# Patient Record
Sex: MALE | Race: WHITE | NOT HISPANIC OR LATINO | Employment: OTHER | URBAN - METROPOLITAN AREA
[De-identification: names, ages, dates, MRNs, and addresses within clinical notes are randomized per-mention and may not be internally consistent; named-entity substitution may affect disease eponyms.]

---

## 2018-10-16 ENCOUNTER — OFFICE VISIT (OUTPATIENT)
Dept: MEDICAL GROUP | Facility: PHYSICIAN GROUP | Age: 53
End: 2018-10-16
Payer: COMMERCIAL

## 2018-10-16 VITALS
HEIGHT: 69 IN | DIASTOLIC BLOOD PRESSURE: 70 MMHG | TEMPERATURE: 97.8 F | HEART RATE: 74 BPM | BODY MASS INDEX: 27.85 KG/M2 | SYSTOLIC BLOOD PRESSURE: 122 MMHG | WEIGHT: 188 LBS | OXYGEN SATURATION: 95 %

## 2018-10-16 DIAGNOSIS — J33.9 NASAL POLYP: ICD-10-CM

## 2018-10-16 DIAGNOSIS — Z12.12 SCREENING FOR COLORECTAL CANCER: ICD-10-CM

## 2018-10-16 DIAGNOSIS — Z23 NEED FOR INFLUENZA VACCINATION: ICD-10-CM

## 2018-10-16 DIAGNOSIS — Z12.11 SCREENING FOR COLORECTAL CANCER: ICD-10-CM

## 2018-10-16 PROCEDURE — 99386 PREV VISIT NEW AGE 40-64: CPT | Mod: 25 | Performed by: FAMILY MEDICINE

## 2018-10-16 PROCEDURE — 90686 IIV4 VACC NO PRSV 0.5 ML IM: CPT | Performed by: FAMILY MEDICINE

## 2018-10-16 PROCEDURE — 90471 IMMUNIZATION ADMIN: CPT | Performed by: FAMILY MEDICINE

## 2018-10-16 RX ORDER — FLUTICASONE PROPIONATE 50 MCG
1 SPRAY, SUSPENSION (ML) NASAL DAILY
COMMUNITY
End: 2018-10-16 | Stop reason: SDUPTHER

## 2018-10-16 RX ORDER — FLUTICASONE PROPIONATE 50 MCG
1 SPRAY, SUSPENSION (ML) NASAL DAILY
Qty: 16 G | Refills: 5 | Status: SHIPPED | OUTPATIENT
Start: 2018-10-16 | End: 2020-08-07

## 2018-10-16 ASSESSMENT — ENCOUNTER SYMPTOMS
PSYCHIATRIC NEGATIVE: 1
COUGH: 0
BLURRED VISION: 0
MYALGIAS: 0
CARDIOVASCULAR NEGATIVE: 1
BRUISES/BLEEDS EASILY: 0
DIZZINESS: 0
NEUROLOGICAL NEGATIVE: 1
CONSTITUTIONAL NEGATIVE: 1
HEMOPTYSIS: 0
MUSCULOSKELETAL NEGATIVE: 1
EYES NEGATIVE: 1
NAUSEA: 0
PALPITATIONS: 0
RESPIRATORY NEGATIVE: 1
DEPRESSION: 0
TINGLING: 0
HEADACHES: 0
DOUBLE VISION: 0
SINUS PAIN: 1
HEARTBURN: 0
CHILLS: 0
FEVER: 0
GASTROINTESTINAL NEGATIVE: 1

## 2018-10-16 ASSESSMENT — PATIENT HEALTH QUESTIONNAIRE - PHQ9: CLINICAL INTERPRETATION OF PHQ2 SCORE: 0

## 2018-10-16 NOTE — PROGRESS NOTES
Subjective:      Sebastian Wall is a 53 y.o. male who presents with [facility]            New patient visit, well controlled nasal polyps    Needs colonoscopy and flu shot for hm    1. Need for influenza vaccination    - Flu Quad Inj >3 Year Pre-Filled (Preservative Free)    2. Nasal polyp    - fluticasone (FLONASE) 50 MCG/ACT nasal spray; Spray 1 Spray in nose every day.  Dispense: 16 g; Refill: 5    3. Screening for colorectal cancer    - REFERRAL TO GI FOR COLONOSCOPY    Past Medical History:  No date: Nasal polyp  Past Surgical History:  No date: NASAL POLYPECTOMY  Smoking status: Never Smoker                                                              Smokeless tobacco: Never Used                      Alcohol use: Yes              Comment: occ    History reviewed.  No pertinent family history.      Current Outpatient Prescriptions: •  fluticasone-salmeterol (ADVAIR) 250-50 MCG/DOSE AEROSOL POWDER, BREATH ACTIVATED, Inhale 1 Puff by mouth 2 Times a Day., Disp: , Rfl: •  Mometasone Furo-Formoterol Fum (DULERA) 100-5 MCG/ACT Aerosol, Inhale  by mouth., Disp: , Rfl: •  fluticasone (FLONASE) 50 MCG/ACT nasal spray, Spray 1 Spray in nose every day., Disp: 16 g, Rfl: 5    Patient was instructed on the use of medications, either prescriptions or OTC and informed on when the appropriate follow up time period should be. In addition, patient was also instructed that should any acute worsening occur that they should notify this clinic asap or call 911.            Review of Systems   Constitutional: Negative.  Negative for chills and fever.   HENT: Positive for congestion and sinus pain. Negative for hearing loss.    Eyes: Negative.  Negative for blurred vision and double vision.   Respiratory: Negative.  Negative for cough and hemoptysis.    Cardiovascular: Negative.  Negative for chest pain and palpitations.   Gastrointestinal: Negative.  Negative for heartburn and nausea.   Genitourinary: Negative.  Negative for dysuria.  "  Musculoskeletal: Negative.  Negative for myalgias.   Skin: Negative.  Negative for rash.   Neurological: Negative.  Negative for dizziness, tingling and headaches.   Endo/Heme/Allergies: Negative.  Does not bruise/bleed easily.   Psychiatric/Behavioral: Negative.  Negative for depression and suicidal ideas.   All other systems reviewed and are negative.         Objective:     /70 (BP Location: Right arm, Patient Position: Sitting)   Pulse 74   Temp 36.6 °C (97.8 °F)   Ht 1.76 m (5' 9.29\")   Wt 85.3 kg (188 lb)   SpO2 95%   BMI 27.53 kg/m²      Physical Exam   Constitutional: He is oriented to person, place, and time. He appears well-developed and well-nourished. No distress.   HENT:   Head: Normocephalic and atraumatic.   Right Ear: External ear normal.   Left Ear: External ear normal.   Nose: Mucosal edema present.   Mouth/Throat: Oropharynx is clear and moist. No oropharyngeal exudate.   Eyes: Pupils are equal, round, and reactive to light. Right eye exhibits no discharge. Left eye exhibits no discharge. No scleral icterus.   Neck: Normal range of motion. Neck supple. No JVD present. No tracheal deviation present. No thyromegaly present.   Cardiovascular: Normal rate, regular rhythm, normal heart sounds and intact distal pulses.  Exam reveals no gallop and no friction rub.    No murmur heard.  Pulmonary/Chest: Effort normal and breath sounds normal. No stridor. No respiratory distress. He has no wheezes. He has no rales. He exhibits no tenderness.   Abdominal: Soft. He exhibits no distension. There is no tenderness.   Lymphadenopathy:     He has no cervical adenopathy.   Neurological: He is alert and oriented to person, place, and time. No cranial nerve deficit.   Skin: He is not diaphoretic.   Psychiatric: He has a normal mood and affect. His behavior is normal. Judgment and thought content normal.   Nursing note and vitals reviewed.              Assessment/Plan:     1. Need for influenza " vaccination    - Flu Quad Inj >3 Year Pre-Filled (Preservative Free)    2. Nasal polyp    - fluticasone (FLONASE) 50 MCG/ACT nasal spray; Spray 1 Spray in nose every day.  Dispense: 16 g; Refill: 5    3. Screening for colorectal cancer  - REFERRAL TO GI FOR COLONOSCOPY

## 2018-12-19 DIAGNOSIS — J45.909 UNCOMPLICATED ASTHMA, UNSPECIFIED ASTHMA SEVERITY, UNSPECIFIED WHETHER PERSISTENT: ICD-10-CM

## 2018-12-19 NOTE — TELEPHONE ENCOUNTER
Was the patient seen in the last year in this department? Yes    Does patient have an active prescription for medications requested? Yes    Received Request Via: Pharmacy     /Last Visit: 10/16/18  Last Labs:

## 2019-10-16 DIAGNOSIS — J45.909 UNCOMPLICATED ASTHMA, UNSPECIFIED ASTHMA SEVERITY, UNSPECIFIED WHETHER PERSISTENT: ICD-10-CM

## 2019-11-14 RX ORDER — FLUTICASONE PROPIONATE 50 MCG
SPRAY, SUSPENSION (ML) NASAL
COMMUNITY
Start: 2016-09-30 | End: 2019-11-14

## 2019-11-15 ENCOUNTER — ANESTHESIA (OUTPATIENT)
Dept: SURGERY | Facility: MEDICAL CENTER | Age: 54
End: 2019-11-15
Payer: COMMERCIAL

## 2019-11-15 ENCOUNTER — ANESTHESIA EVENT (OUTPATIENT)
Dept: SURGERY | Facility: MEDICAL CENTER | Age: 54
End: 2019-11-15
Payer: COMMERCIAL

## 2019-11-15 ENCOUNTER — HOSPITAL ENCOUNTER (OUTPATIENT)
Facility: MEDICAL CENTER | Age: 54
End: 2019-11-15
Attending: OTOLARYNGOLOGY | Admitting: OTOLARYNGOLOGY
Payer: COMMERCIAL

## 2019-11-15 VITALS
BODY MASS INDEX: 27.99 KG/M2 | RESPIRATION RATE: 16 BRPM | DIASTOLIC BLOOD PRESSURE: 58 MMHG | WEIGHT: 199.96 LBS | TEMPERATURE: 97.7 F | HEART RATE: 76 BPM | SYSTOLIC BLOOD PRESSURE: 131 MMHG | HEIGHT: 71 IN | OXYGEN SATURATION: 96 %

## 2019-11-15 DIAGNOSIS — Z98.890 STATUS POST FUNCTIONAL ENDOSCOPIC SINUS SURGERY (FESS): ICD-10-CM

## 2019-11-15 PROCEDURE — 500331 HCHG COTTONOID, SURG PATTIE: Performed by: OTOLARYNGOLOGY

## 2019-11-15 PROCEDURE — 501404 HCHG SPLINT, NASAL DOYLE AIRWAY: Performed by: OTOLARYNGOLOGY

## 2019-11-15 PROCEDURE — 160048 HCHG OR STATISTICAL LEVEL 1-5: Performed by: OTOLARYNGOLOGY

## 2019-11-15 PROCEDURE — 160041 HCHG SURGERY MINUTES - EA ADDL 1 MIN LEVEL 4: Performed by: OTOLARYNGOLOGY

## 2019-11-15 PROCEDURE — 700102 HCHG RX REV CODE 250 W/ 637 OVERRIDE(OP): Performed by: ANESTHESIOLOGY

## 2019-11-15 PROCEDURE — A9270 NON-COVERED ITEM OR SERVICE: HCPCS | Performed by: OTOLARYNGOLOGY

## 2019-11-15 PROCEDURE — 700111 HCHG RX REV CODE 636 W/ 250 OVERRIDE (IP): Performed by: ANESTHESIOLOGY

## 2019-11-15 PROCEDURE — A9270 NON-COVERED ITEM OR SERVICE: HCPCS | Performed by: ANESTHESIOLOGY

## 2019-11-15 PROCEDURE — 700101 HCHG RX REV CODE 250: Performed by: ANESTHESIOLOGY

## 2019-11-15 PROCEDURE — 160002 HCHG RECOVERY MINUTES (STAT): Performed by: OTOLARYNGOLOGY

## 2019-11-15 PROCEDURE — 160025 RECOVERY II MINUTES (STATS): Performed by: OTOLARYNGOLOGY

## 2019-11-15 PROCEDURE — 160035 HCHG PACU - 1ST 60 MINS PHASE I: Performed by: OTOLARYNGOLOGY

## 2019-11-15 PROCEDURE — 160009 HCHG ANES TIME/MIN: Performed by: OTOLARYNGOLOGY

## 2019-11-15 PROCEDURE — A6402 STERILE GAUZE <= 16 SQ IN: HCPCS | Performed by: OTOLARYNGOLOGY

## 2019-11-15 PROCEDURE — 160046 HCHG PACU - 1ST 60 MINS PHASE II: Performed by: OTOLARYNGOLOGY

## 2019-11-15 PROCEDURE — 160029 HCHG SURGERY MINUTES - 1ST 30 MINS LEVEL 4: Performed by: OTOLARYNGOLOGY

## 2019-11-15 PROCEDURE — 700101 HCHG RX REV CODE 250: Performed by: OTOLARYNGOLOGY

## 2019-11-15 PROCEDURE — 160036 HCHG PACU - EA ADDL 30 MINS PHASE I: Performed by: OTOLARYNGOLOGY

## 2019-11-15 PROCEDURE — 502240 HCHG MISC OR SUPPLY RC 0272: Performed by: OTOLARYNGOLOGY

## 2019-11-15 PROCEDURE — C1726 CATH, BAL DIL, NON-VASCULAR: HCPCS | Performed by: OTOLARYNGOLOGY

## 2019-11-15 PROCEDURE — 501838 HCHG SUTURE GENERAL: Performed by: OTOLARYNGOLOGY

## 2019-11-15 PROCEDURE — 501419 HCHG SPONGE, NASAL MRCL: Performed by: OTOLARYNGOLOGY

## 2019-11-15 PROCEDURE — 700105 HCHG RX REV CODE 258: Performed by: OTOLARYNGOLOGY

## 2019-11-15 PROCEDURE — 700102 HCHG RX REV CODE 250 W/ 637 OVERRIDE(OP): Performed by: OTOLARYNGOLOGY

## 2019-11-15 DEVICE — IMPLANT FUROATE MOMETASONE 8MM (1/EA): Type: IMPLANTABLE DEVICE | Status: FUNCTIONAL

## 2019-11-15 RX ORDER — CEFAZOLIN SODIUM 1 G/3ML
INJECTION, POWDER, FOR SOLUTION INTRAMUSCULAR; INTRAVENOUS PRN
Status: DISCONTINUED | OUTPATIENT
Start: 2019-11-15 | End: 2019-11-15 | Stop reason: SURG

## 2019-11-15 RX ORDER — LIDOCAINE HYDROCHLORIDE AND EPINEPHRINE 10; 10 MG/ML; UG/ML
INJECTION, SOLUTION INFILTRATION; PERINEURAL
Status: DISCONTINUED | OUTPATIENT
Start: 2019-11-15 | End: 2019-11-15 | Stop reason: HOSPADM

## 2019-11-15 RX ORDER — ONDANSETRON 2 MG/ML
4 INJECTION INTRAMUSCULAR; INTRAVENOUS
Status: DISCONTINUED | OUTPATIENT
Start: 2019-11-15 | End: 2019-11-15 | Stop reason: HOSPADM

## 2019-11-15 RX ORDER — ONDANSETRON 2 MG/ML
INJECTION INTRAMUSCULAR; INTRAVENOUS PRN
Status: DISCONTINUED | OUTPATIENT
Start: 2019-11-15 | End: 2019-11-15 | Stop reason: SURG

## 2019-11-15 RX ORDER — MOMETASONE FUROATE 1 MG/G
CREAM TOPICAL
Status: DISCONTINUED | OUTPATIENT
Start: 2019-11-15 | End: 2019-11-15 | Stop reason: HOSPADM

## 2019-11-15 RX ORDER — HALOPERIDOL 5 MG/ML
1 INJECTION INTRAMUSCULAR
Status: DISCONTINUED | OUTPATIENT
Start: 2019-11-15 | End: 2019-11-15 | Stop reason: HOSPADM

## 2019-11-15 RX ORDER — DEXAMETHASONE SODIUM PHOSPHATE 4 MG/ML
INJECTION, SOLUTION INTRA-ARTICULAR; INTRALESIONAL; INTRAMUSCULAR; INTRAVENOUS; SOFT TISSUE PRN
Status: DISCONTINUED | OUTPATIENT
Start: 2019-11-15 | End: 2019-11-15 | Stop reason: SURG

## 2019-11-15 RX ORDER — DIPHENHYDRAMINE HYDROCHLORIDE 50 MG/ML
12.5 INJECTION INTRAMUSCULAR; INTRAVENOUS
Status: DISCONTINUED | OUTPATIENT
Start: 2019-11-15 | End: 2019-11-15 | Stop reason: HOSPADM

## 2019-11-15 RX ORDER — CEFDINIR 300 MG/1
300 CAPSULE ORAL 2 TIMES DAILY
Qty: 14 CAP | Refills: 0 | Status: SHIPPED | OUTPATIENT
Start: 2019-11-15 | End: 2019-11-22

## 2019-11-15 RX ORDER — SODIUM CHLORIDE, SODIUM LACTATE, POTASSIUM CHLORIDE, CALCIUM CHLORIDE 600; 310; 30; 20 MG/100ML; MG/100ML; MG/100ML; MG/100ML
INJECTION, SOLUTION INTRAVENOUS CONTINUOUS
Status: DISCONTINUED | OUTPATIENT
Start: 2019-11-15 | End: 2019-11-15 | Stop reason: HOSPADM

## 2019-11-15 RX ORDER — HYDROCODONE BITARTRATE AND ACETAMINOPHEN 10; 325 MG/1; MG/1
1 TABLET ORAL EVERY 6 HOURS PRN
Qty: 25 TAB | Refills: 0 | Status: SHIPPED | OUTPATIENT
Start: 2019-11-15 | End: 2019-11-22

## 2019-11-15 RX ORDER — OXYMETAZOLINE HYDROCHLORIDE 0.05 G/100ML
SPRAY NASAL
Status: DISCONTINUED | OUTPATIENT
Start: 2019-11-15 | End: 2019-11-15 | Stop reason: HOSPADM

## 2019-11-15 RX ADMIN — CEFAZOLIN 2 G: 1 INJECTION, POWDER, FOR SOLUTION INTRAVENOUS at 10:00

## 2019-11-15 RX ADMIN — SUGAMMADEX 200 MG: 100 INJECTION, SOLUTION INTRAVENOUS at 10:48

## 2019-11-15 RX ADMIN — PROPOFOL 250 MG: 10 INJECTION, EMULSION INTRAVENOUS at 10:07

## 2019-11-15 RX ADMIN — ONDANSETRON 4 MG: 2 INJECTION INTRAMUSCULAR; INTRAVENOUS at 10:36

## 2019-11-15 RX ADMIN — FENTANYL CITRATE 25 MCG: 0.05 INJECTION, SOLUTION INTRAMUSCULAR; INTRAVENOUS at 11:35

## 2019-11-15 RX ADMIN — DEXAMETHASONE SODIUM PHOSPHATE 4 MG: 4 INJECTION, SOLUTION INTRAMUSCULAR; INTRAVENOUS at 10:36

## 2019-11-15 RX ADMIN — LIDOCAINE HYDROCHLORIDE 100 MG: 20 INJECTION, SOLUTION INFILTRATION; PERINEURAL at 10:07

## 2019-11-15 RX ADMIN — ROCURONIUM BROMIDE 50 MG: 10 INJECTION INTRAVENOUS at 10:07

## 2019-11-15 RX ADMIN — SODIUM CHLORIDE, POTASSIUM CHLORIDE, SODIUM LACTATE AND CALCIUM CHLORIDE: 600; 310; 30; 20 INJECTION, SOLUTION INTRAVENOUS at 08:30

## 2019-11-15 RX ADMIN — HYDROCODONE BITARTRATE AND ACETAMINOPHEN 15 ML: 7.5; 325 SOLUTION ORAL at 11:35

## 2019-11-15 RX ADMIN — LIDOCAINE HYDROCHLORIDE 0.5 ML: 10 INJECTION, SOLUTION INFILTRATION; PERINEURAL at 08:30

## 2019-11-15 NOTE — DISCHARGE INSTRUCTIONS
ACTIVITY: Rest and take it easy for the first 24 hours.  A responsible adult is recommended to remain with you during that time.  It is normal to feel sleepy.  We encourage you to not do anything that requires balance, judgment or coordination.    MILD FLU-LIKE SYMPTOMS ARE NORMAL. YOU MAY EXPERIENCE GENERALIZED MUSCLE ACHES, THROAT IRRITATION, HEADACHE AND/OR SOME NAUSEA.    FOR 24 HOURS DO NOT:  Drive, operate machinery or run household appliances.  Drink beer or alcoholic beverages.   Make important decisions or sign legal documents.    SPECIAL INSTRUCTIONS: Sleep/rest with head elevated at least 45 degrees (ie well propped up with pillows in bed or in recliner chair) Change drip pad as needed. Do not blow nose.     DIET: To avoid nausea, slowly advance diet as tolerated, avoiding spicy or greasy foods for the first day.  Add more substantial food to your diet according to your physician's instructions.  Babies can be fed formula or breast milk as soon as they are hungry.  INCREASE FLUIDS AND FIBER TO AVOID CONSTIPATION.        FOLLOW-UP APPOINTMENT:  A follow-up appointment should be arranged with your doctor ; call to schedule.    You should CALL YOUR PHYSICIAN if you develop:  Fever greater than 101 degrees F.  Pain not relieved by medication, or persistent nausea or vomiting.  Excessive bleeding (blood soaking through dressing) or unexpected drainage from the wound.  Extreme redness or swelling around the incision site, drainage of pus or foul smelling drainage.  Inability to urinate or empty your bladder within 8 hours.  Problems with breathing or chest pain.    You should call 911 if you develop problems with breathing or chest pain.  If you are unable to contact your doctor or surgical center, you should go to the nearest emergency room or urgent care center.  Physician's telephone Dr. Tenorio  #: [phone]    If any questions arise, call your doctor.  If your doctor is not available, please feel free to  call the Surgical Center at [phone].  The Center is open Monday through Friday from 7AM to 7PM.  You can also call the HEALTH HOTLINE open 24 hours/day, 7 days/week and speak to a nurse at [phone], or toll free at [phone].    A registered nurse may call you a few days after your surgery to see how you are doing after your procedure.    MEDICATIONS: Resume taking daily medication.  Take prescribed pain medication with food.  If no medication is prescribed, you may take non-aspirin pain medication if needed.  PAIN MEDICATION CAN BE VERY CONSTIPATING.  Take a stool softener or laxative such as senokot, pericolace, or milk of magnesia if needed.    Prescription given for Norco and Omnicef .  Last pain medication given at 5280.    If your physician has prescribed pain medication that includes Acetaminophen (Tylenol), do not take additional Acetaminophen (Tylenol) while taking the prescribed medication.    Depression / Suicide Risk    As you are discharged from this [facility] facility, it is important to learn how to keep safe from harming yourself.    Recognize the warning signs:  · Abrupt changes in personality, positive or negative- including increase in energy   · Giving away possessions  · Change in eating patterns- significant weight changes-  positive or negative  · Change in sleeping patterns- unable to sleep or sleeping all the time   · Unwillingness or inability to communicate  · Depression  · Unusual sadness, discouragement and loneliness  · Talk of wanting to die  · Neglect of personal appearance   · Rebelliousness- reckless behavior  · Withdrawal from people/activities they love  · Confusion- inability to concentrate     If you or a loved one observes any of these behaviors or has concerns about self-harm, here's what you can do:  · Talk about it- your feelings and reasons for harming yourself  · Remove any means that you might use to hurt yourself (examples: pills, rope, extension  cords, firearm)  · Get professional help from the community (Mental Health, Substance Abuse, psychological counseling)  · Do not be alone:Call your Safe Contact- someone whom you trust who will be there for you.  · Call your local CRISIS HOTLINE 338-1808 or 612-383-4848  · Call your local Children's Mobile Crisis Response Team [place] (926) 014-6886 or www.Mohive  · Call the toll free National Suicide Prevention Hotlines   · National Suicide Prevention Lifeline 950-541-JQUN (1086)  · National Hope Line Network 800-SUICIDE (094-9576)

## 2019-11-15 NOTE — OR NURSING
1216 PT DRESSED, TRANSFERRED TO RECLINER.  PT DRESSED, TRANSFERRED TO RECLINER.  VSS. 1220 WIFE AT CHAIRSIDE. NOSE SLING AND 4X4 DRESSING APPLIED.  MIN SANG DRAINAGE TO GAUZE DRESSING OR OROPHARYNX. PT REPORTS PAIN TOLERABLE, CORRINE NAUSEA.    1245 PT MEETS CRITERIA FOR D/C TO HOME.

## 2019-11-15 NOTE — ANESTHESIA PROCEDURE NOTES
Airway  Date/Time: 11/15/2019 10:08 AM  Performed by: Robbin Velasco M.D.  Authorized by: Robbin Velasco M.D.     Location:  OR  Urgency:  Elective  Indications for Airway Management:  Anesthesia  Spontaneous Ventilation: absent    Sedation Level:  Deep  Preoxygenated: Yes    Patient Position:  Sniffing  Final Airway Type:  Endotracheal airway  Final Endotracheal Airway:  ETT  Cuffed: Yes    Technique Used for Successful ETT Placement:  Direct laryngoscopy  Insertion Site:  Oral  Blade Type:  Fernández  Laryngoscope Blade/Videolaryngoscope Blade Size:  3  ETT Size (mm):  8.0  Measured from:  Teeth  ETT to Teeth (cm):  24  Placement Verified by: auscultation and capnometry    Cormack-Lehane Classification:  Grade I - full view of glottis  Number of Attempts at Approach:  1

## 2019-11-15 NOTE — OP REPORT
DATE OF SERVICE:  11/15/2019    PREOPERATIVE DIAGNOSES:  Chronic sinusitis with nasal polyposis and turbinate   hypertrophy.     PROCEDURE:  Bilateral frontal sinus revision, bilateral sphenoid sinus   revision, bilateral ethmoid sinus revision, bilateral maxillary sinus revision   and bilateral turbinoplasty.      SURGEON:  Felicitas Tenorio MD    ANESTHESIOLOGIST:  Robbin Velasco MD    ANESTHESIA:  General.    NARRATIVE:  After meeting the patient in preoperative holding area, discussing   risks, benefits, complications, and alternatives, patient was taken to the   operating room, placed under satisfactory general anesthesia.  CT scan was   hung at the head of the bed.  I put 2 Afrin pledgets on each side of the nasal   cavity and I left to scrub.  Upon returning, the pledgets were removed after   the patient had been draped and I examined the nose intranasally with a   0-degree scope.  Massive polyposis was noted in the frontal, ethmoid,   maxillary, and sphenoid region.  I then injected each of the polyps as much as   possible and then placed in the magnum straight shot handpiece and shaved out   the polyp tissue from the base of the skull, ethmoid, sphenoid, and maxillary   sinuses.  After removing the tissue, we then used the #6 balloon to make sure   that the right frontal sinus was patent, opened it widely with a balloon   confirming the placement with LAD.  This was confirmed on the right frontal   sinus as well as the left.  Once that was completed, I then placed in the   passageway of the frontal recess mometasone ointment and a contour stent to   keep it open.  After that had been completed, the polyp tissue was removed out   of the right and left maxillary sinuses.  Once that was completely removed of   the very thickened tenacious mucus and polypoid tissue, I then placed a   mometasone ointment.  The sphenoid sinuses had scarred shut, so they were   opened on the right and left hand side with a #6  balloon, suctioned the   contents out and then placed in the mometasone as well.  Once that was   completed, I outfractured the inferior turbinates, remaining portions were   reduced slightly with a laser and then all tissues were taken down to the base   surface from the polyps.  Two Sylvain sinus packs were then placed and after   being coated with mometasone 0.1%, patient was suctioned out, returned to   anesthesia, awakened, extubated, and transferred to recovery.  Estimated blood   loss was about 10 mL.       ____________________________________     MD NEVIN CROSS / NTS    DD:  11/15/2019 10:59:00  DT:  11/15/2019 11:13:07    D#:  [id number]  Job#:  [id number]

## 2019-11-15 NOTE — OR NURSING
Into pre op, educated on pre op procedures and schedule for this event   0838 tolerated all pre op procedures well, spouse in waiting room, pt denies need that she be in pre op as she is working out in the waiting room.

## 2019-11-15 NOTE — ANESTHESIA PREPROCEDURE EVALUATION
Relevant Problems   No relevant active problems   nasal polyp    Physical Exam    Airway   Mallampati: II  TM distance: <3 FB  Neck ROM: full       Cardiovascular   Rhythm: regular  Rate: normal     Dental    Pulmonary   Breath sounds clear to auscultation     Abdominal    Neurological - normal exam                 Anesthesia Plan    ASA 1       Plan - general       Airway plan will be ETT        Induction: intravenous          Informed Consent:    Anesthetic plan and risks discussed with patient.

## 2019-11-15 NOTE — ANESTHESIA TIME REPORT
Anesthesia Start and Stop Event Times     Date Time Event    11/15/2019 0945 Ready for Procedure     1000 Anesthesia Start     1100 Anesthesia Stop        Responsible Staff  11/15/19    Name Role Begin End    Robbin Velasco M.D. Anesth 1000 1100        Preop Diagnosis (Free Text):  Pre-op Diagnosis     CHRONIC PANSINUSITIS, HYPERTROPHY OF NASAL TURBINATES NASAL POLYPS        Preop Diagnosis (Codes):    Post op Diagnosis  Nasal polyps      Premium Reason  Non-Premium    Comments:

## 2019-11-15 NOTE — OR NURSING
1216 PT DRESSED, TRANSFERRED TO RECLINER.  PT DRESSED, TRANSFERRED TO RECLINER.  VSS. 1230 PT AMBULATES TO RESTROOM TO VOID.

## 2019-11-15 NOTE — ANESTHESIA POSTPROCEDURE EVALUATION
Patient: Andrew Wall    Procedure Summary     Date:  11/15/19 Room / Location:   OR  / SURGERY [facility]    Anesthesia Start:  1000 Anesthesia Stop:  1100    Procedures:       MAXILLARY ANTROSTOMY- REVISION ENDOSCOPICMOMETASONE INJECTION, PROPEL STENT PLACEMENT (Bilateral Nose)      ENDOSCOPY, PARANASAL SINUSES- FOR FRONTAL EXPLORATION (Bilateral Nose)      TURBINOPLASTY (Bilateral Nose)      SPHENOIDECTOMY- FOR SPHENOIDOTOMY (Bilateral Nose)      ETHMOIDECTOMY- ENDOSCOPIC (Bilateral Nose)      POLYPECTOMY, NASAL CAVITY (Bilateral Nose) Diagnosis:  (CHRONIC PANSINUSITIS, HYPERTROPHY OF NASAL TURBINATES NASAL POLYPS)    Surgeon:  Felicitas Tenorio M.D. Responsible Provider:  Robbin Velasco M.D.    Anesthesia Type:  general ASA Status:  1          Final Anesthesia Type: general  Last vitals  BP   Blood Pressure: 131/58    Temp   36.5 °C (97.7 °F)    Pulse   Pulse: 76   Resp   16    SpO2   96 %      Anesthesia Post Evaluation    Patient location during evaluation: PACU  Patient participation: complete - patient participated  Level of consciousness: awake and alert    Airway patency: patent  Anesthetic complications: no  Cardiovascular status: hemodynamically stable  Respiratory status: acceptable  Hydration status: euvolemic    PONV: none           Nurse Pain Score: 2 (NPRS)

## 2019-11-15 NOTE — ANESTHESIA QCDR
2019 [facility] Clinical Data Registry (for Quality Improvement)     Postoperative nausea/vomiting risk protocol (Adult = 18 yrs and Pediatric 3-17 yrs)- (430 and 463)  General inhalation anesthetic (NOT TIVA) with PONV risk factors: Yes  Provision of anti-emetic therapy with at least 2 different classes of agents: Yes   Patient DID NOT receive anti-emetic therapy and reason is documented in Medical Record:  N/A    Multimodal Pain Management- (AQI59)  Patient undergoing Elective Surgery (i.e. Outpatient, or ASC, or Prescheduled Surgery prior to Hospital Admission): Yes  Use of Multimodal Pain Management, two or more drugs and/or interventions, NOT including systemic opioids: Yes   Exception: Documented allergy to multiple classes of analgesics:  N/A    PACU assessment of acute postoperative pain prior to Anesthesia Care End- Applies to Patients Age = 18- (ABG7)  Initial PACU pain score is which of the following: < 7/10  Patient unable to report pain score: N/A    Post-anesthetic transfer of care checklist/protocol to PACU/ICU- (426 and 427)  Upon conclusion of case, patient transferred to which of the following locations: PACU/Non-ICU  Use of transfer checklist/protocol: Yes  Exclusion: Service Performed in Patient Hospital Room (and thus did not require transfer): N/A    PACU Reintubation- (AQI31)  General anesthesia requiring endotracheal intubation (ETT) along with subsequent extubation in OR or PACU: Yes  Required reintubation in the PACU: No   Extubation was a planned trial documented in the medical record prior to removal of the original airway device:  N/A    Unplanned admission to ICU related to anesthesia service up through end of PACU care- (MD51)  Unplanned admission to ICU (not initially anticipated at anesthesia start time): No

## 2019-11-15 NOTE — OR NURSING
1055To PACU from OR via gurney, respirations spontaneous and non-laboredIcepack applied over eyes and nose for comfort and bleeding. Dressing below nares saturated, sanguinous drainage Changed upon arrival. Blood noted to back of throat   1110 Pt remains drowsy. VSS   1125 No change   1130 Pt states pain is 4/10. Plan to medicate. See MAR   1145 VSS. No change   1155 Pt states pain is tolerable and denies nausea. VSS.   1205 Report to JODY Song. Pt meets criteria for stage two at thus time. VSS

## 2020-08-07 ENCOUNTER — OFFICE VISIT (OUTPATIENT)
Dept: MEDICAL GROUP | Facility: LAB | Age: 55
End: 2020-08-07
Payer: COMMERCIAL

## 2020-08-07 VITALS
SYSTOLIC BLOOD PRESSURE: 122 MMHG | HEART RATE: 68 BPM | TEMPERATURE: 97.1 F | WEIGHT: 190 LBS | BODY MASS INDEX: 26.6 KG/M2 | RESPIRATION RATE: 12 BRPM | HEIGHT: 71 IN | OXYGEN SATURATION: 96 % | DIASTOLIC BLOOD PRESSURE: 64 MMHG

## 2020-08-07 DIAGNOSIS — Z76.89 ENCOUNTER TO ESTABLISH CARE: ICD-10-CM

## 2020-08-07 DIAGNOSIS — J45.20 MILD INTERMITTENT ASTHMA WITHOUT COMPLICATION: ICD-10-CM

## 2020-08-07 DIAGNOSIS — J33.9 NASAL POLYP: ICD-10-CM

## 2020-08-07 DIAGNOSIS — R22.9 SKIN MASS: ICD-10-CM

## 2020-08-07 PROCEDURE — 99214 OFFICE O/P EST MOD 30 MIN: CPT | Performed by: FAMILY MEDICINE

## 2020-08-07 RX ORDER — BUDESONIDE 0.5 MG/2ML
INHALANT ORAL
COMMUNITY
Start: 2020-07-19 | End: 2020-10-14

## 2020-08-07 ASSESSMENT — ENCOUNTER SYMPTOMS
CHILLS: 0
FEVER: 0
BLOOD IN STOOL: 0
WHEEZING: 0
BLURRED VISION: 0
SHORTNESS OF BREATH: 0
COUGH: 0
CONSTIPATION: 0
DIARRHEA: 0
VOMITING: 0
PALPITATIONS: 0
ABDOMINAL PAIN: 0
NAUSEA: 0

## 2020-08-07 ASSESSMENT — PATIENT HEALTH QUESTIONNAIRE - PHQ9: CLINICAL INTERPRETATION OF PHQ2 SCORE: 0

## 2020-08-07 NOTE — PROGRESS NOTES
"Andrew Wall is a 55 y.o. male here for   Chief Complaint   Patient presents with   • Establish Care       HPI:  Andrew is a very pleasant 55 y.o. male.     #Establish Care   -Reviewed all past medical history, family history, social history.  -Reviewed all screening/vaccinations: Zoster vaccine.  -Diet and Exercise: Regular exercise, hiking 4 miles with backpacking daily.  Healthy diet regimen appropriate for age.  -Tobacco, alcohol, recreational drug use: Occasional alcohol use.  Denies any tobacco, recreational drug use.  -Sexually active: Continues to be sexually active with wife, monogamous, no very concerns regarding relationship.  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    #Asthma:  -Patient has a history of mild asthma, currently being treated with Advair (see also use for nasal polyps).  -Does have a rescue inhaler; however, has not used it for several years.  -He states he is doing very well, no concerns at this time.  Denies any shortness of breath, chest pain, coughing, wheezing at this time.    #Skin mass:  -Patient states he has longstanding history of skin mass at the lower back, beginning of gluteal cleft.  He states is been there ever since he was an \"child\".  He states that normally it is not painful, not irritating; however, he states it is grown significantly in the last several months causing significant irritation and pain when sitting, walking.  He denies any swelling, tenderness to palpation, redness, heat to touch.  -Request referral to dermatology for further evaluation and possible excision.    #History of nasal polyps:  -Patient states is a longstanding history of nasal polyps.  Status post 3 surgeries for correction, being [facility]  -Currently treating with Advair, budesonide rinses twice daily.  -He is following up with Dr. Tenorio on a routine basis has no concerns at this time.      Current " "medicines (including changes today)  Current Outpatient Medications   Medication Sig Dispense Refill   • Multiple Vitamins-Minerals (MULTIVITAMIN ADULT PO) Take  by mouth every day.     • Calcium Carbonate-Vitamin D (CALCIUM-D PO) Take  by mouth every day.     • ADVAIR DISKUS 250-50 MCG/DOSE AEROSOL POWDER, BREATH ACTIVATED INHALE 1 PUFF BY MOUTH 2 TIMES A DAY. (Patient taking differently: as needed.) 1 Inhaler 3   • fluticasone (FLONASE) 50 MCG/ACT nasal spray Spray 1 Spray in nose every day. (Patient not taking: Reported on 2020) 16 g 5     No current facility-administered medications for this visit.      He  has a past medical history of Asthma and Nasal polyp. He also has no past medical history of Hyperlipidemia.  He  has a past surgical history that includes nasal polypectomy (, ); antrostomy (Bilateral, 11/15/2019); sinuscopy (Bilateral, 11/15/2019); turbinoplasty (Bilateral, 11/15/2019); sphenoidectomy (Bilateral, 11/15/2019); ethmoidectomy (Bilateral, 11/15/2019); and nasal polypectomy (Bilateral, 11/15/2019).  Social History     Tobacco Use   • Smoking status: Never Smoker   • Smokeless tobacco: Never Used   Substance Use Topics   • Alcohol use: Not Currently   • Drug use: No     Social History     Social History Narrative   • Not on file     History reviewed. No pertinent family history.  Family Status   Relation Name Status   • Mo  Alive   • Fa           ROS  Review of Systems   Constitutional: Negative for chills and fever.   HENT: Negative for hearing loss.    Eyes: Negative for blurred vision.   Respiratory: Negative for cough, shortness of breath and wheezing.    Cardiovascular: Negative for chest pain and palpitations.   Gastrointestinal: Negative for abdominal pain, blood in stool, constipation, diarrhea, nausea and vomiting.        Objective:     /64   Pulse 68   Temp 36.2 °C (97.1 °F)   Resp 12   Ht 1.803 m (5' 11\")   Wt 86.2 kg (190 lb)   SpO2 96%  Body mass index " is 26.5 kg/m².  Physical Exam:    Constitutional: Alert, no distress.  Skin: Warm, dry, good turgor.  Large, violaceous mass that is slightly mobile, nontender located midline lower back gluteal cleft connected to skin by a small stalk.  The mass does seem to be vascular at this time.  Eye: Equal, round and reactive, conjunctiva clear, lids normal.  ENMT: Lips without lesions, good dentition, oropharynx clear. TM's pearly gray with normal light reflexes bilaterally  Neck: Trachea midline, no masses, no thyromegaly. No cervical or supraclavicular lymphadenopathy.  Respiratory: Unlabored respiratory effort, lungs clear to auscultation bilaterally, no wheezes, rales, or ronchi.  Cardiovascular: Normal S1, S2, RRR, no murmur, no edema.  Abdomen: Soft, non-tender, no masses, no hepatosplenomegaly.  Psych: Alert and oriented x3, normal affect and mood.    Assessment and Plan:   The following treatment plan was discussed    1. Encounter to establish care  -All questions concerns were answered at this time.  -Vaccinations/screenings needed at this time: zoster, deferred, no available in office .   -Labs reviewed, previous labs completed several years ago, will check labs as below.  -Discussed the importance of a healthy, Mediterranean style diet, routine exercise regimen.  -Discussed general safety measures including seatbelts, helmets, avoidance of smoking, sunscreen, hydration.  -Follow-up for general physical exam on a yearly basis, sooner if needed.  - CBC WITHOUT DIFFERENTIAL; Future  - Comp Metabolic Panel; Future  - Lipid Profile; Future    2. Skin mass  -Unknown allergen skin vesicle however, given its size and that it is not very symptomatic refer to dermatology for further evaluation and treatment at this time.  - REFERRAL TO DERMATOLOGY    3. Mild intermittent asthma without complication  -Status: Stable.  Continue with Advair, albuterol as needed.    4. Nasal polyp  -Status: Stable.  Will continue the Advair,  budesonide rinses daily.  -Follow-up with ENT as needed.      Records requested.  Followup: Return in about 1 year (around 8/7/2021).         This note was created using voice recognition software. I have made every reasonable attempt to correct errors, however, I do anticipate some grammatical errors.

## 2020-09-23 ENCOUNTER — APPOINTMENT (RX ONLY)
Dept: URBAN - METROPOLITAN AREA CLINIC 22 | Facility: CLINIC | Age: 55
Setting detail: DERMATOLOGY
End: 2020-09-23

## 2020-09-23 DIAGNOSIS — Z71.89 OTHER SPECIFIED COUNSELING: ICD-10-CM

## 2020-09-23 DIAGNOSIS — L81.4 OTHER MELANIN HYPERPIGMENTATION: ICD-10-CM

## 2020-09-23 DIAGNOSIS — L72.8 OTHER FOLLICULAR CYSTS OF THE SKIN AND SUBCUTANEOUS TISSUE: ICD-10-CM

## 2020-09-23 DIAGNOSIS — D36.1 BENIGN NEOPLASM OF PERIPHERAL NERVES AND AUTONOMIC NERVOUS SYSTEM: ICD-10-CM

## 2020-09-23 DIAGNOSIS — D18.0 HEMANGIOMA: ICD-10-CM

## 2020-09-23 DIAGNOSIS — D22 MELANOCYTIC NEVI: ICD-10-CM

## 2020-09-23 DIAGNOSIS — L82.1 OTHER SEBORRHEIC KERATOSIS: ICD-10-CM

## 2020-09-23 PROBLEM — D18.01 HEMANGIOMA OF SKIN AND SUBCUTANEOUS TISSUE: Status: ACTIVE | Noted: 2020-09-23

## 2020-09-23 PROBLEM — D22.5 MELANOCYTIC NEVI OF TRUNK: Status: ACTIVE | Noted: 2020-09-23

## 2020-09-23 PROBLEM — D48.5 NEOPLASM OF UNCERTAIN BEHAVIOR OF SKIN: Status: ACTIVE | Noted: 2020-09-23

## 2020-09-23 PROCEDURE — ? COUNSELING

## 2020-09-23 PROCEDURE — 11102 TANGNTL BX SKIN SINGLE LES: CPT

## 2020-09-23 PROCEDURE — 99203 OFFICE O/P NEW LOW 30 MIN: CPT | Mod: 25

## 2020-09-23 PROCEDURE — ? DEFER

## 2020-09-23 PROCEDURE — ? BIOPSY BY SHAVE METHOD

## 2020-09-23 ASSESSMENT — LOCATION SIMPLE DESCRIPTION DERM
LOCATION SIMPLE: RIGHT LOWER BACK
LOCATION SIMPLE: RIGHT BUTTOCK
LOCATION SIMPLE: LEFT UPPER BACK
LOCATION SIMPLE: LEFT FOREARM
LOCATION SIMPLE: CHEST

## 2020-09-23 ASSESSMENT — LOCATION DETAILED DESCRIPTION DERM
LOCATION DETAILED: RIGHT MEDIAL INFERIOR CHEST
LOCATION DETAILED: LEFT LATERAL INFERIOR CHEST
LOCATION DETAILED: RIGHT SUPERIOR LATERAL MIDBACK
LOCATION DETAILED: RIGHT MEDIAL BUTTOCK
LOCATION DETAILED: LEFT PROXIMAL DORSAL FOREARM
LOCATION DETAILED: LEFT INFERIOR UPPER BACK

## 2020-09-23 ASSESSMENT — LOCATION ZONE DERM
LOCATION ZONE: ARM
LOCATION ZONE: TRUNK

## 2020-10-14 ENCOUNTER — PRE-ADMISSION TESTING (OUTPATIENT)
Dept: ADMISSIONS | Facility: MEDICAL CENTER | Age: 55
End: 2020-10-14
Attending: SURGERY
Payer: COMMERCIAL

## 2020-10-14 DIAGNOSIS — Z01.812 PRE-OPERATIVE LABORATORY EXAMINATION: ICD-10-CM

## 2020-10-14 DIAGNOSIS — Z01.810 PRE-OPERATIVE CARDIOVASCULAR EXAMINATION: ICD-10-CM

## 2020-10-14 LAB
ALBUMIN SERPL BCP-MCNC: 4.5 G/DL (ref 3.2–4.9)
ALBUMIN/GLOB SERPL: 1.7 G/DL
ALP SERPL-CCNC: 103 U/L (ref 30–99)
ALT SERPL-CCNC: 39 U/L (ref 2–50)
ANION GAP SERPL CALC-SCNC: 11 MMOL/L (ref 7–16)
AST SERPL-CCNC: 23 U/L (ref 12–45)
BASOPHILS # BLD AUTO: 1.1 % (ref 0–1.8)
BASOPHILS # BLD: 0.08 K/UL (ref 0–0.12)
BILIRUB SERPL-MCNC: 0.3 MG/DL (ref 0.1–1.5)
BUN SERPL-MCNC: 18 MG/DL (ref 8–22)
CALCIUM SERPL-MCNC: 9.7 MG/DL (ref 8.5–10.5)
CHLORIDE SERPL-SCNC: 101 MMOL/L (ref 96–112)
CO2 SERPL-SCNC: 26 MMOL/L (ref 20–33)
COVID ORDER STATUS COVID19: NORMAL
CREAT SERPL-MCNC: 0.7 MG/DL (ref 0.5–1.4)
EKG IMPRESSION: NORMAL
EOSINOPHIL # BLD AUTO: 0.15 K/UL (ref 0–0.51)
EOSINOPHIL NFR BLD: 2.1 % (ref 0–6.9)
ERYTHROCYTE [DISTWIDTH] IN BLOOD BY AUTOMATED COUNT: 41.9 FL (ref 35.9–50)
GLOBULIN SER CALC-MCNC: 2.6 G/DL (ref 1.9–3.5)
GLUCOSE SERPL-MCNC: 109 MG/DL (ref 65–99)
HCT VFR BLD AUTO: 48.6 % (ref 42–52)
HGB BLD-MCNC: 16.5 G/DL (ref 14–18)
IMM GRANULOCYTES # BLD AUTO: 0.05 K/UL (ref 0–0.11)
IMM GRANULOCYTES NFR BLD AUTO: 0.7 % (ref 0–0.9)
INR PPP: 0.94 (ref 0.87–1.13)
LYMPHOCYTES # BLD AUTO: 1.44 K/UL (ref 1–4.8)
LYMPHOCYTES NFR BLD: 20.1 % (ref 22–41)
MCH RBC QN AUTO: 31 PG (ref 27–33)
MCHC RBC AUTO-ENTMCNC: 34 G/DL (ref 33.7–35.3)
MCV RBC AUTO: 91.2 FL (ref 81.4–97.8)
MONOCYTES # BLD AUTO: 0.54 K/UL (ref 0–0.85)
MONOCYTES NFR BLD AUTO: 7.6 % (ref 0–13.4)
NEUTROPHILS # BLD AUTO: 4.89 K/UL (ref 1.82–7.42)
NEUTROPHILS NFR BLD: 68.4 % (ref 44–72)
NRBC # BLD AUTO: 0 K/UL
NRBC BLD-RTO: 0 /100 WBC
PLATELET # BLD AUTO: 300 K/UL (ref 164–446)
PMV BLD AUTO: 10.3 FL (ref 9–12.9)
POTASSIUM SERPL-SCNC: 4.7 MMOL/L (ref 3.6–5.5)
PROT SERPL-MCNC: 7.1 G/DL (ref 6–8.2)
PROTHROMBIN TIME: 12.8 SEC (ref 12–14.6)
RBC # BLD AUTO: 5.33 M/UL (ref 4.7–6.1)
SARS-COV-2 RNA RESP QL NAA+PROBE: NOTDETECTED
SODIUM SERPL-SCNC: 138 MMOL/L (ref 135–145)
SPECIMEN SOURCE: NORMAL
WBC # BLD AUTO: 7.2 K/UL (ref 4.8–10.8)

## 2020-10-14 PROCEDURE — C9803 HOPD COVID-19 SPEC COLLECT: HCPCS

## 2020-10-14 PROCEDURE — 93005 ELECTROCARDIOGRAM TRACING: CPT

## 2020-10-14 PROCEDURE — U0003 INFECTIOUS AGENT DETECTION BY NUCLEIC ACID (DNA OR RNA); SEVERE ACUTE RESPIRATORY SYNDROME CORONAVIRUS 2 (SARS-COV-2) (CORONAVIRUS DISEASE [COVID-19]), AMPLIFIED PROBE TECHNIQUE, MAKING USE OF HIGH THROUGHPUT TECHNOLOGIES AS DESCRIBED BY CMS-2020-01-R: HCPCS

## 2020-10-14 PROCEDURE — 85025 COMPLETE CBC W/AUTO DIFF WBC: CPT

## 2020-10-14 PROCEDURE — 93010 ELECTROCARDIOGRAM REPORT: CPT | Performed by: INTERNAL MEDICINE

## 2020-10-14 PROCEDURE — 80053 COMPREHEN METABOLIC PANEL: CPT

## 2020-10-14 PROCEDURE — 85610 PROTHROMBIN TIME: CPT

## 2020-10-14 PROCEDURE — 36415 COLL VENOUS BLD VENIPUNCTURE: CPT

## 2020-10-19 ENCOUNTER — ANESTHESIA EVENT (OUTPATIENT)
Dept: SURGERY | Facility: MEDICAL CENTER | Age: 55
End: 2020-10-19
Payer: COMMERCIAL

## 2020-10-19 NOTE — TELEPHONE ENCOUNTER
Received request via: Pharmacy    Was the patient seen in the last year in this department? Yes  6/7/20  Does the patient have an active prescription (recently filled or refills available) for medication(s) requested? No

## 2020-10-19 NOTE — OR NURSING
COVID-19 Pre-surgery screenin. Do you have an undiagnosed respiratory illness or symptoms such as coughing or sneezing? NO (Yes/No)  a. Onset of Sx N/A  b. Acute vs. chronic respiratory illness N/A    2. Do you have an unexplained fever greater than 100.4 degrees Fahrenheit or 38 degrees Celsius?     NO (Yes/No)    3. Have you had direct exposure to a patient who tested positive for Covid-19?    NO (Yes/No)    4. Have you had any loss of your sense of taste or smell? Have you had N/V or sore throat? NO    Patient has been informed of visitor policy and asked to wear a mask upon entering the hospital   YES (Yes/No)

## 2020-10-20 ENCOUNTER — ANESTHESIA (OUTPATIENT)
Dept: SURGERY | Facility: MEDICAL CENTER | Age: 55
End: 2020-10-20
Payer: COMMERCIAL

## 2020-10-20 ENCOUNTER — APPOINTMENT (OUTPATIENT)
Dept: RADIOLOGY | Facility: MEDICAL CENTER | Age: 55
End: 2020-10-20
Attending: SURGERY
Payer: COMMERCIAL

## 2020-10-20 ENCOUNTER — HOSPITAL ENCOUNTER (OUTPATIENT)
Facility: MEDICAL CENTER | Age: 55
End: 2020-10-20
Attending: SURGERY | Admitting: SURGERY
Payer: COMMERCIAL

## 2020-10-20 VITALS
SYSTOLIC BLOOD PRESSURE: 126 MMHG | HEART RATE: 83 BPM | HEIGHT: 71 IN | OXYGEN SATURATION: 96 % | BODY MASS INDEX: 26.76 KG/M2 | DIASTOLIC BLOOD PRESSURE: 85 MMHG | RESPIRATION RATE: 15 BRPM | WEIGHT: 191.14 LBS | TEMPERATURE: 96.8 F

## 2020-10-20 DIAGNOSIS — C43.59 MELANOMA OF BUTTOCK (HCC): ICD-10-CM

## 2020-10-20 DIAGNOSIS — C43.59 MALIGNANT MELANOMA OF SKIN OF TRUNK, EXCEPT SCROTUM (HCC): ICD-10-CM

## 2020-10-20 LAB — PATHOLOGY CONSULT NOTE: NORMAL

## 2020-10-20 PROCEDURE — 700105 HCHG RX REV CODE 258: Performed by: SURGERY

## 2020-10-20 PROCEDURE — 501838 HCHG SUTURE GENERAL: Performed by: SURGERY

## 2020-10-20 PROCEDURE — 160048 HCHG OR STATISTICAL LEVEL 1-5: Performed by: SURGERY

## 2020-10-20 PROCEDURE — 160025 RECOVERY II MINUTES (STATS): Performed by: SURGERY

## 2020-10-20 PROCEDURE — 160046 HCHG PACU - 1ST 60 MINS PHASE II: Performed by: SURGERY

## 2020-10-20 PROCEDURE — 88342 IMHCHEM/IMCYTCHM 1ST ANTB: CPT

## 2020-10-20 PROCEDURE — 160035 HCHG PACU - 1ST 60 MINS PHASE I: Performed by: SURGERY

## 2020-10-20 PROCEDURE — 700102 HCHG RX REV CODE 250 W/ 637 OVERRIDE(OP): Performed by: ANESTHESIOLOGY

## 2020-10-20 PROCEDURE — 700101 HCHG RX REV CODE 250: Performed by: SURGERY

## 2020-10-20 PROCEDURE — 160041 HCHG SURGERY MINUTES - EA ADDL 1 MIN LEVEL 4: Performed by: SURGERY

## 2020-10-20 PROCEDURE — 160047 HCHG PACU  - EA ADDL 30 MINS PHASE II: Performed by: SURGERY

## 2020-10-20 PROCEDURE — 700111 HCHG RX REV CODE 636 W/ 250 OVERRIDE (IP): Performed by: ANESTHESIOLOGY

## 2020-10-20 PROCEDURE — 160029 HCHG SURGERY MINUTES - 1ST 30 MINS LEVEL 4: Performed by: SURGERY

## 2020-10-20 PROCEDURE — 700102 HCHG RX REV CODE 250 W/ 637 OVERRIDE(OP): Performed by: SURGERY

## 2020-10-20 PROCEDURE — 88305 TISSUE EXAM BY PATHOLOGIST: CPT | Mod: 59

## 2020-10-20 PROCEDURE — A9541 TC99M SULFUR COLLOID: HCPCS

## 2020-10-20 PROCEDURE — 160002 HCHG RECOVERY MINUTES (STAT): Performed by: SURGERY

## 2020-10-20 PROCEDURE — 88307 TISSUE EXAM BY PATHOLOGIST: CPT | Mod: 59

## 2020-10-20 PROCEDURE — 88341 IMHCHEM/IMCYTCHM EA ADD ANTB: CPT

## 2020-10-20 PROCEDURE — 160009 HCHG ANES TIME/MIN: Performed by: SURGERY

## 2020-10-20 PROCEDURE — A9270 NON-COVERED ITEM OR SERVICE: HCPCS | Performed by: ANESTHESIOLOGY

## 2020-10-20 PROCEDURE — A9270 NON-COVERED ITEM OR SERVICE: HCPCS | Performed by: SURGERY

## 2020-10-20 PROCEDURE — 700101 HCHG RX REV CODE 250: Performed by: ANESTHESIOLOGY

## 2020-10-20 PROCEDURE — 700111 HCHG RX REV CODE 636 W/ 250 OVERRIDE (IP): Performed by: SURGERY

## 2020-10-20 RX ORDER — HYDROMORPHONE HYDROCHLORIDE 2 MG/ML
INJECTION, SOLUTION INTRAMUSCULAR; INTRAVENOUS; SUBCUTANEOUS PRN
Status: DISCONTINUED | OUTPATIENT
Start: 2020-10-20 | End: 2020-10-20 | Stop reason: SURG

## 2020-10-20 RX ORDER — ISOSULFAN BLUE 50 MG/5ML
INJECTION, SOLUTION SUBCUTANEOUS
Status: DISCONTINUED
Start: 2020-10-20 | End: 2020-10-20 | Stop reason: HOSPADM

## 2020-10-20 RX ORDER — ONDANSETRON 2 MG/ML
4 INJECTION INTRAMUSCULAR; INTRAVENOUS
Status: DISCONTINUED | OUTPATIENT
Start: 2020-10-20 | End: 2020-10-20 | Stop reason: HOSPADM

## 2020-10-20 RX ORDER — MEPERIDINE HYDROCHLORIDE 25 MG/ML
6.25 INJECTION INTRAMUSCULAR; INTRAVENOUS; SUBCUTANEOUS
Status: DISCONTINUED | OUTPATIENT
Start: 2020-10-20 | End: 2020-10-20 | Stop reason: HOSPADM

## 2020-10-20 RX ORDER — OXYCODONE HCL 5 MG/5 ML
5 SOLUTION, ORAL ORAL
Status: DISCONTINUED | OUTPATIENT
Start: 2020-10-20 | End: 2020-10-20 | Stop reason: HOSPADM

## 2020-10-20 RX ORDER — HYDROCODONE BITARTRATE AND ACETAMINOPHEN 5; 325 MG/1; MG/1
1 TABLET ORAL EVERY 4 HOURS PRN
Qty: 16 TAB | Refills: 0 | Status: SHIPPED | OUTPATIENT
Start: 2020-10-20 | End: 2020-10-24

## 2020-10-20 RX ORDER — SODIUM CHLORIDE, SODIUM LACTATE, POTASSIUM CHLORIDE, CALCIUM CHLORIDE 600; 310; 30; 20 MG/100ML; MG/100ML; MG/100ML; MG/100ML
INJECTION, SOLUTION INTRAVENOUS CONTINUOUS
Status: DISCONTINUED | OUTPATIENT
Start: 2020-10-20 | End: 2020-10-20 | Stop reason: HOSPADM

## 2020-10-20 RX ORDER — IPRATROPIUM BROMIDE AND ALBUTEROL SULFATE 2.5; .5 MG/3ML; MG/3ML
3 SOLUTION RESPIRATORY (INHALATION)
Status: DISCONTINUED | OUTPATIENT
Start: 2020-10-20 | End: 2020-10-20 | Stop reason: HOSPADM

## 2020-10-20 RX ORDER — HALOPERIDOL 5 MG/ML
1 INJECTION INTRAMUSCULAR
Status: DISCONTINUED | OUTPATIENT
Start: 2020-10-20 | End: 2020-10-20 | Stop reason: HOSPADM

## 2020-10-20 RX ORDER — ONDANSETRON 2 MG/ML
INJECTION INTRAMUSCULAR; INTRAVENOUS PRN
Status: DISCONTINUED | OUTPATIENT
Start: 2020-10-20 | End: 2020-10-20 | Stop reason: SURG

## 2020-10-20 RX ORDER — DEXAMETHASONE SODIUM PHOSPHATE 4 MG/ML
INJECTION, SOLUTION INTRA-ARTICULAR; INTRALESIONAL; INTRAMUSCULAR; INTRAVENOUS; SOFT TISSUE PRN
Status: DISCONTINUED | OUTPATIENT
Start: 2020-10-20 | End: 2020-10-20 | Stop reason: SURG

## 2020-10-20 RX ORDER — ACETAMINOPHEN 500 MG
1000 TABLET ORAL ONCE
Status: COMPLETED | OUTPATIENT
Start: 2020-10-20 | End: 2020-10-20

## 2020-10-20 RX ORDER — BUPIVACAINE HYDROCHLORIDE 2.5 MG/ML
INJECTION, SOLUTION EPIDURAL; INFILTRATION; INTRACAUDAL
Status: DISCONTINUED
Start: 2020-10-20 | End: 2020-10-20 | Stop reason: HOSPADM

## 2020-10-20 RX ORDER — CELECOXIB 200 MG/1
200 CAPSULE ORAL ONCE
Status: COMPLETED | OUTPATIENT
Start: 2020-10-20 | End: 2020-10-20

## 2020-10-20 RX ORDER — HYDROMORPHONE HYDROCHLORIDE 1 MG/ML
0.4 INJECTION, SOLUTION INTRAMUSCULAR; INTRAVENOUS; SUBCUTANEOUS
Status: DISCONTINUED | OUTPATIENT
Start: 2020-10-20 | End: 2020-10-20 | Stop reason: HOSPADM

## 2020-10-20 RX ORDER — BUPIVACAINE HYDROCHLORIDE AND EPINEPHRINE 5; 5 MG/ML; UG/ML
INJECTION, SOLUTION EPIDURAL; INTRACAUDAL; PERINEURAL
Status: DISCONTINUED | OUTPATIENT
Start: 2020-10-20 | End: 2020-10-20 | Stop reason: HOSPADM

## 2020-10-20 RX ORDER — HYDROMORPHONE HYDROCHLORIDE 1 MG/ML
0.2 INJECTION, SOLUTION INTRAMUSCULAR; INTRAVENOUS; SUBCUTANEOUS
Status: DISCONTINUED | OUTPATIENT
Start: 2020-10-20 | End: 2020-10-20 | Stop reason: HOSPADM

## 2020-10-20 RX ORDER — HYDROMORPHONE HYDROCHLORIDE 1 MG/ML
0.1 INJECTION, SOLUTION INTRAMUSCULAR; INTRAVENOUS; SUBCUTANEOUS
Status: DISCONTINUED | OUTPATIENT
Start: 2020-10-20 | End: 2020-10-20 | Stop reason: HOSPADM

## 2020-10-20 RX ORDER — OXYCODONE HCL 5 MG/5 ML
10 SOLUTION, ORAL ORAL
Status: DISCONTINUED | OUTPATIENT
Start: 2020-10-20 | End: 2020-10-20 | Stop reason: HOSPADM

## 2020-10-20 RX ORDER — BUPIVACAINE HYDROCHLORIDE 2.5 MG/ML
INJECTION, SOLUTION EPIDURAL; INFILTRATION; INTRACAUDAL
Status: DISCONTINUED | OUTPATIENT
Start: 2020-10-20 | End: 2020-10-20 | Stop reason: HOSPADM

## 2020-10-20 RX ORDER — BUPIVACAINE HYDROCHLORIDE AND EPINEPHRINE 5; 5 MG/ML; UG/ML
INJECTION, SOLUTION EPIDURAL; INTRACAUDAL; PERINEURAL
Status: DISCONTINUED
Start: 2020-10-20 | End: 2020-10-20 | Stop reason: HOSPADM

## 2020-10-20 RX ORDER — PHENYLEPHRINE HCL IN 0.9% NACL 0.5 MG/5ML
SYRINGE (ML) INTRAVENOUS PRN
Status: DISCONTINUED | OUTPATIENT
Start: 2020-10-20 | End: 2020-10-20 | Stop reason: SURG

## 2020-10-20 RX ORDER — CEFAZOLIN SODIUM 1 G/3ML
INJECTION, POWDER, FOR SOLUTION INTRAMUSCULAR; INTRAVENOUS PRN
Status: DISCONTINUED | OUTPATIENT
Start: 2020-10-20 | End: 2020-10-20 | Stop reason: SURG

## 2020-10-20 RX ORDER — LIDOCAINE HYDROCHLORIDE 20 MG/ML
INJECTION, SOLUTION EPIDURAL; INFILTRATION; INTRACAUDAL; PERINEURAL PRN
Status: DISCONTINUED | OUTPATIENT
Start: 2020-10-20 | End: 2020-10-20 | Stop reason: SURG

## 2020-10-20 RX ADMIN — CEFAZOLIN 2 G: 330 INJECTION, POWDER, FOR SOLUTION INTRAMUSCULAR; INTRAVENOUS at 12:41

## 2020-10-20 RX ADMIN — ACETAMINOPHEN 1000 MG: 500 TABLET ORAL at 12:21

## 2020-10-20 RX ADMIN — Medication 100 MCG: at 14:08

## 2020-10-20 RX ADMIN — EPHEDRINE SULFATE 10 MG: 50 INJECTION, SOLUTION INTRAVENOUS at 13:25

## 2020-10-20 RX ADMIN — HYDROMORPHONE HYDROCHLORIDE 600 MCG: 2 INJECTION, SOLUTION INTRAMUSCULAR; INTRAVENOUS; SUBCUTANEOUS at 14:27

## 2020-10-20 RX ADMIN — FENTANYL CITRATE 50 MCG: 50 INJECTION, SOLUTION INTRAMUSCULAR; INTRAVENOUS at 12:36

## 2020-10-20 RX ADMIN — PROPOFOL 50 MG: 10 INJECTION, EMULSION INTRAVENOUS at 12:39

## 2020-10-20 RX ADMIN — FENTANYL CITRATE 50 MCG: 50 INJECTION, SOLUTION INTRAMUSCULAR; INTRAVENOUS at 13:01

## 2020-10-20 RX ADMIN — PROPOFOL 150 MG: 10 INJECTION, EMULSION INTRAVENOUS at 12:38

## 2020-10-20 RX ADMIN — HYDROMORPHONE HYDROCHLORIDE 400 MCG: 2 INJECTION, SOLUTION INTRAMUSCULAR; INTRAVENOUS; SUBCUTANEOUS at 13:37

## 2020-10-20 RX ADMIN — ONDANSETRON 4 MG: 2 INJECTION INTRAMUSCULAR; INTRAVENOUS at 12:45

## 2020-10-20 RX ADMIN — DEXAMETHASONE SODIUM PHOSPHATE 4 MG: 4 INJECTION, SOLUTION INTRA-ARTICULAR; INTRALESIONAL; INTRAMUSCULAR; INTRAVENOUS; SOFT TISSUE at 12:44

## 2020-10-20 RX ADMIN — SODIUM CHLORIDE, POTASSIUM CHLORIDE, SODIUM LACTATE AND CALCIUM CHLORIDE 1000 ML: 600; 310; 30; 20 INJECTION, SOLUTION INTRAVENOUS at 10:18

## 2020-10-20 RX ADMIN — CELECOXIB 200 MG: 200 CAPSULE ORAL at 12:21

## 2020-10-20 RX ADMIN — POVIDONE IODINE 15 ML: 100 SOLUTION TOPICAL at 10:21

## 2020-10-20 RX ADMIN — Medication 100 MCG: at 13:27

## 2020-10-20 RX ADMIN — LIDOCAINE HYDROCHLORIDE 100 MG: 20 INJECTION, SOLUTION EPIDURAL; INFILTRATION; INTRACAUDAL at 12:38

## 2020-10-20 ASSESSMENT — FIBROSIS 4 INDEX: FIB4 SCORE: 0.69

## 2020-10-20 ASSESSMENT — PAIN DESCRIPTION - PAIN TYPE
TYPE: SURGICAL PAIN

## 2020-10-20 NOTE — ANESTHESIA POSTPROCEDURE EVALUATION
Patient: Andrew Chavira Mae    Procedure Summary     Date: 10/20/20 Room / Location: [facility] ROOM 25 / SURGERY SAME DAY [facility]    Anesthesia Start:  Anesthesia Stop:     Procedures:       BIOPSY, LYMPH NODE, SENTINEL- POSSIBLE GROIN (Bilateral )      WIDE EXCISION, LESION- BUTTOCKS, RADICAL MALIGNANT MELANOMA Diagnosis: (MALIGNANT MELANOMA OF RIGHT MEDIAL BUTTOCK)    Surgeon: Davon Valera M.D. Responsible Provider:     Anesthesia Type: general ASA Status: 2          Final Anesthesia Type: general  Last vitals  BP   Blood Pressure: 117/69    Temp   36.1 °C (97 °F)    Pulse   Pulse: 68   Resp   19    SpO2   98 %      Anesthesia Post Evaluation    Patient location during evaluation: PACU  Patient participation: complete - patient participated  Level of consciousness: awake and alert    Airway patency: patent  Anesthetic complications: no  Cardiovascular status: hemodynamically stable  Respiratory status: acceptable  Hydration status: euvolemic    PONV: none           Nurse Pain Score: 2 (NPRS)

## 2020-10-20 NOTE — ANESTHESIA PROCEDURE NOTES
Airway    Date/Time: 10/20/2020 12:40 PM  Performed by: Nikki Villanueva M.D.  Authorized by: Nikki Villanueva M.D.     Location:  OR  Urgency:  Elective  Difficult Airway: No    Indications for Airway Management:  Anesthesia      Spontaneous Ventilation: absent    Sedation Level:  Deep  Preoxygenated: Yes    Mask Difficulty Assessment:  1 - vent by mask  Final Airway Type:  Supraglottic airway  Final Supraglottic Airway:  Standard LMA    SGA Size:  4  Number of Attempts at Approach:  1   Performed by AA student.

## 2020-10-20 NOTE — DISCHARGE INSTRUCTIONS
ACTIVITY: Rest and take it easy for the first 24 hours.  A responsible adult is recommended to remain with you during that time.  It is normal to feel sleepy.  We encourage you to not do anything that requires balance, judgment or coordination.    MILD FLU-LIKE SYMPTOMS ARE NORMAL. YOU MAY EXPERIENCE GENERALIZED MUSCLE ACHES, THROAT IRRITATION, HEADACHE AND/OR SOME NAUSEA.    FOR 24 HOURS DO NOT:  Drive, operate machinery or run household appliances.  Drink beer or alcoholic beverages.   Make important decisions or sign legal documents.    SPECIAL INSTRUCTIONS:  Patient should try not to bend at waist.  He should try to get into bed as straight as possible.  No bending or sitting. The patient has a Prevena for buttock wound. It will have an air leak and that is OK.     DIET: To avoid nausea, slowly advance diet as tolerated, avoiding spicy or greasy foods for the first day.  Add more substantial food to your diet according to your physician's instructions. INCREASE FLUIDS AND FIBER TO AVOID CONSTIPATION.    SURGICAL DRESSING/BATHING:   · Remove plastic and gauze in 3 days from groin incision.  · Leave underlying steristips on until they fall off   · Leave the dressing on the buttocks area for 1 week; remove the plastic and gauze then and leave the steristrips on.  · OK to shower post-op day 4 (Saturday)    FOLLOW-UP APPOINTMENT:  A follow-up appointment should be arranged with your doctor. Call to schedule. To see Dr. Valera this Friday at 9am for removal of the Prevena.     You should CALL YOUR PHYSICIAN if you develop:  Fever greater than 101 degrees F.  Pain not relieved by medication, or persistent nausea or vomiting.  Excessive bleeding (blood soaking through dressing) or unexpected drainage from the wound.  Extreme redness or swelling around the incision site, drainage of pus or foul smelling drainage.  Inability to urinate or empty your bladder within 8 hours.  Problems with breathing or chest pain.    You  should call 911 if you develop problems with breathing or chest pain.  If you are unable to contact your doctor or surgical center, you should go to the nearest emergency room or urgent care center.  Physician's telephone #: Dr. Valera [phone]    If any questions arise, call your doctor.  If your doctor is not available, please feel free to call the Surgical Center at [phone]. The Contact Center is open Monday through Friday 7AM to 5PM and may speak to a nurse at [phone], or toll free at [phone].     A registered nurse may call you a few days after your surgery to see how you are doing after your procedure.    MEDICATIONS: Resume taking daily medication.  Take prescribed pain medication with food.  If no medication is prescribed, you may take non-aspirin pain medication if needed.  PAIN MEDICATION CAN BE VERY CONSTIPATING.  Take a stool softener or laxative such as senokot, pericolace, or milk of magnesia if needed.    Prescription given for NORCO and PERCOCET (Use percocet if pain not controlled with NORCO).  Last pain medication given: Tylenol 1,000mg and Celebrex at 12:21.  Do not take Tylenol or Ibuprofen until after 6:30pm.     If your physician has prescribed pain medication that includes Acetaminophen (Tylenol), do not take additional Acetaminophen (Tylenol) while taking the prescribed medication.    Depression / Suicide Risk    As you are discharged from this [facility] facility, it is important to learn how to keep safe from harming yourself.    Recognize the warning signs:  · Abrupt changes in personality, positive or negative- including increase in energy   · Giving away possessions  · Change in eating patterns- significant weight changes-  positive or negative  · Change in sleeping patterns- unable to sleep or sleeping all the time   · Unwillingness or inability to communicate  · Depression  · Unusual sadness, discouragement and loneliness  · Talk of wanting to die  · Neglect  of personal appearance   · Rebelliousness- reckless behavior  · Withdrawal from people/activities they love  · Confusion- inability to concentrate     If you or a loved one observes any of these behaviors or has concerns about self-harm, here's what you can do:  · Talk about it- your feelings and reasons for harming yourself  · Remove any means that you might use to hurt yourself (examples: pills, rope, extension cords, firearm)  · Get professional help from the community (Mental Health, Substance Abuse, psychological counseling)  · Do not be alone:Call your Safe Contact- someone whom you trust who will be there for you.  · Call your local CRISIS HOTLINE 129-7929 or 767-693-0937  · Call your local Children's Mobile Crisis Response Team [place] (995) 562-4026 or www.WeShow  · Call the toll free National Suicide Prevention Hotlines   · National Suicide Prevention Lifeline 872-730-NPGA (5897)  · National Hope Line Network 800-SUICIDE (563-3166)

## 2020-10-20 NOTE — ANESTHESIA TIME REPORT
Anesthesia Start and Stop Event Times     Date Time Event    10/20/2020 1232 Ready for Procedure     1234 Anesthesia Start     1442 Anesthesia Stop        Responsible Staff  10/20/20    Name Role Begin End    Nikki Villanueva M.D. Anesth 1234 1442        Preop Diagnosis (Free Text):  Pre-op Diagnosis     RIGHT MEDIAL BUTTOCKS        Preop Diagnosis (Codes):    Post op Diagnosis  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)      Premium Reason  Non-Premium    Comments:

## 2020-10-20 NOTE — OR NURSING
1438 pt arrived from OR. Report from RN and anesthesia. Pt awake on 6L mask. D/c to room air. Dressings to bilat groin are CDI.     1450 Dr. Valera at bedside to update pt.     1501 Pt tolerating PO fluids.     1512 prevena to buttock intact and functioning. ABD pad placed to prevent skin breakdown from prevena tubing. Complains of minimal aching at incision site. States does not want any pain medication at this time.     1515 called pt wife Sherly to give updates.    1530 pt meets phase 2 criteria. Report to Lety VISNON in phase 2.

## 2020-10-20 NOTE — ANESTHESIA PREPROCEDURE EVALUATION
Relevant Problems   PULMONARY   (+) Mild intermittent asthma without complication      Other   (+) Melanoma of buttock (HCC)   (+) Nasal polyp       Physical Exam    Airway   Mallampati: II  TM distance: >3 FB  Neck ROM: full       Cardiovascular - normal exam  Rhythm: regular  Rate: normal  (-) murmur     Dental - normal exam           Pulmonary - normal exam  Breath sounds clear to auscultation     Abdominal    Neurological - normal exam                 Anesthesia Plan    ASA 2       Plan - general       Airway plan will be LMA        Induction: intravenous    Postoperative Plan: Postoperative administration of opioids is intended.    Pertinent diagnostic labs and testing reviewed    Informed Consent:    Anesthetic plan and risks discussed with patient.

## 2020-10-21 NOTE — OR NURSING
1530 - Pt received from PACU, report from ABRAM RICHARDSON RN.  Pt denies pain, denies nausea.  VSS, room air.      1620 - Pt got dizzy and felt faint after ambulating to bathroom, voiding and getting dressed.  Pt sat in recliner chair for a time and felt much better.  Had some more soda and crackers.     1643 - Pt stable to discharge.  Instructions given, patient and wife verbalize understanding.  IV And armbands removed.  Taken via wheelchair with CNA escort to car.  Pt has all belongings with them.

## 2020-10-21 NOTE — OR SURGEON
Immediate Post OP Note    PreOp Diagnosis: malignant melanoma of buttocks just to the right of the top of the crease    PostOp Diagnosis: same    Procedure(s):  BIOPSY, LYMPH NODE, SENTINEL- Bilateral GROIN - Wound Class: Clean  WIDE EXCISION, LESION- BUTTOCKS, RADICAL MALIGNANT MELANOMA - Wound Class: Clean  Flap rotation for coverage    Surgeon(s):  Davon Valera M.D.    Anesthesiologist/Type of Anesthesia:  Anesthesiologist: Nikki Villanueva M.D./General    Surgical Staff:  Assistant: CHARLEE Pradhan  Circulator: Kaykay Solomon R.N.  Scrub Person: ESTHER [email or web address]; Rita Wilcox    Specimens removed if any:  ID Type Source Tests Collected by Time Destination   A : RIGHT INGUINAL SENTINAL NODE Other Other PATHOLOGY SPECIMEN Davon Valera M.D. 10/20/2020 12:56 PM    B : LEFT INGUINAL SENTINAL NODE Other Other PATHOLOGY SPECIMEN Davon Valera M.D. 10/20/2020  1:07 PM    C : MALIGNANT MELANOMA RIGHT  BUTTOCKS Other Other PATHOLOGY SPECIMEN Davon Valera M.D. 10/20/2020  1:32 PM    D : ADDITIONAL SUPERIOR MARGIN Other Other PATHOLOGY SPECIMEN Davon Valera M.D. 10/20/2020  1:59 PM        Estimated Blood Loss: minimal    Findings: previous biopsy site identified and 2cm minimal margin removed around tumor, right gluteal rotational flap used to cover defect; bilateral inguinal sentinel nodes removed    Complications: no apparent complications        10/20/2020 5:34 PM Davon Valera M.D.

## 2020-10-21 NOTE — OP REPORT
DATE OF SERVICE:  10/20/2020    SURGEON PRESENT:  Davon Valera MD    ASSISTANT:  BISHOP Kaur    ANESTHESIOLOGIST:  Nikki Villanueva MD    TYPE OF ANESTHETIC:  General and endotracheal anesthesia plus local.    PREOPERATIVE DIAGNOSIS:  Malignant melanoma of the buttocks just to the right   of the upper crease.    POSTOPERATIVE DIAGNOSIS:  Malignant melanoma of the buttocks just to the right   of the upper crease.  T4NxMx stage.    PROCEDURES PERFORMED:  1.  Radical resection of a malignant melanoma of the buttocks.  2.  Rotational flap and tissue rearrangement for closure of the large defect.  3.  Right inguinal sentinel lymph node biopsy.  4.  Left inguinal sentinel node biopsy.  5.  Bilateral sentinel node identification.    FINDINGS:  The patient is a 55-year-old gentleman who was referred from the   dermatologist, DUNIA Stafford after he was noted to have a lesion in the   buttocks just to the right of the upper crease.  The patient believed he had a   lesion there for many years, but had noticed that he began to change in the   last several months.  A biopsy was performed by dermatology and this revealed   a very deep 6 mm malignant melanoma that was nodular in nature.  This did have   ulceration and mitotic rate was 4 mitoses per square mm.  The lesion appeared   to be arising in association with an intradermal melanocytic nevus.  Margins   were thought to be involved.  The patient was counseled and told this was an   aggressive melanoma and that he would require a fairly radical resection of   this area along with sentinel node biopsies.  He agreed with the procedure.    Today, he had an injection prior to surgery of radionuclide in nuclear   medicine and the radioactive substrate migrated to both the right and left   inguinal areas; therefore, requiring bilateral sentinel nodes performed in   both groins.  The special radioactive navigator was used to identify the   sentinel nodes.   The patient also underwent a radical resection with a 2 cm   margin around the lateral most aspect of the previous biopsy site.  In the   superior buttock area, this created a very large defect.  The tissue in this   area was quite tight and therefore a rotational flap was performed into the   upper right gluteal region.  The tissue rotated in to close the defect.  A   Prevena was placed in order to assist with healing of this area.  There were   no apparent complications.    FINDINGS AND PROCEDURE:  The patient was brought to the operating room and   placed on the table in supine position.  General anesthetic was then provided   by the anesthesiologist, Dr. Villanueva.  A timeout was called.  The correct   patient, correct diagnosis, correct surgery, and correct location of the   surgeries including the lymph nodes on both groins as well as the back,   buttocks was performed, all agreed.  The patient did receive preoperative IV   antibiotics.  The patient is in the supine position, the right and left groin   areas were prepped and draped.  Incision was made directly over the right   inguinal crease with #15 blade approximately 4 cm long.  Using a navigator   tool, a hyperactive area of reactivity was encountered.  This dissection was   carried down through the subcutaneous tissue and then through the deep fascia.    Directly below the deep fascia, the navigator was used to identify a 2x1.5   cm lymph node that was very radioactive.  This was removed with the LigaSure   device and the tissue above and below the node was tied with 3-0 Vicryl ties.    The node was removed and radioactive counts were obtained.  Further   interrogation of the right inguinal area did not reveal any areas other than   the node that was removed that was radioactive.  Therefore, the wound was   irrigated, injected with 0.5% Marcaine with epinephrine.  The deep fascia was   closed using running 3-0 Vicryl suture.  The dermis was closed using  running   3-0 Vicryl suture.  The skin was closed using running 4-0 Monocryl suture in   subcuticular fashion.  Steri-Strips were applied along with a sterile   dressing.  Likewise, the left inguinal area was addressed.  There was an X   that had been placed by nuclear medicine just at the left inguinal crease.    Incision was made approximately 4 cm long over this X through the skin and   dermis.  All bleeding was controlled with electrocautery.  Dissection was then   carried down through the subcutaneous tissue and then into the deeper fascia.    The navigator device was then used to identify a 1.5x1.5 cm radioactive   lymph node.  This was just superior to the femoral artery and vein.  This was   dissected out with LigaSure.  Again, the tissue above and below the node was   tied with 3-0 Vicryl ties to control the lymphatic tracts.  Lymph node was   removed and radioactive counts were obtained.  Further interrogation of the   left inguinal area did not reveal any other radioactive lymph nodes;   therefore, this wound was irrigated and then injected with 0.5% Marcaine.  The   deep fascia was closed using running 3-0 Vicryl suture.  The dermis was   closed using running 3-0 Vicryl suture.  The skin was closed using running 4-0   Monocryl suture in subcuticular fashion.  Steri-Strips and sterile dressings   were applied.  The patient was then turned to the right side up, left side   down position on the operating room table with a beanbag and axillary roll was   placed, arms were elevated with pillows.  All superficial nerves and bony   prominences were carefully padded.  With the patient now securely attached to   the table, the buttocks area was examined.  The patient had a previous biopsy   of a melanoma at the upper aspect of the right buttocks near the crease.    Using a ruler, a 2 cm margin was marked around this lesion.  Incision was made   through the skin and dermis using this marking and all bleeding was    controlled with electrocautery.  Dissection was carried down through the deep   subcutaneous tissue all the way down to the presacral fascia.  The entire   specimen was removed and then oriented for the pathologist with sutures.  It   was sent to pathology for further characterization.  This left a very large   defect extending well down into the crease area of the buttocks.  The skin   around this area was very tight and therefore bring the tissue back together   simply would not be possible; therefore, an incision was made from the   superior aspect of the circular defect extending out across the upper and   lateral portions of the right gluteal region.  This incision was carried down   through the subcutaneous tissue until the fascia over the right gluteus muscle   was encountered.  This flap was then elevated off the gluteus muscle for 6-10   cm in the lateral direction.  Likewise, the flap on the left buttocks was   freed up just above the muscular fascia with electrocautery device.  Bleeding   points were controlled with 3-0 Vicryl ties or the LigaSure device.  Care was   taken down to elevate the tissue off of the presacral fascia down near the   crease of the buttocks.  The large rotational flap that was brought in from   the right side to cover the defect in the midline.  The wound was irrigated   and then injected 0.5% Marcaine with epinephrine.  Multiple sutures of 3-0   Vicryl sutures were used to hold the subcutaneous tissue back together to hold   rotational flap in position.  Then, 3-0 nylon sutures in interrupted fashion   were used to suture the skin back together.  Multiple vertical mattress   sutures were placed in the middle of this incision in order to provide   improved control.  The area was cleaned, prepped and then a Prevena wound VAC   suction device was placed over this area.  The patient tolerated the procedure   well without complications.  Final sponge and needle count were correct.   He   was turned back to the supine position where he was extubated and transferred   to recovery room for further postoperative care.       ____________________________________     MD PATTY BRYAN / JAMI    DD:  10/21/2020 08:17:42  DT:  10/21/2020 09:58:19    D#:  [id number]  Job#:  [id number]    cc: Jose Luis Juarez MD, Jason Ross MD, ____ ____

## 2020-10-21 NOTE — OP REPORT
DATE OF SERVICE:  10/20/2020    SURGEON PRESENT:  Davon Valera MD    ASSISTANT:  BISHOP Kaur    ANESTHESIOLOGIST:  Nikki Villanueva MD    TYPE OF ANESTHETIC:  General and endotracheal anesthesia plus local.    PREOPERATIVE DIAGNOSIS:  Malignant melanoma of the buttocks just to the right   of the upper crease.    POSTOPERATIVE DIAGNOSIS:  Malignant melanoma of the buttocks just to the right   of the upper crease, T4NxMx stage.    PROCEDURES PERFORMED:  1. Radical resection of a malignant melanoma of the buttocks.  2. Rotational flap and tissue rearrangement for closure of the large defect.  3. Right inguinal sentinel lymph node biopsy.  4. Left inguinal sentinel node biopsy.  5. Bilateral sentinel node identification.    FINDINGS:  The patient is a 55-year-old gentleman who was referred from the   dermatologist, DUNIA Stafford, after he was noted to have a lesion in the   buttocks just to the right of the upper crease.  The patient believed he had   a lesion there for many years, but had noticed that he began to change in the   last several months.  A biopsy was performed by dermatology and this revealed   a very deep 6 mm malignant melanoma that was nodular in nature.  This did have   ulceration and mitotic rate was 4 mitoses per square mm.  The lesion appeared   to be arising in association with an intradermal melanocytic nevus.  Margins   were thought to be involved.  The patient was counseled and told this was an   aggressive melanoma and that he would require a fairly radical resection of   this area along with sentinel node biopsies.  He agreed with the procedure.    Today, he had an injection prior to surgery of radionuclide in nuclear   medicine and the radioactive substrate migrated to both the right and left   inguinal areas; therefore, requiring bilateral sentinel nodes performed in   both groins.  The special radioactive navigator was used to identify the   sentinel nodes.  The  patient also underwent a radical resection with a 2 cm   margin around the lateral most aspect of the previous biopsy site.  In the   superior buttock area, this created a very large defect.  The tissue in this   area was quite tight and therefore a rotational flap was performed into the   upper right gluteal region.  The tissue rotated in to close the defect.  A   Prevena was placed in order to assist with healing of this area.  There were   no apparent complications.    FINDINGS AND PROCEDURE:  The patient was brought to the operating room and   placed on the table in supine position.  General anesthetic was then provided   by the anesthesiologist, Dr. Villanueva.  A timeout was called.  The correct   patient, correct diagnosis, correct surgery, and correct location of the   surgeries including the lymph nodes on both groins as well as the back,   buttocks was performed, all agreed.  The patient did receive preoperative IV   antibiotics.  The patient is in the supine position, the right and left groin   areas were prepped and draped.  Incision was made directly over the right   inguinal crease with #15 blade approximately 4 cm long.  Using a navigator   tool, a hyperactive area of reactivity was encountered.  This dissection was   carried down through the subcutaneous tissue and then through the deep fascia.    Directly below the deep fascia, the navigator was used to identify a 2x1.5   cm lymph node that was very radioactive.  This was removed with the LigaSure   device and the tissue above and below the node was tied with 3-0 Vicryl ties.    The node was removed and radioactive counts were obtained.  Further   interrogation of the right inguinal area did not reveal any areas other than   the node that was removed that was radioactive.  Therefore, the wound was   irrigated, injected with 0.5% Marcaine with epinephrine.  The deep fascia was   closed using running 3-0 Vicryl suture.  The dermis was closed using running    3-0 Vicryl suture.  The skin was closed using running 4-0 Monocryl suture in   subcuticular fashion.  Steri-Strips were applied along with a sterile   dressing.  Likewise, the left inguinal area was addressed.  There was an X   that had been placed by nuclear medicine just at the left inguinal crease.    Incision was made approximately 4 cm long over this X through the skin and   dermis.  All bleeding was controlled with electrocautery.  Dissection was then   carried down through the subcutaneous tissue and then into the deeper fascia.    The navigator device was then used to identify a 1.5x1.5 cm radioactive   lymph node.  This was just superior to the femoral artery and vein.  This was   dissected out with LigaSure.  Again, the tissue above and below the node was   tied with 3-0 Vicryl ties to control the lymphatic tracts.  Lymph node was   removed and radioactive counts were obtained.  Further interrogation of the   left inguinal area did not reveal any other radioactive lymph nodes;   therefore, this wound was irrigated and then injected with 0.5% Marcaine.  The   deep fascia was closed using running 3-0 Vicryl suture.  The dermis was   closed using running 3-0 Vicryl suture.  The skin was closed using running 4-0   Monocryl suture in subcuticular fashion.  Steri-Strips and sterile dressings   were applied.  The patient was then turned to the right side up, left side   down position on the operating room table with a beanbag and axillary roll was   placed, arms were elevated with pillows.  All superficial nerves and bony   prominences were carefully padded.  With the patient now securely attached to   the table, the buttocks area was examined.  The patient had a previous biopsy   of a melanoma at the upper aspect of the right buttocks near the crease.    Using a ruler, a 2 cm margin was marked around this lesion.  Incision was made   through the skin and dermis using this marking and all bleeding was    controlled with electrocautery.  Dissection was carried down through the deep   subcutaneous tissue all the way down to the presacral fascia.  The entire   specimen was removed and then oriented for the pathologist with sutures.  It   was sent to pathology for further characterization.  This left a very large   defect extending well down into the crease area of the buttocks.  The skin   around this area was very tight and therefore bring the tissue back together   simply would not be possible; therefore, an incision was made from the   superior aspect of the circular defect extending out across the upper and   lateral portions of the right gluteal region.  This incision was carried down   through the subcutaneous tissue until the fascia over the right gluteus muscle   was encountered.  This flap was then elevated off the gluteus muscle for 6-10   cm in the lateral direction.  Likewise, the flap on the left buttocks was   freed up just above the muscular fascia with electrocautery device.  Bleeding   points were controlled with 3-0 Vicryl ties or the LigaSure device.  Care was   taken down to elevate the tissue off of the presacral fascia down near the   crease of the buttocks.  The large rotational flap that was brought in from   the right side to cover the defect in the midline.  The wound was irrigated   and then injected 0.5% Marcaine with epinephrine.  Multiple sutures of 3-0   Vicryl sutures were used to hold the subcutaneous tissue back together to hold   rotational flap in position.  Then, 3-0 nylon sutures in interrupted fashion   were used to suture the skin back together.  Multiple vertical mattress   sutures were placed in the middle of this incision in order to provide   improved control.  The area was cleaned, prepped and then a Prevena wound VAC   suction device was placed over this area.  The patient tolerated the procedure   well without complications.  Final sponge and needle count were correct.   He   was turned back to the supine position where he was extubated and transferred   to recovery room for further postoperative care.             ____________________________________     MD PATTY BRYAN / JAMI    DD:  10/21/2020 08:17:42  DT:  10/21/2020 09:58:19    D#:  [id number]  Job#:  [id number]    cc: Jose Luis Juarez MD, Rachel Andino PA-C, Jason Ross MD, ___ ___

## 2020-10-22 ENCOUNTER — NURSE TRIAGE (OUTPATIENT)
Dept: HEALTH INFORMATION MANAGEMENT | Facility: OTHER | Age: 55
End: 2020-10-22

## 2020-10-22 NOTE — TELEPHONE ENCOUNTER
Patient called nurse triage to report he currently has an alarm going off on his wound vac.  Pt reports he does have an appointment tomorrow with the surgeon. Pt reports wound vac to buttocks where patient had recent procedure on 10/20/20. Pt reports alarm appears to be a leak alarm.  Nurse recommended patient contact his surgeon this morning to address wound vac concern. Pt reports no other problems at this time.  Nurse triage will route message to PCP.

## 2020-10-28 ENCOUNTER — HOSPITAL ENCOUNTER (OUTPATIENT)
Dept: LAB | Facility: MEDICAL CENTER | Age: 55
End: 2020-10-28
Attending: INTERNAL MEDICINE
Payer: COMMERCIAL

## 2020-10-28 LAB
ALBUMIN SERPL BCP-MCNC: 4.7 G/DL (ref 3.2–4.9)
ALBUMIN/GLOB SERPL: 1.7 G/DL
ALP SERPL-CCNC: 111 U/L (ref 30–99)
ALT SERPL-CCNC: 55 U/L (ref 2–50)
ANION GAP SERPL CALC-SCNC: 6 MMOL/L (ref 7–16)
AST SERPL-CCNC: 30 U/L (ref 12–45)
BILIRUB SERPL-MCNC: 0.6 MG/DL (ref 0.1–1.5)
BUN SERPL-MCNC: 14 MG/DL (ref 8–22)
CALCIUM SERPL-MCNC: 9.8 MG/DL (ref 8.5–10.5)
CHLORIDE SERPL-SCNC: 101 MMOL/L (ref 96–112)
CO2 SERPL-SCNC: 30 MMOL/L (ref 20–33)
CREAT SERPL-MCNC: 0.76 MG/DL (ref 0.5–1.4)
GLOBULIN SER CALC-MCNC: 2.7 G/DL (ref 1.9–3.5)
GLUCOSE SERPL-MCNC: 94 MG/DL (ref 65–99)
LDH SERPL L TO P-CCNC: 205 U/L (ref 107–266)
POTASSIUM SERPL-SCNC: 4.3 MMOL/L (ref 3.6–5.5)
PROT SERPL-MCNC: 7.4 G/DL (ref 6–8.2)
SODIUM SERPL-SCNC: 137 MMOL/L (ref 135–145)

## 2020-10-28 PROCEDURE — 36415 COLL VENOUS BLD VENIPUNCTURE: CPT

## 2020-10-28 PROCEDURE — 80053 COMPREHEN METABOLIC PANEL: CPT

## 2020-10-28 PROCEDURE — 83615 LACTATE (LD) (LDH) ENZYME: CPT

## 2020-11-04 ENCOUNTER — APPOINTMENT (OUTPATIENT)
Dept: RADIOLOGY | Facility: MEDICAL CENTER | Age: 55
End: 2020-11-04
Attending: SURGERY
Payer: COMMERCIAL

## 2020-11-04 DIAGNOSIS — Z09 RADIOTHERAPY FOLLOW-UP EXAMINATION: ICD-10-CM

## 2020-11-04 DIAGNOSIS — C43.59 MALIGNANT MELANOMA OF SKIN OF TRUNK, EXCEPT SCROTUM (HCC): ICD-10-CM

## 2020-11-04 PROCEDURE — 700117 HCHG RX CONTRAST REV CODE 255: Performed by: RADIOLOGY

## 2020-11-04 PROCEDURE — A9576 INJ PROHANCE MULTIPACK: HCPCS | Performed by: RADIOLOGY

## 2020-11-04 PROCEDURE — 70553 MRI BRAIN STEM W/O & W/DYE: CPT

## 2020-11-04 RX ADMIN — GADOTERIDOL 20 ML: 279.3 INJECTION, SOLUTION INTRAVENOUS at 13:57

## 2020-11-05 ENCOUNTER — HOSPITAL ENCOUNTER (OUTPATIENT)
Dept: RADIOLOGY | Facility: MEDICAL CENTER | Age: 55
End: 2020-11-05
Attending: SURGERY
Payer: COMMERCIAL

## 2020-11-05 DIAGNOSIS — C43.59 MALIGNANT MELANOMA OF SKIN OF TRUNK, EXCEPT SCROTUM (HCC): ICD-10-CM

## 2020-11-05 PROCEDURE — A9552 F18 FDG: HCPCS

## 2020-11-10 ENCOUNTER — APPOINTMENT (OUTPATIENT)
Dept: RADIOLOGY | Facility: MEDICAL CENTER | Age: 55
End: 2020-11-10
Attending: SURGERY
Payer: COMMERCIAL

## 2020-11-13 ENCOUNTER — HOSPITAL ENCOUNTER (OUTPATIENT)
Dept: LAB | Facility: MEDICAL CENTER | Age: 55
End: 2020-11-13
Attending: INTERNAL MEDICINE
Payer: COMMERCIAL

## 2020-11-13 LAB
ALBUMIN SERPL BCP-MCNC: 4.4 G/DL (ref 3.2–4.9)
ALBUMIN/GLOB SERPL: 1.5 G/DL
ALP SERPL-CCNC: 132 U/L (ref 30–99)
ALT SERPL-CCNC: 53 U/L (ref 2–50)
ANION GAP SERPL CALC-SCNC: 10 MMOL/L (ref 7–16)
AST SERPL-CCNC: 32 U/L (ref 12–45)
BASOPHILS # BLD AUTO: 0.9 % (ref 0–1.8)
BASOPHILS # BLD: 0.06 K/UL (ref 0–0.12)
BILIRUB SERPL-MCNC: 0.5 MG/DL (ref 0.1–1.5)
BUN SERPL-MCNC: 15 MG/DL (ref 8–22)
CALCIUM SERPL-MCNC: 9.4 MG/DL (ref 8.5–10.5)
CHLORIDE SERPL-SCNC: 100 MMOL/L (ref 96–112)
CO2 SERPL-SCNC: 29 MMOL/L (ref 20–33)
CORTIS SERPL-MCNC: 15.1 UG/DL (ref 0–23)
CREAT SERPL-MCNC: 0.62 MG/DL (ref 0.5–1.4)
EOSINOPHIL # BLD AUTO: 0.11 K/UL (ref 0–0.51)
EOSINOPHIL NFR BLD: 1.6 % (ref 0–6.9)
ERYTHROCYTE [DISTWIDTH] IN BLOOD BY AUTOMATED COUNT: 41.6 FL (ref 35.9–50)
GLOBULIN SER CALC-MCNC: 3 G/DL (ref 1.9–3.5)
GLUCOSE SERPL-MCNC: 96 MG/DL (ref 65–99)
HAV IGM SERPL QL IA: NORMAL
HBV CORE IGM SER QL: NORMAL
HBV SURFACE AG SER QL: NORMAL
HCT VFR BLD AUTO: 49.2 % (ref 42–52)
HCV AB SER QL: NORMAL
HGB BLD-MCNC: 16.8 G/DL (ref 14–18)
IMM GRANULOCYTES # BLD AUTO: 0.05 K/UL (ref 0–0.11)
IMM GRANULOCYTES NFR BLD AUTO: 0.7 % (ref 0–0.9)
LYMPHOCYTES # BLD AUTO: 1.2 K/UL (ref 1–4.8)
LYMPHOCYTES NFR BLD: 17.4 % (ref 22–41)
MAGNESIUM SERPL-MCNC: 2.2 MG/DL (ref 1.5–2.5)
MCH RBC QN AUTO: 31.2 PG (ref 27–33)
MCHC RBC AUTO-ENTMCNC: 34.1 G/DL (ref 33.7–35.3)
MCV RBC AUTO: 91.4 FL (ref 81.4–97.8)
MONOCYTES # BLD AUTO: 0.63 K/UL (ref 0–0.85)
MONOCYTES NFR BLD AUTO: 9.1 % (ref 0–13.4)
NEUTROPHILS # BLD AUTO: 4.85 K/UL (ref 1.82–7.42)
NEUTROPHILS NFR BLD: 70.3 % (ref 44–72)
NRBC # BLD AUTO: 0 K/UL
NRBC BLD-RTO: 0 /100 WBC
PLATELET # BLD AUTO: 347 K/UL (ref 164–446)
PMV BLD AUTO: 9.7 FL (ref 9–12.9)
POTASSIUM SERPL-SCNC: 4.1 MMOL/L (ref 3.6–5.5)
PROT SERPL-MCNC: 7.4 G/DL (ref 6–8.2)
RBC # BLD AUTO: 5.38 M/UL (ref 4.7–6.1)
SODIUM SERPL-SCNC: 139 MMOL/L (ref 135–145)
TSH SERPL DL<=0.005 MIU/L-ACNC: 1.12 UIU/ML (ref 0.38–5.33)
WBC # BLD AUTO: 6.9 K/UL (ref 4.8–10.8)

## 2020-11-13 PROCEDURE — 80074 ACUTE HEPATITIS PANEL: CPT

## 2020-11-13 PROCEDURE — 36415 COLL VENOUS BLD VENIPUNCTURE: CPT

## 2020-11-13 PROCEDURE — 85025 COMPLETE CBC W/AUTO DIFF WBC: CPT

## 2020-11-13 PROCEDURE — 80053 COMPREHEN METABOLIC PANEL: CPT

## 2020-11-13 PROCEDURE — 83735 ASSAY OF MAGNESIUM: CPT

## 2020-11-13 PROCEDURE — 84443 ASSAY THYROID STIM HORMONE: CPT

## 2020-11-13 PROCEDURE — 82533 TOTAL CORTISOL: CPT

## 2020-12-03 ENCOUNTER — HOSPITAL ENCOUNTER (OUTPATIENT)
Dept: LAB | Facility: MEDICAL CENTER | Age: 55
End: 2020-12-03
Attending: INTERNAL MEDICINE
Payer: COMMERCIAL

## 2020-12-03 PROCEDURE — 80053 COMPREHEN METABOLIC PANEL: CPT

## 2020-12-03 PROCEDURE — 84443 ASSAY THYROID STIM HORMONE: CPT

## 2020-12-04 ENCOUNTER — APPOINTMENT (RX ONLY)
Dept: URBAN - METROPOLITAN AREA CLINIC 22 | Facility: CLINIC | Age: 55
Setting detail: DERMATOLOGY
End: 2020-12-04

## 2020-12-04 DIAGNOSIS — D22 MELANOCYTIC NEVI: ICD-10-CM

## 2020-12-04 DIAGNOSIS — D18.0 HEMANGIOMA: ICD-10-CM

## 2020-12-04 DIAGNOSIS — L82.1 OTHER SEBORRHEIC KERATOSIS: ICD-10-CM

## 2020-12-04 DIAGNOSIS — L81.4 OTHER MELANIN HYPERPIGMENTATION: ICD-10-CM

## 2020-12-04 DIAGNOSIS — Z71.89 OTHER SPECIFIED COUNSELING: ICD-10-CM

## 2020-12-04 PROBLEM — D18.01 HEMANGIOMA OF SKIN AND SUBCUTANEOUS TISSUE: Status: ACTIVE | Noted: 2020-12-04

## 2020-12-04 PROBLEM — D22.9 MELANOCYTIC NEVI, UNSPECIFIED: Status: ACTIVE | Noted: 2020-12-04

## 2020-12-04 PROBLEM — C43.59 MALIGNANT MELANOMA OF OTHER PART OF TRUNK: Status: ACTIVE | Noted: 2020-12-04

## 2020-12-04 LAB
ALBUMIN SERPL BCP-MCNC: 4.6 G/DL (ref 3.2–4.9)
ALBUMIN/GLOB SERPL: 2 G/DL
ALP SERPL-CCNC: 105 U/L (ref 30–99)
ALT SERPL-CCNC: 33 U/L (ref 2–50)
ANION GAP SERPL CALC-SCNC: 9 MMOL/L (ref 7–16)
AST SERPL-CCNC: 26 U/L (ref 12–45)
BILIRUB SERPL-MCNC: 0.7 MG/DL (ref 0.1–1.5)
BUN SERPL-MCNC: 15 MG/DL (ref 8–22)
CALCIUM SERPL-MCNC: 9.8 MG/DL (ref 8.5–10.5)
CHLORIDE SERPL-SCNC: 106 MMOL/L (ref 96–112)
CO2 SERPL-SCNC: 27 MMOL/L (ref 20–33)
CREAT SERPL-MCNC: 0.69 MG/DL (ref 0.5–1.4)
GLOBULIN SER CALC-MCNC: 2.3 G/DL (ref 1.9–3.5)
GLUCOSE SERPL-MCNC: 81 MG/DL (ref 65–99)
POTASSIUM SERPL-SCNC: 4.8 MMOL/L (ref 3.6–5.5)
PROT SERPL-MCNC: 6.9 G/DL (ref 6–8.2)
SODIUM SERPL-SCNC: 142 MMOL/L (ref 135–145)
TSH SERPL DL<=0.005 MIU/L-ACNC: 0.56 UIU/ML (ref 0.38–5.33)

## 2020-12-04 PROCEDURE — ? ADDITIONAL NOTES

## 2020-12-04 PROCEDURE — ? RECORDS REQUESTED

## 2020-12-04 PROCEDURE — 99214 OFFICE O/P EST MOD 30 MIN: CPT

## 2020-12-04 PROCEDURE — ? COUNSELING

## 2020-12-04 PROCEDURE — ? DIAGNOSIS COMMENT

## 2020-12-04 NOTE — PROCEDURE: ADDITIONAL NOTES
Additional Notes: 3 month FBSE from initial appt and dx.  \\nDiscussed importance of regular dental and retinal exam \\nHe is following with Dr. Mosher, Onc and states he has started Opdivo.  Records requested.
Detail Level: Detailed

## 2020-12-04 NOTE — PROCEDURE: DIAGNOSIS COMMENT
Comment: S/p excision Nov 2020 with one + node.  Stage 3.  Followed by oncology and on Opdivo.
Detail Level: Detailed

## 2020-12-04 NOTE — HPI: MELANOMA F/U (HISTORY OF MALIGNANT MELANOMA)
What Is The Reason For Today's Visit?: Education and counseling about sun exposure
Breslow Depth?: 6mm
Year Excised?: 10/2020

## 2020-12-04 NOTE — PROCEDURE: RECORDS REQUESTED
Preface: I requested the records from the following providers: Oncology and General Surgery
Detail Level: Zone

## 2020-12-31 ENCOUNTER — HOSPITAL ENCOUNTER (OUTPATIENT)
Facility: MEDICAL CENTER | Age: 55
End: 2020-12-31
Attending: INTERNAL MEDICINE
Payer: COMMERCIAL

## 2020-12-31 PROCEDURE — 80053 COMPREHEN METABOLIC PANEL: CPT

## 2020-12-31 PROCEDURE — 84443 ASSAY THYROID STIM HORMONE: CPT

## 2021-01-01 LAB
ALBUMIN SERPL BCP-MCNC: 4.4 G/DL (ref 3.2–4.9)
ALBUMIN/GLOB SERPL: 2 G/DL
ALP SERPL-CCNC: 122 U/L (ref 30–99)
ALT SERPL-CCNC: 41 U/L (ref 2–50)
ANION GAP SERPL CALC-SCNC: 11 MMOL/L (ref 7–16)
AST SERPL-CCNC: 27 U/L (ref 12–45)
BILIRUB SERPL-MCNC: 0.4 MG/DL (ref 0.1–1.5)
BUN SERPL-MCNC: 12 MG/DL (ref 8–22)
CALCIUM SERPL-MCNC: 9.2 MG/DL (ref 8.5–10.5)
CHLORIDE SERPL-SCNC: 107 MMOL/L (ref 96–112)
CO2 SERPL-SCNC: 23 MMOL/L (ref 20–33)
CREAT SERPL-MCNC: 0.56 MG/DL (ref 0.5–1.4)
GLOBULIN SER CALC-MCNC: 2.2 G/DL (ref 1.9–3.5)
GLUCOSE SERPL-MCNC: 113 MG/DL (ref 65–99)
POTASSIUM SERPL-SCNC: 4.1 MMOL/L (ref 3.6–5.5)
PROT SERPL-MCNC: 6.6 G/DL (ref 6–8.2)
SODIUM SERPL-SCNC: 141 MMOL/L (ref 135–145)
TSH SERPL DL<=0.005 MIU/L-ACNC: 0.49 UIU/ML (ref 0.38–5.33)

## 2021-01-28 ENCOUNTER — HOSPITAL ENCOUNTER (OUTPATIENT)
Facility: MEDICAL CENTER | Age: 56
End: 2021-01-28
Attending: INTERNAL MEDICINE
Payer: COMMERCIAL

## 2021-01-28 PROCEDURE — 80053 COMPREHEN METABOLIC PANEL: CPT

## 2021-01-28 PROCEDURE — 84443 ASSAY THYROID STIM HORMONE: CPT

## 2021-01-29 LAB
ALBUMIN SERPL BCP-MCNC: 4.6 G/DL (ref 3.2–4.9)
ALBUMIN/GLOB SERPL: 1.7 G/DL
ALP SERPL-CCNC: 99 U/L (ref 30–99)
ALT SERPL-CCNC: 32 U/L (ref 2–50)
ANION GAP SERPL CALC-SCNC: 12 MMOL/L (ref 7–16)
AST SERPL-CCNC: 36 U/L (ref 12–45)
BILIRUB SERPL-MCNC: 0.5 MG/DL (ref 0.1–1.5)
BUN SERPL-MCNC: 16 MG/DL (ref 8–22)
CALCIUM SERPL-MCNC: 9.5 MG/DL (ref 8.5–10.5)
CHLORIDE SERPL-SCNC: 104 MMOL/L (ref 96–112)
CO2 SERPL-SCNC: 21 MMOL/L (ref 20–33)
CREAT SERPL-MCNC: 0.66 MG/DL (ref 0.5–1.4)
GLOBULIN SER CALC-MCNC: 2.7 G/DL (ref 1.9–3.5)
GLUCOSE SERPL-MCNC: 78 MG/DL (ref 65–99)
POTASSIUM SERPL-SCNC: 4.4 MMOL/L (ref 3.6–5.5)
PROT SERPL-MCNC: 7.3 G/DL (ref 6–8.2)
SODIUM SERPL-SCNC: 137 MMOL/L (ref 135–145)
TSH SERPL DL<=0.005 MIU/L-ACNC: 0.78 UIU/ML (ref 0.38–5.33)

## 2021-02-25 ENCOUNTER — HOSPITAL ENCOUNTER (OUTPATIENT)
Facility: MEDICAL CENTER | Age: 56
End: 2021-02-25
Attending: INTERNAL MEDICINE
Payer: COMMERCIAL

## 2021-02-25 LAB
ALBUMIN SERPL BCP-MCNC: 4.4 G/DL (ref 3.2–4.9)
ALBUMIN/GLOB SERPL: 1.9 G/DL
ALP SERPL-CCNC: 93 U/L (ref 30–99)
ALT SERPL-CCNC: 32 U/L (ref 2–50)
ANION GAP SERPL CALC-SCNC: 15 MMOL/L (ref 7–16)
AST SERPL-CCNC: 26 U/L (ref 12–45)
BILIRUB SERPL-MCNC: 0.3 MG/DL (ref 0.1–1.5)
BUN SERPL-MCNC: 18 MG/DL (ref 8–22)
CALCIUM SERPL-MCNC: 9.3 MG/DL (ref 8.5–10.5)
CHLORIDE SERPL-SCNC: 104 MMOL/L (ref 96–112)
CO2 SERPL-SCNC: 20 MMOL/L (ref 20–33)
CREAT SERPL-MCNC: 0.62 MG/DL (ref 0.5–1.4)
GLOBULIN SER CALC-MCNC: 2.3 G/DL (ref 1.9–3.5)
GLUCOSE SERPL-MCNC: 91 MG/DL (ref 65–99)
POTASSIUM SERPL-SCNC: 4.8 MMOL/L (ref 3.6–5.5)
PROT SERPL-MCNC: 6.7 G/DL (ref 6–8.2)
SODIUM SERPL-SCNC: 139 MMOL/L (ref 135–145)
TSH SERPL DL<=0.005 MIU/L-ACNC: 0.48 UIU/ML (ref 0.38–5.33)

## 2021-02-25 PROCEDURE — 80053 COMPREHEN METABOLIC PANEL: CPT

## 2021-02-25 PROCEDURE — 84443 ASSAY THYROID STIM HORMONE: CPT

## 2021-03-09 ENCOUNTER — APPOINTMENT (RX ONLY)
Dept: URBAN - METROPOLITAN AREA CLINIC 22 | Facility: CLINIC | Age: 56
Setting detail: DERMATOLOGY
End: 2021-03-09

## 2021-03-09 DIAGNOSIS — D18.0 HEMANGIOMA: ICD-10-CM

## 2021-03-09 DIAGNOSIS — D22 MELANOCYTIC NEVI: ICD-10-CM

## 2021-03-09 DIAGNOSIS — L82.1 OTHER SEBORRHEIC KERATOSIS: ICD-10-CM

## 2021-03-09 DIAGNOSIS — L81.4 OTHER MELANIN HYPERPIGMENTATION: ICD-10-CM

## 2021-03-09 DIAGNOSIS — D485 NEOPLASM OF UNCERTAIN BEHAVIOR OF SKIN: ICD-10-CM

## 2021-03-09 PROBLEM — D22.9 MELANOCYTIC NEVI, UNSPECIFIED: Status: ACTIVE | Noted: 2021-03-09

## 2021-03-09 PROBLEM — D48.5 NEOPLASM OF UNCERTAIN BEHAVIOR OF SKIN: Status: ACTIVE | Noted: 2021-03-09

## 2021-03-09 PROBLEM — D18.01 HEMANGIOMA OF SKIN AND SUBCUTANEOUS TISSUE: Status: ACTIVE | Noted: 2021-03-09

## 2021-03-09 PROBLEM — C43.59 MALIGNANT MELANOMA OF OTHER PART OF TRUNK: Status: ACTIVE | Noted: 2021-03-09

## 2021-03-09 PROCEDURE — ? COUNSELING

## 2021-03-09 PROCEDURE — 99212 OFFICE O/P EST SF 10 MIN: CPT

## 2021-03-09 PROCEDURE — ? ADDITIONAL NOTES

## 2021-03-09 PROCEDURE — ? DIAGNOSIS COMMENT

## 2021-03-09 ASSESSMENT — LOCATION SIMPLE DESCRIPTION DERM: LOCATION SIMPLE: CHEST

## 2021-03-09 ASSESSMENT — LOCATION ZONE DERM: LOCATION ZONE: TRUNK

## 2021-03-09 ASSESSMENT — LOCATION DETAILED DESCRIPTION DERM: LOCATION DETAILED: LEFT MEDIAL INFERIOR CHEST

## 2021-03-09 NOTE — PROCEDURE: ADDITIONAL NOTES
Additional Notes: 3 month FBSE from initial appt and diagnosis.\\nHe is following with Dr. Mosher, Onc and states he is doing well on optivo. Denies any symptoms or concerns.  Scar well healed and no evidence of recurrence noted today.
Detail Level: Detailed
Render Risk Assessment In Note?: yes
Additional Notes: Will submit par today for excision removal
Render Risk Assessment In Note?: no

## 2021-03-15 DIAGNOSIS — Z23 NEED FOR VACCINATION: ICD-10-CM

## 2021-03-25 ENCOUNTER — HOSPITAL ENCOUNTER (OUTPATIENT)
Dept: LAB | Facility: MEDICAL CENTER | Age: 56
End: 2021-03-25
Attending: INTERNAL MEDICINE
Payer: COMMERCIAL

## 2021-03-25 LAB
ALBUMIN SERPL BCP-MCNC: 4.5 G/DL (ref 3.2–4.9)
ALBUMIN/GLOB SERPL: 1.9 G/DL
ALP SERPL-CCNC: 92 U/L (ref 30–99)
ALT SERPL-CCNC: 27 U/L (ref 2–50)
ANION GAP SERPL CALC-SCNC: 10 MMOL/L (ref 7–16)
AST SERPL-CCNC: 25 U/L (ref 12–45)
BILIRUB SERPL-MCNC: 0.5 MG/DL (ref 0.1–1.5)
BUN SERPL-MCNC: 15 MG/DL (ref 8–22)
CALCIUM SERPL-MCNC: 9.5 MG/DL (ref 8.5–10.5)
CHLORIDE SERPL-SCNC: 106 MMOL/L (ref 96–112)
CO2 SERPL-SCNC: 25 MMOL/L (ref 20–33)
CREAT SERPL-MCNC: 0.71 MG/DL (ref 0.5–1.4)
GLOBULIN SER CALC-MCNC: 2.4 G/DL (ref 1.9–3.5)
GLUCOSE SERPL-MCNC: 96 MG/DL (ref 65–99)
POTASSIUM SERPL-SCNC: 4.5 MMOL/L (ref 3.6–5.5)
PROT SERPL-MCNC: 6.9 G/DL (ref 6–8.2)
SODIUM SERPL-SCNC: 141 MMOL/L (ref 135–145)
TSH SERPL DL<=0.005 MIU/L-ACNC: 0.3 UIU/ML (ref 0.38–5.33)

## 2021-03-25 PROCEDURE — 84443 ASSAY THYROID STIM HORMONE: CPT

## 2021-03-25 PROCEDURE — 80053 COMPREHEN METABOLIC PANEL: CPT

## 2021-04-09 ENCOUNTER — APPOINTMENT (RX ONLY)
Dept: URBAN - METROPOLITAN AREA CLINIC 22 | Facility: CLINIC | Age: 56
Setting detail: DERMATOLOGY
End: 2021-04-09

## 2021-04-09 DIAGNOSIS — D485 NEOPLASM OF UNCERTAIN BEHAVIOR OF SKIN: ICD-10-CM

## 2021-04-09 PROBLEM — D48.5 NEOPLASM OF UNCERTAIN BEHAVIOR OF SKIN: Status: ACTIVE | Noted: 2021-04-09

## 2021-04-09 PROCEDURE — 12032 INTMD RPR S/A/T/EXT 2.6-7.5: CPT

## 2021-04-09 PROCEDURE — ? EXCISION

## 2021-04-09 PROCEDURE — 11404 EXC TR-EXT B9+MARG 3.1-4 CM: CPT

## 2021-04-09 ASSESSMENT — LOCATION ZONE DERM: LOCATION ZONE: TRUNK

## 2021-04-09 ASSESSMENT — LOCATION DETAILED DESCRIPTION DERM: LOCATION DETAILED: LEFT MEDIAL INFERIOR CHEST

## 2021-04-09 ASSESSMENT — LOCATION SIMPLE DESCRIPTION DERM: LOCATION SIMPLE: CHEST

## 2021-04-09 NOTE — HPI: PROCEDURE (SKIN SURGERY)
Has The Growth Been Previously Biopsied?: has not been previously biopsied
Year Removed: 10/2020
Location: Right medial buttock

## 2021-04-09 NOTE — PROCEDURE: EXCISION
Medical Necessity Information: It is in your best interest to select a reason for this procedure from the list below. All of these items fulfill various CMS LCD requirements except lesion extends to a margin.
Include Z78.9 (Other Specified Conditions Influencing Health Status) As An Associated Diagnosis?: No
Medical Necessity Clause: This procedure was medically necessary because the lesion that was treated was:
Lab: [id number]
Lab Facility: 
Surgeon (Optional): Gee Sher M.D.
Biopsy Photograph Reviewed: Yes
Size Of Lesion In Cm: 3
X Size Of Lesion In Cm (Optional): 2
Size Of Margin In Cm: 0.3
Anesthesia Volume In Cc: 12
Excision Method: Excisional Biopsy
Repair Type: Intermediate
Suturegard Retention Suture: 2-0 Nylon
Retention Suture Bite Size: 3 mm
Length To Time In Minutes Device Was In Place: 10
Number Of Hemigard Strips Per Side: 1
Intermediate / Complex Repair - Final Wound Length In Cm: 7
Undermining Type: Entire Wound
Debridement Text: The wound edges were debrided prior to proceeding with the closure to facilitate wound healing.
Helical Rim Text: The closure involved the helical rim.
Vermilion Border Text: The closure involved the vermilion border.
Nostril Rim Text: The closure involved the nostril rim.
Retention Suture Text: Retention sutures were placed to support the closure and prevent dehiscence.
Primary Defect Length (In Cm): 0
Suture Removal: 14 days
Epidermal Closure Graft Donor Site (Optional): simple interrupted
Graft Donor Site Bandage (Optional-Leave Blank If You Don't Want In Note): Steri-strips and a pressure bandage were applied to the donor site.
Detail Level: Detailed
Excision Depth: adipose tissue
Scalpel Size: 15 blade
Anesthesia Type: 1% lidocaine with 1:100,000 epinephrine and a 1:12 solution of 8.4% sodium bicarbonate
Additional Anesthesia Volume In Cc: 6
Hemostasis: Electricator
Estimated Blood Loss (Cc): minimal
Undermining Location (Optional): in the deep fat
Deep Sutures: 4-0 Maxon
Dermal Closure: buried vertical mattress
Epidermal Sutures: 4-0 Surgipro
Wound Care: Petrolatum
Dressing: pressure dressing with telfa
Suturegard Intro: Intraoperative tissue expansion was performed, utilizing the SUTUREGARD device, in order to reduce wound tension.
Suturegard Body: The suture ends were repeatedly re-tightened and re-clamped to achieve the desired tissue expansion.
Hemigard Intro: Due to skin fragility and wound tension, it was decided to use HEMIGARD adhesive retention suture devices to permit a linear closure. The skin was cleaned and dried for a 6cm distance away from the wound. Excessive hair, if present, was removed to allow for adhesion.
Hemigard Postcare Instructions: The HEMIGARD strips are to remain completely dry for at least 5-7 days.
Complex Repair Preamble Text (Leave Blank If You Do Not Want): Extensive wide undermining was performed.
Intermediate Repair Preamble Text (Leave Blank If You Do Not Want): Undermining was performed with blunt dissection.
Fusiform Excision Additional Text (Leave Blank If You Do Not Want): The margin was drawn around the clinically apparent lesion.  A fusiform shape was then drawn on the skin incorporating the lesion and margins.  Incisions were then made along these lines to the appropriate tissue plane and the lesion was extirpated.
Eliptical Excision Additional Text (Leave Blank If You Do Not Want): The margin was drawn around the clinically apparent lesion.  An elliptical shape was then drawn on the skin incorporating the lesion and margins.  Incisions were then made along these lines to the appropriate tissue plane and the lesion was extirpated.
Saucerization Excision Additional Text (Leave Blank If You Do Not Want): The margin was drawn around the clinically apparent lesion.  Incisions were then made along these lines, in a tangential fashion, to the appropriate tissue plane and the lesion was extirpated.
Slit Excision Additional Text (Leave Blank If You Do Not Want): A linear line was drawn on the skin overlying the lesion. An incision was made slowly until the lesion was visualized.  Once visualized, the lesion was removed with blunt dissection.
Excisional Biopsy Additional Text (Leave Blank If You Do Not Want): The margin was drawn around the clinically apparent lesion. An elliptical shape was then drawn on the skin incorporating the lesion and margins.  Incisions were then made along these lines to the appropriate tissue plane and the lesion was extirpated.
Perilesional Excision Additional Text (Leave Blank If You Do Not Want): The margin was drawn around the clinically apparent lesion. Incisions were then made along these lines to the appropriate tissue plane and the lesion was extirpated.
Repair Performed By Another Provider Text (Leave Blank If You Do Not Want): After the tissue was excised the defect was repaired by another provider.
No Repair - Repaired With Adjacent Surgical Defect Text (Leave Blank If You Do Not Want): After the excision the defect was repaired concurrently with another surgical defect which was in close approximation.
Advancement Flap (Single) Text: The defect edges were debeveled with a #15 scalpel blade.  Given the location of the defect and the proximity to free margins a single advancement flap was deemed most appropriate.  Using a sterile surgical marker, an appropriate advancement flap was drawn incorporating the defect and placing the expected incisions within the relaxed skin tension lines where possible.    The area thus outlined was incised deep to adipose tissue with a #15 scalpel blade.  The skin margins were undermined to an appropriate distance in all directions utilizing iris scissors.
Advancement Flap (Double) Text: The defect edges were debeveled with a #15 scalpel blade.  Given the location of the defect and the proximity to free margins a double advancement flap was deemed most appropriate.  Using a sterile surgical marker, the appropriate advancement flaps were drawn incorporating the defect and placing the expected incisions within the relaxed skin tension lines where possible.    The area thus outlined was incised deep to adipose tissue with a #15 scalpel blade.  The skin margins were undermined to an appropriate distance in all directions utilizing iris scissors.
Burow's Advancement Flap Text: The defect edges were debeveled with a #15 scalpel blade.  Given the location of the defect and the proximity to free margins a Burow's advancement flap was deemed most appropriate.  Using a sterile surgical marker, the appropriate advancement flap was drawn incorporating the defect and placing the expected incisions within the relaxed skin tension lines where possible.    The area thus outlined was incised deep to adipose tissue with a #15 scalpel blade.  The skin margins were undermined to an appropriate distance in all directions utilizing iris scissors.
Chonodrocutaneous Helical Advancement Flap Text: The defect edges were debeveled with a #15 scalpel blade.  Given the location of the defect and the proximity to free margins a chondrocutaneous helical advancement flap was deemed most appropriate.  Using a sterile surgical marker, the appropriate advancement flap was drawn incorporating the defect and placing the expected incisions within the relaxed skin tension lines where possible.    The area thus outlined was incised deep to adipose tissue with a #15 scalpel blade.  The skin margins were undermined to an appropriate distance in all directions utilizing iris scissors.
Crescentic Advancement Flap Text: The defect edges were debeveled with a #15 scalpel blade.  Given the location of the defect and the proximity to free margins a crescentic advancement flap was deemed most appropriate.  Using a sterile surgical marker, the appropriate advancement flap was drawn incorporating the defect and placing the expected incisions within the relaxed skin tension lines where possible.    The area thus outlined was incised deep to adipose tissue with a #15 scalpel blade.  The skin margins were undermined to an appropriate distance in all directions utilizing iris scissors.
A-T Advancement Flap Text: The defect edges were debeveled with a #15 scalpel blade.  Given the location of the defect, shape of the defect and the proximity to free margins an A-T advancement flap was deemed most appropriate.  Using a sterile surgical marker, an appropriate advancement flap was drawn incorporating the defect and placing the expected incisions within the relaxed skin tension lines where possible.    The area thus outlined was incised deep to adipose tissue with a #15 scalpel blade.  The skin margins were undermined to an appropriate distance in all directions utilizing iris scissors.
O-T Advancement Flap Text: The defect edges were debeveled with a #15 scalpel blade.  Given the location of the defect, shape of the defect and the proximity to free margins an O-T advancement flap was deemed most appropriate.  Using a sterile surgical marker, an appropriate advancement flap was drawn incorporating the defect and placing the expected incisions within the relaxed skin tension lines where possible.    The area thus outlined was incised deep to adipose tissue with a #15 scalpel blade.  The skin margins were undermined to an appropriate distance in all directions utilizing iris scissors.
O-L Flap Text: The defect edges were debeveled with a #15 scalpel blade.  Given the location of the defect, shape of the defect and the proximity to free margins an O-L flap was deemed most appropriate.  Using a sterile surgical marker, an appropriate advancement flap was drawn incorporating the defect and placing the expected incisions within the relaxed skin tension lines where possible.    The area thus outlined was incised deep to adipose tissue with a #15 scalpel blade.  The skin margins were undermined to an appropriate distance in all directions utilizing iris scissors.
O-Z Flap Text: The defect edges were debeveled with a #15 scalpel blade.  Given the location of the defect, shape of the defect and the proximity to free margins an O-Z flap was deemed most appropriate.  Using a sterile surgical marker, an appropriate transposition flap was drawn incorporating the defect and placing the expected incisions within the relaxed skin tension lines where possible. The area thus outlined was incised deep to adipose tissue with a #15 scalpel blade.  The skin margins were undermined to an appropriate distance in all directions utilizing iris scissors.
Double O-Z Flap Text: The defect edges were debeveled with a #15 scalpel blade.  Given the location of the defect, shape of the defect and the proximity to free margins a Double O-Z flap was deemed most appropriate.  Using a sterile surgical marker, an appropriate transposition flap was drawn incorporating the defect and placing the expected incisions within the relaxed skin tension lines where possible. The area thus outlined was incised deep to adipose tissue with a #15 scalpel blade.  The skin margins were undermined to an appropriate distance in all directions utilizing iris scissors.
V-Y Flap Text: The defect edges were debeveled with a #15 scalpel blade.  Given the location of the defect, shape of the defect and the proximity to free margins a V-Y flap was deemed most appropriate.  Using a sterile surgical marker, an appropriate advancement flap was drawn incorporating the defect and placing the expected incisions within the relaxed skin tension lines where possible.    The area thus outlined was incised deep to adipose tissue with a #15 scalpel blade.  The skin margins were undermined to an appropriate distance in all directions utilizing iris scissors.
Advancement-Rotation Flap Text: The defect edges were debeveled with a #15 scalpel blade.  Given the location of the defect, shape of the defect and the proximity to free margins an advancement-rotation flap was deemed most appropriate.  Using a sterile surgical marker, an appropriate flap was drawn incorporating the defect and placing the expected incisions within the relaxed skin tension lines where possible. The area thus outlined was incised deep to adipose tissue with a #15 scalpel blade.  The skin margins were undermined to an appropriate distance in all directions utilizing iris scissors.
Mercedes Flap Text: The defect edges were debeveled with a #15 scalpel blade.  Given the location of the defect, shape of the defect and the proximity to free margins a Mercedes flap was deemed most appropriate.  Using a sterile surgical marker, an appropriate advancement flap was drawn incorporating the defect and placing the expected incisions within the relaxed skin tension lines where possible. The area thus outlined was incised deep to adipose tissue with a #15 scalpel blade.  The skin margins were undermined to an appropriate distance in all directions utilizing iris scissors.
Modified Advancement Flap Text: The defect edges were debeveled with a #15 scalpel blade.  Given the location of the defect, shape of the defect and the proximity to free margins a modified advancement flap was deemed most appropriate.  Using a sterile surgical marker, an appropriate advancement flap was drawn incorporating the defect and placing the expected incisions within the relaxed skin tension lines where possible.    The area thus outlined was incised deep to adipose tissue with a #15 scalpel blade.  The skin margins were undermined to an appropriate distance in all directions utilizing iris scissors.
Mucosal Advancement Flap Text: Given the location of the defect, shape of the defect and the proximity to free margins a mucosal advancement flap was deemed most appropriate. Incisions were made with a 15 blade scalpel in the appropriate fashion along the cutaneous vermillion border and the mucosal lip. The remaining actinically damaged mucosal tissue was excised.  The mucosal advancement flap was then elevated to the gingival sulcus with care taken to preserve the neurovascular structures and advanced into the primary defect. Care was taken to ensure that precise realignment of the vermilion border was achieved.
Peng Advancement Flap Text: The defect edges were debeveled with a #15 scalpel blade.  Given the location of the defect, shape of the defect and the proximity to free margins a Peng advancement flap was deemed most appropriate.  Using a sterile surgical marker, an appropriate advancement flap was drawn incorporating the defect and placing the expected incisions within the relaxed skin tension lines where possible. The area thus outlined was incised deep to adipose tissue with a #15 scalpel blade.  The skin margins were undermined to an appropriate distance in all directions utilizing iris scissors.
Hatchet Flap Text: The defect edges were debeveled with a #15 scalpel blade.  Given the location of the defect, shape of the defect and the proximity to free margins a hatchet flap was deemed most appropriate.  Using a sterile surgical marker, an appropriate hatchet flap was drawn incorporating the defect and placing the expected incisions within the relaxed skin tension lines where possible.    The area thus outlined was incised deep to adipose tissue with a #15 scalpel blade.  The skin margins were undermined to an appropriate distance in all directions utilizing iris scissors.
Rotation Flap Text: The defect edges were debeveled with a #15 scalpel blade.  Given the location of the defect, shape of the defect and the proximity to free margins a rotation flap was deemed most appropriate.  Using a sterile surgical marker, an appropriate rotation flap was drawn incorporating the defect and placing the expected incisions within the relaxed skin tension lines where possible.    The area thus outlined was incised deep to adipose tissue with a #15 scalpel blade.  The skin margins were undermined to an appropriate distance in all directions utilizing iris scissors.
Spiral Flap Text: The defect edges were debeveled with a #15 scalpel blade.  Given the location of the defect, shape of the defect and the proximity to free margins a spiral flap was deemed most appropriate.  Using a sterile surgical marker, an appropriate rotation flap was drawn incorporating the defect and placing the expected incisions within the relaxed skin tension lines where possible. The area thus outlined was incised deep to adipose tissue with a #15 scalpel blade.  The skin margins were undermined to an appropriate distance in all directions utilizing iris scissors.
Star Wedge Flap Text: The defect edges were debeveled with a #15 scalpel blade.  Given the location of the defect, shape of the defect and the proximity to free margins a star wedge flap was deemed most appropriate.  Using a sterile surgical marker, an appropriate rotation flap was drawn incorporating the defect and placing the expected incisions within the relaxed skin tension lines where possible. The area thus outlined was incised deep to adipose tissue with a #15 scalpel blade.  The skin margins were undermined to an appropriate distance in all directions utilizing iris scissors.
Transposition Flap Text: The defect edges were debeveled with a #15 scalpel blade.  Given the location of the defect and the proximity to free margins a transposition flap was deemed most appropriate.  Using a sterile surgical marker, an appropriate transposition flap was drawn incorporating the defect.    The area thus outlined was incised deep to adipose tissue with a #15 scalpel blade.  The skin margins were undermined to an appropriate distance in all directions utilizing iris scissors.
Muscle Hinge Flap Text: The defect edges were debeveled with a #15 scalpel blade.  Given the size, depth and location of the defect and the proximity to free margins a muscle hinge flap was deemed most appropriate.  Using a sterile surgical marker, an appropriate hinge flap was drawn incorporating the defect. The area thus outlined was incised with a #15 scalpel blade.  The skin margins were undermined to an appropriate distance in all directions utilizing iris scissors.
Nasal Turnover Hinge Flap Text: The defect edges were debeveled with a #15 scalpel blade.  Given the size, depth, location of the defect and the defect being full thickness a nasal turnover hinge flap was deemed most appropriate.  Using a sterile surgical marker, an appropriate hinge flap was drawn incorporating the defect. The area thus outlined was incised with a #15 scalpel blade. The flap was designed to recreate the nasal mucosal lining and the alar rim. The skin margins were undermined to an appropriate distance in all directions utilizing iris scissors.
Nasalis-Muscle-Based Myocutaneous Island Pedicle Flap Text: Using a #15 blade, an incision was made around the donor flap to the level of the nasalis muscle. Wide lateral undermining was then performed in both the subcutaneous plane above the nasalis muscle, and in a submuscular plane just above periosteum. This allowed the formation of a free nasalis muscle axial pedicle (based on the angular artery) which was still attached to the actual cutaneous flap, increasing its mobility and vascular viability. Hemostasis was obtained with pinpoint electrocoagulation. The flap was mobilized into position and the pivotal anchor points positioned and stabilized with buried interrupted sutures. Subcutaneous and dermal tissues were closed in a multilayered fashion with sutures. Tissue redundancies were excised, and the epidermal edges were apposed without significant tension and sutured with sutures.
Orbicularis Oris Muscle Flap Text: The defect edges were debeveled with a #15 scalpel blade.  Given that the defect affected the competency of the oral sphincter an obicularis oris muscle flap was deemed most appropriate to restore this competency and normal muscle function.  Using a sterile surgical marker, an appropriate flap was drawn incorporating the defect. The area thus outlined was incised with a #15 scalpel blade.
Melolabial Transposition Flap Text: The defect edges were debeveled with a #15 scalpel blade.  Given the location of the defect and the proximity to free margins a melolabial flap was deemed most appropriate.  Using a sterile surgical marker, an appropriate melolabial transposition flap was drawn incorporating the defect.    The area thus outlined was incised deep to adipose tissue with a #15 scalpel blade.  The skin margins were undermined to an appropriate distance in all directions utilizing iris scissors.
Rhombic Flap Text: The defect edges were debeveled with a #15 scalpel blade.  Given the location of the defect and the proximity to free margins a rhombic flap was deemed most appropriate.  Using a sterile surgical marker, an appropriate rhombic flap was drawn incorporating the defect.    The area thus outlined was incised deep to adipose tissue with a #15 scalpel blade.  The skin margins were undermined to an appropriate distance in all directions utilizing iris scissors.
Rhomboid Transposition Flap Text: The defect edges were debeveled with a #15 scalpel blade.  Given the location of the defect and the proximity to free margins a rhomboid transposition flap was deemed most appropriate.  Using a sterile surgical marker, an appropriate rhomboid flap was drawn incorporating the defect.    The area thus outlined was incised deep to adipose tissue with a #15 scalpel blade.  The skin margins were undermined to an appropriate distance in all directions utilizing iris scissors.
Bi-Rhombic Flap Text: The defect edges were debeveled with a #15 scalpel blade.  Given the location of the defect and the proximity to free margins a bi-rhombic flap was deemed most appropriate.  Using a sterile surgical marker, an appropriate rhombic flap was drawn incorporating the defect. The area thus outlined was incised deep to adipose tissue with a #15 scalpel blade.  The skin margins were undermined to an appropriate distance in all directions utilizing iris scissors.
Helical Rim Advancement Flap Text: The defect edges were debeveled with a #15 blade scalpel.  Given the location of the defect and the proximity to free margins (helical rim) a double helical rim advancement flap was deemed most appropriate.  Using a sterile surgical marker, the appropriate advancement flaps were drawn incorporating the defect and placing the expected incisions between the helical rim and antihelix where possible.  The area thus outlined was incised through and through with a #15 scalpel blade.  With a skin hook and iris scissors, the flaps were gently and sharply undermined and freed up.
Bilateral Helical Rim Advancement Flap Text: The defect edges were debeveled with a #15 blade scalpel.  Given the location of the defect and the proximity to free margins (helical rim) a bilateral helical rim advancement flap was deemed most appropriate.  Using a sterile surgical marker, the appropriate advancement flaps were drawn incorporating the defect and placing the expected incisions between the helical rim and antihelix where possible.  The area thus outlined was incised through and through with a #15 scalpel blade.  With a skin hook and iris scissors, the flaps were gently and sharply undermined and freed up.
Ear Star Wedge Flap Text: The defect edges were debeveled with a #15 blade scalpel.  Given the location of the defect and the proximity to free margins (helical rim) an ear star wedge flap was deemed most appropriate.  Using a sterile surgical marker, the appropriate flap was drawn incorporating the defect and placing the expected incisions between the helical rim and antihelix where possible.  The area thus outlined was incised through and through with a #15 scalpel blade.
Banner Transposition Flap Text: The defect edges were debeveled with a #15 scalpel blade.  Given the location of the defect and the proximity to free margins a Banner transposition flap was deemed most appropriate.  Using a sterile surgical marker, an appropriate flap drawn around the defect. The area thus outlined was incised deep to adipose tissue with a #15 scalpel blade.  The skin margins were undermined to an appropriate distance in all directions utilizing iris scissors.
Bilobed Flap Text: The defect edges were debeveled with a #15 scalpel blade.  Given the location of the defect and the proximity to free margins a bilobe flap was deemed most appropriate.  Using a sterile surgical marker, an appropriate bilobe flap drawn around the defect.    The area thus outlined was incised deep to adipose tissue with a #15 scalpel blade.  The skin margins were undermined to an appropriate distance in all directions utilizing iris scissors.
Bilobed Transposition Flap Text: The defect edges were debeveled with a #15 scalpel blade.  Given the location of the defect and the proximity to free margins a bilobed transposition flap was deemed most appropriate.  Using a sterile surgical marker, an appropriate bilobe flap drawn around the defect.    The area thus outlined was incised deep to adipose tissue with a #15 scalpel blade.  The skin margins were undermined to an appropriate distance in all directions utilizing iris scissors.
Trilobed Flap Text: The defect edges were debeveled with a #15 scalpel blade.  Given the location of the defect and the proximity to free margins a trilobed flap was deemed most appropriate.  Using a sterile surgical marker, an appropriate trilobed flap drawn around the defect.    The area thus outlined was incised deep to adipose tissue with a #15 scalpel blade.  The skin margins were undermined to an appropriate distance in all directions utilizing iris scissors.
Dorsal Nasal Flap Text: The defect edges were debeveled with a #15 scalpel blade.  Given the location of the defect and the proximity to free margins a dorsal nasal flap was deemed most appropriate.  Using a sterile surgical marker, an appropriate dorsal nasal flap was drawn around the defect.    The area thus outlined was incised deep to adipose tissue with a #15 scalpel blade.  The skin margins were undermined to an appropriate distance in all directions utilizing iris scissors.
Island Pedicle Flap Text: The defect edges were debeveled with a #15 scalpel blade.  Given the location of the defect, shape of the defect and the proximity to free margins an island pedicle advancement flap was deemed most appropriate.  Using a sterile surgical marker, an appropriate advancement flap was drawn incorporating the defect, outlining the appropriate donor tissue and placing the expected incisions within the relaxed skin tension lines where possible.    The area thus outlined was incised deep to adipose tissue with a #15 scalpel blade.  The skin margins were undermined to an appropriate distance in all directions around the primary defect and laterally outward around the island pedicle utilizing iris scissors.  There was minimal undermining beneath the pedicle flap.
Island Pedicle Flap With Canthal Suspension Text: The defect edges were debeveled with a #15 scalpel blade.  Given the location of the defect, shape of the defect and the proximity to free margins an island pedicle advancement flap was deemed most appropriate.  Using a sterile surgical marker, an appropriate advancement flap was drawn incorporating the defect, outlining the appropriate donor tissue and placing the expected incisions within the relaxed skin tension lines where possible. The area thus outlined was incised deep to adipose tissue with a #15 scalpel blade.  The skin margins were undermined to an appropriate distance in all directions around the primary defect and laterally outward around the island pedicle utilizing iris scissors.  There was minimal undermining beneath the pedicle flap. A suspension suture was placed in the canthal tendon to prevent tension and prevent ectropion.
Alar Island Pedicle Flap Text: The defect edges were debeveled with a #15 scalpel blade.  Given the location of the defect, shape of the defect and the proximity to the alar rim an island pedicle advancement flap was deemed most appropriate.  Using a sterile surgical marker, an appropriate advancement flap was drawn incorporating the defect, outlining the appropriate donor tissue and placing the expected incisions within the nasal ala running parallel to the alar rim. The area thus outlined was incised with a #15 scalpel blade.  The skin margins were undermined minimally to an appropriate distance in all directions around the primary defect and laterally outward around the island pedicle utilizing iris scissors.  There was minimal undermining beneath the pedicle flap.
Double Island Pedicle Flap Text: The defect edges were debeveled with a #15 scalpel blade.  Given the location of the defect, shape of the defect and the proximity to free margins a double island pedicle advancement flap was deemed most appropriate.  Using a sterile surgical marker, an appropriate advancement flap was drawn incorporating the defect, outlining the appropriate donor tissue and placing the expected incisions within the relaxed skin tension lines where possible.    The area thus outlined was incised deep to adipose tissue with a #15 scalpel blade.  The skin margins were undermined to an appropriate distance in all directions around the primary defect and laterally outward around the island pedicle utilizing iris scissors.  There was minimal undermining beneath the pedicle flap.
Island Pedicle Flap-Requiring Vessel Identification Text: The defect edges were debeveled with a #15 scalpel blade.  Given the location of the defect, shape of the defect and the proximity to free margins an island pedicle advancement flap was deemed most appropriate.  Using a sterile surgical marker, an appropriate advancement flap was drawn, based on the axial vessel mentioned above, incorporating the defect, outlining the appropriate donor tissue and placing the expected incisions within the relaxed skin tension lines where possible.    The area thus outlined was incised deep to adipose tissue with a #15 scalpel blade.  The skin margins were undermined to an appropriate distance in all directions around the primary defect and laterally outward around the island pedicle utilizing iris scissors.  There was minimal undermining beneath the pedicle flap.
Keystone Flap Text: The defect edges were debeveled with a #15 scalpel blade.  Given the location of the defect, shape of the defect a keystone flap was deemed most appropriate.  Using a sterile surgical marker, an appropriate keystone flap was drawn incorporating the defect, outlining the appropriate donor tissue and placing the expected incisions within the relaxed skin tension lines where possible. The area thus outlined was incised deep to adipose tissue with a #15 scalpel blade.  The skin margins were undermined to an appropriate distance in all directions around the primary defect and laterally outward around the flap utilizing iris scissors.
O-T Plasty Text: The defect edges were debeveled with a #15 scalpel blade.  Given the location of the defect, shape of the defect and the proximity to free margins an O-T plasty was deemed most appropriate.  Using a sterile surgical marker, an appropriate O-T plasty was drawn incorporating the defect and placing the expected incisions within the relaxed skin tension lines where possible.    The area thus outlined was incised deep to adipose tissue with a #15 scalpel blade.  The skin margins were undermined to an appropriate distance in all directions utilizing iris scissors.
O-Z Plasty Text: The defect edges were debeveled with a #15 scalpel blade.  Given the location of the defect, shape of the defect and the proximity to free margins an O-Z plasty (double transposition flap) was deemed most appropriate.  Using a sterile surgical marker, the appropriate transposition flaps were drawn incorporating the defect and placing the expected incisions within the relaxed skin tension lines where possible.    The area thus outlined was incised deep to adipose tissue with a #15 scalpel blade.  The skin margins were undermined to an appropriate distance in all directions utilizing iris scissors.  Hemostasis was achieved with electrocautery.  The flaps were then transposed into place, one clockwise and the other counterclockwise, and anchored with interrupted buried subcutaneous sutures.
Double O-Z Plasty Text: The defect edges were debeveled with a #15 scalpel blade.  Given the location of the defect, shape of the defect and the proximity to free margins a Double O-Z plasty (double transposition flap) was deemed most appropriate.  Using a sterile surgical marker, the appropriate transposition flaps were drawn incorporating the defect and placing the expected incisions within the relaxed skin tension lines where possible. The area thus outlined was incised deep to adipose tissue with a #15 scalpel blade.  The skin margins were undermined to an appropriate distance in all directions utilizing iris scissors.  Hemostasis was achieved with electrocautery.  The flaps were then transposed into place, one clockwise and the other counterclockwise, and anchored with interrupted buried subcutaneous sutures.
V-Y Plasty Text: The defect edges were debeveled with a #15 scalpel blade.  Given the location of the defect, shape of the defect and the proximity to free margins an V-Y advancement flap was deemed most appropriate.  Using a sterile surgical marker, an appropriate advancement flap was drawn incorporating the defect and placing the expected incisions within the relaxed skin tension lines where possible.    The area thus outlined was incised deep to adipose tissue with a #15 scalpel blade.  The skin margins were undermined to an appropriate distance in all directions utilizing iris scissors.
H Plasty Text: Given the location of the defect, shape of the defect and the proximity to free margins a H-plasty was deemed most appropriate for repair.  Using a sterile surgical marker, the appropriate advancement arms of the H-plasty were drawn incorporating the defect and placing the expected incisions within the relaxed skin tension lines where possible. The area thus outlined was incised deep to adipose tissue with a #15 scalpel blade. The skin margins were undermined to an appropriate distance in all directions utilizing iris scissors.  The opposing advancement arms were then advanced into place in opposite direction and anchored with interrupted buried subcutaneous sutures.
W Plasty Text: The lesion was extirpated to the level of the fat with a #15 scalpel blade.  Given the location of the defect, shape of the defect and the proximity to free margins a W-plasty was deemed most appropriate for repair.  Using a sterile surgical marker, the appropriate transposition arms of the W-plasty were drawn incorporating the defect and placing the expected incisions within the relaxed skin tension lines where possible.    The area thus outlined was incised deep to adipose tissue with a #15 scalpel blade.  The skin margins were undermined to an appropriate distance in all directions utilizing iris scissors.  The opposing transposition arms were then transposed into place in opposite direction and anchored with interrupted buried subcutaneous sutures.
Z Plasty Text: The lesion was extirpated to the level of the fat with a #15 scalpel blade.  Given the location of the defect, shape of the defect and the proximity to free margins a Z-plasty was deemed most appropriate for repair.  Using a sterile surgical marker, the appropriate transposition arms of the Z-plasty were drawn incorporating the defect and placing the expected incisions within the relaxed skin tension lines where possible.    The area thus outlined was incised deep to adipose tissue with a #15 scalpel blade.  The skin margins were undermined to an appropriate distance in all directions utilizing iris scissors.  The opposing transposition arms were then transposed into place in opposite direction and anchored with interrupted buried subcutaneous sutures.
Zygomaticofacial Flap Text: Given the location of the defect, shape of the defect and the proximity to free margins a zygomaticofacial flap was deemed most appropriate for repair.  Using a sterile surgical marker, the appropriate flap was drawn incorporating the defect and placing the expected incisions within the relaxed skin tension lines where possible. The area thus outlined was incised deep to adipose tissue with a #15 scalpel blade with preservation of a vascular pedicle.  The skin margins were undermined to an appropriate distance in all directions utilizing iris scissors.  The flap was then placed into the defect and anchored with interrupted buried subcutaneous sutures.
Cheek Interpolation Flap Text: A decision was made to reconstruct the defect utilizing an interpolation axial flap and a staged reconstruction.  A telfa template was made of the defect.  This telfa template was then used to outline the Cheek Interpolation flap.  The donor area for the pedicle flap was then injected with anesthesia.  The flap was excised through the skin and subcutaneous tissue down to the layer of the underlying musculature.  The interpolation flap was carefully excised within this deep plane to maintain its blood supply.  The edges of the donor site were undermined.   The donor site was closed in a primary fashion.  The pedicle was then rotated into position and sutured.  Once the tube was sutured into place, adequate blood supply was confirmed with blanching and refill.  The pedicle was then wrapped with xeroform gauze and dressed appropriately with a telfa and gauze bandage to ensure continued blood supply and protect the attached pedicle.
Cheek-To-Nose Interpolation Flap Text: A decision was made to reconstruct the defect utilizing an interpolation axial flap and a staged reconstruction.  A telfa template was made of the defect.  This telfa template was then used to outline the Cheek-To-Nose Interpolation flap.  The donor area for the pedicle flap was then injected with anesthesia.  The flap was excised through the skin and subcutaneous tissue down to the layer of the underlying musculature.  The interpolation flap was carefully excised within this deep plane to maintain its blood supply.  The edges of the donor site were undermined.   The donor site was closed in a primary fashion.  The pedicle was then rotated into position and sutured.  Once the tube was sutured into place, adequate blood supply was confirmed with blanching and refill.  The pedicle was then wrapped with xeroform gauze and dressed appropriately with a telfa and gauze bandage to ensure continued blood supply and protect the attached pedicle.
Interpolation Flap Text: A decision was made to reconstruct the defect utilizing an interpolation axial flap and a staged reconstruction.  A telfa template was made of the defect.  This telfa template was then used to outline the interpolation flap.  The donor area for the pedicle flap was then injected with anesthesia.  The flap was excised through the skin and subcutaneous tissue down to the layer of the underlying musculature.  The interpolation flap was carefully excised within this deep plane to maintain its blood supply.  The edges of the donor site were undermined.   The donor site was closed in a primary fashion.  The pedicle was then rotated into position and sutured.  Once the tube was sutured into place, adequate blood supply was confirmed with blanching and refill.  The pedicle was then wrapped with xeroform gauze and dressed appropriately with a telfa and gauze bandage to ensure continued blood supply and protect the attached pedicle.
Melolabial Interpolation Flap Text: A decision was made to reconstruct the defect utilizing an interpolation axial flap and a staged reconstruction.  A telfa template was made of the defect.  This telfa template was then used to outline the melolabial interpolation flap.  The donor area for the pedicle flap was then injected with anesthesia.  The flap was excised through the skin and subcutaneous tissue down to the layer of the underlying musculature.  The pedicle flap was carefully excised within this deep plane to maintain its blood supply.  The edges of the donor site were undermined.   The donor site was closed in a primary fashion.  The pedicle was then rotated into position and sutured.  Once the tube was sutured into place, adequate blood supply was confirmed with blanching and refill.  The pedicle was then wrapped with xeroform gauze and dressed appropriately with a telfa and gauze bandage to ensure continued blood supply and protect the attached pedicle.
Mastoid Interpolation Flap Text: A decision was made to reconstruct the defect utilizing an interpolation axial flap and a staged reconstruction.  A telfa template was made of the defect.  This telfa template was then used to outline the mastoid interpolation flap.  The donor area for the pedicle flap was then injected with anesthesia.  The flap was excised through the skin and subcutaneous tissue down to the layer of the underlying musculature.  The pedicle flap was carefully excised within this deep plane to maintain its blood supply.  The edges of the donor site were undermined.   The donor site was closed in a primary fashion.  The pedicle was then rotated into position and sutured.  Once the tube was sutured into place, adequate blood supply was confirmed with blanching and refill.  The pedicle was then wrapped with xeroform gauze and dressed appropriately with a telfa and gauze bandage to ensure continued blood supply and protect the attached pedicle.
Posterior Auricular Interpolation Flap Text: A decision was made to reconstruct the defect utilizing an interpolation axial flap and a staged reconstruction.  A telfa template was made of the defect.  This telfa template was then used to outline the posterior auricular interpolation flap.  The donor area for the pedicle flap was then injected with anesthesia.  The flap was excised through the skin and subcutaneous tissue down to the layer of the underlying musculature.  The pedicle flap was carefully excised within this deep plane to maintain its blood supply.  The edges of the donor site were undermined.   The donor site was closed in a primary fashion.  The pedicle was then rotated into position and sutured.  Once the tube was sutured into place, adequate blood supply was confirmed with blanching and refill.  The pedicle was then wrapped with xeroform gauze and dressed appropriately with a telfa and gauze bandage to ensure continued blood supply and protect the attached pedicle.
Paramedian Forehead Flap Text: A decision was made to reconstruct the defect utilizing an interpolation axial flap and a staged reconstruction.  A telfa template was made of the defect.  This telfa template was then used to outline the paramedian forehead pedicle flap.  The donor area for the pedicle flap was then injected with anesthesia.  The flap was excised through the skin and subcutaneous tissue down to the layer of the underlying musculature.  The pedicle flap was carefully excised within this deep plane to maintain its blood supply.  The edges of the donor site were undermined.   The donor site was closed in a primary fashion.  The pedicle was then rotated into position and sutured.  Once the tube was sutured into place, adequate blood supply was confirmed with blanching and refill.  The pedicle was then wrapped with xeroform gauze and dressed appropriately with a telfa and gauze bandage to ensure continued blood supply and protect the attached pedicle.
Lip Wedge Excision Repair Text: Given the location of the defect and the proximity to free margins a full thickness wedge repair was deemed most appropriate.  Using a sterile surgical marker, the appropriate repair was drawn incorporating the defect and placing the expected incisions perpendicular to the vermilion border.  The vermilion border was also meticulously outlined to ensure appropriate reapproximation during the repair.  The area thus outlined was incised through and through with a #15 scalpel blade.  The muscularis and dermis were reaproximated with deep sutures following hemostasis. Care was taken to realign the vermilion border before proceeding with the superficial closure.  Once the vermilion was realigned the superfical and mucosal closure was finished.
Ftsg Text: The defect edges were debeveled with a #15 scalpel blade.  Given the location of the defect, shape of the defect and the proximity to free margins a full thickness skin graft was deemed most appropriate.  Using a sterile surgical marker, the primary defect shape was transferred to the donor site. The area thus outlined was incised deep to adipose tissue with a #15 scalpel blade.  The harvested graft was then trimmed of adipose tissue until only dermis and epidermis was left.  The skin margins of the secondary defect were undermined to an appropriate distance in all directions utilizing iris scissors.  The secondary defect was closed with interrupted buried subcutaneous sutures.  The skin edges were then re-apposed with running  sutures.  The skin graft was then placed in the primary defect and oriented appropriately.
Split-Thickness Skin Graft Text: The defect edges were debeveled with a #15 scalpel blade.  Given the location of the defect, shape of the defect and the proximity to free margins a split thickness skin graft was deemed most appropriate.  Using a sterile surgical marker, the primary defect shape was transferred to the donor site. The split thickness graft was then harvested.  The skin graft was then placed in the primary defect and oriented appropriately.
Burow's Graft Text: The defect edges were debeveled with a #15 scalpel blade.  Given the location of the defect, shape of the defect, the proximity to free margins and the presence of a standing cone deformity a Burow's skin graft was deemed most appropriate. The standing cone was removed and this tissue was then trimmed to the shape of the primary defect. The adipose tissue was also removed until only dermis and epidermis were left.  The skin margins of the secondary defect were undermined to an appropriate distance in all directions utilizing iris scissors.  The secondary defect was closed with interrupted buried subcutaneous sutures.  The skin edges were then re-apposed with running  sutures.  The skin graft was then placed in the primary defect and oriented appropriately.
Cartilage Graft Text: The defect edges were debeveled with a #15 scalpel blade.  Given the location of the defect, shape of the defect, the fact the defect involved a full thickness cartilage defect a cartilage graft was deemed most appropriate.  An appropriate donor site was identified, cleansed, and anesthetized. The cartilage graft was then harvested and transferred to the recipient site, oriented appropriately and then sutured into place.  The secondary defect was then repaired using a primary closure.
Composite Graft Text: The defect edges were debeveled with a #15 scalpel blade.  Given the location of the defect, shape of the defect, the proximity to free margins and the fact the defect was full thickness a composite graft was deemed most appropriate.  The defect was outline and then transferred to the donor site.  A full thickness graft was then excised from the donor site. The graft was then placed in the primary defect, oriented appropriately and then sutured into place.  The secondary defect was then repaired using a primary closure.
Epidermal Autograft Text: The defect edges were debeveled with a #15 scalpel blade.  Given the location of the defect, shape of the defect and the proximity to free margins an epidermal autograft was deemed most appropriate.  Using a sterile surgical marker, the primary defect shape was transferred to the donor site. The epidermal graft was then harvested.  The skin graft was then placed in the primary defect and oriented appropriately.
Dermal Autograft Text: The defect edges were debeveled with a #15 scalpel blade.  Given the location of the defect, shape of the defect and the proximity to free margins a dermal autograft was deemed most appropriate.  Using a sterile surgical marker, the primary defect shape was transferred to the donor site. The area thus outlined was incised deep to adipose tissue with a #15 scalpel blade.  The harvested graft was then trimmed of adipose and epidermal tissue until only dermis was left.  The skin graft was then placed in the primary defect and oriented appropriately.
Skin Substitute Text: The defect edges were debeveled with a #15 scalpel blade.  Given the location of the defect, shape of the defect and the proximity to free margins a skin substitute graft was deemed most appropriate.  The graft material was trimmed to fit the size of the defect. The graft was then placed in the primary defect and oriented appropriately.
Tissue Cultured Epidermal Autograft Text: The defect edges were debeveled with a #15 scalpel blade.  Given the location of the defect, shape of the defect and the proximity to free margins a tissue cultured epidermal autograft was deemed most appropriate.  The graft was then trimmed to fit the size of the defect.  The graft was then placed in the primary defect and oriented appropriately.
Xenograft Text: The defect edges were debeveled with a #15 scalpel blade.  Given the location of the defect, shape of the defect and the proximity to free margins a xenograft was deemed most appropriate.  The graft was then trimmed to fit the size of the defect.  The graft was then placed in the primary defect and oriented appropriately.
Purse String (Intermediate) Text: Given the location of the defect and the characteristics of the surrounding skin a purse string intermediate closure was deemed most appropriate.  Undermining was performed circumferentially around the surgical defect.  A purse string suture was then placed and tightened.
Purse String (Simple) Text: Given the location of the defect and the characteristics of the surrounding skin a purse string simple closure was deemed most appropriate.  Undermining was performed circumferentially around the surgical defect.  A purse string suture was then placed and tightened.
Complex Repair And Single Advancement Flap Text: The defect edges were debeveled with a #15 scalpel blade.  The primary defect was closed partially with a complex linear closure.  Given the location of the remaining defect, shape of the defect and the proximity to free margins a single advancement flap was deemed most appropriate for complete closure of the defect.  Using a sterile surgical marker, an appropriate advancement flap was drawn incorporating the defect and placing the expected incisions within the relaxed skin tension lines where possible.    The area thus outlined was incised deep to adipose tissue with a #15 scalpel blade.  The skin margins were undermined to an appropriate distance in all directions utilizing iris scissors.
Complex Repair And Double Advancement Flap Text: The defect edges were debeveled with a #15 scalpel blade.  The primary defect was closed partially with a complex linear closure.  Given the location of the remaining defect, shape of the defect and the proximity to free margins a double advancement flap was deemed most appropriate for complete closure of the defect.  Using a sterile surgical marker, an appropriate advancement flap was drawn incorporating the defect and placing the expected incisions within the relaxed skin tension lines where possible.    The area thus outlined was incised deep to adipose tissue with a #15 scalpel blade.  The skin margins were undermined to an appropriate distance in all directions utilizing iris scissors.
Complex Repair And Modified Advancement Flap Text: The defect edges were debeveled with a #15 scalpel blade.  The primary defect was closed partially with a complex linear closure.  Given the location of the remaining defect, shape of the defect and the proximity to free margins a modified advancement flap was deemed most appropriate for complete closure of the defect.  Using a sterile surgical marker, an appropriate advancement flap was drawn incorporating the defect and placing the expected incisions within the relaxed skin tension lines where possible.    The area thus outlined was incised deep to adipose tissue with a #15 scalpel blade.  The skin margins were undermined to an appropriate distance in all directions utilizing iris scissors.
Complex Repair And A-T Advancement Flap Text: The defect edges were debeveled with a #15 scalpel blade.  The primary defect was closed partially with a complex linear closure.  Given the location of the remaining defect, shape of the defect and the proximity to free margins an A-T advancement flap was deemed most appropriate for complete closure of the defect.  Using a sterile surgical marker, an appropriate advancement flap was drawn incorporating the defect and placing the expected incisions within the relaxed skin tension lines where possible.    The area thus outlined was incised deep to adipose tissue with a #15 scalpel blade.  The skin margins were undermined to an appropriate distance in all directions utilizing iris scissors.
Complex Repair And O-T Advancement Flap Text: The defect edges were debeveled with a #15 scalpel blade.  The primary defect was closed partially with a complex linear closure.  Given the location of the remaining defect, shape of the defect and the proximity to free margins an O-T advancement flap was deemed most appropriate for complete closure of the defect.  Using a sterile surgical marker, an appropriate advancement flap was drawn incorporating the defect and placing the expected incisions within the relaxed skin tension lines where possible.    The area thus outlined was incised deep to adipose tissue with a #15 scalpel blade.  The skin margins were undermined to an appropriate distance in all directions utilizing iris scissors.
Complex Repair And O-L Flap Text: The defect edges were debeveled with a #15 scalpel blade.  The primary defect was closed partially with a complex linear closure.  Given the location of the remaining defect, shape of the defect and the proximity to free margins an O-L flap was deemed most appropriate for complete closure of the defect.  Using a sterile surgical marker, an appropriate flap was drawn incorporating the defect and placing the expected incisions within the relaxed skin tension lines where possible.    The area thus outlined was incised deep to adipose tissue with a #15 scalpel blade.  The skin margins were undermined to an appropriate distance in all directions utilizing iris scissors.
Complex Repair And Bilobe Flap Text: The defect edges were debeveled with a #15 scalpel blade.  The primary defect was closed partially with a complex linear closure.  Given the location of the remaining defect, shape of the defect and the proximity to free margins a bilobe flap was deemed most appropriate for complete closure of the defect.  Using a sterile surgical marker, an appropriate advancement flap was drawn incorporating the defect and placing the expected incisions within the relaxed skin tension lines where possible.    The area thus outlined was incised deep to adipose tissue with a #15 scalpel blade.  The skin margins were undermined to an appropriate distance in all directions utilizing iris scissors.
Complex Repair And Melolabial Flap Text: The defect edges were debeveled with a #15 scalpel blade.  The primary defect was closed partially with a complex linear closure.  Given the location of the remaining defect, shape of the defect and the proximity to free margins a melolabial flap was deemed most appropriate for complete closure of the defect.  Using a sterile surgical marker, an appropriate advancement flap was drawn incorporating the defect and placing the expected incisions within the relaxed skin tension lines where possible.    The area thus outlined was incised deep to adipose tissue with a #15 scalpel blade.  The skin margins were undermined to an appropriate distance in all directions utilizing iris scissors.
Complex Repair And Rotation Flap Text: The defect edges were debeveled with a #15 scalpel blade.  The primary defect was closed partially with a complex linear closure.  Given the location of the remaining defect, shape of the defect and the proximity to free margins a rotation flap was deemed most appropriate for complete closure of the defect.  Using a sterile surgical marker, an appropriate advancement flap was drawn incorporating the defect and placing the expected incisions within the relaxed skin tension lines where possible.    The area thus outlined was incised deep to adipose tissue with a #15 scalpel blade.  The skin margins were undermined to an appropriate distance in all directions utilizing iris scissors.
Complex Repair And Rhombic Flap Text: The defect edges were debeveled with a #15 scalpel blade.  The primary defect was closed partially with a complex linear closure.  Given the location of the remaining defect, shape of the defect and the proximity to free margins a rhombic flap was deemed most appropriate for complete closure of the defect.  Using a sterile surgical marker, an appropriate advancement flap was drawn incorporating the defect and placing the expected incisions within the relaxed skin tension lines where possible.    The area thus outlined was incised deep to adipose tissue with a #15 scalpel blade.  The skin margins were undermined to an appropriate distance in all directions utilizing iris scissors.
Complex Repair And Transposition Flap Text: The defect edges were debeveled with a #15 scalpel blade.  The primary defect was closed partially with a complex linear closure.  Given the location of the remaining defect, shape of the defect and the proximity to free margins a transposition flap was deemed most appropriate for complete closure of the defect.  Using a sterile surgical marker, an appropriate advancement flap was drawn incorporating the defect and placing the expected incisions within the relaxed skin tension lines where possible.    The area thus outlined was incised deep to adipose tissue with a #15 scalpel blade.  The skin margins were undermined to an appropriate distance in all directions utilizing iris scissors.
Complex Repair And V-Y Plasty Text: The defect edges were debeveled with a #15 scalpel blade.  The primary defect was closed partially with a complex linear closure.  Given the location of the remaining defect, shape of the defect and the proximity to free margins a V-Y plasty was deemed most appropriate for complete closure of the defect.  Using a sterile surgical marker, an appropriate advancement flap was drawn incorporating the defect and placing the expected incisions within the relaxed skin tension lines where possible.    The area thus outlined was incised deep to adipose tissue with a #15 scalpel blade.  The skin margins were undermined to an appropriate distance in all directions utilizing iris scissors.
Complex Repair And M Plasty Text: The defect edges were debeveled with a #15 scalpel blade.  The primary defect was closed partially with a complex linear closure.  Given the location of the remaining defect, shape of the defect and the proximity to free margins an M plasty was deemed most appropriate for complete closure of the defect.  Using a sterile surgical marker, an appropriate advancement flap was drawn incorporating the defect and placing the expected incisions within the relaxed skin tension lines where possible.    The area thus outlined was incised deep to adipose tissue with a #15 scalpel blade.  The skin margins were undermined to an appropriate distance in all directions utilizing iris scissors.
Complex Repair And Double M Plasty Text: The defect edges were debeveled with a #15 scalpel blade.  The primary defect was closed partially with a complex linear closure.  Given the location of the remaining defect, shape of the defect and the proximity to free margins a double M plasty was deemed most appropriate for complete closure of the defect.  Using a sterile surgical marker, an appropriate advancement flap was drawn incorporating the defect and placing the expected incisions within the relaxed skin tension lines where possible.    The area thus outlined was incised deep to adipose tissue with a #15 scalpel blade.  The skin margins were undermined to an appropriate distance in all directions utilizing iris scissors.
Complex Repair And W Plasty Text: The defect edges were debeveled with a #15 scalpel blade.  The primary defect was closed partially with a complex linear closure.  Given the location of the remaining defect, shape of the defect and the proximity to free margins a W plasty was deemed most appropriate for complete closure of the defect.  Using a sterile surgical marker, an appropriate advancement flap was drawn incorporating the defect and placing the expected incisions within the relaxed skin tension lines where possible.    The area thus outlined was incised deep to adipose tissue with a #15 scalpel blade.  The skin margins were undermined to an appropriate distance in all directions utilizing iris scissors.
Complex Repair And Z Plasty Text: The defect edges were debeveled with a #15 scalpel blade.  The primary defect was closed partially with a complex linear closure.  Given the location of the remaining defect, shape of the defect and the proximity to free margins a Z plasty was deemed most appropriate for complete closure of the defect.  Using a sterile surgical marker, an appropriate advancement flap was drawn incorporating the defect and placing the expected incisions within the relaxed skin tension lines where possible.    The area thus outlined was incised deep to adipose tissue with a #15 scalpel blade.  The skin margins were undermined to an appropriate distance in all directions utilizing iris scissors.
Complex Repair And Dorsal Nasal Flap Text: The defect edges were debeveled with a #15 scalpel blade.  The primary defect was closed partially with a complex linear closure.  Given the location of the remaining defect, shape of the defect and the proximity to free margins a dorsal nasal flap was deemed most appropriate for complete closure of the defect.  Using a sterile surgical marker, an appropriate flap was drawn incorporating the defect and placing the expected incisions within the relaxed skin tension lines where possible.    The area thus outlined was incised deep to adipose tissue with a #15 scalpel blade.  The skin margins were undermined to an appropriate distance in all directions utilizing iris scissors.
Complex Repair And Ftsg Text: The defect edges were debeveled with a #15 scalpel blade.  The primary defect was closed partially with a complex linear closure.  Given the location of the defect, shape of the defect and the proximity to free margins a full thickness skin graft was deemed most appropriate to repair the remaining defect.  The graft was trimmed to fit the size of the remaining defect.  The graft was then placed in the primary defect, oriented appropriately, and sutured into place.
Complex Repair And Burow's Graft Text: The defect edges were debeveled with a #15 scalpel blade.  The primary defect was closed partially with a complex linear closure.  Given the location of the defect, shape of the defect, the proximity to free margins and the presence of a standing cone deformity a Burow's graft was deemed most appropriate to repair the remaining defect.  The graft was trimmed to fit the size of the remaining defect.  The graft was then placed in the primary defect, oriented appropriately, and sutured into place.
Complex Repair And Split-Thickness Skin Graft Text: The defect edges were debeveled with a #15 scalpel blade.  The primary defect was closed partially with a complex linear closure.  Given the location of the defect, shape of the defect and the proximity to free margins a split thickness skin graft was deemed most appropriate to repair the remaining defect.  The graft was trimmed to fit the size of the remaining defect.  The graft was then placed in the primary defect, oriented appropriately, and sutured into place.
Complex Repair And Epidermal Autograft Text: The defect edges were debeveled with a #15 scalpel blade.  The primary defect was closed partially with a complex linear closure.  Given the location of the defect, shape of the defect and the proximity to free margins an epidermal autograft was deemed most appropriate to repair the remaining defect.  The graft was trimmed to fit the size of the remaining defect.  The graft was then placed in the primary defect, oriented appropriately, and sutured into place.
Complex Repair And Dermal Autograft Text: The defect edges were debeveled with a #15 scalpel blade.  The primary defect was closed partially with a complex linear closure.  Given the location of the defect, shape of the defect and the proximity to free margins an dermal autograft was deemed most appropriate to repair the remaining defect.  The graft was trimmed to fit the size of the remaining defect.  The graft was then placed in the primary defect, oriented appropriately, and sutured into place.
Complex Repair And Tissue Cultured Epidermal Autograft Text: The defect edges were debeveled with a #15 scalpel blade.  The primary defect was closed partially with a complex linear closure.  Given the location of the defect, shape of the defect and the proximity to free margins an tissue cultured epidermal autograft was deemed most appropriate to repair the remaining defect.  The graft was trimmed to fit the size of the remaining defect.  The graft was then placed in the primary defect, oriented appropriately, and sutured into place.
Complex Repair And Xenograft Text: The defect edges were debeveled with a #15 scalpel blade.  The primary defect was closed partially with a complex linear closure.  Given the location of the defect, shape of the defect and the proximity to free margins a xenograft was deemed most appropriate to repair the remaining defect.  The graft was trimmed to fit the size of the remaining defect.  The graft was then placed in the primary defect, oriented appropriately, and sutured into place.
Complex Repair And Skin Substitute Graft Text: The defect edges were debeveled with a #15 scalpel blade.  The primary defect was closed partially with a complex linear closure.  Given the location of the remaining defect, shape of the defect and the proximity to free margins a skin substitute graft was deemed most appropriate to repair the remaining defect.  The graft was trimmed to fit the size of the remaining defect.  The graft was then placed in the primary defect, oriented appropriately, and sutured into place.
Path Notes (To The Dermatopathologist): Please confirm diagnosis and check margins.
Consent was obtained from the patient. The risks and benefits to therapy were discussed in detail. Specifically, the risks of infection, scarring, bleeding, prolonged wound healing, incomplete removal, allergy to anesthesia, nerve injury and recurrence were addressed. Prior to the procedure, the treatment site was clearly identified and confirmed by the patient. All components of Universal Protocol/PAUSE Rule completed.
Post-Care Instructions: I reviewed with the patient in detail post-care instructions. Patient is not to engage in any heavy lifting, exercise, or swimming for the next 14 days. Should the patient develop any fevers, chills, bleeding, severe pain patient will contact the office immediately.
Home Suture Removal Text: Patient was provided a home suture removal kit and will remove their sutures at home.  If they have any questions or difficulties they will call the office.
Where Do You Want The Question To Include Opioid Counseling Located?: Case Summary Tab
Billing Type: Third-Party Bill
Information: Selecting Yes will display possible errors in your note based on the variables you have selected. This validation is only offered as a suggestion for you. PLEASE NOTE THAT THE VALIDATION TEXT WILL BE REMOVED WHEN YOU FINALIZE YOUR NOTE. IF YOU WANT TO FAX A PRELIMINARY NOTE YOU WILL NEED TO TOGGLE THIS TO 'NO' IF YOU DO NOT WANT IT IN YOUR FAXED NOTE.

## 2021-04-23 ENCOUNTER — APPOINTMENT (RX ONLY)
Dept: URBAN - METROPOLITAN AREA CLINIC 22 | Facility: CLINIC | Age: 56
Setting detail: DERMATOLOGY
End: 2021-04-23

## 2021-04-23 DIAGNOSIS — Z48.817 ENCOUNTER FOR SURGICAL AFTERCARE FOLLOWING SURGERY ON THE SKIN AND SUBCUTANEOUS TISSUE: ICD-10-CM

## 2021-04-23 PROCEDURE — 99024 POSTOP FOLLOW-UP VISIT: CPT

## 2021-04-23 PROCEDURE — ? POST-OP WOUND EVALUATION

## 2021-04-23 ASSESSMENT — LOCATION ZONE DERM: LOCATION ZONE: TRUNK

## 2021-04-23 ASSESSMENT — LOCATION SIMPLE DESCRIPTION DERM: LOCATION SIMPLE: CHEST

## 2021-04-23 ASSESSMENT — LOCATION DETAILED DESCRIPTION DERM: LOCATION DETAILED: LEFT MEDIAL INFERIOR CHEST

## 2021-04-23 NOTE — PROCEDURE: POST-OP WOUND EVALUATION
Detail Level: Detailed
Add 08100 Cpt? (Important Note: In 2017 The Use Of 89455 Is Being Tracked By Cms To Determine Future Global Period Reimbursement For Global Periods): yes
Wound Evaluated By (Optional): Gee Sher MD
Wound Diameter In Cm(Optional): 0
Wound Crusting?: clean
Sutures?: intact
Wound Color?: red
Any New Or Residual Neoplasm?: No
Patient To Follow-Up With?: our clinic
Follow Up Provider (Optional): Antwon Sher
Follow Up Units (Optional): 4
Follow Up Time Frame (Optional): weeks

## 2021-05-20 DIAGNOSIS — J45.909 UNCOMPLICATED ASTHMA, UNSPECIFIED ASTHMA SEVERITY, UNSPECIFIED WHETHER PERSISTENT: ICD-10-CM

## 2021-06-05 ENCOUNTER — HOSPITAL ENCOUNTER (OUTPATIENT)
Dept: RADIOLOGY | Facility: MEDICAL CENTER | Age: 56
End: 2021-06-05
Attending: INTERNAL MEDICINE
Payer: COMMERCIAL

## 2021-06-05 DIAGNOSIS — C43.59 MALIGNANT MELANOMA OF SKIN OF TRUNK, EXCEPT SCROTUM (HCC): ICD-10-CM

## 2021-06-05 DIAGNOSIS — Z79.899 NEED FOR PROPHYLACTIC CHEMOTHERAPY: ICD-10-CM

## 2021-06-05 DIAGNOSIS — R94.5 NONSPECIFIC ABNORMAL RESULTS OF LIVER FUNCTION STUDY: ICD-10-CM

## 2021-06-05 PROCEDURE — 70553 MRI BRAIN STEM W/O & W/DYE: CPT

## 2021-06-05 PROCEDURE — A9576 INJ PROHANCE MULTIPACK: HCPCS | Performed by: INTERNAL MEDICINE

## 2021-06-05 PROCEDURE — 700117 HCHG RX CONTRAST REV CODE 255: Performed by: INTERNAL MEDICINE

## 2021-06-05 RX ADMIN — GADOTERIDOL 16 ML: 279.3 INJECTION, SOLUTION INTRAVENOUS at 14:26

## 2021-06-08 ENCOUNTER — APPOINTMENT (RX ONLY)
Dept: URBAN - METROPOLITAN AREA CLINIC 22 | Facility: CLINIC | Age: 56
Setting detail: DERMATOLOGY
End: 2021-06-08

## 2021-06-08 DIAGNOSIS — Z71.89 OTHER SPECIFIED COUNSELING: ICD-10-CM

## 2021-06-08 DIAGNOSIS — L82.1 OTHER SEBORRHEIC KERATOSIS: ICD-10-CM

## 2021-06-08 DIAGNOSIS — L21.8 OTHER SEBORRHEIC DERMATITIS: ICD-10-CM

## 2021-06-08 DIAGNOSIS — D22 MELANOCYTIC NEVI: ICD-10-CM

## 2021-06-08 DIAGNOSIS — D18.0 HEMANGIOMA: ICD-10-CM

## 2021-06-08 DIAGNOSIS — L81.4 OTHER MELANIN HYPERPIGMENTATION: ICD-10-CM

## 2021-06-08 PROBLEM — C43.59 MALIGNANT MELANOMA OF OTHER PART OF TRUNK: Status: ACTIVE | Noted: 2021-06-08

## 2021-06-08 PROBLEM — D18.01 HEMANGIOMA OF SKIN AND SUBCUTANEOUS TISSUE: Status: ACTIVE | Noted: 2021-06-08

## 2021-06-08 PROBLEM — D22.9 MELANOCYTIC NEVI, UNSPECIFIED: Status: ACTIVE | Noted: 2021-06-08

## 2021-06-08 PROCEDURE — ? COUNSELING

## 2021-06-08 PROCEDURE — ? ADDITIONAL NOTES

## 2021-06-08 PROCEDURE — ? DIAGNOSIS COMMENT

## 2021-06-08 PROCEDURE — 99213 OFFICE O/P EST LOW 20 MIN: CPT

## 2021-06-08 ASSESSMENT — LOCATION DETAILED DESCRIPTION DERM: LOCATION DETAILED: RIGHT OCCIPITAL SCALP

## 2021-06-08 ASSESSMENT — LOCATION ZONE DERM: LOCATION ZONE: SCALP

## 2021-06-08 ASSESSMENT — LOCATION SIMPLE DESCRIPTION DERM: LOCATION SIMPLE: POSTERIOR SCALP

## 2021-06-08 NOTE — PROCEDURE: ADDITIONAL NOTES
Additional Notes: 3 month FBSE from initial appt and diagnosis.\\nHe is following with Dr. Mosher, Onc and states he is doing well on optivo. Denies any symptoms or concerns.  Scar well healed and no evidence of recurrence noted today.
Detail Level: Detailed
Render Risk Assessment In Note?: yes
Detail Level: Simple
Render Risk Assessment In Note?: no
Additional Notes: Advised patient to use OTC selsun blue shampoo

## 2021-08-11 ENCOUNTER — OFFICE VISIT (OUTPATIENT)
Dept: MEDICAL GROUP | Facility: LAB | Age: 56
End: 2021-08-11
Payer: COMMERCIAL

## 2021-08-11 VITALS
SYSTOLIC BLOOD PRESSURE: 118 MMHG | RESPIRATION RATE: 14 BRPM | TEMPERATURE: 98.1 F | DIASTOLIC BLOOD PRESSURE: 74 MMHG | WEIGHT: 184.2 LBS | BODY MASS INDEX: 25.79 KG/M2 | HEIGHT: 71 IN | OXYGEN SATURATION: 96 % | HEART RATE: 76 BPM

## 2021-08-11 DIAGNOSIS — J45.40 MODERATE PERSISTENT ASTHMA WITHOUT COMPLICATION: ICD-10-CM

## 2021-08-11 DIAGNOSIS — Z13.6 SCREENING FOR CARDIOVASCULAR CONDITION: ICD-10-CM

## 2021-08-11 DIAGNOSIS — Z00.00 ANNUAL PHYSICAL EXAM: ICD-10-CM

## 2021-08-11 DIAGNOSIS — Z12.5 ENCOUNTER FOR SCREENING FOR MALIGNANT NEOPLASM OF PROSTATE: ICD-10-CM

## 2021-08-11 DIAGNOSIS — C43.59 MELANOMA OF BUTTOCK (HCC): ICD-10-CM

## 2021-08-11 PROBLEM — H93.13 BILATERAL TINNITUS: Status: ACTIVE | Noted: 2021-08-11

## 2021-08-11 PROBLEM — J32.4 CHRONIC PANSINUSITIS: Status: ACTIVE | Noted: 2021-08-11

## 2021-08-11 PROCEDURE — 99396 PREV VISIT EST AGE 40-64: CPT | Performed by: FAMILY MEDICINE

## 2021-08-11 RX ORDER — PREDNISONE 20 MG/1
40 TABLET ORAL DAILY
Qty: 10 TABLET | Refills: 0 | Status: SHIPPED | OUTPATIENT
Start: 2021-08-11 | End: 2021-08-16

## 2021-08-11 RX ORDER — ALBUTEROL SULFATE 90 UG/1
AEROSOL, METERED RESPIRATORY (INHALATION)
COMMUNITY
Start: 2021-07-29 | End: 2021-10-05

## 2021-08-11 ASSESSMENT — ENCOUNTER SYMPTOMS
CHILLS: 0
ABDOMINAL PAIN: 0
BLURRED VISION: 0
DIARRHEA: 0
SHORTNESS OF BREATH: 0
FEVER: 0
CONSTIPATION: 0
NAUSEA: 0
COUGH: 1
PALPITATIONS: 0
DEPRESSION: 0
WHEEZING: 1
NERVOUS/ANXIOUS: 0
VOMITING: 0

## 2021-08-11 ASSESSMENT — PATIENT HEALTH QUESTIONNAIRE - PHQ9: CLINICAL INTERPRETATION OF PHQ2 SCORE: 0

## 2021-08-11 ASSESSMENT — FIBROSIS 4 INDEX: FIB4 SCORE: 0.79

## 2021-08-11 NOTE — PROGRESS NOTES
Subjective:   Andrew Wall is a 56 y.o. male here today for   Chief Complaint   Patient presents with   • Annual Exam   • Shortness of Breath     exercise x 3 weeks , some cough       Dr. [facility].     #Annual   -Reviewed all past medical history, family history, social history.  -Reviewed all screening/vaccinations: Patient is due for zoster vaccine. He is also due for screenings include lipid profile, PSA. Has recently had colonoscopy completed at [facility], will request results at this time.  -Diet and Exercise: Appropriate for age, normal diet with no concerns.  -Tobacco, alcohol, recreational drug use: Very occasional alcohol use. Denies any tobacco, recreational drug use.  -Sexually active: Remains monogamous with female partner, no concerns    #Melanoma   -History of melanoma which is surgically excised off buttock. [facility] Patient states that things are going well. Is tolerating the therapy well with no concerns at this time. We will continue to follow-up with Dr. Ross on a regular basis.    #Moderate persistent asthma:  -Currently treating with Wixela, albuterol. He states that he has had increased chest tightness, cough, wheezing over the last few weeks given the increased smoke. 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 He states that since then he has had worsening of symptoms. He denies any fevers, chills, recent sick exposure, recent traveling. Patient has had Covid-19 vaccine completed in January.      No Active Allergies      Current medicines (including changes today)  Current Outpatient Medications   Medication Sig Dispense Refill   • albuterol 108 (90 Base) MCG/ACT Aero Soln inhalation aerosol INHALE 2 PUFFSBY MOUTH EVERY 4 HOURS AS NEEDED FOR COUGH, WHEEZING, SOB     •  "WIXELA INHUB 250-50 MCG/DOSE AEROSOL POWDER, BREATH ACTIVATED USE 1 INHALATION ORALLY TWICE DAILY. RINSE MOUTH WELL AFTER USE. (NOTE: THIS REPLACES DULERA) 60 Each 3   • BUDESONIDE NA [place]  in nose every day. SALINE NASAL RINSE     • Multiple Vitamins-Minerals (MULTIVITAMIN ADULT PO) Take  by mouth every day.     • Calcium Carbonate-Vitamin D (CALCIUM-D PO) Take  by mouth every day.       No current facility-administered medications for this visit.     He  has a past medical history of Asthma, 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), and Nasal polyp. He also has no past medical history of Hyperlipidemia.    ROS   Review of Systems   Constitutional: Negative for chills and fever.   HENT: Negative for hearing loss.    Eyes: Negative for blurred vision.   Respiratory: Positive for cough and wheezing. Negative for shortness of breath.    Cardiovascular: Negative for chest pain and palpitations.   Gastrointestinal: Negative for abdominal pain, constipation, diarrhea, nausea and vomiting.   Psychiatric/Behavioral: Negative for depression. The patient is not nervous/anxious.           Objective:     Physical Exam:  /74 (BP Location: Right arm, Patient Position: Sitting, BP Cuff Size: Adult)   Pulse 76   Temp 36.7 °C (98.1 °F)   Resp 14   Ht 1.803 m (5' 11\")   Wt 83.6 kg (184 lb 3.2 oz)   SpO2 96%  Body mass index is 25.69 kg/m².   Constitutional: Alert, no distress.  Skin: Warm, dry, good turgor, no rashes in visible areas.  Eye: Equal, round and reactive, conjunctiva clear, lids normal.  ENMT: TM's clear bilaterally, lips without lesions, good dentition, oropharynx clear.  Neck: Trachea midline, no masses, no thyromegaly. No cervical or supraclavicular lymphadenopathy.  Respiratory: Unlabored respiratory effort, lungs clear to auscultation, no wheezes, no rhonchi.  Cardiovascular: Normal S1, S2, no murmur, no edema.  Abdomen: Soft, non-tender, no masses, no hepatosplenomegaly.  Psych: Alert and oriented " x3, normal affect and mood.    Assessment and Plan:     1. Annual physical exam  -All questions concerns were answered at this time.  -Vaccinations/screenings needed at this time: Encourage patient to complete zoster vaccine. Will check labs as below.  -Labs reviewed, no concerns, check labs as below.  -Discussed the importance of a healthy, Mediterranean style diet, routine exercise regimen.  -Discussed general safety measures including seatbelts, helmets, avoidance of smoking, sunscreen, hydration.  -Follow-up for general physical exam on a yearly basis, sooner if needed.    2. Melanoma of buttock (HCC)  -Status: Stable. Will continue immunotherapy with oncologist. Will follow up with me as needed.    3. Moderate persistent asthma without complication  -At this time symptoms do seem to be exacerbated given the smell. His examination did show some slight expiratory wheezing in the right lung field. Given his exacerbation of symptoms we will begin treatment this time with burst of steroids. We will contact oncologist office to assure that they are okay with him beginning steroid burst. Meanwhile I encourage patient to remain out of smoke as much as possible, continue with Wixela, albuterol as needed. Strict return precautions were given, patient will follow-up as needed.  - predniSONE (DELTASONE) 20 MG Tab; Take 2 Tablets by mouth every day for 5 days.  Dispense: 10 Tablet; Refill: 0    4. Encounter for screening for malignant neoplasm of prostate  - PROSTATE SPECIFIC AG SCREENING; Future    5. Screening for cardiovascular condition  - Lipid Profile; Future      Followup: Return in about 1 year (around 8/11/2022), or if symptoms worsen or fail to improve.         PLEASE NOTE: This dictation was created using voice recognition software. I have made every reasonable attempt to correct obvious errors, but I expect that there are errors of grammar and possibly content that I did not discover before finalizing the  note.

## 2021-09-07 ENCOUNTER — APPOINTMENT (RX ONLY)
Dept: URBAN - METROPOLITAN AREA CLINIC 22 | Facility: CLINIC | Age: 56
Setting detail: DERMATOLOGY
End: 2021-09-07

## 2021-09-07 DIAGNOSIS — L81.4 OTHER MELANIN HYPERPIGMENTATION: ICD-10-CM

## 2021-09-07 DIAGNOSIS — D22 MELANOCYTIC NEVI: ICD-10-CM

## 2021-09-07 DIAGNOSIS — Z71.89 OTHER SPECIFIED COUNSELING: ICD-10-CM

## 2021-09-07 DIAGNOSIS — L82.1 OTHER SEBORRHEIC KERATOSIS: ICD-10-CM

## 2021-09-07 DIAGNOSIS — D18.0 HEMANGIOMA: ICD-10-CM

## 2021-09-07 PROBLEM — C43.59 MALIGNANT MELANOMA OF OTHER PART OF TRUNK: Status: ACTIVE | Noted: 2021-09-07

## 2021-09-07 PROBLEM — D18.01 HEMANGIOMA OF SKIN AND SUBCUTANEOUS TISSUE: Status: ACTIVE | Noted: 2021-09-07

## 2021-09-07 PROBLEM — D22.9 MELANOCYTIC NEVI, UNSPECIFIED: Status: ACTIVE | Noted: 2021-09-07

## 2021-09-07 PROCEDURE — ? COUNSELING

## 2021-09-07 PROCEDURE — 99213 OFFICE O/P EST LOW 20 MIN: CPT

## 2021-09-07 PROCEDURE — ? ADDITIONAL NOTES

## 2021-09-07 PROCEDURE — ? DIAGNOSIS COMMENT

## 2021-09-07 NOTE — PROCEDURE: ADDITIONAL NOTES
Additional Notes: 3 month FBSE from initial appt and diagnosis.\\nHe is following with Dr. Mosher, Onc and states he is doing well on optivo. Denies any symptoms or concerns.  Scar well healed and no evidence of recurrence noted today.
Detail Level: Detailed
Render Risk Assessment In Note?: yes

## 2021-09-27 ENCOUNTER — OFFICE VISIT (OUTPATIENT)
Dept: URGENT CARE | Facility: CLINIC | Age: 56
End: 2021-09-27
Payer: COMMERCIAL

## 2021-09-27 ENCOUNTER — HOSPITAL ENCOUNTER (OUTPATIENT)
Facility: MEDICAL CENTER | Age: 56
End: 2021-09-27
Attending: NURSE PRACTITIONER
Payer: COMMERCIAL

## 2021-09-27 VITALS
OXYGEN SATURATION: 97 % | SYSTOLIC BLOOD PRESSURE: 114 MMHG | HEIGHT: 71 IN | TEMPERATURE: 98.4 F | RESPIRATION RATE: 18 BRPM | BODY MASS INDEX: 24.92 KG/M2 | HEART RATE: 95 BPM | DIASTOLIC BLOOD PRESSURE: 72 MMHG | WEIGHT: 178 LBS

## 2021-09-27 DIAGNOSIS — R50.9 FEVER, UNSPECIFIED FEVER CAUSE: ICD-10-CM

## 2021-09-27 DIAGNOSIS — A08.4 VIRAL GASTROENTERITIS: ICD-10-CM

## 2021-09-27 PROCEDURE — U0005 INFEC AGEN DETEC AMPLI PROBE: HCPCS

## 2021-09-27 PROCEDURE — 99213 OFFICE O/P EST LOW 20 MIN: CPT | Performed by: NURSE PRACTITIONER

## 2021-09-27 PROCEDURE — U0003 INFECTIOUS AGENT DETECTION BY NUCLEIC ACID (DNA OR RNA); SEVERE ACUTE RESPIRATORY SYNDROME CORONAVIRUS 2 (SARS-COV-2) (CORONAVIRUS DISEASE [COVID-19]), AMPLIFIED PROBE TECHNIQUE, MAKING USE OF HIGH THROUGHPUT TECHNOLOGIES AS DESCRIBED BY CMS-2020-01-R: HCPCS

## 2021-09-27 ASSESSMENT — ENCOUNTER SYMPTOMS
VOMITING: 1
RESPIRATORY NEGATIVE: 1
DIARRHEA: 1
SHORTNESS OF BREATH: 0
FATIGUE: 1
COUGH: 0
MYALGIAS: 1
CHILLS: 1
FEVER: 1
SORE THROAT: 0
NAUSEA: 1

## 2021-09-27 ASSESSMENT — FIBROSIS 4 INDEX: FIB4 SCORE: 0.79

## 2021-09-27 ASSESSMENT — VISUAL ACUITY: OU: 1

## 2021-09-27 NOTE — PROGRESS NOTES
Subjective:     Andrew Wall is a 56 y.o. male who presents for Coronavirus Screening, Fever (x3-4 days, 101F), Fatigue, and Diarrhea       Fever   This is a new problem. The problem has been gradually worsening. Associated symptoms include diarrhea, nausea and vomiting. Pertinent negatives include no congestion, coughing or sore throat.   Fatigue  Associated symptoms include chills, fatigue, a fever, myalgias, nausea and vomiting. Pertinent negatives include no congestion, coughing or sore throat.   Diarrhea   Associated symptoms include chills, a fever, myalgias and vomiting. Pertinent negatives include no coughing.     Patient would like to be tested for Covid.    Patient was screened prior to rooming and denied COVID-19 diagnosis or contact with a person who has been diagnosed or is suspected to have COVID-19. During this visit, appropriate PPE was worn, hand hygiene was performed, and the patient and any visitors were masked.     PMH:  has a past medical history of Asthma, 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), and Nasal polyp. He also has no past medical history of Hyperlipidemia.    MEDS:   Current Outpatient Medications:   •  albuterol 108 (90 Base) MCG/ACT Aero Soln inhalation aerosol, INHALE 2 PUFFSBY MOUTH EVERY 4 HOURS AS NEEDED FOR COUGH, WHEEZING, SOB, Disp: , Rfl:   •  WIXELA INHUB 250-50 MCG/DOSE AEROSOL POWDER, BREATH ACTIVATED, USE 1 INHALATION ORALLY TWICE DAILY. RINSE MOUTH WELL AFTER USE. (NOTE: THIS REPLACES DULERA), Disp: 60 Each, Rfl: 3  •  BUDESONIDE NA, [place]  in nose every day. SALINE NASAL RINSE, Disp: , Rfl:   •  Multiple Vitamins-Minerals (MULTIVITAMIN ADULT PO), Take  by mouth every day., Disp: , Rfl:   •  Calcium Carbonate-Vitamin D (CALCIUM-D PO), Take  by mouth every day., Disp: , Rfl:     ALLERGIES: No Known Allergies    SURGHX:   Past Surgical History:   Procedure Laterality Date   • NODE BIOPSY SENTINEL Bilateral 10/20/2020    Procedure: BIOPSY, LYMPH NODE, SENTINEL-  GROIN;  Surgeon: Davon Valera M.D.;  Location: SURGERY SAME DAY [facility];  Service: General   • WIDE EXCISION Right 10/20/2020    Procedure: WIDE EXCISION, LESION- BUTTOCKS, RADICAL MALIGNANT MELANOMA;  Surgeon: Davon Valera M.D.;  Location: SURGERY SAME DAY [facility];  Service: General   • ANTROSTOMY Bilateral 11/15/2019    Procedure: MAXILLARY ANTROSTOMY- REVISION ENDOSCOPICMOMETASONE INJECTION, PROPEL STENT PLACEMENT;  Surgeon: Felicitas Tenorio M.D.;  Location: [facility];  Service: Ent   • SINUSCOPY Bilateral 11/15/2019    Procedure: ENDOSCOPY, PARANASAL SINUSES- FOR FRONTAL EXPLORATION;  Surgeon: Felicitas Tenorio M.D.;  Location: [facility];  Service: Ent   • TURBINOPLASTY Bilateral 11/15/2019    Procedure: TURBINOPLASTY;  Surgeon: Felicitas Tenorio M.D.;  Location: [facility];  Service: Ent   • SPHENOIDECTOMY Bilateral 11/15/2019    Procedure: SPHENOIDECTOMY- FOR SPHENOIDOTOMY;  Surgeon: Felicitas Tenorio M.D.;  Location: [facility];  Service: Ent   • ETHMOIDECTOMY Bilateral 11/15/2019    Procedure: ETHMOIDECTOMY- ENDOSCOPIC;  Surgeon: Felicitas Tenorio M.D.;  Location: [facility];  Service: Ent   • NASAL POLYPECTOMY Bilateral 11/15/2019    Procedure: POLYPECTOMY, NASAL CAVITY;  Surgeon: Feliictas Tenorio M.D.;  Location: [facility];  Service: Ent   • NASAL POLYPECTOMY  2015, 2017, 2019    x 3     SOCHX:  reports that he has never smoked. He has never used smokeless tobacco. He reports previous alcohol use. He reports that he does not use drugs.     FH: Reviewed with patient, not pertinent to this visit.    Review of Systems   Constitutional: Positive for chills, fatigue and fever.   HENT: Negative.  Negative for congestion and sore throat.    Respiratory: Negative.  Negative for cough and shortness of breath.    Gastrointestinal: Positive for diarrhea, nausea and vomiting.   Musculoskeletal: Positive for  "myalgias.   All other systems reviewed and are negative.    Additional details per HPI.      Objective:     /72   Pulse 95   Temp 36.9 °C (98.4 °F) (Temporal)   Resp 18   Ht 1.803 m (5' 11\")   Wt 80.7 kg (178 lb)   SpO2 97%   BMI 24.83 kg/m²     Physical Exam  Vitals reviewed.   Constitutional:       General: He is not in acute distress.     Appearance: He is well-developed. He is not ill-appearing or toxic-appearing.   HENT:      Right Ear: External ear normal.      Left Ear: External ear normal.   Eyes:      General: Vision grossly intact.      Extraocular Movements: Extraocular movements intact.      Conjunctiva/sclera: Conjunctivae normal.   Cardiovascular:      Rate and Rhythm: Normal rate.   Pulmonary:      Effort: Pulmonary effort is normal. No respiratory distress.   Musculoskeletal:         General: No deformity. Normal range of motion.      Cervical back: Normal range of motion.   Skin:     Coloration: Skin is not pale.   Neurological:      Mental Status: He is alert and oriented to person, place, and time.      Sensory: No sensory deficit.      Motor: No weakness.   Psychiatric:         Behavior: Behavior normal. Behavior is cooperative.       Assessment/Plan:     1. Viral gastroenteritis  - COVID/SARS CoV-2 PCR; Future    2. Fever, unspecified fever cause  - COVID/SARS CoV-2 PCR; Future    Discussed likely self-limiting viral etiology and expected course and duration of illness. Vital signs stable, afebrile, no acute distress at this time.    COVID-19 test pending. Patient is advised to self-isolate as recommended by the CDC.    Discussed clear liquid, BRAT, and bland diets and then advancing as tolerated.     Patient declines Rx for nausea.    Differential diagnosis, natural history, supportive care, rest, fluids, over-the-counter symptom management per 's instructions, ibuprofen, close monitoring, and indications for immediate follow-up discussed.     All questions answered. " Patient agrees with the plan of care.    Discharge summary provided.    Patient declines work note.    [facility] and approves of electronic communication of test results.

## 2021-09-27 NOTE — PATIENT INSTRUCTIONS
Viral Gastroenteritis, Adult    Viral gastroenteritis is also known as the stomach flu. This condition may affect your stomach, small intestine, and large intestine. It can cause sudden watery diarrhea, fever, and vomiting. This condition is caused by many different viruses. These viruses can be passed from person to person very easily (are contagious).  Diarrhea and vomiting can make you feel weak and cause you to become dehydrated. You may not be able to keep fluids down. Dehydration can make you tired and thirsty, cause you to have a dry mouth, and decrease how often you urinate. It is important to replace the fluids that you lose from diarrhea and vomiting.  What are the causes?  Gastroenteritis is caused by many viruses, including rotavirus and norovirus. Norovirus is the most common cause in adults. You can get sick after being exposed to the viruses from other people. You can also get sick by:  · Eating food, drinking water, or touching a surface contaminated with one of these viruses.  · Sharing utensils or other personal items with an infected person.  What increases the risk?  You are more likely to develop this condition if you:  · Have a weak body defense system (immune system).  · Live with one or more children who are younger than 2 years old.  · Live in a nursing home.  · Travel on cruise ships.  What are the signs or symptoms?  Symptoms of this condition start suddenly 1-3 days after exposure to a virus. Symptoms may last for a few days or for as long as a week. Common symptoms include watery diarrhea and vomiting. Other symptoms include:  · Fever.  · Headache.  · Fatigue.  · Pain in the abdomen.  · Chills.  · Weakness.  · Nausea.  · Muscle aches.  · Loss of appetite.  How is this diagnosed?  This condition is diagnosed with a medical history and physical exam. You may also have a stool test to check for viruses or other infections.  How is this treated?  This condition typically goes away on its  own. The focus of treatment is to prevent dehydration and restore lost fluids (rehydration). This condition may be treated with:  · An oral rehydration solution (ORS) to replace important salts and minerals (electrolytes) in your body. Take this if told by your health care provider. This is a drink that is sold at pharmacies and retail stores.  · Medicines to help with your symptoms.  · Probiotic supplements to reduce symptoms of diarrhea.  · Fluids given through an IV, if dehydration is severe.  Older adults and people with other diseases or a weak immune system are at higher risk for dehydration.  Follow these instructions at home:    Eating and drinking    · Take an ORS as told by your health care provider.  · Drink clear fluids in small amounts as you are able. Clear fluids include:  ? Water.  ? Ice chips.  ? Diluted fruit juice.  ? Low-calorie sports drinks.  · Drink enough fluid to keep your urine pale yellow.  · Eat small amounts of healthy foods every 3-4 hours as you are able. This may include whole grains, fruits, vegetables, lean meats, and yogurt.  · Avoid fluids that contain a lot of sugar or caffeine, such as energy drinks, sports drinks, and soda.  · Avoid spicy or fatty foods.  · Avoid alcohol.  General instructions  · Wash your hands often, especially after having diarrhea or vomiting. If soap and water are not available, use hand .  · Make sure that all people in your household wash their hands well and often.  · Take over-the-counter and prescription medicines only as told by your health care provider.  · Rest at home while you recover.  · Watch your condition for any changes.  · Take a warm bath to relieve any burning or pain from frequent diarrhea episodes.  · Keep all follow-up visits as told by your health care provider. This is important.  Contact a health care provider if you:  · Cannot keep fluids down.  · Have symptoms that get worse.  · Have new symptoms.  · Feel light-headed or  dizzy.  · Have muscle cramps.  Get help right away if you:  · Have chest pain.  · Feel extremely weak or you faint.  · See blood in your vomit.  · Have vomit that looks like coffee grounds.  · Have bloody or black stools or stools that look like tar.  · Have a severe headache, a stiff neck, or both.  · Have a rash.  · Have severe pain, cramping, or bloating in your abdomen.  · Have trouble breathing or you are breathing very quickly.  · Have a fast heartbeat.  · Have skin that feels cold and clammy.  · Feel confused.  · Have pain when you urinate.  · Have signs of dehydration, such as:  ? Dark urine, very little urine, or no urine.  ? Cracked lips.  ? Dry mouth.  ? Sunken eyes.  ? Sleepiness.  ? Weakness.  Summary  · Viral gastroenteritis is also known as the stomach flu. It can cause sudden watery diarrhea, fever, and vomiting.  · This condition can be passed from person to person very easily (is contagious).  · Take an ORS if told by your health care provider. This is a drink that is sold at pharmacies and retail stores.  · Wash your hands often, especially after having diarrhea or vomiting. If soap and water are not available, use hand .  This information is not intended to replace advice given to you by your health care provider. Make sure you discuss any questions you have with your health care provider.  Document Released: 12/18/2006 Document Revised: 10/23/2019 Document Reviewed: 10/23/2019  [employer] Patient Education © 2020 [employer]          COVID-19  COVID-19 is a respiratory infection that is caused by a virus called severe acute respiratory syndrome coronavirus 2 (SARS-CoV-2). The disease is also known as coronavirus disease or novel coronavirus. In some people, the virus may not cause any symptoms. In others, it may cause a serious infection. The infection can get worse quickly and can lead to complications, such as:  · Pneumonia, or infection of the lungs.  · Acute respiratory distress  syndrome or ARDS. This is fluid build-up in the lungs.  · Acute respiratory failure. This is a condition in which there is not enough oxygen passing from the lungs to the body.  · Sepsis or septic shock. This is a serious bodily reaction to an infection.  · Blood clotting problems.  · Secondary infections due to bacteria or fungus.  The virus that causes COVID-19 is contagious. This means that it can spread from person to person through droplets from coughs and sneezes (respiratory secretions).  What are the causes?  This illness is caused by a virus. You may catch the virus by:  · Breathing in droplets from an infected person's cough or sneeze.  · Touching something, like a table or a doorknob, that was exposed to the virus (contaminated) and then touching your mouth, nose, or eyes.  What increases the risk?  Risk for infection  You are more likely to be infected with this virus if you:  · Live in or travel to an area with a COVID-19 outbreak.  · Come in contact with a sick person who recently traveled to an area with a COVID-19 outbreak.  · Provide care for or live with a person who is infected with COVID-19.  Risk for serious illness  You are more likely to become seriously ill from the virus if you:  · Are 65 years of age or older.  · Have a long-term disease that lowers your body's ability to fight infection (immunocompromised).  · Live in a nursing home or long-term care facility.  · Have a long-term (chronic) disease such as:  ? Chronic lung disease, including chronic obstructive pulmonary disease or asthma  ? Heart disease.  ? Diabetes.  ? Chronic kidney disease.  ? Liver disease.  · Are obese.  What are the signs or symptoms?  Symptoms of this condition can range from mild to severe. Symptoms may appear any time from 2 to 14 days after being exposed to the virus. They include:  · A fever.  · A cough.  · Difficulty breathing.  · Chills.  · Muscle pains.  · A sore throat.  · Loss of taste or smell.  Some  people may also have stomach problems, such as nausea, vomiting, or diarrhea.  Other people may not have any symptoms of COVID-19.  How is this diagnosed?  This condition may be diagnosed based on:  · Your signs and symptoms, especially if:  ? You live in an area with a COVID-19 outbreak.  ? You recently traveled to or from an area where the virus is common.  ? You provide care for or live with a person who was diagnosed with COVID-19.  · A physical exam.  · Lab tests, which may include:  ? A nasal swab to take a sample of fluid from your nose.  ? A throat swab to take a sample of fluid from your throat.  ? A sample of mucus from your lungs (sputum).  ? Blood tests.  · Imaging tests, which may include, X-rays, CT scan, or ultrasound.  How is this treated?  At present, there is no medicine to treat COVID-19. Medicines that treat other diseases are being used on a trial basis to see if they are effective against COVID-19.  Your health care provider will talk with you about ways to treat your symptoms. For most people, the infection is mild and can be managed at home with rest, fluids, and over-the-counter medicines.  Treatment for a serious infection usually takes places in a hospital intensive care unit (ICU). It may include one or more of the following treatments. These treatments are given until your symptoms improve.  · Receiving fluids and medicines through an IV.  · Supplemental oxygen. Extra oxygen is given through a tube in the nose, a face mask, or a aguillon.  · Positioning you to lie on your stomach (prone position). This makes it easier for oxygen to get into the lungs.  · Continuous positive airway pressure (CPAP) or bi-level positive airway pressure (BPAP) machine. This treatment uses mild air pressure to keep the airways open. A tube that is connected to a motor delivers oxygen to the body.  · Ventilator. This treatment moves air into and out of the lungs by using a tube that is placed in your  arcelia.  · Tracheostomy. This is a procedure to create a hole in the neck so that a breathing tube can be inserted.  · Extracorporeal membrane oxygenation (ECMO). This procedure gives the lungs a chance to recover by taking over the functions of the heart and lungs. It supplies oxygen to the body and removes carbon dioxide.  Follow these instructions at home:  Lifestyle  · If you are sick, stay home except to get medical care. Your health care provider will tell you how long to stay home. Call your health care provider before you go for medical care.  · Rest at home as told by your health care provider.  · Do not use any products that contain nicotine or tobacco, such as cigarettes, e-cigarettes, and chewing tobacco. If you need help quitting, ask your health care provider.  · Return to your normal activities as told by your health care provider. Ask your health care provider what activities are safe for you.  General instructions  · Take over-the-counter and prescription medicines only as told by your health care provider.  · Drink enough fluid to keep your urine pale yellow.  · Keep all follow-up visits as told by your health care provider. This is important.  How is this prevented?    There is no vaccine to help prevent COVID-19 infection. However, there are steps you can take to protect yourself and others from this virus.  To protect yourself:   · Do not travel to areas where COVID-19 is a risk. The areas where COVID-19 is reported change often. To identify high-risk areas and travel restrictions, check the CDC travel website: wwwnc.cdc.gov/travel/notices  · If you live in, or must travel to, an area where COVID-19 is a risk, take precautions to avoid infection.  ? Stay away from people who are sick.  ? Wash your hands often with soap and water for 20 seconds. If soap and water are not available, use an alcohol-based hand .  ? Avoid touching your mouth, face, eyes, or nose.  ? Avoid going out in  public, follow guidance from your state and local health authorities.  ? If you must go out in public, wear a cloth face covering or face mask.  ? Disinfect objects and surfaces that are frequently touched every day. This may include:  § Counters and tables.  § Doorknobs and light switches.  § Sinks and faucets.  § Electronics, such as phones, remote controls, keyboards, computers, and tablets.  To protect others:  If you have symptoms of COVID-19, take steps to prevent the virus from spreading to others.  · If you think you have a COVID-19 infection, contact your health care provider right away. Tell your health care team that you think you may have a COVID-19 infection.  · Stay home. Leave your house only to seek medical care. Do not use public transport.  · Do not travel while you are sick.  · Wash your hands often with soap and water for 20 seconds. If soap and water are not available, use alcohol-based hand .  · Stay away from other members of your household. Let healthy household members care for children and pets, if possible. If you have to care for children or pets, wash your hands often and wear a mask. If possible, stay in your own room, separate from others. Use a different bathroom.  · Make sure that all people in your household wash their hands well and often.  · Cough or sneeze into a tissue or your sleeve or elbow. Do not cough or sneeze into your hand or into the air.  · Wear a cloth face covering or face mask.  Where to find more information  · Centers for Disease Control and Prevention: www.cdc.gov/coronavirus/2019-ncov/index.html  · World Health Organization: www.who.int/health-topics/coronavirus  Contact a health care provider if:  · You live in or have traveled to an area where COVID-19 is a risk and you have symptoms of the infection.  · You have had contact with someone who has COVID-19 and you have symptoms of the infection.  Get help right away if:  · You have trouble  breathing.  · You have pain or pressure in your chest.  · You have confusion.  · You have bluish lips and fingernails.  · You have difficulty waking from sleep.  · You have symptoms that get worse.  These symptoms may represent a serious problem that is an emergency. Do not wait to see if the symptoms will go away. Get medical help right away. Call your local emergency services (911 in the [place] Do not drive yourself to the hospital. Let the emergency medical personnel know if you think you have COVID-19.  Summary  · COVID-19 is a respiratory infection that is caused by a virus. It is also known as coronavirus disease or novel coronavirus. It can cause serious infections, such as pneumonia, acute respiratory distress syndrome, acute respiratory failure, or sepsis.  · The virus that causes COVID-19 is contagious. This means that it can spread from person to person through droplets from coughs and sneezes.  · You are more likely to develop a serious illness if you are 65 years of age or older, have a weak immunity, live in a nursing home, or have chronic disease.  · There is no medicine to treat COVID-19. Your health care provider will talk with you about ways to treat your symptoms.  · Take steps to protect yourself and others from infection. Wash your hands often and disinfect objects and surfaces that are frequently touched every day. Stay away from people who are sick and wear a mask if you are sick.  This information is not intended to replace advice given to you by your health care provider. Make sure you discuss any questions you have with your health care provider.  Document Released: 01/23/2020 Document Revised: 05/14/2020 Document Reviewed: 01/23/2020  [employer] Patient Education © 2020 [employer]        Discussed clear liquid, BRAT, and bland diets and then advancing as tolerated.        COVID-19 test pending. Patient is advised to self-isolate as recommended by the CDC.    The CDC recommends that any  person who has symptoms of COVID-19 self-isolates until 1) at least 10 days have elapsed since symptom onset, and 2) at least 24 hours have passed since resolution of any fever without use of fever-reducing medications, and 3) other symptoms have improved.    If results are positive, the health department should be consulted for further instructions. Otherwise, follow the CDC self-isolation criteria stated above.    If results are negative and there is exposure to COVID-19, the CDC recommends self-isolation for 14 days after last exposure.    If results are negative and there is no exposure to COVID-19, the patient may return to work, school, or regular activities after symptoms have fully resolved for at least 24 hours.    In general, repeat testing is not necessary and is not offered in our urgent cares.

## 2021-09-28 DIAGNOSIS — J45.909 UNCOMPLICATED ASTHMA, UNSPECIFIED ASTHMA SEVERITY, UNSPECIFIED WHETHER PERSISTENT: ICD-10-CM

## 2021-09-28 DIAGNOSIS — R50.9 FEVER, UNSPECIFIED FEVER CAUSE: ICD-10-CM

## 2021-09-28 DIAGNOSIS — A08.4 VIRAL GASTROENTERITIS: ICD-10-CM

## 2021-09-28 LAB
COVID ORDER STATUS COVID19: NORMAL
SARS-COV-2 RNA RESP QL NAA+PROBE: NOTDETECTED
SPECIMEN SOURCE: NORMAL

## 2021-09-28 NOTE — TELEPHONE ENCOUNTER
Received request via: Patient    Was the patient seen in the last year in this department? Yes  LOV 08/11/2021  Does the patient have an active prescription (recently filled or refills available) for medication(s) requested? No

## 2021-10-05 ENCOUNTER — OFFICE VISIT (OUTPATIENT)
Dept: URGENT CARE | Facility: CLINIC | Age: 56
End: 2021-10-05
Payer: COMMERCIAL

## 2021-10-05 VITALS
HEIGHT: 71 IN | BODY MASS INDEX: 24.64 KG/M2 | DIASTOLIC BLOOD PRESSURE: 66 MMHG | HEART RATE: 72 BPM | RESPIRATION RATE: 20 BRPM | SYSTOLIC BLOOD PRESSURE: 100 MMHG | OXYGEN SATURATION: 97 % | TEMPERATURE: 96.9 F | WEIGHT: 176 LBS

## 2021-10-05 DIAGNOSIS — H66.002 ACUTE SUPPURATIVE OTITIS MEDIA OF LEFT EAR WITHOUT SPONTANEOUS RUPTURE OF TYMPANIC MEMBRANE, RECURRENCE NOT SPECIFIED: ICD-10-CM

## 2021-10-05 PROCEDURE — 99213 OFFICE O/P EST LOW 20 MIN: CPT | Performed by: PHYSICIAN ASSISTANT

## 2021-10-05 RX ORDER — AMOXICILLIN 875 MG/1
875 TABLET, COATED ORAL 2 TIMES DAILY
Qty: 20 TABLET | Refills: 0 | Status: SHIPPED | OUTPATIENT
Start: 2021-10-05 | End: 2021-10-15

## 2021-10-05 ASSESSMENT — FIBROSIS 4 INDEX: FIB4 SCORE: 0.79

## 2021-10-05 NOTE — PROGRESS NOTES
"Subjective:     Andrew Wall  is a 56 y.o. male who presents for Ear Pain (Left, started 3 days ago)       HPI  Mr. Wall is a very pleasant 56-year-old gentleman who presents to urgent care with 3-day history of left ear pain.  Patient admits to viral respiratory illness 1 week ago.  He did have a Covid test performed which was negative per his report.  Over the past 3 days patient has had worsening left ear pain with decreased hearing in the ear.  Denies recent swimming.  Denies fever or chills.    Review of Systems   HENT: Positive for ear pain, hearing loss and tinnitus. Negative for ear discharge.    All other systems reviewed and are negative.    No Known Allergies  Past medical history, family history, surgical history and social history are reviewed and updated in the record today.     Objective:   /66 (BP Location: Left arm, Patient Position: Sitting, BP Cuff Size: Adult)   Pulse 72   Temp 36.1 °C (96.9 °F) (Temporal)   Resp 20   Ht 1.803 m (5' 11\")   Wt 79.8 kg (176 lb)   SpO2 97%   BMI 24.55 kg/m²   Physical Exam  Vitals and nursing note reviewed.   Constitutional:       Appearance: He is well-developed. He is not ill-appearing or toxic-appearing.   HENT:      Head: Normocephalic and atraumatic.      Right Ear: Tympanic membrane, ear canal and external ear normal.      Left Ear: Ear canal and external ear normal. No tenderness. A middle ear effusion is present. No mastoid tenderness. Tympanic membrane is injected, erythematous and bulging.      Nose: Nose normal.      Mouth/Throat:      Lips: Pink.      Mouth: Mucous membranes are moist.      Pharynx: Uvula midline. No oropharyngeal exudate.   Eyes:      Conjunctiva/sclera: Conjunctivae normal.      Pupils: Pupils are equal, round, and reactive to light.   Cardiovascular:      Rate and Rhythm: Normal rate and regular rhythm.      Heart sounds: Normal heart sounds. No murmur heard.   No friction rub.   Pulmonary:      Effort: Pulmonary " effort is normal. No respiratory distress.      Breath sounds: Normal breath sounds.   Abdominal:      General: Bowel sounds are normal.      Palpations: Abdomen is soft.      Tenderness: There is no abdominal tenderness.   Musculoskeletal:         General: Normal range of motion.      Cervical back: Normal range of motion and neck supple.   Lymphadenopathy:      Head:      Right side of head: No submental, submandibular or tonsillar adenopathy.      Left side of head: No submental, submandibular or tonsillar adenopathy.      Cervical: No cervical adenopathy.      Upper Body:      Right upper body: No supraclavicular adenopathy.      Left upper body: No supraclavicular adenopathy.   Skin:     General: Skin is warm and dry.      Findings: No rash.   Neurological:      Mental Status: He is alert and oriented to person, place, and time.      Cranial Nerves: Cranial nerves are intact. No cranial nerve deficit.      Sensory: Sensation is intact. No sensory deficit.      Motor: Motor function is intact.      Coordination: Coordination is intact. Coordination normal.      Gait: Gait is intact.   Psychiatric:         Attention and Perception: Attention normal.         Mood and Affect: Mood normal.         Speech: Speech normal.         Behavior: Behavior normal.         Thought Content: Thought content normal.         Judgment: Judgment normal.          Assessment/Plan:   1. Acute suppurative otitis media of left ear without spontaneous rupture of tympanic membrane, recurrence not specified  - amoxicillin (AMOXIL) 875 MG tablet; Take 1 Tablet by mouth 2 times a day for 10 days.  Dispense: 20 Tablet; Refill: 0    Patient will be treated with amoxicillin as above.  May use ibuprofen or Tylenol as needed for pain not to exceed recommended daily dose.      Differential diagnosis, natural history, supportive care, and indications for immediate follow-up discussed.  Red flag warning symptoms and strict ER/follow-up precautions  given.  The patient demonstrated a good understanding and agreed with the treatment plan.    Upon entering exam room I ensured patient was wearing a mask.  This provider wore appropriate PPE throughout entire visit.  Patient wore mask entire visit except for a brief period while examining oropharynx.    Please note that this note was created using voice recognition speech to text software. Every effort has been made to correct obvious errors.  However, I expect there are errors of grammar and possibly context that were not discovered prior to finalizing the note  OSCAR Joseph PA-C

## 2021-10-15 ENCOUNTER — OFFICE VISIT (OUTPATIENT)
Dept: MEDICAL GROUP | Facility: LAB | Age: 56
End: 2021-10-15
Payer: COMMERCIAL

## 2021-10-15 VITALS
DIASTOLIC BLOOD PRESSURE: 70 MMHG | HEART RATE: 81 BPM | WEIGHT: 176.6 LBS | HEIGHT: 71 IN | BODY MASS INDEX: 24.72 KG/M2 | RESPIRATION RATE: 17 BRPM | SYSTOLIC BLOOD PRESSURE: 100 MMHG | OXYGEN SATURATION: 97 % | TEMPERATURE: 97 F

## 2021-10-15 DIAGNOSIS — R14.0 BLOATING: ICD-10-CM

## 2021-10-15 PROCEDURE — 99213 OFFICE O/P EST LOW 20 MIN: CPT | Performed by: FAMILY MEDICINE

## 2021-10-15 ASSESSMENT — FIBROSIS 4 INDEX: FIB4 SCORE: 0.79

## 2021-10-15 ASSESSMENT — ENCOUNTER SYMPTOMS
PALPITATIONS: 0
SHORTNESS OF BREATH: 0
NAUSEA: 0
DIARRHEA: 0
VOMITING: 0
CHILLS: 0
WHEEZING: 0
FEVER: 0
ABDOMINAL PAIN: 0

## 2021-10-15 NOTE — PROGRESS NOTES
Subjective:   Andrew Wall is a 56 y.o. male here today for   Chief Complaint   Patient presents with   • GI Problem     Bloating       #Bloating:  -Patient states for the last 1 to 2 weeks has been experiencing significant mount of abdominal bloating, early satiety.  He states these symptoms normally occur after eating a large meal.  Before he had no problems eating meals; however, now he states that anytime he eats something that is spicy, fatty he experiences the symptoms.  -Recently was diagnosed with otitis media and treated with amoxicillin.  Prescription originally written for amoxicillin twice a day; however, is only taking it once a day for the last 10 days.  Patient does have a significant intolerance to dairy which has been working on avoiding.  -Patient also has a significantly pertinent medical history of melanoma for which she is undergoing immunotherapy.  He states he recently started having some symptoms immunotherapy including rash.  He is concerned that this could possibly be due to the immunotherapy as well.  -Patient denies any fevers, chills, nausea, vomiting, diarrhea, constipation, blood in stool, black dark or tarry looking stools.    No Known Allergies      Current medicines (including changes today)  Current Outpatient Medications   Medication Sig Dispense Refill   • amoxicillin (AMOXIL) 875 MG tablet Take 1 Tablet by mouth 2 times a day for 10 days. 20 Tablet 0   • fluticasone-salmeterol (WIXELA INHUB) 250-50 MCG/DOSE AEROSOL POWDER, BREATH ACTIVATED Inhale 1 Puff 2 times a day for 30 days. 1 Each 11   • BUDESONIDE NA [place]  in nose every day. SALINE NASAL RINSE     • Multiple Vitamins-Minerals (MULTIVITAMIN ADULT PO) Take  by mouth every day.       No current facility-administered medications for this visit.     He  has a past medical history of Asthma, 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), and Nasal polyp. He also has no past medical history of Hyperlipidemia.    ROS  "  Review of Systems   Constitutional: Negative for chills and fever.   Respiratory: Negative for shortness of breath and wheezing.    Cardiovascular: Negative for chest pain and palpitations.   Gastrointestinal: Negative for abdominal pain, diarrhea, nausea and vomiting.      Objective:     Physical Exam:  /70 (BP Location: Right arm, Patient Position: Sitting, BP Cuff Size: Adult)   Pulse 81   Temp 36.1 °C (97 °F) (Temporal)   Resp 17   Ht 1.803 m (5' 11\")   Wt 80.1 kg (176 lb 9.6 oz)   SpO2 97%  Body mass index is 24.63 kg/m².   Constitutional: Alert, no distress.  Skin: Warm, dry, good turgor, no rashes in visible areas.  Eye: Equal, round and reactive, conjunctiva clear, lids normal.  ENMT: TM's clear bilaterally, lips without lesions, good dentition, oropharynx clear.  Neck: Trachea midline, no masses, no thyromegaly. No cervical or supraclavicular lymphadenopathy.  Respiratory: Unlabored respiratory effort  Abdomen: Soft, non-tender, no masses, no hepatosplenomegaly.  Normal bowel sounds.  Psych: Alert and oriented x3, normal affect and mood.    Assessment and Plan:     1. Bloating  -Symptoms most likely linked to current antibiotic use and/or side effects from current immunotherapy.  I recommend that patient begins a lactose-free probiotic such as Culturelle to be taken daily with antibiotic as well as 1 week after antibiotic is completed.  -Patient has 2 more doses immunotherapy left, encouraged continuation of and immunotherapy to follow-up with oncologist.  Begins having worsening symptoms such as nausea, vomiting then he will follow-up with me at that time.  Patient understood agree with current treatment plan.      Followup: Return if symptoms worsen or fail to improve.         PLEASE NOTE: This dictation was created using voice recognition software. I have made every reasonable attempt to correct obvious errors, but I expect that there are errors of grammar and possibly content that I did " not discover before finalizing the note.

## 2021-10-21 ENCOUNTER — HOSPITAL ENCOUNTER (OUTPATIENT)
Dept: RADIOLOGY | Facility: MEDICAL CENTER | Age: 56
End: 2021-10-21
Attending: INTERNAL MEDICINE
Payer: COMMERCIAL

## 2021-10-21 DIAGNOSIS — R94.5 NONSPECIFIC ABNORMAL RESULTS OF LIVER FUNCTION STUDY: ICD-10-CM

## 2021-10-21 DIAGNOSIS — Z79.899 NEED FOR PROPHYLACTIC CHEMOTHERAPY: ICD-10-CM

## 2021-10-21 DIAGNOSIS — C43.59 MALIGNANT MELANOMA OF SKIN OF TRUNK, EXCEPT SCROTUM (HCC): ICD-10-CM

## 2021-10-21 PROCEDURE — 700117 HCHG RX CONTRAST REV CODE 255: Performed by: INTERNAL MEDICINE

## 2021-10-21 PROCEDURE — 71260 CT THORAX DX C+: CPT

## 2021-10-21 RX ADMIN — IOHEXOL 25 ML: 240 INJECTION, SOLUTION INTRATHECAL; INTRAVASCULAR; INTRAVENOUS; ORAL at 08:00

## 2021-10-21 RX ADMIN — IOHEXOL 100 ML: 350 INJECTION, SOLUTION INTRAVENOUS at 09:30

## 2021-10-27 ENCOUNTER — HOSPITAL ENCOUNTER (OUTPATIENT)
Dept: RADIOLOGY | Facility: MEDICAL CENTER | Age: 56
End: 2021-10-27
Attending: INTERNAL MEDICINE
Payer: COMMERCIAL

## 2021-10-27 DIAGNOSIS — R94.5 NONSPECIFIC ABNORMAL RESULTS OF LIVER FUNCTION STUDY: ICD-10-CM

## 2021-10-27 DIAGNOSIS — C43.59 MALIGNANT MELANOMA OF SKIN OF TRUNK, EXCEPT SCROTUM (HCC): ICD-10-CM

## 2021-10-27 PROCEDURE — 700117 HCHG RX CONTRAST REV CODE 255: Performed by: INTERNAL MEDICINE

## 2021-10-27 PROCEDURE — 74183 MRI ABD W/O CNTR FLWD CNTR: CPT

## 2021-10-27 PROCEDURE — A9576 INJ PROHANCE MULTIPACK: HCPCS | Performed by: INTERNAL MEDICINE

## 2021-10-27 RX ADMIN — GADOTERIDOL 15 ML: 279.3 INJECTION, SOLUTION INTRAVENOUS at 16:44

## 2021-12-02 ENCOUNTER — OFFICE VISIT (OUTPATIENT)
Dept: MEDICAL GROUP | Facility: LAB | Age: 56
End: 2021-12-02
Payer: COMMERCIAL

## 2021-12-02 ENCOUNTER — HOSPITAL ENCOUNTER (OUTPATIENT)
Dept: RADIOLOGY | Facility: MEDICAL CENTER | Age: 56
End: 2021-12-02
Attending: FAMILY MEDICINE
Payer: COMMERCIAL

## 2021-12-02 VITALS
RESPIRATION RATE: 16 BRPM | WEIGHT: 177.8 LBS | SYSTOLIC BLOOD PRESSURE: 100 MMHG | OXYGEN SATURATION: 97 % | TEMPERATURE: 97.7 F | BODY MASS INDEX: 24.89 KG/M2 | HEIGHT: 71 IN | HEART RATE: 76 BPM | DIASTOLIC BLOOD PRESSURE: 64 MMHG

## 2021-12-02 DIAGNOSIS — J45.40 MODERATE PERSISTENT ASTHMA WITHOUT COMPLICATION: ICD-10-CM

## 2021-12-02 DIAGNOSIS — R05.9 COUGH: ICD-10-CM

## 2021-12-02 PROCEDURE — 99214 OFFICE O/P EST MOD 30 MIN: CPT | Performed by: FAMILY MEDICINE

## 2021-12-02 PROCEDURE — 71046 X-RAY EXAM CHEST 2 VIEWS: CPT

## 2021-12-02 RX ORDER — ALBUTEROL SULFATE 90 UG/1
2 AEROSOL, METERED RESPIRATORY (INHALATION) EVERY 4 HOURS PRN
Qty: 1 EACH | Refills: 11 | Status: SHIPPED | OUTPATIENT
Start: 2021-12-02 | End: 2022-01-01

## 2021-12-02 RX ORDER — ALBUTEROL SULFATE 90 UG/1
AEROSOL, METERED RESPIRATORY (INHALATION)
COMMUNITY
Start: 2021-11-04 | End: 2021-12-02 | Stop reason: SDUPTHER

## 2021-12-02 RX ORDER — PREDNISONE 10 MG/1
TABLET ORAL
COMMUNITY
Start: 2021-11-04 | End: 2021-12-02

## 2021-12-02 RX ORDER — BUDESONIDE 0.5 MG/2ML
INHALANT ORAL
COMMUNITY
Start: 2021-11-07 | End: 2023-05-24

## 2021-12-02 RX ORDER — AZITHROMYCIN 250 MG/1
TABLET, FILM COATED ORAL
COMMUNITY
Start: 2021-10-19 | End: 2021-12-02

## 2021-12-02 RX ORDER — LEVOFLOXACIN 500 MG/1
TABLET, FILM COATED ORAL
COMMUNITY
Start: 2021-11-23 | End: 2021-12-02

## 2021-12-02 RX ORDER — PREDNISONE 20 MG/1
40 TABLET ORAL DAILY
Qty: 10 TABLET | Refills: 0 | Status: SHIPPED | OUTPATIENT
Start: 2021-12-02 | End: 2021-12-07

## 2021-12-02 ASSESSMENT — ENCOUNTER SYMPTOMS
FEVER: 0
NAUSEA: 0
HEARTBURN: 0
COUGH: 1
WHEEZING: 1
SPUTUM PRODUCTION: 1
CHILLS: 0
VOMITING: 0
PALPITATIONS: 0
SHORTNESS OF BREATH: 1
ABDOMINAL PAIN: 0

## 2021-12-02 ASSESSMENT — FIBROSIS 4 INDEX: FIB4 SCORE: 0.79

## 2021-12-02 NOTE — PROGRESS NOTES
"Subjective:   Andrew Wall is a 56 y.o. male here today for   Chief Complaint   Patient presents with   • Other     respitory issues when he works out, Sx 3-4 weeks        #Cough/shortness of breath:-Patient is a 56-year-old male with pertinent medical history of moderate persistent asthma, malignant melanoma currently completed immunotherapy.  He states that several weeks ago he developed a URI which was treated by oncologist with a azithromycin, levofloxacin, and prednisone 10 mg burst for 5 days.  He states most symptoms have resolved; however, continues to have a persistent, occasionally productive cough that is worse with exercise or any other moderate activity.  He denies any chest pain, difficulty breathing but does feel like he is more fatigued than normal.  Patient is currently using inhalers including Wixela, albuterol as needed but feels like the inhalers are \"not getting down deep enough).    No Known Allergies      Current medicines (including changes today)  Current Outpatient Medications   Medication Sig Dispense Refill   • BUDESONIDE NA [place]  in nose every day. SALINE NASAL RINSE     • Multiple Vitamins-Minerals (MULTIVITAMIN ADULT PO) Take  by mouth every day.       No current facility-administered medications for this visit.     He  has a past medical history of Asthma, 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), and Nasal polyp. He also has no past medical history of Hyperlipidemia.    ROS   Review of Systems   Constitutional: Negative for chills and fever.   Respiratory: Positive for cough, sputum production, shortness of breath and wheezing.    Cardiovascular: Negative for chest pain and palpitations.   Gastrointestinal: Negative for abdominal pain, heartburn, nausea and vomiting.      Objective:     Physical Exam:  /64 (BP Location: Right arm, Patient Position: Sitting, BP Cuff Size: Adult)   Pulse 76   Temp 36.5 °C (97.7 °F) (Temporal)   Resp 16   Ht 1.803 m (5' 11\")   Wt " 80.6 kg (177 lb 12.8 oz)   SpO2 97%  Body mass index is 24.8 kg/m².   Constitutional: Alert, no distress.  Skin: Warm, dry, good turgor, no rashes in visible areas.  Eye: Equal, round and reactive, conjunctiva clear, lids normal.  ENMT: TM's clear bilaterally, lips without lesions, good dentition, oropharynx clear.  Neck: Trachea midline, no masses, no thyromegaly. No cervical or supraclavicular lymphadenopathy.  Respiratory: Unlabored respiratory effort, end expiratory wheeze noted in bibasilar lung fields.  Cardiovascular: Normal S1, S2, no murmur, no edema.  Psych: Alert and oriented x3, normal affect and mood.    Assessment and Plan:     1. Cough  -Cough is most likely brought on by a post viral cough syndrome in the presence of acute asthma exacerbation given patient's history as well as physical exam findings.  At this time we will restart steroid burst at 40 mg for 5 days.  He can continue using the albuterol every 4 hours as needed.  We will also get chest x-ray to rule out any other pathology.  -Return precautions given, patient will follow-up as needed.  - predniSONE (DELTASONE) 20 MG Tab; Take 2 Tablets by mouth every day for 5 days.  Dispense: 10 Tablet; Refill: 0  - albuterol 108 (90 Base) MCG/ACT Aero Soln inhalation aerosol; Inhale 2 Puffs every four hours as needed for Shortness of Breath for up to 30 days.  Dispense: 1 Each; Refill: 11  - DX-CHEST-2 VIEWS; Future    2. Moderate persistent asthma without complication  -See #1  - predniSONE (DELTASONE) 20 MG Tab; Take 2 Tablets by mouth every day for 5 days.  Dispense: 10 Tablet; Refill: 0  - albuterol 108 (90 Base) MCG/ACT Aero Soln inhalation aerosol; Inhale 2 Puffs every four hours as needed for Shortness of Breath for up to 30 days.  Dispense: 1 Each; Refill: 11  - DX-CHEST-2 VIEWS; Future      Followup: No follow-ups on file.         PLEASE NOTE: This dictation was created using voice recognition software. I have made every reasonable attempt to  correct obvious errors, but I expect that there are errors of grammar and possibly content that I did not discover before finalizing the note.

## 2021-12-07 ENCOUNTER — APPOINTMENT (RX ONLY)
Dept: URBAN - METROPOLITAN AREA CLINIC 22 | Facility: CLINIC | Age: 56
Setting detail: DERMATOLOGY
End: 2021-12-07

## 2021-12-07 DIAGNOSIS — Z71.89 OTHER SPECIFIED COUNSELING: ICD-10-CM

## 2021-12-07 DIAGNOSIS — D18.0 HEMANGIOMA: ICD-10-CM

## 2021-12-07 DIAGNOSIS — L81.4 OTHER MELANIN HYPERPIGMENTATION: ICD-10-CM

## 2021-12-07 DIAGNOSIS — L82.1 OTHER SEBORRHEIC KERATOSIS: ICD-10-CM

## 2021-12-07 DIAGNOSIS — D22 MELANOCYTIC NEVI: ICD-10-CM

## 2021-12-07 PROBLEM — D18.01 HEMANGIOMA OF SKIN AND SUBCUTANEOUS TISSUE: Status: ACTIVE | Noted: 2021-12-07

## 2021-12-07 PROBLEM — D22.9 MELANOCYTIC NEVI, UNSPECIFIED: Status: ACTIVE | Noted: 2021-12-07

## 2021-12-07 PROBLEM — C43.59 MALIGNANT MELANOMA OF OTHER PART OF TRUNK: Status: ACTIVE | Noted: 2021-12-07

## 2021-12-07 PROBLEM — D22.39 MELANOCYTIC NEVI OF OTHER PARTS OF FACE: Status: ACTIVE | Noted: 2021-12-07

## 2021-12-07 PROCEDURE — ? ADDITIONAL NOTES

## 2021-12-07 PROCEDURE — ? DIAGNOSIS COMMENT

## 2021-12-07 PROCEDURE — ? COUNSELING

## 2021-12-07 PROCEDURE — 99213 OFFICE O/P EST LOW 20 MIN: CPT

## 2021-12-07 ASSESSMENT — LOCATION ZONE DERM: LOCATION ZONE: FACE

## 2021-12-07 ASSESSMENT — LOCATION SIMPLE DESCRIPTION DERM: LOCATION SIMPLE: LEFT CHEEK

## 2021-12-07 ASSESSMENT — LOCATION DETAILED DESCRIPTION DERM: LOCATION DETAILED: LEFT CENTRAL MALAR CHEEK

## 2021-12-07 NOTE — PROCEDURE: MIPS QUALITY
Quality 137: Melanoma: Continuity Of Care - Recall System: Patient information entered into a recall system that includes: target date for the next exam specified AND a process to follow up with patients regarding missed or unscheduled appointments
Detail Level: Detailed
Quality 138: Melanoma: Coordination Of Care: Treatment plan not communicated, reason not otherwise specified.
Quality 130: Documentation Of Current Medications In The Medical Record: Current Medications Documented

## 2021-12-17 ENCOUNTER — APPOINTMENT (OUTPATIENT)
Dept: RADIOLOGY | Facility: MEDICAL CENTER | Age: 56
End: 2021-12-17
Attending: INTERNAL MEDICINE
Payer: COMMERCIAL

## 2021-12-24 ENCOUNTER — HOSPITAL ENCOUNTER (OUTPATIENT)
Dept: RADIOLOGY | Facility: MEDICAL CENTER | Age: 56
End: 2021-12-24
Attending: INTERNAL MEDICINE
Payer: COMMERCIAL

## 2021-12-24 DIAGNOSIS — C43.59 MALIGNANT MELANOMA OF SKIN OF TRUNK, EXCEPT SCROTUM (HCC): ICD-10-CM

## 2021-12-24 DIAGNOSIS — J20.9 ACUTE BRONCHITIS, UNSPECIFIED ORGANISM: ICD-10-CM

## 2021-12-24 DIAGNOSIS — R59.0 VIRCHOW'S NODE: ICD-10-CM

## 2021-12-24 DIAGNOSIS — Z79.899 DRUG THERAPY: ICD-10-CM

## 2021-12-24 DIAGNOSIS — R94.5 ABNORMAL RESULTS OF LIVER FUNCTION STUDIES: ICD-10-CM

## 2021-12-24 DIAGNOSIS — Z51.12 ENCOUNTER FOR ANTINEOPLASTIC IMMUNOTHERAPY: ICD-10-CM

## 2021-12-24 PROCEDURE — 700117 HCHG RX CONTRAST REV CODE 255: Performed by: INTERNAL MEDICINE

## 2021-12-24 PROCEDURE — 71260 CT THORAX DX C+: CPT

## 2021-12-24 RX ADMIN — IOHEXOL 25 ML: 240 INJECTION, SOLUTION INTRATHECAL; INTRAVASCULAR; INTRAVENOUS; ORAL at 09:38

## 2021-12-24 RX ADMIN — IOHEXOL 100 ML: 350 INJECTION, SOLUTION INTRAVENOUS at 09:38

## 2022-01-05 ENCOUNTER — APPOINTMENT (OUTPATIENT)
Dept: MEDICAL GROUP | Facility: LAB | Age: 57
End: 2022-01-05
Payer: COMMERCIAL

## 2022-01-05 ENCOUNTER — OFFICE VISIT (OUTPATIENT)
Dept: MEDICAL GROUP | Facility: LAB | Age: 57
End: 2022-01-05
Payer: COMMERCIAL

## 2022-01-05 VITALS
TEMPERATURE: 98.2 F | OXYGEN SATURATION: 95 % | WEIGHT: 172.2 LBS | SYSTOLIC BLOOD PRESSURE: 102 MMHG | HEIGHT: 71 IN | BODY MASS INDEX: 24.11 KG/M2 | DIASTOLIC BLOOD PRESSURE: 56 MMHG | RESPIRATION RATE: 14 BRPM | HEART RATE: 82 BPM

## 2022-01-05 DIAGNOSIS — J98.8 RESPIRATORY INFECTION: ICD-10-CM

## 2022-01-05 PROCEDURE — 99213 OFFICE O/P EST LOW 20 MIN: CPT | Performed by: NURSE PRACTITIONER

## 2022-01-05 RX ORDER — METHYLPREDNISOLONE 4 MG/1
TABLET ORAL
Qty: 21 TABLET | Refills: 0 | Status: SHIPPED | OUTPATIENT
Start: 2022-01-05 | End: 2022-08-10

## 2022-01-05 RX ORDER — AZITHROMYCIN 250 MG/1
TABLET, FILM COATED ORAL
Qty: 6 TABLET | Refills: 0 | Status: SHIPPED | OUTPATIENT
Start: 2022-01-05 | End: 2022-01-10

## 2022-01-05 ASSESSMENT — FIBROSIS 4 INDEX: FIB4 SCORE: 0.79

## 2022-01-05 NOTE — PROGRESS NOTES
"Subjective:     CC: The encounter diagnosis was Respiratory infection.    HPI:   Andrew presents today with the following:    Cough  Onset 10 days ago. Current symptoms include congestion, cough, runny nose, shortness of breath, wheezing, myalgias, chills.   Denies fever, sinus pain/pressure.   Patient is taking inhalers as prescribed.   He reports that it has been a struggle to go up the stairs, which is usually not a problem.   Patient reports that his daughter was sick over [place], but was negative for covid.     ROS:   As documented in history of present illness above    Objective:     Exam: /56 (BP Location: Right arm, Patient Position: Sitting, BP Cuff Size: Adult)   Pulse 82   Temp 36.8 °C (98.2 °F)   Resp 14   Ht 1.803 m (5' 11\")   Wt 78.1 kg (172 lb 3.2 oz)   SpO2 95%  Body mass index is 24.02 kg/m².    Constitutional: Alert, no distress, plus 3 vital signs  Skin:  Warm, dry, no rashes invisible areas  Eye: Equal, round and reactive, conjunctiva clear  ENMT: Bilateral tympanic membranes are retracted but translucent without erythema or perforation. Positive bilateral maxillary sinus tenderness. Oropharynx is slightly injected without exudate. Mild right-sided tonsillar enlargement.    Neck: Mild anterior cervical, nontender lymphadenopathy  Respiratory: Unlabored respiration, diffuse wheezing in all lung fields  Cardiovascular: Normal rate and rhythm  Psych: Alert, pleasant, well-groomed, normal affect    Assessment & Plan:     56 y.o. male with the following -     1. Respiratory infection  Discussed using a cool mist humidifier at home. Recommend using nasal saline wash (i.e. Nedi-Pot), over-the-counter decongestants as needed and antibiotics as prescribed. Tylenol or Motrin as needed for headache or discomfort. Patient to continue inhalers as prescribed. Supportive care, differential diagnoses, and indications for immediate follow-up discussed with patient. Pathogenesis of diagnosis " discussed including typical length and natural progression. Instructed to return to clinic for any change in condition, further concerns, or worsening of symptoms.  - azithromycin (ZITHROMAX) 250 MG Tab; Take 2 Tablets by mouth every day for 1 day, THEN 1 Tablet every day for 4 days.  Dispense: 6 Tablet; Refill: 0  - methylPREDNISolone (MEDROL DOSEPAK) 4 MG Tablet Therapy Pack; As directed on the packaging label.  Dispense: 21 Tablet; Refill: 0

## 2022-01-05 NOTE — ASSESSMENT & PLAN NOTE
Onset 10 days.  He reports that it has been a struggle to go up the stairs, which is usually not a problem.   He does report some congestion, cough, runny nose, shortness of breath, myalgias, chills,   Denies fever, sinus pain/pressure. Patient is taking inhalers as prescribed.   Patient reports that his daughter was sick over [place], but was negative for covid.

## 2022-01-05 NOTE — PROGRESS NOTES
Subjective:     CC: ***    HPI:   Andrew here today with ***    No problems updated.      ROS:  Gen: no fevers/chills, no changes in weight  Eyes: no changes in vision  ENT: no sore throat, no hearing loss, no bloody nose  Pulm: no sob, no cough  CV: no chest pain, no palpitations  GI: no nausea/vomiting, no diarrhea  : no dysuria  MSk: no myalgias  Skin: no rash  Neuro: no headaches, no numbness/tingling  Heme/Lymph: no easy bruising    Current Outpatient Medications Ordered in Epic   Medication Sig Dispense Refill   • budesonide (PULMICORT) 0.5 MG/2ML Suspension      • fluticasone-salmeterol (WIXELA INHUB) 250-50 MCG/DOSE AEROSOL POWDER, BREATH ACTIVATED      • Multiple Vitamins-Minerals (MULTIVITAMIN ADULT PO) Take  by mouth every day.       No current [facility]-ordered facility-administered medications on file.       Health Maintenance: {COMPLETED:560787}    Objective:     Exam:  There were no vitals taken for this visit. There is no height or weight on file to calculate BMI.    Gen: Alert and oriented, No apparent distress.  Neck: Neck is supple without lymphadenopathy.  Lungs: Normal effort, CTA bilaterally, no wheezes, rhonchi, or rales  CV: Regular rate and rhythm. No murmurs, rubs, or gallops.  Ext: No clubbing, cyanosis, edema.    A chaperone was offered to the patient during today's exam. {CHAPERONE:39225}    Labs: ***    Assessment & Plan:     56 y.o. male with the following -     Problem List Items Addressed This Visit     None          I spent a total of *** minutes with record review, exam, communication with the patient, communication with other providers, and documentation of this encounter.      No follow-ups on file.    Please note that this dictation was created using voice recognition software. I have made every reasonable attempt to correct obvious errors, but there may be errors of grammar and possibly content that I did not discover before finalizing the note.

## 2022-06-07 ENCOUNTER — APPOINTMENT (RX ONLY)
Dept: URBAN - METROPOLITAN AREA CLINIC 22 | Facility: CLINIC | Age: 57
Setting detail: DERMATOLOGY
End: 2022-06-07

## 2022-06-07 DIAGNOSIS — L20.89 OTHER ATOPIC DERMATITIS: ICD-10-CM

## 2022-06-07 DIAGNOSIS — L82.1 OTHER SEBORRHEIC KERATOSIS: ICD-10-CM

## 2022-06-07 DIAGNOSIS — D18.0 HEMANGIOMA: ICD-10-CM

## 2022-06-07 DIAGNOSIS — L81.4 OTHER MELANIN HYPERPIGMENTATION: ICD-10-CM

## 2022-06-07 DIAGNOSIS — D22 MELANOCYTIC NEVI: ICD-10-CM

## 2022-06-07 DIAGNOSIS — Z71.89 OTHER SPECIFIED COUNSELING: ICD-10-CM

## 2022-06-07 PROBLEM — C43.59 MALIGNANT MELANOMA OF OTHER PART OF TRUNK: Status: ACTIVE | Noted: 2022-06-07

## 2022-06-07 PROBLEM — D18.01 HEMANGIOMA OF SKIN AND SUBCUTANEOUS TISSUE: Status: ACTIVE | Noted: 2022-06-07

## 2022-06-07 PROBLEM — L20.84 INTRINSIC (ALLERGIC) ECZEMA: Status: ACTIVE | Noted: 2022-06-07

## 2022-06-07 PROBLEM — D22.9 MELANOCYTIC NEVI, UNSPECIFIED: Status: ACTIVE | Noted: 2022-06-07

## 2022-06-07 PROCEDURE — ? ADDITIONAL NOTES

## 2022-06-07 PROCEDURE — ? COUNSELING

## 2022-06-07 PROCEDURE — 99213 OFFICE O/P EST LOW 20 MIN: CPT

## 2022-06-07 PROCEDURE — ? PRESCRIPTION

## 2022-06-07 PROCEDURE — ? DIAGNOSIS COMMENT

## 2022-06-07 RX ORDER — TRIAMCINOLONE ACETONIDE 1 MG/G
OINTMENT TOPICAL BID
Qty: 454 | Refills: 2 | Status: ERX | COMMUNITY
Start: 2022-06-07

## 2022-06-07 RX ADMIN — TRIAMCINOLONE ACETONIDE 1: 1 OINTMENT TOPICAL at 00:00

## 2022-06-07 ASSESSMENT — LOCATION DETAILED DESCRIPTION DERM: LOCATION DETAILED: LEFT MID-UPPER BACK

## 2022-06-07 ASSESSMENT — LOCATION SIMPLE DESCRIPTION DERM: LOCATION SIMPLE: LEFT UPPER BACK

## 2022-06-07 ASSESSMENT — LOCATION ZONE DERM: LOCATION ZONE: TRUNK

## 2022-06-07 NOTE — PROCEDURE: ADDITIONAL NOTES
Additional Notes: 6 month FBSE from initial appt and diagnosis.\\nHe is following with Dr. Mosher, Onc and states he is doing well on optivo. Denies any symptoms or concerns.  Scar well healed and no evidence of recurrence noted today.
Detail Level: Detailed
Render Risk Assessment In Note?: yes

## 2022-07-13 NOTE — TELEPHONE ENCOUNTER
REFERRING PHYSICIAN: Bill Merritt MD    DATE:  07/12/2022    FIRST ASSISTANT:  Kat Leblanc PA-C. PREOPERATIVE DIAGNOSIS:  Prostate cancer. POSTOPERATIVE DIAGNOSIS:  Prostate cancer. OPERATION PERFORMED:  Laparoscopic robotic-assisted radical prostatectomy with bilateral pelvic lymphadenectomy. ANESTHETIC:  General.    INDICATIONS:  The patient is a 42-year-old male with prostate cancer, desiring treatment by laparoscopic robotic radical prostatectomy. The patient understands the risks, benefits, and potential complications, including anesthetic and medical complications, infection, bleeding, pain, change in sensation, change in cosmesis, open conversion, immediate or delayed bowel injury, major vascular injury, bladder neck, urethral or ureteral orifice obstruction, intraabdominal urinary leakage, incontinence, impotency, as well as the possible need for adjuvant therapy or recurrent prostate cancer in the future and the need for followup. Preoperative IV antibiotics, GIORGI hose, compression stockings, and a modified bowel preparation were given. The patient wished to proceed as planned. DESCRIPTION OF PROCEDURE:  After proper counseling and consent, the patient brought to the operating room where general anesthetic was given and the patient was placed in the padded supine lithotomy position and shaved, prepped, and draped in the sterile fashion. The first assistant was present throughout the case and essential at the bedside. The first assistant changed instruments, used suction gave exposure. Clean the camera, passed suture and removed the specimen. The patient was placed in the Trendelenburg position after placement of a 20-[place] Anderson catheter and 20 cc in the balloon. The abdomen was insufflated above the umbilicus. A robotic port was placed here. Then, 2 robotic ports on the left side under direct vision as well as one on the right side.   An AirSeal port in the right Received request via: Pharmacy    Was the patient seen in the last year in this department? Yes 8/7/2020, has [facility] 8/9/21    Does the patient have an active prescription (recently filled or refills available) for medication(s) requested? No   lower quadrant and a 5-mm port in the right upper quadrant. The 0 degree lens was used as well as the monopolar scissors, bipolar fenestrated graspers, and ProGrasp. On investigation of the pelvis, the patient had a large left and right inguinal hernia. The hernia sac actually was adherent to the bladder and had to be dissected free. The retropubic space was then developed. The endopelvic fascia incised and the dorsal venous complex ligated with a 0 Vicryl suture. The bladder neck was identified and incised and the Anderson catheter brought forth for retraction. The posterior plane was developed. The seminal vesicles and ejaculatory ducts were dissected free. Each seminal vesicle was sent separately. The posterior plane was developed up to the apex and the vessel sealer was used to take down the lateral pedicles. The dorsal venous complex was then ligated. The urethra was developed. The anterior wall incised. The catheter drawn back and then the posterior wall. The prostate was free and placed into an EndoCatch bag. The area was irrigated and several small bleeding points were cauterized with the bipolar fenestrated graspers. Lateral pelvic lymphadenectomy was done from the external iliac vein to the pelvic sidewall above the obturator nerve on the left side. Surgicel was placed in on the right side, thrombogenic Gel-Foam.  The anastomosis was then done using 3-0 Monocryl V-Loc suture starting posteriorly and running anterior applying the proper amount of tension over a fresh 18-Beng[place] Anderson catheter. 15 cc was left in the balloon and with 100 cc of irrigation, there was no drainage. FloSeal was placed at the dorsal venous complex. The prostate was brought out through the midline port site which was widened slightly and then closed with a 0 Vicryl suture. The AirSeal port was also closed with 0 Vicryl suture figure-of-eight.   Each site was infiltrated with 0.25% Marcaine, irrigated and closed with subcuticular 4-0 Monocryl. Steri-Strips, dressings were applied as well as Tegaderm dressings. The Anderson catheter was secured to the patient's leg and he was transferred to recovery room in good condition. SPECIMENS TO PATHOLOGY:    1. Final apical margin. 2. Radical prostatectomy. 3. Bilateral pelvic lymphadenectomy. ESTIMATED BLOOD LOSS:  From procedure 250 cc. All sponge, needle counts were correct x3. The patient tolerated the procedure well and was transferred to the recovery room in stable condition.         Dictated By:  Juan M Guzman MD        D:  07/12/2022 10:54:13  T:  07/12/2022 21:34:26  GRG/modl  Job:  [id number]  cc:    CC:  Mukund Kuo MD      cc:  Mukund Kuo MD

## 2022-08-01 ENCOUNTER — HOSPITAL ENCOUNTER (OUTPATIENT)
Dept: RADIOLOGY | Facility: MEDICAL CENTER | Age: 57
End: 2022-08-01
Attending: INTERNAL MEDICINE
Payer: COMMERCIAL

## 2022-08-01 DIAGNOSIS — Z79.899 NEED FOR PROPHYLACTIC CHEMOTHERAPY: ICD-10-CM

## 2022-08-01 DIAGNOSIS — Z51.12 ENCOUNTER FOR ANTINEOPLASTIC IMMUNOTHERAPY: ICD-10-CM

## 2022-08-01 DIAGNOSIS — C43.59 MALIGNANT MELANOMA OF SKIN OF TRUNK, EXCEPT SCROTUM (HCC): ICD-10-CM

## 2022-08-01 DIAGNOSIS — R94.5 NONSPECIFIC ABNORMAL RESULTS OF LIVER FUNCTION STUDY: ICD-10-CM

## 2022-08-01 DIAGNOSIS — R59.0 VIRCHOW'S NODE: ICD-10-CM

## 2022-08-01 DIAGNOSIS — J20.9 ACUTE BRONCHITIS, UNSPECIFIED ORGANISM: ICD-10-CM

## 2022-08-01 PROCEDURE — 71260 CT THORAX DX C+: CPT

## 2022-08-01 PROCEDURE — 700117 HCHG RX CONTRAST REV CODE 255: Performed by: INTERNAL MEDICINE

## 2022-08-01 RX ADMIN — IOHEXOL 100 ML: 350 INJECTION, SOLUTION INTRAVENOUS at 14:45

## 2022-08-10 ENCOUNTER — OFFICE VISIT (OUTPATIENT)
Dept: MEDICAL GROUP | Facility: LAB | Age: 57
End: 2022-08-10
Payer: COMMERCIAL

## 2022-08-10 VITALS
OXYGEN SATURATION: 97 % | HEIGHT: 71 IN | HEART RATE: 68 BPM | WEIGHT: 178 LBS | TEMPERATURE: 97.7 F | BODY MASS INDEX: 24.92 KG/M2 | RESPIRATION RATE: 15 BRPM

## 2022-08-10 DIAGNOSIS — J06.9 UPPER RESPIRATORY TRACT INFECTION, UNSPECIFIED TYPE: ICD-10-CM

## 2022-08-10 PROCEDURE — 99213 OFFICE O/P EST LOW 20 MIN: CPT | Performed by: FAMILY MEDICINE

## 2022-08-10 RX ORDER — ALBUTEROL SULFATE 90 UG/1
AEROSOL, METERED RESPIRATORY (INHALATION)
COMMUNITY
Start: 2021-12-02 | End: 2022-08-10

## 2022-08-10 RX ORDER — BUDESONIDE 0.5 MG/2ML
0.5 INHALANT ORAL DAILY
COMMUNITY
End: 2022-10-05

## 2022-08-10 RX ORDER — AMOXICILLIN AND CLAVULANATE POTASSIUM 875; 125 MG/1; MG/1
TABLET, FILM COATED ORAL
COMMUNITY
Start: 2022-07-14 | End: 2022-08-10

## 2022-08-10 RX ORDER — TRIAMCINOLONE ACETONIDE 1 MG/G
OINTMENT TOPICAL
COMMUNITY
Start: 2022-06-07 | End: 2022-08-10

## 2022-08-10 RX ORDER — NEOMYCIN SULFATE, POLYMYXIN B SULFATE, HYDROCORTISONE 3.5; 10000; 1 MG/ML; [USP'U]/ML; MG/ML
SOLUTION/ DROPS AURICULAR (OTIC)
COMMUNITY
Start: 2022-07-14 | End: 2022-08-10

## 2022-08-10 RX ORDER — FLUTICASONE PROPIONATE AND SALMETEROL 250; 50 UG/1; UG/1
1 POWDER RESPIRATORY (INHALATION)
COMMUNITY
End: 2022-08-10

## 2022-08-10 ASSESSMENT — PATIENT HEALTH QUESTIONNAIRE - PHQ9: CLINICAL INTERPRETATION OF PHQ2 SCORE: 0

## 2022-08-10 ASSESSMENT — FIBROSIS 4 INDEX: FIB4 SCORE: 0.8

## 2022-08-10 NOTE — PROGRESS NOTES
"Subjective:   Andrew Wall is a 57 y.o. male here today for   Chief Complaint   Patient presents with    Sinusitis     X 3 weeks or so, taking Advil PRN      #Sinusitis   -s/p nasal plyp removal, hx of recurrent sinus infection   -2-3days of congestion, nasal discharge, headache, loss of smell.  Symptoms have been improving but continues to have lingering congestion  -Previously given Augmentin for previous ear infection 3 weeks ago. Did not complete antibiotics   -Currently treating with ibuprofen, nasal lavage.  Again, some improvement but no full resolution.  Denies any fevers, chills, chest pain, shortness of breath, difficulty breathing.    No Known Allergies      Current medicines (including changes today)  Current Outpatient Medications   Medication Sig Dispense Refill    budesonide (PULMICORT) 0.5 MG/2ML Suspension Inhale 0.5 mg every day.      budesonide (PULMICORT) 0.5 MG/2ML Suspension       fluticasone-salmeterol (ADVAIR) 250-50 MCG/DOSE AEROSOL POWDER, BREATH ACTIVATED       Multiple Vitamins-Minerals (MULTIVITAMIN ADULT PO) Take  by mouth every day.      methylPREDNISolone (MEDROL DOSEPAK) 4 MG Tablet Therapy Pack As directed on the packaging label. 21 Tablet 0     No current facility-administered medications for this visit.     He  has a past medical history of Asthma, 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), and Nasal polyp.    He has no past medical history of Hyperlipidemia.    ROS   -See HPI     Objective:     Physical Exam:  Pulse 68   Temp 36.5 °C (97.7 °F) (Temporal)   Resp 15   Ht 1.803 m (5' 10.98\")   Wt 80.7 kg (178 lb)   SpO2 97%  Body mass index is 24.84 kg/m².   Constitutional: Alert, no distress.  Skin: Warm, dry, good turgor, no rashes in visible areas.  Eye: Equal, round and reactive, conjunctiva clear, lids normal.  ENMT: TM's clear bilaterally, lips without lesions, good dentition, oropharynx clear.  Neck: Trachea midline, no masses, no thyromegaly. No cervical or " supraclavicular lymphadenopathy.  Respiratory: Unlabored respiratory effort, lungs clear to auscultation, no wheezes, no rhonchi.  Psych: Alert and oriented x3, normal affect and mood.    Assessment and Plan:     1. Upper respiratory tract infection, unspecified type  -Uncertain etiology although most likely viral. Concern for possible allergy etiology as well.  Continue conservative treatment measures.  We will add on Flonase, second-generation antihistamine.  If no improvement in the next 1 to 2 weeks patient will follow-up for treatment of possible bacterial sinusitis.      Followup: No follow-ups on file.         PLEASE NOTE: This dictation was created using voice recognition software. I have made every reasonable attempt to correct obvious errors, but I expect that there are errors of grammar and possibly content that I did not discover before finalizing the note.

## 2022-10-03 ENCOUNTER — PATIENT MESSAGE (OUTPATIENT)
Dept: MEDICAL GROUP | Facility: LAB | Age: 57
End: 2022-10-03
Payer: COMMERCIAL

## 2022-10-05 RX ORDER — FLUTICASONE PROPIONATE AND SALMETEROL 250; 50 UG/1; UG/1
POWDER RESPIRATORY (INHALATION)
COMMUNITY
Start: 2022-08-01 | End: 2022-10-05 | Stop reason: SDUPTHER

## 2022-10-05 RX ORDER — FLUTICASONE PROPIONATE AND SALMETEROL 250; 50 UG/1; UG/1
1 POWDER RESPIRATORY (INHALATION) 2 TIMES DAILY
Qty: 60 EACH | Refills: 11 | Status: SHIPPED | OUTPATIENT
Start: 2022-10-05 | End: 2023-10-24

## 2022-10-05 RX ORDER — BUDESONIDE 0.5 MG/2ML
INHALANT ORAL DAILY
Status: CANCELLED
Start: 2022-10-05

## 2022-12-06 ENCOUNTER — APPOINTMENT (RX ONLY)
Dept: URBAN - METROPOLITAN AREA CLINIC 22 | Facility: CLINIC | Age: 57
Setting detail: DERMATOLOGY
End: 2022-12-06

## 2022-12-06 DIAGNOSIS — L81.4 OTHER MELANIN HYPERPIGMENTATION: ICD-10-CM

## 2022-12-06 DIAGNOSIS — D18.0 HEMANGIOMA: ICD-10-CM

## 2022-12-06 DIAGNOSIS — L82.1 OTHER SEBORRHEIC KERATOSIS: ICD-10-CM

## 2022-12-06 DIAGNOSIS — D22 MELANOCYTIC NEVI: ICD-10-CM

## 2022-12-06 DIAGNOSIS — L57.0 ACTINIC KERATOSIS: ICD-10-CM

## 2022-12-06 DIAGNOSIS — Z85.820 PERSONAL HISTORY OF MALIGNANT MELANOMA OF SKIN: ICD-10-CM

## 2022-12-06 DIAGNOSIS — Z71.89 OTHER SPECIFIED COUNSELING: ICD-10-CM

## 2022-12-06 PROBLEM — D18.01 HEMANGIOMA OF SKIN AND SUBCUTANEOUS TISSUE: Status: ACTIVE | Noted: 2022-12-06

## 2022-12-06 PROBLEM — D22.9 MELANOCYTIC NEVI, UNSPECIFIED: Status: ACTIVE | Noted: 2022-12-06

## 2022-12-06 PROCEDURE — 17000 DESTRUCT PREMALG LESION: CPT

## 2022-12-06 PROCEDURE — ? DIAGNOSIS COMMENT

## 2022-12-06 PROCEDURE — ? COUNSELING

## 2022-12-06 PROCEDURE — ? LIQUID NITROGEN

## 2022-12-06 PROCEDURE — 99213 OFFICE O/P EST LOW 20 MIN: CPT | Mod: 25

## 2022-12-06 PROCEDURE — 17003 DESTRUCT PREMALG LES 2-14: CPT

## 2022-12-06 ASSESSMENT — LOCATION DETAILED DESCRIPTION DERM
LOCATION DETAILED: LEFT ULNAR DORSAL HAND
LOCATION DETAILED: RIGHT BUTTOCK
LOCATION DETAILED: LEFT PROXIMAL DORSAL FOREARM

## 2022-12-06 ASSESSMENT — LOCATION ZONE DERM
LOCATION ZONE: HAND
LOCATION ZONE: ARM
LOCATION ZONE: TRUNK

## 2022-12-06 ASSESSMENT — LOCATION SIMPLE DESCRIPTION DERM
LOCATION SIMPLE: LEFT FOREARM
LOCATION SIMPLE: RIGHT BUTTOCK
LOCATION SIMPLE: LEFT HAND

## 2022-12-06 NOTE — PROCEDURE: DIAGNOSIS COMMENT
Comment: Right medial buttock 0.6mm excised 9/23/2020
Detail Level: Detailed
Render Risk Assessment In Note?: no

## 2022-12-06 NOTE — PROCEDURE: LIQUID NITROGEN
Duration Of Freeze Thaw-Cycle (Seconds): 5
Post-Care Instructions: I reviewed with the patient in detail post-care instructions. Patient is to wear sunprotection, and avoid picking at any of the treated lesions. Pt may apply Vaseline to crusted or scabbing areas.
Show Applicator Variable?: Yes
Application Tool (Optional): Liquid Nitrogen Sprayer
Render Note In Bullet Format When Appropriate: No
Consent: The patient's consent was obtained including but not limited to risks of crusting, scabbing, blistering, scarring, darker or lighter pigmentary change, recurrence, incomplete removal and infection.
Detail Level: Detailed
Number Of Freeze-Thaw Cycles: 1 freeze-thaw cycle

## 2022-12-06 NOTE — HPI: MELANOMA F/U (HISTORY OF MALIGNANT MELANOMA)
What Is The Reason For Today's Visit?: History of Melanoma
What Stage Is The Melanoma?: other
Year Excised?: 9/23/2020

## 2023-01-05 ENCOUNTER — HOSPITAL ENCOUNTER (OUTPATIENT)
Dept: RADIOLOGY | Facility: MEDICAL CENTER | Age: 58
End: 2023-01-05
Attending: INTERNAL MEDICINE
Payer: COMMERCIAL

## 2023-01-05 DIAGNOSIS — R59.0 LOCALIZED ENLARGED LYMPH NODES: ICD-10-CM

## 2023-01-05 DIAGNOSIS — Z79.899 OTHER LONG TERM (CURRENT) DRUG THERAPY: ICD-10-CM

## 2023-01-05 DIAGNOSIS — J20.9 ACUTE BRONCHITIS, UNSPECIFIED ORGANISM: ICD-10-CM

## 2023-01-05 DIAGNOSIS — Z51.12 ENCOUNTER FOR ANTINEOPLASTIC IMMUNOTHERAPY: ICD-10-CM

## 2023-01-05 DIAGNOSIS — R94.5 ABNORMAL RESULTS OF LIVER FUNCTION STUDIES: ICD-10-CM

## 2023-01-05 DIAGNOSIS — C43.59 MALIGNANT MELANOMA OF SKIN OF TRUNK (HCC): ICD-10-CM

## 2023-01-05 PROCEDURE — 700117 HCHG RX CONTRAST REV CODE 255: Performed by: INTERNAL MEDICINE

## 2023-01-05 PROCEDURE — 74160 CT ABDOMEN W/CONTRAST: CPT

## 2023-01-05 RX ADMIN — IOHEXOL 100 ML: 350 INJECTION, SOLUTION INTRAVENOUS at 11:48

## 2023-01-05 RX ADMIN — IOHEXOL 20 ML: 240 INJECTION, SOLUTION INTRATHECAL; INTRAVASCULAR; INTRAVENOUS; ORAL at 11:49

## 2023-05-24 ENCOUNTER — OFFICE VISIT (OUTPATIENT)
Dept: MEDICAL GROUP | Facility: LAB | Age: 58
End: 2023-05-24
Payer: COMMERCIAL

## 2023-05-24 VITALS
SYSTOLIC BLOOD PRESSURE: 102 MMHG | HEART RATE: 77 BPM | RESPIRATION RATE: 16 BRPM | DIASTOLIC BLOOD PRESSURE: 72 MMHG | OXYGEN SATURATION: 96 % | HEIGHT: 70 IN | BODY MASS INDEX: 25.05 KG/M2 | WEIGHT: 175 LBS | TEMPERATURE: 97.3 F

## 2023-05-24 DIAGNOSIS — Z12.12 SCREENING FOR COLORECTAL CANCER: ICD-10-CM

## 2023-05-24 DIAGNOSIS — L84 CORN OF FOOT: ICD-10-CM

## 2023-05-24 DIAGNOSIS — Z12.11 SCREENING FOR COLORECTAL CANCER: ICD-10-CM

## 2023-05-24 PROCEDURE — 3074F SYST BP LT 130 MM HG: CPT | Performed by: FAMILY MEDICINE

## 2023-05-24 PROCEDURE — 99214 OFFICE O/P EST MOD 30 MIN: CPT | Performed by: FAMILY MEDICINE

## 2023-05-24 PROCEDURE — 3078F DIAST BP <80 MM HG: CPT | Performed by: FAMILY MEDICINE

## 2023-05-24 ASSESSMENT — PATIENT HEALTH QUESTIONNAIRE - PHQ9: CLINICAL INTERPRETATION OF PHQ2 SCORE: 0

## 2023-05-24 NOTE — PROGRESS NOTES
"Subjective:   Andrew Wall is a 58 y.o. male here today for   No chief complaint on file.    #left foot pain   -Patient states for last 4 months has been having specific, pinpoint tenderness located on the bottom of his foot below the fifth toe.  He states the pain is intermittent, only occurring when on feet, active for several hours.  He states the biggest example he had noted is when he is skiing for prolonged period of time he notices worsening of the pain on the bottom of his foot.  -Patient states that getting off his feet, resting does help.  -Had previous injury several years back with retained piece of glass that had to be removed by podiatrist in the same area.  -Does have history of bunion on left lateral aspect as well.  -Denies any acute trauma.  Denies any numbness, tingling, weakness in lower extremities.      #Health maintenance:   -Due for colonoscopy.    Allergies   Allergen Reactions    Augmentin Vomiting and Nausea         Current medicines (including changes today)  Current Outpatient Medications   Medication Sig Dispense Refill    fluticasone-salmeterol (WIXELA INHUB) 250-50 MCG/ACT AEROSOL POWDER, BREATH ACTIVATED Inhale 1 Puff 2 times a day. 60 Each 11    Multiple Vitamins-Minerals (MULTIVITAMIN ADULT PO) Take  by mouth every day.       No current facility-administered medications for this visit.     He  has a past medical history of Asthma, 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), and Nasal polyp.    He has no past medical history of Hyperlipidemia.    ROS   -See HPI     Objective:     Physical Exam:  /72   Pulse 77   Temp 36.3 °C (97.3 °F) (Temporal)   Resp 16   Ht 1.778 m (5' 10\")   Wt 79.4 kg (175 lb)   SpO2 96%  Body mass index is 25.11 kg/m².   Const: Vitals above. Well-appearing.  CV: Inspected/palpated for lower extremity edema. Bilateral dorsalis pedis/posterior tibial pulses palpated, noting below if not 2+. Capillary refill evaluated, noting below if greater " than 2 seconds.  Skin: Inspected for rash or skin lesions in area of concern.  Neuro/psych: Sensation to light touch evaluated at web space of great toe (peroneal nerve), medial leg/foot (L4), lateral leg/dorsal foot (L5), lateral foot (S1). Observed for normal mood/affect/insight.  MSK: Evaluated gait, posture, weightbearing status. Examination of bilateral ankles:  - Inspected/palpated bilaterally for warmth, erythema/discoloration, effusion/swelling, deformity, and tenderness of the proximal fibula, malleolae, navicular and tarsal bones, talus (dome, lateral/posterior processes), calcaneus, 5th metatarsal and all other metatarsals, phalanges, peroneus brevis/longus tendons, Achilles tendon, anterior tibiofibular ligament, deltoid ligament, ATFL/CFL/PTFL, and Lisfranc ligament. Syndesmosis squeeze and Patton test were performed.  - Assessed passive/active range of motion bilaterally in dorsiflexion, plantar flexion, inversion, and eversion, noting below for pain or crepitation.  - Assessed muscle strength bilaterally in dorsiflexion, plantar flexion, inversion, and eversion, noting below if less than 5/5 or pain. Observed for muscle atrophy.  - Assessed stability bilaterally at ATFL (anterior drawer), CFL (talar tilt).    All of the above were found to be normal, except:   -Pinpoint tenderness located on the dorsal aspect foot just proximal to the fifth metatarsal head.  Pinpoint tenderness is located same area as large callus formation.  No redness, no foreign bodies noted, no heat to touch.  Further examination of foot unremarkable.    Assessment and Plan:     1. Corn of foot  -Discussed conservative treatment measures with offloading corn pads, appropriate foot care with moisturizing lotion to help soften callus, appropriate footwear.  No significant red flag symptoms at this time.  Patient will continue with measures as discussed, follow-up if symptoms persist or worsen.    2. Screening for colorectal  cancer  - Referral to GI for Colonoscopy      Followup: No follow-ups on file.         PLEASE NOTE: This dictation was created using voice recognition software. I have made every reasonable attempt to correct obvious errors, but I expect that there are errors of grammar and possibly content that I did not discover before finalizing the note.

## 2023-06-06 ENCOUNTER — APPOINTMENT (RX ONLY)
Dept: URBAN - METROPOLITAN AREA CLINIC 35 | Facility: CLINIC | Age: 58
Setting detail: DERMATOLOGY
End: 2023-06-06

## 2023-06-06 DIAGNOSIS — Z85.820 PERSONAL HISTORY OF MALIGNANT MELANOMA OF SKIN: ICD-10-CM

## 2023-06-06 DIAGNOSIS — L82.1 OTHER SEBORRHEIC KERATOSIS: ICD-10-CM

## 2023-06-06 DIAGNOSIS — L81.4 OTHER MELANIN HYPERPIGMENTATION: ICD-10-CM

## 2023-06-06 DIAGNOSIS — D22 MELANOCYTIC NEVI: ICD-10-CM

## 2023-06-06 DIAGNOSIS — Z71.89 OTHER SPECIFIED COUNSELING: ICD-10-CM

## 2023-06-06 PROBLEM — D22.5 MELANOCYTIC NEVI OF TRUNK: Status: ACTIVE | Noted: 2023-06-06

## 2023-06-06 PROCEDURE — ? DIAGNOSIS COMMENT

## 2023-06-06 PROCEDURE — 99213 OFFICE O/P EST LOW 20 MIN: CPT

## 2023-06-06 PROCEDURE — ? COUNSELING

## 2023-06-06 ASSESSMENT — LOCATION DETAILED DESCRIPTION DERM
LOCATION DETAILED: EPIGASTRIC SKIN
LOCATION DETAILED: GLUTEAL CLEFT
LOCATION DETAILED: RIGHT MID-UPPER BACK
LOCATION DETAILED: SUPERIOR THORACIC SPINE
LOCATION DETAILED: RIGHT INFERIOR LATERAL UPPER BACK

## 2023-06-06 ASSESSMENT — LOCATION ZONE DERM: LOCATION ZONE: TRUNK

## 2023-06-06 ASSESSMENT — LOCATION SIMPLE DESCRIPTION DERM
LOCATION SIMPLE: UPPER BACK
LOCATION SIMPLE: RIGHT UPPER BACK
LOCATION SIMPLE: ABDOMEN
LOCATION SIMPLE: GLUTEAL CLEFT

## 2023-07-11 ENCOUNTER — HOSPITAL ENCOUNTER (OUTPATIENT)
Dept: RADIOLOGY | Facility: MEDICAL CENTER | Age: 58
End: 2023-07-11
Attending: INTERNAL MEDICINE
Payer: COMMERCIAL

## 2023-07-11 DIAGNOSIS — J20.9 ACUTE BRONCHITIS, UNSPECIFIED ORGANISM: ICD-10-CM

## 2023-07-11 DIAGNOSIS — R94.5 NONSPECIFIC ABNORMAL RESULTS OF LIVER FUNCTION STUDY: ICD-10-CM

## 2023-07-11 DIAGNOSIS — C43.59 MALIGNANT MELANOMA OF SKIN OF TRUNK, EXCEPT SCROTUM (HCC): ICD-10-CM

## 2023-07-11 PROCEDURE — 700117 HCHG RX CONTRAST REV CODE 255: Performed by: INTERNAL MEDICINE

## 2023-07-11 PROCEDURE — 71260 CT THORAX DX C+: CPT

## 2023-07-11 RX ADMIN — IOHEXOL 100 ML: 350 INJECTION, SOLUTION INTRAVENOUS at 11:05

## 2023-07-11 RX ADMIN — IOHEXOL 25 ML: 240 INJECTION, SOLUTION INTRATHECAL; INTRAVASCULAR; INTRAVENOUS; ORAL at 11:05

## 2023-09-27 ENCOUNTER — OFFICE VISIT (OUTPATIENT)
Dept: MEDICAL GROUP | Facility: LAB | Age: 58
End: 2023-09-27
Payer: COMMERCIAL

## 2023-09-27 ENCOUNTER — HOSPITAL ENCOUNTER (OUTPATIENT)
Dept: RADIOLOGY | Facility: MEDICAL CENTER | Age: 58
End: 2023-09-27
Attending: FAMILY MEDICINE
Payer: COMMERCIAL

## 2023-09-27 VITALS
BODY MASS INDEX: 25.05 KG/M2 | WEIGHT: 175 LBS | HEART RATE: 68 BPM | OXYGEN SATURATION: 98 % | RESPIRATION RATE: 16 BRPM | HEIGHT: 70 IN | DIASTOLIC BLOOD PRESSURE: 70 MMHG | SYSTOLIC BLOOD PRESSURE: 108 MMHG | TEMPERATURE: 97.4 F

## 2023-09-27 DIAGNOSIS — R19.09 INGUINAL MASS: ICD-10-CM

## 2023-09-27 DIAGNOSIS — J06.9 UPPER RESPIRATORY TRACT INFECTION, UNSPECIFIED TYPE: ICD-10-CM

## 2023-09-27 PROCEDURE — 99214 OFFICE O/P EST MOD 30 MIN: CPT | Performed by: FAMILY MEDICINE

## 2023-09-27 PROCEDURE — 76857 US EXAM PELVIC LIMITED: CPT

## 2023-09-27 PROCEDURE — 3074F SYST BP LT 130 MM HG: CPT | Performed by: FAMILY MEDICINE

## 2023-09-27 PROCEDURE — 3078F DIAST BP <80 MM HG: CPT | Performed by: FAMILY MEDICINE

## 2023-09-27 ASSESSMENT — ENCOUNTER SYMPTOMS
CHILLS: 0
WEIGHT LOSS: 0
FEVER: 0

## 2023-09-27 NOTE — PROGRESS NOTES
"Subjective:   Andrew Wall is a 58 y.o. male here today for   Chief Complaint   Patient presents with    Lump     X few weeks or so, no pain, right side hip,        #Mass on hip  -Mass noted in the right inguinal area.  First noted a couple weeks ago.  Patient denies any tenderness, sensitivity, itching, rash.  States it is mobile.  Denies any changes in size or shape.  -Patient does have significant history of melanoma diagnosed in 2020.  Underwent adjuvant chemotherapy with Opdivo.  Being followed closely by dermatology, oncology.  -No other lesions or masses noted.    #URI:  -Patient states that 14 days cold symptoms including sore throat, congestion, sinus pressure, loss of voice, cough.  Patient did have fever but has since resolved.  He states symptoms are improving albeit very slowly.  Denies any chest pain, shortness of breath, difficulty breathing.    Allergies   Allergen Reactions    Augmentin Vomiting and Nausea         Current medicines (including changes today)  Current Outpatient Medications   Medication Sig Dispense Refill    fluticasone-salmeterol (WIXELA INHUB) 250-50 MCG/ACT AEROSOL POWDER, BREATH ACTIVATED Inhale 1 Puff 2 times a day. 60 Each 11    Multiple Vitamins-Minerals (MULTIVITAMIN ADULT PO) Take  by mouth every day.       No current facility-administered medications for this visit.     He  has a past medical history of Asthma, 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), and Nasal polyp.    He has no past medical history of Hyperlipidemia.    ROS   Review of Systems   Constitutional:  Negative for chills, fever and weight loss.   Skin:  Negative for itching and rash.      Objective:     Physical Exam:  /70   Pulse 68   Temp 36.3 °C (97.4 °F) (Temporal)   Resp 16   Ht 1.778 m (5' 10\")   Wt 79.4 kg (175 lb)   SpO2 98%  Body mass index is 25.11 kg/m².   Constitutional: Alert, no distress.  Skin: Warm, dry, good turgor, no rashes in visible areas.  Small, 1 cm x 1 cm round, " rubbery, mobile mass noted high in inguinal area.  No tenderness to palpation.  Eye: Equal, round and reactive, conjunctiva clear, lids normal.  Respiratory: Unlabored respiratory effort  Psych: Alert and oriented x3, normal affect and mood.    Assessment and Plan:     1. Inguinal mass  -While signs and symptoms are most likely consistent with lipoma, patient does have history of melanoma.  Given his history further evaluations we will order ultrasound at this time we will follow patient with results.  - US-INGUINAL HERNIA; Future    2. Upper respiratory tract infection, unspecified type  -Patient continues to improve; however, patient will continue to monitor symptoms, follow-up with me in the next 1 to 7 days if no improvement at which time we may need to start antibiotic.      Followup: No follow-ups on file.         PLEASE NOTE: This dictation was created using voice recognition software. I have made every reasonable attempt to correct obvious errors, but I expect that there are errors of grammar and possibly content that I did not discover before finalizing the note.

## 2023-10-02 DIAGNOSIS — R19.09 INGUINAL MASS: ICD-10-CM

## 2023-10-21 ENCOUNTER — PATIENT MESSAGE (OUTPATIENT)
Dept: MEDICAL GROUP | Facility: LAB | Age: 58
End: 2023-10-21
Payer: COMMERCIAL

## 2023-10-21 DIAGNOSIS — J01.90 ACUTE NON-RECURRENT SINUSITIS, UNSPECIFIED LOCATION: ICD-10-CM

## 2023-10-23 NOTE — TELEPHONE ENCOUNTER
Received request via: Pharmacy    Was the patient seen in the last year in this department? Yes    Does the patient have an active prescription (recently filled or refills available) for medication(s) requested? No    Does the patient have skilled nursing Plus and need 100 day supply (blood pressure, diabetes and cholesterol meds only)? Patient does not have SCP    WIXELA 250-50 INHUB

## 2023-10-24 RX ORDER — FLUTICASONE PROPIONATE AND SALMETEROL 250; 50 UG/1; UG/1
1 POWDER RESPIRATORY (INHALATION) 2 TIMES DAILY
Qty: 60 EACH | Refills: 3 | Status: SHIPPED | OUTPATIENT
Start: 2023-10-24 | End: 2024-03-05

## 2023-10-24 RX ORDER — DOXYCYCLINE HYCLATE 100 MG
100 TABLET ORAL 2 TIMES DAILY
Qty: 14 TABLET | Refills: 0 | Status: SHIPPED | OUTPATIENT
Start: 2023-10-24 | End: 2023-10-31

## 2023-11-07 ENCOUNTER — APPOINTMENT (OUTPATIENT)
Dept: ADMISSIONS | Facility: MEDICAL CENTER | Age: 58
End: 2023-11-07
Attending: SURGERY
Payer: COMMERCIAL

## 2023-11-14 ENCOUNTER — PRE-ADMISSION TESTING (OUTPATIENT)
Dept: ADMISSIONS | Facility: MEDICAL CENTER | Age: 58
End: 2023-11-14
Attending: SURGERY
Payer: COMMERCIAL

## 2023-11-14 DIAGNOSIS — Z01.810 PRE-OPERATIVE CARDIOVASCULAR EXAMINATION: ICD-10-CM

## 2023-11-14 DIAGNOSIS — Z01.812 PRE-OPERATIVE LABORATORY EXAMINATION: ICD-10-CM

## 2023-11-15 ENCOUNTER — PRE-ADMISSION TESTING (OUTPATIENT)
Dept: ADMISSIONS | Facility: MEDICAL CENTER | Age: 58
End: 2023-11-15
Attending: SURGERY
Payer: COMMERCIAL

## 2023-11-15 DIAGNOSIS — Z01.810 PRE-OPERATIVE CARDIOVASCULAR EXAMINATION: ICD-10-CM

## 2023-11-15 DIAGNOSIS — Z01.812 PRE-OPERATIVE LABORATORY EXAMINATION: ICD-10-CM

## 2023-11-15 LAB
ALBUMIN SERPL BCP-MCNC: 4.2 G/DL (ref 3.2–4.9)
ALBUMIN/GLOB SERPL: 1.8 G/DL
ALP SERPL-CCNC: 83 U/L (ref 30–99)
ALT SERPL-CCNC: 31 U/L (ref 2–50)
ANION GAP SERPL CALC-SCNC: 8 MMOL/L (ref 7–16)
AST SERPL-CCNC: 27 U/L (ref 12–45)
BASOPHILS # BLD AUTO: 1.2 % (ref 0–1.8)
BASOPHILS # BLD: 0.06 K/UL (ref 0–0.12)
BILIRUB SERPL-MCNC: 0.6 MG/DL (ref 0.1–1.5)
BUN SERPL-MCNC: 14 MG/DL (ref 8–22)
CALCIUM ALBUM COR SERPL-MCNC: 9.3 MG/DL (ref 8.5–10.5)
CALCIUM SERPL-MCNC: 9.5 MG/DL (ref 8.5–10.5)
CHLORIDE SERPL-SCNC: 103 MMOL/L (ref 96–112)
CO2 SERPL-SCNC: 29 MMOL/L (ref 20–33)
CREAT SERPL-MCNC: 0.65 MG/DL (ref 0.5–1.4)
EKG IMPRESSION: NORMAL
EOSINOPHIL # BLD AUTO: 0.26 K/UL (ref 0–0.51)
EOSINOPHIL NFR BLD: 5.1 % (ref 0–6.9)
ERYTHROCYTE [DISTWIDTH] IN BLOOD BY AUTOMATED COUNT: 40 FL (ref 35.9–50)
GFR SERPLBLD CREATININE-BSD FMLA CKD-EPI: 108 ML/MIN/1.73 M 2
GLOBULIN SER CALC-MCNC: 2.4 G/DL (ref 1.9–3.5)
GLUCOSE SERPL-MCNC: 124 MG/DL (ref 65–99)
HCT VFR BLD AUTO: 45.5 % (ref 42–52)
HGB BLD-MCNC: 15.2 G/DL (ref 14–18)
IMM GRANULOCYTES # BLD AUTO: 0.01 K/UL (ref 0–0.11)
IMM GRANULOCYTES NFR BLD AUTO: 0.2 % (ref 0–0.9)
INR PPP: 1.1 (ref 0.87–1.13)
LYMPHOCYTES # BLD AUTO: 1.71 K/UL (ref 1–4.8)
LYMPHOCYTES NFR BLD: 33.3 % (ref 22–41)
MCH RBC QN AUTO: 29.5 PG (ref 27–33)
MCHC RBC AUTO-ENTMCNC: 33.4 G/DL (ref 32.3–36.5)
MCV RBC AUTO: 88.3 FL (ref 81.4–97.8)
MONOCYTES # BLD AUTO: 0.47 K/UL (ref 0–0.85)
MONOCYTES NFR BLD AUTO: 9.2 % (ref 0–13.4)
NEUTROPHILS # BLD AUTO: 2.62 K/UL (ref 1.82–7.42)
NEUTROPHILS NFR BLD: 51 % (ref 44–72)
NRBC # BLD AUTO: 0 K/UL
NRBC BLD-RTO: 0 /100 WBC (ref 0–0.2)
PLATELET # BLD AUTO: 293 K/UL (ref 164–446)
PMV BLD AUTO: 9.7 FL (ref 9–12.9)
POTASSIUM SERPL-SCNC: 4.6 MMOL/L (ref 3.6–5.5)
PROT SERPL-MCNC: 6.6 G/DL (ref 6–8.2)
PROTHROMBIN TIME: 14.3 SEC (ref 12–14.6)
RBC # BLD AUTO: 5.15 M/UL (ref 4.7–6.1)
SODIUM SERPL-SCNC: 140 MMOL/L (ref 135–145)
WBC # BLD AUTO: 5.1 K/UL (ref 4.8–10.8)

## 2023-11-15 PROCEDURE — 85025 COMPLETE CBC W/AUTO DIFF WBC: CPT

## 2023-11-15 PROCEDURE — 85610 PROTHROMBIN TIME: CPT

## 2023-11-15 PROCEDURE — 93005 ELECTROCARDIOGRAM TRACING: CPT

## 2023-11-15 PROCEDURE — 80053 COMPREHEN METABOLIC PANEL: CPT

## 2023-11-15 PROCEDURE — 93010 ELECTROCARDIOGRAM REPORT: CPT | Performed by: INTERNAL MEDICINE

## 2023-11-15 PROCEDURE — 36415 COLL VENOUS BLD VENIPUNCTURE: CPT

## 2023-11-16 ENCOUNTER — ANESTHESIA (OUTPATIENT)
Dept: SURGERY | Facility: MEDICAL CENTER | Age: 58
End: 2023-11-16
Payer: COMMERCIAL

## 2023-11-16 ENCOUNTER — HOSPITAL ENCOUNTER (OUTPATIENT)
Facility: MEDICAL CENTER | Age: 58
End: 2023-11-16
Attending: SURGERY | Admitting: SURGERY
Payer: COMMERCIAL

## 2023-11-16 ENCOUNTER — ANESTHESIA EVENT (OUTPATIENT)
Dept: SURGERY | Facility: MEDICAL CENTER | Age: 58
End: 2023-11-16
Payer: COMMERCIAL

## 2023-11-16 VITALS
OXYGEN SATURATION: 94 % | TEMPERATURE: 97.2 F | SYSTOLIC BLOOD PRESSURE: 115 MMHG | RESPIRATION RATE: 17 BRPM | HEART RATE: 68 BPM | WEIGHT: 171.74 LBS | HEIGHT: 71 IN | DIASTOLIC BLOOD PRESSURE: 74 MMHG | BODY MASS INDEX: 24.04 KG/M2

## 2023-11-16 DIAGNOSIS — G89.18 POSTOPERATIVE PAIN: ICD-10-CM

## 2023-11-16 PROBLEM — C77.9 MALIGNANT MELANOMA METASTATIC TO LYMPH NODE (HCC): Status: ACTIVE | Noted: 2023-11-16

## 2023-11-16 LAB — PATHOLOGY CONSULT NOTE: NORMAL

## 2023-11-16 PROCEDURE — 700101 HCHG RX REV CODE 250: Performed by: ANESTHESIOLOGY

## 2023-11-16 PROCEDURE — 160028 HCHG SURGERY MINUTES - 1ST 30 MINS LEVEL 3: Performed by: SURGERY

## 2023-11-16 PROCEDURE — 700111 HCHG RX REV CODE 636 W/ 250 OVERRIDE (IP): Performed by: ANESTHESIOLOGY

## 2023-11-16 PROCEDURE — 700102 HCHG RX REV CODE 250 W/ 637 OVERRIDE(OP): Performed by: ANESTHESIOLOGY

## 2023-11-16 PROCEDURE — 160048 HCHG OR STATISTICAL LEVEL 1-5: Performed by: SURGERY

## 2023-11-16 PROCEDURE — 160036 HCHG PACU - EA ADDL 30 MINS PHASE I: Performed by: SURGERY

## 2023-11-16 PROCEDURE — 88305 TISSUE EXAM BY PATHOLOGIST: CPT

## 2023-11-16 PROCEDURE — 88342 IMHCHEM/IMCYTCHM 1ST ANTB: CPT

## 2023-11-16 PROCEDURE — 700101 HCHG RX REV CODE 250: Performed by: SURGERY

## 2023-11-16 PROCEDURE — 160046 HCHG PACU - 1ST 60 MINS PHASE II: Performed by: SURGERY

## 2023-11-16 PROCEDURE — 160002 HCHG RECOVERY MINUTES (STAT): Performed by: SURGERY

## 2023-11-16 PROCEDURE — 160025 RECOVERY II MINUTES (STATS): Performed by: SURGERY

## 2023-11-16 PROCEDURE — A9270 NON-COVERED ITEM OR SERVICE: HCPCS | Performed by: ANESTHESIOLOGY

## 2023-11-16 PROCEDURE — 160009 HCHG ANES TIME/MIN: Performed by: SURGERY

## 2023-11-16 PROCEDURE — 700105 HCHG RX REV CODE 258: Performed by: SURGERY

## 2023-11-16 PROCEDURE — 160035 HCHG PACU - 1ST 60 MINS PHASE I: Performed by: SURGERY

## 2023-11-16 PROCEDURE — 160039 HCHG SURGERY MINUTES - EA ADDL 1 MIN LEVEL 3: Performed by: SURGERY

## 2023-11-16 RX ORDER — DIPHENHYDRAMINE HYDROCHLORIDE 50 MG/ML
12.5 INJECTION INTRAMUSCULAR; INTRAVENOUS
Status: DISCONTINUED | OUTPATIENT
Start: 2023-11-16 | End: 2023-11-16 | Stop reason: HOSPADM

## 2023-11-16 RX ORDER — HYDROMORPHONE HYDROCHLORIDE 1 MG/ML
0.4 INJECTION, SOLUTION INTRAMUSCULAR; INTRAVENOUS; SUBCUTANEOUS
Status: DISCONTINUED | OUTPATIENT
Start: 2023-11-16 | End: 2023-11-16 | Stop reason: HOSPADM

## 2023-11-16 RX ORDER — LABETALOL HYDROCHLORIDE 5 MG/ML
5 INJECTION, SOLUTION INTRAVENOUS
Status: DISCONTINUED | OUTPATIENT
Start: 2023-11-16 | End: 2023-11-16 | Stop reason: HOSPADM

## 2023-11-16 RX ORDER — LIDOCAINE HYDROCHLORIDE 20 MG/ML
INJECTION, SOLUTION EPIDURAL; INFILTRATION; INTRACAUDAL; PERINEURAL PRN
Status: DISCONTINUED | OUTPATIENT
Start: 2023-11-16 | End: 2023-11-16 | Stop reason: SURG

## 2023-11-16 RX ORDER — MEPERIDINE HYDROCHLORIDE 25 MG/ML
12.5 INJECTION INTRAMUSCULAR; INTRAVENOUS; SUBCUTANEOUS
Status: DISCONTINUED | OUTPATIENT
Start: 2023-11-16 | End: 2023-11-16 | Stop reason: HOSPADM

## 2023-11-16 RX ORDER — PHENYLEPHRINE HCL IN 0.9% NACL 0.5 MG/5ML
SYRINGE (ML) INTRAVENOUS PRN
Status: DISCONTINUED | OUTPATIENT
Start: 2023-11-16 | End: 2023-11-16 | Stop reason: SURG

## 2023-11-16 RX ORDER — OXYCODONE HCL 5 MG/5 ML
10 SOLUTION, ORAL ORAL
Status: COMPLETED | OUTPATIENT
Start: 2023-11-16 | End: 2023-11-16

## 2023-11-16 RX ORDER — ACETAMINOPHEN 500 MG
1000 TABLET ORAL ONCE
Status: COMPLETED | OUTPATIENT
Start: 2023-11-16 | End: 2023-11-16

## 2023-11-16 RX ORDER — OXYCODONE HCL 5 MG/5 ML
5 SOLUTION, ORAL ORAL
Status: COMPLETED | OUTPATIENT
Start: 2023-11-16 | End: 2023-11-16

## 2023-11-16 RX ORDER — KETOROLAC TROMETHAMINE 30 MG/ML
INJECTION, SOLUTION INTRAMUSCULAR; INTRAVENOUS PRN
Status: DISCONTINUED | OUTPATIENT
Start: 2023-11-16 | End: 2023-11-16 | Stop reason: SURG

## 2023-11-16 RX ORDER — MIDAZOLAM HYDROCHLORIDE 1 MG/ML
INJECTION INTRAMUSCULAR; INTRAVENOUS PRN
Status: DISCONTINUED | OUTPATIENT
Start: 2023-11-16 | End: 2023-11-16 | Stop reason: SURG

## 2023-11-16 RX ORDER — ONDANSETRON 2 MG/ML
4 INJECTION INTRAMUSCULAR; INTRAVENOUS
Status: DISCONTINUED | OUTPATIENT
Start: 2023-11-16 | End: 2023-11-16 | Stop reason: HOSPADM

## 2023-11-16 RX ORDER — SODIUM CHLORIDE, SODIUM LACTATE, POTASSIUM CHLORIDE, CALCIUM CHLORIDE 600; 310; 30; 20 MG/100ML; MG/100ML; MG/100ML; MG/100ML
INJECTION, SOLUTION INTRAVENOUS CONTINUOUS
Status: DISCONTINUED | OUTPATIENT
Start: 2023-11-16 | End: 2023-11-16 | Stop reason: HOSPADM

## 2023-11-16 RX ORDER — EPHEDRINE SULFATE 50 MG/ML
5 INJECTION, SOLUTION INTRAVENOUS
Status: DISCONTINUED | OUTPATIENT
Start: 2023-11-16 | End: 2023-11-16 | Stop reason: HOSPADM

## 2023-11-16 RX ORDER — CEFAZOLIN SODIUM 1 G/3ML
INJECTION, POWDER, FOR SOLUTION INTRAMUSCULAR; INTRAVENOUS PRN
Status: DISCONTINUED | OUTPATIENT
Start: 2023-11-16 | End: 2023-11-16 | Stop reason: SURG

## 2023-11-16 RX ORDER — METOPROLOL TARTRATE 1 MG/ML
1 INJECTION, SOLUTION INTRAVENOUS
Status: DISCONTINUED | OUTPATIENT
Start: 2023-11-16 | End: 2023-11-16 | Stop reason: HOSPADM

## 2023-11-16 RX ORDER — HYDROCODONE BITARTRATE AND ACETAMINOPHEN 5; 325 MG/1; MG/1
1 TABLET ORAL EVERY 4 HOURS PRN
Qty: 10 TABLET | Refills: 0 | Status: SHIPPED | OUTPATIENT
Start: 2023-11-16 | End: 2023-11-19

## 2023-11-16 RX ORDER — HYDRALAZINE HYDROCHLORIDE 20 MG/ML
5 INJECTION INTRAMUSCULAR; INTRAVENOUS
Status: DISCONTINUED | OUTPATIENT
Start: 2023-11-16 | End: 2023-11-16 | Stop reason: HOSPADM

## 2023-11-16 RX ORDER — BUPIVACAINE HYDROCHLORIDE AND EPINEPHRINE 5; 5 MG/ML; UG/ML
INJECTION, SOLUTION EPIDURAL; INTRACAUDAL; PERINEURAL
Status: DISCONTINUED | OUTPATIENT
Start: 2023-11-16 | End: 2023-11-16 | Stop reason: HOSPADM

## 2023-11-16 RX ORDER — MIDAZOLAM HYDROCHLORIDE 1 MG/ML
1 INJECTION INTRAMUSCULAR; INTRAVENOUS
Status: DISCONTINUED | OUTPATIENT
Start: 2023-11-16 | End: 2023-11-16 | Stop reason: HOSPADM

## 2023-11-16 RX ORDER — HALOPERIDOL 5 MG/ML
1 INJECTION INTRAMUSCULAR
Status: DISCONTINUED | OUTPATIENT
Start: 2023-11-16 | End: 2023-11-16 | Stop reason: HOSPADM

## 2023-11-16 RX ORDER — HYDROMORPHONE HYDROCHLORIDE 1 MG/ML
0.1 INJECTION, SOLUTION INTRAMUSCULAR; INTRAVENOUS; SUBCUTANEOUS
Status: DISCONTINUED | OUTPATIENT
Start: 2023-11-16 | End: 2023-11-16 | Stop reason: HOSPADM

## 2023-11-16 RX ORDER — ONDANSETRON 2 MG/ML
INJECTION INTRAMUSCULAR; INTRAVENOUS PRN
Status: DISCONTINUED | OUTPATIENT
Start: 2023-11-16 | End: 2023-11-16 | Stop reason: SURG

## 2023-11-16 RX ORDER — HYDROMORPHONE HYDROCHLORIDE 1 MG/ML
0.2 INJECTION, SOLUTION INTRAMUSCULAR; INTRAVENOUS; SUBCUTANEOUS
Status: DISCONTINUED | OUTPATIENT
Start: 2023-11-16 | End: 2023-11-16 | Stop reason: HOSPADM

## 2023-11-16 RX ORDER — IBUPROFEN 200 MG
200 TABLET ORAL
COMMUNITY
End: 2024-03-09

## 2023-11-16 RX ADMIN — FENTANYL CITRATE 50 MCG: 50 INJECTION, SOLUTION INTRAMUSCULAR; INTRAVENOUS at 07:50

## 2023-11-16 RX ADMIN — SODIUM CHLORIDE, POTASSIUM CHLORIDE, SODIUM LACTATE AND CALCIUM CHLORIDE: 600; 310; 30; 20 INJECTION, SOLUTION INTRAVENOUS at 06:56

## 2023-11-16 RX ADMIN — Medication 100 MCG: at 07:55

## 2023-11-16 RX ADMIN — FENTANYL CITRATE 50 MCG: 50 INJECTION, SOLUTION INTRAMUSCULAR; INTRAVENOUS at 07:26

## 2023-11-16 RX ADMIN — LIDOCAINE HYDROCHLORIDE 100 MG: 20 INJECTION, SOLUTION EPIDURAL; INFILTRATION; INTRACAUDAL at 07:26

## 2023-11-16 RX ADMIN — CEFAZOLIN 2 G: 1 INJECTION, POWDER, FOR SOLUTION INTRAMUSCULAR; INTRAVENOUS at 07:26

## 2023-11-16 RX ADMIN — ONDANSETRON 4 MG: 2 INJECTION INTRAMUSCULAR; INTRAVENOUS at 08:04

## 2023-11-16 RX ADMIN — MIDAZOLAM 2 MG: 1 INJECTION, SOLUTION INTRAMUSCULAR; INTRAVENOUS at 07:24

## 2023-11-16 RX ADMIN — ACETAMINOPHEN 1000 MG: 500 TABLET, FILM COATED ORAL at 06:57

## 2023-11-16 RX ADMIN — Medication 100 MCG: at 08:04

## 2023-11-16 RX ADMIN — KETOROLAC TROMETHAMINE 30 MG: 30 INJECTION, SOLUTION INTRAMUSCULAR; INTRAVENOUS at 08:04

## 2023-11-16 RX ADMIN — PROPOFOL 150 MG: 10 INJECTION, EMULSION INTRAVENOUS at 07:26

## 2023-11-16 RX ADMIN — PROPOFOL 50 MG: 10 INJECTION, EMULSION INTRAVENOUS at 07:30

## 2023-11-16 RX ADMIN — OXYCODONE HYDROCHLORIDE 5 MG: 5 SOLUTION ORAL at 08:55

## 2023-11-16 ASSESSMENT — PAIN DESCRIPTION - PAIN TYPE
TYPE: SURGICAL PAIN
TYPE: SURGICAL PAIN

## 2023-11-16 ASSESSMENT — FIBROSIS 4 INDEX
FIB4 SCORE: 0.98
FIB4 SCORE: 0.98

## 2023-11-16 ASSESSMENT — PAIN SCALES - GENERAL: PAIN_LEVEL: 2

## 2023-11-16 NOTE — OP REPORT
DATE OF SERVICE:  11/16/2023     SURGEON:  Davon Valera MD     ANESTHESIOLOGIST:  Srinivas Mijares MD     TYPE OF ANESTHESIA:  General anesthetic using laryngeal mask airway plus local   0.5% Marcaine with epinephrine.     PREOPERATIVE DIAGNOSES:  1.  Enlarged right inguinal bernardo mass suspicious for metastatic melanoma.  2.  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     POSTOPERATIVE DIAGNOSES:  1.  Enlarged right inguinal bernardo mass suspicious for metastatic melanoma.  2.  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     PROCEDURE:  Right inguinal node excisional biopsy.     FINDINGS:  The patient is a 58-year-old gentleman who had a previous history   of malignant melanoma of the lower back near the gluteal cleft.  He underwent   a radical wide excision back in 2020 with flap closure.  He also underwent   bilateral sentinel node biopsies and was found to have a positive right   inguinal node an isolated tumor as well as in the left inguinal node.  He was   seen postoperatively by Dr. Ross, the medical oncologist, and underwent   immunotherapy for one year.  He was then being followed when he developed a   new right inguinal mass.  This was a somewhat higher and more towards the   anterior superior iliac spine.  A PET scan was performed and revealed that   this lymph node was very active measured about 2.1 cm in diameter.  After   discussion with Dr. Ross, it was felt the patient should have an excisional   biopsy of this lesion rather than a core biopsy in order to remove the bulk of   the disease.  The tissue will be sent for further analysis.  At the time of   surgery, the patient was found to have a 3x3 cm inflamed lymph node that was   attached to the external oblique fascia higher up on the inguinal region.    Evaluation of the rest of the inguinal area by palpation did not reveal any   other suspicious nodes.  Ultrasound revealed only normal-appearing nodes down   by the inguinal femoral  area.  It was elected only to perform the excisional   biopsy to get histology.  The patient tolerated the procedure well with no   complications.     FINDINGS AND PROCEDURE:  The patient was brought to the operating room and   placed on the table in supine position.  General anesthetic was then provided   by the anesthesiologist, Dr. Mijares.  A timeout was called.  The correct   patient, correct diagnosis, correct surgery, and correct location of surgery   were discussed and agreed upon.  He did receive preoperative IV antibiotics.    The right inguinal area was prepped and draped in the usual sterile fashion.    A sterile ultrasound probe was used to identify the bernardo mass in the high   inguinal region.  Further interrogation of the inguinal area did not reveal   any other enlarged nodes.  Several small normal-appearing nodes were noted in   the inguinal femoral region.  With the area now prepped and draped, an   incision was made directly over the palpable mass high in the right inguinal   area with a #15 blade through skin and dermis.  All bleeding was controlled   with electrocautery.  Dissection was then carried down through the   subcutaneous tissue down through the subcutaneous fat and through Noé's   fascia.  A bernardo mass was then noted to be attached to the external oblique   aponeurosis.  A dissection around this bernardo mass was then performed using   combination of the Metzenbaum scissors and the LigaSure device.  A portion of   the fascia of the external oblique was taken with the bernardo mass.  The entire   mass was removed en bloc and then sent to pathology for further   characterization.  The defect in the oblique was closed using a running 0   Vicryl suture.  Next, palpation of the area of the inguinal femoral region   through the open incision did not reveal any other suspicious nodes.  The area   was injected with 0.5% Marcaine with epinephrine.  Noé's fascia and the   dermis was closed using  running 3-0 Vicryl suture.  The skin was closed using   running 4-0 Monocryl suture in subcuticular fashion.  Steri-Strips and sterile   dressings were applied.  This included Dermabond as the patient was sensitive   to Steri-Strips.        ______________________________  MD PATTY BRYAN/TOMMIE    DD:  11/16/2023 08:22  DT:  11/16/2023 09:17    Job#:  [id number]    CC:MD Jose Luis Crane MD

## 2023-11-16 NOTE — DISCHARGE INSTRUCTIONS
HOME CARE INSTRUCTIONS    ACTIVITY: Rest and take it easy for the first 24 hours.  A responsible adult is recommended to remain with you during that time.  It is normal to feel sleepy.  We encourage you to not do anything that requires balance, judgment or coordination.    FOR 24 HOURS DO NOT:  Drive, operate machinery or run household appliances.  Drink beer or alcoholic beverages.  Make important decisions or sign legal documents.    SPECIAL INSTRUCTIONS:   Remove any gauze dressing in 2 days. There will be dermabond and that will last for 1-2 weeks     DIET: To avoid nausea, slowly advance diet as tolerated, avoiding spicy or greasy foods for the first day.  Add more substantial food to your diet according to your physician's instructions.  Babies can be fed formula or breast milk as soon as they are hungry.  INCREASE FLUIDS AND FIBER TO AVOID CONSTIPATION.    SURGICAL DRESSING/BATHING: Ok to remove dressing in 2 days and shower after dressing removed. Do not submerge surgical site     MEDICATIONS: Resume taking daily medication.  Take prescribed pain medication with food.  If no medication is prescribed, you may take non-aspirin pain medication if needed.  PAIN MEDICATION CAN BE VERY CONSTIPATING.  Take a stool softener or laxative such as senokot, pericolace, or milk of magnesia if needed.    Prescription given for Norco.  Last pain medication given at 0855 am next dose at 1255pm    A follow-up appointment should be arranged with your doctor in 1-2 weeks; call to schedule.    You should CALL YOUR PHYSICIAN if you develop:  Fever greater than 101 degrees F.  Pain not relieved by medication, or persistent nausea or vomiting.  Excessive bleeding (blood soaking through dressing) or unexpected drainage from the wound.  Extreme redness or swelling around the incision site, drainage of pus or foul smelling drainage.  Inability to urinate or empty your bladder within 8 hours.  Problems with breathing or chest  pain.    You should call 911 if you develop problems with breathing or chest pain.  If you are unable to contact your doctor or surgical center, you should go to the nearest emergency room or urgent care center.  Physician's telephone #: [phone]    MILD FLU-LIKE SYMPTOMS ARE NORMAL.  YOU MAY EXPERIENCE GENERALIZED MUSCLE ACHES, THROAT IRRITATION, HEADACHE AND/OR SOME NAUSEA.    If any questions arise, call your doctor.  If your doctor is not available, please feel free to call the Surgical Center at [phone].  The Center is open Monday through Friday from 7AM to 7PM.      A registered nurse may call you a few days after your surgery to see how you are doing after your procedure.    You may also receive a survey in the mail within the next two weeks and we ask that you take a few moments to complete the survey and return it to us.  Our goal is to provide you with very good care and we value your comments.     Depression / Suicide Risk    As you are discharged from this [facility] facility, it is important to learn how to keep safe from harming yourself.    Recognize the warning signs:  Abrupt changes in personality, positive or negative- including increase in energy   Giving away possessions  Change in eating patterns- significant weight changes-  positive or negative  Change in sleeping patterns- unable to sleep or sleeping all the time   Unwillingness or inability to communicate  Depression  Unusual sadness, discouragement and loneliness  Talk of wanting to die  Neglect of personal appearance   Rebelliousness- reckless behavior  Withdrawal from people/activities they love  Confusion- inability to concentrate     If you or a loved one observes any of these behaviors or has concerns about self-harm, here's what you can do:  Talk about it- your feelings and reasons for harming yourself  Remove any means that you might use to hurt yourself (examples: pills, rope, extension cords, firearm)  Get  professional help from the community (Mental Health, Substance Abuse, psychological counseling)  Do not be alone:Call your Safe Contact- someone whom you trust who will be there for you.  Call your local CRISIS HOTLINE 768-6986 or 788-463-6672  Call your local Children's Mobile Crisis Response Team [place] (862) 944-2149 or www.TLabs  Call the toll free National Suicide Prevention Hotlines   National Suicide Prevention Lifeline 840-607-MYEK (9673)  [facility] Line Network 800-SUICIDE (883-1238)    I acknowledge receipt and understanding of these Home Care instructions.

## 2023-11-16 NOTE — OR NURSING
0815: Pt arrived from OR, handoff received from anesthesiologist and RN. Patient asleep, breathing even and unlabored. Vitals stable. Right groin incision with derma bond, C/D/I.     0840: Patient waking up, following commands, OPA removed.     0855: Mediated for pain per MAR.     0900: Patient tolerating oral intake of fluids. Wife update don status.     0915: Patient states pain improved, at tolerable level. Recovery complete.      0925: Report given to phase 2 Kenroy VERA.

## 2023-11-16 NOTE — ANESTHESIA PROCEDURE NOTES
Airway    Date/Time: 11/16/2023 7:27 AM    Performed by: Srinivas Mijares M.D.  Authorized by: Srinivas Mijares M.D.    Location:  OR  Urgency:  Elective  Indications for Airway Management:  Anesthesia      Spontaneous Ventilation: absent    Sedation Level:  Deep  Preoxygenated: Yes    Mask Difficulty Assessment:  1 - vent by mask  Final Airway Type:  Supraglottic airway  Final Supraglottic Airway:  Standard LMA    SGA Size:  5  Number of Attempts at Approach:  1

## 2023-11-16 NOTE — ANESTHESIA POSTPROCEDURE EVALUATION
Patient: Andrew Wall    Procedure Summary       Date: 11/16/23 Room / Location: [place] [id number] / SURGERY [facility]    Anesthesia Start: 0720 Anesthesia Stop: 0815    Procedure: RIGHT INGUINAL NODE SUPERFICIAL DISSECTION (Right: Groin) Diagnosis: (MALIGNANT MELANOMA)    Surgeons: Davon Valera M.D. Responsible Provider: Srinivas Mijares M.D.    Anesthesia Type: general ASA Status: 2            Final Anesthesia Type: general  Last vitals  BP   Blood Pressure: 115/74    Temp   36.2 °C (97.2 °F)    Pulse   68   Resp   17    SpO2   94 %      Anesthesia Post Evaluation    Patient location during evaluation: PACU  Patient participation: complete - patient participated  Level of consciousness: awake and alert  Pain score: 2    Airway patency: patent  Anesthetic complications: no  Cardiovascular status: hemodynamically stable  Respiratory status: acceptable  Hydration status: euvolemic    PONV: none          No notable events documented.     Nurse Pain Score: 2 (NPRS)

## 2023-11-16 NOTE — OR NURSING
Pt arrived to PACU II at 0933. Pt aa/ox4, VSS. Discharge criteria met. Pain is 2/10 which patient states is tolerable. Rt groin incision with dermabond, ulisses. Ice pack in place. Pt changed into clothing with assistance. Discharge instructions given; pt and family verbalized understanding and questions answered.  IV removed, tip intact. Will follow-up with Dr. Valera  in 1-2 weeks. Prescriptions sent to pharmacy. Pt discharged home and escorted via w/c with RN in stable condition.

## 2023-11-16 NOTE — PROGRESS NOTES
Medication history reviewed with PT at bedside    [facility] is complete per PT reporting    Allergies reviewed.     Patient denies any outpatient antibiotics in the last 30 days.     Patient is not taking anticoagulants.    Preferred pharmacy for this visit - [facility]  ([phone]).

## 2023-11-16 NOTE — OR SURGEON
Immediate Post OP Note    PreOp Diagnosis: Enlarged Right Inguinal Lymph Node ; History of node positive melanoma of the lower back      PostOp Diagnosis: same      Procedure(s):  RIGHT INGUINAL NODE Excisional Biopsy - Wound Class: Clean    Surgeon(s):  Davon Valera M.D.    Anesthesiologist/Type of Anesthesia:  Anesthesiologist: Srinivas Mijares M.D./General    Surgical Staff:  Circulator: Annelise Zavala R.N.  Scrub Person: Nohmei Jenesn    Specimens removed if any:  ID Type Source Tests Collected by Time Destination   A : Right inguinal node #1 Tissue Lymph Node PATHOLOGY SPECIMEN Davon Valera M.D. 11/16/2023 0750        Estimated Blood Loss: minimal    Findings: large 3x3cm inflammed bernardo mass attached to the ext. Oblique fascia int he upper inguinal area, no other palpable enlarged or suspicious nodes    Complications: no apparent complications        11/16/2023 8:13 AM Davon Valera M.D.

## 2023-11-16 NOTE — ANESTHESIA TIME REPORT
Anesthesia Start and Stop Event Times       Date Time Event    11/16/2023 0715 Ready for Procedure     0720 Anesthesia Start     0815 Anesthesia Stop          Responsible Staff  11/16/23      Name Role Begin End    Srinivas Mijares M.D. Anesth 0720 0815          Overtime Reason:  no overtime (within assigned shift)    Comments:

## 2023-11-16 NOTE — OR NURSING
Assume care for pt in pre-op. Patient allergies and NPO status verified. Belongings secured. Patient verbalizes understanding of pain scale, expected course of stay and plan of care. Surgical procedure verified with patient. IV access established. Call light within reach. No further needs at this time. Hourly rounding in place.

## 2023-11-16 NOTE — ANESTHESIA PREPROCEDURE EVALUATION
Case: [id number] Date/Time: 11/16/23 0715    Procedure: RIGHT INGUINAL NODE SUPERFICIAL DISSECTION    Pre-op diagnosis: METASTATIC MALIGNANT NEOPLASM TO INGUINAL LYMPH NODES    Location: [facility] OR 12 / SURGERY [facility]    Surgeons: Davon Valera M.D.            Relevant Problems   PULMONARY   (positive) Mild intermittent asthma without complication   (positive) Moderate persistent asthma without complication       Physical Exam    Airway   Mallampati: II  TM distance: >3 FB  Neck ROM: full       Cardiovascular - normal exam  Rhythm: regular  Rate: normal  (-) murmur     Dental - normal exam           Pulmonary - normal exam  Breath sounds clear to auscultation     Abdominal    Neurological - normal exam                   Anesthesia Plan    ASA 2       Plan - general       Airway plan will be LMA          Induction: intravenous    Postoperative Plan: Postoperative administration of opioids is intended.    Pertinent diagnostic labs and testing reviewed    Informed Consent:    Anesthetic plan and risks discussed with patient.    Use of blood products discussed with: patient whom consented to blood products.

## 2023-12-06 ENCOUNTER — OFFICE VISIT (OUTPATIENT)
Dept: URGENT CARE | Facility: CLINIC | Age: 58
End: 2023-12-06
Payer: COMMERCIAL

## 2023-12-06 ENCOUNTER — APPOINTMENT (RX ONLY)
Dept: URBAN - METROPOLITAN AREA CLINIC 35 | Facility: CLINIC | Age: 58
Setting detail: DERMATOLOGY
End: 2023-12-06

## 2023-12-06 ENCOUNTER — APPOINTMENT (OUTPATIENT)
Dept: RADIOLOGY | Facility: IMAGING CENTER | Age: 58
End: 2023-12-06
Attending: NURSE PRACTITIONER
Payer: COMMERCIAL

## 2023-12-06 VITALS
BODY MASS INDEX: 24.08 KG/M2 | TEMPERATURE: 97.1 F | HEIGHT: 71 IN | DIASTOLIC BLOOD PRESSURE: 68 MMHG | OXYGEN SATURATION: 97 % | WEIGHT: 172 LBS | RESPIRATION RATE: 16 BRPM | SYSTOLIC BLOOD PRESSURE: 104 MMHG | HEART RATE: 86 BPM

## 2023-12-06 DIAGNOSIS — C77.9 MALIGNANT MELANOMA METASTATIC TO LYMPH NODE (HCC): ICD-10-CM

## 2023-12-06 DIAGNOSIS — Z71.89 OTHER SPECIFIED COUNSELING: ICD-10-CM

## 2023-12-06 DIAGNOSIS — L82.1 OTHER SEBORRHEIC KERATOSIS: ICD-10-CM

## 2023-12-06 DIAGNOSIS — D22 MELANOCYTIC NEVI: ICD-10-CM

## 2023-12-06 DIAGNOSIS — M25.512 ACUTE PAIN OF LEFT SHOULDER: ICD-10-CM

## 2023-12-06 DIAGNOSIS — L81.4 OTHER MELANIN HYPERPIGMENTATION: ICD-10-CM

## 2023-12-06 DIAGNOSIS — M79.18 MYOFASCIAL MUSCLE PAIN: ICD-10-CM

## 2023-12-06 DIAGNOSIS — L80 VITILIGO: ICD-10-CM

## 2023-12-06 PROBLEM — D22.5 MELANOCYTIC NEVI OF TRUNK: Status: ACTIVE | Noted: 2023-12-06

## 2023-12-06 PROBLEM — L81.9 DISORDER OF PIGMENTATION, UNSPECIFIED: Status: ACTIVE | Noted: 2023-12-06

## 2023-12-06 PROBLEM — C43.59 MALIGNANT MELANOMA OF OTHER PART OF TRUNK: Status: ACTIVE | Noted: 2023-12-06

## 2023-12-06 PROCEDURE — 99213 OFFICE O/P EST LOW 20 MIN: CPT

## 2023-12-06 PROCEDURE — 3078F DIAST BP <80 MM HG: CPT | Performed by: NURSE PRACTITIONER

## 2023-12-06 PROCEDURE — 99214 OFFICE O/P EST MOD 30 MIN: CPT | Performed by: NURSE PRACTITIONER

## 2023-12-06 PROCEDURE — 3074F SYST BP LT 130 MM HG: CPT | Performed by: NURSE PRACTITIONER

## 2023-12-06 PROCEDURE — 73030 X-RAY EXAM OF SHOULDER: CPT | Mod: TC,FY,LT | Performed by: FAMILY MEDICINE

## 2023-12-06 PROCEDURE — ? COUNSELING

## 2023-12-06 PROCEDURE — ? ADDITIONAL NOTES

## 2023-12-06 ASSESSMENT — LOCATION SIMPLE DESCRIPTION DERM
LOCATION SIMPLE: ABDOMEN
LOCATION SIMPLE: UPPER BACK
LOCATION SIMPLE: LEFT CALF
LOCATION SIMPLE: RIGHT UPPER BACK
LOCATION SIMPLE: LEFT POPLITEAL SKIN

## 2023-12-06 ASSESSMENT — LOCATION DETAILED DESCRIPTION DERM
LOCATION DETAILED: LEFT DISTAL CALF
LOCATION DETAILED: SUPERIOR THORACIC SPINE
LOCATION DETAILED: LEFT POPLITEAL SKIN
LOCATION DETAILED: RIGHT INFERIOR LATERAL UPPER BACK
LOCATION DETAILED: RIGHT MID-UPPER BACK
LOCATION DETAILED: EPIGASTRIC SKIN

## 2023-12-06 ASSESSMENT — LOCATION ZONE DERM
LOCATION ZONE: LEG
LOCATION ZONE: TRUNK

## 2023-12-06 ASSESSMENT — FIBROSIS 4 INDEX: FIB4 SCORE: 0.98

## 2023-12-06 NOTE — PROGRESS NOTES
Andrew Wall is a 58 y.o. male who presents for Shoulder Pain (L side shoulder pain x 2-3 days, no injuries)      HPI  This is a new problem. Andrew Wall is a 58 y.o. patient who presents to urgent care with c/o: recent surgery on leg is making him lay more on his left shoulder. Now Left shoulder is very painful for a few days.  Had an old shoulder sling that he is now using to reduce his pain. Pain  8/10, constant but depends on movement.  Pain does not radiate.  It is achy to sharp at times.  Denies numbness or tingling. Has decreased movement in his shoulder.  Denies repetitive movements, injury or trauma.  Treatment tried ibuprofen ( last dose 0800 today 400 mg and advil pm at 3 am)     ROS See HPI    Allergies:       Allergies   Allergen Reactions    Augmentin Vomiting and Nausea       PMSFS Hx:  Past Medical History:   Diagnosis Date    Asthma     Cancer (HCC) 10/20    melanoma    Malignant melanoma metastatic to lymph node ([facility]) 11/16/2023    Malignant melanoma metastatic to lymph node ([facility]) 11/16/2023    Malignant melanoma of skin of buttock ([facility])     Nasal polyp      Past Surgical History:   Procedure Laterality Date    LYMPH NODE EXCISION Right 11/16/2023    Procedure: RIGHT INGUINAL NODE SUPERFICIAL DISSECTION;  Surgeon: Davon Valera M.D.;  Location: [facility];  Service: General    NODE BIOPSY SENTINEL Bilateral 10/20/2020    Procedure: BIOPSY, LYMPH NODE, SENTINEL- GROIN;  Surgeon: Davon Valera M.D.;  Location: SURGERY [facility];  Service: General    WIDE EXCISION Right 10/20/2020    Procedure: WIDE EXCISION, LESION- BUTTOCKS, RADICAL MALIGNANT MELANOMA;  Surgeon: Davon Valera M.D.;  Location: SURGERY SAME [facility];  Service: General    ANTROSTOMY Bilateral 11/15/2019    Procedure: MAXILLARY ANTROSTOMY- REVISION ENDOSCOPICMOMETASONE INJECTION, PROPEL STENT PLACEMENT;  Surgeon: Felicitas Tenorio M.D.;  Location: [facility];  Service: Ent     SINUSCOPY Bilateral 11/15/2019    Procedure: ENDOSCOPY, PARANASAL SINUSES- FOR FRONTAL EXPLORATION;  Surgeon: Felicitas Tenorio M.D.;  Location: [facility];  Service: Ent    TURBINOPLASTY Bilateral 11/15/2019    Procedure: TURBINOPLASTY;  Surgeon: Felicitas Tenorio M.D.;  Location: [facility];  Service: Ent    SPHENOIDECTOMY Bilateral 11/15/2019    Procedure: SPHENOIDECTOMY- FOR SPHENOIDOTOMY;  Surgeon: Felicitas Tenorio M.D.;  Location: SURGERY [facility];  Service: Ent    ETHMOIDECTOMY Bilateral 11/15/2019    Procedure: ETHMOIDECTOMY- ENDOSCOPIC;  Surgeon: Felicitas Tenorio M.D.;  Location: [facility];  Service: Ent    NASAL POLYPECTOMY Bilateral 11/15/2019    Procedure: POLYPECTOMY, NASAL CAVITY;  Surgeon: Felicitas Tenorio M.D.;  Location: [facility];  Service: Ent    NASAL POLYPECTOMY  2015, 2017, 2019    x 3    OTHER  11/20    removed melanoma     No family history on file.  Social History     Tobacco Use    Smoking status: Never    Smokeless tobacco: Never   Substance Use Topics    Alcohol use: Yes     Alcohol/week: 0.6 oz     Types: 1 Cans of beer per week     Comment: reports 4/month       Problems:   Patient Active Problem List   Diagnosis    Hypertrophy of nasal turbinates    Mild intermittent asthma without complication    Melanoma of buttock (HCC)    Moderate persistent asthma without complication    Chronic pansinusitis    Bilateral tinnitus    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)       Medications:   Current Outpatient Medications on File Prior to Visit   Medication Sig Dispense Refill    ibuprofen (MOTRIN) 200 MG Tab Take 200 mg by mouth 2 times a day as needed for Mild Pain.      Calcium-Phosphorus-Vitamin D (CALCIUM/VITAMIN D3/ADULT GUMMY PO) Take 1 Tablet by mouth every day.      NON SPECIFIED Administer 1 Vial into affected nostril(S) every day. Budesenide nasal wash  daily      fluticasone-salmeterol (ADVAIR)  "250-50 MCG/ACT AEROSOL POWDER, BREATH ACTIVATED Inhale 1 Puff 2 times a day. 60 Each 3    Multiple Vitamins-Minerals (MULTIVITAMIN ADULT PO) Take 1 Tablet by mouth every day.       No current facility-administered medications on file prior to visit.        Objective:     /68 (BP Location: Left arm, Patient Position: Sitting, BP Cuff Size: Large adult)   Pulse 86   Temp 36.2 °C (97.1 °F)   Resp 16   Ht 1.803 m (5' 11\")   Wt 78 kg (172 lb)   SpO2 97%   BMI 23.99 kg/m²     Physical Exam  Vitals reviewed.   Constitutional:       General: He is not in acute distress.     Appearance: Normal appearance. He is normal weight. He is not ill-appearing.   Cardiovascular:      Rate and Rhythm: Normal rate and regular rhythm.      Pulses: Normal pulses.      Heart sounds: Normal heart sounds.   Pulmonary:      Effort: Pulmonary effort is normal.      Breath sounds: Normal breath sounds.   Musculoskeletal:        Arms:    Skin:     General: Skin is warm.      Capillary Refill: Capillary refill takes less than 2 seconds.   Neurological:      Mental Status: He is alert and oriented to person, place, and time.   Psychiatric:         Mood and Affect: Mood normal.         Behavior: Behavior normal.         Thought Content: Thought content normal.         Assessment /Associated Orders:      1. Acute pain of left shoulder  DX-SHOULDER 2+ LEFT      2. 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)  DX-SHOULDER 2+ LEFT      3. Myofascial muscle pain            Medical Decision Making:    Sebastian is a very pleasant 58 y.o. male who is clinically stable at today's acute urgent care visit.  No acute distress noted.  VSS. Appropriate for outpatient care at this time.   Acute problem today with uncertain prognosis.  Etiology of his pain is most likely due to laying on his left side due to a procedure that was done in his right side for melanoma.  I recommend that he try to lay on his back or sit in an upright position to try to " sleep and reduce the stress on his left shoulder.  His x-ray was normal today and reassuring.  OTC analgesic topical muscle/ joint cream prn pain. Dosage and directions per   OTC  analgesic of choice (acetaminophen or NSAID) prn pain. Follow manufactures dosing and safety precautions.   Gentle rom exercises prn     Discussed Dx, management options (risks,benefits, and alternatives to planned treatment), natural progression and supportive care.  Expressed understanding and the treatment plan was agreed upon.   Questions were encouraged and answered   Return to urgent care prn if new or worsening sx or if there is no improvement in condition prn.    Educated in Red flags and indications to immediately call 911 or present to the Emergency Department.       Time I spent evaluating Andrew Wall in urgent care today was 30  minutes. This time includes preparing for visit, reviewing any pertinent notes or test results, counseling/education, exam, obtaining HPI, interpretation of lab tests, medication management and documentation as indicated above.Time does not include separately billable procedures noted .       Please note that this dictation was created using voice recognition software. I have worked with consultants from the vendor as well as technical experts from [facility] to optimize the interface. I have made every reasonable attempt to correct obvious errors, but I expect that there are errors of grammar and possibly content that I did not discover before finalizing the note.  This note was electronically signed by provider

## 2023-12-06 NOTE — PROCEDURE: ADDITIONAL NOTES
Render Risk Assessment In Note?: no
Additional Notes: 2023 positive lymph node removed in the right inguinal fold.  PET scan only showed activity in the lymph node that was removed.   Restarting treatment with Dr. Mosher
Detail Level: Simple

## 2023-12-06 NOTE — PROCEDURE: MIPS QUALITY
Quality 138: Melanoma: Coordination Of Care: A treatment plan was communicated to the physicians providing continuing care within one month of diagnosis outlining: diagnosis, tumor thickness and a plan for surgery or alternate care.
Quality 137: Melanoma: Continuity Of Care - Recall System: Patient information entered into a recall system that includes: target date for the next exam specified AND a process to follow up with patients regarding missed or unscheduled appointments
Quality 397: Melanoma: Reporting: Pathology report includes the pT Category, thickness, ulceration and mitotic rate, peripheral and deep margin status and presence or absence of microsatellitosis for invasive tumors.
Quality 226: Preventive Care And Screening: Tobacco Use: Screening And Cessation Intervention: Patient screened for tobacco use and is an ex/non-smoker
Quality 130: Documentation Of Current Medications In The Medical Record: Current Medications Documented
Detail Level: Detailed

## 2023-12-09 ENCOUNTER — OFFICE VISIT (OUTPATIENT)
Dept: URGENT CARE | Facility: CLINIC | Age: 58
End: 2023-12-09
Payer: COMMERCIAL

## 2023-12-09 VITALS
SYSTOLIC BLOOD PRESSURE: 118 MMHG | OXYGEN SATURATION: 98 % | TEMPERATURE: 97 F | DIASTOLIC BLOOD PRESSURE: 70 MMHG | BODY MASS INDEX: 23.8 KG/M2 | WEIGHT: 170 LBS | HEIGHT: 71 IN | RESPIRATION RATE: 18 BRPM | HEART RATE: 82 BPM

## 2023-12-09 DIAGNOSIS — R11.2 NAUSEA AND VOMITING, UNSPECIFIED VOMITING TYPE: ICD-10-CM

## 2023-12-09 DIAGNOSIS — R19.7 DIARRHEA, UNSPECIFIED TYPE: ICD-10-CM

## 2023-12-09 DIAGNOSIS — M25.512 ACUTE PAIN OF LEFT SHOULDER: ICD-10-CM

## 2023-12-09 LAB
FLUAV RNA SPEC QL NAA+PROBE: NEGATIVE
FLUBV RNA SPEC QL NAA+PROBE: NEGATIVE
RSV RNA SPEC QL NAA+PROBE: NEGATIVE
SARS-COV-2 RNA RESP QL NAA+PROBE: NEGATIVE

## 2023-12-09 PROCEDURE — 99213 OFFICE O/P EST LOW 20 MIN: CPT | Performed by: NURSE PRACTITIONER

## 2023-12-09 PROCEDURE — 0241U POCT CEPHEID COV-2, FLU A/B, RSV - PCR: CPT | Performed by: NURSE PRACTITIONER

## 2023-12-09 PROCEDURE — 3074F SYST BP LT 130 MM HG: CPT | Performed by: NURSE PRACTITIONER

## 2023-12-09 PROCEDURE — 3078F DIAST BP <80 MM HG: CPT | Performed by: NURSE PRACTITIONER

## 2023-12-09 RX ORDER — KETOROLAC TROMETHAMINE 30 MG/ML
30 INJECTION, SOLUTION INTRAMUSCULAR; INTRAVENOUS ONCE
Status: COMPLETED | OUTPATIENT
Start: 2023-12-09 | End: 2023-12-09

## 2023-12-09 RX ORDER — NAPROXEN 500 MG/1
500 TABLET ORAL 2 TIMES DAILY WITH MEALS
Qty: 28 TABLET | Refills: 0 | Status: SHIPPED | OUTPATIENT
Start: 2023-12-09 | End: 2023-12-23

## 2023-12-09 RX ORDER — ONDANSETRON 4 MG/1
4 TABLET, ORALLY DISINTEGRATING ORAL EVERY 6 HOURS PRN
Qty: 10 TABLET | Refills: 0 | Status: SHIPPED | OUTPATIENT
Start: 2023-12-09 | End: 2024-01-04 | Stop reason: SDUPTHER

## 2023-12-09 RX ORDER — BUDESONIDE 0.25 MG/2ML
250 INHALANT ORAL
COMMUNITY
End: 2024-03-09

## 2023-12-09 RX ORDER — ONDANSETRON 4 MG/1
4 TABLET, ORALLY DISINTEGRATING ORAL ONCE
Status: COMPLETED | OUTPATIENT
Start: 2023-12-09 | End: 2023-12-09

## 2023-12-09 RX ADMIN — ONDANSETRON 4 MG: 4 TABLET, ORALLY DISINTEGRATING ORAL at 09:55

## 2023-12-09 RX ADMIN — KETOROLAC TROMETHAMINE 30 MG: 30 INJECTION, SOLUTION INTRAMUSCULAR; INTRAVENOUS at 09:54

## 2023-12-09 ASSESSMENT — FIBROSIS 4 INDEX: FIB4 SCORE: 0.98

## 2023-12-09 ASSESSMENT — ENCOUNTER SYMPTOMS
PALPITATIONS: 0
BLOOD IN STOOL: 0
ABDOMINAL PAIN: 0
NUMBNESS: 0
VOMITING: 1
DIARRHEA: 1
CONSTIPATION: 0
NUMBER OF EPISODES OF EMESIS TODAY: 1
SORE THROAT: 0
COUGH: 0
FEVER: 0

## 2023-12-09 NOTE — PROGRESS NOTES
Subjective:     Andrew Wall is a 58 y.o. male who presents for Emesis (X 1 day, vomiting, diarrhea. Cancer patient.) and Shoulder Pain (X 3 days, RT shoulder pain.)      N/V/D started last night. Denies chest pain, abdominal pain, or fever. Not taking any new medications. Denies URI symptoms.      Had been previously seen for left shoulder joint pain that started on Tuesday. No muscle spasms. Pain 0/10 at rest, 8/10 with ROM. Has been unable to take OTC pain medication due to the vomiting. Unable to sleep at night due to the pain. Does have oxycodone at home from his recent surgery.     Emesis  This is a new problem. The current episode started yesterday. Associated symptoms include vomiting. Pertinent negatives include no abdominal pain, chest pain, coughing, fever, numbness or sore throat.   Shoulder Pain  Associated symptoms include vomiting. Pertinent negatives include no abdominal pain, chest pain, coughing, fever, numbness or sore throat.       Past Medical History:   Diagnosis Date    Asthma     Cancer (HCC) 10/20    melanoma    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) 11/16/2023    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) 11/16/2023    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)     Nasal polyp        Past Surgical History:   Procedure Laterality Date    LYMPH NODE EXCISION Right 11/16/2023    Procedure: RIGHT INGUINAL NODE SUPERFICIAL DISSECTION;  Surgeon: Davon Valera M.D.;  Location: SURGERY [facility];  Service: General    NODE BIOPSY SENTINEL Bilateral 10/20/2020    Procedure: BIOPSY, LYMPH NODE, SENTINEL- GROIN;  Surgeon: Davon Valera M.D.;  Location: SURGERY SAME DAY [facility];  Service: General    WIDE EXCISION Right 10/20/2020    Procedure: WIDE EXCISION, LESION- BUTTOCKS, RADICAL MALIGNANT MELANOMA;  Surgeon: Davon Valera M.D.;  Location: SURGERY SAME DAY [facility];  Service: General    ANTROSTOMY Bilateral 11/15/2019    Procedure: MAXILLARY ANTROSTOMY- REVISION  ENDOSCOPICMOMETASONE INJECTION, PROPEL STENT PLACEMENT;  Surgeon: Felicitas Tenorio M.D.;  Location: SURGERY [facility];  Service: Ent    SINUSCOPY Bilateral 11/15/2019    Procedure: ENDOSCOPY, PARANASAL SINUSES- FOR FRONTAL EXPLORATION;  Surgeon: Felicitas Tenorio M.D.;  Location: [facility];  Service: Ent    TURBINOPLASTY Bilateral 11/15/2019    Procedure: TURBINOPLASTY;  Surgeon: Felicitas Tenorio M.D.;  Location: [facility];  Service: Ent    SPHENOIDECTOMY Bilateral 11/15/2019    Procedure: SPHENOIDECTOMY- FOR SPHENOIDOTOMY;  Surgeon: Felicitas Tenorio M.D.;  Location: [facility];  Service: Ent    ETHMOIDECTOMY Bilateral 11/15/2019    Procedure: ETHMOIDECTOMY- ENDOSCOPIC;  Surgeon: Felicitas Tenorio M.D.;  Location: [facility];  Service: Ent    NASAL POLYPECTOMY Bilateral 11/15/2019    Procedure: POLYPECTOMY, NASAL CAVITY;  Surgeon: Felicitas Tenorio M.D.;  Location: [facility];  Service: Ent    NASAL POLYPECTOMY  2015, 2017, 2019    x 3    OTHER  11/20    removed melanoma       Social History     Socioeconomic History    Marital status:      Spouse name: Not on file    Number of children: Not on file    Years of education: Not on file    Highest education level: Not on file   Occupational History    Not on file   Tobacco Use    Smoking status: Never    Smokeless tobacco: Never   Vaping Use    Vaping Use: Never used   Substance and Sexual Activity    Alcohol use: Yes     Alcohol/week: 0.6 oz     Types: 1 Cans of beer per week     Comment: reports 4/month    Drug use: No    Sexual activity: Yes     Partners: Female     Comment: used to manage semiconductor factory   Other Topics Concern    Not on file   Social History Narrative    Not on file     Social Determinants of Health     Financial Resource Strain: Not on file   Food Insecurity: Not on file   Transportation Needs: Not on file   Physical Activity: Not on file  "  Stress: Not on file   Social Connections: Not on file   Intimate Partner Violence: Not on file   Housing Stability: Not on file        History reviewed. No pertinent family history.     Allergies   Allergen Reactions    Augmentin Vomiting and Nausea       Review of Systems   Constitutional:  Negative for fever.   HENT:  Negative for sore throat.    Respiratory:  Negative for cough.    Cardiovascular:  Negative for chest pain and palpitations.   Gastrointestinal:  Positive for diarrhea and vomiting. Negative for abdominal pain, blood in stool and constipation.   Musculoskeletal:  Positive for joint pain.   Neurological:  Negative for numbness.   All other systems reviewed and are negative.       Objective:   /70   Pulse 82   Temp 36.1 °C (97 °F) (Temporal)   Resp 18   Ht 1.803 m (5' 11\")   Wt 77.1 kg (170 lb)   SpO2 98%   BMI 23.71 kg/m²     Physical Exam  Vitals reviewed.   Constitutional:       General: He is not in acute distress.     Appearance: He is ill-appearing. He is not toxic-appearing.   Cardiovascular:      Rate and Rhythm: Normal rate and regular rhythm.      Pulses:           Radial pulses are 2+ on the left side.   Pulmonary:      Effort: Pulmonary effort is normal. No respiratory distress.      Breath sounds: Normal breath sounds. No stridor. No wheezing, rhonchi or rales.   Abdominal:      General: Bowel sounds are normal.      Palpations: Abdomen is soft.      Tenderness: There is no abdominal tenderness. There is no guarding.   Musculoskeletal:         General: Tenderness present.      Left shoulder: Tenderness present. No crepitus. Decreased range of motion. Normal pulse.      Comments: Guarded to ROM. Anterior left shoulder TTP. Noted to be holding shoulder still, in position of comfort.    Skin:     General: Skin is warm and dry.   Neurological:      Mental Status: He is alert and oriented to person, place, and time.         Assessment/Plan:   1. Nausea and vomiting, unspecified " vomiting type  - ondansetron (Zofran ODT) dispertab 4 mg  - POCT CEPHEID COV-2, FLU A/B, RSV - PCR  - ondansetron (ZOFRAN ODT) 4 MG TABLET DISPERSIBLE; Take 1 Tablet by mouth every 6 hours as needed for Nausea/Vomiting.  Dispense: 10 Tablet; Refill: 0    2. Diarrhea, unspecified type    3. Acute pain of left shoulder  - ketorolac (Toradol) injection 30 mg  - naproxen (NAPROSYN) 500 MG Tab; Take 1 Tablet by mouth 2 times a day with meals for 14 days.  Dispense: 28 Tablet; Refill: 0  - Referral to Orthopedics    Vomiting & Diarrhea:  -Rest.   -Clear liquid diet, advance slowly to bland diet, nothing greasy or spicy.   -Follow up with your Primary Care Provider.  -Return to the clinic or your primary care if not better in a couple days.  -Go to the ER if your symptoms worsen.     Follow up emergently if unable to tolerate fluids, severe or worsening abdominal pain, persistent fevers, blood in stool, weakness, elevated heart rate, chest pain.     Shoulder:  -Can also take Tylenol for pain.   -RICE Therapy: Rest, Ice  -Rest includes avoiding overhead reaching, reaching across the chest, lifting, leaning on the elbows, and sleeping directly on the shoulder.  -Gentle range of motion exercises and stretches as tolerated.  -Follow up with Orthopedic specialist if you experience sharp or tearing pain while stretching, and discontinue exercises.    Follow up emergently for severe uncontrolled pain, neurovascular compromise (decreased sensation, motion, or circulation).    -Presents with acute N/V/D since last night. No abdominal pain. Discussed likely viral gastroenteritis. Advised monitoring with f/u for persistent or worsening symptoms. Left shoulder pain with ROM and palpation to shoulder joint, has musculoskeletal correlation. Previous x-ray imaging was negative. Advised continued pain management, RICE therapy, with orthopedic f/u for persistent or worsening symptoms.     Differential diagnosis, natural history, supportive  care, and indications for immediate follow-up discussed.

## 2023-12-09 NOTE — PATIENT INSTRUCTIONS
Vomiting & Diarrhea:  -Rest.   -Clear liquid diet, advance slowly to bland diet, nothing greasy or spicy.   -Follow up with your Primary Care Provider.  -Return to the clinic or your primary care if not better in a couple days.  -Go to the ER if your symptoms worsen.     Follow up emergently if unable to tolerate fluids, severe or worsening abdominal pain, persistent fevers, blood in stool, weakness, elevated heart rate, chest pain.     Shoulder:  -Can also take Tylenol for pain.   -RICE Therapy: Rest, Ice  -Rest includes avoiding overhead reaching, reaching across the chest, lifting, leaning on the elbows, and sleeping directly on the shoulder.  -Gentle range of motion exercises and stretches as tolerated.  -Follow up with Orthopedic specialist if you experience sharp or tearing pain while stretching, and discontinue exercises.    Follow up emergently for severe uncontrolled pain, neurovascular compromise (decreased sensation, motion, or circulation).

## 2024-01-03 ENCOUNTER — HOSPITAL ENCOUNTER (OUTPATIENT)
Facility: MEDICAL CENTER | Age: 59
End: 2024-01-03
Payer: COMMERCIAL

## 2024-01-03 LAB
HBV CORE AB SERPL QL IA: NONREACTIVE
HBV SURFACE AB SERPL IA-ACNC: 39.8 MIU/ML (ref 0–10)
HBV SURFACE AG SER QL: NORMAL
HCV AB SER QL: NORMAL
TSH SERPL DL<=0.005 MIU/L-ACNC: 3.54 UIU/ML (ref 0.38–5.33)

## 2024-01-03 PROCEDURE — 87340 HEPATITIS B SURFACE AG IA: CPT

## 2024-01-03 PROCEDURE — 86706 HEP B SURFACE ANTIBODY: CPT

## 2024-01-03 PROCEDURE — 86803 HEPATITIS C AB TEST: CPT

## 2024-01-03 PROCEDURE — 86704 HEP B CORE ANTIBODY TOTAL: CPT

## 2024-01-03 PROCEDURE — 84443 ASSAY THYROID STIM HORMONE: CPT

## 2024-01-04 ENCOUNTER — OFFICE VISIT (OUTPATIENT)
Dept: MEDICAL GROUP | Facility: LAB | Age: 59
End: 2024-01-04
Payer: COMMERCIAL

## 2024-01-04 VITALS
BODY MASS INDEX: 22.73 KG/M2 | OXYGEN SATURATION: 96 % | RESPIRATION RATE: 16 BRPM | HEART RATE: 90 BPM | TEMPERATURE: 97.5 F | DIASTOLIC BLOOD PRESSURE: 64 MMHG | WEIGHT: 163 LBS | SYSTOLIC BLOOD PRESSURE: 110 MMHG

## 2024-01-04 DIAGNOSIS — M67.912 TENDINOPATHY OF LEFT SHOULDER: ICD-10-CM

## 2024-01-04 DIAGNOSIS — R11.2 NAUSEA AND VOMITING, UNSPECIFIED VOMITING TYPE: ICD-10-CM

## 2024-01-04 DIAGNOSIS — M54.31 SCIATICA OF RIGHT SIDE: ICD-10-CM

## 2024-01-04 DIAGNOSIS — C43.59 MELANOMA OF BUTTOCK (HCC): ICD-10-CM

## 2024-01-04 PROCEDURE — 3078F DIAST BP <80 MM HG: CPT | Performed by: FAMILY MEDICINE

## 2024-01-04 PROCEDURE — 99214 OFFICE O/P EST MOD 30 MIN: CPT | Performed by: FAMILY MEDICINE

## 2024-01-04 PROCEDURE — 3074F SYST BP LT 130 MM HG: CPT | Performed by: FAMILY MEDICINE

## 2024-01-04 RX ORDER — ONDANSETRON 4 MG/1
4 TABLET, ORALLY DISINTEGRATING ORAL EVERY 6 HOURS PRN
Qty: 20 TABLET | Refills: 3 | Status: SHIPPED | OUTPATIENT
Start: 2024-01-04 | End: 2024-02-05 | Stop reason: SDUPTHER

## 2024-01-04 RX ORDER — GABAPENTIN 100 MG/1
CAPSULE ORAL
Qty: 111 CAPSULE | Refills: 1 | Status: SHIPPED | OUTPATIENT
Start: 2024-01-04 | End: 2024-02-17

## 2024-01-04 RX ORDER — OXYCODONE AND ACETAMINOPHEN 10; 325 MG/1; MG/1
1 TABLET ORAL EVERY 8 HOURS PRN
Qty: 15 TABLET | Refills: 0 | Status: ON HOLD | OUTPATIENT
Start: 2024-01-04 | End: 2024-01-28

## 2024-01-04 ASSESSMENT — FIBROSIS 4 INDEX: FIB4 SCORE: 0.98

## 2024-01-04 ASSESSMENT — ENCOUNTER SYMPTOMS
HEADACHES: 0
DIZZINESS: 0
FALLS: 0
BACK PAIN: 0

## 2024-01-04 NOTE — PROGRESS NOTES
Subjective:   Andrew Wall is a 58 y.o. male here today for   Chief Complaint   Patient presents with    Shoulder Pain       Patient presenting with ongoing shoulder pain as well as new onset sciatica.    #Left shoulder pain   -Patient previously injured shoulder back in August. Since then he has had chronic, persistent, severe left shoulder pain.  Has completed 1 round of physical therapy.  Patient states it helped in the moment but the following night was left and worse pain.  He has been seen by orthopedics and following up with MRI in 1 to 2 weeks.  Denies any numbness, ting, weakness in left arm.  -Pain in shoulder (with concomitant sciatic pain as below) has caused significant pain to the point where he is constantly nauseous and throwing up.  He has had weight loss because of this.  Is treating with Zofran.    #Sciatica   -On going pain which she describes as a severe, persistent, sharp pain in his right gluteal, radiating down the lateral aspect of the leg.  -States the pain is present continuously.  It is worse no matter what the position either sitting, standing, or laying.  He does state it is worse when laying for long periods of time.  -Treating with heat, lidocaine patch, ibuprofen several times a day  -Patient denies any significant back pain.  Denies any acute trauma.  -Patient does have a history of malignant melanoma with metastatic lymph nodes.  [facility]  Is planning on restarting chemotherapy.    Allergies   Allergen Reactions    Augmentin Vomiting and Nausea         Current medicines (including changes today)  Current Outpatient Medications   Medication Sig Dispense Refill    HYDROcodone-acetaminophen (NORCO) 5-325 MG Tab per tablet TAKE 1 TABLET BY MOUTH EVERY FOUR HOURS AS NEEDED FOR SEVERE PAIN FOR UP TO 3 DAYS.      doxycycline (VIBRAMYCIN) 100 MG Tab Take 1 Tablet by mouth 2 times a day.      methylPREDNISolone (MEDROL DOSEPAK) 4 MG Tablet Therapy  Pack Follow schedule on package instructions. 21 Tablet 0    budesonide (PULMICORT) 0.25 MG/2ML Suspension Take 250 mcg by nebulization 2 times a day.      NON SPECIFIED Wixela      ondansetron (ZOFRAN ODT) 4 MG TABLET DISPERSIBLE Take 1 Tablet by mouth every 6 hours as needed for Nausea/Vomiting. 10 Tablet 0    ibuprofen (MOTRIN) 200 MG Tab Take 200 mg by mouth 2 times a day as needed for Mild Pain.      Calcium-Phosphorus-Vitamin D (CALCIUM/VITAMIN D3/ADULT GUMMY PO) Take 1 Tablet by mouth every day.      NON SPECIFIED Administer 1 Vial into affected nostril(S) every day. Budesenide nasal wash  daily      fluticasone-salmeterol (ADVAIR) 250-50 MCG/ACT AEROSOL POWDER, BREATH ACTIVATED Inhale 1 Puff 2 times a day. 60 Each 3    Multiple Vitamins-Minerals (MULTIVITAMIN ADULT PO) Take 1 Tablet by mouth every day.       No current facility-administered medications for this visit.     He  has a past medical history of Asthma, Cancer ([facility]) (10/20), Malignant melanoma metastatic to lymph node ([facility]) (11/16/2023), Malignant melanoma metastatic to lymph node ([facility]) (11/16/2023), Malignant melanoma of skin of buttock ([facility]), and Nasal polyp.    He has no past medical history of Acute nasopharyngitis, Anesthesia, Anginal syndrome ([facility]), Arrhythmia, Arthritis, Blood clotting disorder ([facility]), Bowel habit changes, Breath shortness, Bronchitis, Carcinoma in situ of respiratory system, Cataract, Congestive heart failure ([facility]), Continuous ambulatory peritoneal dialysis status ([facility]), Coughing blood, Dental disorder, Diabetes ([facility]), Dialysis patient ([facility]), Disorder of thyroid, Emphysema of lung ([facility]), Glaucoma, Gynecological disorder, Heart burn, Heart murmur, Heart valve disease, Hemorrhagic disorder ([facility]), Hepatitis A, Hepatitis B, Hepatitis C, Hiatus hernia syndrome, High cholesterol, Hyperlipidemia, Indigestion, Infectious disease, Jaundice, Myocardial infarct ([facility]), Pacemaker, Pain, Pneumonia, Pregnant, Psychiatric problem, Renal  disorder, Rheumatic fever, Seizure (HCC), Sleep apnea, Snoring, Stroke (HCC), Tuberculosis, Urinary bladder disorder, or Urinary incontinence.    ROS   Review of Systems   Musculoskeletal:  Negative for back pain, falls and joint pain.   Neurological:  Negative for dizziness and headaches.      Objective:     Physical Exam:  /64   Pulse 90   Temp 36.4 °C (97.5 °F) (Temporal)   Resp 16   Wt 73.9 kg (163 lb)   SpO2 96%  Body mass index is 22.73 kg/m².   Physical examination limited due to severe pain causing difficulty with any type of movement.  Constitutional: Alert, no distress, well-groomed.  Skin: No rashes in visible areas.  Eye: Round. Conjunctiva clear, lids normal. No icterus.   ENMT: Lips pink without lesions, good dentition, moist mucous membranes. Phonation normal.  Neck: No masses, no thyromegaly. Moves freely without pain.  Respiratory: Unlabored respiratory effort, no cough or audible wheeze  Psych: Alert and oriented x3, normal affect and mood.    Assessment and Plan:     1. Tendinopathy of left shoulder  -Patient does have MRI scheduled for the 16th of this month.  I am concerned about complete tear of rotator cuff given that he is having difficulty moving arm without any type of pain.  I do encourage patient to follow-up with physical therapy, follow-up with orthopedics.  We will augment therapy at this time.  Will begin Zofran to help with the nausea secondary to the pain.  We did discuss keeping ibuprofen under 2.4 g a day.  Will augment therapy with Percocet to be used on strict as needed basis.  Discussed appropriate, potential side effects.  Patient will follow-up with orthopedist for any further evaluation.  -Obtained and reviewed patient utilization report from [facility] pharmacy database on 01/04/23 to writing prescription for controlled substance II, III or IV per [place] bill . Based on assessment of the report,my physical exam if necessary and the patient's health  problem, the prescription is medically necessary..  - ondansetron (ZOFRAN ODT) 4 MG TABLET DISPERSIBLE; Take 1 Tablet by mouth every 6 hours as needed for Nausea/Vomiting.  Dispense: 20 Tablet; Refill: 3  - oxyCODONE-acetaminophen (PERCOCET-10)  MG Tab; Take 1 Tablet by mouth every 8 hours as needed for Severe Pain for up to 30 days.  Dispense: 15 Tablet; Refill: 0    2. Sciatica of right side  -New problem, uncertain prognosis.  Uncertain as to what is causing the sciatica-like pain.  Patient not experiencing any back pain but I am concerned about lumbar etiology especially given history of melanoma.  We will go ahead and begin treatment of radicular symptoms with gabapentin.  We will titrate up from once a day to twice a day to 3 times a day.  Can continue seeing ibuprofen as well.  Percocet was ordered to be used on a strict as needed basis (see above).  -Given history of melanoma with acute onset of sciatica we will complete a stat MRI to rule out any malignant etiology.  -Strict return precautions were given, patient will follow-up as needed.  - gabapentin (NEURONTIN) 100 MG Cap; Take 1 Capsule by mouth at bedtime for 7 days, THEN 1 Capsule 2 times a day for 7 days, THEN 1 Capsule 3 times a day for 30 days.  Dispense: 111 Capsule; Refill: 1  - oxyCODONE-acetaminophen (PERCOCET-10)  MG Tab; Take 1 Tablet by mouth every 8 hours as needed for Severe Pain for up to 30 days.  Dispense: 15 Tablet; Refill: 0  - MR-LUMBAR SPINE-WITH; Future    3. Melanoma of buttock (HCC)  -See #2.  - MR-LUMBAR SPINE-WITH; Future    4. Nausea and vomiting, unspecified vomiting type  -Secondary to pain.  We will write prescription for Zofran to help with symptom management.  - ondansetron (ZOFRAN ODT) 4 MG TABLET DISPERSIBLE; Take 1 Tablet by mouth every 6 hours as needed for Nausea/Vomiting.  Dispense: 20 Tablet; Refill: 3      Followup: No follow-ups on file.         PLEASE NOTE: This dictation was created using voice  recognition software. I have made every reasonable attempt to correct obvious errors, but I expect that there are errors of grammar and possibly content that I did not discover before finalizing the note.

## 2024-01-08 ENCOUNTER — HOSPITAL ENCOUNTER (OUTPATIENT)
Dept: RADIOLOGY | Facility: MEDICAL CENTER | Age: 59
End: 2024-01-08
Attending: FAMILY MEDICINE
Payer: COMMERCIAL

## 2024-01-08 DIAGNOSIS — M54.31 SCIATICA OF RIGHT SIDE: ICD-10-CM

## 2024-01-08 DIAGNOSIS — C43.59 MELANOMA OF BUTTOCK (HCC): ICD-10-CM

## 2024-01-08 PROCEDURE — A9579 GAD-BASE MR CONTRAST NOS,1ML: HCPCS | Performed by: FAMILY MEDICINE

## 2024-01-08 PROCEDURE — 72158 MRI LUMBAR SPINE W/O & W/DYE: CPT

## 2024-01-08 PROCEDURE — 700117 HCHG RX CONTRAST REV CODE 255: Performed by: FAMILY MEDICINE

## 2024-01-08 RX ADMIN — GADOTERIDOL 15 ML: 279.3 INJECTION, SOLUTION INTRAVENOUS at 18:00

## 2024-01-11 ENCOUNTER — HOSPITAL ENCOUNTER (INPATIENT)
Facility: MEDICAL CENTER | Age: 59
LOS: 4 days | DRG: 543 | End: 2024-01-15
Attending: EMERGENCY MEDICINE | Admitting: HOSPITALIST
Payer: COMMERCIAL

## 2024-01-11 ENCOUNTER — HOSPITAL ENCOUNTER (OUTPATIENT)
Dept: LAB | Facility: MEDICAL CENTER | Age: 59
End: 2024-01-11
Attending: FAMILY MEDICINE
Payer: COMMERCIAL

## 2024-01-11 ENCOUNTER — TELEPHONE (OUTPATIENT)
Dept: MEDICAL GROUP | Facility: LAB | Age: 59
End: 2024-01-11

## 2024-01-11 ENCOUNTER — OFFICE VISIT (OUTPATIENT)
Dept: MEDICAL GROUP | Facility: LAB | Age: 59
End: 2024-01-11
Payer: COMMERCIAL

## 2024-01-11 VITALS
DIASTOLIC BLOOD PRESSURE: 60 MMHG | SYSTOLIC BLOOD PRESSURE: 88 MMHG | OXYGEN SATURATION: 97 % | RESPIRATION RATE: 16 BRPM | HEART RATE: 117 BPM

## 2024-01-11 DIAGNOSIS — R74.8 ELEVATED ALKALINE PHOSPHATASE LEVEL: ICD-10-CM

## 2024-01-11 DIAGNOSIS — C79.9 METASTASIS FROM MALIGNANT MELANOMA OF SKIN (HCC): ICD-10-CM

## 2024-01-11 DIAGNOSIS — M54.42 CHRONIC BILATERAL LOW BACK PAIN WITH BILATERAL SCIATICA: ICD-10-CM
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
## 2024-01-11 DIAGNOSIS — C77.9 MALIGNANT MELANOMA METASTATIC TO LYMPH NODE (HCC): ICD-10-CM

## 2024-01-11 DIAGNOSIS — C43.9 METASTATIC MELANOMA (HCC): ICD-10-CM

## 2024-01-11 DIAGNOSIS — C43.9 METASTASIS FROM MALIGNANT MELANOMA OF SKIN (HCC): ICD-10-CM

## 2024-01-11 DIAGNOSIS — R53.81 PHYSICAL DECONDITIONING: ICD-10-CM

## 2024-01-11 DIAGNOSIS — R74.01 ELEVATED AST (SGOT): ICD-10-CM

## 2024-01-11 DIAGNOSIS — E83.52 HYPERCALCEMIA: ICD-10-CM

## 2024-01-11 DIAGNOSIS — G89.29 CHRONIC BILATERAL LOW BACK PAIN WITH BILATERAL SCIATICA: ICD-10-CM

## 2024-01-11 DIAGNOSIS — M54.41 CHRONIC BILATERAL LOW BACK PAIN WITH BILATERAL SCIATICA: ICD-10-CM

## 2024-01-11 LAB
ALBUMIN SERPL BCP-MCNC: 3.4 G/DL (ref 3.2–4.9)
ALBUMIN/GLOB SERPL: 1.2 G/DL
ALP SERPL-CCNC: 447 U/L (ref 30–99)
ALT SERPL-CCNC: 50 U/L (ref 2–50)
ANION GAP SERPL CALC-SCNC: 14 MMOL/L (ref 7–16)
AST SERPL-CCNC: 69 U/L (ref 12–45)
BASOPHILS # BLD AUTO: 1 % (ref 0–1.8)
BASOPHILS # BLD: 0.08 K/UL (ref 0–0.12)
BILIRUB SERPL-MCNC: 0.5 MG/DL (ref 0.1–1.5)
BUN SERPL-MCNC: 24 MG/DL (ref 8–22)
CA-I SERPL-SCNC: 1.46 MMOL/L (ref 1.1–1.3)
CALCIUM ALBUM COR SERPL-MCNC: 12.2 MG/DL (ref 8.5–10.5)
CALCIUM SERPL-MCNC: 11.7 MG/DL (ref 8.5–10.5)
CHLORIDE SERPL-SCNC: 93 MMOL/L (ref 96–112)
CO2 SERPL-SCNC: 29 MMOL/L (ref 20–33)
CREAT SERPL-MCNC: 0.92 MG/DL (ref 0.5–1.4)
EKG IMPRESSION: NORMAL
EOSINOPHIL # BLD AUTO: 0 K/UL (ref 0–0.51)
EOSINOPHIL NFR BLD: 0 % (ref 0–6.9)
ERYTHROCYTE [DISTWIDTH] IN BLOOD BY AUTOMATED COUNT: 38.5 FL (ref 35.9–50)
GFR SERPLBLD CREATININE-BSD FMLA CKD-EPI: 95 ML/MIN/1.73 M 2
GLOBULIN SER CALC-MCNC: 2.9 G/DL (ref 1.9–3.5)
GLUCOSE SERPL-MCNC: 131 MG/DL (ref 65–99)
HCT VFR BLD AUTO: 29.2 % (ref 42–52)
HGB BLD-MCNC: 10.1 G/DL (ref 14–18)
LYMPHOCYTES # BLD AUTO: 1.04 K/UL (ref 1–4.8)
LYMPHOCYTES NFR BLD: 13 % (ref 22–41)
MAGNESIUM SERPL-MCNC: 1.7 MG/DL (ref 1.5–2.5)
MANUAL DIFF BLD: NORMAL
MCH RBC QN AUTO: 29.4 PG (ref 27–33)
MCHC RBC AUTO-ENTMCNC: 34.6 G/DL (ref 32.3–36.5)
MCV RBC AUTO: 84.9 FL (ref 81.4–97.8)
MONOCYTES # BLD AUTO: 0.72 K/UL (ref 0–0.85)
MONOCYTES NFR BLD AUTO: 9 % (ref 0–13.4)
NEUTROPHILS # BLD AUTO: 6.16 K/UL (ref 1.82–7.42)
NEUTROPHILS NFR BLD: 73 % (ref 44–72)
NEUTS BAND NFR BLD MANUAL: 4 % (ref 0–10)
NRBC # BLD AUTO: 0 K/UL
NRBC BLD-RTO: 0 /100 WBC (ref 0–0.2)
PLATELET # BLD AUTO: 373 K/UL (ref 164–446)
PLATELET BLD QL SMEAR: NORMAL
PMV BLD AUTO: 9.2 FL (ref 9–12.9)
POTASSIUM SERPL-SCNC: 3.6 MMOL/L (ref 3.6–5.5)
PROT SERPL-MCNC: 6.3 G/DL (ref 6–8.2)
RBC # BLD AUTO: 3.44 M/UL (ref 4.7–6.1)
RBC BLD AUTO: NORMAL
SODIUM SERPL-SCNC: 136 MMOL/L (ref 135–145)
WBC # BLD AUTO: 8 K/UL (ref 4.8–10.8)

## 2024-01-11 PROCEDURE — 93005 ELECTROCARDIOGRAM TRACING: CPT

## 2024-01-11 PROCEDURE — 700111 HCHG RX REV CODE 636 W/ 250 OVERRIDE (IP): Mod: JZ | Performed by: EMERGENCY MEDICINE

## 2024-01-11 PROCEDURE — 99223 1ST HOSP IP/OBS HIGH 75: CPT | Performed by: HOSPITALIST

## 2024-01-11 PROCEDURE — 700105 HCHG RX REV CODE 258: Performed by: EMERGENCY MEDICINE

## 2024-01-11 PROCEDURE — 83735 ASSAY OF MAGNESIUM: CPT

## 2024-01-11 PROCEDURE — 36415 COLL VENOUS BLD VENIPUNCTURE: CPT

## 2024-01-11 PROCEDURE — 93005 ELECTROCARDIOGRAM TRACING: CPT | Performed by: EMERGENCY MEDICINE

## 2024-01-11 PROCEDURE — 3074F SYST BP LT 130 MM HG: CPT | Performed by: FAMILY MEDICINE

## 2024-01-11 PROCEDURE — 85027 COMPLETE CBC AUTOMATED: CPT

## 2024-01-11 PROCEDURE — 96374 THER/PROPH/DIAG INJ IV PUSH: CPT

## 2024-01-11 PROCEDURE — 82330 ASSAY OF CALCIUM: CPT

## 2024-01-11 PROCEDURE — 94760 N-INVAS EAR/PLS OXIMETRY 1: CPT

## 2024-01-11 PROCEDURE — 80053 COMPREHEN METABOLIC PANEL: CPT

## 2024-01-11 PROCEDURE — 85007 BL SMEAR W/DIFF WBC COUNT: CPT

## 2024-01-11 PROCEDURE — 99285 EMERGENCY DEPT VISIT HI MDM: CPT

## 2024-01-11 PROCEDURE — 99214 OFFICE O/P EST MOD 30 MIN: CPT | Performed by: FAMILY MEDICINE

## 2024-01-11 PROCEDURE — 3078F DIAST BP <80 MM HG: CPT | Performed by: FAMILY MEDICINE

## 2024-01-11 PROCEDURE — 770020 HCHG ROOM/CARE - TELE (206)

## 2024-01-11 RX ORDER — HYDROCODONE BITARTRATE AND ACETAMINOPHEN 5; 325 MG/1; MG/1
2 TABLET ORAL EVERY 6 HOURS PRN
Status: DISCONTINUED | OUTPATIENT
Start: 2024-01-11 | End: 2024-01-11

## 2024-01-11 RX ORDER — PROMETHAZINE HYDROCHLORIDE 25 MG/1
12.5-25 SUPPOSITORY RECTAL EVERY 4 HOURS PRN
Status: DISCONTINUED | OUTPATIENT
Start: 2024-01-11 | End: 2024-01-15 | Stop reason: HOSPADM

## 2024-01-11 RX ORDER — SODIUM CHLORIDE 9 MG/ML
1000 INJECTION, SOLUTION INTRAVENOUS ONCE
Status: COMPLETED | OUTPATIENT
Start: 2024-01-11 | End: 2024-01-12

## 2024-01-11 RX ORDER — ONDANSETRON 2 MG/ML
4 INJECTION INTRAMUSCULAR; INTRAVENOUS EVERY 4 HOURS PRN
Status: DISCONTINUED | OUTPATIENT
Start: 2024-01-11 | End: 2024-01-15 | Stop reason: HOSPADM

## 2024-01-11 RX ORDER — BISACODYL 10 MG
10 SUPPOSITORY, RECTAL RECTAL
Status: DISCONTINUED | OUTPATIENT
Start: 2024-01-11 | End: 2024-01-15 | Stop reason: HOSPADM

## 2024-01-11 RX ORDER — ACETAMINOPHEN 325 MG/1
650 TABLET ORAL EVERY 6 HOURS PRN
Status: DISCONTINUED | OUTPATIENT
Start: 2024-01-11 | End: 2024-01-15 | Stop reason: HOSPADM

## 2024-01-11 RX ORDER — AMOXICILLIN 250 MG
2 CAPSULE ORAL 2 TIMES DAILY
Status: DISCONTINUED | OUTPATIENT
Start: 2024-01-11 | End: 2024-01-15 | Stop reason: HOSPADM

## 2024-01-11 RX ORDER — SODIUM CHLORIDE, SODIUM LACTATE, POTASSIUM CHLORIDE, CALCIUM CHLORIDE 600; 310; 30; 20 MG/100ML; MG/100ML; MG/100ML; MG/100ML
1000 INJECTION, SOLUTION INTRAVENOUS ONCE
Status: COMPLETED | OUTPATIENT
Start: 2024-01-11 | End: 2024-01-11

## 2024-01-11 RX ORDER — BUDESONIDE 0.5 MG/2ML
0.25 INHALANT ORAL
Status: DISCONTINUED | OUTPATIENT
Start: 2024-01-12 | End: 2024-01-11

## 2024-01-11 RX ORDER — PROCHLORPERAZINE EDISYLATE 5 MG/ML
5-10 INJECTION INTRAMUSCULAR; INTRAVENOUS EVERY 4 HOURS PRN
Status: DISCONTINUED | OUTPATIENT
Start: 2024-01-11 | End: 2024-01-15 | Stop reason: HOSPADM

## 2024-01-11 RX ORDER — POLYETHYLENE GLYCOL 3350 17 G/17G
1 POWDER, FOR SOLUTION ORAL
Status: DISCONTINUED | OUTPATIENT
Start: 2024-01-11 | End: 2024-01-15 | Stop reason: HOSPADM

## 2024-01-11 RX ORDER — FLUTICASONE PROPIONATE AND SALMETEROL 250; 50 UG/1; UG/1
1 POWDER RESPIRATORY (INHALATION) 2 TIMES DAILY
Status: DISCONTINUED | OUTPATIENT
Start: 2024-01-11 | End: 2024-01-11

## 2024-01-11 RX ORDER — PROMETHAZINE HYDROCHLORIDE 25 MG/1
12.5-25 TABLET ORAL EVERY 4 HOURS PRN
Status: DISCONTINUED | OUTPATIENT
Start: 2024-01-11 | End: 2024-01-15 | Stop reason: HOSPADM

## 2024-01-11 RX ORDER — MORPHINE SULFATE 4 MG/ML
4 INJECTION INTRAVENOUS ONCE
Status: COMPLETED | OUTPATIENT
Start: 2024-01-11 | End: 2024-01-11

## 2024-01-11 RX ORDER — OXYCODONE AND ACETAMINOPHEN 10; 325 MG/1; MG/1
1 TABLET ORAL 2 TIMES DAILY PRN
Qty: 60 TABLET | Refills: 0 | Status: ON HOLD | OUTPATIENT
Start: 2024-01-11 | End: 2024-01-11

## 2024-01-11 RX ORDER — ONDANSETRON 4 MG/1
4 TABLET, ORALLY DISINTEGRATING ORAL EVERY 4 HOURS PRN
Status: DISCONTINUED | OUTPATIENT
Start: 2024-01-11 | End: 2024-01-15 | Stop reason: HOSPADM

## 2024-01-11 RX ADMIN — SODIUM CHLORIDE 1000 ML: 9 INJECTION, SOLUTION INTRAVENOUS at 22:52

## 2024-01-11 RX ADMIN — SODIUM CHLORIDE, POTASSIUM CHLORIDE, SODIUM LACTATE AND CALCIUM CHLORIDE 1000 ML: 600; 310; 30; 20 INJECTION, SOLUTION INTRAVENOUS at 22:09

## 2024-01-11 RX ADMIN — MORPHINE SULFATE 4 MG: 4 INJECTION, SOLUTION INTRAMUSCULAR; INTRAVENOUS at 22:10

## 2024-01-11 ASSESSMENT — COGNITIVE AND FUNCTIONAL STATUS - GENERAL
MOVING TO AND FROM BED TO CHAIR: A LOT
TOILETING: TOTAL
TURNING FROM BACK TO SIDE WHILE IN FLAT BAD: UNABLE
SUGGESTED CMS G CODE MODIFIER MOBILITY: CM
CLIMB 3 TO 5 STEPS WITH RAILING: TOTAL
STANDING UP FROM CHAIR USING ARMS: TOTAL
MOVING FROM LYING ON BACK TO SITTING ON SIDE OF FLAT BED: A LOT
DRESSING REGULAR UPPER BODY CLOTHING: TOTAL
WALKING IN HOSPITAL ROOM: TOTAL
SUGGESTED CMS G CODE MODIFIER DAILY ACTIVITY: CL
PERSONAL GROOMING: A LOT
HELP NEEDED FOR BATHING: TOTAL
EATING MEALS: A LITTLE
MOBILITY SCORE: 8
DRESSING REGULAR LOWER BODY CLOTHING: TOTAL
DAILY ACTIVITIY SCORE: 9

## 2024-01-11 ASSESSMENT — LIFESTYLE VARIABLES
HAVE PEOPLE ANNOYED YOU BY CRITICIZING YOUR DRINKING: NO
TOTAL SCORE: 0
EVER FELT BAD OR GUILTY ABOUT YOUR DRINKING: NO
HAVE YOU EVER FELT YOU SHOULD CUT DOWN ON YOUR DRINKING: NO
CONSUMPTION TOTAL: NEGATIVE
AVERAGE NUMBER OF DAYS PER WEEK YOU HAVE A DRINK CONTAINING ALCOHOL: 0
HOW MANY TIMES IN THE PAST YEAR HAVE YOU HAD 5 OR MORE DRINKS IN A DAY: 0
TOTAL SCORE: 0
ALCOHOL_USE: NO
ON A TYPICAL DAY WHEN YOU DRINK ALCOHOL HOW MANY DRINKS DO YOU HAVE: 0
TOTAL SCORE: 0
EVER HAD A DRINK FIRST THING IN THE MORNING TO STEADY YOUR NERVES TO GET RID OF A HANGOVER: NO

## 2024-01-11 ASSESSMENT — ENCOUNTER SYMPTOMS
DIAPHORESIS: 1
WEIGHT LOSS: 1
BACK PAIN: 1
PALPITATIONS: 0
FOCAL WEAKNESS: 1

## 2024-01-11 ASSESSMENT — FIBROSIS 4 INDEX
FIB4 SCORE: 1.54
FIB4 SCORE: 1.96

## 2024-01-11 NOTE — PROGRESS NOTES
Subjective:   Andrew Wall is a 58 y.o. male here today for   No chief complaint on file.    # Sciatica right side:  -Patient here to follow-up on MRI completed on 1/8/2024.  -Patient continues to have significant sciatica-like pain bilaterally as well as low back pain.  The pain is sharp, intense, has become more persistent.  -At last appointment we began treatment with Percocet 10 mg.  He states that does help significantly.  There are times where he has had to take it twice a day given activity level or going to doctors appointments.  -Significant medical history includes recent metastasis from known malignant melanoma.  Is being followed by oncology at this time.  -Patient states that the pain, weakness and fatigue that he is now almost completely wheelchair-bound as standing for any period of time causes significant discomfort and pain.    Allergies   Allergen Reactions    Augmentin Vomiting and Nausea         Current medicines (including changes today)  Current Outpatient Medications   Medication Sig Dispense Refill    ondansetron (ZOFRAN ODT) 4 MG TABLET DISPERSIBLE Take 1 Tablet by mouth every 6 hours as needed for Nausea/Vomiting. 20 Tablet 3    gabapentin (NEURONTIN) 100 MG Cap Take 1 Capsule by mouth at bedtime for 7 days, THEN 1 Capsule 2 times a day for 7 days, THEN 1 Capsule 3 times a day for 30 days. 111 Capsule 1    oxyCODONE-acetaminophen (PERCOCET-10)  MG Tab Take 1 Tablet by mouth every 8 hours as needed for Severe Pain for up to 30 days. 15 Tablet 0    HYDROcodone-acetaminophen (NORCO) 5-325 MG Tab per tablet TAKE 1 TABLET BY MOUTH EVERY FOUR HOURS AS NEEDED FOR SEVERE PAIN FOR UP TO 3 DAYS.      doxycycline (VIBRAMYCIN) 100 MG Tab Take 1 Tablet by mouth 2 times a day.      methylPREDNISolone (MEDROL DOSEPAK) 4 MG Tablet Therapy Pack Follow schedule on package instructions. 21 Tablet 0    budesonide (PULMICORT) 0.25 MG/2ML Suspension Take 250 mcg by nebulization 2 times a day.       NON SPECIFIED Wixela      ibuprofen (MOTRIN) 200 MG Tab Take 200 mg by mouth 2 times a day as needed for Mild Pain.      Calcium-Phosphorus-Vitamin D (CALCIUM/VITAMIN D3/ADULT GUMMY PO) Take 1 Tablet by mouth every day.      NON SPECIFIED Administer 1 Vial into affected nostril(S) every day. Budesenide nasal wash  daily      fluticasone-salmeterol (ADVAIR) 250-50 MCG/ACT AEROSOL POWDER, BREATH ACTIVATED Inhale 1 Puff 2 times a day. 60 Each 3    Multiple Vitamins-Minerals (MULTIVITAMIN ADULT PO) Take 1 Tablet by mouth every day.       No current facility-administered medications for this visit.     He  has a past medical history of Asthma, Cancer ([facility]) (10/20), Malignant melanoma metastatic to lymph node ([facility]) (11/16/2023), Malignant melanoma metastatic to lymph node ([facility]) (11/16/2023), Malignant melanoma of skin of buttock ([facility]), and Nasal polyp.    He has no past medical history of Acute nasopharyngitis, Anesthesia, Anginal syndrome ([facility]), Arrhythmia, Arthritis, Blood clotting disorder ([facility]), Bowel habit changes, Breath shortness, Bronchitis, Carcinoma in situ of respiratory system, Cataract, Congestive heart failure ([facility]), Continuous ambulatory peritoneal dialysis status ([facility]), Coughing blood, Dental disorder, Diabetes ([facility]), Dialysis patient ([facility]), Disorder of thyroid, Emphysema of lung ([facility]), Glaucoma, Gynecological disorder, Heart burn, Heart murmur, Heart valve disease, Hemorrhagic disorder ([facility]), Hepatitis A, Hepatitis B, Hepatitis C, Hiatus hernia syndrome, High cholesterol, Hyperlipidemia, Indigestion, Infectious disease, Jaundice, Myocardial infarct ([facility]), Pacemaker, Pain, Pneumonia, Pregnant, Psychiatric problem, Renal disorder, Rheumatic fever, Seizure (HCC), Sleep apnea, Snoring, Stroke ([facility]), Tuberculosis, Urinary bladder disorder, or Urinary incontinence.    ROS   Review of Systems   Constitutional:  Positive for diaphoresis, malaise/fatigue and weight loss.   Cardiovascular:  Negative for chest pain  and palpitations.   Musculoskeletal:  Positive for back pain and joint pain.   Neurological:  Positive for focal weakness.      Objective:     Physical Exam:  BP (!) 88/60   Pulse (!) 117   Resp 16   SpO2 97%  There is no height or weight on file to calculate BMI.   Constitutional: Alert, no distress, well-groomed.  Skin: No rashes in visible areas.  Eye: Round. Conjunctiva clear, lids normal. No icterus.   ENMT: Lips pink without lesions, good dentition, moist mucous membranes. Phonation normal.  Neck: No masses, no thyromegaly. Moves freely without pain.  Respiratory: Unlabored respiratory effort, no cough or audible wheeze  Psych: Alert and oriented x3, normal affect and mood.    Imaging:  MRI completed 1/4/2024:    1. Extensive enhancing osseous masses throughout the visualized lumbar spine and sacrum, most in the right sacral alar, in keeping with metastasis.     2. Multiple metastasis seen in the paraspinous muscles, psoas muscle, iliacus muscle, gluteal muscles. The largest is in the right posterior chest wall at the level of the right kidney.     3. No enhancement seen in the cord. There is nerve root enhancement in the bilateral lumbar foramina diffusely of unclear etiology     4. Bilateral S1 nerve roots and right S2 nerve root in the sacral foramen are surrounded by the mass and probably invaded/impinged.    Assessment and Plan:     1. Chronic bilateral low back pain with bilateral sciatica  -Chronic condition, unstable.  Pain continues to worsen causing more more neuropathic symptoms.  This is secondary to metastases to the back as seen on MRI as above.  We will go ahead and augment therapy by prescribing Percocet 10 mg twice a day.  Discussed continuation of gabapentin as well.  -Given that this will be more of a chronic use of medication controlled substance agreement was filed.  -Patient will follow-up in 1 month for reevaluation of symptoms.  -Patient is following up with radiology team to undergo  immunotherapy as well as radiation therapy for pain relief for metastases.  -Obtained and reviewed patient utilization report from [facility] pharmacy database on 01/11/23 to writing prescription for controlled substance II, III or IV per [place] bill . Based on assessment of the report,my physical exam if necessary and the patient's health problem, the prescription is medically necessary.  - oxyCODONE-acetaminophen (PERCOCET-10)  MG Tab; Take 1 Tablet by mouth 2 times a day as needed for Severe Pain for up to 30 days.  Dispense: 60 Tablet; Refill: 0  - Controlled Substance Treatment Agreement  - Comp Metabolic Panel; Future    2. 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  Dispense: 60 Tablet; Refill: 0  - Controlled Substance Treatment Agreement  - Comp Metabolic Panel; Future    3. Physical deconditioning  -Given substantial physical deconditioning due to his recent condition and exacerbation of malignant melanoma with metastasis to the back we will go ahead and order wheelchair for patient for mobility.  We are seeing some weight loss as well.  Will complete CMP at this time.  Encouraged appropriate eating and diet.  Patient will follow-up with any other questions or concerns.  - DME Wheelchair  - Comp Metabolic Panel; Future      Followup: No follow-ups on file.         PLEASE NOTE: This dictation was created using voice recognition software. I have made every reasonable attempt to correct obvious errors, but I expect that there are errors of grammar and possibly content that I did not discover before finalizing the note.

## 2024-01-12 PROBLEM — K59.00 CONSTIPATION: Status: ACTIVE | Noted: 2024-01-12

## 2024-01-12 PROBLEM — C43.9 METASTATIC MELANOMA (HCC): Status: ACTIVE | Noted: 2024-01-12

## 2024-01-12 PROBLEM — R73.09 ELEVATED GLUCOSE: Status: ACTIVE | Noted: 2024-01-12

## 2024-01-12 PROBLEM — D64.9 ANEMIA: Status: ACTIVE | Noted: 2024-01-12

## 2024-01-12 LAB
ANION GAP SERPL CALC-SCNC: 11 MMOL/L (ref 7–16)
BUN SERPL-MCNC: 23 MG/DL (ref 8–22)
CA-I SERPL-SCNC: 1.43 MMOL/L (ref 1.1–1.3)
CALCIUM SERPL-MCNC: 10.9 MG/DL (ref 8.4–10.2)
CHLORIDE SERPL-SCNC: 98 MMOL/L (ref 96–112)
CO2 SERPL-SCNC: 29 MMOL/L (ref 20–33)
CREAT SERPL-MCNC: 0.8 MG/DL (ref 0.5–1.4)
ERYTHROCYTE [DISTWIDTH] IN BLOOD BY AUTOMATED COUNT: 41.5 FL (ref 35.9–50)
FERRITIN SERPL-MCNC: 1327 NG/ML (ref 22–322)
GFR SERPLBLD CREATININE-BSD FMLA CKD-EPI: 101 ML/MIN/1.73 M 2
GLUCOSE SERPL-MCNC: 111 MG/DL (ref 65–99)
HCT VFR BLD AUTO: 28.9 % (ref 42–52)
HGB BLD-MCNC: 9.5 G/DL (ref 14–18)
IRON SATN MFR SERPL: 16 % (ref 15–55)
IRON SERPL-MCNC: 26 UG/DL (ref 50–180)
MCH RBC QN AUTO: 29 PG (ref 27–33)
MCHC RBC AUTO-ENTMCNC: 32.9 G/DL (ref 32.3–36.5)
MCV RBC AUTO: 88.1 FL (ref 81.4–97.8)
PHOSPHATE SERPL-MCNC: 3.6 MG/DL (ref 2.5–4.5)
PLATELET # BLD AUTO: 355 K/UL (ref 164–446)
PMV BLD AUTO: 9.3 FL (ref 9–12.9)
POTASSIUM SERPL-SCNC: 3.8 MMOL/L (ref 3.6–5.5)
PTH-INTACT SERPL-MCNC: 3.7 PG/ML (ref 14–72)
RBC # BLD AUTO: 3.28 M/UL (ref 4.7–6.1)
SODIUM SERPL-SCNC: 138 MMOL/L (ref 135–145)
TIBC SERPL-MCNC: 165 UG/DL (ref 250–450)
UIBC SERPL-MCNC: 139 UG/DL (ref 110–370)
VIT B12 SERPL-MCNC: 2444 PG/ML (ref 211–911)
WBC # BLD AUTO: 6.8 K/UL (ref 4.8–10.8)

## 2024-01-12 PROCEDURE — 36415 COLL VENOUS BLD VENIPUNCTURE: CPT

## 2024-01-12 PROCEDURE — 3E01340 INTRODUCTION OF INFLUENZA VACCINE INTO SUBCUTANEOUS TISSUE, PERCUTANEOUS APPROACH: ICD-10-PCS | Performed by: EMERGENCY MEDICINE

## 2024-01-12 PROCEDURE — 84100 ASSAY OF PHOSPHORUS: CPT

## 2024-01-12 PROCEDURE — 83540 ASSAY OF IRON: CPT

## 2024-01-12 PROCEDURE — 99233 SBSQ HOSP IP/OBS HIGH 50: CPT | Performed by: STUDENT IN AN ORGANIZED HEALTH CARE EDUCATION/TRAINING PROGRAM

## 2024-01-12 PROCEDURE — 700111 HCHG RX REV CODE 636 W/ 250 OVERRIDE (IP): Mod: JZ | Performed by: STUDENT IN AN ORGANIZED HEALTH CARE EDUCATION/TRAINING PROGRAM

## 2024-01-12 PROCEDURE — 700105 HCHG RX REV CODE 258: Performed by: STUDENT IN AN ORGANIZED HEALTH CARE EDUCATION/TRAINING PROGRAM

## 2024-01-12 PROCEDURE — 83550 IRON BINDING TEST: CPT

## 2024-01-12 PROCEDURE — 700111 HCHG RX REV CODE 636 W/ 250 OVERRIDE (IP): Performed by: HOSPITALIST

## 2024-01-12 PROCEDURE — 90471 IMMUNIZATION ADMIN: CPT

## 2024-01-12 PROCEDURE — A9270 NON-COVERED ITEM OR SERVICE: HCPCS | Performed by: STUDENT IN AN ORGANIZED HEALTH CARE EDUCATION/TRAINING PROGRAM

## 2024-01-12 PROCEDURE — 85027 COMPLETE CBC AUTOMATED: CPT

## 2024-01-12 PROCEDURE — 82330 ASSAY OF CALCIUM: CPT

## 2024-01-12 PROCEDURE — 700102 HCHG RX REV CODE 250 W/ 637 OVERRIDE(OP): Performed by: HOSPITALIST

## 2024-01-12 PROCEDURE — 82607 VITAMIN B-12: CPT

## 2024-01-12 PROCEDURE — 90686 IIV4 VACC NO PRSV 0.5 ML IM: CPT | Performed by: HOSPITALIST

## 2024-01-12 PROCEDURE — 82728 ASSAY OF FERRITIN: CPT

## 2024-01-12 PROCEDURE — 94640 AIRWAY INHALATION TREATMENT: CPT

## 2024-01-12 PROCEDURE — 83970 ASSAY OF PARATHORMONE: CPT

## 2024-01-12 PROCEDURE — 94760 N-INVAS EAR/PLS OXIMETRY 1: CPT

## 2024-01-12 PROCEDURE — 80048 BASIC METABOLIC PNL TOTAL CA: CPT

## 2024-01-12 PROCEDURE — 700102 HCHG RX REV CODE 250 W/ 637 OVERRIDE(OP): Performed by: STUDENT IN AN ORGANIZED HEALTH CARE EDUCATION/TRAINING PROGRAM

## 2024-01-12 PROCEDURE — 770020 HCHG ROOM/CARE - TELE (206)

## 2024-01-12 PROCEDURE — A9270 NON-COVERED ITEM OR SERVICE: HCPCS | Performed by: HOSPITALIST

## 2024-01-12 RX ORDER — MORPHINE SULFATE 4 MG/ML
4 INJECTION INTRAVENOUS
Status: DISCONTINUED | OUTPATIENT
Start: 2024-01-12 | End: 2024-01-15 | Stop reason: HOSPADM

## 2024-01-12 RX ORDER — POLYETHYLENE GLYCOL 3350 17 G/17G
1 POWDER, FOR SOLUTION ORAL DAILY
Status: DISCONTINUED | OUTPATIENT
Start: 2024-01-12 | End: 2024-01-15 | Stop reason: HOSPADM

## 2024-01-12 RX ORDER — OXYCODONE HYDROCHLORIDE 5 MG/1
5 TABLET ORAL EVERY 4 HOURS PRN
Status: DISCONTINUED | OUTPATIENT
Start: 2024-01-12 | End: 2024-01-15 | Stop reason: HOSPADM

## 2024-01-12 RX ORDER — ENOXAPARIN SODIUM 100 MG/ML
40 INJECTION SUBCUTANEOUS DAILY
Status: DISCONTINUED | OUTPATIENT
Start: 2024-01-12 | End: 2024-01-15 | Stop reason: HOSPADM

## 2024-01-12 RX ORDER — OXYCODONE HYDROCHLORIDE 10 MG/1
10 TABLET ORAL EVERY 4 HOURS PRN
Status: DISCONTINUED | OUTPATIENT
Start: 2024-01-12 | End: 2024-01-15 | Stop reason: HOSPADM

## 2024-01-12 RX ORDER — MORPHINE SULFATE 4 MG/ML
4 INJECTION INTRAVENOUS
Status: DISCONTINUED | OUTPATIENT
Start: 2024-01-12 | End: 2024-01-12

## 2024-01-12 RX ORDER — SODIUM CHLORIDE 9 MG/ML
INJECTION, SOLUTION INTRAVENOUS CONTINUOUS
Status: DISCONTINUED | OUTPATIENT
Start: 2024-01-12 | End: 2024-01-13

## 2024-01-12 RX ADMIN — MORPHINE SULFATE 4 MG: 4 INJECTION, SOLUTION INTRAMUSCULAR; INTRAVENOUS at 13:08

## 2024-01-12 RX ADMIN — POLYETHYLENE GLYCOL 3350 1 PACKET: 17 POWDER, FOR SOLUTION ORAL at 17:15

## 2024-01-12 RX ADMIN — SENNOSIDES AND DOCUSATE SODIUM 2 TABLET: 50; 8.6 TABLET ORAL at 00:25

## 2024-01-12 RX ADMIN — SODIUM CHLORIDE: 9 INJECTION, SOLUTION INTRAVENOUS at 08:37

## 2024-01-12 RX ADMIN — MORPHINE SULFATE 4 MG: 4 INJECTION, SOLUTION INTRAMUSCULAR; INTRAVENOUS at 16:44

## 2024-01-12 RX ADMIN — ENOXAPARIN SODIUM 40 MG: 100 INJECTION SUBCUTANEOUS at 17:42

## 2024-01-12 RX ADMIN — SODIUM CHLORIDE: 9 INJECTION, SOLUTION INTRAVENOUS at 22:38

## 2024-01-12 RX ADMIN — ACETAMINOPHEN 650 MG: 325 TABLET ORAL at 19:49

## 2024-01-12 RX ADMIN — MORPHINE SULFATE 4 MG: 4 INJECTION, SOLUTION INTRAMUSCULAR; INTRAVENOUS at 08:55

## 2024-01-12 RX ADMIN — MORPHINE SULFATE 4 MG: 4 INJECTION, SOLUTION INTRAMUSCULAR; INTRAVENOUS at 04:44

## 2024-01-12 RX ADMIN — MORPHINE SULFATE 4 MG: 4 INJECTION, SOLUTION INTRAMUSCULAR; INTRAVENOUS at 01:02

## 2024-01-12 RX ADMIN — INFLUENZA A VIRUS A/VICTORIA/4897/2022 IVR-238 (H1N1) ANTIGEN (FORMALDEHYDE INACTIVATED), INFLUENZA A VIRUS A/DARWIN/9/2021 SAN-010 (H3N2) ANTIGEN (FORMALDEHYDE INACTIVATED), INFLUENZA B VIRUS B/PHUKET/3073/2013 ANTIGEN (FORMALDEHYDE INACTIVATED), AND INFLUENZA B VIRUS B/MICHIGAN/01/2021 ANTIGEN (FORMALDEHYDE INACTIVATED) 0.5 ML: 15; 15; 15; 15 INJECTION, SUSPENSION INTRAMUSCULAR at 04:50

## 2024-01-12 RX ADMIN — SENNOSIDES AND DOCUSATE SODIUM 2 TABLET: 50; 8.6 TABLET ORAL at 04:56

## 2024-01-12 RX ADMIN — MORPHINE SULFATE 4 MG: 4 INJECTION, SOLUTION INTRAMUSCULAR; INTRAVENOUS at 22:35

## 2024-01-12 RX ADMIN — OXYCODONE HYDROCHLORIDE 10 MG: 10 TABLET ORAL at 19:53

## 2024-01-12 RX ADMIN — SENNOSIDES AND DOCUSATE SODIUM 2 TABLET: 50; 8.6 TABLET ORAL at 17:15

## 2024-01-12 RX ADMIN — MOMETASONE FUROATE AND FORMOTEROL FUMARATE DIHYDRATE 2 PUFF: 200; 5 AEROSOL RESPIRATORY (INHALATION) at 07:23

## 2024-01-12 RX ADMIN — OXYCODONE HYDROCHLORIDE 10 MG: 10 TABLET ORAL at 12:07

## 2024-01-12 RX ADMIN — MOMETASONE FUROATE AND FORMOTEROL FUMARATE DIHYDRATE 2 PUFF: 200; 5 AEROSOL RESPIRATORY (INHALATION) at 19:53

## 2024-01-12 ASSESSMENT — PATIENT HEALTH QUESTIONNAIRE - PHQ9
2. FEELING DOWN, DEPRESSED, IRRITABLE, OR HOPELESS: SEVERAL DAYS
5. POOR APPETITE OR OVEREATING: NOT AT ALL
4. FEELING TIRED OR HAVING LITTLE ENERGY: NOT AT ALL
9. THOUGHTS THAT YOU WOULD BE BETTER OFF DEAD, OR OF HURTING YOURSELF: NOT AT ALL
2. FEELING DOWN, DEPRESSED, IRRITABLE, OR HOPELESS: NOT AT ALL
1. LITTLE INTEREST OR PLEASURE IN DOING THINGS: NOT AT ALL
5. POOR APPETITE OR OVEREATING: NOT AT ALL
6. FEELING BAD ABOUT YOURSELF - OR THAT YOU ARE A FAILURE OR HAVE LET YOURSELF OR YOUR FAMILY DOWN: NOT AL ALL
1. LITTLE INTEREST OR PLEASURE IN DOING THINGS: NOT AT ALL
4. FEELING TIRED OR HAVING LITTLE ENERGY: NOT AT ALL
5. POOR APPETITE OR OVEREATING: NOT AT ALL
2. FEELING DOWN, DEPRESSED, IRRITABLE, OR HOPELESS: SEVERAL DAYS
3. TROUBLE FALLING OR STAYING ASLEEP OR SLEEPING TOO MUCH: NOT AT ALL
SUM OF ALL RESPONSES TO PHQ QUESTIONS 1-9: 0
SUM OF ALL RESPONSES TO PHQ QUESTIONS 1-9: 1
7. TROUBLE CONCENTRATING ON THINGS, SUCH AS READING THE NEWSPAPER OR WATCHING TELEVISION: NOT AT ALL
3. TROUBLE FALLING OR STAYING ASLEEP OR SLEEPING TOO MUCH: NOT AT ALL
SUM OF ALL RESPONSES TO PHQ9 QUESTIONS 1 AND 2: 1
SUM OF ALL RESPONSES TO PHQ9 QUESTIONS 1 AND 2: 0
9. THOUGHTS THAT YOU WOULD BE BETTER OFF DEAD, OR OF HURTING YOURSELF: NOT AT ALL
3. TROUBLE FALLING OR STAYING ASLEEP OR SLEEPING TOO MUCH: NOT AT ALL
SUM OF ALL RESPONSES TO PHQ9 QUESTIONS 1 AND 2: 1
1. LITTLE INTEREST OR PLEASURE IN DOING THINGS: NOT AT ALL
8. MOVING OR SPEAKING SO SLOWLY THAT OTHER PEOPLE COULD HAVE NOTICED. OR THE OPPOSITE, BEING SO FIGETY OR RESTLESS THAT YOU HAVE BEEN MOVING AROUND A LOT MORE THAN USUAL: NOT AT ALL
7. TROUBLE CONCENTRATING ON THINGS, SUCH AS READING THE NEWSPAPER OR WATCHING TELEVISION: NOT AT ALL
4. FEELING TIRED OR HAVING LITTLE ENERGY: NOT AT ALL
8. MOVING OR SPEAKING SO SLOWLY THAT OTHER PEOPLE COULD HAVE NOTICED. OR THE OPPOSITE, BEING SO FIGETY OR RESTLESS THAT YOU HAVE BEEN MOVING AROUND A LOT MORE THAN USUAL: NOT AT ALL
7. TROUBLE CONCENTRATING ON THINGS, SUCH AS READING THE NEWSPAPER OR WATCHING TELEVISION: NOT AT ALL
9. THOUGHTS THAT YOU WOULD BE BETTER OFF DEAD, OR OF HURTING YOURSELF: NOT AT ALL
SUM OF ALL RESPONSES TO PHQ QUESTIONS 1-9: 1
6. FEELING BAD ABOUT YOURSELF - OR THAT YOU ARE A FAILURE OR HAVE LET YOURSELF OR YOUR FAMILY DOWN: NOT AL ALL
8. MOVING OR SPEAKING SO SLOWLY THAT OTHER PEOPLE COULD HAVE NOTICED. OR THE OPPOSITE, BEING SO FIGETY OR RESTLESS THAT YOU HAVE BEEN MOVING AROUND A LOT MORE THAN USUAL: NOT AT ALL
6. FEELING BAD ABOUT YOURSELF - OR THAT YOU ARE A FAILURE OR HAVE LET YOURSELF OR YOUR FAMILY DOWN: NOT AL ALL

## 2024-01-12 ASSESSMENT — PAIN DESCRIPTION - PAIN TYPE
TYPE: ACUTE PAIN
TYPE: ACUTE PAIN
TYPE: CHRONIC PAIN

## 2024-01-12 NOTE — ASSESSMENT & PLAN NOTE
-Recently diagnosed with extensive metastases through the lumbar spine, sacrum, paraspinous muscles, psoas muscle, iliacus muscle, gluteal muscles and Bilateral S1 nerve roots and right S2 nerve root in the sacral foramen are surrounded by the mass and probably invaded/impinged.  -Adding morphine for pain control and PT/OT evaluation in the morning.  He might need wheelchair or assisting devices as he did report significantly decrease in function over the past few weeks.    Patient has appointment with the oncologist Dr. Ross on Tuesday, and radiology oncologist on Wednesday 1/14 CT R shoulder - Large lytic metastasis involving the right anterior superior glenoid and coracoid with soft tissue component.     CT left shoulder - metastasis of Glenoid/Scapular body/ Clavicular head/ Teres minor muscle mass     CT chest/abd/pelvis - Severe pulmonary metastatic disease; Severe and widespread osseous metastatic disease/pathologic fractures; New liver lesion suggesting metastasis. Few peritoneal nodules suggesting peritoneal malignancy    new from prior CT dated 7/11/2023. PET scan 11/2023 was negative for chest and abdomen.     I discussed with medical oncology, ordered MRI brain for staging  I discussed with medical oncology, will reevaluate patient tomorrow to see whether patient needs inpatient radiation.

## 2024-01-12 NOTE — WOUND TEAM
Wound consult received regarding patient sacrum. Chart reviewed. Patient with healed scar tissue from previous surgery. No open wounds and no pressure injuries noted. No further advanced wound care needs at this time. Wound team signing off.

## 2024-01-12 NOTE — ASSESSMENT & PLAN NOTE
Due to metastatic bone disease  Tsh 3.5,   PTH 3.7 (appropriately depressed)    1/14 calcium is slightly improving  I discussed with the hematology.  Started Zometa IV for 1 dose  Continue IV fluid  I ordered a.m. calcium to monitor

## 2024-01-12 NOTE — H&P
Hospital Medicine History & Physical Note    Date of Service  1/11/2024    Primary Care Physician  Jose Luis Juarez M.D.    Consultants  None    Code Status  Full Code    Chief Complaint  Chief Complaint   Patient presents with    Abnormal Labs     Pt called and told to come to ER from PCP for high calcium levels    Weakness     Pt has hx of spinal cancer and has back pain and weakness. Pt endorses glf tonight after trying to get up to come to ER, pt states he fell backward and landed on some pillows on his buttocks.        History of Presenting Illness  Andrew Wall is a 58 y.o. male, with history of melanoma and recently diagnosed with extensive metastatic disease with MRI earlier this month, scheduled for his first radiation therapy intake appointment tomorrow.  As part of the workup in preparation for radiation therapy, had a BMP done earlier today and received a phone call to come to the ED for hyper calcium of 12.2.  Patient reports progressively worse back pain, right leg sciatica and generalized weakness with inability of walking much secondary to pain.  He is seen in the ED accompanied by his wife on 1/11/2024.    I discussed the plan of care with patient and ERP Dr. Correia .    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    Surgical History   has a past surgical history that includes nasal polypectomy (2015, 2017, 2019); antrostomy (Bilateral, 11/15/2019); sinuscopy (Bilateral, 11/15/2019); turbinoplasty (Bilateral, 11/15/2019); sphenoidectomy (Bilateral, 11/15/2019); ethmoidectomy (Bilateral, 11/15/2019); nasal polypectomy (Bilateral, 11/15/2019); node biopsy sentinel (Bilateral, 10/20/2020); wide excision (Right, 10/20/2020); other (11/20);  and lymph node excision (Right, 11/16/2023).     Family History  Reviewed and not pertinent  Family history reviewed with patient. There is no family history that is pertinent to the chief complaint.     Social History   reports that he has never smoked. He has never used smokeless tobacco. He reports current alcohol use of about 0.6 oz of alcohol per week. He reports that he does not use drugs.    Allergies  Allergies   Allergen Reactions    Augmentin Vomiting and Nausea       Medications  Prior to Admission Medications   Prescriptions Last Dose Informant Patient Reported? Taking?   Calcium-Phosphorus-Vitamin D (CALCIUM/VITAMIN D3/ADULT GUMMY PO)  Patient Yes No   Sig: Take 1 Tablet by mouth every day.   HYDROcodone-acetaminophen (NORCO) 5-325 MG Tab per tablet   Yes No   Sig: TAKE 1 TABLET BY MOUTH EVERY FOUR HOURS AS NEEDED FOR SEVERE PAIN FOR UP TO 3 DAYS.   Multiple Vitamins-Minerals (MULTIVITAMIN ADULT PO)  Patient Yes No   Sig: Take 1 Tablet by mouth every day.   NON SPECIFIED  Patient Yes No   Sig: Administer 1 Vial into affected nostril(S) every day. Budesenide nasal wash  daily   NON SPECIFIED   Yes No   Sig: Wixela   budesonide (PULMICORT) 0.25 MG/2ML Suspension   Yes No   Sig: Take 250 mcg by nebulization 2 times a day.   doxycycline (VIBRAMYCIN) 100 MG Tab   Yes No   Sig: Take 1 Tablet by mouth 2 times a day.   fluticasone-salmeterol (ADVAIR) 250-50 MCG/ACT AEROSOL POWDER, BREATH ACTIVATED  Patient No No   Sig: Inhale 1 Puff 2 times a day.   gabapentin (NEURONTIN) 100 MG Cap   No No   Sig: Take 1 Capsule by mouth at bedtime for 7 days, THEN 1 Capsule 2 times a day for 7 days, THEN 1 Capsule 3 times a day for 30 days.   ibuprofen (MOTRIN) 200 MG Tab  Patient Yes No   Sig: Take 200 mg by mouth 2 times a day as needed for Mild Pain.   methylPREDNISolone (MEDROL DOSEPAK) 4 MG Tablet Therapy Pack   No No   Sig: Follow schedule on package instructions.   ondansetron (ZOFRAN ODT) 4 MG TABLET DISPERSIBLE   No  No   Sig: Take 1 Tablet by mouth every 6 hours as needed for Nausea/Vomiting.   oxyCODONE-acetaminophen (PERCOCET-10)  MG Tab   No No   Sig: Take 1 Tablet by mouth every 8 hours as needed for Severe Pain for up to 30 days.   oxyCODONE-acetaminophen (PERCOCET-10)  MG Tab   No No   Sig: Take 1 Tablet by mouth 2 times a day as needed for Severe Pain for up to 30 days.      Facility-Administered Medications: None       Physical Exam  Temp:  [37.3 °C (99.2 °F)] 37.3 °C (99.2 °F)  Pulse:  [100-117] 100  Resp:  [16-18] 18  BP: (88-95)/(54-63) 93/63  SpO2:  [90 %-97 %] 95 %  Blood Pressure: 93/63   Temperature: 37.3 °C (99.2 °F)   Pulse: 100   Respiration: 18   Pulse Oximetry: 95 %       Physical Exam  Constitutional:       Appearance: Normal appearance.   HENT:      Head: Normocephalic and atraumatic.      Nose: Nose normal.      Mouth/Throat:      Mouth: Mucous membranes are moist.      Pharynx: Oropharynx is clear.   Eyes:      Extraocular Movements: Extraocular movements intact.      Pupils: Pupils are equal, round, and reactive to light.   Cardiovascular:      Rate and Rhythm: Normal rate and regular rhythm.      Pulses: Normal pulses.      Heart sounds: Normal heart sounds.   Pulmonary:      Effort: Pulmonary effort is normal.      Breath sounds: Normal breath sounds.   Abdominal:      General: Abdomen is flat. Bowel sounds are normal.      Palpations: Abdomen is soft.   Musculoskeletal:      Cervical back: Normal range of motion and neck supple.   Skin:     General: Skin is warm and dry.   Neurological:      General: No focal deficit present.      Mental Status: He is alert and oriented to person, place, and time.   Psychiatric:         Mood and Affect: Mood normal.         Behavior: Behavior normal.         Laboratory:          Metastases through the lumbar spine, sacrum, paraspinous muscles      EKG:  I have personally reviewed the images and compared with prior images. and My impression is: Sinus  tachycardia at 109 bpm.  No acute ST elevation.  QTc is 430 .    MRI Lumbar Spine 1/8/2024  IMPRESSION:        1. Extensive enhancing osseous masses throughout the visualized lumbar spine and sacrum, most in the right sacral alar, in keeping with metastasis.     2. Multiple metastasis seen in the paraspinous muscles, psoas muscle, iliacus muscle, gluteal muscles. The largest is in the right posterior chest wall at the level of the right kidney.     3. No enhancement seen in the cord. There is nerve root enhancement in the bilateral lumbar foramina diffusely of unclear etiology     4. Bilateral S1 nerve roots and right S2 nerve root in the sacral foramen are surrounded by the mass and probably invaded/impinged.       Assessment/Plan:  Justification for Admission Status  I anticipate this patient will require at least two midnights for appropriate medical management, necessitating inpatient admission because 58-year-old male, with extensive metastatic melanoma, coming with hypercalcemia      * Hypercalcemia- (present on admission)  Assessment & Plan  -Inpatient status on telemetry unit.  -Calcium earlier today was 12.2.  -Patient is receiving normal saline in the emergency department.  I will repeat his calcium level at 3 in the morning, he might need additional IV fluids.  -Hypercalcemia under the context of extensive bony and muscle metastases, likely the cause of it.      Metastatic melanoma (HCC)  Assessment & Plan  -Recently diagnosed with extensive metastases through the lumbar spine, sacrum, paraspinous muscles, psoas muscle, iliacus muscle, gluteal muscles and Bilateral S1 nerve roots and right S2 nerve root in the sacral foramen are surrounded by the mass and probably invaded/impinged.  -Patient is following with Dr. Ross with oncology.  He has an appointment with the radiation therapist for intake in the morning that he is still hoping to attend.  -Adding morphine for pain control and PT/OT evaluation  in the morning.  He might need wheelchair or assisting devices as he did report significantly decrease in function over the past few weeks.    Elevated glucose  Assessment & Plan  -Glucose earlier today was 131.  This is likely reactive due to above.  If glucose remains elevated, he might need a hemoglobin A1c check.    Mild intermittent asthma without complication- (present on admission)  Assessment & Plan  -He is not wheezing at this time or having any respiratory distress.  -Added Dulera.        VTE prophylaxis: SCDs/TEDs

## 2024-01-12 NOTE — ASSESSMENT & PLAN NOTE
-Glucose earlier today was 131.  This is likely reactive due to above.  If glucose remains elevated, he might need a hemoglobin A1c check.

## 2024-01-12 NOTE — CARE PLAN
The patient is Stable - Low risk of patient condition declining or worsening    Shift Goals  Clinical Goals: VSS, pain control, calcium lab levels  Patient Goals: pain control, get better  Family Goals: pain control, get better    Progress made toward(s) clinical / shift goals:    Problem: Pain - Standard  Goal: Alleviation of pain or a reduction in pain to the patient’s comfort goal  Outcome: Progressing     Problem: Knowledge Deficit - Standard  Goal: Patient and family/care givers will demonstrate understanding of plan of care, disease process/condition, diagnostic tests and medications  Outcome: Progressing     Problem: Fall Risk  Goal: Patient will remain free from falls  Outcome: Progressing       Patient is not progressing towards the following goals:

## 2024-01-12 NOTE — ED PROVIDER NOTES
ER Provider Note    Scribed for Vincenzo Correia M.d. by Dana Moreno. 1/11/2024  9:48 PM    Primary Care Provider: Jose Luis Juarez M.D.    CHIEF COMPLAINT  Chief Complaint   Patient presents with    Abnormal Labs     Pt called and told to come to ER from PCP for high calcium levels    Weakness     Pt has hx of spinal cancer and has back pain and weakness. Pt endorses glf tonight after trying to get up to come to ER, pt states he fell backward and landed on some pillows on his buttocks.      EXTERNAL RECORDS REVIEWED  Outpatient Notes: Reviewing outpatient labs that show correct calcium of 12.2 and ionized calcium of 1.46. Ionized calcium is new and the correct calcium is old.   I reviewed MRI results from  1/8 that showed multiple metastases seen in the paraspinous muscles psoas muscles iliac us muscle and gluteal muscles.    HPI/ROS  LIMITATION TO HISTORY   Select: : None  OUTSIDE HISTORIAN(S):  Significant other Wife is present at bedside and provide some of the patient's history.     Andrew Wall is a 58 y.o. male with a history of malignant melanoma who presents to the ED complaining of high calcium levels along with back pain from a fall earlier this evening. The patient states that he fell earlier today when he was trying to get here. He states that he did not feel any pops when he fell and reports landing on some pillows on his buttocks. He denies any loss of consciousness. The patient denies vomiting after the fall, however reports that he vomited two days ago. He denies hitting his chest when he fell. He denies taking diuretic mediation or blood thinners.  The patient also states that he has back pain from his cancer and adds that the cancer has made his leg go numb and reports that it has been numb for about 2 weeks and has gradually gotten worse. The patient states that he has an appointment with a radiologist tomorrow at 11 AM and the patient's wife notes that he is doing  immunotherapy. The patient states that he is here for his high calcium and for evaluation for the pressure on his spine that is causing him pain. The patient reports that he had a MRI two days ago of his spine. He denies any problems with urination. The patient has an allergy to Augmentin.     PAST MEDICAL HISTORY  Past Medical History:   Diagnosis Date    Asthma     Cancer (HCC) 10/20    melanoma    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) 11/16/2023    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) 11/16/2023    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)     Nasal polyp        SURGICAL HISTORY  Past Surgical History:   Procedure Laterality Date    LYMPH NODE EXCISION Right 11/16/2023    Procedure: RIGHT INGUINAL NODE SUPERFICIAL DISSECTION;  Surgeon: Davon Valera M.D.;  Location: [facility];  Service: General    NODE BIOPSY SENTINEL Bilateral 10/20/2020    Procedure: BIOPSY, LYMPH NODE, SENTINEL- GROIN;  Surgeon: Davon Valera M.D.;  Location: SURGERY SAME DAY [facility];  Service: General    WIDE EXCISION Right 10/20/2020    Procedure: WIDE EXCISION, LESION- BUTTOCKS, RADICAL MALIGNANT MELANOMA;  Surgeon: Davon Valera M.D.;  Location: SURGERY [facility];  Service: General    ANTROSTOMY Bilateral 11/15/2019    Procedure: MAXILLARY ANTROSTOMY- REVISION ENDOSCOPICMOMETASONE INJECTION, PROPEL STENT PLACEMENT;  Surgeon: Felicitas Tenorio M.D.;  Location: [facility];  Service: Ent    SINUSCOPY Bilateral 11/15/2019    Procedure: ENDOSCOPY, PARANASAL SINUSES- FOR FRONTAL EXPLORATION;  Surgeon: Felicitas Tenorio M.D.;  Location: [facility];  Service: Ent    TURBINOPLASTY Bilateral 11/15/2019    Procedure: TURBINOPLASTY;  Surgeon: Felicitas Tenorio M.D.;  Location: [facility];  Service: Ent    SPHENOIDECTOMY Bilateral 11/15/2019    Procedure: SPHENOIDECTOMY- FOR SPHENOIDOTOMY;  Surgeon: Felicitas Tenorio M.D.;  Location: [facility];  Service: Ent    ETHMOIDECTOMY Bilateral 11/15/2019    Procedure: ETHMOIDECTOMY- ENDOSCOPIC;  Surgeon: Felicitas Tenorio M.D.;  Location: SURGERY [facility];  Service: Ent    NASAL POLYPECTOMY Bilateral 11/15/2019    Procedure: POLYPECTOMY, NASAL CAVITY;  Surgeon: Felicitas Tenorio M.D.;  Location: SURGERY [facility];  Service: Ent    NASAL POLYPECTOMY  2015, 2017, 2019    x 3    OTHER  11/20    removed melanoma       FAMILY HISTORY  History reviewed. No pertinent family history.    SOCIAL HISTORY   reports that he has never smoked. He has never used smokeless tobacco. He reports current alcohol use of about 0.6 oz of alcohol per week. He reports that he does not use drugs.    CURRENT MEDICATIONS  Previous Medications    BUDESONIDE (PULMICORT) 0.25 MG/2ML SUSPENSION    Take 250 mcg by nebulization 2 times a day.    CALCIUM-PHOSPHORUS-VITAMIN D (CALCIUM/VITAMIN D3/ADULT GUMMY PO)    Take 1 Tablet by mouth every day.    DOXYCYCLINE (VIBRAMYCIN) 100 MG TAB    Take 1 Tablet by mouth 2 times a day.    FLUTICASONE-SALMETEROL (ADVAIR) 250-50 MCG/ACT AEROSOL POWDER, BREATH ACTIVATED    Inhale 1 Puff 2 times a day.    GABAPENTIN (NEURONTIN) 100 MG CAP    Take 1 Capsule by mouth at bedtime for 7 days, THEN 1 Capsule 2 times a day for 7 days, THEN 1 Capsule 3 times a day for 30 days.    HYDROCODONE-ACETAMINOPHEN (NORCO) 5-325 MG TAB PER TABLET    TAKE 1 TABLET BY MOUTH EVERY FOUR HOURS AS NEEDED FOR SEVERE PAIN FOR UP TO 3 DAYS.    IBUPROFEN (MOTRIN) 200 MG TAB    Take 200 mg by mouth 2 times a day as needed for Mild Pain.    METHYLPREDNISOLONE (MEDROL DOSEPAK) 4 MG TABLET THERAPY PACK    Follow schedule on package instructions.    MULTIPLE VITAMINS-MINERALS (MULTIVITAMIN ADULT PO)    Take 1 Tablet by mouth every day.    NON SPECIFIED    Administer 1 Vial into affected nostril(S) every day. Budesenide nasal wash  daily    NON SPECIFIED    Wixela    ONDANSETRON (ZOFRAN ODT) 4 MG TABLET DISPERSIBLE     Take 1 Tablet by mouth every 6 hours as needed for Nausea/Vomiting.    OXYCODONE-ACETAMINOPHEN (PERCOCET-10)  MG TAB    Take 1 Tablet by mouth every 8 hours as needed for Severe Pain for up to 30 days.    OXYCODONE-ACETAMINOPHEN (PERCOCET-10)  MG TAB    Take 1 Tablet by mouth 2 times a day as needed for Severe Pain for up to 30 days.       ALLERGIES  Augmentin    PHYSICAL EXAM  BP 95/54   Pulse (!) 111   Temp 37.3 °C (99.2 °F) (Temporal)   Resp 18   Wt 72.1 kg (159 lb)   SpO2 90%   BMI 22.18 kg/m²     Constitutional: Alert in no apparent distress.  HENT: No signs of trauma, Bilateral external ears normal, Nose normal. Uvula midline.   Eyes: Pupils are equal and reactive, Conjunctiva normal, Non-icteric.   Neck: Normal range of motion, No tenderness, Supple, No stridor.   Lymphatic: No lymphadenopathy noted.   Cardiovascular: Regular rate and rhythm, no murmurs.   Thorax & Lungs: Normal breath sounds, No respiratory distress, No wheezing, No chest tenderness.   Abdomen: Bowel sounds normal, Soft, No tenderness, No peritoneal signs, No masses, No pulsatile masses.   Skin: Warm, Dry, No erythema, No rash.   Back: No bony tenderness, No CVA tenderness.   Extremities: Intact distal pulses, No edema, No tenderness, No cyanosis.  Musculoskeletal: Good range of motion in all major joints. No tenderness to palpation or major deformities noted.   Neurologic: Alert , Normal motor function, Normal sensory function, No focal deficits noted.   Psychiatric: Affect normal, Judgment normal, Mood normal.          DIAGNOSTIC STUDIES    Labs:   I independently interpreted these labs.   Labs Reviewed   IONIZED CALCIUM - Abnormal; Notable for the following components:       Result Value    Ionized Calcium 1.46 (*)     All other components within normal limits   CBC WITH DIFFERENTIAL - Abnormal; Notable for the following components:    RBC 3.44 (*)     Hemoglobin 10.1 (*)     Hematocrit 29.2 (*)     Neutrophils-Polys  73.00 (*)     Lymphocytes 13.00 (*)     All other components within normal limits   MAGNESIUM   DIFFERENTIAL MANUAL   PLATELET ESTIMATE   MORPHOLOGY   CBC WITHOUT DIFFERENTIAL   BASIC METABOLIC PANEL   PHOSPHORUS        EKG:   I have independently interpreted this EKG      Report   Date Value Ref Range Status   2024       [facility] Emergency Dept.    Test Date:  2024  Pt Name:    CARLOS HEREDIA                 Department: [facility]  MRN:        [id number]                      [facility]  Gender:     Male                         Technician: ZNEIA  :        1965                   Requested By:ER TRIAGE PROTOCOL  Order #:    [id number]                    Reading MD:    Measurements  Intervals                                Axis  Rate:       109                          P:          57  IN:         134                          QRS:        63  QRSD:       97                           T:          -60  QT:         319  QTc:        430    Interpretive Statements  Sinus tachycardia  Borderline repolarization abnormality  Baseline wander in lead(s) I  Compared to ECG 11/15/2023 10:04:33  Sinus rhythm no longer present                    COURSE & MEDICAL DECISION MAKING     ED Observation Status? No; Patient does not meet criteria for ED Observation.     INITIAL ASSESSMENT, COURSE AND PLAN  Care Narrative:     9:48 PM - Patient presents to the ED with a history of malignant melanoma who presents to the ED complaining of high calcium levels along with back pain from a fall earlier this evening. He denies any loss of consciousness. He denies taking diuretic mediation or blood thinners. The patient states that he is here for his high calcium and for evaluation for the pressure on his spine that is causing him pain. See HPI for further details. Discussed the plan of care, including ordering labs and imaging to further evaluate. Discussed the plan for admission. Patient will be treated  with morphine 4 mg. Ordered for CBC with diff, ionized calcium, and EKG to evaluate his symptoms.      10:27 - I discussed the patient's case and the above findings with Dr. Maloney ([facility]) who agrees to evaluate the patient for admission.      Patient with hypercalcemia and elevated ionized calcium.  IV fluids ordered by me.    Given hypercalcemia secondary to metastatic cancer plan for admission and IV fluids and pain control    HYDRATION: Based on the patient's presentation of Dehydration the patient was given IV fluids. IV Hydration was used because oral hydration was not adequate alone. Upon recheck following hydration, the patient was improved.      DISPOSITION AND DISCUSSIONS  I have discussed management of the patient with the following physicians and AZAEL's:  Dr. Maloney ([facility])    Discussion of management with other QHP or appropriate source(s): None     Barriers to care at this time, including but not limited to:  None .         DISPOSITION:  Patient will be hospitalized by Dr. Dr. Maloney in guarded condition.     FINAL DIANGOSIS  1. Hypercalcemia    2. Fall, initial encounter       Dana TOLEDO (Bridget), am scribing for, and in the presence of, Vincenzo Correia M.D..    Electronically signed by: Dana Moreno (Bridget), 1/11/2024    IVincenzo M.D. personally performed the services described in this documentation, as scribed by Dana Moreno in my presence, and it is both accurate and complete.      The note accurately reflects work and decisions made by me.  Vincenzo Correia M.D.  1/11/2024  11:00 PM

## 2024-01-12 NOTE — CARE PLAN
The patient is Stable - Low risk of patient condition declining or worsening    Shift Goals  Clinical Goals: pain control  Patient Goals: pain control  Family Goals: pain control    Progress made toward(s) clinical / shift goals:    Problem: Pain - Standard  Goal: Alleviation of pain or a reduction in pain to the patient’s comfort goal  Outcome: Progressing     Problem: Knowledge Deficit - Standard  Goal: Patient and family/care givers will demonstrate understanding of plan of care, disease process/condition, diagnostic tests and medications  Outcome: Progressing     Problem: Skin Integrity  Goal: Skin integrity is maintained or improved  Outcome: Progressing

## 2024-01-12 NOTE — TELEPHONE ENCOUNTER
Critical lab (Calcium) and today's encounter reviewed.    Discussed results with patient and his wife. Not hydrating well. Chronic poor appetite (drinks 3 ensures a day), but no nausea, vomiting or constipation. Has a few weeks of worsening bone pain. Patient denies generalized weakness, but has needed a wheel chair to ambulate due to back pain. Endorses fatigue, but not significant per patient. Normal mentation, no confusion.     Reviewed moderate range corrected calcium of 12.2. Hypercalcemia and elevated alk phos suspected due to known metastatic bone disease. Cr normal. Has at 1100 with radiation oncology tomorrow. Followed by Dr. Ross for general oncology.     Patient endorsing mild symptoms at this time so aware of ED precautions and will trend labs early tomorrow AM.

## 2024-01-12 NOTE — PROGRESS NOTES
4 Eyes Skin Assessment Completed by JODY Slater and JODY Stuart.    Head WDL  Ears WDL  Nose WDL  Mouth WDL  Neck WDL  Breast/Chest WDL  Shoulder Blades WDL  Spine WDL  (R) Arm/Elbow/Hand Redness- right wrist  (L) Arm/Elbow/Hand WDL  Abdomen WDL  Groin WDL  Scrotum/Coccyx/Buttocks Redness, Blanching, and Scar  (R) Leg WDL  (L) Leg WDL  (R) Heel/Foot/Toe Redness and Blanching  (L) Heel/Foot/Toe WDL          Devices In Places Tele Box      Interventions In Place Gray Ear Foams, Heel Mepilex, Pillows, Q2 Turns, and Heels Loaded W/Pillows    Possible Skin Injury No    Pictures Uploaded Into Epic Yes  Wound Consult Placed Yes  RN Wound Prevention Protocol Ordered Yes

## 2024-01-12 NOTE — ED TRIAGE NOTES
Bib ems for above complaints.     Chief Complaint   Patient presents with    Abnormal Labs     Pt called and told to come to ER from PCP for high calcium levels    Weakness     Pt has hx of spinal cancer and has back pain and weakness. Pt endorses glf tonight after trying to get up to come to ER, pt states he fell backward and landed on some pillows on his buttocks.      BP 95/54   Pulse (!) 111   Temp 37.3 °C (99.2 °F) (Temporal)   Resp 18   Wt 72.1 kg (159 lb)   SpO2 90%   BMI 22.18 kg/m²

## 2024-01-12 NOTE — PROGRESS NOTES
Telemetry Shift Summary     Rhythm SR  HR Range 80s  Ectopy rPAC  Measurements 0.12/0.10/0.34           Normal Values  Rhythm SR  HR Range    Measurements 0.12-0.20 / 0.06-0.10  / 0.30-0.52

## 2024-01-13 ENCOUNTER — APPOINTMENT (OUTPATIENT)
Dept: RADIOLOGY | Facility: MEDICAL CENTER | Age: 59
DRG: 543 | End: 2024-01-13
Attending: STUDENT IN AN ORGANIZED HEALTH CARE EDUCATION/TRAINING PROGRAM
Payer: COMMERCIAL

## 2024-01-13 PROBLEM — M54.59 INTRACTABLE LOW BACK PAIN: Status: ACTIVE | Noted: 2024-01-13

## 2024-01-13 PROBLEM — E87.6 HYPOKALEMIA: Status: ACTIVE | Noted: 2024-01-13

## 2024-01-13 LAB
ALBUMIN SERPL BCP-MCNC: 2.7 G/DL (ref 3.2–4.9)
B PARAP IS1001 DNA NPH QL NAA+NON-PROBE: NOT DETECTED
B PERT.PT PRMT NPH QL NAA+NON-PROBE: NOT DETECTED
BUN SERPL-MCNC: 16 MG/DL (ref 8–22)
C PNEUM DNA NPH QL NAA+NON-PROBE: NOT DETECTED
CA-I SERPL-SCNC: 1.37 MMOL/L (ref 1.1–1.3)
CALCIUM ALBUM COR SERPL-MCNC: 11.8 MG/DL (ref 8.5–10.5)
CALCIUM SERPL-MCNC: 10.8 MG/DL (ref 8.4–10.2)
CHLORIDE SERPL-SCNC: 97 MMOL/L (ref 96–112)
CO2 SERPL-SCNC: 29 MMOL/L (ref 20–33)
CREAT SERPL-MCNC: 0.75 MG/DL (ref 0.5–1.4)
ERYTHROCYTE [DISTWIDTH] IN BLOOD BY AUTOMATED COUNT: 41 FL (ref 35.9–50)
FLUAV RNA NPH QL NAA+NON-PROBE: NOT DETECTED
FLUBV RNA NPH QL NAA+NON-PROBE: NOT DETECTED
GFR SERPLBLD CREATININE-BSD FMLA CKD-EPI: 103 ML/MIN/1.73 M 2
GLUCOSE SERPL-MCNC: 100 MG/DL (ref 65–99)
HADV DNA NPH QL NAA+NON-PROBE: NOT DETECTED
HCOV 229E RNA NPH QL NAA+NON-PROBE: NOT DETECTED
HCOV HKU1 RNA NPH QL NAA+NON-PROBE: NOT DETECTED
HCOV NL63 RNA NPH QL NAA+NON-PROBE: NOT DETECTED
HCOV OC43 RNA NPH QL NAA+NON-PROBE: NOT DETECTED
HCT VFR BLD AUTO: 29.1 % (ref 42–52)
HGB BLD-MCNC: 9.6 G/DL (ref 14–18)
HMPV RNA NPH QL NAA+NON-PROBE: NOT DETECTED
HPIV1 RNA NPH QL NAA+NON-PROBE: NOT DETECTED
HPIV2 RNA NPH QL NAA+NON-PROBE: NOT DETECTED
HPIV3 RNA NPH QL NAA+NON-PROBE: NOT DETECTED
HPIV4 RNA NPH QL NAA+NON-PROBE: NOT DETECTED
M PNEUMO DNA NPH QL NAA+NON-PROBE: NOT DETECTED
MAGNESIUM SERPL-MCNC: 1.5 MG/DL (ref 1.5–2.5)
MCH RBC QN AUTO: 28.6 PG (ref 27–33)
MCHC RBC AUTO-ENTMCNC: 33 G/DL (ref 32.3–36.5)
MCV RBC AUTO: 86.6 FL (ref 81.4–97.8)
PHOSPHATE SERPL-MCNC: 3.5 MG/DL (ref 2.5–4.5)
PLATELET # BLD AUTO: 374 K/UL (ref 164–446)
PMV BLD AUTO: 9 FL (ref 9–12.9)
POTASSIUM SERPL-SCNC: 3.3 MMOL/L (ref 3.6–5.5)
PROCALCITONIN SERPL-MCNC: 0.3 NG/ML
RBC # BLD AUTO: 3.36 M/UL (ref 4.7–6.1)
RSV RNA NPH QL NAA+NON-PROBE: NOT DETECTED
RV+EV RNA NPH QL NAA+NON-PROBE: NOT DETECTED
SARS-COV-2 RNA NPH QL NAA+NON-PROBE: NOTDETECTED
SODIUM SERPL-SCNC: 138 MMOL/L (ref 135–145)
WBC # BLD AUTO: 9 K/UL (ref 4.8–10.8)

## 2024-01-13 PROCEDURE — 87486 CHLMYD PNEUM DNA AMP PROBE: CPT

## 2024-01-13 PROCEDURE — 700117 HCHG RX CONTRAST REV CODE 255: Performed by: STUDENT IN AN ORGANIZED HEALTH CARE EDUCATION/TRAINING PROGRAM

## 2024-01-13 PROCEDURE — 87581 M.PNEUMON DNA AMP PROBE: CPT

## 2024-01-13 PROCEDURE — 74018 RADEX ABDOMEN 1 VIEW: CPT

## 2024-01-13 PROCEDURE — 82330 ASSAY OF CALCIUM: CPT

## 2024-01-13 PROCEDURE — 770020 HCHG ROOM/CARE - TELE (206)

## 2024-01-13 PROCEDURE — 73201 CT UPPER EXTREMITY W/DYE: CPT | Mod: LT

## 2024-01-13 PROCEDURE — 83735 ASSAY OF MAGNESIUM: CPT

## 2024-01-13 PROCEDURE — 94640 AIRWAY INHALATION TREATMENT: CPT

## 2024-01-13 PROCEDURE — 36415 COLL VENOUS BLD VENIPUNCTURE: CPT

## 2024-01-13 PROCEDURE — 84145 PROCALCITONIN (PCT): CPT

## 2024-01-13 PROCEDURE — A9270 NON-COVERED ITEM OR SERVICE: HCPCS | Performed by: HOSPITALIST

## 2024-01-13 PROCEDURE — A9270 NON-COVERED ITEM OR SERVICE: HCPCS | Performed by: STUDENT IN AN ORGANIZED HEALTH CARE EDUCATION/TRAINING PROGRAM

## 2024-01-13 PROCEDURE — 80069 RENAL FUNCTION PANEL: CPT

## 2024-01-13 PROCEDURE — 73201 CT UPPER EXTREMITY W/DYE: CPT | Mod: RT

## 2024-01-13 PROCEDURE — 71260 CT THORAX DX C+: CPT

## 2024-01-13 PROCEDURE — 97535 SELF CARE MNGMENT TRAINING: CPT

## 2024-01-13 PROCEDURE — 87040 BLOOD CULTURE FOR BACTERIA: CPT | Mod: 91

## 2024-01-13 PROCEDURE — 85027 COMPLETE CBC AUTOMATED: CPT

## 2024-01-13 PROCEDURE — 99233 SBSQ HOSP IP/OBS HIGH 50: CPT | Performed by: STUDENT IN AN ORGANIZED HEALTH CARE EDUCATION/TRAINING PROGRAM

## 2024-01-13 PROCEDURE — 94760 N-INVAS EAR/PLS OXIMETRY 1: CPT

## 2024-01-13 PROCEDURE — 700102 HCHG RX REV CODE 250 W/ 637 OVERRIDE(OP): Performed by: STUDENT IN AN ORGANIZED HEALTH CARE EDUCATION/TRAINING PROGRAM

## 2024-01-13 PROCEDURE — 700105 HCHG RX REV CODE 258: Performed by: HOSPITALIST

## 2024-01-13 PROCEDURE — 82652 VIT D 1 25-DIHYDROXY: CPT

## 2024-01-13 PROCEDURE — 700102 HCHG RX REV CODE 250 W/ 637 OVERRIDE(OP): Performed by: HOSPITALIST

## 2024-01-13 PROCEDURE — 87633 RESP VIRUS 12-25 TARGETS: CPT

## 2024-01-13 PROCEDURE — 700111 HCHG RX REV CODE 636 W/ 250 OVERRIDE (IP): Mod: JZ | Performed by: STUDENT IN AN ORGANIZED HEALTH CARE EDUCATION/TRAINING PROGRAM

## 2024-01-13 PROCEDURE — 87798 DETECT AGENT NOS DNA AMP: CPT | Mod: 91

## 2024-01-13 PROCEDURE — 700105 HCHG RX REV CODE 258: Performed by: STUDENT IN AN ORGANIZED HEALTH CARE EDUCATION/TRAINING PROGRAM

## 2024-01-13 RX ORDER — SODIUM CHLORIDE 9 MG/ML
INJECTION, SOLUTION INTRAVENOUS CONTINUOUS
Status: DISCONTINUED | OUTPATIENT
Start: 2024-01-13 | End: 2024-01-15 | Stop reason: HOSPADM

## 2024-01-13 RX ORDER — DEXAMETHASONE SODIUM PHOSPHATE 4 MG/ML
4 INJECTION, SOLUTION INTRA-ARTICULAR; INTRALESIONAL; INTRAMUSCULAR; INTRAVENOUS; SOFT TISSUE EVERY 6 HOURS
Status: DISCONTINUED | OUTPATIENT
Start: 2024-01-13 | End: 2024-01-15 | Stop reason: HOSPADM

## 2024-01-13 RX ORDER — SODIUM CHLORIDE 9 MG/ML
1000 INJECTION, SOLUTION INTRAVENOUS ONCE
Status: COMPLETED | OUTPATIENT
Start: 2024-01-13 | End: 2024-01-13

## 2024-01-13 RX ADMIN — DEXAMETHASONE SODIUM PHOSPHATE 4 MG: 4 INJECTION INTRA-ARTICULAR; INTRALESIONAL; INTRAMUSCULAR; INTRAVENOUS; SOFT TISSUE at 14:36

## 2024-01-13 RX ADMIN — IOHEXOL 80 ML: 350 INJECTION, SOLUTION INTRAVENOUS at 17:54

## 2024-01-13 RX ADMIN — OXYCODONE HYDROCHLORIDE 10 MG: 10 TABLET ORAL at 06:46

## 2024-01-13 RX ADMIN — IOHEXOL 30 ML: 350 INJECTION, SOLUTION INTRAVENOUS at 18:11

## 2024-01-13 RX ADMIN — MORPHINE SULFATE 4 MG: 4 INJECTION, SOLUTION INTRAMUSCULAR; INTRAVENOUS at 03:20

## 2024-01-13 RX ADMIN — MAGNESIUM HYDROXIDE 30 ML: 1200 LIQUID ORAL at 09:04

## 2024-01-13 RX ADMIN — DEXAMETHASONE SODIUM PHOSPHATE 4 MG: 4 INJECTION INTRA-ARTICULAR; INTRALESIONAL; INTRAMUSCULAR; INTRAVENOUS; SOFT TISSUE at 17:51

## 2024-01-13 RX ADMIN — MORPHINE SULFATE 4 MG: 4 INJECTION, SOLUTION INTRAMUSCULAR; INTRAVENOUS at 20:49

## 2024-01-13 RX ADMIN — ACETAMINOPHEN 650 MG: 325 TABLET ORAL at 03:13

## 2024-01-13 RX ADMIN — OXYCODONE HYDROCHLORIDE 10 MG: 10 TABLET ORAL at 17:49

## 2024-01-13 RX ADMIN — ACETAMINOPHEN 650 MG: 325 TABLET ORAL at 15:52

## 2024-01-13 RX ADMIN — MOMETASONE FUROATE AND FORMOTEROL FUMARATE DIHYDRATE 2 PUFF: 200; 5 AEROSOL RESPIRATORY (INHALATION) at 09:02

## 2024-01-13 RX ADMIN — OXYCODONE HYDROCHLORIDE 10 MG: 10 TABLET ORAL at 11:34

## 2024-01-13 RX ADMIN — SODIUM CHLORIDE: 9 INJECTION, SOLUTION INTRAVENOUS at 21:25

## 2024-01-13 RX ADMIN — SODIUM CHLORIDE: 9 INJECTION, SOLUTION INTRAVENOUS at 11:29

## 2024-01-13 RX ADMIN — ENOXAPARIN SODIUM 40 MG: 100 INJECTION SUBCUTANEOUS at 17:50

## 2024-01-13 RX ADMIN — MOMETASONE FUROATE AND FORMOTEROL FUMARATE DIHYDRATE 2 PUFF: 200; 5 AEROSOL RESPIRATORY (INHALATION) at 20:37

## 2024-01-13 RX ADMIN — SENNOSIDES AND DOCUSATE SODIUM 2 TABLET: 50; 8.6 TABLET ORAL at 04:32

## 2024-01-13 RX ADMIN — IOHEXOL 30 ML: 350 INJECTION, SOLUTION INTRAVENOUS at 18:08

## 2024-01-13 RX ADMIN — MORPHINE SULFATE 4 MG: 4 INJECTION, SOLUTION INTRAMUSCULAR; INTRAVENOUS at 15:52

## 2024-01-13 RX ADMIN — POLYETHYLENE GLYCOL 3350 1 PACKET: 17 POWDER, FOR SOLUTION ORAL at 04:32

## 2024-01-13 RX ADMIN — SENNOSIDES AND DOCUSATE SODIUM 2 TABLET: 50; 8.6 TABLET ORAL at 17:49

## 2024-01-13 RX ADMIN — MORPHINE SULFATE 4 MG: 4 INJECTION, SOLUTION INTRAMUSCULAR; INTRAVENOUS at 08:57

## 2024-01-13 RX ADMIN — SODIUM CHLORIDE 1000 ML: 9 INJECTION, SOLUTION INTRAVENOUS at 05:28

## 2024-01-13 ASSESSMENT — COGNITIVE AND FUNCTIONAL STATUS - GENERAL
MOVING FROM LYING ON BACK TO SITTING ON SIDE OF FLAT BED: UNABLE
STANDING UP FROM CHAIR USING ARMS: TOTAL
HELP NEEDED FOR BATHING: TOTAL
TOILETING: TOTAL
DAILY ACTIVITIY SCORE: 9
EATING MEALS: A LITTLE
SUGGESTED CMS G CODE MODIFIER DAILY ACTIVITY: CL
TURNING FROM BACK TO SIDE WHILE IN FLAT BAD: UNABLE
PERSONAL GROOMING: A LOT
SUGGESTED CMS G CODE MODIFIER MOBILITY: CN
WALKING IN HOSPITAL ROOM: TOTAL
MOVING TO AND FROM BED TO CHAIR: UNABLE
CLIMB 3 TO 5 STEPS WITH RAILING: TOTAL
MOBILITY SCORE: 6
DRESSING REGULAR UPPER BODY CLOTHING: TOTAL
DRESSING REGULAR LOWER BODY CLOTHING: TOTAL

## 2024-01-13 ASSESSMENT — PAIN DESCRIPTION - PAIN TYPE
TYPE: ACUTE PAIN

## 2024-01-13 ASSESSMENT — ACTIVITIES OF DAILY LIVING (ADL): TOILETING: INDEPENDENT

## 2024-01-13 ASSESSMENT — GAIT ASSESSMENTS: GAIT LEVEL OF ASSIST: UNABLE TO PARTICIPATE

## 2024-01-13 NOTE — PROGRESS NOTES
Heart rate at 109-113,febrile at 101.6,RR 18 O2 sat 95% on 2liters via NC.WBC 9.0.AOx4.NS at 125ml/hr.MD updated with orders made and carried out.

## 2024-01-13 NOTE — PROGRESS NOTES
Received report from night shift RN. Assumed patient care at 0715. Assessment completed. A&OX4. Patient and wife updated on plan of care.     MOM administered per MAR, patient's wife reports it has been approximately 7 days since last BM. Patient has also not been moving or turing, he was educated on the importance of turning and repositioning in the prevention of skin breakdown.  Patient agreeable to try one turn, but then refused due to pain despite morphine administration prior to turn. Patient agreeable to try placing waffle overlay when he has to turn to use a bedpan next.    Bed in lowest position, bed locked, treaded socks in place, call light within reach. Hourly rounding in place.

## 2024-01-13 NOTE — CARE PLAN
Problem: Pain - Standard  Goal: Alleviation of pain or a reduction in pain to the patient’s comfort goal  Outcome: Progressing  Note: Medicated as per pain rating scale and MAR     Problem: Fall Risk  Goal: Patient will remain free from falls  Outcome: Progressing  Note: Fall precaution in place   The patient is Stable - Low risk of patient condition declining or worsening    Shift Goals  Clinical Goals: pain control,IV fluids,MRI result  Patient Goals: pain control  Family Goals: pain control    Progress made toward(s) clinical / shift goals:  Educated on fall prevention measures,encouraged use of call light,updated on plan of care.    Patient is not progressing towards the following goals:

## 2024-01-13 NOTE — PROGRESS NOTES
[facility] Medicine Daily Progress Note    Date of Service  1/12/2024    Chief Complaint  Andrew Wall is a 58 y.o. male admitted 1/11/2024 with hypercalcemia    Hospital Course  Andrew Wall is a 58 y.o. male, with history of melanoma and recently diagnosed with extensive metastatic disease with MRI earlier this month, scheduled for his first radiation therapy intake appointment tomorrow.  As part of the workup in preparation for radiation therapy, had a BMP done earlier today and received a phone call to come to the ED for hyper calcium of 12.2.  Patient reports progressively worse back pain, right leg sciatica and generalized weakness with inability of walking much secondary to pain.  He is seen in the ED accompanied by his wife on 1/11/2024.     Patient has appointment with the oncologist Dr. Ross on Tuesday, and radiology oncologist on Wednesday    Interval Problem Update  Patient was seen and examined at bedside  Reported lower back pain, radiated to right leg, numbness  Care plan was discussed with the wife at bedside, all questions were answered    Ionized Calcium 1.46>1.43  I ordered normal saline, continue  aggressive  IV fluid  Tsh 3.5    No BM 6 days, I ordered bowel management    Hb10>9.5,  I ordered anemia workup, ferritin high, b12 high    I have discussed this patient's plan of care and discharge plan at IDT rounds today with Case Management, Nursing, Nursing leadership, and other members of the IDT team.    Consultants/Specialty      Code Status  Full Code    Disposition  The patient is not medically cleared for discharge to home or a post-acute facility.      I have placed the appropriate orders for post-discharge needs.    Review of Systems  All 12 systems were reviewed and negative except as mentioned above       Physical Exam  Temp:  [36.1 °C (96.9 °F)-37.3 °C (99.2 °F)] 37 °C (98.6 °F)  Pulse:  [] 114  Resp:  [18-20] 18  BP: ()/(54-78) 121/69  SpO2:  [90 %-100 %] 97  %    Physical Exam  Constitutional:       General: He is in acute distress.      Appearance: He is ill-appearing.   HENT:      Head: Normocephalic.      Nose: Nose normal.      Mouth/Throat:      Mouth: Mucous membranes are moist.   Eyes:      Extraocular Movements: Extraocular movements intact.      Conjunctiva/sclera: Conjunctivae normal.   Cardiovascular:      Rate and Rhythm: Normal rate and regular rhythm.      Pulses: Normal pulses.      Heart sounds: Normal heart sounds.   Pulmonary:      Effort: Pulmonary effort is normal.      Breath sounds: Normal breath sounds.   Abdominal:      Palpations: Abdomen is soft.      Tenderness: There is no abdominal tenderness. There is no guarding.   Musculoskeletal:         General: Tenderness present.      Cervical back: Normal range of motion and neck supple.      Right lower leg: No edema.      Left lower leg: No edema.   Skin:     General: Skin is warm.   Neurological:      Mental Status: He is alert and oriented to person, place, and time. Mental status is at baseline.   Psychiatric:         Mood and Affect: Mood normal.         Fluids    Intake/Output Summary (Last 24 hours) at 1/12/2024 1722  Last data filed at 1/12/2024 1142  Gross per 24 hour   Intake 1171.93 ml   Output 1275 ml   Net -103.07 ml       Laboratory  Recent Labs     01/11/24  2136 01/12/24  0040   WBC 8.0 6.8   RBC 3.44* 3.28*   HEMOGLOBIN 10.1* 9.5*   HEMATOCRIT 29.2* 28.9*   MCV 84.9 88.1   MCH 29.4 29.0   MCHC 34.6 32.9   RDW 38.5 41.5   PLATELETCT 373 355   MPV 9.2 9.3     Recent Labs     01/11/24  1436 01/12/24  0040   SODIUM 136 138   POTASSIUM 3.6 3.8   CHLORIDE 93* 98   CO2 29 29   GLUCOSE 131* 111*   BUN 24* 23*   CREATININE 0.92 0.80   CALCIUM 11.7* 10.9*                   Imaging  No orders to display        Assessment/Plan  * Hypercalcemia- (present on admission)  Assessment & Plan  Due to metastatic bone disease    Ionized Calcium 1.46>1.43  I ordered normal saline, continue  aggressive   IV fluid  Tsh 3.5  I ordered a PTH, vitamin D    Constipation  Assessment & Plan  No bowel movement for 6 days  I ordered aggressive bowel management    Anemia  Assessment & Plan  Due to underlying malignancy  Hb10>9.5,  I ordered anemia workup, ferritin high, b12 high    I ordered a.m. CBC to monitor  Transfuse if Hb less than 7    Elevated glucose  Assessment & Plan  -Glucose earlier today was 131.  This is likely reactive due to above.  If glucose remains elevated, he might need a hemoglobin A1c check.    Metastatic melanoma (HCC)  Assessment & Plan  -Recently diagnosed with extensive metastases through the lumbar spine, sacrum, paraspinous muscles, psoas muscle, iliacus muscle, gluteal muscles and Bilateral S1 nerve roots and right S2 nerve root in the sacral foramen are surrounded by the mass and probably invaded/impinged.  -Adding morphine for pain control and PT/OT evaluation in the morning.  He might need wheelchair or assisting devices as he did report significantly decrease in function over the past few weeks.    Patient has appointment with the oncologist Dr. Ross on Tuesday, and radiology oncologist on Wednesday    Mild intermittent asthma without complication- (present on admission)  Assessment & Plan  -He is not wheezing at this time or having any respiratory distress.  -Added Dulera.         VTE prophylaxis: lovenox    I have performed a physical exam and reviewed and updated ROS and Plan today (1/12/2024). In review of yesterday's note (1/11/2024), there are no changes except as documented above.    I spent greater than 52 minutes for chart review, obtaining history independently, performing medically appropriate examination,  documenting , ordering medications, tests, or procedures, referring and communicating with other health care professionals, Independently interpreting results and communicating results with patient/family/caregiver. More than 50% of time was spent in face-to-face clinical  encounter.

## 2024-01-13 NOTE — THERAPY
Physical Therapy   Initial Evaluation     Patient Name: Andrew Wall  Age:  58 y.o., Sex:  male  Medical Record #: [id number]  Today's Date: 1/13/2024          Assessment  Patient is 58 y.o. male with a diagnosis of metastatic CA lumbar spine.Pt lives at home with wife and is usually very active.Pt unable to move or participate in therapy secondary to pain      Plan    Physical Therapy Initial Treatment Plan   Treatment Plan : (P) Bed Mobility, Therapeutic Activities, Therapeutic Exercise  Treatment Frequency: (P) 3 Times per Week    DC Equipment Recommendations: (P) Unable to determine at this time          Objective       01/13/24 1300   Charge Group   PT Self Care / Home Evaluation (Units) 1   Initial Contact Note    Initial Contact Note Order Received and Verified, Physical Therapy Evaluation in Progress with Full Report to Follow.   Prior Living Situation   Prior Services None   Housing / Facility 1 Story House   Steps Into Home 2   Equipment Owned Single Point Cane   Lives with - Patient's Self Care Capacity Spouse   Prior Level of Functional Mobility   Bed Mobility Independent   Transfer Status Independent   Ambulation Independent   Ambulation Distance community   Assistive Devices Used None   Stairs Independent   Cognition    Cognition / Consciousness X   Passive ROM Lower Body   Passive ROM Lower Body X   Comments limited by pain   Active ROM Lower Body    Active ROM Lower Body  X   Comments pain limitation   Strength Lower Body   Comments limited by pain   Coordination Lower Body    Coordination Lower Body  WDL   Balance Assessment   Sitting Balance (Static) Dependent   Sitting Balance (Dynamic) Dependent   Standing Balance (Static) Dependent   Standing Balance (Dynamic) Dependent   Weight Shift Sitting Absent   Weight Shift Standing Absent   Bed Mobility    Supine to Sit Unable to Participate   Sit to Supine Unable to Participate   Scooting Unable to Participate   Gait Analysis   Gait Level Of Assist  Unable to Participate   Functional Mobility   Sit to Stand Unable to Participate   How much difficulty does the patient currently have...   Turning over in bed (including adjusting bedclothes, sheets and blankets)? 1   Sitting down on and standing up from a chair with arms (e.g., wheelchair, bedside commode, etc.) 1   Moving from lying on back to sitting on the side of the bed? 1   How much help from another person does the patient currently need...   Moving to and from a bed to a chair (including a wheelchair)? 1   Need to walk in a hospital room? 1   Climbing 3-5 steps with a railing? 1   6 clicks Mobility Score 6   Patient / Family Goals    Patient / Family Goal #1 not stated   Short Term Goals    Short Term Goal # 1 Min A bed mob in 6 V   Short Term Goal # 2 Mod A sit to  6 V   Physical Therapy Initial Treatment Plan    Treatment Plan  Bed Mobility;Therapeutic Activities;Therapeutic Exercise   Treatment Frequency 3 Times per Week   Problem List    Problems Impaired Bed Mobility;Impaired Transfers;Impaired Ambulation;Functional ROM Deficit;Functional Strength Deficit;Impaired Balance;Decreased Activity Tolerance   Anticipated Discharge Equipment and Recommendations   DC Equipment Recommendations Unable to determine at this time   Interdisciplinary Plan of Care Collaboration   IDT Collaboration with  Nursing   Session Information   Date / Session Number  1/13-1 1/3 1/19

## 2024-01-13 NOTE — THERAPY
"Occupational Therapy   Initial Evaluation     Patient Name: Andrew Wall  Age:  58 y.o., Sex:  male  Medical Record #: [id number]  Today's Date: 1/13/2024     Assessment initiated:  Initiated OT Eval; PLOF obtained from wife, discussed home safety and DME recommendations. Patient is a 58 y.o. male admitted with hypercalcemia. Hx of melanoma, recently diagnosed with extensive metastatic disease. 1st radiation therapy scheduled next week. Pt presents alert, with confusion. Total A for bed mobility due to lower back,RLE,LUE pain. LUE with decreased AROM - chronic RTC injury. Feeding, grooming- MaxA from spouse's assist. Pt unable to perform sup>sit at this time due to pain. Will try to assess further ADL function as able.               Plan  Plan to complete eval as appropriate.     DC Equipment Recommendations: (P) Unable to determine at this time, Wheelchair, Bed Side Commode, Front-Wheel Walker  Discharge Recommendations: (P) Anticipate that the patient will have no further occupational therapy needs after discharge from the hospital     Subjective  \"Yellow\" when asked if he was cold or hot.      Objective   01/13/24 1258   Prior Living Situation   Prior Services None   Housing / Facility 1 Story House   Steps Into Home 2   Steps In Home 0   Bathroom Set up Walk In Shower   Equipment Owned Single Point Cane   Lives with - Patient's Self Care Capacity Spouse   Comments Pt indep/active 2-3 mos ago; avid skier. Lives with spouse. Family member is building a ramp at entrance.   Prior Level of ADL Function   Self Feeding Independent   Grooming / Hygiene Independent   Bathing Independent   Dressing Independent   Toileting Independent   Prior Level of IADL Function   Medication Management Independent   Laundry Independent   Kitchen Mobility Independent   Finances Independent   Home Management Independent   Shopping Independent   Prior Level Of Mobility Independent Without Device in Home   Driving / Transportation " Driving Independent   Occupation (Pre-Hospital Vocational) Unable To Determine At This Time   Leisure Interests Hobbies;Exercise;Sports;Family   Vitals   O2 (LPM) 2   O2 Delivery Device Silicone Nasal Cannula   Pain 0 - 10 Group   Location Back;Leg;Shoulder   Location Orientation Lower;Right;Left   Therapist Pain Assessment Post Activity Pain Same as Prior to Activity;Nurse Notified   Cognition    Cognition / Consciousness X   Level of Consciousness Alert   Comments confusion noted. word finding difficulties.   Passive ROM Upper Body   Passive ROM Upper Body WDL   Active ROM Upper Body   Active ROM Upper Body  X   Dominant Hand Left   Comments RTC injury LUE- chronic   Bed Mobility    Supine to Sit Unable to Participate   ADL Assessment   Eating Maximal Assist   Grooming Maximal Assist   Toileting Total Assist   Comments wife assists with feeding/grooming. pt unable to to move OOB since admit.   How much help from another person does the patient currently need...   Putting on and taking off regular lower body clothing? 1   Bathing (including washing, rinsing, and drying)? 1   Toileting, which includes using a toilet, bedpan, or urinal? 1   Putting on and taking off regular upper body clothing? 1   Taking care of personal grooming such as brushing teeth? 2   Eating meals? 3   6 Clicks Daily Activity Score 9   Functional Mobility   Sit to Stand Unable to Participate   Education Group   Education Provided Home Safety;Adaptive Equipment   Role of Occupational Therapist Patient Response Family;Patient;Acceptance;Explanation;Verbal Demonstration   Home Safety Patient Response Family;Patient;Acceptance;Explanation;Verbal Demonstration   Adaptive Equipment Patient Response Family;Patient;Acceptance;Explanation;Verbal Demonstration   Anticipated Discharge Equipment and Recommendations   DC Equipment Recommendations Unable to determine at this time;Wheelchair;Bed Side Commode;Front-Wheel Walker   Discharge Recommendations  Anticipate that the patient will have no further occupational therapy needs after discharge from the hospital   Interdisciplinary Plan of Care Collaboration   IDT Collaboration with  Nursing;Certified Nursing Assistant;Physician;Family / Caregiver   Patient Position at End of Therapy In Bed;Family / Friend in Room;Call Light within Reach;Phone within Reach   Collaboration Comments OT Eval initiated; unable to complete due to pt having high pain levels in lower back, RLE, LUE. PLOF obtained from spouse. Spouse plans to take pt home soon; discussed home safety, DME recommendations. Will try to complete eval as appropriate.

## 2024-01-13 NOTE — PROGRESS NOTES
Identified Patient with two Patient identifiers (Name and ). Two Patient Identifiers confirmed. Reviewed record in preparation for visit and have obtained necessary documentation. Chief Complaint   Patient presents with    Routine Prenatal Visit     35w4d       Visit Vitals  /67 (BP 1 Location: Right arm, BP Patient Position: Sitting, BP Cuff Size: Adult)   Pulse 69   Temp 98.5 °F (36.9 °C) (Oral)   Resp 20   Ht 5' 3.3\" (1.608 m)   Wt 178 lb (80.7 kg)   SpO2 100%   BMI 31.23 kg/m²       1. Have you been to the ER, urgent care clinic since your last visit? Hospitalized since your last visit? No    2. Have you seen or consulted any other health care providers outside of the [address] [place] [address] since your last visit? Include any pap smears or colon screening.  No Telemetry Shift Summary     Rhythm ST  HR Range 109-113  Ectopy rPVC  Measurements  .18/.14/.44  Per strip printed 0400     Normal Values  Rhythm SR  HR Range    Measurements 0.12-0.20 / 0.06-0.10  / 0.30-0.52

## 2024-01-13 NOTE — PROGRESS NOTES
12-hour chart check complete.    Monitor Summary  Rhythm: SR  Rate:   Ectopy: PAC  Measurements: .16/.10/.34

## 2024-01-13 NOTE — PROGRESS NOTES
[facility] Medicine Daily Progress Note    Date of Service  1/13/2024    Chief Complaint  Andrew Wall is a 58 y.o. male admitted 1/11/2024 with hypercalcemia    Hospital Course  Andrew Wall is a 58 y.o. male, with history of melanoma and recently diagnosed with extensive metastatic disease with MRI earlier this month, scheduled for his first radiation therapy intake appointment tomorrow.  As part of the workup in preparation for radiation therapy, had a BMP done earlier today and received a phone call to come to the ED for hyper calcium of 12.2.  Patient reports progressively worse back pain, right leg sciatica and generalized weakness with inability of walking much secondary to pain.  He is seen in the ED accompanied by his wife on 1/11/2024.     Patient has appointment with the oncologist Dr. Ross on Tuesday, and radiology oncologist on Wednesday 6    Tsh 3.5, PTH 3.7 (appropriately depressed)    Hb10>9.5,  I ordered anemia workup, ferritin high, b12 high    Interval Problem Update  Patient was seen and examined at bedside  Care plan was discussed with the wife at bedside, all questions were answered    Patient reported intractable back pain radiating to the right leg, likely due to tumor compression of the S1/S2, I started IV Decadron,  monitor BG and BP while on high-dose steroid.     Patient had fever last night, I ordered respiratory viral panel. Came back negative  Procal 0.3, wbc 9, tachy, I ordered blood culture, monitor wbc and procal, no signs of infection, hold abx for now    No BM a week. I ordered KUB, personally reviewed, significant colonic stool burden,  Numerous nodular densities in the visualized lower lungs on both sides consistent with metastases, new from prior CT dated 7/11/2023.   Plan for suppository /enema if still no BM    I discussed with the wife, patient recent PET scan 11/2023 was negative for chest and abdomen. I ordered CT ABD/chest/pelvic with contrast    K 3.3-  replaced    Ionized Calcium 1.46>1.43>1.37, continue aggressive IVF      I have discussed this patient's plan of care and discharge plan at IDT rounds today with Case Management, Nursing, Nursing leadership, and other members of the IDT team.    Consultants/Specialty      Code Status  Full Code    Disposition  The patient is not medically cleared for discharge to home or a post-acute facility.      I have placed the appropriate orders for post-discharge needs.    Review of Systems  All 12 systems were reviewed and negative except as mentioned above       Physical Exam  Temp:  [36.8 °C (98.2 °F)-39.4 °C (103 °F)] 39.4 °C (103 °F)  Pulse:  [] 134  Resp:  [18-20] 20  BP: (110-135)/(56-75) 128/63  SpO2:  [93 %-100 %] 93 %    Physical Exam  Constitutional:       General: He is in acute distress.      Appearance: He is ill-appearing.   HENT:      Head: Normocephalic.      Nose: Nose normal.      Mouth/Throat:      Mouth: Mucous membranes are moist.   Eyes:      Extraocular Movements: Extraocular movements intact.      Conjunctiva/sclera: Conjunctivae normal.   Cardiovascular:      Rate and Rhythm: Normal rate and regular rhythm.      Pulses: Normal pulses.      Heart sounds: Normal heart sounds.   Pulmonary:      Effort: Pulmonary effort is normal.      Breath sounds: Normal breath sounds.   Abdominal:      Palpations: Abdomen is soft.      Tenderness: There is no abdominal tenderness. There is no guarding.   Musculoskeletal:         General: Tenderness present.      Cervical back: Normal range of motion and neck supple.      Right lower leg: No edema.      Left lower leg: No edema.   Skin:     General: Skin is warm.   Neurological:      Mental Status: He is alert and oriented to person, place, and time. Mental status is at baseline.   Psychiatric:         Mood and Affect: Mood normal.         Fluids    Intake/Output Summary (Last 24 hours) at 1/13/2024 1604  Last data filed at 1/13/2024 0900  Gross per 24 hour    Intake 120 ml   Output 925 ml   Net -805 ml       Laboratory  Recent Labs     01/11/24  2136 01/12/24  0040 01/13/24  0219   WBC 8.0 6.8 9.0   RBC 3.44* 3.28* 3.36*   HEMOGLOBIN 10.1* 9.5* 9.6*   HEMATOCRIT 29.2* 28.9* 29.1*   MCV 84.9 88.1 86.6   MCH 29.4 29.0 28.6   MCHC 34.6 32.9 33.0   RDW 38.5 41.5 41.0   PLATELETCT 373 355 374   MPV 9.2 9.3 9.0     Recent Labs     01/11/24  1436 01/12/24  0040 01/13/24  0219   SODIUM 136 138 138   POTASSIUM 3.6 3.8 3.3*   CHLORIDE 93* 98 97   CO2 29 29 29   GLUCOSE 131* 111* 100*   BUN 24* 23* 16   CREATININE 0.92 0.80 0.75   CALCIUM 11.7* 10.9* 10.8*                   Imaging  [id number] VIEW   Final Result      1.  No evidence of bowel obstruction.   2.  Increased colonic stool since and constipation   3.  Numerous nodular densities in the visualized lower lungs on both sides consistent with metastases, new from prior CT dated 7/11/2023.      CT-CHEST,ABDOMEN,PELVIS WITH    (Results Pending)   CT-SHOULDER WITH LEFT    (Results Pending)   CT-SHOULDER WITH RIGHT    (Results Pending)        Assessment/Plan  * Hypercalcemia- (present on admission)  Assessment & Plan  Due to metastatic bone disease  Tsh 3.5,   PTH 3.7 (appropriately depressed)    Continue aggressive IV fluid  Ionized Calcium 1.46>1.43>1.37  vitamin D    Hypokalemia  Assessment & Plan  replaced    Intractable low back pain  Assessment & Plan   intractable back pain radiating to the right leg, likely due to tumor compression of the S1/S2 root, I started IV Decadron,  monitor BG and BP while on high-dose steroid.     Constipation  Assessment & Plan  No bowel movement for 6 days    KUB showed significant colonic stool burden  Continue aggressive bowel management  Plan for suppository /enema if still no BM    Anemia  Assessment & Plan  Due to underlying malignancy  Hb10>9.5,  I ordered anemia workup, ferritin high, b12 high    I ordered a.m. CBC to monitor  Transfuse if Hb less than 7    Elevated  glucose  Assessment & Plan  -Glucose earlier today was 131.  This is likely reactive due to above.  If glucose remains elevated, he might need a hemoglobin A1c check.    Metastatic melanoma (HCC)  Assessment & Plan  -Recently diagnosed with extensive metastases through the lumbar spine, sacrum, paraspinous muscles, psoas muscle, iliacus muscle, gluteal muscles and Bilateral S1 nerve roots and right S2 nerve root in the sacral foramen are surrounded by the mass and probably invaded/impinged.  -Adding morphine for pain control and PT/OT evaluation in the morning.  He might need wheelchair or assisting devices as he did report significantly decrease in function over the past few weeks.    Patient has appointment with the oncologist Dr. Ross on Tuesday, and radiology oncologist on Wednesday 1/12 kub  showed Numerous nodular densities in the visualized lower lungs on both sides consistent with metastases, new from prior CT dated 7/11/2023.     I discussed with the wife, patient recent PET scan 11/2023 was negative for chest and abdomen. I ordered CT ABD/chest/pelvic with contrast      Mild intermittent asthma without complication- (present on admission)  Assessment & Plan  -He is not wheezing at this time or having any respiratory distress.  -Added Dulera.         VTE prophylaxis: lovenox    I have performed a physical exam and reviewed and updated ROS and Plan today (1/13/2024). In review of yesterday's note (1/12/2024), there are no changes except as documented above.    I spent greater than 53 minutes for chart review, obtaining history independently, performing medically appropriate examination,  documenting , ordering medications, tests, or procedures, referring and communicating with other health care professionals, Independently interpreting results and communicating results with patient/family/caregiver. More than 50% of time was spent in face-to-face clinical encounter.

## 2024-01-13 NOTE — ASSESSMENT & PLAN NOTE
No bowel movement for 1 wk  KUB showed significant colonic stool burden    1/14 had a bowel movement today  Continue aggressive bowel management

## 2024-01-13 NOTE — ASSESSMENT & PLAN NOTE
intractable back pain radiating to the right leg, likely due to tumor compression of the S1/S2 root, I started IV Decadron,      1/14 patient reported back pain and right leg pain is better. continue IV Decadron   Monitor BG and BP while on high-dose steroid

## 2024-01-13 NOTE — ASSESSMENT & PLAN NOTE
Due to underlying malignancy  Hb10>9.5,  I ordered anemia workup, ferritin high, b12 high    I ordered a.m. CBC to monitor  Transfuse if Hb less than 7

## 2024-01-13 NOTE — DIETARY
"Nutrition services: Day 2 of admit.  Andrew Wall is a 58 y.o. male with admitting DX of hypercalcemia   Consult received for MST score of 3 indicating 14-23 lb wt loss over 1 month related to poor appetite.     RD met with pt at bedside. Pt has still not been able to have a BM today and has been given bowel meds. Pt reports drinking adequate fluids (~2 L fluid/day). Pt reports h/o N/V pta although no episodes of emesis or reported nausea during admission. RD notified pt to call RN if nausea occurs around meals so that antiemetics can be considered. Pt reports 10 lb wt loss in 1 month pta. RD discussed food preferences which includes fresh fruit (no citrus), and chocolate Ensure.   RD obtained permission to conduct a Nutrition Focused Physical Exam. No significant signs of subcutaneous fat loss or muscle wasting noted.    Assessment:  Height: 180.3 cm (5' 11\")  Weight: 79 kg (174 lb 2.6 oz) via bed scale   Body mass index is 24.29 kg/m²., BMI classification: normal   Diet/Intake: Cardiac, Ensure TID. 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   Labs: K+ 3.3, correct calcium 11.8, iron 26, Ferritin 1327, Vit B12 2444  Medications: Dulera, bowel regimen PRN, antiemetics PRN   Skin: \"No open wounds and no pressure injuries noted. No further advanced wound care needs at this time\" per wound team   GI: constipation, no BM x6 days per MD   Wt Hx:  Records indicate no severe wt loss  Wt Readings from Last 10 Encounters:   01/11/24 79 kg (174 lb 2.6 oz)   01/04/24 73.9 kg (163 lb)   12/09/23 77.1 kg (170 lb)   12/06/23 78 kg (172 lb)   11/16/23 77.9 kg (171 lb 11.8 oz)   09/27/23 79.4 kg (175 lb)   05/24/23 79.4 kg (175 lb)   12/05/22 78 kg (172 lb)   08/10/22 80.7 kg (178 " lb)   01/05/22 78.1 kg (172 lb 3.2 oz)       Malnutrition Risk: criteria not met, pt is at risk due to dx of metastatic cx.     Recommendations/Plan:  Continue current diet order, cardiac. Consider diet liberalization to regular if medically appropriate (MD notified)   Continue Ensure Plus TID   Encourage intake of >50% of meals/ONS  Document intake of all meals/ONS  as % taken in ADL's to provide interdisciplinary communication across all shifts.   Monitor weight, prevent wt loss during hospitalization.   Nutrition rep will continue to see patient for ongoing meal and snack preferences.   Food preferences updated in [employer]  Continue aggressive bowel regimen      RD following    oral

## 2024-01-14 ENCOUNTER — APPOINTMENT (OUTPATIENT)
Dept: RADIOLOGY | Facility: MEDICAL CENTER | Age: 59
DRG: 543 | End: 2024-01-14
Attending: INTERNAL MEDICINE
Payer: COMMERCIAL

## 2024-01-14 PROBLEM — R50.9 FEVER: Status: ACTIVE | Noted: 2024-01-14

## 2024-01-14 LAB
ALBUMIN SERPL BCP-MCNC: 2.7 G/DL (ref 3.2–4.9)
BUN SERPL-MCNC: 16 MG/DL (ref 8–22)
CA-I SERPL-SCNC: 1.39 MMOL/L (ref 1.1–1.3)
CALCIUM ALBUM COR SERPL-MCNC: 11.7 MG/DL (ref 8.5–10.5)
CALCIUM SERPL-MCNC: 10.7 MG/DL (ref 8.4–10.2)
CHLORIDE SERPL-SCNC: 98 MMOL/L (ref 96–112)
CO2 SERPL-SCNC: 26 MMOL/L (ref 20–33)
CREAT SERPL-MCNC: 0.63 MG/DL (ref 0.5–1.4)
ERYTHROCYTE [DISTWIDTH] IN BLOOD BY AUTOMATED COUNT: 42.5 FL (ref 35.9–50)
GFR SERPLBLD CREATININE-BSD FMLA CKD-EPI: 109 ML/MIN/1.73 M 2
GLUCOSE SERPL-MCNC: 232 MG/DL (ref 65–99)
HCT VFR BLD AUTO: 30.6 % (ref 42–52)
HGB BLD-MCNC: 10 G/DL (ref 14–18)
MAGNESIUM SERPL-MCNC: 1.6 MG/DL (ref 1.5–2.5)
MCH RBC QN AUTO: 28.7 PG (ref 27–33)
MCHC RBC AUTO-ENTMCNC: 32.7 G/DL (ref 32.3–36.5)
MCV RBC AUTO: 87.9 FL (ref 81.4–97.8)
PHOSPHATE SERPL-MCNC: 3.2 MG/DL (ref 2.5–4.5)
PLATELET # BLD AUTO: 378 K/UL (ref 164–446)
PMV BLD AUTO: 9.6 FL (ref 9–12.9)
POTASSIUM SERPL-SCNC: 3.5 MMOL/L (ref 3.6–5.5)
PROCALCITONIN SERPL-MCNC: 0.67 NG/ML
RBC # BLD AUTO: 3.48 M/UL (ref 4.7–6.1)
SODIUM SERPL-SCNC: 137 MMOL/L (ref 135–145)
WBC # BLD AUTO: 10.2 K/UL (ref 4.8–10.8)

## 2024-01-14 PROCEDURE — 99233 SBSQ HOSP IP/OBS HIGH 50: CPT | Performed by: STUDENT IN AN ORGANIZED HEALTH CARE EDUCATION/TRAINING PROGRAM

## 2024-01-14 PROCEDURE — 700111 HCHG RX REV CODE 636 W/ 250 OVERRIDE (IP): Mod: JZ | Performed by: HOSPITALIST

## 2024-01-14 PROCEDURE — 36415 COLL VENOUS BLD VENIPUNCTURE: CPT

## 2024-01-14 PROCEDURE — A9270 NON-COVERED ITEM OR SERVICE: HCPCS | Mod: JZ | Performed by: STUDENT IN AN ORGANIZED HEALTH CARE EDUCATION/TRAINING PROGRAM

## 2024-01-14 PROCEDURE — 94760 N-INVAS EAR/PLS OXIMETRY 1: CPT

## 2024-01-14 PROCEDURE — 770020 HCHG ROOM/CARE - TELE (206)

## 2024-01-14 PROCEDURE — 700105 HCHG RX REV CODE 258: Performed by: STUDENT IN AN ORGANIZED HEALTH CARE EDUCATION/TRAINING PROGRAM

## 2024-01-14 PROCEDURE — 700117 HCHG RX CONTRAST REV CODE 255: Performed by: INTERNAL MEDICINE

## 2024-01-14 PROCEDURE — 700102 HCHG RX REV CODE 250 W/ 637 OVERRIDE(OP): Mod: JZ | Performed by: STUDENT IN AN ORGANIZED HEALTH CARE EDUCATION/TRAINING PROGRAM

## 2024-01-14 PROCEDURE — A9579 GAD-BASE MR CONTRAST NOS,1ML: HCPCS | Performed by: INTERNAL MEDICINE

## 2024-01-14 PROCEDURE — 700111 HCHG RX REV CODE 636 W/ 250 OVERRIDE (IP): Mod: JZ | Performed by: INTERNAL MEDICINE

## 2024-01-14 PROCEDURE — 700111 HCHG RX REV CODE 636 W/ 250 OVERRIDE (IP): Mod: JZ | Performed by: STUDENT IN AN ORGANIZED HEALTH CARE EDUCATION/TRAINING PROGRAM

## 2024-01-14 PROCEDURE — 83735 ASSAY OF MAGNESIUM: CPT

## 2024-01-14 PROCEDURE — 70553 MRI BRAIN STEM W/O & W/DYE: CPT

## 2024-01-14 PROCEDURE — 80069 RENAL FUNCTION PANEL: CPT

## 2024-01-14 PROCEDURE — 700105 HCHG RX REV CODE 258: Performed by: INTERNAL MEDICINE

## 2024-01-14 PROCEDURE — 700102 HCHG RX REV CODE 250 W/ 637 OVERRIDE(OP): Performed by: HOSPITALIST

## 2024-01-14 PROCEDURE — 82330 ASSAY OF CALCIUM: CPT

## 2024-01-14 PROCEDURE — 94640 AIRWAY INHALATION TREATMENT: CPT

## 2024-01-14 PROCEDURE — 84145 PROCALCITONIN (PCT): CPT

## 2024-01-14 PROCEDURE — 85027 COMPLETE CBC AUTOMATED: CPT

## 2024-01-14 PROCEDURE — A9270 NON-COVERED ITEM OR SERVICE: HCPCS | Performed by: HOSPITALIST

## 2024-01-14 RX ORDER — POTASSIUM CHLORIDE 20 MEQ/1
40 TABLET, EXTENDED RELEASE ORAL ONCE
Status: COMPLETED | OUTPATIENT
Start: 2024-01-14 | End: 2024-01-14

## 2024-01-14 RX ADMIN — SENNOSIDES AND DOCUSATE SODIUM 2 TABLET: 50; 8.6 TABLET ORAL at 04:58

## 2024-01-14 RX ADMIN — SODIUM CHLORIDE 1000 ML: 9 INJECTION, SOLUTION INTRAVENOUS at 05:27

## 2024-01-14 RX ADMIN — OXYCODONE HYDROCHLORIDE 10 MG: 10 TABLET ORAL at 17:54

## 2024-01-14 RX ADMIN — SENNOSIDES AND DOCUSATE SODIUM 2 TABLET: 50; 8.6 TABLET ORAL at 17:54

## 2024-01-14 RX ADMIN — GADOTERIDOL 17 ML: 279.3 INJECTION, SOLUTION INTRAVENOUS at 17:55

## 2024-01-14 RX ADMIN — POLYETHYLENE GLYCOL 3350 1 PACKET: 17 POWDER, FOR SOLUTION ORAL at 04:58

## 2024-01-14 RX ADMIN — DEXAMETHASONE SODIUM PHOSPHATE 4 MG: 4 INJECTION INTRA-ARTICULAR; INTRALESIONAL; INTRAMUSCULAR; INTRAVENOUS; SOFT TISSUE at 23:42

## 2024-01-14 RX ADMIN — MORPHINE SULFATE 4 MG: 4 INJECTION, SOLUTION INTRAMUSCULAR; INTRAVENOUS at 12:03

## 2024-01-14 RX ADMIN — DEXAMETHASONE SODIUM PHOSPHATE 4 MG: 4 INJECTION INTRA-ARTICULAR; INTRALESIONAL; INTRAMUSCULAR; INTRAVENOUS; SOFT TISSUE at 12:04

## 2024-01-14 RX ADMIN — ZOLEDRONIC ACID 4 MG: 4 INJECTION, SOLUTION, CONCENTRATE INTRAVENOUS at 13:33

## 2024-01-14 RX ADMIN — MORPHINE SULFATE 4 MG: 4 INJECTION, SOLUTION INTRAMUSCULAR; INTRAVENOUS at 15:55

## 2024-01-14 RX ADMIN — DEXAMETHASONE SODIUM PHOSPHATE 4 MG: 4 INJECTION INTRA-ARTICULAR; INTRALESIONAL; INTRAMUSCULAR; INTRAVENOUS; SOFT TISSUE at 17:53

## 2024-01-14 RX ADMIN — OXYCODONE HYDROCHLORIDE 10 MG: 10 TABLET ORAL at 22:00

## 2024-01-14 RX ADMIN — DEXAMETHASONE SODIUM PHOSPHATE 4 MG: 4 INJECTION INTRA-ARTICULAR; INTRALESIONAL; INTRAMUSCULAR; INTRAVENOUS; SOFT TISSUE at 05:00

## 2024-01-14 RX ADMIN — SODIUM CHLORIDE: 9 INJECTION, SOLUTION INTRAVENOUS at 13:32

## 2024-01-14 RX ADMIN — MORPHINE SULFATE 4 MG: 4 INJECTION, SOLUTION INTRAMUSCULAR; INTRAVENOUS at 06:04

## 2024-01-14 RX ADMIN — MOMETASONE FUROATE AND FORMOTEROL FUMARATE DIHYDRATE 2 PUFF: 200; 5 AEROSOL RESPIRATORY (INHALATION) at 07:11

## 2024-01-14 RX ADMIN — DEXAMETHASONE SODIUM PHOSPHATE 4 MG: 4 INJECTION INTRA-ARTICULAR; INTRALESIONAL; INTRAMUSCULAR; INTRAVENOUS; SOFT TISSUE at 00:17

## 2024-01-14 RX ADMIN — POTASSIUM CHLORIDE 40 MEQ: 1500 TABLET, EXTENDED RELEASE ORAL at 08:24

## 2024-01-14 RX ADMIN — SODIUM CHLORIDE: 9 INJECTION, SOLUTION INTRAVENOUS at 22:54

## 2024-01-14 RX ADMIN — ONDANSETRON 4 MG: 2 INJECTION INTRAMUSCULAR; INTRAVENOUS at 17:54

## 2024-01-14 RX ADMIN — MOMETASONE FUROATE AND FORMOTEROL FUMARATE DIHYDRATE 2 PUFF: 200; 5 AEROSOL RESPIRATORY (INHALATION) at 19:38

## 2024-01-14 ASSESSMENT — PAIN DESCRIPTION - PAIN TYPE
TYPE: ACUTE PAIN;CHRONIC PAIN
TYPE: ACUTE PAIN;CHRONIC PAIN
TYPE: ACUTE PAIN
TYPE: ACUTE PAIN;CHRONIC PAIN

## 2024-01-14 NOTE — ASSESSMENT & PLAN NOTE
-likely due to extensive metastasis; no identified infection signs; elevated procalcitonin could be related to malignancy;   blood culture pending, continue to hold antibiotics, monitoring

## 2024-01-14 NOTE — CARE PLAN
The patient is Watcher - Medium risk of patient condition declining or worsening    Shift Goals  Clinical Goals: Pain control, monitor labs  Patient Goals: pain control  Family Goals: safety, comfort    Progress made toward(s) clinical / shift goals:        Problem: Knowledge Deficit - Standard  Goal: Patient and family/care givers will demonstrate understanding of plan of care, disease process/condition, diagnostic tests and medications  Description: Target End Date:  1-3 days or as soon as patient condition allows    Document in Patient Education    1.  Patient and family/caregiver oriented to unit, equipment, visitation policy and means for communicating concern  2.  Complete/review Learning Assessment  3.  Assess knowledge level of disease process/condition, treatment plan, diagnostic tests and medications  4.  Explain disease process/condition, treatment plan, diagnostic tests and medications  Outcome: Progressing     Problem: Fall Risk  Goal: Patient will remain free from falls  Description: Target End Date:  Prior to discharge or change in level of care    Document interventions on the Jodi Leigh Fall Risk Assessment    1.  Assess for fall risk factors  2.  Implement fall precautions  Outcome: Progressing     Problem: Skin Integrity  Goal: Skin integrity is maintained or improved  Description: Target End Date:  Prior to discharge or change in level of care    Document interventions on Skin Risk/[place] flowsheet groups and corresponding LDA    1.  Assess and monitor skin integrity, appearance and/or temperature  2.  Assess risk factors for impaired skin integrity and/or pressures ulcers  3.  Implement precautions to protect skin integrity in collaboration with interdisciplinary team  4.  Implement pressure ulcer prevention protocol if at risk for skin breakdown  5.  Confirm wound care consult if at risk for skin breakdown  6.  Ensure patient use of pressure relieving devices  (Low air loss bed, waffle  overlay, heel protectors, ROHO cushion, etc)  Outcome: Progressing       Patient is not progressing towards the following goals:    Patient is still in a significant amount of pain and expresses this information verbally. Patient is declining oral pain medication oxycodone due to it causing him extreme constipation. Will continue to dose patient with IV morphine for pain control.     Problem: Pain - Standard  Goal: Alleviation of pain or a reduction in pain to the patient’s comfort goal  Description: Target End Date:  Prior to discharge or change in level of care    Document on Vitals flowsheet    1.  Document pain using the appropriate pain scale per order or unit policy  2.  Educate and implement non-pharmacologic comfort measures (i.e. relaxation, distraction, massage, cold/heat therapy, etc.)  3.  Pain management medications as ordered  4.  Reassess pain after pain med administration per policy  5.  If opiods administered assess patient's response to pain medication is appropriate per POSS sedation scale  6.  Follow pain management plan developed in collaboration with patient and interdisciplinary team (including palliative care or pain specialists if applicable)  Outcome: Not Progressing

## 2024-01-14 NOTE — PROGRESS NOTES
Mineral oil enema administered per orders. No BM yet.    Waffle overlay placed on patient's bed to help prevent skin breakdown. TAPS system placed under patient to assist with turning and repositioning. Patient elevated with pillows under bilateral hips and back.

## 2024-01-14 NOTE — CARE PLAN
The patient is Stable - Low risk of patient condition declining or worsening    Shift Goals  Clinical Goals: Pain management  Patient Goals: pain management  Family Goals: comfort; pain management    Progress made toward(s) clinical / shift goals:  Continue with pain management. Reposition patient. Education provided on turns and repositioning - refused due to pain. Per patient in comfortable position currently. Remind patient to call for needs. Safety/fall precautions in place.     Problem: Knowledge Deficit - Standard  Goal: Patient and family/care givers will demonstrate understanding of plan of care, disease process/condition, diagnostic tests and medications  Outcome: Progressing     Problem: Fall Risk  Goal: Patient will remain free from falls  Outcome: Progressing       Patient is not progressing towards the following goals:N/A

## 2024-01-14 NOTE — PROGRESS NOTES
Telemetry Shift Summary    Rhythm SR  HR Range 80s-90s  Ectopy rPVC, rPAC  Measurements 0.14/0.10/0.34        Normal Values  Rhythm SR  HR Range    Measurements 0.12-0.20 / 0.06-0.10  / 0.30-0.52

## 2024-01-14 NOTE — CONSULTS
DATE OF SERVICE:  01/14/2024     HEMATOLOGY/ONCOLOGY CONSULTATION     ADMITTING PHYSICIAN:  Bailey Maloney MD     REQUESTING PHYSICIAN:  Jason Ross MD     CONSULTING PHYSICIAN:  Leta Kidd MD     REASON FOR CONSULTATION:  The patient with stage IV metastatic melanoma, now   being admitted with increasing back pain and also hypercalcemia with   generalized weakness.     HISTORY OF PRESENT ILLNESS:  The patient is a 58-year-old male who had a   history of [id number] malignant melanoma of the right lower back with lymph node   metastasis diagnosed in 2019, underwent surgical resection followed by   adjuvant Opdivo, which he completed in November of 2021.  He was doing well   until recently where he presented with an increasing back pain.  In October,   he had a PET/CT scan done that showed a 2.1 cm hypermetabolic lymph node in   the right upper inguinal region consistent with metastatic disease.  His right   inguinal lymph node biopsy was consistent with relapsed melanoma.  He also   underwent an MRI of the lumbar spine on 01/08/2024 that showed extensive   enhancing osseous metastasis throughout the lumbar spine and sacrum along with   the possibility of nerve root compression being noted with no enhancement in   the cord.  The patient also had bilateral S1 nerve roots surrounded by the   mass.  The patient was seen by Dr. Ross and started on Opdivo and Yervoy on   01/09/2024. The patient also was referred to Dr. Mccormick and he is due to see   her on Wednesday 01/17/2024 as an outpatient.  In the interim, he  presented   to the ER with complaints of worsening back pain, right leg sciatic and   generalized weakness with inability of walking due to pain. He denies any bowel or bladder incontinence or focal weakness.   On presentation   to the ER, he was also found to be hypercalcemic with a calcium level greater   than 12.0.  The patient started on pain management and also IV hydration,   which is helping  his calcium levels to slowly trend downwards but still   elevated at 11.7 with an ionized calcium remains 1.39.  The patient at the   present time complains of increasing back pain.  He reports that his pain is   better with current pain medication.  He is able to lift his right leg and is able to move it. He is started on DEX 4 mg every 6 hrs.  No focal   weakness.  No headaches, vision changes.  He denies any paresthesias. He mentions mainly feels like right sciatica pain. The patient's wife is at bedside   and he also had a CT scan of the chest, abdomen and pelvis on 01/13/24 that   showed interval development of widespread metastatic disease,   severe   pulmonary metastatic disease, severe and widespread osseous metastatic   disease.  There are also some pathological fractures related to osseous METs.    New liver lesion, suggesting metastasis.  Few peritoneal nodules, suggesting   peritoneal malignancy.  Mild right pelvic adenopathy.  He also had a CT of the   shoulder that showed glenoid metastasis, scapular bony metastasis, clavicular   head metastasis.  The patient is due to get MRI of the brain to complete the   staging workup.     PAST MEDICAL HISTORY:  Metastatic melanoma as mentioned above, asthma, acute   on chronic back pain.     PAST SURGICAL HISTORY:  1.  Lymph node biopsy.  2.  Melanoma excision in 2019.  3.  Nasal polypectomy.  4.  Other superficial skin cancers removed.     ALLERGIES:  AUGMENTIN.     MEDICATIONS:  Currently, he is on dexamethasone 4 mg every 6 hours, Lovenox 40   mg, morphine, oxycodone, acetaminophen, Zofran, Compazine, magnesium   hydroxide.     SOCIAL HISTORY:  Denies history of smoking, no heavy alcohol intake, no drugs.    He is , lives with his wife.  He is retired .  He has 2   children.     FAMILY HISTORY:  Noncontributory.  Mother had superficial skin cancer removed.    No history of any breast cancer or melanoma.     REVIEW OF SYSTEMS:  I did do a 10-point  system review, which is negative   except as mentioned above.  CONSTITUTIONAL:  Positive fatigue, positive weight loss, positive malaise.  No   fevers, chills.  RESPIRATORY:  No cough or shortness of breath.  CARDIOVASCULAR:  No chest pain or palpitation.  GASTROINTESTINAL:  No abdominal pain, nausea, vomiting or diarrhea.  PSYCHIATRIC:  Positive situational anxiety, no depression.  MUSCULOSKELETAL:  Positive acute lower back pain, generalized bone pains and   right sciatic pain.  NEUROLOGY:  No headaches, seizure activity or focal weakness.     PHYSICAL EXAMINATION:   GENERAL:  He is alert, oriented x3, chronic fatigue looking.  VITAL SIGNS:  Temperature was 36.3, pulse is 91, blood pressure was 106/64 on   1.5 liters saturating about 97%.  HEENT:  Pupils are equal, reactive.  Extraocular muscles are intact.    Anicteric.  CHEST:  Clear to auscultation bilaterally symmetrical.  CARDIOVASCULAR: S1, S2 heard.  Regular rate and rhythm.  ABDOMEN:  Soft, nontender, nondistended, positive bowel sounds.  EXTREMITIES:  No edema, cyanosis or clubbing.  PSYCHIATRIC: Mood appropriate and normal affect.  Neuro: CN intact, Strength 4/5 RLE due to pain, 5/5 LLE, Reflexes N, sensations intact      LABORATORY DATA:  WBC count 10.2, hemoglobin 10.0, platelet count is 378.    Sodium 137, potassium 3.5, BUN was 16, creatinine was 0.6, calcium 10.7,   corrected calcium is still elevated at 11.7, albumin 2.7, phosphorus 3.2,   magnesium 1.6, ionized calcium 1.3.  Serum iron 26, total iron binding   capacity 165, percentage saturation was 16.  Ferritin 1327.  B12 2444.  PTH   was 3.7 low, 11/16/2023.  Metastatic melanoma involving one lymph node with   extranodal extension.     ASSESSMENT:  The patient is a 58-year-old male who is now diagnosed with stage   IV metastatic melanoma with extensive metastatic disease seen on a recent CT   scan of the chest, abdomen and pelvis showing widespread metastatic disease   involving the lungs,  bones, spine, liver and peritoneal nodules.  The patient did   have a biopsy done of the inguinal lymph node in 11/2023 that came back   positive for metastatic melanoma.  He started on Opdivo and Yervoy on 01/09/24   and now presented with increasing weakness, right sciatic pain and inability   to walk due to pain.  Also, found to have hypercalcemia.  1.  Hypercalcemia due to malignancy.  The patient is on IV hydration with slow   improvement.  I did add Zometa today to help decrease the calcium levels,   which also will be contributing to some of his symptoms.  Continue IV   hydration.  2.  Acute on chronic pain in his back is recent MRI of the spine on 01/08/2024   showed extensive enhancing osseous metastasis in the lumbar spine and the   sacrum, multiple metastases seen in the paraspinal muscles and psoas muscles.    No enhancement seen in the cord, but there is a nerve root enhancement in the   bilateral lumen lumbar foramina and also bilateral S1 nerve roots and right   S2 nerve root in the sacral foramen surrounded by the mass and there is some   impingement.  The patient started on Decadron 4 mg every 6 hours.  His pain is   slightly better.  Exam shows no cauda equina symptoms.  His s/o are related to nerve impingement. He definitely noticed some improvement since admission on steroids.   3.  Stage IV metastatic melanoma, started on Yervoy and Opdivo on 01/09/2024.  4.  Chronic anemia, likely anemia of chronic disease.  5.  Pain control.  Would recommend palliative care consultation.     PLAN: I reviewed all his imaging studies, labs and office records. I had a long discussion with patient and his wife that his disease has taken aggressive course with rapid progression. He just received C # 1 Opdivo and Yervoy and its too soon to predict response. I explained to them that he has stage IV disease - intent of treatment will be palliative which they were surprised to hear his disease status and current  situation.   - He has hypercalcemia which is related to underlying malignancy and help control - I will give Zometa 4 mg IV x 1 today and he will need to continue bisphosphonate therapy as outpatient. Continue IV hydration and pain control   - I will reach out to Dr. Ross on Monday and update his recent findings seen on CT scan during hospitalization   - Also would request tissue to send for foundation one analysis which will help future treatments   - To complete staging work up requested MRI Brain   - Labs reviewed - Chronic anemia likely due to anemic of chronic disease .   - Overall prognosis is guarded   - Discussed with hospitalist Dr. Nubia Sheppard - to get palliative care consult to help with pain   - Continue DEX 4 mg every 6 hrs      Thank you for allowing me to participate in his care.  Our team will continue   to follow as an inpatient.        ______________________________  Leta Kidd MD SR/RUB    DD:  01/14/2024 10:14  DT:  01/14/2024 11:58    Job#:  [id number]

## 2024-01-14 NOTE — PROGRESS NOTES
[facility] Medicine Daily Progress Note    Date of Service  1/14/2024    Chief Complaint  Andrew Wall is a 58 y.o. male admitted 1/11/2024 with hypercalcemia    Hospital Course  Andrew Wall is a 58 y.o. male, with history of melanoma and recently diagnosed with extensive metastatic disease with MRI earlier this month, scheduled for his first radiation therapy intake appointment tomorrow.  As part of the workup in preparation for radiation therapy, had a BMP done earlier today and received a phone call to come to the ED for hyper calcium of 12.2.  Patient reports progressively worse back pain, right leg sciatica and generalized weakness with inability of walking much secondary to pain.  He is seen in the ED accompanied by his wife on 1/11/2024.     Patient has appointment with the oncologist Dr. Ross on Tuesday, and radiology oncologist on Wednesday 6    Tsh 3.5, PTH 3.7 (appropriately depressed)    Hb10>9.5,  I ordered anemia workup, ferritin high, b12 high    Interval Problem Update  Patient was seen and examined at bedside  Care plan was discussed with the wife at bedside, all questions were answered    Patient started the IV Decadron helped, his right leg pain is much better  Continue IV Decadron, may consider to repeated lumbar MRI if pain worsening again    CT R shoulder - Large lytic metastasis involving the right anterior superior glenoid and coracoid with soft tissue component.     CT left shoulder - metastasis of Glenoid/Scapular body/ Clavicular head/ Teres minor muscle mass     CT chest/abd/pelvis - Severe pulmonary metastatic disease; Severe and widespread osseous metastatic disease/pathologic fractures; New liver lesion suggesting metastasis. Few peritoneal nodules suggesting peritoneal malignancy    I discussed with medical oncology, ordered MRI brain for staging  I discussed with medical oncology, will reevaluate patient tomorrow to see whether patient needs inpatient radiation.     Fever  -likely due to extensive metastasis; no identified infection signs; elevated procalcitonin could be related to malignancy; blood culture pending, continue to hold antibiotics, monitoring      - Patient reported intractable back pain radiating to the right leg, likely due to tumor compression of the S1/S2, I started IV Decadron,  monitor BG and BP while on high-dose steroid.     K 3.5- replaced    Hypercalcemia -slightly improving.  I discussed with the hematology.  Started Zometa IV for 1 dose  Continue IV fluid      I have discussed this patient's plan of care and discharge plan at IDT rounds today with Case Management, Nursing, Nursing leadership, and other members of the IDT team.    Consultants/Specialty      Code Status  Full Code    Disposition  The patient is not medically cleared for discharge to home or a post-acute facility.      I have placed the appropriate orders for post-discharge needs.    Review of Systems  All 12 systems were reviewed and negative except as mentioned above       Physical Exam  Temp:  [36.3 °C (97.4 °F)-39.4 °C (103 °F)] 36.4 °C (97.6 °F)  Pulse:  [] 89  Resp:  [16-20] 18  BP: ()/(56-64) 102/57  SpO2:  [93 %-97 %] 96 %    Physical Exam  Constitutional:       General: He is in acute distress.      Appearance: He is ill-appearing.   HENT:      Head: Normocephalic.      Nose: Nose normal.      Mouth/Throat:      Mouth: Mucous membranes are moist.   Eyes:      Extraocular Movements: Extraocular movements intact.      Conjunctiva/sclera: Conjunctivae normal.   Cardiovascular:      Rate and Rhythm: Normal rate and regular rhythm.      Pulses: Normal pulses.      Heart sounds: Normal heart sounds.   Pulmonary:      Effort: Pulmonary effort is normal.      Breath sounds: Normal breath sounds.   Abdominal:      Palpations: Abdomen is soft.      Tenderness: There is no abdominal tenderness. There is no guarding.   Musculoskeletal:         General: Tenderness present.      Cervical  back: Normal range of motion and neck supple.      Right lower leg: No edema.      Left lower leg: No edema.   Skin:     General: Skin is warm.   Neurological:      Mental Status: He is alert and oriented to person, place, and time. Mental status is at baseline.   Psychiatric:         Mood and Affect: Mood normal.         Fluids    Intake/Output Summary (Last 24 hours) at 1/14/2024 1435  Last data filed at 1/14/2024 0300  Gross per 24 hour   Intake 60 ml   Output 700 ml   Net -640 ml       Laboratory  Recent Labs     01/12/24  0040 01/13/24  0219 01/14/24  0038   WBC 6.8 9.0 10.2   RBC 3.28* 3.36* 3.48*   HEMOGLOBIN 9.5* 9.6* 10.0*   HEMATOCRIT 28.9* 29.1* 30.6*   MCV 88.1 86.6 87.9   MCH 29.0 28.6 28.7   MCHC 32.9 33.0 32.7   RDW 41.5 41.0 42.5   PLATELETCT 355 374 378   MPV 9.3 9.0 9.6     Recent Labs     01/12/24  0040 01/13/24 0219 01/14/24  0038   SODIUM 138 138 137   POTASSIUM 3.8 3.3* 3.5*   CHLORIDE 98 97 98   CO2 29 29 26   GLUCOSE 111* 100* 232*   BUN 23* 16 16   CREATININE 0.80 0.75 0.63   CALCIUM 10.9* 10.8* 10.7*                   Imaging  CT-SHOULDER WITH RIGHT   Final Result      Large lytic metastasis involving the right anterior superior glenoid and coracoid with soft tissue component.      CT-SHOULDER WITH LEFT   Final Result      1.  Glenoid metastasis.   2.  Scapular body metastasis.   3.  Clavicular head metastasis.   4.  Other findings as above.   5.  Teres minor muscle mass which may be soft tissue metastasis.      CT-CHEST,ABDOMEN,PELVIS WITH   Final Result      1.  Since the previous study performed 7/11/2023 there has been interval development of widespread metastatic disease throughout chest abdomen and pelvis.   2.  Severe pulmonary metastatic disease.   3.  Severe and widespread osseous metastatic disease. There are some pathologic fractures related to osseous metastatic disease as above.   4.  New liver lesion suggesting metastasis.   5.  Few peritoneal nodules suggesting peritoneal  malignancy.   6.  Mild right pelvic adenopathy.   7.  Detailed findings as above.      [id number] VIEW   Final Result      1.  No evidence of bowel obstruction.   2.  Increased colonic stool since and constipation   3.  Numerous nodular densities in the visualized lower lungs on both sides consistent with metastases, new from prior CT dated 7/11/2023.      MR-BRAIN-WITH & W/O    (Results Pending)        Assessment/Plan  * Hypercalcemia- (present on admission)  Assessment & Plan  Due to metastatic bone disease  Tsh 3.5,   PTH 3.7 (appropriately depressed)    1/14 calcium is slightly improving  I discussed with the hematology.  Started Zometa IV for 1 dose  Continue IV fluid  I ordered a.m. calcium to monitor    Fever  Assessment & Plan   -likely due to extensive metastasis; no identified infection signs; elevated procalcitonin could be related to malignancy;   blood culture pending, continue to hold antibiotics, monitoring    Hypokalemia  Assessment & Plan  Potassium 3.5, replaced    Intractable low back pain  Assessment & Plan   intractable back pain radiating to the right leg, likely due to tumor compression of the S1/S2 root, I started IV Decadron,      1/14 patient reported back pain and right leg pain is better. continue IV Decadron   Monitor BG and BP while on high-dose steroid     Constipation  Assessment & Plan  No bowel movement for 1 wk  KUB showed significant colonic stool burden    1/14 had a bowel movement today  Continue aggressive bowel management      Anemia  Assessment & Plan  Due to underlying malignancy  Hb10>9.5,  I ordered anemia workup, ferritin high, b12 high    I ordered a.m. CBC to monitor  Transfuse if Hb less than 7    Elevated glucose  Assessment & Plan  -Glucose earlier today was 131.  This is likely reactive due to above.  If glucose remains elevated, he might need a hemoglobin A1c check.    Metastatic melanoma (HCC)  Assessment & Plan  -Recently diagnosed with extensive metastases  through the lumbar spine, sacrum, paraspinous muscles, psoas muscle, iliacus muscle, gluteal muscles and Bilateral S1 nerve roots and right S2 nerve root in the sacral foramen are surrounded by the mass and probably invaded/impinged.  -Adding morphine for pain control and PT/OT evaluation in the morning.  He might need wheelchair or assisting devices as he did report significantly decrease in function over the past few weeks.    Patient has appointment with the oncologist Dr. Ross on Tuesday, and radiology oncologist on Wednesday 1/14 CT R shoulder - Large lytic metastasis involving the right anterior superior glenoid and coracoid with soft tissue component.     CT left shoulder - metastasis of Glenoid/Scapular body/ Clavicular head/ Teres minor muscle mass     CT chest/abd/pelvis - Severe pulmonary metastatic disease; Severe and widespread osseous metastatic disease/pathologic fractures; New liver lesion suggesting metastasis. Few peritoneal nodules suggesting peritoneal malignancy    new from prior CT dated 7/11/2023. PET scan 11/2023 was negative for chest and abdomen.     I discussed with medical oncology, ordered MRI brain for staging  I discussed with medical oncology, will reevaluate patient tomorrow to see whether patient needs inpatient radiation.       Mild intermittent asthma without complication- (present on admission)  Assessment & Plan  -He is not wheezing at this time or having any respiratory distress.  -Added Dulera.         VTE prophylaxis: lovenox    I have performed a physical exam and reviewed and updated ROS and Plan today (1/14/2024). In review of yesterday's note (1/13/2024), there are no changes except as documented above.    I spent greater than 55 minutes for chart review, obtaining history independently, performing medically appropriate examination,  documenting , ordering medications, tests, or procedures, referring and communicating with other health care professionals,  Independently interpreting results and communicating results with patient/family/caregiver. More than 50% of time was spent in face-to-face clinical encounter.      ROS

## 2024-01-14 NOTE — PROGRESS NOTES
He updated. Oral temperature of 103, , patient is still A&Ox4 but is increasingly confused and forgetful in conversation.    Blood cultures ordered.

## 2024-01-15 ENCOUNTER — HOSPITAL ENCOUNTER (INPATIENT)
Facility: MEDICAL CENTER | Age: 59
LOS: 14 days | DRG: 543 | End: 2024-01-29
Attending: STUDENT IN AN ORGANIZED HEALTH CARE EDUCATION/TRAINING PROGRAM | Admitting: STUDENT IN AN ORGANIZED HEALTH CARE EDUCATION/TRAINING PROGRAM
Payer: COMMERCIAL

## 2024-01-15 VITALS
RESPIRATION RATE: 18 BRPM | SYSTOLIC BLOOD PRESSURE: 107 MMHG | BODY MASS INDEX: 26.42 KG/M2 | DIASTOLIC BLOOD PRESSURE: 64 MMHG | OXYGEN SATURATION: 98 % | HEART RATE: 82 BPM | HEIGHT: 71 IN | WEIGHT: 188.71 LBS | TEMPERATURE: 97.9 F

## 2024-01-15 DIAGNOSIS — C79.51 METASTASIS TO BONE (HCC): ICD-10-CM

## 2024-01-15 DIAGNOSIS — C43.59 MELANOMA OF BUTTOCK (HCC): ICD-10-CM

## 2024-01-15 DIAGNOSIS — C77.9 MALIGNANT MELANOMA METASTATIC TO LYMPH NODE (HCC): ICD-10-CM

## 2024-01-15 DIAGNOSIS — M84.50XA PATHOLOGICAL FRACTURE DUE TO NEOPLASTIC DISEASE, UNSPECIFIED FRACTURE SITE, INITIAL ENCOUNTER: ICD-10-CM

## 2024-01-15 PROBLEM — D72.829 LEUKOCYTOSIS: Status: ACTIVE | Noted: 2024-01-15

## 2024-01-15 PROBLEM — R73.9 HYPERGLYCEMIA: Status: ACTIVE | Noted: 2024-01-15

## 2024-01-15 LAB
ALBUMIN SERPL BCP-MCNC: 2.4 G/DL (ref 3.2–4.9)
BUN SERPL-MCNC: 19 MG/DL (ref 8–22)
CA-I SERPL-SCNC: 1.33 MMOL/L (ref 1.1–1.3)
CALCIUM ALBUM COR SERPL-MCNC: 11.3 MG/DL (ref 8.5–10.5)
CALCIUM SERPL-MCNC: 10 MG/DL (ref 8.4–10.2)
CHLORIDE SERPL-SCNC: 108 MMOL/L (ref 96–112)
CO2 SERPL-SCNC: 25 MMOL/L (ref 20–33)
CREAT SERPL-MCNC: 0.47 MG/DL (ref 0.5–1.4)
ERYTHROCYTE [DISTWIDTH] IN BLOOD BY AUTOMATED COUNT: 39.8 FL (ref 35.9–50)
GFR SERPLBLD CREATININE-BSD FMLA CKD-EPI: 119 ML/MIN/1.73 M 2
GLUCOSE BLD STRIP.AUTO-MCNC: 122 MG/DL (ref 65–99)
GLUCOSE BLD STRIP.AUTO-MCNC: 194 MG/DL (ref 65–99)
GLUCOSE SERPL-MCNC: 161 MG/DL (ref 65–99)
HCT VFR BLD AUTO: 24.6 % (ref 42–52)
HGB BLD-MCNC: 8.3 G/DL (ref 14–18)
MAGNESIUM SERPL-MCNC: 1.6 MG/DL (ref 1.5–2.5)
MCH RBC QN AUTO: 29.2 PG (ref 27–33)
MCHC RBC AUTO-ENTMCNC: 33.7 G/DL (ref 32.3–36.5)
MCV RBC AUTO: 86.6 FL (ref 81.4–97.8)
PHOSPHATE SERPL-MCNC: 2.6 MG/DL (ref 2.5–4.5)
PLATELET # BLD AUTO: 396 K/UL (ref 164–446)
PMV BLD AUTO: 9.6 FL (ref 9–12.9)
POTASSIUM SERPL-SCNC: 4.4 MMOL/L (ref 3.6–5.5)
RBC # BLD AUTO: 2.84 M/UL (ref 4.7–6.1)
SODIUM SERPL-SCNC: 143 MMOL/L (ref 135–145)
WBC # BLD AUTO: 13.6 K/UL (ref 4.8–10.8)

## 2024-01-15 PROCEDURE — 99223 1ST HOSP IP/OBS HIGH 75: CPT | Performed by: STUDENT IN AN ORGANIZED HEALTH CARE EDUCATION/TRAINING PROGRAM

## 2024-01-15 PROCEDURE — 700111 HCHG RX REV CODE 636 W/ 250 OVERRIDE (IP): Mod: JZ | Performed by: STUDENT IN AN ORGANIZED HEALTH CARE EDUCATION/TRAINING PROGRAM

## 2024-01-15 PROCEDURE — 770004 HCHG ROOM/CARE - ONCOLOGY PRIVATE *

## 2024-01-15 PROCEDURE — A9270 NON-COVERED ITEM OR SERVICE: HCPCS | Performed by: STUDENT IN AN ORGANIZED HEALTH CARE EDUCATION/TRAINING PROGRAM

## 2024-01-15 PROCEDURE — 80069 RENAL FUNCTION PANEL: CPT

## 2024-01-15 PROCEDURE — 94640 AIRWAY INHALATION TREATMENT: CPT

## 2024-01-15 PROCEDURE — 700105 HCHG RX REV CODE 258: Performed by: STUDENT IN AN ORGANIZED HEALTH CARE EDUCATION/TRAINING PROGRAM

## 2024-01-15 PROCEDURE — 700102 HCHG RX REV CODE 250 W/ 637 OVERRIDE(OP): Performed by: STUDENT IN AN ORGANIZED HEALTH CARE EDUCATION/TRAINING PROGRAM

## 2024-01-15 PROCEDURE — 82330 ASSAY OF CALCIUM: CPT

## 2024-01-15 PROCEDURE — 82962 GLUCOSE BLOOD TEST: CPT

## 2024-01-15 PROCEDURE — 36415 COLL VENOUS BLD VENIPUNCTURE: CPT

## 2024-01-15 PROCEDURE — 83735 ASSAY OF MAGNESIUM: CPT

## 2024-01-15 PROCEDURE — 82962 GLUCOSE BLOOD TEST: CPT | Mod: 91

## 2024-01-15 PROCEDURE — 94760 N-INVAS EAR/PLS OXIMETRY 1: CPT

## 2024-01-15 PROCEDURE — 700102 HCHG RX REV CODE 250 W/ 637 OVERRIDE(OP): Performed by: HOSPITALIST

## 2024-01-15 PROCEDURE — 85027 COMPLETE CBC AUTOMATED: CPT

## 2024-01-15 PROCEDURE — A9270 NON-COVERED ITEM OR SERVICE: HCPCS | Performed by: HOSPITALIST

## 2024-01-15 RX ORDER — PROMETHAZINE HYDROCHLORIDE 25 MG/1
12.5-25 SUPPOSITORY RECTAL EVERY 4 HOURS PRN
Status: CANCELLED | OUTPATIENT
Start: 2024-01-15

## 2024-01-15 RX ORDER — POLYETHYLENE GLYCOL 3350 17 G/17G
1 POWDER, FOR SOLUTION ORAL
Status: DISCONTINUED | OUTPATIENT
Start: 2024-01-15 | End: 2024-01-23

## 2024-01-15 RX ORDER — OXYCODONE HYDROCHLORIDE 5 MG/1
5 TABLET ORAL EVERY 4 HOURS PRN
Status: CANCELLED | OUTPATIENT
Start: 2024-01-15

## 2024-01-15 RX ORDER — AMOXICILLIN 250 MG
2 CAPSULE ORAL 2 TIMES DAILY
Status: CANCELLED | OUTPATIENT
Start: 2024-01-15

## 2024-01-15 RX ORDER — FAMOTIDINE 20 MG/1
20 TABLET, FILM COATED ORAL DAILY
Status: DISCONTINUED | OUTPATIENT
Start: 2024-01-15 | End: 2024-01-15 | Stop reason: HOSPADM

## 2024-01-15 RX ORDER — DEXAMETHASONE SODIUM PHOSPHATE 4 MG/ML
4 INJECTION, SOLUTION INTRA-ARTICULAR; INTRALESIONAL; INTRAMUSCULAR; INTRAVENOUS; SOFT TISSUE EVERY 6 HOURS
Status: DISCONTINUED | OUTPATIENT
Start: 2024-01-16 | End: 2024-01-22

## 2024-01-15 RX ORDER — OXYCODONE HYDROCHLORIDE 10 MG/1
10 TABLET ORAL EVERY 4 HOURS PRN
Status: CANCELLED | OUTPATIENT
Start: 2024-01-15

## 2024-01-15 RX ORDER — AMOXICILLIN 250 MG
2 CAPSULE ORAL 2 TIMES DAILY
Status: DISCONTINUED | OUTPATIENT
Start: 2024-01-16 | End: 2024-01-29 | Stop reason: HOSPADM

## 2024-01-15 RX ORDER — ONDANSETRON 2 MG/ML
4 INJECTION INTRAMUSCULAR; INTRAVENOUS EVERY 4 HOURS PRN
Status: DISCONTINUED | OUTPATIENT
Start: 2024-01-15 | End: 2024-01-29 | Stop reason: HOSPADM

## 2024-01-15 RX ORDER — FAMOTIDINE 20 MG/1
20 TABLET, FILM COATED ORAL DAILY
Status: CANCELLED | OUTPATIENT
Start: 2024-01-15

## 2024-01-15 RX ORDER — SODIUM CHLORIDE 9 MG/ML
INJECTION, SOLUTION INTRAVENOUS CONTINUOUS
Status: DISCONTINUED | OUTPATIENT
Start: 2024-01-15 | End: 2024-01-19

## 2024-01-15 RX ORDER — SODIUM CHLORIDE 9 MG/ML
INJECTION, SOLUTION INTRAVENOUS CONTINUOUS
Status: CANCELLED | OUTPATIENT
Start: 2024-01-15

## 2024-01-15 RX ORDER — BISACODYL 10 MG
10 SUPPOSITORY, RECTAL RECTAL
Status: DISCONTINUED | OUTPATIENT
Start: 2024-01-15 | End: 2024-01-23

## 2024-01-15 RX ORDER — PROMETHAZINE HYDROCHLORIDE 25 MG/1
12.5-25 TABLET ORAL EVERY 4 HOURS PRN
Status: DISCONTINUED | OUTPATIENT
Start: 2024-01-15 | End: 2024-01-29 | Stop reason: HOSPADM

## 2024-01-15 RX ORDER — DEXAMETHASONE SODIUM PHOSPHATE 4 MG/ML
4 INJECTION, SOLUTION INTRA-ARTICULAR; INTRALESIONAL; INTRAMUSCULAR; INTRAVENOUS; SOFT TISSUE EVERY 6 HOURS
Status: CANCELLED | OUTPATIENT
Start: 2024-01-15

## 2024-01-15 RX ORDER — PROCHLORPERAZINE EDISYLATE 5 MG/ML
5-10 INJECTION INTRAMUSCULAR; INTRAVENOUS EVERY 4 HOURS PRN
Status: CANCELLED | OUTPATIENT
Start: 2024-01-15

## 2024-01-15 RX ORDER — ONDANSETRON 4 MG/1
4 TABLET, ORALLY DISINTEGRATING ORAL EVERY 4 HOURS PRN
Status: CANCELLED | OUTPATIENT
Start: 2024-01-15

## 2024-01-15 RX ORDER — DEXTROSE MONOHYDRATE 25 G/50ML
25 INJECTION, SOLUTION INTRAVENOUS
Status: DISCONTINUED | OUTPATIENT
Start: 2024-01-15 | End: 2024-01-15 | Stop reason: HOSPADM

## 2024-01-15 RX ORDER — ENOXAPARIN SODIUM 100 MG/ML
40 INJECTION SUBCUTANEOUS DAILY
Status: DISCONTINUED | OUTPATIENT
Start: 2024-01-16 | End: 2024-01-29 | Stop reason: HOSPADM

## 2024-01-15 RX ORDER — ONDANSETRON 2 MG/ML
4 INJECTION INTRAMUSCULAR; INTRAVENOUS EVERY 4 HOURS PRN
Status: CANCELLED | OUTPATIENT
Start: 2024-01-15

## 2024-01-15 RX ORDER — MORPHINE SULFATE 4 MG/ML
4 INJECTION INTRAVENOUS
Status: DISCONTINUED | OUTPATIENT
Start: 2024-01-15 | End: 2024-01-26

## 2024-01-15 RX ORDER — FAMOTIDINE 20 MG/1
20 TABLET, FILM COATED ORAL DAILY
Status: DISCONTINUED | OUTPATIENT
Start: 2024-01-16 | End: 2024-01-29 | Stop reason: HOSPADM

## 2024-01-15 RX ORDER — ENOXAPARIN SODIUM 100 MG/ML
40 INJECTION SUBCUTANEOUS DAILY
Status: CANCELLED | OUTPATIENT
Start: 2024-01-15

## 2024-01-15 RX ORDER — BISACODYL 10 MG
10 SUPPOSITORY, RECTAL RECTAL
Status: CANCELLED | OUTPATIENT
Start: 2024-01-15

## 2024-01-15 RX ORDER — ONDANSETRON 4 MG/1
4 TABLET, ORALLY DISINTEGRATING ORAL EVERY 4 HOURS PRN
Status: DISCONTINUED | OUTPATIENT
Start: 2024-01-15 | End: 2024-01-29 | Stop reason: HOSPADM

## 2024-01-15 RX ORDER — POLYETHYLENE GLYCOL 3350 17 G/17G
1 POWDER, FOR SOLUTION ORAL
Status: CANCELLED | OUTPATIENT
Start: 2024-01-15

## 2024-01-15 RX ORDER — PROMETHAZINE HYDROCHLORIDE 12.5 MG/1
12.5-25 SUPPOSITORY RECTAL EVERY 4 HOURS PRN
Status: DISCONTINUED | OUTPATIENT
Start: 2024-01-15 | End: 2024-01-29 | Stop reason: HOSPADM

## 2024-01-15 RX ORDER — POLYETHYLENE GLYCOL 3350 17 G/17G
1 POWDER, FOR SOLUTION ORAL DAILY
Status: CANCELLED | OUTPATIENT
Start: 2024-01-16

## 2024-01-15 RX ORDER — OXYCODONE HYDROCHLORIDE 10 MG/1
10 TABLET ORAL EVERY 4 HOURS PRN
Status: DISCONTINUED | OUTPATIENT
Start: 2024-01-15 | End: 2024-01-29 | Stop reason: HOSPADM

## 2024-01-15 RX ORDER — OXYCODONE HYDROCHLORIDE 5 MG/1
5 TABLET ORAL EVERY 4 HOURS PRN
Status: DISCONTINUED | OUTPATIENT
Start: 2024-01-15 | End: 2024-01-29 | Stop reason: HOSPADM

## 2024-01-15 RX ORDER — MORPHINE SULFATE 4 MG/ML
4 INJECTION INTRAVENOUS
Status: CANCELLED | OUTPATIENT
Start: 2024-01-15

## 2024-01-15 RX ORDER — POLYETHYLENE GLYCOL 3350 17 G/17G
1 POWDER, FOR SOLUTION ORAL DAILY
Status: DISCONTINUED | OUTPATIENT
Start: 2024-01-16 | End: 2024-01-29 | Stop reason: HOSPADM

## 2024-01-15 RX ORDER — ACETAMINOPHEN 325 MG/1
650 TABLET ORAL EVERY 6 HOURS PRN
Status: DISCONTINUED | OUTPATIENT
Start: 2024-01-15 | End: 2024-01-29 | Stop reason: HOSPADM

## 2024-01-15 RX ORDER — PROCHLORPERAZINE EDISYLATE 5 MG/ML
5-10 INJECTION INTRAMUSCULAR; INTRAVENOUS EVERY 4 HOURS PRN
Status: DISCONTINUED | OUTPATIENT
Start: 2024-01-15 | End: 2024-01-29 | Stop reason: HOSPADM

## 2024-01-15 RX ORDER — ACETAMINOPHEN 325 MG/1
650 TABLET ORAL EVERY 6 HOURS PRN
Status: CANCELLED | OUTPATIENT
Start: 2024-01-15

## 2024-01-15 RX ORDER — DEXTROSE MONOHYDRATE 25 G/50ML
25 INJECTION, SOLUTION INTRAVENOUS
Status: CANCELLED | OUTPATIENT
Start: 2024-01-15

## 2024-01-15 RX ORDER — PROMETHAZINE HYDROCHLORIDE 25 MG/1
12.5-25 TABLET ORAL EVERY 4 HOURS PRN
Status: CANCELLED | OUTPATIENT
Start: 2024-01-15

## 2024-01-15 RX ADMIN — POLYETHYLENE GLYCOL 3350 1 PACKET: 17 POWDER, FOR SOLUTION ORAL at 04:50

## 2024-01-15 RX ADMIN — OXYCODONE HYDROCHLORIDE 10 MG: 10 TABLET ORAL at 03:07

## 2024-01-15 RX ADMIN — MORPHINE SULFATE 4 MG: 4 INJECTION, SOLUTION INTRAMUSCULAR; INTRAVENOUS at 17:40

## 2024-01-15 RX ADMIN — OXYCODONE HYDROCHLORIDE 10 MG: 10 TABLET ORAL at 21:34

## 2024-01-15 RX ADMIN — SODIUM CHLORIDE: 9 INJECTION, SOLUTION INTRAVENOUS at 06:44

## 2024-01-15 RX ADMIN — DEXAMETHASONE SODIUM PHOSPHATE 4 MG: 4 INJECTION INTRA-ARTICULAR; INTRALESIONAL; INTRAMUSCULAR; INTRAVENOUS; SOFT TISSUE at 04:49

## 2024-01-15 RX ADMIN — OXYCODONE HYDROCHLORIDE 10 MG: 10 TABLET ORAL at 11:41

## 2024-01-15 RX ADMIN — SENNOSIDES AND DOCUSATE SODIUM 2 TABLET: 50; 8.6 TABLET ORAL at 04:49

## 2024-01-15 RX ADMIN — DEXAMETHASONE SODIUM PHOSPHATE 4 MG: 4 INJECTION INTRA-ARTICULAR; INTRALESIONAL; INTRAMUSCULAR; INTRAVENOUS; SOFT TISSUE at 11:41

## 2024-01-15 RX ADMIN — SODIUM CHLORIDE: 9 INJECTION, SOLUTION INTRAVENOUS at 19:58

## 2024-01-15 RX ADMIN — MOMETASONE FUROATE AND FORMOTEROL FUMARATE DIHYDRATE 2 PUFF: 200; 5 AEROSOL RESPIRATORY (INHALATION) at 21:40

## 2024-01-15 RX ADMIN — FAMOTIDINE 20 MG: 20 TABLET, FILM COATED ORAL at 11:42

## 2024-01-15 RX ADMIN — MOMETASONE FUROATE AND FORMOTEROL FUMARATE DIHYDRATE 2 PUFF: 200; 5 AEROSOL RESPIRATORY (INHALATION) at 06:48

## 2024-01-15 RX ADMIN — INSULIN HUMAN 1 UNITS: 100 INJECTION, SOLUTION PARENTERAL at 17:39

## 2024-01-15 RX ADMIN — DEXAMETHASONE SODIUM PHOSPHATE 4 MG: 4 INJECTION INTRA-ARTICULAR; INTRALESIONAL; INTRAMUSCULAR; INTRAVENOUS; SOFT TISSUE at 17:40

## 2024-01-15 RX ADMIN — OXYCODONE HYDROCHLORIDE 10 MG: 10 TABLET ORAL at 16:23

## 2024-01-15 RX ADMIN — DEXAMETHASONE SODIUM PHOSPHATE 4 MG: 4 INJECTION INTRA-ARTICULAR; INTRALESIONAL; INTRAMUSCULAR; INTRAVENOUS; SOFT TISSUE at 23:28

## 2024-01-15 ASSESSMENT — COGNITIVE AND FUNCTIONAL STATUS - GENERAL
SUGGESTED CMS G CODE MODIFIER MOBILITY: CL
DRESSING REGULAR UPPER BODY CLOTHING: A LITTLE
TOILETING: A LITTLE
STANDING UP FROM CHAIR USING ARMS: A LOT
TURNING FROM BACK TO SIDE WHILE IN FLAT BAD: A LITTLE
EATING MEALS: A LITTLE
MOBILITY SCORE: 12
MOVING TO AND FROM BED TO CHAIR: A LITTLE
CLIMB 3 TO 5 STEPS WITH RAILING: TOTAL
DAILY ACTIVITIY SCORE: 17
DRESSING REGULAR LOWER BODY CLOTHING: A LOT
HELP NEEDED FOR BATHING: A LOT
MOVING FROM LYING ON BACK TO SITTING ON SIDE OF FLAT BED: A LOT
WALKING IN HOSPITAL ROOM: TOTAL
SUGGESTED CMS G CODE MODIFIER DAILY ACTIVITY: CK

## 2024-01-15 ASSESSMENT — PAIN DESCRIPTION - PAIN TYPE
TYPE: ACUTE PAIN
TYPE: ACUTE PAIN;CHRONIC PAIN
TYPE: ACUTE PAIN
TYPE: ACUTE PAIN;CHRONIC PAIN

## 2024-01-15 ASSESSMENT — FIBROSIS 4 INDEX
FIB4 SCORE: 1.45
FIB4 SCORE: 1.45

## 2024-01-15 NOTE — PROGRESS NOTES
Telemetry Shift Summary     Rhythm: SR/ST  Rate: 70s-100s  Measurements: 0.14/0.08/0.36  Ectopy (reported by Monitor Tech): rare PVC/PAC/PJC/Couplets     Normal Values  Rhythm: Sinus  HR:   Measurements: 0.12-0.20/0.06-0.10/0.30-0.52

## 2024-01-15 NOTE — ASSESSMENT & PLAN NOTE
likely due to tumor compression of the S1/S2,   continue Decadron, his reported his pain has been improving.    Pepcid for GI prophylaxis  Multimodal pain managements including po and iv narcotics prn. Monitoring respiratory status and sedation score  Patient seen at bedside today, patient reports pain is tolerable, pain regimen has been adjusted by palliative care.

## 2024-01-15 NOTE — DISCHARGE PLANNING
Case Management Discharge Planning    Admission Date: 1/11/2024  GMLOS: 3.5  ALOS: 4    6-Clicks ADL Score: 9  6-Clicks Mobility Score: 6    Anticipated Discharge Dispo: Discharge Disposition: D/T to short term gen hosp for IP care (02)    DME Needed: No    Escalations Completed: None    Medically Clear: Yes    Barriers to Discharge: Bed Availability at [facility]    Course of Action  **0930  Per MD, patient to transfer to [facility]. PCS faxed to transfer center.     **1134  Spoke to patient and spouse at bedside. They are aware of transfer. Cobra completed.    **1155  Transfer packet handed to bedside RN. Transfer time: TBD.

## 2024-01-15 NOTE — ASSESSMENT & PLAN NOTE
Likely due to underlying malignancy, high ferritin and B12.   No sign of active bleeding    Continue to monitor, transfuse if Hb less than 7

## 2024-01-15 NOTE — CARE PLAN
The patient is Watcher - Medium risk of patient condition declining or worsening    Shift Goals  Clinical Goals: Pain Control, Monitor Labs  Patient Goals: Comfort  Family Goals: safety, comfort    Progress made toward(s) clinical / shift goals:    Problem: Pain - Standard  Goal: Alleviation of pain or a reduction in pain to the patient’s comfort goal  Outcome: Progressing     Problem: Knowledge Deficit - Standard  Goal: Patient and family/care givers will demonstrate understanding of plan of care, disease process/condition, diagnostic tests and medications  Outcome: Progressing     Problem: Fall Risk  Goal: Patient will remain free from falls  Outcome: Progressing     Problem: Skin Integrity  Goal: Skin integrity is maintained or improved  Outcome: Progressing       Patient is not progressing towards the following goals:

## 2024-01-15 NOTE — PROGRESS NOTES
Monitor summary:     Rhythm : SR  Rate : 88-93  Ectopy : R PVC/PAC    SC=.14  QRS=.10  QT=.34        Per telemetry strip summary from monitor room.

## 2024-01-15 NOTE — H&P
Hospital Medicine History & Physical Note    Date of Service  1/15/2024    Primary Care Physician  Jose Luis Juarez M.D.    Consultants  oncology    Specialist Names: Dr. Tello    Code Status  Full Code    Chief Complaint  No chief complaint on file.      History of Presenting Illness  Andrew Wall is a 58 y.o. male, with history of melanoma and recently diagnosed with extensive metastatic disease with MRI earlier this month, who admitted for hypercalcemia.  Patient was sent by his oncologist for abnormal lab Calcium 12.2.  Patient reports progressively worse back pain, right leg sciatica and generalized weakness, constipation with no bowel movement for 6 days.    Patient followed by oncologist Dr. Ross, has been on immunotherapy.  He has an upcoming appointment with radiation oncology.      Images showed extensive bone metastasis and new metastasis to lung and liver     Hypercalcemia improved with IV fluid and Zometa.     Radiation oncology Dr. Tello recommended to transfer to [facility] for mapping and possible inpatient radiation.     I discussed the plan of care with patient and family.    Review of Systems  All 12 systems were reviewed and negative except as mentioned above      Past Medical History   has a past medical history of Asthma, Cancer (HCC) (10/20), Constipation (1/12/2024), 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) (11/16/2023), 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) (11/16/2023), 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), and Nasal polyp.    Surgical History   has a past surgical history that includes nasal polypectomy (2015, 2017, 2019); antrostomy (Bilateral, 11/15/2019); sinuscopy (Bilateral, 11/15/2019); turbinoplasty (Bilateral, 11/15/2019); sphenoidectomy (Bilateral, 11/15/2019); ethmoidectomy (Bilateral, 11/15/2019); nasal polypectomy (Bilateral, 11/15/2019); node biopsy sentinel (Bilateral, 10/20/2020); wide excision (Right, 10/20/2020); other  (11/20); and lymph node excision (Right, 11/16/2023).     Family History  family history is not on file.   Family history reviewed with patient. There is no family history that is pertinent to the chief complaint.     Social History   reports that he has never smoked. He has never used smokeless tobacco. He reports current alcohol use of about 0.6 oz of alcohol per week. He reports that he does not use drugs.    Allergies  Allergies   Allergen Reactions    Augmentin Vomiting and Nausea       Medications  Cannot display prior to admission medications because the patient has not been admitted in this contact.       Physical Exam  Temp:  [36.1 °C (97 °F)-36.6 °C (97.9 °F)] 36.6 °C (97.9 °F)  Pulse:  [79-99] 82  Resp:  [16-19] 18  BP: (102-115)/(54-81) 107/64  SpO2:  [94 %-98 %] 98 %                          Physical Exam  Constitutional:       General: He is in acute distress.      Appearance: He is ill-appearing.   HENT:      Head: Normocephalic.      Mouth/Throat:      Mouth: Mucous membranes are moist.   Eyes:      Extraocular Movements: Extraocular movements intact.      Conjunctiva/sclera: Conjunctivae normal.   Cardiovascular:      Rate and Rhythm: Normal rate and regular rhythm.      Pulses: Normal pulses.      Heart sounds: Normal heart sounds.   Pulmonary:      Effort: Pulmonary effort is normal.      Breath sounds: Normal breath sounds.   Abdominal:      General: Bowel sounds are normal.      Palpations: Abdomen is soft.      Tenderness: There is no abdominal tenderness. There is no guarding.   Musculoskeletal:         General: Tenderness present.      Cervical back: Normal range of motion and neck supple.   Skin:     General: Skin is warm.   Neurological:      Mental Status: He is alert and oriented to person, place, and time. Mental status is at baseline.   Psychiatric:         Mood and Affect: Mood normal.         Laboratory:  Recent Labs     01/13/24  0219 01/14/24  0038 01/15/24  0125   WBC 9.0 10.2  "13.6*   RBC 3.36* 3.48* 2.84*   HEMOGLOBIN 9.6* 10.0* 8.3*   HEMATOCRIT 29.1* 30.6* 24.6*   MCV 86.6 87.9 86.6   MCH 28.6 28.7 29.2   MCHC 33.0 32.7 33.7   RDW 41.0 42.5 39.8   PLATELETCT 374 378 396   MPV 9.0 9.6 9.6     Recent Labs     01/13/24  0219 01/14/24  0038 01/15/24  0125   SODIUM 138 137 143   POTASSIUM 3.3* 3.5* 4.4   CHLORIDE 97 98 108   CO2 29 26 25   GLUCOSE 100* 232* 161*   BUN 16 16 19   CREATININE 0.75 0.63 0.47*   CALCIUM 10.8* 10.7* 10.0     Recent Labs     01/13/24 0219 01/14/24  0038 01/15/24  0125   GLUCOSE 100* 232* 161*         No results for input(s): \"NTPROBNP\" in the last 72 hours.      No results for input(s): \"TROPONINT\" in the last 72 hours.    Imaging:  No orders to display           Assessment/Plan:  Justification for Admission Status  I anticipate this patient will require at least two midnights for appropriate medical management, necessitating inpatient admission because extensive melanoma    Patient will need a Med/Surg bed on ONCOLOGY service .  The need is secondary to extensive melanoma.    * Metastatic melanoma (HCC)- (present on admission)  Assessment & Plan  Patient followed by oncologist Dr. Ross, has been on immunotherapy.  He has an upcoming appointment with radiation oncology.     Images showed extensive bone metastasis and new metastasis to lung and liver  outpatient MRI showed extensive metastases through the lumbar spine, sacrum, paraspinous muscles, psoas muscle, iliacus muscle, gluteal muscles and Bilateral S1 nerve roots and right S2 nerve root in the sacral foramen are surrounded by the mass and probably invaded/impinged.     CT here showed  R shoulder - Large lytic metastasis involving the right anterior superior glenoid and coracoid with soft tissue component;  left shoulder - metastasis of Glenoid/Scapular body/ Clavicular head/ Teres minor muscle mass      CT chest/abd/pelvis - Severe pulmonary metastatic disease; Severe and widespread osseous metastatic " disease/pathologic fractures; New liver lesion suggesting metastasis. Few peritoneal nodules suggesting peritoneal malignancy     new from prior CT dated 7/11/2023. PET scan 11/2023 was negative for chest and abdomen.       MRI brain showed MRI brain noted C2 vertebral body bone metastasis. There is mild amount of anterior epidural tumor at this level. There is no spinal canal compromise. This is new since the previous study. No intracranial metastasis.     Medical oncology in the radiation oncology consultation  Radiation oncology Dr. Tello recommended to transfer to [facility] for mapping and possible inpatient radiation.           Hypercalcemia- (present on admission)  Assessment & Plan  due to extensive bone metastasis.    TSH 3.5, PTH 3.7 (appropriately depressed)  He has been treated with aggressive IV fluid, received 1 dose of IV Zometa, His hypercalcemia slowly improving, corrected calcium down to 11.3.   Continue IV fluid    Hyperglycemia  Assessment & Plan  Due to steroids  BG over 200  I started SSI  Hypoglycemia protocol    Leukocytosis  Assessment & Plan  likely due to steroid use.  No infectious signs    Fever- (present on admission)  Assessment & Plan   likely due to extensive metastasis;   no identified infection signs;   elevated procalcitonin could be related to malignancy;   blood culture NTD, continue to hold antibiotics, monitoring    Intractable low back pain- (present on admission)  Assessment & Plan   likely due to tumor compression of the S1/S2,   continue IV Decadron, his reported his pain has been improving.    I ordered Pepcid for GI prophylaxis  Monitor BP and BG while on high-dose steroids    Constipation- (present on admission)  Assessment & Plan  No bowel movement for a week  Likely due to hypercalcemia, opioid use and bedbound    1/14 had bowel movement  Continue aggressive bowel management    Anemia- (present on admission)  Assessment & Plan   likely due to underlying  malignancy, high ferritin and B12.   Hb 8.3    Continue to monitor, transfuse if Hb less than 7        VTE prophylaxis: enoxaparin ppx    I spent greater than 76 minutes for chart review, obtaining history independently, performing medically appropriate examination,  documenting , ordering medications, tests, or procedures, referring and communicating with other health care professionals, Independently interpreting results and communicating results with patient/family/caregiver. More than 50% of time was spent in face-to-face clinical encounter.

## 2024-01-15 NOTE — ASSESSMENT & PLAN NOTE
Patient followed by oncologist Dr. Ross, has been on immunotherapy.  He has an upcoming appointment with radiation oncology.     Images showed extensive bone metastasis and new metastasis to lung and liver  outpatient MRI showed extensive metastases through the lumbar spine, sacrum, paraspinous muscles, psoas muscle, iliacus muscle, gluteal muscles and Bilateral S1 nerve roots and right S2 nerve root in the sacral foramen are surrounded by the mass and probably invaded/impinged.     CT here showed  R shoulder - Large lytic metastasis involving the right anterior superior glenoid and coracoid with soft tissue component;  left shoulder - metastasis of Glenoid/Scapular body/ Clavicular head/ Teres minor muscle mass      CT chest/abd/pelvis - Severe pulmonary metastatic disease; Severe and widespread osseous metastatic disease/pathologic fractures; New liver lesion suggesting metastasis. Few peritoneal nodules suggesting peritoneal malignancy. new from prior CT dated 7/11/2023. PET scan 11/2023 was negative for chest and abdomen.       MRI brain showed MRI brain noted C2 vertebral body bone metastasis. There is mild amount of anterior epidural tumor at this level. There is no spinal canal compromise. This is new since the previous study. No intracranial metastasis.     Medical oncology and radiation oncology consultation  S/p Sacrum Radiation started 1/17-19.   Completing course of radiation to spine and hip - following Dr. Fraga  Discussed with Dr. Kuo, he is BRAF [id number] POSITIVE. Planning Braftovi/Mektovi, now insurance approved 1/25, will start 1/27  Continue multimodal pain managements  TTE with LVEF of 60%, no valvular abnormalities.

## 2024-01-15 NOTE — ASSESSMENT & PLAN NOTE
due to extensive bone metastasis.    TSH 3.5, PTH 3.7 (appropriately depressed)  He has been treated with aggressive IV fluid, received 1 dose of IV Zometa, His hypercalcemia slowly improving, corrected calcium down to 11.3.   Labs reviewed, resolved

## 2024-01-15 NOTE — THERAPY
Occupational Therapy Contact Note    Patient Name: Andrew Wall  Age:  58 y.o., Sex:  male  Medical Record #: [id number]  Today's Date: 1/15/2024    Discussed missed therapy with MD and PT       01/15/24 1025   Initial Contact Note    Initial Contact Note Order Received and Verified. Occupational Therapy Evaluation NOT Completed Because Patient Does Not Require Acute Occupational Therapy at this Time.   Interdisciplinary Plan of Care Collaboration   IDT Collaboration with  Physician;Physical Therapist   Collaboration Comments Attempted OT eval.  Consult w/ physican who recommended cancel OT orders at this time secondary pt will be transferring to [facility] for higher level of care.

## 2024-01-15 NOTE — DISCHARGE SUMMARY
Discharge Summary    CHIEF COMPLAINT ON ADMISSION  Chief Complaint   Patient presents with    Abnormal Labs     Pt called and told to come to ER from PCP for high calcium levels    Weakness     Pt has hx of spinal cancer and has back pain and weakness. Pt endorses glf tonight after trying to get up to come to ER, pt states he fell backward and landed on some pillows on his buttocks.        Reason for Admission  EMS    Admission Date  1/11/2024     CODE STATUS  Full Code    HPI & HOSPITAL COURSE  Andrew Wall is a 58 y.o. male, with history of melanoma and recently diagnosed with extensive metastatic disease with MRI earlier this month, who admitted for hypercalcemia.  Patient was sent by his oncologist for abnormal lab Calcium 12.2.  Patient reports progressively worse back pain, right leg sciatica and generalized weakness, constipation with no bowel movement for 6 days.    Patient followed by oncologist Dr. Ross, has been on immunotherapy.  He has an upcoming appointment with radiation oncology.     Images showed extensive bone metastasis and new metastasis to lung and liver  outpatient MRI showed extensive metastases through the lumbar spine, sacrum, paraspinous muscles, psoas muscle, iliacus muscle, gluteal muscles and Bilateral S1 nerve roots and right S2 nerve root in the sacral foramen are surrounded by the mass and probably invaded/impinged.    CT here showed  R shoulder - Large lytic metastasis involving the right anterior superior glenoid and coracoid with soft tissue component;  left shoulder - metastasis of Glenoid/Scapular body/ Clavicular head/ Teres minor muscle mass      CT chest/abd/pelvis - Severe pulmonary metastatic disease; Severe and widespread osseous metastatic disease/pathologic fractures; New liver lesion suggesting metastasis. Few peritoneal nodules suggesting peritoneal malignancy    new from prior CT dated 7/11/2023. PET scan 11/2023 was negative for chest and abdomen.      MRI  brain showed MRI brain noted C2 vertebral body bone metastasis. There is mild amount of anterior epidural tumor at this level. There is no spinal canal compromise. This is new since the previous study. No intracranial metastasis.    TSH 3.5, PTH 3.7 (appropriately depressed)    hypercalcemia - due to extensive bone metastasis.  He has been treated with aggressive IV fluid, received 1 dose of IV Zometa, His hypercalcemia slowly improving, corrected calcium down to 11.3.      intractable back pain radiating to the right leg, likely due to tumor compression of the S1/S2, continue IV Decadron, his reported his pain has been improving.  I ordered Pepcid for GI prophylaxis    Hyperglycemia due to steroid use, BG over 200, continue SSI.     Episodes of fever - likely due to extensive metastasis; no identified infection signs; elevated procalcitonin could be related to malignancy; blood culture NTD, continue to hold antibiotics, monitoring    Leukocytosis-likely due to steroid use.  No infectious signs    Chronic anemia, likely due to underlying malignancy, high ferritin and B12.     Radiation oncology Dr. Tello recommended to transfer to [facility] for mapping and possible inpatient radiation.        Therefore, he is transferred in fair and stable condition to a short-term general hosptial for inpatient care.      FOLLOW UP ITEMS POST DISCHARGE  Follow-up with radiation oncology and medical oncology    DISCHARGE DIAGNOSES  Principal Problem:    Hypercalcemia (POA: Yes)  Active Problems:    Mild intermittent asthma without complication (POA: Yes)    Metastatic melanoma (HCC) (POA: Unknown)    Elevated glucose (POA: Unknown)    Anemia (POA: Unknown)    Constipation (POA: Unknown)    Intractable low back pain (POA: Unknown)    Hypokalemia (POA: Unknown)    Fever (POA: Unknown)  Resolved Problems:    * No resolved hospital problems. *      FOLLOW UP  Future Appointments   Date Time Provider Department Center    1/16/2024 11:00 AM Abdoul Tello M.D. RADT None   1/21/2024  7:30 AM [facility] MRI 1 MR [facility] [place]   1/30/2024  8:45 AM Lamberto Larson M.D. [facility] [address]     No follow-up provider specified.    MEDICATIONS ON DISCHARGE     Medication List        ASK your doctor about these medications        Instructions   budesonide 0.25 MG/2ML Susp  Commonly known as: Pulmicort   Take 250 mcg by nebulization 2 times a day.  Dose: 250 mcg     CALCIUM/VITAMIN D3/ADULT GUMMY PO   Take 1 Tablet by mouth every day.  Dose: 1 Tablet     fluticasone-salmeterol 250-50 MCG/ACT Aepb  Commonly known as: Advair   Inhale 1 Puff 2 times a day.  Dose: 1 Puff     gabapentin 100 MG Caps  Start taking on: January 4, 2024  Commonly known as: Neurontin   Take 1 Capsule by mouth at bedtime for 7 days, THEN 1 Capsule 2 times a day for 7 days, THEN 1 Capsule 3 times a day for 30 days.     ibuprofen 200 MG Tabs  Commonly known as: Motrin   Take 200 mg by mouth 2 times a day as needed for Mild Pain.  Dose: 200 mg     MULTIVITAMIN ADULT PO   Take 1 Tablet by mouth every day.  Dose: 1 Tablet     ondansetron 4 MG Tbdp  Commonly known as: Zofran ODT   Take 1 Tablet by mouth every 6 hours as needed for Nausea/Vomiting.  Dose: 4 mg     oxyCODONE-acetaminophen  MG Tabs  Commonly known as: Percocet-10  Ask about: Which instructions should I use?   Take 1 Tablet by mouth every 8 hours as needed for Severe Pain for up to 30 days.  Dose: 1 Tablet              Allergies  Allergies   Allergen Reactions    Augmentin Vomiting and Nausea       DIET  Orders Placed This Encounter   Procedures    Diet Order Diet: Regular     Standing Status:   Standing     Number of Occurrences:   1     Order Specific Question:   Diet:     Answer:   Regular [1]       ACTIVITY  As tolerated.  Weight bearing as tolerated    LINES, DRAINS, AND WOUNDS  This is an automated list. Peripheral IVs will be removed prior to discharge.  Peripheral IV 01/11/24 18 G Left;Posterior  Hand (Active)   Site Assessment Clean;Dry;Intact 01/14/24 1945   Dressing Type Occlusive;Transparent 01/14/24 1945   Line Status Scrubbed the hub prior to access;Flushed;Infusing 01/14/24 1945   Dressing Status Clean;Dry;Intact 01/14/24 1945   Dressing Intervention N/A 01/14/24 1945   Dressing Change Due 01/13/24 01/13/24 2049   Date Primary Tubing Changed 01/13/24 01/13/24 2049   Infiltration Grading ([facility], [facility]) 0 01/14/24 0800   Phlebitis Scale ([facility] Only) 0 01/14/24 0800       Peripheral IV 01/13/24 Left Antecubital (Active)   Site Assessment Clean;Dry;Intact 01/14/24 1945   Dressing Type Occlusive;Transparent 01/14/24 1945   Line Status Scrubbed the hub prior to access;Flushed;Saline locked 01/14/24 1945   Dressing Status Clean;Dry;Intact 01/14/24 1945   Dressing Intervention N/A 01/14/24 1945   Infiltration Grading ([facility]) 0 01/14/24 0800   Phlebitis Scale ([facility] Only) 0 01/14/24 0800       Wound 10/20/20 Incision Groin Bilateral STERI STRIPS, 2 X 2, TEGADERM BILAT GROINS (Active)       Wound 10/20/20 Incision Buttocks Right PREVENA 13 CM (Active)       Wound 11/16/23 Incision Groin Right 1 incision: dermabond (Active)       Peripheral IV 01/11/24 18 G Left;Posterior Hand (Active)   Site Assessment Clean;Dry;Intact 01/14/24 1945   Dressing Type Occlusive;Transparent 01/14/24 1945   Line Status Scrubbed the hub prior to access;Flushed;Infusing 01/14/24 1945   Dressing Status Clean;Dry;Intact 01/14/24 1945   Dressing Intervention N/A 01/14/24 1945   Dressing Change Due 01/13/24 01/13/24 2049   Date Primary Tubing Changed 01/13/24 01/13/24 2049   Infiltration Grading ([facility]) 0 01/14/24 0800   Phlebitis Scale ([facility] Only) 0 01/14/24 0800       Peripheral IV 01/13/24 Left Antecubital (Active)   Site Assessment Clean;Dry;Intact 01/14/24 1945   Dressing Type Occlusive;Transparent 01/14/24 1945   Line Status Scrubbed the hub prior to access;Flushed;Saline locked 01/14/24 1945   Dressing Status  Clean;Dry;Intact 01/14/24 1945   Dressing Intervention N/A 01/14/24 1945   Infiltration Grading ([facility]) 0 01/14/24 0800   Phlebitis Scale ([facility] Only) 0 01/14/24 0800               MENTAL STATUS ON TRANSFER             CONSULTATIONS  Medical oncology and radiation oncology    PROCEDURES      LABORATORY  Lab Results   Component Value Date    SODIUM 143 01/15/2024    POTASSIUM 4.4 01/15/2024    CHLORIDE 108 01/15/2024    CO2 25 01/15/2024    GLUCOSE 161 (H) 01/15/2024    BUN 19 01/15/2024    CREATININE 0.47 (L) 01/15/2024        Lab Results   Component Value Date    WBC 13.6 (H) 01/15/2024    HEMOGLOBIN 8.3 (L) 01/15/2024    HEMATOCRIT 24.6 (L) 01/15/2024    PLATELETCT 396 01/15/2024        Total time of the discharge process exceeds 36 minutes.

## 2024-01-15 NOTE — ASSESSMENT & PLAN NOTE
Likely due to hypercalcemia, opioid use and bedbound  Aggressive bowel regimen, oral Dulcolax, senna, MiraLAX  Dulcolax suppository  Patient had large bowel movement 1/23 PM, continue with aggressive bowel regimen.

## 2024-01-15 NOTE — THERAPY
Physical Therapy Contact Note    Patient Name: Andrew Wall  Age:  58 y.o., Sex:  male  Medical Record #: [id number]  Today's Date: 1/15/2024     01/15/24 1021   Interdisciplinary Plan of Care Collaboration   IDT Collaboration with  Physician   Collaboration Comments Attempted PT txt session, however, physican stating to cancel therapy at this time as pt will be transferring to [facility] for higher level of care. Will cancel therapy at this time.

## 2024-01-15 NOTE — ASSESSMENT & PLAN NOTE
likely due to extensive metastasis;   no identified infection signs;   elevated procalcitonin could be related to malignancy;   blood culture NTD, continue to hold antibiotics, monitoring

## 2024-01-16 ENCOUNTER — HOSPITAL ENCOUNTER (OUTPATIENT)
Dept: RADIATION ONCOLOGY | Facility: MEDICAL CENTER | Age: 59
End: 2024-01-16

## 2024-01-16 ENCOUNTER — APPOINTMENT (OUTPATIENT)
Dept: RADIOLOGY | Facility: MEDICAL CENTER | Age: 59
DRG: 543 | End: 2024-01-16
Attending: STUDENT IN AN ORGANIZED HEALTH CARE EDUCATION/TRAINING PROGRAM
Payer: COMMERCIAL

## 2024-01-16 ENCOUNTER — APPOINTMENT (OUTPATIENT)
Dept: RADIOLOGY | Facility: MEDICAL CENTER | Age: 59
End: 2024-01-16
Attending: SURGERY
Payer: COMMERCIAL

## 2024-01-16 DIAGNOSIS — C79.51 METASTASIS TO BONE (HCC): ICD-10-CM

## 2024-01-16 LAB
1,25(OH)2D3 SERPL-MCNC: <5 PG/ML (ref 19.9–79.3)
ALBUMIN SERPL BCP-MCNC: 2.5 G/DL (ref 3.2–4.9)
BUN SERPL-MCNC: 25 MG/DL (ref 8–22)
CALCIUM ALBUM COR SERPL-MCNC: 9.4 MG/DL (ref 8.5–10.5)
CALCIUM SERPL-MCNC: 8.2 MG/DL (ref 8.5–10.5)
CHLORIDE SERPL-SCNC: 105 MMOL/L (ref 96–112)
CO2 SERPL-SCNC: 24 MMOL/L (ref 20–33)
CREAT SERPL-MCNC: 0.58 MG/DL (ref 0.5–1.4)
ERYTHROCYTE [DISTWIDTH] IN BLOOD BY AUTOMATED COUNT: 41.9 FL (ref 35.9–50)
GFR SERPLBLD CREATININE-BSD FMLA CKD-EPI: 112 ML/MIN/1.73 M 2
GLUCOSE BLD STRIP.AUTO-MCNC: 119 MG/DL (ref 65–99)
GLUCOSE BLD STRIP.AUTO-MCNC: 126 MG/DL (ref 65–99)
GLUCOSE BLD STRIP.AUTO-MCNC: 173 MG/DL (ref 65–99)
GLUCOSE BLD STRIP.AUTO-MCNC: 191 MG/DL (ref 65–99)
GLUCOSE SERPL-MCNC: 126 MG/DL (ref 65–99)
HCT VFR BLD AUTO: 25.9 % (ref 42–52)
HGB BLD-MCNC: 8.5 G/DL (ref 14–18)
MAGNESIUM SERPL-MCNC: 1.7 MG/DL (ref 1.5–2.5)
MCH RBC QN AUTO: 28.9 PG (ref 27–33)
MCHC RBC AUTO-ENTMCNC: 32.8 G/DL (ref 32.3–36.5)
MCV RBC AUTO: 88.1 FL (ref 81.4–97.8)
PHOSPHATE SERPL-MCNC: 3.2 MG/DL (ref 2.5–4.5)
PLATELET # BLD AUTO: 406 K/UL (ref 164–446)
PMV BLD AUTO: 9.5 FL (ref 9–12.9)
POTASSIUM SERPL-SCNC: 4.1 MMOL/L (ref 3.6–5.5)
RBC # BLD AUTO: 2.94 M/UL (ref 4.7–6.1)
SODIUM SERPL-SCNC: 140 MMOL/L (ref 135–145)
WBC # BLD AUTO: 14.6 K/UL (ref 4.8–10.8)

## 2024-01-16 PROCEDURE — 700102 HCHG RX REV CODE 250 W/ 637 OVERRIDE(OP): Performed by: STUDENT IN AN ORGANIZED HEALTH CARE EDUCATION/TRAINING PROGRAM

## 2024-01-16 PROCEDURE — A9579 GAD-BASE MR CONTRAST NOS,1ML: HCPCS | Performed by: STUDENT IN AN ORGANIZED HEALTH CARE EDUCATION/TRAINING PROGRAM

## 2024-01-16 PROCEDURE — 700111 HCHG RX REV CODE 636 W/ 250 OVERRIDE (IP): Mod: JZ | Performed by: STUDENT IN AN ORGANIZED HEALTH CARE EDUCATION/TRAINING PROGRAM

## 2024-01-16 PROCEDURE — 77334 RADIATION TREATMENT AID(S): CPT | Performed by: RADIOLOGY

## 2024-01-16 PROCEDURE — 77263 THER RADIOLOGY TX PLNG CPLX: CPT | Performed by: RADIOLOGY

## 2024-01-16 PROCEDURE — 99233 SBSQ HOSP IP/OBS HIGH 50: CPT | Performed by: STUDENT IN AN ORGANIZED HEALTH CARE EDUCATION/TRAINING PROGRAM

## 2024-01-16 PROCEDURE — A9270 NON-COVERED ITEM OR SERVICE: HCPCS | Performed by: STUDENT IN AN ORGANIZED HEALTH CARE EDUCATION/TRAINING PROGRAM

## 2024-01-16 PROCEDURE — 85027 COMPLETE CBC AUTOMATED: CPT

## 2024-01-16 PROCEDURE — 77470 SPECIAL RADIATION TREATMENT: CPT | Performed by: RADIOLOGY

## 2024-01-16 PROCEDURE — 82962 GLUCOSE BLOOD TEST: CPT

## 2024-01-16 PROCEDURE — 77334 RADIATION TREATMENT AID(S): CPT | Mod: 26 | Performed by: RADIOLOGY

## 2024-01-16 PROCEDURE — 700105 HCHG RX REV CODE 258: Performed by: STUDENT IN AN ORGANIZED HEALTH CARE EDUCATION/TRAINING PROGRAM

## 2024-01-16 PROCEDURE — 80069 RENAL FUNCTION PANEL: CPT

## 2024-01-16 PROCEDURE — 77290 THER RAD SIMULAJ FIELD CPLX: CPT | Mod: 26 | Performed by: RADIOLOGY

## 2024-01-16 PROCEDURE — 700117 HCHG RX CONTRAST REV CODE 255: Performed by: STUDENT IN AN ORGANIZED HEALTH CARE EDUCATION/TRAINING PROGRAM

## 2024-01-16 PROCEDURE — 83735 ASSAY OF MAGNESIUM: CPT

## 2024-01-16 PROCEDURE — 99223 1ST HOSP IP/OBS HIGH 75: CPT | Mod: 25 | Performed by: RADIOLOGY

## 2024-01-16 PROCEDURE — 770004 HCHG ROOM/CARE - ONCOLOGY PRIVATE *

## 2024-01-16 PROCEDURE — 36415 COLL VENOUS BLD VENIPUNCTURE: CPT

## 2024-01-16 PROCEDURE — 77290 THER RAD SIMULAJ FIELD CPLX: CPT | Performed by: RADIOLOGY

## 2024-01-16 PROCEDURE — 77470 SPECIAL RADIATION TREATMENT: CPT | Mod: 26 | Performed by: RADIOLOGY

## 2024-01-16 PROCEDURE — 72156 MRI NECK SPINE W/O & W/DYE: CPT

## 2024-01-16 PROCEDURE — 72157 MRI CHEST SPINE W/O & W/DYE: CPT

## 2024-01-16 RX ORDER — MENTHOL AND METHYL SALICYLATE 7.6; 29 G/100G; G/100G
OINTMENT TOPICAL PRN
Status: DISCONTINUED | OUTPATIENT
Start: 2024-01-16 | End: 2024-01-29 | Stop reason: HOSPADM

## 2024-01-16 RX ORDER — METHOCARBAMOL 500 MG/1
500 TABLET, FILM COATED ORAL 4 TIMES DAILY
Status: DISCONTINUED | OUTPATIENT
Start: 2024-01-16 | End: 2024-01-29 | Stop reason: HOSPADM

## 2024-01-16 RX ORDER — GABAPENTIN 100 MG/1
100 CAPSULE ORAL 3 TIMES DAILY
Status: DISCONTINUED | OUTPATIENT
Start: 2024-01-16 | End: 2024-01-17

## 2024-01-16 RX ADMIN — MORPHINE SULFATE 4 MG: 4 INJECTION, SOLUTION INTRAMUSCULAR; INTRAVENOUS at 14:30

## 2024-01-16 RX ADMIN — ENOXAPARIN SODIUM 40 MG: 100 INJECTION SUBCUTANEOUS at 17:45

## 2024-01-16 RX ADMIN — METHOCARBAMOL 500 MG: 500 TABLET ORAL at 17:46

## 2024-01-16 RX ADMIN — OXYCODONE HYDROCHLORIDE 10 MG: 10 TABLET ORAL at 13:03

## 2024-01-16 RX ADMIN — OXYCODONE HYDROCHLORIDE 10 MG: 10 TABLET ORAL at 08:14

## 2024-01-16 RX ADMIN — INSULIN HUMAN 1 UNITS: 100 INJECTION, SOLUTION PARENTERAL at 21:21

## 2024-01-16 RX ADMIN — GADOTERIDOL 18 ML: 279.3 INJECTION, SOLUTION INTRAVENOUS at 13:48

## 2024-01-16 RX ADMIN — DEXAMETHASONE SODIUM PHOSPHATE 4 MG: 4 INJECTION INTRA-ARTICULAR; INTRALESIONAL; INTRAMUSCULAR; INTRAVENOUS; SOFT TISSUE at 23:42

## 2024-01-16 RX ADMIN — MORPHINE SULFATE 4 MG: 4 INJECTION, SOLUTION INTRAMUSCULAR; INTRAVENOUS at 22:48

## 2024-01-16 RX ADMIN — ONDANSETRON 4 MG: 2 INJECTION INTRAMUSCULAR; INTRAVENOUS at 09:43

## 2024-01-16 RX ADMIN — OXYCODONE HYDROCHLORIDE 10 MG: 10 TABLET ORAL at 04:15

## 2024-01-16 RX ADMIN — DOCUSATE SODIUM 50 MG AND SENNOSIDES 8.6 MG 2 TABLET: 8.6; 5 TABLET, FILM COATED ORAL at 05:16

## 2024-01-16 RX ADMIN — DEXAMETHASONE SODIUM PHOSPHATE 4 MG: 4 INJECTION INTRA-ARTICULAR; INTRALESIONAL; INTRAMUSCULAR; INTRAVENOUS; SOFT TISSUE at 05:15

## 2024-01-16 RX ADMIN — METHOCARBAMOL 500 MG: 500 TABLET ORAL at 21:20

## 2024-01-16 RX ADMIN — SODIUM CHLORIDE: 9 INJECTION, SOLUTION INTRAVENOUS at 06:46

## 2024-01-16 RX ADMIN — DOCUSATE SODIUM 50 MG AND SENNOSIDES 8.6 MG 2 TABLET: 8.6; 5 TABLET, FILM COATED ORAL at 17:45

## 2024-01-16 RX ADMIN — FAMOTIDINE 20 MG: 20 TABLET ORAL at 05:15

## 2024-01-16 RX ADMIN — INSULIN HUMAN 1 UNITS: 100 INJECTION, SOLUTION PARENTERAL at 17:48

## 2024-01-16 RX ADMIN — OXYCODONE HYDROCHLORIDE 10 MG: 10 TABLET ORAL at 21:20

## 2024-01-16 RX ADMIN — MORPHINE SULFATE 4 MG: 4 INJECTION, SOLUTION INTRAMUSCULAR; INTRAVENOUS at 09:36

## 2024-01-16 RX ADMIN — DEXAMETHASONE SODIUM PHOSPHATE 4 MG: 4 INJECTION INTRA-ARTICULAR; INTRALESIONAL; INTRAMUSCULAR; INTRAVENOUS; SOFT TISSUE at 13:13

## 2024-01-16 RX ADMIN — GABAPENTIN 100 MG: 100 CAPSULE ORAL at 17:46

## 2024-01-16 RX ADMIN — GADOTERIDOL 18 ML: 279.3 INJECTION, SOLUTION INTRAVENOUS at 11:24

## 2024-01-16 RX ADMIN — POLYETHYLENE GLYCOL 3350 1 PACKET: 17 POWDER, FOR SOLUTION ORAL at 05:15

## 2024-01-16 RX ADMIN — DEXAMETHASONE SODIUM PHOSPHATE 4 MG: 4 INJECTION INTRA-ARTICULAR; INTRALESIONAL; INTRAMUSCULAR; INTRAVENOUS; SOFT TISSUE at 17:46

## 2024-01-16 ASSESSMENT — ENCOUNTER SYMPTOMS
MYALGIAS: 1
BACK PAIN: 1

## 2024-01-16 ASSESSMENT — PAIN DESCRIPTION - PAIN TYPE
TYPE: ACUTE PAIN
TYPE: ACUTE PAIN;CHRONIC PAIN
TYPE: ACUTE PAIN
TYPE: ACUTE PAIN;CHRONIC PAIN
TYPE: ACUTE PAIN

## 2024-01-16 ASSESSMENT — LIFESTYLE VARIABLES
AVERAGE NUMBER OF DAYS PER WEEK YOU HAVE A DRINK CONTAINING ALCOHOL: 0
TOTAL SCORE: 0
CONSUMPTION TOTAL: NEGATIVE
HOW MANY TIMES IN THE PAST YEAR HAVE YOU HAD 5 OR MORE DRINKS IN A DAY: 0
EVER HAD A DRINK FIRST THING IN THE MORNING TO STEADY YOUR NERVES TO GET RID OF A HANGOVER: NO
ALCOHOL_USE: NO
ON A TYPICAL DAY WHEN YOU DRINK ALCOHOL HOW MANY DRINKS DO YOU HAVE: 0
TOTAL SCORE: 0
HAVE PEOPLE ANNOYED YOU BY CRITICIZING YOUR DRINKING: NO
TOTAL SCORE: 0
HAVE YOU EVER FELT YOU SHOULD CUT DOWN ON YOUR DRINKING: NO
DOES PATIENT WANT TO STOP DRINKING: NO
EVER FELT BAD OR GUILTY ABOUT YOUR DRINKING: NO

## 2024-01-16 ASSESSMENT — PATIENT HEALTH QUESTIONNAIRE - PHQ9
SUM OF ALL RESPONSES TO PHQ9 QUESTIONS 1 AND 2: 0
2. FEELING DOWN, DEPRESSED, IRRITABLE, OR HOPELESS: NOT AT ALL
1. LITTLE INTEREST OR PLEASURE IN DOING THINGS: NOT AT ALL

## 2024-01-16 NOTE — PROGRESS NOTES
58-year-old male with history of melanoma recently diagnosed with extensive metastatic disease.  Patient presented as a direct transfer from [facility], admitted by my colleague Dr. Sheppard, transferred to main [facility] oncology unit for radiation oncology evaluation.  I personally notified radiation oncology (Dr. Tello), formal evaluation follow tomorrow.  Please see Dr. Sheppard's H&P for details.

## 2024-01-16 NOTE — RADIATION COMPLETION NOTES
Clinical Treatment Planning Note    DATE OF SERVICE: 1/16/2024    DIAGNOSIS:  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)  Staging form: Melanoma of the Skin, AJCC 8th Edition  - Pathologic stage from 1/8/2024: Stage IV (rpT0, pNX, pM1c(1)) - Signed by Julia Fraga M.D. on 1/16/2024  Stage prefix: Recurrence     sacrum    IMAGING REVIEWED:  [x] CT     [x] MRI     [] PET/CT     [] BONE SCAN     [] MAMMO     [] OTHER      TREATMENT INTENT:   [] CURATIVE     [] MAINTENANCE     [x]  PALLIATIVE      []  SUPPORTIVE     []  PROPHYLACTIC     [] BENIGN     []  CONSOLIDATIVE      [] DEFINITIVE   []  OLOGIMETASTATIC      LINE OF TREATMENT:  [] ADJUVANT   [x] DEFINITIVE   [] NEOADJUVANT   [] RE-TREATMENT      TECHNIQUE PLANNED:  [] IMRT   [] 3D   [x] SBRT   [] SRS/SRT   [] HDR   [] ELECTRON       IMRT JUSTIFICATION:  []   An immediately adjacent area has been previously irradiated and abutting portals must be established with high precision.    []  Dose escalation is planned to deliver radiation doses in excess of those commonly utilized for similar tumors with conventional treatment.    []  The target volume is concave or convex, and the critical normal tissues are within or around that convexity or concavity.    []  The target volume is in close proximity to critical structures that must be protected.    []  The volume of interest must be covered with narrow margins to adequately protect  immediately adjacent structures.      FIELDS & BLOCKING:  [x] COMPLEX BLOCKS     []  = 3 TX AREAS     []  ARCS     []  CUSTOM SHEILD        []  SIMPLE BLOCK      CHEMOTHERAPY:  []  CONCURRENT     []  INDUCTION     [] SEQUENTIAL     []  <30 DAYS FROM XRT      NOTES:  OAR CONSTRAINTS: (GUIDELINES ONLY NOT ABSOLUTE)   Target Prescribed Coverage   % of PTV covered by 95% (cGy) of RX Dose       DENNY Goal   Bladder 50% < 35Gy   Bladder 35% < 40Gy   Bladder 5% < 50Gy   External genitalia 50% < 20Gy   External genitalia 35% < 30Gy    External genitalia 5% < 40Gy   Femoral Head 50% < 30Gy   Femoral Head 35% < 40Gy   Femoral Head 5% < 44Gy   lliac crests 50% < 30Gy   lliac crests 35% < 40Gy   lliac crests 5% < 50Gy   Large Bowel 50% < 50Gy   Large Bowel 35% < 40Gy   Large Bowel 5% < 50Gy   Small Bowel 200cc < 30Gy   Small Bowel 150cc < 35Gy   Small Bowel 20cc < 45Gy   *RTOG 0529

## 2024-01-16 NOTE — CARE PLAN
The patient is Watcher - Medium risk of patient condition declining or worsening    Shift Goals  Clinical Goals: vitals WNL  Patient Goals: sleep  Family Goals: update on plan of care    Progress made toward(s) clinical / shift goals:  Vitals stable and WNL at this time. Pain controlled as medicated per mar.     Patient is not progressing towards the following goals:

## 2024-01-16 NOTE — CARE PLAN
The patient is Stable - Low risk of patient condition declining or worsening    Shift Goals  Clinical Goals: Pain control, Radiation Mapping  Patient Goals: Pain control, start radiation  Family Goals: update on plan of care    Progress made toward(s) clinical / shift goals:        Problem: Knowledge Deficit - Standard  Goal: Patient and family/care givers will demonstrate understanding of plan of care, disease process/condition, diagnostic tests and medications  Description: Target End Date:  1-3 days or as soon as patient condition allows    Document in Patient Education    1.  Patient and family/caregiver oriented to unit, equipment, visitation policy and means for communicating concern  2.  Complete/review Learning Assessment  3.  Assess knowledge level of disease process/condition, treatment plan, diagnostic tests and medications  4.  Explain disease process/condition, treatment plan, diagnostic tests and medications  Outcome: Progressing  Note: Patient and spouse understand the need for radiation treatment and mapping. Patient will continue to rate pain using a scale of 1-10.

## 2024-01-16 NOTE — PROGRESS NOTES
Hospital Medicine Daily Progress Note    Date of Service  1/16/2024    Chief Complaint  Andrew Wall is a 58 y.o. male admitted 1/15/2024 with back pain    Hospital Course  58 y.o. male, with history of melanoma and recently diagnosed with extensive metastatic disease with MRI earlier this month, who initially admitted to [facility] 1/11 for hypercalcemia 12.2.  Patient reports progressively worse back pain, right leg sciatica and generalized weakness, constipation with no bowel movement for 6 days.    Patient followed by oncologist Dr. Ross, has been on immunotherapy.   Images showed extensive bone metastasis and new metastasis to lung and liver.  Patient is transferred here for radiation consult.   Hypercalcemia improved with IV fluid and Zometa.        Interval Problem Update  Seen patient at bedside. Family at bedside  Complains back pain  Discussed with radiation oncology Dr. Fraga, Ordered MRI C/T spine  Multimodal pain managements. I consulted palliative care    I have discussed this patient's plan of care and discharge plan at IDT rounds today with Case Management, Nursing, Nursing leadership, and other members of the IDT team.    Consultants/Specialty  Radiation oncology    Code Status  Full Code    Disposition  The patient is not medically cleared for discharge to home or a post-acute facility.      I have placed the appropriate orders for post-discharge needs.    Review of Systems  Review of Systems   Musculoskeletal:  Positive for back pain, joint pain and myalgias.   All other systems reviewed and are negative.       Physical Exam  Temp:  [36.4 °C (97.6 °F)-36.7 °C (98.1 °F)] 36.6 °C (97.9 °F)  Pulse:  [76-82] 76  Resp:  [16-18] 18  BP: (111-117)/(62-70) 114/67  SpO2:  [96 %-98 %] 96 %    Physical Exam  Vitals and nursing note reviewed.   Constitutional:       Appearance: Normal appearance. He is ill-appearing.   HENT:      Head: Normocephalic and atraumatic.      Mouth/Throat:       Pharynx: Oropharynx is clear.   Eyes:      Pupils: Pupils are equal, round, and reactive to light.   Neck:      Vascular: No carotid bruit.   Cardiovascular:      Rate and Rhythm: Normal rate and regular rhythm.   Pulmonary:      Effort: Pulmonary effort is normal.      Breath sounds: Normal breath sounds.   Abdominal:      General: Abdomen is flat. Bowel sounds are normal.      Palpations: Abdomen is soft. There is no mass.   Musculoskeletal:         General: Tenderness present. Normal range of motion.      Cervical back: Neck supple.   Skin:     General: Skin is warm and dry.   Neurological:      General: No focal deficit present.      Mental Status: He is alert and oriented to person, place, and time.   Psychiatric:         Mood and Affect: Mood normal.         Behavior: Behavior normal.         Fluids    Intake/Output Summary (Last 24 hours) at 1/16/2024 1545  Last data filed at 1/16/2024 0946  Gross per 24 hour   Intake 650 ml   Output 650 ml   Net 0 ml       Laboratory  Recent Labs     01/14/24  0038 01/15/24  0125 01/16/24  0511   WBC 10.2 13.6* 14.6*   RBC 3.48* 2.84* 2.94*   HEMOGLOBIN 10.0* 8.3* 8.5*   HEMATOCRIT 30.6* 24.6* 25.9*   MCV 87.9 86.6 88.1   MCH 28.7 29.2 28.9   MCHC 32.7 33.7 32.8   RDW 42.5 39.8 41.9   PLATELETCT 378 396 406   MPV 9.6 9.6 9.5     Recent Labs     01/14/24  0038 01/15/24  0125 01/16/24  0511   SODIUM 137 143 140   POTASSIUM 3.5* 4.4 4.1   CHLORIDE 98 108 105   CO2 26 25 24   GLUCOSE 232* 161* 126*   BUN 16 19 25*   CREATININE 0.63 0.47* 0.58   CALCIUM 10.7* 10.0 8.2*                   Imaging  MR-CERVICAL SPINE-WITH & W/O   Final Result         1.  Infiltrative metastatic disease with complete replacement of the bone marrow at C2, C6 and C7 with involvement of the posterior elements as described in detail above.      2.  There is ventral epidural infiltration by metastatic disease at C2, C6 and C7.      3.   Cortical breakthrough with extension of disease to the pre and  paravertebral soft tissues is noted at C6 and C7 as described above.      4.  Severe right foraminal narrowing at C5-6.      5.  There is no significant spinal canal stenosis, cord compression or evidence for cord signal abnormality.      MR-THORACIC SPINE-WITH & W/O   Final Result         Multiple metastatic lesions noted throughout the thoracic spine as described above.      Large spinous process lesion noted at T3, T9.      Large right paraspinal metastatic lesion involvement of the right-sided pedicle and lamina noted at T3.      Ventral epidural extension of metastatic disease noted at the T5, T5-T6 level without significant cord impingement.      Large left paraspinous soft tissue metastatic lesion and a small right subcutaneous lesion noted at the T1-T2 level.      No significant spinal canal stenosis.                    Assessment/Plan  * Metastatic melanoma (HCC)- (present on admission)  Assessment & Plan  Patient followed by oncologist Dr. Ross, has been on immunotherapy.  He has an upcoming appointment with radiation oncology.     Images showed extensive bone metastasis and new metastasis to lung and liver  outpatient MRI showed extensive metastases through the lumbar spine, sacrum, paraspinous muscles, psoas muscle, iliacus muscle, gluteal muscles and Bilateral S1 nerve roots and right S2 nerve root in the sacral foramen are surrounded by the mass and probably invaded/impinged.     CT here showed  R shoulder - Large lytic metastasis involving the right anterior superior glenoid and coracoid with soft tissue component;  left shoulder - metastasis of Glenoid/Scapular body/ Clavicular head/ Teres minor muscle mass      CT chest/abd/pelvis - Severe pulmonary metastatic disease; Severe and widespread osseous metastatic disease/pathologic fractures; New liver lesion suggesting metastasis. Few peritoneal nodules suggesting peritoneal malignancy     new from prior CT dated 7/11/2023. PET scan 11/2023 was  negative for chest and abdomen.       MRI brain showed MRI brain noted C2 vertebral body bone metastasis. There is mild amount of anterior epidural tumor at this level. There is no spinal canal compromise. This is new since the previous study. No intracranial metastasis.     Medical oncology and radiation oncology consultation  1/16: ordered MRI C/T spine  Discussed with Dr. Fraga          Hyperglycemia  Assessment & Plan  Due to steroids  BG over 200  I started SSI  Hypoglycemia protocol    Leukocytosis  Assessment & Plan  likely due to steroid use.  No infectious signs    Fever- (present on admission)  Assessment & Plan   likely due to extensive metastasis;   no identified infection signs;   elevated procalcitonin could be related to malignancy;   blood culture NTD, continue to hold antibiotics, monitoring    Intractable low back pain- (present on admission)  Assessment & Plan   likely due to tumor compression of the S1/S2,   continue IV Decadron, his reported his pain has been improving.    I ordered Pepcid for GI prophylaxis  Multimodal pain managements including po and iv narcotics prn. Monitoring respiratory status and sedation score  Palliative consulted for pain managements    Constipation- (present on admission)  Assessment & Plan  Likely due to hypercalcemia, opioid use and bedbound  Continue aggressive bowel management    Anemia- (present on admission)  Assessment & Plan   likely due to underlying malignancy, high ferritin and B12.   No sign of active bleeding    Continue to monitor, transfuse if Hb less than 7    Hypercalcemia- (present on admission)  Assessment & Plan  due to extensive bone metastasis.    TSH 3.5, PTH 3.7 (appropriately depressed)  He has been treated with aggressive IV fluid, received 1 dose of IV Zometa, His hypercalcemia slowly improving, corrected calcium down to 11.3.   Continue IV fluid         VTE prophylaxis:    enoxaparin ppx      I have performed a physical exam and reviewed  and updated ROS and Plan today (1/16/2024). In review of yesterday's note (1/15/2024), there are no changes except as documented above.    My total time spent caring for the patient on the day of the encounter was 51  minutes.   This does not include time spent on separately billable procedures/tests.

## 2024-01-16 NOTE — CT SIMULATION
PATIENT NAME Andrew Wall   PRIMARY PHYSICIAN MiriamJose Luis thorpe [id number]   REFERRING PHYSICIAN No ref. provider found 1965     No matching staging information was found for the patient.       Treatment Planning CT Simulation        Order Questions       Question Answer    Is this for a new course of treatment? Yes    Is this an Addendum? No    Simulation Status Initial    Planned Start Date 1/17/2024    Treatment Site Bone - Sacrum    Laterality Midline    Treatment Technique SBRT    Treatment Pattern/Frequency Daily    Slice Thickness 3mm    Bowel Preparation No    Treatment Device(s) OmniBoard    Patient Position Supine    Patient Orientation Head First    Chin Position Neutral    Treatment Image Guidance CBCT    Image Guidance Match Bone    Treatment Planning Image Fusion CT/MR     CT/CT    Special Physics Consult Stereotactic    Other Orders Special Tx Procedure    Release to patient Immediate

## 2024-01-16 NOTE — PROGRESS NOTES
4 Eyes Skin Assessment Completed by Angi RN and JODY Dent.    Head WDL  Ears WDL  Nose WDL  Mouth WDL  Neck WDL  Breast/Chest WDL  Shoulder Blades WDL  Spine WDL  (R) Arm/Elbow/Hand WDL  (L) Arm/Elbow/Hand WDL  Abdomen WDL  Groin WDL  Scrotum/Coccyx/Buttocks Scar  (R) Leg WDL  (L) Leg WDL  (R) Heel/Foot/Toe WDL  (L) Heel/Foot/Toe WDL          Devices In Places Blood Pressure Cuff and SCD's      Interventions In Place Pillows    Possible Skin Injury No    Pictures Uploaded Into Epic N/A  Wound Consult Placed N/A  RN Wound Prevention Protocol Ordered No

## 2024-01-16 NOTE — RADIATION COMPLETION NOTES
DATE OF SERVICE: 1/16/2024    DIAGNOSIS:  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)  Staging form: Melanoma of the Skin, AJCC 8th Edition  - Pathologic stage from 1/8/2024: Stage IV (rpT0, pNX, pM1c(1)) - Signed by Julia Fraga M.D. on 1/16/2024  Stage prefix: Recurrence       DATE OF SERVICE: 1/16/2024    TYPE OF SIMULATION: Pelvis    GOAL OF TREATMENT:   [] Curative  [x] Palliative  [] Oligometastatic    CONTRAST:    [] IV Contrast*  [] Small Bowel  [] Rectal  [] Urethral              POSITION:    [x]  Supine  [] Prone with belly board    COMPLEX:  [x] Complex Blocking   []Arcs  [] Custom Blocks  [] >3 Sites    PROCEDURE: Patient positioned on CT table in Vac-Kelly immobilization device with or without belly board depending on position. CT acquired thorough the entire volume of interest.  Images reviewed and exported to treatment planning system.    I have personally reviewed the relevant data, performed the target localization, and determined all relevant factors for this patient’s simulation.    *Omnipaque 80 -100cc IVP in conjunction with 500cc NS

## 2024-01-16 NOTE — CONSULTS
RADIATION ONCOLOGY CONSULT    DATE OF SERVICE: 1/16/2024    IDENTIFICATION: A 58 y.o. male with metastatic melanoma with extensive bony metastasis now with large sacral metastasis   With bilateral S1 nerve root and right S2 nerve root impingement/invasion.  Also incidentally found to have a C2 metastasis as well as bilateral shoulder left clavicular and right rib metastases..  He is here at the kind request of Dr. Moran.      HISTORY OF PRESENT ILLNESS:   Patient's history began in 2019 when he was diagnosed with a T4b N1 M0 malignant melanoma of the right lower back with lymph node metastasis.  He underwent surgical resection followed by adjuvant Opdivo which he completed November 2021.   He did well till a PET CT scan was done late October 2023 showing a right upper inguinal lymph node consistent with metastatic disease.   This was biopsied on 11/16/2023 found to be consistent with metastatic melanoma.  More recently over the last 2 weeks he has been having increasing back pain.  Apparently he was found to have high calcium levels and it was recommended that he go to the emergency room at the same time he was going he fell and developed severe pain in the right leg with inability to move it due to pain.   MRI scan of the lumbar spine was done 1/8/2024 showing an extensive enhancing osseous metastasis throughout the visualized lumbar spine and sacrum most in the right sacral jaz in keeping with metastasis.  There was multiple metastasis seen in the paraspinous muscles psoas muscles iliac us muscles and gluteal muscles.  The largest is in the right posterior chest wall at the level of the right kidney.  There is no enhancement seen in the cord.  there is nerve root enhancement in the bilateral lumbar foramina diffusely of unclear etiology.  There is bilateral S1 nerve roots and right S2 nerve root in the sacral foramen surrounded by the mass and probably invading and impinged.   CT chest abdomen pelvis was done  1/13/2024 there is widespread metastatic disease throughout the chest abdomen and pelvis there is a new liver lesion and pulmonary metastasis and severe and widespread osseous metastatic disease with pathologic fractures related to the osseous metastatic disease.   There is a lytic lesion at L4 with pathologic compression fracture on the left, there is a lytic lesion at S1 sacrum and a large lytic lesion in the right sacral jaz with pathologic fractures.  There is lytic lesions bilaterally in the ilium and in the left ischium with a pathologic fracture.  There is a lytic lesion in the right superior pubic ramus and pubic symphysis with pathologic fracture.  There is a lytic lesion in the right femoral neck.    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)     Nasal polyp        PAST SURGICAL HISTORY:  Past Surgical History:   Procedure Laterality Date    LYMPH NODE EXCISION Right 11/16/2023    Procedure: RIGHT INGUINAL NODE SUPERFICIAL DISSECTION;  Surgeon: Davon Valera M.D.;  Location: SURGERY [facility];  Service: General    NODE BIOPSY SENTINEL Bilateral 10/20/2020    Procedure: BIOPSY, LYMPH NODE, SENTINEL- GROIN;  Surgeon: Davon Valera M.D.;  Location: SURGERY SAME [facility];  Service: General    WIDE EXCISION Right 10/20/2020    Procedure: WIDE EXCISION, LESION- BUTTOCKS, RADICAL MALIGNANT MELANOMA;  Surgeon: Davon Valera M.D.;  Location: SURGERY [facility];  Service: General    ANTROSTOMY Bilateral 11/15/2019    Procedure: MAXILLARY ANTROSTOMY- REVISION ENDOSCOPICMOMETASONE INJECTION, PROPEL STENT PLACEMENT;  Surgeon: Felicitas Tenorio M.D.;  Location: SURGERY [facility];  Service: Ent    SINUSCOPY Bilateral 11/15/2019     Procedure: ENDOSCOPY, PARANASAL SINUSES- FOR FRONTAL EXPLORATION;  Surgeon: Felicitas Tenorio M.D.;  Location: SURGERY [facility];  Service: Ent    TURBINOPLASTY Bilateral 11/15/2019    Procedure: TURBINOPLASTY;  Surgeon: Felicitas Tenorio M.D.;  Location: [facility];  Service: Ent    SPHENOIDECTOMY Bilateral 11/15/2019    Procedure: SPHENOIDECTOMY- FOR SPHENOIDOTOMY;  Surgeon: Felicitas Tenorio M.D.;  Location: [facility];  Service: Ent    ETHMOIDECTOMY Bilateral 11/15/2019    Procedure: ETHMOIDECTOMY- ENDOSCOPIC;  Surgeon: Felicitas Tenorio M.D.;  Location: [facility];  Service: Ent    NASAL POLYPECTOMY Bilateral 11/15/2019    Procedure: POLYPECTOMY, NASAL CAVITY;  Surgeon: Felicitas Tenorio M.D.;  Location: [facility];  Service: Ent    NASAL POLYPECTOMY  2015, 2017, 2019    x 3    OTHER  11/20    removed melanoma         CURRENT MEDICATIONS:  Current Facility-Administered Medications   Medication Dose Route Frequency Provider Last Rate Last Admin    mometasone-formoterol (Dulera) 200-5 MCG/ACT inhaler 2 Puff  2 Puff Inhalation BID Nubia Sheppard M.D.   2 Puff at 01/16/24 0518    acetaminophen (Tylenol) tablet 650 mg  650 mg Oral Q6HRS PRN Nubia Sheppard M.D.        ondansetron (Zofran) syringe/vial injection 4 mg  4 mg Intravenous Q4HRS PRTEREZA Sheppard M.D.        ondansetron (Zofran ODT) dispertab 4 mg  4 mg Oral Q4HRS PRTEREZA Sheppard M.D.        promethazine (Phenergan) tablet 12.5-25 mg  12.5-25 mg Oral Q4HRS PRTEREZA Sheppard M.D.        promethazine (Phenergan) suppository 12.5-25 mg  12.5-25 mg Rectal Q4HRS PRTEREZA Sheppard M.D.        prochlorperazine (Compazine) injection 5-10 mg  5-10 mg Intravenous Q4HRS PRTEREZA Sheppard M.D.        senna-docusate (Pericolace Or Senokot S) 8.6-50 MG per tablet 2 Tablet  2 Tablet Oral BID Nubia Sheppard M.D.   2 Tablet at 01/16/24 0516    And    polyethylene glycol/lytes (Miralax) Packet 1 Packet  1 Packet Oral QDAY PRN Nubia Sheppard M.D.    1 Packet at 01/16/24 0515    And    magnesium hydroxide (Milk Of Magnesia) suspension 30 mL  30 mL Oral QDAY PRN Nubia Sheppard M.D.        And    bisacodyl (Dulcolax) suppository 10 mg  10 mg Rectal QDAY PRN Nubia Sheppard M.D.        oxyCODONE immediate release (Roxicodone) tablet 10 mg  10 mg Oral Q4HRS PRN Nubia Sheppard M.D.   10 mg at 01/16/24 0814    oxyCODONE immediate-release (Roxicodone) tablet 5 mg  5 mg Oral Q4HRS PRN Nubia Sheppard M.D.        morphine 4 MG/ML injection 4 mg  4 mg Intravenous Q3HRS PRN Nubia Sheppard M.D.        polyethylene glycol/lytes (Miralax) Packet 1 Packet  1 Packet Oral DAILY Nubia Sheppard M.D.        enoxaparin (Lovenox) inj 40 mg  40 mg Subcutaneous DAILY AT 1800 Nubia Sheppard M.D.        NS infusion   Intravenous Continuous Nubia Sheppard M.D. 125 mL/hr at 01/16/24 0646 New Bag at 01/16/24 0646    dexamethasone (Decadron) injection 4 mg  4 mg Intravenous Q6HRS Nubia Sheppard M.D.   4 mg at 01/16/24 0515    famotidine (Pepcid) tablet 20 mg  20 mg Oral DAILY Nubia Sheppard M.D.   20 mg at 01/16/24 0515    insulin regular (HumuLIN R,NovoLIN R) injection  1-6 Units Subcutaneous 4X/DAY ACHS Nubia Sheppard M.D.        And    dextrose 10 % BOLUS 25 g  25 g Intravenous Q15 MIN PRN Nubia Sheppard M.D.           ALLERGIES:    Augmentin    FAMILY HISTORY:    No [facility] history on file.[email or web address]        SOCIAL HISTORY:     reports that he has never smoked. He has never used smokeless tobacco. He reports current alcohol use of about 0.6 oz of alcohol per week. He reports that he does not use drugs.    Patient is an EMT.  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    Review of Systems:   Constitutional:   Patient has fever chills sweats and weight loss has lost about 20 pounds in the last few weeks.  HENT:   Denies neck pain, headache or dizziness, hearing loss  Eyes:  denies vision changes  Respiratory:  denies cough, congestion, SOB, hemoptysis  Cardiovascular:  denies palpations, chest pain or easy fatiguability   Gastrointestinal: Denies diarrhea, abdominal pain.  Has  constipation   related to pain medication..  Genitourinal: Denies hematuria, dysuria, incontinence  Musculoskeletal:   Has back pain right posterior rib pain pain radiating down his right leg and bilateral shoulder pain.  Skin:   Denies itching or rash, or new lesions  Neurological:    Has numbness and tingling down the right leg and decreased range of motion because of this but denies other weakness.  Endocrine: Denies thyroid problems or diabetes  Psyche: Denies depression, anxiety, mood changes     PAIN: as described in the HPI      PHYSICAL EXAM:    4=Completely disabled.  Cannot carry on any self care.  Totally confined to bed or chair.  Vitals:    01/15/24 2050 01/15/24 2312 01/16/24 0457 01/16/24 0825   BP: 117/62  116/70 114/67   Pulse: 82  76 76   Resp: 16  16 18   Temp: 36.7 °C (98.1 °F)  36.4 °C (97.6 °F) 36.6 °C (97.9 °F)   TempSrc: Oral  Oral Temporal   SpO2: 96%  98% 96%   Weight:  85.6 kg (188 lb 11.4 oz)     Location: Generalized  Description: Aching        GENERAL:   Well-appearing alert and oriented x 3 lying in bed.  MUSCULOSKELETAL:   There is prominence on palpation in the left clavicular head.  He does have pain bilaterally in the shoulders and pain in the right posterior lower ribs.  Pain can minimally be elicited also on the right sacrum.  HEENT:  Pupils are equal, round, and reactive to light.  Extraocular muscles   are intact. Sclerae nonicteric.  Conjunctivae pink.  Oral cavity, tongue   protrudes midline.   NECK:   No peripheral adenopathy of the neck, supraclavicular fossa or axillae   bilaterally.  LUNGS:  Clear to ascultation   HEART:  Regular rate and rhythm.  No murmur appreciated  ABDOMEN:  Soft. No evidence of hepatosplenomegaly.    EXTREMITIES:  Without Edema.  NEUROLOGIC:  Cranial nerves II through XII were intact.   Patient lying in bed has difficulty with range of motion of the right leg due to pain.   Limited sensation right leg            IMPRESSION:    A 58 y.o. with   metastatic melanoma with extensive bony metastasis now with large sacral metastasis   With bilateral S1 nerve root and right S2 nerve root impingement/invasion.  Also incidentally found to have a C2 metastasis as well as bilateral shoulder left clavicular and right rib metastases. R hip/pelvis metastases      RECOMMENDATIONS:    I am recommending a short palliative course of SBRT to the sacrum as that seems to be the greatest side of symptoms.  Because he was also incidentally found to have the C2 vertebral body met I am recommending getting an MRI scan of the C-spine to further evaluate this area  and consider SBRT to this area as well.  I have also spoken to Dr. Ross about his case because of the advanced lytic nature of his bony metastasis.  I am concerned about the right hip as well.  He explained that he is waiting for the BRAF and he would recommend just treating the sacrum and any spine mets that look like they could cause cord compression.   So we will be not treating the right hip or pelvis although there still is concern for fracture in those areas.  I've described the details of radiation along with the side effects both acute and chronic, including but not exclusive to fatigue, skin reaction, local soreness, swelling, delayed healing.  He and his wife understand there can be a possible flare in pain initially and there is a remote chance that we might not see any improvement with the radiation therapy. I explained that this tumor appears to be rapidly progressing so were not sure what the response will be.  They also understand there could be damage to any of the tissues within the treatment field. Ample time was allowed for questions, and patient understands.      Patient is scheduled for emergent simulation today to get started tomorrow.    Thank you for the opportunity to participate in his care.  If any questions or comments, please do not hesitate in calling.    Please note that this dictation was  created using voice recognition software. I have made every reasonable attempt to correct obvious errors, but I expect that there are errors of grammar and possibly content that I did not discover before finalizing the note.

## 2024-01-16 NOTE — PROGRESS NOTES
Patient arrived to the floor via gurney with JUSTYNA. Patient was A&O 4 on 2 L of O2. Patient was oriented to his room.

## 2024-01-17 ENCOUNTER — HOSPITAL ENCOUNTER (OUTPATIENT)
Dept: RADIATION ONCOLOGY | Facility: MEDICAL CENTER | Age: 59
End: 2024-01-17

## 2024-01-17 ENCOUNTER — HOSPITAL ENCOUNTER (OUTPATIENT)
Dept: RADIATION ONCOLOGY | Facility: MEDICAL CENTER | Age: 59
End: 2024-01-31
Attending: RADIOLOGY
Payer: COMMERCIAL

## 2024-01-17 VITALS — DIASTOLIC BLOOD PRESSURE: 79 MMHG | OXYGEN SATURATION: 97 % | HEART RATE: 74 BPM | SYSTOLIC BLOOD PRESSURE: 119 MMHG

## 2024-01-17 DIAGNOSIS — C79.51 METASTASIS TO BONE (HCC): ICD-10-CM

## 2024-01-17 PROBLEM — Z66 DNR (DO NOT RESUSCITATE): Status: ACTIVE | Noted: 2024-01-17

## 2024-01-17 LAB
ALBUMIN SERPL BCP-MCNC: 2.4 G/DL (ref 3.2–4.9)
ALBUMIN/GLOB SERPL: 1 G/DL
ALP SERPL-CCNC: 323 U/L (ref 30–99)
ALT SERPL-CCNC: 110 U/L (ref 2–50)
ANION GAP SERPL CALC-SCNC: 11 MMOL/L (ref 7–16)
AST SERPL-CCNC: 71 U/L (ref 12–45)
BILIRUB SERPL-MCNC: 0.3 MG/DL (ref 0.1–1.5)
BUN SERPL-MCNC: 25 MG/DL (ref 8–22)
CALCIUM ALBUM COR SERPL-MCNC: 8.6 MG/DL (ref 8.5–10.5)
CALCIUM SERPL-MCNC: 7.3 MG/DL (ref 8.5–10.5)
CHEMOTHERAPY INFUSION START DATE: NORMAL
CHEMOTHERAPY RECORDS: 2700
CHEMOTHERAPY RECORDS: 9
CHEMOTHERAPY RECORDS: NORMAL
CHEMOTHERAPY RX CANCER: NORMAL
CHLORIDE SERPL-SCNC: 104 MMOL/L (ref 96–112)
CO2 SERPL-SCNC: 23 MMOL/L (ref 20–33)
CREAT SERPL-MCNC: 0.52 MG/DL (ref 0.5–1.4)
DATE 1ST CHEMO CANCER: NORMAL
ERYTHROCYTE [DISTWIDTH] IN BLOOD BY AUTOMATED COUNT: 42.4 FL (ref 35.9–50)
GFR SERPLBLD CREATININE-BSD FMLA CKD-EPI: 116 ML/MIN/1.73 M 2
GLOBULIN SER CALC-MCNC: 2.4 G/DL (ref 1.9–3.5)
GLUCOSE BLD STRIP.AUTO-MCNC: 126 MG/DL (ref 65–99)
GLUCOSE BLD STRIP.AUTO-MCNC: 140 MG/DL (ref 65–99)
GLUCOSE BLD STRIP.AUTO-MCNC: 176 MG/DL (ref 65–99)
GLUCOSE BLD STRIP.AUTO-MCNC: 192 MG/DL (ref 65–99)
GLUCOSE SERPL-MCNC: 141 MG/DL (ref 65–99)
HCT VFR BLD AUTO: 28.1 % (ref 42–52)
HGB BLD-MCNC: 9.2 G/DL (ref 14–18)
MAGNESIUM SERPL-MCNC: 1.8 MG/DL (ref 1.5–2.5)
MCH RBC QN AUTO: 28.8 PG (ref 27–33)
MCHC RBC AUTO-ENTMCNC: 32.7 G/DL (ref 32.3–36.5)
MCV RBC AUTO: 88.1 FL (ref 81.4–97.8)
PHOSPHATE SERPL-MCNC: 2.7 MG/DL (ref 2.5–4.5)
PLATELET # BLD AUTO: 474 K/UL (ref 164–446)
PMV BLD AUTO: 9.5 FL (ref 9–12.9)
POTASSIUM SERPL-SCNC: 3.6 MMOL/L (ref 3.6–5.5)
PROT SERPL-MCNC: 4.8 G/DL (ref 6–8.2)
RAD ONC ARIA COURSE LAST TREATMENT DATE: NORMAL
RAD ONC ARIA COURSE TREATMENT ELAPSED DAYS: NORMAL
RAD ONC ARIA REFERENCE POINT DOSAGE GIVEN TO DATE: 9
RAD ONC ARIA REFERENCE POINT ID: NORMAL
RAD ONC ARIA REFERENCE POINT SESSION DOSAGE GIVEN: 9
RBC # BLD AUTO: 3.19 M/UL (ref 4.7–6.1)
SODIUM SERPL-SCNC: 138 MMOL/L (ref 135–145)
WBC # BLD AUTO: 16 K/UL (ref 4.8–10.8)

## 2024-01-17 PROCEDURE — 99222 1ST HOSP IP/OBS MODERATE 55: CPT | Mod: 25 | Performed by: NURSE PRACTITIONER

## 2024-01-17 PROCEDURE — 99497 ADVNCD CARE PLAN 30 MIN: CPT | Performed by: NURSE PRACTITIONER

## 2024-01-17 PROCEDURE — 77295 3-D RADIOTHERAPY PLAN: CPT | Performed by: RADIOLOGY

## 2024-01-17 PROCEDURE — 700111 HCHG RX REV CODE 636 W/ 250 OVERRIDE (IP): Mod: JZ | Performed by: STUDENT IN AN ORGANIZED HEALTH CARE EDUCATION/TRAINING PROGRAM

## 2024-01-17 PROCEDURE — 700102 HCHG RX REV CODE 250 W/ 637 OVERRIDE(OP): Performed by: NURSE PRACTITIONER

## 2024-01-17 PROCEDURE — 77470 SPECIAL RADIATION TREATMENT: CPT | Mod: 26 | Performed by: RADIOLOGY

## 2024-01-17 PROCEDURE — 77300 RADIATION THERAPY DOSE PLAN: CPT | Mod: 26 | Performed by: RADIOLOGY

## 2024-01-17 PROCEDURE — 700102 HCHG RX REV CODE 250 W/ 637 OVERRIDE(OP): Performed by: STUDENT IN AN ORGANIZED HEALTH CARE EDUCATION/TRAINING PROGRAM

## 2024-01-17 PROCEDURE — 77295 3-D RADIOTHERAPY PLAN: CPT | Mod: 26 | Performed by: RADIOLOGY

## 2024-01-17 PROCEDURE — 77334 RADIATION TREATMENT AID(S): CPT | Mod: 26 | Performed by: RADIOLOGY

## 2024-01-17 PROCEDURE — 77470 SPECIAL RADIATION TREATMENT: CPT | Performed by: RADIOLOGY

## 2024-01-17 PROCEDURE — 77290 THER RAD SIMULAJ FIELD CPLX: CPT | Performed by: RADIOLOGY

## 2024-01-17 PROCEDURE — 77290 THER RAD SIMULAJ FIELD CPLX: CPT | Mod: 26 | Performed by: RADIOLOGY

## 2024-01-17 PROCEDURE — 97535 SELF CARE MNGMENT TRAINING: CPT

## 2024-01-17 PROCEDURE — A9270 NON-COVERED ITEM OR SERVICE: HCPCS | Performed by: STUDENT IN AN ORGANIZED HEALTH CARE EDUCATION/TRAINING PROGRAM

## 2024-01-17 PROCEDURE — 77300 RADIATION THERAPY DOSE PLAN: CPT | Performed by: RADIOLOGY

## 2024-01-17 PROCEDURE — 77373 STRTCTC BDY RAD THER TX DLVR: CPT | Performed by: RADIOLOGY

## 2024-01-17 PROCEDURE — 83735 ASSAY OF MAGNESIUM: CPT

## 2024-01-17 PROCEDURE — 77263 THER RADIOLOGY TX PLNG CPLX: CPT | Performed by: RADIOLOGY

## 2024-01-17 PROCEDURE — 85027 COMPLETE CBC AUTOMATED: CPT

## 2024-01-17 PROCEDURE — 77370 RADIATION PHYSICS CONSULT: CPT | Mod: XU | Performed by: RADIOLOGY

## 2024-01-17 PROCEDURE — 77334 RADIATION TREATMENT AID(S): CPT | Performed by: RADIOLOGY

## 2024-01-17 PROCEDURE — 770004 HCHG ROOM/CARE - ONCOLOGY PRIVATE *

## 2024-01-17 PROCEDURE — 700102 HCHG RX REV CODE 250 W/ 637 OVERRIDE(OP)

## 2024-01-17 PROCEDURE — 36415 COLL VENOUS BLD VENIPUNCTURE: CPT

## 2024-01-17 PROCEDURE — 82962 GLUCOSE BLOOD TEST: CPT | Mod: 91

## 2024-01-17 PROCEDURE — 99233 SBSQ HOSP IP/OBS HIGH 50: CPT | Performed by: STUDENT IN AN ORGANIZED HEALTH CARE EDUCATION/TRAINING PROGRAM

## 2024-01-17 PROCEDURE — 77280 THER RAD SIMULAJ FIELD SMPL: CPT | Mod: 26,XU | Performed by: RADIOLOGY

## 2024-01-17 PROCEDURE — 80053 COMPREHEN METABOLIC PANEL: CPT

## 2024-01-17 PROCEDURE — A9270 NON-COVERED ITEM OR SERVICE: HCPCS | Performed by: NURSE PRACTITIONER

## 2024-01-17 PROCEDURE — 77280 THER RAD SIMULAJ FIELD SMPL: CPT | Performed by: RADIOLOGY

## 2024-01-17 PROCEDURE — 84100 ASSAY OF PHOSPHORUS: CPT

## 2024-01-17 PROCEDURE — A9270 NON-COVERED ITEM OR SERVICE: HCPCS

## 2024-01-17 RX ORDER — LACTULOSE 20 G/30ML
30 SOLUTION ORAL DAILY
Status: DISCONTINUED | OUTPATIENT
Start: 2024-01-17 | End: 2024-01-22

## 2024-01-17 RX ORDER — GABAPENTIN 300 MG/1
300 CAPSULE ORAL EVERY EVENING
Status: DISCONTINUED | OUTPATIENT
Start: 2024-01-17 | End: 2024-01-29 | Stop reason: HOSPADM

## 2024-01-17 RX ORDER — DULOXETIN HYDROCHLORIDE 20 MG/1
60 CAPSULE, DELAYED RELEASE ORAL DAILY
Status: DISCONTINUED | OUTPATIENT
Start: 2024-01-17 | End: 2024-01-29 | Stop reason: HOSPADM

## 2024-01-17 RX ORDER — MORPHINE SULFATE 30 MG/1
30 TABLET, FILM COATED, EXTENDED RELEASE ORAL EVERY 12 HOURS
Status: DISCONTINUED | OUTPATIENT
Start: 2024-01-17 | End: 2024-01-23

## 2024-01-17 RX ORDER — GABAPENTIN 100 MG/1
100 CAPSULE ORAL 2 TIMES DAILY
Status: DISCONTINUED | OUTPATIENT
Start: 2024-01-17 | End: 2024-01-17

## 2024-01-17 RX ORDER — GABAPENTIN 100 MG/1
100 CAPSULE ORAL 2 TIMES DAILY
Status: DISCONTINUED | OUTPATIENT
Start: 2024-01-17 | End: 2024-01-29 | Stop reason: HOSPADM

## 2024-01-17 RX ADMIN — LACTULOSE 30 ML: 20 SOLUTION ORAL at 14:05

## 2024-01-17 RX ADMIN — GABAPENTIN 100 MG: 300 CAPSULE ORAL at 14:08

## 2024-01-17 RX ADMIN — METHOCARBAMOL 500 MG: 500 TABLET ORAL at 18:10

## 2024-01-17 RX ADMIN — INSULIN HUMAN 1 UNITS: 100 INJECTION, SOLUTION PARENTERAL at 22:18

## 2024-01-17 RX ADMIN — ENOXAPARIN SODIUM 40 MG: 100 INJECTION SUBCUTANEOUS at 18:09

## 2024-01-17 RX ADMIN — FAMOTIDINE 20 MG: 20 TABLET ORAL at 05:08

## 2024-01-17 RX ADMIN — GABAPENTIN 100 MG: 100 CAPSULE ORAL at 05:08

## 2024-01-17 RX ADMIN — MENTHOL AND METHYL SALICYLATE: 7.6; 29 OINTMENT TOPICAL at 09:17

## 2024-01-17 RX ADMIN — BISACODYL 10 MG: 10 SUPPOSITORY RECTAL at 15:31

## 2024-01-17 RX ADMIN — MORPHINE SULFATE 4 MG: 4 INJECTION, SOLUTION INTRAMUSCULAR; INTRAVENOUS at 09:16

## 2024-01-17 RX ADMIN — DEXAMETHASONE SODIUM PHOSPHATE 4 MG: 4 INJECTION INTRA-ARTICULAR; INTRALESIONAL; INTRAMUSCULAR; INTRAVENOUS; SOFT TISSUE at 11:53

## 2024-01-17 RX ADMIN — METHOCARBAMOL 500 MG: 500 TABLET ORAL at 22:12

## 2024-01-17 RX ADMIN — DOCUSATE SODIUM 50 MG AND SENNOSIDES 8.6 MG 2 TABLET: 8.6; 5 TABLET, FILM COATED ORAL at 18:10

## 2024-01-17 RX ADMIN — MORPHINE SULFATE 4 MG: 4 INJECTION, SOLUTION INTRAMUSCULAR; INTRAVENOUS at 11:53

## 2024-01-17 RX ADMIN — POLYETHYLENE GLYCOL 3350 1 PACKET: 17 POWDER, FOR SOLUTION ORAL at 05:09

## 2024-01-17 RX ADMIN — DEXAMETHASONE SODIUM PHOSPHATE 4 MG: 4 INJECTION INTRA-ARTICULAR; INTRALESIONAL; INTRAMUSCULAR; INTRAVENOUS; SOFT TISSUE at 18:09

## 2024-01-17 RX ADMIN — DULOXETINE HYDROCHLORIDE 60 MG: 20 CAPSULE, DELAYED RELEASE ORAL at 14:05

## 2024-01-17 RX ADMIN — DEXAMETHASONE SODIUM PHOSPHATE 4 MG: 4 INJECTION INTRA-ARTICULAR; INTRALESIONAL; INTRAMUSCULAR; INTRAVENOUS; SOFT TISSUE at 05:08

## 2024-01-17 RX ADMIN — METHOCARBAMOL 500 MG: 500 TABLET ORAL at 09:17

## 2024-01-17 RX ADMIN — GABAPENTIN 300 MG: 300 CAPSULE ORAL at 18:10

## 2024-01-17 RX ADMIN — MORPHINE SULFATE 30 MG: 30 TABLET, FILM COATED, EXTENDED RELEASE ORAL at 18:10

## 2024-01-17 RX ADMIN — METHOCARBAMOL 500 MG: 500 TABLET ORAL at 14:08

## 2024-01-17 RX ADMIN — OXYCODONE HYDROCHLORIDE 10 MG: 10 TABLET ORAL at 11:15

## 2024-01-17 RX ADMIN — INSULIN HUMAN 1 UNITS: 100 INJECTION, SOLUTION PARENTERAL at 18:20

## 2024-01-17 RX ADMIN — DOCUSATE SODIUM 50 MG AND SENNOSIDES 8.6 MG 2 TABLET: 8.6; 5 TABLET, FILM COATED ORAL at 05:08

## 2024-01-17 RX ADMIN — GABAPENTIN 100 MG: 100 CAPSULE ORAL at 11:15

## 2024-01-17 RX ADMIN — OXYCODONE HYDROCHLORIDE 10 MG: 10 TABLET ORAL at 05:08

## 2024-01-17 ASSESSMENT — ENCOUNTER SYMPTOMS
COUGH: 0
EYE PAIN: 0
EYE REDNESS: 0
MYALGIAS: 1
ABDOMINAL PAIN: 0
FEVER: 0
WEAKNESS: 1
NAUSEA: 0
SPUTUM PRODUCTION: 0
INSOMNIA: 1
NAUSEA: 1
EYE DISCHARGE: 0
SHORTNESS OF BREATH: 0
HEARTBURN: 0
WEIGHT LOSS: 1
ORTHOPNEA: 0
HEMOPTYSIS: 0
CHILLS: 0
DIARRHEA: 0
BACK PAIN: 1
PALPITATIONS: 0
VOMITING: 1
CONSTIPATION: 1
VOMITING: 0
SINUS PAIN: 0

## 2024-01-17 ASSESSMENT — PAIN DESCRIPTION - PAIN TYPE
TYPE: ACUTE PAIN

## 2024-01-17 NOTE — RADIATION COMPLETION NOTES
DATE OF SERVICE: 1/17/2024    DIAGNOSIS:  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)  Staging form: Melanoma of the Skin, AJCC 8th Edition  - Pathologic stage from 1/8/2024: Stage IV (rpT0, pNX, pM1c(1)) - Signed by Julia Fraga M.D. on 1/16/2024  Stage prefix: Recurrence     SBRT C_spine  DATE OF SERVICE: 1/17/2024    TYPE OF SIMULATION: Head & Neck    GOAL OF TREATMENT:   [] Curative  [x] Palliative  [] Oligometastatic    CONTRAST:    [] IV Contrast*  [] Oral Contrast               POSITION:    [x]  Supine  [] Prone     COMPLEX:  [x] Complex Blocking   []Arcs  [] Custom Blocks  [] >3 Sites    PROCEDURE: Patient place in supine position on CT table with head in neutral position. Dentures removed. Shoulder pulls used to stabilize shoulders. Patient head and shoulders were immobilized with a thermoplastic mask.   films evaluated to ensure proper patient position.  CT obtained through entire volume of interest. Exam pushed to treatment planning system for contouring and planning.    I have personally reviewed the relevant data, performed the target localization, and determined all relevant factors for this patient’s simulation.    *Omnipaque 80 -100cc IVP in conjunction with 500cc NS

## 2024-01-17 NOTE — CONSULTS
"SUMMARY transition to DNR/DNI.  Symptom and pain management orders placed.  Discussed hospice versus home health with palliative.  PC to continue to closely follow along with you.      MRN: [id number]  Date of palliative consult: 1/16  Reason for consult: Pain management  Referring provider: Dean  Location of consult: Cancer nursing unit [facility]  Additional consulting services: Hospital medicine, radiation oncology, therapies    HPI:   Andrew Wall is a 58 y.o. male with past medical history of melanoma and recently diagnosed extensive metastatic disease per MRI earlier this month who was admitted on January 15 th by  his oncology office for hypercalcemia, calcium 12.2.  Patient reports progressively worsening back pain, right leg sciatica, and generalized weakness with additional constipation x 6 days without bowel movement.  He is followed by Dr. Ross with cancer care specialty and has been on immunotherapy.  He has upcoming appointment with radiation oncology.  Patient was direct transfer from [facility] emergency department to oncology floor at [facility].    Patient and wife report a fall while still at home which precipitated increased pain in right hip area.    Significant/pertinent past medical history: Asthma, nasal polyps.  Never smoker current alcohol use no current drug use.    Pain History:  Onset: 2 Weeks ago  Location: Right posterior leg  Duration: Constant in between pain medication, pain medication lasting about 4 hours.  Characteristics: \"Feels like my leg is being sliced and half and burning\"  Aggravating factors: Movement/position  Alleviating factors: Movement/position  Radiation: Down to ankle and the bottom of right foot  Treatments: IcyHot  Severity: 9+/10 at its worst; 2-3/10 at best    Additional symptoms: Denies dyspnea.  Constipated prior to admission x 6 days, most recent  bowel movement was 1/14 which he describes as \"giving birth to twins\".  Experienced nausea and vomiting " prior to admission secondary to extreme uncontrolled pain.  Wife reports nausea and vomiting occurred with any oral intake.  Experienced increased fatigue with increasing pain and inability to move.  Appetite additionally poor prior to admission due to extreme pain.  Patient had severe and disrupting insomnia secondary to extreme pain.  He states his mood has been mostly good and he is trying to maintain a positive attitude.    Interval History: Complained of back pain, primary hospitalist discussed with radiation oncology Dr. Fraga.  MRI, CT spine ordered.    Medication Allergy/Sensitivities:  Allergies   Allergen Reactions    Augmentin Vomiting and Nausea       ROS:    Review of Systems   Constitutional:  Positive for malaise/fatigue and weight loss. Negative for chills and fever.   Respiratory:  Negative for shortness of breath.    Cardiovascular:  Positive for chest pain. Negative for leg swelling.   Gastrointestinal:  Positive for constipation, nausea and vomiting.   Musculoskeletal:  Positive for back pain.   Neurological:  Positive for weakness.   Psychiatric/Behavioral:  The patient has insomnia.        PE:   Recent vital signs  BMI: Body mass index is 26.32 kg/m².    Temp (24hrs), Av.6 °C (97.8 °F), Min:36.5 °C (97.7 °F), Max:36.6 °C (97.9 °F)  Temperature: 36.5 °C (97.7 °F)  Pulse  Av  Min: 76  Max: 85   Blood Pressure: 115/74       Physical Exam  Vitals and nursing note reviewed.   Constitutional:       General: He is not in acute distress.     Appearance: He is underweight. He is ill-appearing. He is not toxic-appearing.      Interventions: Nasal cannula in place.   Cardiovascular:      Rate and Rhythm: Normal rate.      Pulses: Normal pulses.      Heart sounds: Normal heart sounds.   Pulmonary:      Effort: Pulmonary effort is normal.      Breath sounds: Normal breath sounds.   Abdominal:      General: Bowel sounds are increased.      Palpations: Abdomen is soft.      Tenderness: There is no  abdominal tenderness.   Skin:     General: Skin is warm and dry.      Capillary Refill: Capillary refill takes less than 2 seconds.      Coloration: Skin is pale.   Neurological:      General: No focal deficit present.      Mental Status: He is alert and oriented to person, place, and time.   Psychiatric:         Attention and Perception: Attention normal.         Mood and Affect: Mood normal.         Speech: Speech normal.         Behavior: Behavior normal.         Thought Content: Thought content normal.         Cognition and Memory: Cognition normal.         Judgment: Judgment normal.       Recent Labs     01/15/24  0125 01/16/24  0511   SODIUM 143 140   POTASSIUM 4.4 4.1   CHLORIDE 108 105   CO2 25 24   GLUCOSE 161* 126*   BUN 19 25*   CREATININE 0.47* 0.58   CALCIUM 10.0 8.2*     Recent Labs     01/15/24  0125 01/16/24  0511   WBC 13.6* 14.6*   RBC 2.84* 2.94*   HEMOGLOBIN 8.3* 8.5*   HEMATOCRIT 24.6* 25.9*   MCV 86.6 88.1   MCH 29.2 28.9   MCHC 33.7 32.8   RDW 39.8 41.9   PLATELETCT 396 406   MPV 9.6 9.5       ASSESSMENT/PLAN WITH SHARED DECISION MAKING:   Review  Pertinent imaging reviewed.    MRI thoracic spine with and without/FINDINGS:  Metastatic lesions are noted at T3, T5, T6, T8, T9, T10, T11, T12, L1. Large metastatic lesions noted in the T9 spinous process, the T3 spinous process, T8 spinous process.     Additional large metastatic lesion is noted in the left clavicle, incompletely imaged.     Bilateral pleural effusion noted.     Spinal cord signal intensity is within normal limits throughout. Subcutaneous metastatic lesions are noted bilaterally at T1-T2 involving the paraspinal soft tissues.     Large right paraspinal metastatic lesion involving the right-sided pedicle and lamina noted at the T3 level. Postcontrast images demonstrate enhancement of most of these metastatic lesions.  Minimal epidural enhancement is noted along the left anterolateral epidural space at the T5 level. There is also  minimal epidural extension of metastatic disease on the right side of the T5-T6 disc level    MRI cervical spine with and without/IMPRESSION:        1.  Infiltrative metastatic disease with complete replacement of the bone marrow at C2, C6 and C7 with involvement of the posterior elements as described in detail above.     2.  There is ventral epidural infiltration by metastatic disease at C2, C6 and C7.     3.   Cortical breakthrough with extension of disease to the pre and paravertebral soft tissues is noted at C6 and C7 as described above.     4.  Severe right foraminal narrowing at C5-6.     5.  There is no significant spinal canal stenosis, cord compression or evidence for cord signal abnormality.    PHYSICAL ASPECTS OF CARE  Palliative Performance Scale: 40%    # Cancer related pain: Multiple lytic lesions to axial and appendicular regions  - OME times past 24 hours equals 116 mg:   Oxycodone 10 mg p.o. x 4 doses equals 40 mg--> 80 mg OME   Morphine sulfate 4 mg IV x 3 doses equals 36 mg--> 36 mg OME  -Start morphine sulfate extended release 30 mg p.o. every 12 hours.  -Gabapentin 100 mg 3 times daily  - Dexamethasone 4 mg IV every 6 hours started yesterday  - Previously prescribed oxycodone/acetaminophen 10/325 every 8 hours, verified with PDMP.  -Add duloxetine 60 mg p.o. daily  # Metastatic melanoma  # Opioid-induced constipation, at risk  -Add lactulose 30 g daily, increase to twice daily if needed.  - Last bowel movement 1/14 patient has been on senna DS 2 tabs p.o. twice daily.  If no bowel movement today will escalate as needed protocol.  # Spinal cord compression secondary to multiple lytic lesions, stenosis evident, neuropathic pain  -Change gabapentin dosing to 100 mg p.o. twice daily and 300 mg p.o. q. evening.  # Debility  # Hypercalcemia  #Frailty    SOCIAL ASPECTS OF CARE  Sebastian resides with his wife of 35 years, Sherly.  They have 2 adult daughters Rachel and Vidya.  They also have a large family  network here in [place].  Sebastian reports he is from [place] and moved to [place] in 2018.  He works as  at [employer] and as a  for the [employer]'s office.  He reports that he was skiing 2 months ago.      Sebastian reports that he was skiing 2 months ago however has had somewhat of a decline since then and a drastic decline over the past 2 weeks.  He found himself no longer able to walk due to pain.  Wife reports he spent most of his time in a supine position attempting to relieve his pain.  She also reports they were attempting to get a wheelchair for him but were having difficulty doing so.  Prior to 2 months ago Sebastian was independent in all ADLs and IADLs.    SPIRITUAL ASPECTS OF CARE   Patient states he has been a [employer] all of his life up until moving to [place] he was active in [employer], however was unable to find a [employer] that he connected with after moving here.  He is open, however, to a visit from the  for spiritual support.    GOALS OF CARE/SERIOUS ILLNESS CONVERSATION  Met with patient at bedside along with his wife Sherly, his daughter Rachel, and his daughter Vidya.  Introduced myself and role of palliative care and all are agreeable to visit.  We began visit by discussing patient's symptoms prior to this hospital stay and overall.  We discussed management of symptoms thus far and plan of care to manage going forward.  Patient and family are agreeable to plan of care as proposed by provider.  We then explored patient and family's understanding of his prognosis.  His wife states that they have not given any prognosis by oncology etc.  With permission, I was allowed to give prognosis based upon overall picture, progression of disease, current PPS.  I shared with patient and family her prognosis given spread of lytic lesions to pelvis, spine, clavicle, shoulder, ribs, femur.  We discussed symptom etiology specifically with respect to spinal metastases as well as  hip and pelvic metastases and differentiation of pain therein.  Given patient's rapid decline over the past 2 months and specifically over the past 2 weeks I shared with him and family my worry of a prognosis of less than 6 months.  Obviously, the hope is he would respond to palliative radiation and pending genotyping results may be able to change treatment strategies per oncology.  Given this information, I introduced the concept of hospice to patient and family.  Discussed philosophy of hospice as 1 of quality and comfort focused treatment versus ongoing aggressive disease modifying treatment.  Additional clarification home palliative could continue to follow patient he were to improve and go home with home health and ongoing treatment.  Discussed the difference between palliative care and hospice care.    As such, approached discussion regarding resuscitation.  Patient is a member of [employer] as well as  and is familiar with resuscitation status and the act itself.  Additionally, he and his wife report they have elderly parents and are familiar with the POLST form.  Explored current status of full code and date of metastatic melanoma.  Patient agrees he would not want to be resuscitated if his death was secondary to ongoing cancer diagnosis.  He has agreed to DNR/DNI.  His family is in agreement as well.    Code Status: Transition to DNR/DNI    ACP Documents: Wife reports they have advanced directive she will provide a copy to be scanned into system.  POLST will be completed prior to discharge.    19 minutes spent discussing advance care planning, this time excludes any other billed services.    I spent a total of 65 minutes reviewing medical records, direct face-to-face time with the patient and/or family, documentation and coordination of care. This is separate from the time spent on advance care planning, which is documented above.    Vikki Karimi, MSN, APRN, ACNPC-AG.  Palliative Care  Nurse Practitioner  [phone]

## 2024-01-17 NOTE — RADIATION COMPLETION NOTES
Clinical Treatment Planning Note    DATE OF SERVICE: 1/17/2024    DIAGNOSIS:  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)  Staging form: Melanoma of the Skin, AJCC 8th Edition  - Pathologic stage from 1/8/2024: Stage IV (rpT0, pNX, pM1c(1)) - Signed by Julia Fraga M.D. on 1/16/2024  Stage prefix: Recurrence         IMAGING REVIEWED:  [] CT     [] MRI     [] PET/CT     [] BONE SCAN     [] MAMMO     [] OTHER      TREATMENT INTENT:   [] CURATIVE     [] MAINTENANCE     []  PALLIATIVE      []  SUPPORTIVE     []  PROPHYLACTIC     [] BENIGN     []  CONSOLIDATIVE      [] DEFINITIVE   []  OLOGIMETASTATIC      LINE OF TREATMENT:  [] ADJUVANT   [] DEFINITIVE   [] NEOADJUVANT   [] RE-TREATMENT      TECHNIQUE PLANNED:  [] IMRT   [] 3D   [x] SBRT   [] SRS/SRT   [] HDR   [] ELECTRON       IMRT JUSTIFICATION:  []   An immediately adjacent area has been previously irradiated and abutting portals must be established with high precision.    []  Dose escalation is planned to deliver radiation doses in excess of those commonly utilized for similar tumors with conventional treatment.    []  The target volume is concave or convex, and the critical normal tissues are within or around that convexity or concavity.    []  The target volume is in close proximity to critical structures that must be protected.    []  The volume of interest must be covered with narrow margins to adequately protect  immediately adjacent structures.      FIELDS & BLOCKING:  [x] COMPLEX BLOCKS     []  = 3 TX AREAS     []  ARCS     []  CUSTOM SHEILD        []  SIMPLE BLOCK      CHEMOTHERAPY:  []  CONCURRENT     []  INDUCTION     [] SEQUENTIAL     []  <30 DAYS FROM XRT      NOTES:  OAR CONSTRAINTS: (GUIDELINES ONLY NOT ABSOLUTE)  Target Prescribed Coverage   PTV1 95% of PTV1 covered by 100% (cGy) of RX Dose     PTV1 99% of PTV1 covered by 93% (cGy) of RX Dose       DENNY Goal   Any volume (1cc) outside PTV Max Dose < 74Gy   Plan Hot Spot Max Dose < 110%    Cord  Max Dose < 45Gy   Cord + 0.5cm Max Dose < 50Gy   Brainstem Max Dose < 52 Gy   Brainstem + 0.5cm                           (0.3cm w/ Dr. Luis A.) Max Dose < 52Gy   Oral Pharynx  Mean Dose < 45Gy    Oral Cavity Mean Dose < 30Gy   R Parotid  Mean Dose < 23Gy    L Parotid  Mean Dose < 23Gy    Cervical Esophagus Mean Dose < 30Gy   Contralateral Submandibular gland (not target)  Max Dose < 39Gy   Optic Chiasm Max Dose < 54Gy   R Optic Nerve Max Dose < 54Gy   L Optic Nerve Max Dose < 54Gy   R Eye Max Dose < 35Gy   L Eye Max Dose < 35Gy   R Lens Max Dose < 10Gy   L Lens Max Dose < 10Gy   Inner Ear Mean Dose < 50Gy    Mandible Goal - 1cc Max Dose < 70Gy   *RTOG 1016, RTOG 0225

## 2024-01-17 NOTE — THERAPY
Physical Therapy Contact Note    Patient Name: Andrew Wall  Age:  58 y.o., Sex:  male  Medical Record #: [id number]  Today's Date: 1/16/2024    PT consult received and chart reviewed. Imaging demonstrated widespread bony metastatic disease with pathologic fractures of the R sacral alar, L ischium, R superior pubic ramus, and pubic symphysis. Messaged the hospitalist for oncologic orthopedic consult due to concern for weightbearing with location of fractures. Will hold mobility at this time and follow up as appropriate.     Angi Davila, PT, DPT

## 2024-01-17 NOTE — DISCHARGE PLANNING
Met with pt, wife and adult children . Wife is 24/7 primary caregiver. Children are here to help. Pt was able to eat on his own but needs assistance with ADLs and IADLs. He would like a wheelchair and walker upon discharge. Apparently PCP has tried to get him a wheelchair and was unable to, apparently called [employer] companies and they are on back order. Explained that we have free wheelchair but need to qualify so need to ask CM Manager if he could get one as they are supposed to be for uninsured.    Wife would like HH services, no choice to specific company just one who accepts their insurance.     PCP Dr. Jose Luis Juarez.   Pharmacy is [facility].     Care Transition Team Assessment    Information Source  Orientation Level: Oriented X4  Information Given By: Patient  Informant's Name: Wife Sherly  Who is responsible for making decisions for patient? : Patient    Readmission Evaluation  Is this a readmission?: No    Elopement Risk  Legal Hold: No  Ambulatory or Self Mobile in Wheelchair: No-Not an Elopement Risk  Elopement Risk: Not at Risk for Elopement    Interdisciplinary Discharge Planning  Does Admitting Nurse Feel This Could be a Complex Discharge?: No  Primary Care Physician: DR Bryn Juarez  Lives with - Patient's Self Care Capacity: Spouse, Adult Children  Patient or legal guardian wants to designate a caregiver: No  Support Systems: Children, Spouse / Significant Other  Housing / Facility: 2 Story House (bedroom is on first floor.)  Do You Take your Prescribed Medications Regularly: Yes  Able to Return to Previous ADL's: Future Time w/Therapy  Mobility Issues: Yes  Prior Services: Home-Independent, None  Patient Prefers to be Discharged to:: Home with HH  Assistance Needed: Yes  Durable Medical Equipment: Not Applicable    Discharge Preparedness  What is your plan after discharge?: Home health care  What are your discharge supports?: Child, Spouse  Prior Functional Level: Ambulatory,  Independent with Activities of Daily Living, Independent with Medication Management  Difficulity with ADLs: Walking, Bathing, Toileting, Dressing  Difficulity with IADLs: Cooking, Driving, Laundry, Shopping    Functional Assesment  Prior Functional Level: Ambulatory, Independent with Activities of Daily Living, Independent with Medication Management    Finances  Financial Barriers to Discharge: No  Prescription Coverage: Yes    Vision / Hearing Impairment  Vision Impairment : No  Hearing Impairment : No    Values / Beliefs / Concerns  Values / Beliefs Concerns : No    Advance Directive  Advance Directive?: None    Domestic Abuse  Have you ever been the victim of abuse or violence?: No  Physical Abuse or Sexual Abuse: No  Verbal Abuse or Emotional Abuse: No  Possible Abuse/Neglect Reported to:: Not Applicable         Discharge Risks or Barriers  Discharge risks or barriers?: Post-acute placement / services  Patient risk factors: Cognitive / sensory / physical deficit, Complex medical needs    Anticipated Discharge Information  Discharge Disposition: D/T to home under A care in anticipation of covered skilled care (06)

## 2024-01-17 NOTE — CARE PLAN
The patient is Stable - Low risk of patient condition declining or worsening    Shift Goals  Clinical Goals: Pain control, Radation  Patient Goals: Pain control  Family Goals: NA    Progress made toward(s) clinical / shift goals:       Problem: Knowledge Deficit - Standard  Goal: Patient and family/care givers will demonstrate understanding of plan of care, disease process/condition, diagnostic tests and medications  Description: Target End Date:  1-3 days or as soon as patient condition allows    Document in Patient Education    1.  Patient and family/caregiver oriented to unit, equipment, visitation policy and means for communicating concern  2.  Complete/review Learning Assessment  3.  Assess knowledge level of disease process/condition, treatment plan, diagnostic tests and medications  4.  Explain disease process/condition, treatment plan, diagnostic tests and medications  Outcome: Progressing  Note: Patient understands the need for his radiation treatment. Patient will continue to report pain as needed and rate pain using a scale of 1-10.

## 2024-01-17 NOTE — RADIATION COMPLETION NOTES
PATIENT NAME Andrew Wall   PRIMARY PHYSICIAN Jose Luis Juarez [id number]   REFERRING PHYSICIAN No ref. provider found 1965     DATE OF SERVICE: 1/16/2024    DIAGNOSIS:  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)  Staging form: Melanoma of the Skin, AJCC 8th Edition  - Pathologic stage from 1/8/2024: Stage IV (rpT0, pNX, pM1c(1)) - Signed by Julia Fraga M.D. on 1/16/2024  Stage prefix: Recurrence         SPECIAL TREATMENT PROCEDURE NOTE:  Considerable additional effort required in the management of this case because of administration of Stereotactic Radiotherapy, which may result in increased normal tissue toxicity and require greater effort in contouring and treatment because of greater precision.

## 2024-01-17 NOTE — CT SIMULATION
PATIENT NAME Andrew Wall   PRIMARY PHYSICIAN Jose Luis Juarez [id number]   REFERRING PHYSICIAN No ref. provider found 1965     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)  Staging form: Melanoma of the Skin, AJCC 8th Edition  - Pathologic stage from 1/8/2024: Stage IV (rpT0, pNX, pM1c(1)) - Signed by Julia Fraga M.D. on 1/16/2024  Stage prefix: Recurrence         Treatment Planning CT Simulation        Order Questions       Question Answer    Is this for a new course of treatment? Yes    Is this an Addendum? No    Simulation Status Initial    Planned Start Date 1/22/2024    Treatment Site Spine - Cervical    Laterality Midline    Treatment Technique SBRT    Treatment Pattern/Frequency Daily    Number of fractions: 5    Concurrent Chemotherapy No    CT Technique 3D    Slice Thickness 3mm    Scan Extent H&N     Chest    Bowel Preparation No    Treatment Device(s) Aquaplast Mask    Patient Position Supine    Patient Orientation Head First    Chin Position Neutral    Treatment Image Guidance CBCT    Treatment Planning Image Fusion CT/MR    Other Orders Special Tx Procedure    Release to patient Immediate

## 2024-01-17 NOTE — ON TREATMENT VISIT
ON TREATMENT  NOTE  RADIATION ONCOLOGY DEPARTMENT    Patient name:  Andrew Wall    Primary Physician:  Jose Luis Juarez M.D. MRN: [id number]  CSN: [id number]   Referring physician:  No ref. provider found : 1965, 58 y.o.     ENCOUNTER DATE:  24    DIAGNOSIS:    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)  Staging form: Melanoma of the Skin, AJCC 8th Edition  - Pathologic stage from 2024: Stage IV (rpT0, pNX, pM1c(1)) - Signed by Julia Fraga M.D. on 2024  Stage prefix: Recurrence      TREATMENT SUMMARY:  Aria Treatment Information          2024   Aria Course Treatment Dates   Course First Treatment Date 2024    Course Last Treatment Date 2024    Aria Treatment Summary   SBRT Sacrum  Plan from Course C1_sacrum   Fraction 1 of 3   Elapsed Course Days 0 @ [id number]   Prescribed Fraction Dose 900 cGy   Prescribed Total Dose 2,700 cGy   SBRT Sacrum  Reference Point from Course C1_sacrum   Elapsed Course Days 0 @ [id number]   Session Dose 900 cGy   Total Dose 900 cGy       SUBJECTIVE:   Tolerated first treatment today.  He was also stimulated for the C-spine today.  risks and benefits of treatment to the C-spine were discussed as well.        VITAL SIGNS:  KPS: 20, Very sick; hospital admission necessary; active supportive treatment necessary (ECOG equivalent 4)  Encounter Vitals  Blood Pressure: 119/79  Pulse: 74  Pulse Oximetry: 97 %       No data to display                   PHYSICAL EXAM:  No change lying in bed          2024    12:51 PM   Toxicity Assessment   Toxicity Assessment Male Pelvis   Fatigue (lethargy, malaise, asthenia) Bedridden or disabling   Radiation Dermatitis None   Anorexia None   Colitis None   Constipation Requiring stool softener or dietary modification   Dehydration None   Diarrhea w/o Colostomy None   Flatulence Mild   Nausea None   Proctitis None   Vomiting None   RT - Pain due to RT None   Tumor Pain (onset or  exacerbation of tumor pain due to treatment) None   Dysuria (painful urination) None   Urinary Frequency Normal   Urinary Urgency None   Bladder Spasms Absent   Incontinence None   Urinary Retention Normal           IMPRESSION:  Cancer Staging   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)  Staging form: Melanoma of the Skin, AJCC 8th Edition  - Pathologic stage from 1/8/2024: Stage IV (rpT0, pNX, pM1c(1)) - Signed by Julia Fraga M.D. on 1/16/2024      PLAN:  No change in treatment plan    Disposition:  Treatment plan reviewed. Questions answered. Continue therapy outlined.     Julia Fraga M.D.    No orders of the defined types were placed in this encounter.

## 2024-01-17 NOTE — CARE PLAN
The patient is Unstable - High likelihood or risk of patient condition declining or worsening    Shift Goals  Clinical Goals: pain level 3/10  Patient Goals: sleep  Family Goals: NA    Progress made toward(s) clinical / shift goals:  patient able to sleep with pain level met. Medicated per mar for pain.     Patient is not progressing towards the following goals:

## 2024-01-17 NOTE — PROGRESS NOTES
Oncology/Hematology Progress Note               Author: Amol Leo M.D.    Cc: Metastatic melanoma Date & Time created: 1/17/2024  10:04 AM     Interval History:  He had his radiation planning session yesterday.  He will go down today to start radiation on the lumbosacral area.  He has some ongoing neck pain, bilateral shoulder pain, and low back pain including right-sided sciatica--but this seems to be under fairly good control on the current medications.  He has had some constipation off and on.  He has no nausea or vomiting.  He has no headaches.  His breathing is fine.      PMHx, PSurgHx, SocHX, FAMHx: All reviewed and no changes    Review of Systems:  Review of Systems   Constitutional:  Positive for malaise/fatigue. Negative for chills and fever.   HENT:  Negative for congestion, nosebleeds and sinus pain.    Eyes:  Negative for pain, discharge and redness.   Respiratory:  Negative for cough, hemoptysis, sputum production and shortness of breath.    Cardiovascular:  Negative for chest pain, palpitations, orthopnea and leg swelling.   Gastrointestinal:  Positive for constipation. Negative for abdominal pain, diarrhea, heartburn, nausea and vomiting.   Genitourinary:  Negative for dysuria, frequency and urgency.   Musculoskeletal:  Positive for back pain.   Skin:  Negative for itching and rash.       Physical Exam:  Physical Exam  Vitals reviewed.   Constitutional:       General: He is not in acute distress.     Appearance: He is not toxic-appearing.   HENT:      Head: Normocephalic and atraumatic.      Mouth/Throat:      Mouth: Mucous membranes are moist.      Pharynx: Oropharynx is clear. No oropharyngeal exudate.   Eyes:      General:         Right eye: No discharge.         Left eye: No discharge.      Extraocular Movements: Extraocular movements intact.      Conjunctiva/sclera: Conjunctivae normal.   Cardiovascular:      Rate and Rhythm: Normal rate and regular rhythm.      Heart sounds: No murmur  heard.     No friction rub. No gallop.   Pulmonary:      Effort: Pulmonary effort is normal. No respiratory distress.      Breath sounds: Normal breath sounds. No wheezing or rales.   Abdominal:      General: Bowel sounds are normal. There is no distension.      Palpations: Abdomen is soft.      Tenderness: There is no abdominal tenderness. There is no guarding.   Musculoskeletal:         General: No swelling or tenderness. Normal range of motion.      Cervical back: Normal range of motion. No rigidity.      Right lower leg: No edema.      Left lower leg: No edema.   Lymphadenopathy:      Cervical: No cervical adenopathy.   Skin:     General: Skin is warm and dry.      Coloration: Skin is not jaundiced.      Findings: No bruising or lesion.   Neurological:      General: No focal deficit present.      Mental Status: He is alert and oriented to person, place, and time. Mental status is at baseline.   Psychiatric:         Mood and Affect: Mood normal.         Thought Content: Thought content normal.         Judgment: Judgment normal.         Labs:          Recent Labs     01/15/24  0125 01/16/24  0511 01/17/24  0709   SODIUM 143 140 138   POTASSIUM 4.4 4.1 3.6   CHLORIDE 108 105 104   CO2 25 24 23   BUN 19 25* 25*   CREATININE 0.47* 0.58 0.52   MAGNESIUM 1.6 1.7 1.8   PHOSPHORUS 2.6 3.2 2.7   CALCIUM 10.0 8.2* 7.3*     Recent Labs     01/15/24  0125 01/16/24  0511 01/17/24  0709   ALTSGPT  --   --  110*   ASTSGOT  --   --  71*   ALKPHOSPHAT  --   --  323*   TBILIRUBIN  --   --  0.3   GLUCOSE 161* 126* 141*     Recent Labs     01/15/24  0125 01/16/24  0511 01/17/24  0709   RBC 2.84* 2.94* 3.19*   HEMOGLOBIN 8.3* 8.5* 9.2*   HEMATOCRIT 24.6* 25.9* 28.1*   PLATELETCT 396 406 474*     Recent Labs     01/15/24  0125 01/16/24  0511 01/17/24  0709   WBC 13.6* 14.6* 16.0*   ASTSGOT  --   --  71*   ALTSGPT  --   --  110*   ALKPHOSPHAT  --   --  323*   TBILIRUBIN  --   --  0.3     Recent Labs     01/15/24  0125 01/16/24  0511  24  0709   SODIUM 143 140 138   POTASSIUM 4.4 4.1 3.6   CHLORIDE 108 105 104   CO2 25 24 23   GLUCOSE 161* 126* 141*   BUN 19 25* 25*   CREATININE 0.47* 0.58 0.52   CALCIUM 10.0 8.2* 7.3*     Hemodynamics:  Temp (24hrs), Av.6 °C (97.8 °F), Min:36.5 °C (97.7 °F), Max:36.6 °C (97.9 °F)  Temperature: 36.6 °C (97.8 °F)  Pulse  Av  Min: 71  Max: 85   Blood Pressure: 118/72     Respiratory:    Respiration: 18, Pulse Oximetry: 95 %           Fluids:    Intake/Output Summary (Last 24 hours) at 2024 1004  Last data filed at 2024 0900  Gross per 24 hour   Intake 120 ml   Output --   Net 120 ml        GI/Nutrition:  Orders Placed This Encounter   Procedures    Diet Order Diet: Regular     Standing Status:   Standing     Number of Occurrences:   1     Order Specific Question:   Diet:     Answer:   Regular [1]     Medical Decision Making, by Problem:  Active Hospital Problems    Diagnosis     *Metastatic melanoma (HCC) [C43.9]     Leukocytosis [D72.829]     Hyperglycemia [R73.9]     Fever [R50.9]     Intractable low back pain [M54.59]     Constipation [K59.00]     Anemia [D64.9]     Hypercalcemia [E83.52]        Plan:    1.  Metastatic melanoma--  He had an initial melanoma diagnosed in , treated with surgical resection and 1 year of adjuvant Opdivo from 2020 through 2021.  He underwent further surveillance and monitoring.  He unfortunately is found to have recurrent/metastatic disease in 2023.  He is now on palliative Opdivo and Yervoy therapy which started on 2024.      Admitted now with hypercalcemia of malignancy, as well as progressive back pain, right-sided sciatica, with numbness and weakness in the right leg.  Spine imaging has shown widely metastatic disease through the spine including involvement of C2, C6, and C7 with epidural infiltration at that point.  He also has a large lumbosacral mass involving the right sided nerve roots at S1 and S2.  He also has widely  metastatic disease on CT imaging including more than 100 nodules in the lungs, possible liver disease, as well as widespread bony metastatic disease.    --Currently on Opdivo and Yervoy which is on an every 3-week schedule.  Cycle #2 would be due on 1/30/2024  -- Currently NGS testing on his tumor is pending.  If BRAF positive, will likely switch to BRAF directed therapy to promote more rapid response  -- He will follow-up with Dr. Ross after discharge    2.  Hypercalcemia of malignancy--he was treated with IV fluids as well as Zometa.  His calcium is now back to normal.  -- Resolved    3.  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  He has been seen by Dr. Fraga.  He had a planning session on 1/16.  She is starting SBRT radiation to the sacral mass on 1/17/2024.  She is planning further radiation to the cervical spine as well.  -- Dr. Fraga following  -- Will start radiation to the sacral mass today  -- Will do upper body mapping for cervical and thoracic disease as well, and likely start radiation to these areas in short order  -- Sounds like radiation will encompass 3-5 fractions in each of the areas above  -- Radiation team can decide whether to keep him inpatient for this or not    4.  Cancer related pain--currently seems to be under good control.  -- Will need trial of oral therapies, prior to discharge  -- Will need good control of pain prior to discharge    5.  Anemia--related to underlying disease.  No obvious signs of bleeding.    6.  Immobility--he has some degree of immobility or difficulty ambulating related to pain as well as neurologic symptoms of the right leg.  He is being assessed by physical therapy for home needs.  -- Determine home needs prior to discharge  -- Ensure safe disposition        At this point  there are no active oncology interventions needed.  I will sign off on the case.  I have answered all the patient's questions satisfactorily.  We will follow along peripherally.  We will be available for any questions, and we can be reconsulted if we need to become more involved.  Hopefully he can be discharged over the next few days, to follow-up with Dr. Ross as an outpatient.          Highly complex case, high risk of morbidity and mortality.      Quality-Core Measures   Reviewed items::  Medications reviewed, Labs reviewed and Radiology images reviewed  Santiago catheter::  No Santiago  DVT prophylaxis pharmacological::  Enoxaparin (Lovenox)

## 2024-01-17 NOTE — RADIATION COMPLETION NOTES
PATIENT NAME Andrew Wall   PRIMARY PHYSICIAN Jose Luis Juarez [phone]   REFERRING PHYSICIAN No ref. provider found 1965     DATE OF SERVICE: 1/17/2024    DIAGNOSIS:  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)  Staging form: Melanoma of the Skin, AJCC 8th Edition  - Pathologic stage from 1/8/2024: Stage IV (rpT0, pNX, pM1c(1)) - Signed by Julia Fraga M.D. on 1/16/2024  Stage prefix: Recurrence         SPECIAL TREATMENT PROCEDURE NOTE:  Considerable additional effort required in the management of this case because of administration of Stereotactic Radiotherapy, which may result in increased normal tissue toxicity and require greater effort in contouring and treatment because of greater precision.

## 2024-01-17 NOTE — PROCEDURES
DATE OF SERVICE: 1/17/2024    DIAGNOSIS:  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)  Staging form: Melanoma of the Skin, AJCC 8th Edition  - Pathologic stage from 1/8/2024: Stage IV (rpT0, pNX, pM1c(1)) - Signed by Julia Fraga M.D. on 1/16/2024  Stage prefix: Recurrence       TREATMENT:  Radiation Therapy Episodes       Active Episodes       Radiation Therapy: SBRT (1/22/2024)    Radiation Therapy: SBRT (1/17/2024)                   Radiation Treatments         Plan Last Treated On Elapsed Days Fractions Treated Prescribed Fraction Dose (cGy) Prescribed Total Dose (cGy)    SBRT Sacrum 1/17/2024 0 @ [id number] 1 of 3 900 2,700                  Reference Point Last Treated On Elapsed Days Most Recent Session Dose (cGy) Total Dose (cGy)    SBRT Sacrum 1/17/2024 0 @ [id number] 900 900                            STEREOTACTIC PROCEDURE NOTE:    Called by Truebeam machine to verify treatment parameters including:  treatment site, treatment dose, and treatment setup prior to stereotactic treatment..    Patient was placed in the treatment position with use of immobilization device and  laser guidance. CBCT images were acquired for target localization.  Images were reviewed in the axial, coronal, and saggital views and shifts were made as necessary to ensure that patient position matched simulation position.      Treatment delivered per  prescription.  The medical physicist was present throughout the set-up, verification and treatment delivery to oversee the procedure and ensure all parameters agreed with the computerized plan.    I have personally reviewed the relevant data, performed the target localization, and determined all relevant factors for this patient’s simulation.

## 2024-01-18 ENCOUNTER — APPOINTMENT (OUTPATIENT)
Dept: RADIOLOGY | Facility: MEDICAL CENTER | Age: 59
DRG: 543 | End: 2024-01-18
Attending: ORTHOPAEDIC SURGERY
Payer: COMMERCIAL

## 2024-01-18 ENCOUNTER — HOSPITAL ENCOUNTER (OUTPATIENT)
Dept: RADIATION ONCOLOGY | Facility: MEDICAL CENTER | Age: 59
End: 2024-01-18

## 2024-01-18 PROBLEM — M84.40XA PATHOLOGIC FRACTURE: Status: ACTIVE | Noted: 2024-01-18

## 2024-01-18 PROBLEM — R79.89 ELEVATED LFTS: Status: ACTIVE | Noted: 2024-01-18

## 2024-01-18 LAB
ALBUMIN SERPL BCP-MCNC: 2.6 G/DL (ref 3.2–4.9)
ALBUMIN/GLOB SERPL: 1.1 G/DL
ALP SERPL-CCNC: 298 U/L (ref 30–99)
ALT SERPL-CCNC: 132 U/L (ref 2–50)
ANION GAP SERPL CALC-SCNC: 10 MMOL/L (ref 7–16)
AST SERPL-CCNC: 92 U/L (ref 12–45)
BACTERIA BLD CULT: NORMAL
BACTERIA BLD CULT: NORMAL
BILIRUB SERPL-MCNC: 0.2 MG/DL (ref 0.1–1.5)
BUN SERPL-MCNC: 20 MG/DL (ref 8–22)
CALCIUM ALBUM COR SERPL-MCNC: 8.1 MG/DL (ref 8.5–10.5)
CALCIUM SERPL-MCNC: 7 MG/DL (ref 8.5–10.5)
CHEMOTHERAPY INFUSION START DATE: NORMAL
CHEMOTHERAPY RECORDS: 2700
CHEMOTHERAPY RECORDS: 9
CHEMOTHERAPY RECORDS: NORMAL
CHEMOTHERAPY RX CANCER: NORMAL
CHLORIDE SERPL-SCNC: 105 MMOL/L (ref 96–112)
CO2 SERPL-SCNC: 22 MMOL/L (ref 20–33)
CREAT SERPL-MCNC: 0.46 MG/DL (ref 0.5–1.4)
DATE 1ST CHEMO CANCER: NORMAL
ERYTHROCYTE [DISTWIDTH] IN BLOOD BY AUTOMATED COUNT: 42.4 FL (ref 35.9–50)
GFR SERPLBLD CREATININE-BSD FMLA CKD-EPI: 120 ML/MIN/1.73 M 2
GLOBULIN SER CALC-MCNC: 2.4 G/DL (ref 1.9–3.5)
GLUCOSE BLD STRIP.AUTO-MCNC: 141 MG/DL (ref 65–99)
GLUCOSE BLD STRIP.AUTO-MCNC: 143 MG/DL (ref 65–99)
GLUCOSE BLD STRIP.AUTO-MCNC: 162 MG/DL (ref 65–99)
GLUCOSE BLD STRIP.AUTO-MCNC: 165 MG/DL (ref 65–99)
GLUCOSE SERPL-MCNC: 154 MG/DL (ref 65–99)
HCT VFR BLD AUTO: 28.9 % (ref 42–52)
HGB BLD-MCNC: 9.5 G/DL (ref 14–18)
MAGNESIUM SERPL-MCNC: 1.8 MG/DL (ref 1.5–2.5)
MCH RBC QN AUTO: 28.7 PG (ref 27–33)
MCHC RBC AUTO-ENTMCNC: 32.9 G/DL (ref 32.3–36.5)
MCV RBC AUTO: 87.3 FL (ref 81.4–97.8)
PHOSPHATE SERPL-MCNC: 1.7 MG/DL (ref 2.5–4.5)
PLATELET # BLD AUTO: 449 K/UL (ref 164–446)
PMV BLD AUTO: 9.3 FL (ref 9–12.9)
POTASSIUM SERPL-SCNC: 3.9 MMOL/L (ref 3.6–5.5)
PROT SERPL-MCNC: 5 G/DL (ref 6–8.2)
RAD ONC ARIA COURSE LAST TREATMENT DATE: NORMAL
RAD ONC ARIA COURSE TREATMENT ELAPSED DAYS: NORMAL
RAD ONC ARIA REFERENCE POINT DOSAGE GIVEN TO DATE: 18
RAD ONC ARIA REFERENCE POINT ID: NORMAL
RAD ONC ARIA REFERENCE POINT SESSION DOSAGE GIVEN: 9
RBC # BLD AUTO: 3.31 M/UL (ref 4.7–6.1)
SIGNIFICANT IND 70042: NORMAL
SIGNIFICANT IND 70042: NORMAL
SITE SITE: NORMAL
SITE SITE: NORMAL
SODIUM SERPL-SCNC: 137 MMOL/L (ref 135–145)
SOURCE SOURCE: NORMAL
SOURCE SOURCE: NORMAL
WBC # BLD AUTO: 18.1 K/UL (ref 4.8–10.8)

## 2024-01-18 PROCEDURE — A9270 NON-COVERED ITEM OR SERVICE: HCPCS | Performed by: NURSE PRACTITIONER

## 2024-01-18 PROCEDURE — 77074 RADEX OSSEOUS SURVEY LMTD: CPT

## 2024-01-18 PROCEDURE — 83735 ASSAY OF MAGNESIUM: CPT

## 2024-01-18 PROCEDURE — A9270 NON-COVERED ITEM OR SERVICE: HCPCS | Performed by: STUDENT IN AN ORGANIZED HEALTH CARE EDUCATION/TRAINING PROGRAM

## 2024-01-18 PROCEDURE — 82962 GLUCOSE BLOOD TEST: CPT | Mod: 91

## 2024-01-18 PROCEDURE — 700111 HCHG RX REV CODE 636 W/ 250 OVERRIDE (IP): Mod: JZ | Performed by: STUDENT IN AN ORGANIZED HEALTH CARE EDUCATION/TRAINING PROGRAM

## 2024-01-18 PROCEDURE — 700102 HCHG RX REV CODE 250 W/ 637 OVERRIDE(OP): Performed by: STUDENT IN AN ORGANIZED HEALTH CARE EDUCATION/TRAINING PROGRAM

## 2024-01-18 PROCEDURE — 700105 HCHG RX REV CODE 258: Performed by: STUDENT IN AN ORGANIZED HEALTH CARE EDUCATION/TRAINING PROGRAM

## 2024-01-18 PROCEDURE — 77336 RADIATION PHYSICS CONSULT: CPT | Mod: XE | Performed by: RADIOLOGY

## 2024-01-18 PROCEDURE — 99233 SBSQ HOSP IP/OBS HIGH 50: CPT | Performed by: STUDENT IN AN ORGANIZED HEALTH CARE EDUCATION/TRAINING PROGRAM

## 2024-01-18 PROCEDURE — 77280 THER RAD SIMULAJ FIELD SMPL: CPT | Mod: 26 | Performed by: RADIOLOGY

## 2024-01-18 PROCEDURE — 80053 COMPREHEN METABOLIC PANEL: CPT

## 2024-01-18 PROCEDURE — 84100 ASSAY OF PHOSPHORUS: CPT

## 2024-01-18 PROCEDURE — 85027 COMPLETE CBC AUTOMATED: CPT

## 2024-01-18 PROCEDURE — 97163 PT EVAL HIGH COMPLEX 45 MIN: CPT

## 2024-01-18 PROCEDURE — 700102 HCHG RX REV CODE 250 W/ 637 OVERRIDE(OP): Performed by: NURSE PRACTITIONER

## 2024-01-18 PROCEDURE — 77280 THER RAD SIMULAJ FIELD SMPL: CPT | Performed by: RADIOLOGY

## 2024-01-18 PROCEDURE — 99231 SBSQ HOSP IP/OBS SF/LOW 25: CPT | Performed by: NURSE PRACTITIONER

## 2024-01-18 PROCEDURE — 77373 STRTCTC BDY RAD THER TX DLVR: CPT | Performed by: RADIOLOGY

## 2024-01-18 PROCEDURE — 36415 COLL VENOUS BLD VENIPUNCTURE: CPT

## 2024-01-18 PROCEDURE — 97535 SELF CARE MNGMENT TRAINING: CPT

## 2024-01-18 PROCEDURE — 97166 OT EVAL MOD COMPLEX 45 MIN: CPT

## 2024-01-18 PROCEDURE — 770004 HCHG ROOM/CARE - ONCOLOGY PRIVATE *

## 2024-01-18 RX ADMIN — DEXAMETHASONE SODIUM PHOSPHATE 4 MG: 4 INJECTION INTRA-ARTICULAR; INTRALESIONAL; INTRAMUSCULAR; INTRAVENOUS; SOFT TISSUE at 23:35

## 2024-01-18 RX ADMIN — MORPHINE SULFATE 4 MG: 4 INJECTION, SOLUTION INTRAMUSCULAR; INTRAVENOUS at 11:18

## 2024-01-18 RX ADMIN — DULOXETINE HYDROCHLORIDE 60 MG: 20 CAPSULE, DELAYED RELEASE ORAL at 04:58

## 2024-01-18 RX ADMIN — LACTULOSE 30 ML: 20 SOLUTION ORAL at 08:34

## 2024-01-18 RX ADMIN — DEXAMETHASONE SODIUM PHOSPHATE 4 MG: 4 INJECTION INTRA-ARTICULAR; INTRALESIONAL; INTRAMUSCULAR; INTRAVENOUS; SOFT TISSUE at 12:25

## 2024-01-18 RX ADMIN — DIBASIC SODIUM PHOSPHATE, MONOBASIC POTASSIUM PHOSPHATE AND MONOBASIC SODIUM PHOSPHATE 500 MG: 852; 155; 130 TABLET ORAL at 08:35

## 2024-01-18 RX ADMIN — METHOCARBAMOL 500 MG: 500 TABLET ORAL at 17:45

## 2024-01-18 RX ADMIN — GABAPENTIN 100 MG: 300 CAPSULE ORAL at 04:58

## 2024-01-18 RX ADMIN — METHOCARBAMOL 500 MG: 500 TABLET ORAL at 12:24

## 2024-01-18 RX ADMIN — DIBASIC SODIUM PHOSPHATE, MONOBASIC POTASSIUM PHOSPHATE AND MONOBASIC SODIUM PHOSPHATE 500 MG: 852; 155; 130 TABLET ORAL at 17:58

## 2024-01-18 RX ADMIN — GABAPENTIN 100 MG: 300 CAPSULE ORAL at 12:24

## 2024-01-18 RX ADMIN — MORPHINE SULFATE 30 MG: 30 TABLET, FILM COATED, EXTENDED RELEASE ORAL at 17:46

## 2024-01-18 RX ADMIN — METHOCARBAMOL 500 MG: 500 TABLET ORAL at 08:34

## 2024-01-18 RX ADMIN — ENOXAPARIN SODIUM 40 MG: 100 INJECTION SUBCUTANEOUS at 17:45

## 2024-01-18 RX ADMIN — DOCUSATE SODIUM 50 MG AND SENNOSIDES 8.6 MG 2 TABLET: 8.6; 5 TABLET, FILM COATED ORAL at 08:35

## 2024-01-18 RX ADMIN — DEXAMETHASONE SODIUM PHOSPHATE 4 MG: 4 INJECTION INTRA-ARTICULAR; INTRALESIONAL; INTRAMUSCULAR; INTRAVENOUS; SOFT TISSUE at 00:14

## 2024-01-18 RX ADMIN — POLYETHYLENE GLYCOL 3350 1 PACKET: 17 POWDER, FOR SOLUTION ORAL at 08:35

## 2024-01-18 RX ADMIN — METHOCARBAMOL 500 MG: 500 TABLET ORAL at 20:25

## 2024-01-18 RX ADMIN — FAMOTIDINE 20 MG: 20 TABLET ORAL at 04:59

## 2024-01-18 RX ADMIN — GABAPENTIN 300 MG: 300 CAPSULE ORAL at 17:45

## 2024-01-18 RX ADMIN — DEXAMETHASONE SODIUM PHOSPHATE 4 MG: 4 INJECTION INTRA-ARTICULAR; INTRALESIONAL; INTRAMUSCULAR; INTRAVENOUS; SOFT TISSUE at 17:46

## 2024-01-18 RX ADMIN — INSULIN HUMAN 1 UNITS: 100 INJECTION, SOLUTION PARENTERAL at 17:46

## 2024-01-18 RX ADMIN — DEXAMETHASONE SODIUM PHOSPHATE 4 MG: 4 INJECTION INTRA-ARTICULAR; INTRALESIONAL; INTRAMUSCULAR; INTRAVENOUS; SOFT TISSUE at 04:59

## 2024-01-18 RX ADMIN — ONDANSETRON 4 MG: 4 TABLET, ORALLY DISINTEGRATING ORAL at 17:58

## 2024-01-18 RX ADMIN — DOCUSATE SODIUM 50 MG AND SENNOSIDES 8.6 MG 2 TABLET: 8.6; 5 TABLET, FILM COATED ORAL at 17:44

## 2024-01-18 RX ADMIN — SODIUM CHLORIDE: 9 INJECTION, SOLUTION INTRAVENOUS at 02:47

## 2024-01-18 RX ADMIN — MORPHINE SULFATE 30 MG: 30 TABLET, FILM COATED, EXTENDED RELEASE ORAL at 04:59

## 2024-01-18 ASSESSMENT — ENCOUNTER SYMPTOMS
TINGLING: 1
SENSORY CHANGE: 1
BACK PAIN: 1
SHORTNESS OF BREATH: 0
FEVER: 0
CONSTIPATION: 1
MYALGIAS: 0
CHILLS: 0
WEAKNESS: 0
ABDOMINAL PAIN: 0
MYALGIAS: 1

## 2024-01-18 ASSESSMENT — COGNITIVE AND FUNCTIONAL STATUS - GENERAL
DAILY ACTIVITIY SCORE: 15
MOVING TO AND FROM BED TO CHAIR: UNABLE
SUGGESTED CMS G CODE MODIFIER DAILY ACTIVITY: CK
EATING MEALS: A LITTLE
DRESSING REGULAR LOWER BODY CLOTHING: A LOT
STANDING UP FROM CHAIR USING ARMS: A LOT
MOVING FROM LYING ON BACK TO SITTING ON SIDE OF FLAT BED: UNABLE
CLIMB 3 TO 5 STEPS WITH RAILING: TOTAL
SUGGESTED CMS G CODE MODIFIER MOBILITY: CM
MOBILITY SCORE: 7
TURNING FROM BACK TO SIDE WHILE IN FLAT BAD: UNABLE
WALKING IN HOSPITAL ROOM: TOTAL
PERSONAL GROOMING: A LITTLE
DRESSING REGULAR UPPER BODY CLOTHING: A LITTLE
TOILETING: A LOT
HELP NEEDED FOR BATHING: A LOT

## 2024-01-18 ASSESSMENT — PAIN DESCRIPTION - PAIN TYPE
TYPE: ACUTE PAIN

## 2024-01-18 ASSESSMENT — ACTIVITIES OF DAILY LIVING (ADL): TOILETING: INDEPENDENT

## 2024-01-18 NOTE — PROGRESS NOTES
Inpatient Palliative Care     Location: Cancer nursing unit [address] [address]     HPI:   Andrew Wall is a 58 y.o. male with past medical history of melanoma and recently diagnosed extensive metastatic disease per MRI earlier this month who was admitted on January 15 th by  his oncology office for hypercalcemia, calcium 12.2.  Patient reports progressively worsening back pain, right leg sciatica, and generalized weakness with additional constipation x 6 days without bowel movement.  He is followed by Dr. Ross with cancer care specialty and has been on immunotherapy.  He has upcoming appointment with radiation oncology.  Patient was direct transfer from [facility] emergency department to oncology floor at [facility].     Patient and wife report a fall while still at home which precipitated increased pain in right hip area.   .  Interval:  Status post SBRT per Dr. Fraga initial dose yesterday.  Patient has been evaluated by orthopedic oncology as well.  Participated with joint cotreatment therapy who report low blood pressure with standing, patient was able to stand however.    Summary:  Has only utilized morphine sulfate extended release 30 mg twice as ordered.  He is vague about pain, but reports yesterday was tiring day and he was not awakened by pain only by staff.  He is encouraged to ask for as needed medication accordingly.  He does state nursing staff is inquiring about pain often.  Had a BM last night after suppository.  Has no immediate complaints.     Active listening, reflection, reminiscing, validation & normalization, and empathic support utilized throughout this encounter.  All questions answered and contact information provided, encouraged to call with any questions or concerns.      Plan:     1) continue long-acting morphine, duloxetine, increased gabapentin dosing, dexamethasone per hospitalist team.  2) PC to continue to follow daily  3)        Thank you for allowing me the opportunity to  participate in the care of  Andrew Lamasn.     I spent a total of 32 minutes reviewing medical records, direct face-to-face time with the patient and/or family, coordination of care, and documentation. This is separate from the time spent on advance care planning, which is documented above.     Vikki Karimi, MSN, APRN, ACNPC-AG.  Palliative Care Nurse Practitioner  [phone]

## 2024-01-18 NOTE — PROCEDURES
DATE OF SERVICE: 1/18/2024    DIAGNOSIS:  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)  Staging form: Melanoma of the Skin, AJCC 8th Edition  - Pathologic stage from 1/8/2024: Stage IV (rpT0, pNX, pM1c(1)) - Signed by Julia Fraga M.D. on 1/16/2024  Stage prefix: Recurrence       TREATMENT:  Radiation Therapy Episodes       Active Episodes       Radiation Therapy: SBRT (1/22/2024)    Radiation Therapy: SBRT (1/17/2024)                   Radiation Treatments         Plan Last Treated On Elapsed Days Fractions Treated Prescribed Fraction Dose (cGy) Prescribed Total Dose (cGy)    SBRT Sacrum 1/18/2024 1 @ [id number] 2 of 3 900 2,700                  Reference Point Last Treated On Elapsed Days Most Recent Session Dose (cGy) Total Dose (cGy)    SBRT Sacrum 1/18/2024 1 @ [id number] 900 1,800                            STEREOTACTIC PROCEDURE NOTE:    Called by Truebeam machine to verify treatment parameters including:  treatment site, treatment dose, and treatment setup prior to stereotactic treatment..    Patient was placed in the treatment position with use of immobilization device and  laser guidance. CBCT images were acquired for target localization.  Images were reviewed in the axial, coronal, and saggital views and shifts were made as necessary to ensure that patient position matched simulation position.      Treatment delivered per  prescription.  The medical physicist was present throughout the set-up, verification and treatment delivery to oversee the procedure and ensure all parameters agreed with the computerized plan.    I have personally reviewed the relevant data, performed the target localization, and determined all relevant factors for this patient’s simulation.

## 2024-01-18 NOTE — THERAPY
Physical Therapy Contact Note    Patient Name: Andrew Wall  Age:  58 y.o., Sex:  male  Medical Record #: [id number]  Today's Date: 1/17/2024    Pt seen for DME/dc planning as still awaiting for surgical oncology consult for recommendations on weight bearing (message placed to provider will follow up in AM if not received) on both arms and right leg; pts mets are extensive with soft tissue involvement of both shoulders and may not be appropriate for weight bearing/walking; right hip will likely need limited weight bearing as well therefore pt will absolutely need a wheelchair for dc home; their preference currently continues to be home with home health; wife's greatest concern is transfusion transport that is suppose to occur every three weeks; encouraged reach out to [employer] to see if there is any medical transport benefits with his diagnosis; will mobilize once weight bearing clarified; until then educated on nonweightbearing exercises in bed and absolute adherence to spinal precautions.     DME: standard wheelchair with removable arm rests/leg rests; ramp for entry (discussed care chest/[facility]); see OT note for bathroom equipment        01/17/24 1810   Prior Living Situation   Prior Services Home-Independent;None   Housing / Facility 2 Story House   Steps Into Home 2   Equipment Owned 4-Wheel Walker   Lives with - Patient's Self Care Capacity Spouse;Adult Children   Comments denies falls; will not be returning to work; greatest concern will be medical transport to and from infusions about every 3 weeks; will have dtrs assist for the next month; their home bed is adjustable and he will stay on one floor; wife took care of her mother when she was passingand is familiar with some equipment; also requesting a shower chair and bedside commode, discussed the ywill also be seen by OT   Prior Level of Functional Mobility   Comments pt was independent and working up until 12/31; was told he had a rotator cuff  tear and otherwise had no symptoms until N/V   Cognition    Cognition / Consciousness WDL   Level of Consciousness Alert   Comments pleasant and cooperative; asking appropriate questions; wife at bedside; dtrs present by end   Strength Lower Body   Comments moving both legs against gravity in bed, discouraged straihgt leg raised and prolonged positioning on side   Sensation Lower Body   Lower Extremity Sensation   X   Comments right tingling/numbness down into right foot, unchanged from radiation;   Bed Mobility    Comments disucssed raised HOB up for meals nad as able wiht back pain for rewspiraotyr   Education Group   Role of Physical Therapist Patient Response Patient;Acceptance;Demonstration;Explanation;Verbal Demonstration;Action Demonstration   Additional Comments DME needed for home; role of exercises and diet to promote healing and health

## 2024-01-18 NOTE — CARE PLAN
The patient is Stable - Low risk of patient condition declining or worsening    Shift Goals  Clinical Goals: Pain control, radiation  Patient Goals: Rest  Family Goals: JAYLEEN    Progress made toward(s) clinical / shift goals:        Problem: Knowledge Deficit - Standard  Goal: Patient and family/care givers will demonstrate understanding of plan of care, disease process/condition, diagnostic tests and medications  Description: Target End Date:  1-3 days or as soon as patient condition allows    Document in Patient Education    1.  Patient and family/caregiver oriented to unit, equipment, visitation policy and means for communicating concern  2.  Complete/review Learning Assessment  3.  Assess knowledge level of disease process/condition, treatment plan, diagnostic tests and medications  4.  Explain disease process/condition, treatment plan, diagnostic tests and medications  Outcome: Progressing  Note: Patient understands the need for radiation treatments and pain control. Patient will continue to rate pain using a scale of 1-10.

## 2024-01-18 NOTE — CONSULTS
Date of Service  January 18, 2024     Reason for Consult: Metastatic bone disease secondary to melanoma    Requested by: Tuan Moran MD     HPI: Patient is a pleasant 58-year-old male with history of metastatic melanoma originally diagnosed in 2020.  He was treated with excision of the primary site and started on Opdivo.  Unfortunately he relapsed with original metastatic site as a lymph node.  He now has widely metastatic bone disease as well as other organ sites including lung and liver.  He is currently on Opdivo and Yervoy with plan to switch to BRAF directed therapy pending NGS testing on his tissue.  At admission he was noted to have hypercalcemia of malignancy treated with IV fluids and Zometa.  His calcium has been corrected.  Dr. Fraga is currently radiating the sacral metastatic site and plans to radiate several high risk areas in the spine as well.  He reports to me that his most symptomatic musculoskeletal sites are the left shoulder and right leg.  He denies pain at rest both of the sites and at other sites.  He denies waking from sleep secondary to pain.  He also denies pain in the sacrum and ischium while sitting or laying.  He notes the left shoulder is painful with movement especially when the arm is abducted away from the body.  He states the right leg pain is a burning sensation down the posterior aspect of the thigh and leg and into the heel.  This is associated with numbness of the heel that is nearly constant.  He states the pain is not made worse by standing or walking.  He denies any numbness or paresthesias in the upper extremities or left lower extremity.  He endorses constipation secondary to his narcotics, but denies any urinary retention or incontinence or numbness in the perineal region.  He reports his goals in terms of treatment are to complete radiation in the hospital and discharge to start on BRAF directed therapy if possible.     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;  Service: General    WIDE EXCISION Right 10/20/2020    Procedure: WIDE EXCISION, LESION- BUTTOCKS, RADICAL MALIGNANT MELANOMA;  Surgeon: Davon Valera M.D.;  Location: SURGERY SAME DAY [facility];  Service: General    ANTROSTOMY Bilateral 11/15/2019    Procedure: MAXILLARY ANTROSTOMY- REVISION ENDOSCOPICMOMETASONE INJECTION, PROPEL STENT PLACEMENT;  Surgeon: Felicitas Tenorio M.D.;  Location: [facility];  Service: Ent    SINUSCOPY Bilateral 11/15/2019    Procedure: ENDOSCOPY, PARANASAL SINUSES- FOR FRONTAL EXPLORATION;  Surgeon: Felicitas Tenorio M.D.;  Location: [facility];  Service: Ent    TURBINOPLASTY Bilateral 11/15/2019    Procedure: TURBINOPLASTY;  Surgeon: Felicitas Tenorio M.D.;  Location: [facility];  Service: Ent    SPHENOIDECTOMY Bilateral 11/15/2019    Procedure: SPHENOIDECTOMY- FOR SPHENOIDOTOMY;  Surgeon: Felicitas Tenorio M.D.;  Location: [facility];  Service: Ent    ETHMOIDECTOMY Bilateral 11/15/2019    Procedure: ETHMOIDECTOMY- ENDOSCOPIC;  Surgeon: Felicitas Tenorio M.D.;  Location: [facility];  Service: Ent    NASAL POLYPECTOMY Bilateral 11/15/2019    Procedure: POLYPECTOMY, NASAL CAVITY;  Surgeon: Felicitas Tenorio  M.D.;  Location: SURGERY [facility];  Service: Ent    NASAL POLYPECTOMY  2015, 2017, 2019    x 3    OTHER  11/20    removed melanoma            Anticoagulation:  lovenox    Allergies:   Allergies   Allergen Reactions    Augmentin Vomiting and Nausea       Problem List  Principal Problem:    Metastatic melanoma (HCC) (POA: Yes)  Active Problems:    Hypercalcemia (POA: Yes)    Anemia (POA: Yes)    Constipation (POA: Yes)    Intractable low back pain (POA: Yes)    Fever (POA: Yes)    Leukocytosis (POA: Unknown)    Hyperglycemia (POA: Unknown)    DNR (do not resuscitate) (POA: Unknown)  Resolved Problems:    * No resolved hospital problems. *       Subjective  Review of Systems   Constitutional:  Negative for chills and fever.   Respiratory:  Negative for shortness of breath.    Cardiovascular:  Negative for chest pain.   Gastrointestinal:  Positive for constipation. Negative for abdominal pain.   Genitourinary:  Negative for dysuria and urgency.   Musculoskeletal:  Positive for joint pain. Negative for myalgias.   Skin:  Negative for rash.   Neurological:  Positive for tingling and sensory change. Negative for weakness.         Objective  Temp:  [36.6 °C (97.8 °F)-36.9 °C (98.4 °F)] 36.7 °C (98 °F)  Pulse:  [71-93] 93  Resp:  [18] 18  BP: (118-123)/(72-75) 119/75  SpO2:  [95 %] 95 %      Physical Exam  Constitutional:       General: He is not in acute distress.     Appearance: Normal appearance. He is normal weight.   HENT:      Head: Normocephalic and atraumatic.      Mouth/Throat:      Mouth: Mucous membranes are moist.      Pharynx: Oropharynx is clear.   Eyes:      Extraocular Movements: Extraocular movements intact.      Conjunctiva/sclera: Conjunctivae normal.   Cardiovascular:      Pulses: Normal pulses.   Pulmonary:      Effort: Pulmonary effort is normal. No respiratory distress.      Breath sounds: No wheezing.   Abdominal:      General: Abdomen is flat.      Palpations: Abdomen is soft.    Musculoskeletal:      Cervical back: Normal range of motion and neck supple.      Comments: right upper extremity: shoulder ROM is full painless.  SILT axillary, radial, median and ulnar nerves. Motor intact to axillary, AIN, PIN, median and ulnar nerves. Radial pulse 2+.  There is no tenderness with palpation of the humerus, elbow, forearm and wrist.  He does have some tenderness of the anterior shoulder.     left upper extremity: shoulder ROM is limited and painful especially with abduction.  He tolerates some internal and external rotation with the arm at the side.  SILT axillary, radial, median and ulnar nerves. Motor intact to axillary, AIN, PIN, median and ulnar nerves. Radial pulse 2+.  There is no tenderness with palpation of the humerus, elbow, forearm and wrist.  He is tender to palpation around the shoulder and scapula.    Right lower extremity: He tolerates logroll of the hip without pain.  Sensation intact to light touch SPN, DPN, plantar and sural nerves.  Motor strength 4+/5 with leg raise, 5/5 with plantar and dorsiflexion.  He has some tenderness with palpation of the PSIS and sacrum.  He is nontender with palpation to the femur, knee and leg.    Left lower extremity: He tolerates logroll of the hip without pain.  Sensation intact to light touch SPN, DPN, plantar and sural nerves.  Motor strength 5/5 with leg raise, 5/5 with plantar and dorsiflexion.  He is nontender with palpation of the left lower extremity.    No significant tenderness with palpation of the cervical, thoracic and lumbar spines.  There is tenderness with palpation of the sacrum.     Neurological:      Mental Status: He is alert.          Fluids    Intake/Output Summary (Last 24 hours) at 1/18/2024 0739  Last data filed at 1/18/2024 0520  Gross per 24 hour   Intake 370 ml   Output 1000 ml   Net -630 ml         Labs  Lab Results   Component Value Date/Time    WBC 18.1 (H) 01/18/2024 12:52 AM    RBC 3.31 (L) 01/18/2024 12:52 AM     HEMOGLOBIN 9.5 (L) 01/18/2024 12:52 AM    HEMATOCRIT 28.9 (L) 01/18/2024 12:52 AM    MCV 87.3 01/18/2024 12:52 AM    MCH 28.7 01/18/2024 12:52 AM    MCHC 32.9 01/18/2024 12:52 AM    MPV 9.3 01/18/2024 12:52 AM    NEUTSPOLYS 73.00 (H) 01/11/2024 09:36 PM    LYMPHOCYTES 13.00 (L) 01/11/2024 09:36 PM    MONOCYTES 9.00 01/11/2024 09:36 PM    EOSINOPHILS 0.00 01/11/2024 09:36 PM    BASOPHILS 1.00 01/11/2024 09:36 PM        Lab Results   Component Value Date/Time    SODIUM 137 01/18/2024 12:52 AM    POTASSIUM 3.9 01/18/2024 12:52 AM    CHLORIDE 105 01/18/2024 12:52 AM    CO2 22 01/18/2024 12:52 AM    GLUCOSE 154 (H) 01/18/2024 12:52 AM    BUN 20 01/18/2024 12:52 AM    CREATININE 0.46 (L) 01/18/2024 12:52 AM        Lab Results   Component Value Date/Time    PROTHROMBTM 14.3 11/15/2023 09:53 AM    INR 1.10 11/15/2023 09:53 AM        Recent Labs     01/11/24  1436 01/17/24  0709 01/18/24  0052   ASTSGOT 69* 71* 92*   ALTSGPT 50 110* 132*   TBILIRUBIN 0.5 0.3 0.2   GLOBULIN 2.9 2.4 2.4       Imaging  CT chest abdomen and pelvis 1/13/2024  IMPRESSION:     1.  Since the previous study performed 7/11/2023 there has been interval development of widespread metastatic disease throughout chest abdomen and pelvis.  2.  Severe pulmonary metastatic disease.  3.  Severe and widespread osseous metastatic disease. There are some pathologic fractures related to osseous metastatic disease as above.  4.  New liver lesion suggesting metastasis.  5.  Few peritoneal nodules suggesting peritoneal malignancy.  6.  Mild right pelvic adenopathy.  7.  Detailed findings as above.    Per my read notable areas of skeletal metastatic disease include the right periacetabular and superior ramus demonstrating a destructive lytic mass with nondisplaced pathologic fracture through the ramus.  There is also a lytic lesion in the right femoral neck without cortical destruction or fracture.  The left ischium demonstrates lytic lesions and an incomplete  fracture.  The left glenoid demonstrates a destructive lytic lesion and fracture through the glenoid neck.  The right coracoid process nearly completely destroyed from a lytic mass, with the glenoid neck is intact without fracture.  There are multiple metastatic sites throughout the cervical and thoracic spine and ribs with a pathologic fracture of the right rib 8.    Skeletal survey 1/18/2024    Per my read AP/lateral views of the bilateral humeri and femur demonstrate lytic lesions in the bilateral glenoid and scapula greater on the left with associated pathologic fracture of the glenoid neck.  There is no significant displacement.  There is a small lytic lesion in the left femoral head with a narrow zone of transition and sclerotic border more consistent with a bone cyst over metastasis.  There is lucency with ill-defined borders and a wide zone of transition in the right periacetabulum and the left ischium.  I do not appreciate pathologic fractures in these areas on radiographs        Assessment and Plan  1.  Widely metastatic bone disease secondary to melanoma  -osseous (spine, ribs, scapula, pelvis, right femoral neck, sacrum, clavicle), lungs, liver, lymph   2.  Pathologic fracture left glenoid neck, metastatic melanoma  3.  Pathologic fracture right superior ramus, metastatic melanoma  4.  Pathologic fracture left ischium, metastatic melanoma    -I counseled the patient regarding my clinical and imaging findings.  I agree with the plan for palliative radiation to the sacrum and spine.  I do think there is a benefit for radiation to the right acetabulum and hip as fracture through this area carries high morbidity and attempts to prevent this I believe are worthwhile.  We did discuss options for surgical intervention of the pelvis including percutaneous screw with cement augmentation to support these lytic areas and prevent fracture.  There is some evidence to suggest this can help with pain control as well.   I did not recommend this treatment at this time as he is not having weightbearing pain and I think first attempts at radiation would be best.  I counseled him regarding his left shoulder.  There are no good surgical options to fix or reconstruct the glenoid.  It seems that most of his pain is functional related to the fracture so I am not sure radiation would provide a huge benefit to the left shoulder which does not seem he is having a lot of biologic pain in that area.  I recommended he limit weightbearing to the left shoulder and arm mostly for pain control.  He can use this arm to assist with ADLs and use of the walker.  Also recommended he use the walker for ambulation and if he does begin to experience weightbearing pain to let me know as this is the most significant finding for impending pathologic fracture.  All of his questions were answered, he verbalized understanding and was in agreement with the plan.  I will reach out to Dr. Fraga as well with my recommendation to radiate the right acetabulum.    Weightbearing recommendations:  1.  Weightbearing as tolerated bilateral lower extremities with walker assist  2.  Weightbearing as tolerated right upper extremity  3.  Nonweightbearing left upper extremity except to assist with a walker and ADLs    Recommend palliative radiation to the right acetabulum and hip    Sofia Burleson, DO  Orthopedic Oncology

## 2024-01-18 NOTE — PROGRESS NOTES
Hospital Medicine Daily Progress Note    Date of Service  1/17/2024    Chief Complaint  Andrew Wall is a 58 y.o. male admitted 1/15/2024 with back pain    Hospital Course  58 y.o. male, with history of melanoma following Dr. Ross and recently diagnosed with extensive metastatic disease with MRI earlier this month, who initially admitted to [facility] 1/11 for hypercalcemia 12.2.  Patient reports progressively worse back pain, right leg sciatica and generalized weakness, constipation with no bowel movement for 6 days.    Patient followed by oncologist Dr. Ross, has been on immunotherapy.   Images showed extensive bone metastasis and new metastasis to lung and liver.  Patient is transferred here for radiation consult.   Hypercalcemia improved with IV fluid and Zometa.      Interval Problem Update  Seen patient at bedside. Family at bedside  Radiation started today  Pain control. Discussed with palliative care team. Start MS contin, gabapentin, decadron, cymbalta  DNR/DNI    Discussed with radiation oncology Dr. Fraga, Ordered MRI C/T spine  Multimodal pain managements. I consulted palliative care    I have discussed this patient's plan of care and discharge plan at IDT rounds today with Case Management, Nursing, Nursing leadership, and other members of the IDT team.    Consultants/Specialty  Radiation oncology    Code Status  DNAR/DNI    Disposition  The patient is not medically cleared for discharge to home or a post-acute facility.      I have placed the appropriate orders for post-discharge needs.    Review of Systems  Review of Systems   Musculoskeletal:  Positive for back pain, joint pain and myalgias.   All other systems reviewed and are negative.       Physical Exam  Temp:  [36.5 °C (97.7 °F)-36.9 °C (98.4 °F)] 36.9 °C (98.4 °F)  Pulse:  [71-85] 82  Resp:  [18] 18  BP: (109-123)/(66-79) 123/75  SpO2:  [95 %-97 %] 95 %    Physical Exam  Vitals and nursing note reviewed.   Constitutional:        Appearance: Normal appearance. He is ill-appearing.   HENT:      Head: Normocephalic and atraumatic.      Mouth/Throat:      Pharynx: Oropharynx is clear.   Eyes:      Pupils: Pupils are equal, round, and reactive to light.   Neck:      Vascular: No carotid bruit.   Cardiovascular:      Rate and Rhythm: Normal rate and regular rhythm.   Pulmonary:      Effort: Pulmonary effort is normal.      Breath sounds: Normal breath sounds.   Abdominal:      General: Abdomen is flat. Bowel sounds are normal.      Palpations: Abdomen is soft. There is no mass.   Musculoskeletal:         General: Tenderness present. Normal range of motion.      Cervical back: Neck supple.   Skin:     General: Skin is warm and dry.   Neurological:      General: No focal deficit present.      Mental Status: He is alert and oriented to person, place, and time.   Psychiatric:         Mood and Affect: Mood normal.         Behavior: Behavior normal.         Fluids    Intake/Output Summary (Last 24 hours) at 1/17/2024 1813  Last data filed at 1/17/2024 1457  Gross per 24 hour   Intake 370 ml   Output --   Net 370 ml         Laboratory  Recent Labs     01/15/24  0125 01/16/24  0511 01/17/24  0709   WBC 13.6* 14.6* 16.0*   RBC 2.84* 2.94* 3.19*   HEMOGLOBIN 8.3* 8.5* 9.2*   HEMATOCRIT 24.6* 25.9* 28.1*   MCV 86.6 88.1 88.1   MCH 29.2 28.9 28.8   MCHC 33.7 32.8 32.7   RDW 39.8 41.9 42.4   PLATELETCT 396 406 474*   MPV 9.6 9.5 9.5       Recent Labs     01/15/24  0125 01/16/24  0511 01/17/24  0709   SODIUM 143 140 138   POTASSIUM 4.4 4.1 3.6   CHLORIDE 108 105 104   CO2 25 24 23   GLUCOSE 161* 126* 141*   BUN 19 25* 25*   CREATININE 0.47* 0.58 0.52   CALCIUM 10.0 8.2* 7.3*                     Imaging  MR-CERVICAL SPINE-WITH & W/O   Final Result         1.  Infiltrative metastatic disease with complete replacement of the bone marrow at C2, C6 and C7 with involvement of the posterior elements as described in detail above.      2.  There is ventral epidural  infiltration by metastatic disease at C2, C6 and C7.      3.   Cortical breakthrough with extension of disease to the pre and paravertebral soft tissues is noted at C6 and C7 as described above.      4.  Severe right foraminal narrowing at C5-6.      5.  There is no significant spinal canal stenosis, cord compression or evidence for cord signal abnormality.      MR-THORACIC SPINE-WITH & W/O   Final Result         Multiple metastatic lesions noted throughout the thoracic spine as described above.      Large spinous process lesion noted at T3, T9.      Large right paraspinal metastatic lesion involvement of the right-sided pedicle and lamina noted at T3.      Ventral epidural extension of metastatic disease noted at the T5, T5-T6 level without significant cord impingement.      Large left paraspinous soft tissue metastatic lesion and a small right subcutaneous lesion noted at the T1-T2 level.      No significant spinal canal stenosis.                    Assessment/Plan  * Metastatic melanoma (HCC)- (present on admission)  Assessment & Plan  Patient followed by oncologist Dr. Ross, has been on immunotherapy.  He has an upcoming appointment with radiation oncology.     Images showed extensive bone metastasis and new metastasis to lung and liver  outpatient MRI showed extensive metastases through the lumbar spine, sacrum, paraspinous muscles, psoas muscle, iliacus muscle, gluteal muscles and Bilateral S1 nerve roots and right S2 nerve root in the sacral foramen are surrounded by the mass and probably invaded/impinged.     CT here showed  R shoulder - Large lytic metastasis involving the right anterior superior glenoid and coracoid with soft tissue component;  left shoulder - metastasis of Glenoid/Scapular body/ Clavicular head/ Teres minor muscle mass      CT chest/abd/pelvis - Severe pulmonary metastatic disease; Severe and widespread osseous metastatic disease/pathologic fractures; New liver lesion suggesting  metastasis. Few peritoneal nodules suggesting peritoneal malignancy     new from prior CT dated 7/11/2023. PET scan 11/2023 was negative for chest and abdomen.       MRI brain showed MRI brain noted C2 vertebral body bone metastasis. There is mild amount of anterior epidural tumor at this level. There is no spinal canal compromise. This is new since the previous study. No intracranial metastasis.     Medical oncology and radiation oncology consultation    1/17: discussed with oncology. Radiation started today  Pain control, discussed with palliative. Start MS contin, gabapentin, decadron, cymbalta       DNR (do not resuscitate)  Assessment & Plan  DNR/DNI per discussion with palliative     Hyperglycemia  Assessment & Plan  Due to steroids  BG over 200  I started SSI  Hypoglycemia protocol    Leukocytosis  Assessment & Plan  likely due to steroid use.  No infectious signs    Fever- (present on admission)  Assessment & Plan   likely due to extensive metastasis;   no identified infection signs;   elevated procalcitonin could be related to malignancy;   blood culture NTD, continue to hold antibiotics, monitoring    Intractable low back pain- (present on admission)  Assessment & Plan   likely due to tumor compression of the S1/S2,   continue IV Decadron, his reported his pain has been improving.    I ordered Pepcid for GI prophylaxis  Multimodal pain managements including po and iv narcotics prn. Monitoring respiratory status and sedation score  Palliative consulted for pain managements    Constipation- (present on admission)  Assessment & Plan  Likely due to hypercalcemia, opioid use and bedbound  Continue aggressive bowel management    Anemia- (present on admission)  Assessment & Plan   likely due to underlying malignancy, high ferritin and B12.   No sign of active bleeding    Continue to monitor, transfuse if Hb less than 7    Hypercalcemia- (present on admission)  Assessment & Plan  due to extensive bone metastasis.     TSH 3.5, PTH 3.7 (appropriately depressed)  He has been treated with aggressive IV fluid, received 1 dose of IV Zometa, His hypercalcemia slowly improving, corrected calcium down to 11.3.   Continue IV fluid         VTE prophylaxis:    enoxaparin ppx      I have performed a physical exam and reviewed and updated ROS and Plan today (1/17/2024). In review of yesterday's note (1/16/2024), there are no changes except as documented above.    My total time spent caring for the patient on the day of the encounter was 53  minutes.   This does not include time spent on separately billable procedures/tests.

## 2024-01-18 NOTE — CARE PLAN
The patient is Watcher - Medium risk of patient condition declining or worsening    Shift Goals  Clinical Goals: pain management this shift  Patient Goals: rest  Family Goals: JAYLEEN    Progress made toward(s) clinical / shift goals: Pt. Delines interventions for pain at this time, pt. To have bilateral femur and humerus x-ray in the AM.  Call light and belongings in reach, treaded socks on, yellow fall risk band on, appropriate fall risk sign on door.

## 2024-01-18 NOTE — DISCHARGE PLANNING
Case Management Discharge Planning    Admission Date: 1/15/2024  GMLOS: 3.5  ALOS: 3    6-Clicks ADL Score: 15  6-Clicks Mobility Score: 12  PT and/or OT Eval ordered: Yes  Post-acute Referrals Ordered: Yes  Post-acute Choice Obtained: Yes  Has referral(s) been sent to post-acute provider:  Yes      Anticipated Discharge Dispo: Discharge Disposition: D/T to home under [facility] care in anticipation of covered skilled care (06)    DME Needed: No    Action(s) Taken: Discussed in IDT rounds, pt having Palliative radiation today and tomorrow for symptom management. Pt has been requiring IV pain medications for comfort. PT/OT attempted to wok with pt but need clarification on mobility orders prior to making recommendations. Plan of care to be determined at this time.     Escalations Completed: None    Medically Clear: No    Next Steps: Pending medical clearance     Barriers to Discharge: Medical clearance    Is the patient up for discharge tomorrow: No

## 2024-01-18 NOTE — THERAPY
"Occupational Therapy   Initial Evaluation     Patient Name: Andrew Wall  Age:  58 y.o., Sex:  male  Medical Record #: [id number]  Today's Date: 1/18/2024  Precautions: Fall Risk, Weight Bearing As Tolerated Right Lower Extremity, Weight Bearing As Tolerated Left Lower Extremity, Non Weight Bearing Left Lower Extremity  Comments: +pathological fxs    Assessment  Patient is 58 y.o. male admitted from oncologist d/t abnormal labs. Pt has been having worsening low back pain and weakness. PMHx: Melanoma of buttock in 2020 otherwise was active and healthy.   Pt is now dx w/metastatic melanoma w/lytic mets to bones (spine, shoulders, pelvis, and liver) started on radiation tx, as well as hyperglycemia, constipation and anemia. Pt has progressive loss of ROM to his L shoulder, as well as pain in back and legs w/progressive weakness, and neck pain w/head weakness. All of which is impacting his participation in ADL's. Pt also had +orthostatic BP, impacting activity tolerance and safety w/mobility.     Plan  Occupational Therapy Initial Treatment Plan   Treatment Interventions: Self Care / Activities of Daily Living, Adaptive Equipment, Manual Therapy Techniques, Neuro Re-Education / Balance, Therapeutic Exercises, Therapeutic Activity  Treatment Frequency: 3 Times per Week  Duration: Until Therapy Goals Met    DC Equipment Recommendations: Tub / Shower Seat (BSC order has been placed)  Discharge Recommendations:  (TBD pts goal is for home but will need to monitor functional status given active tx)     Subjective  \" My goal is to go home\"      Objective   01/18/24 1059   Charge Group   OT Evaluation OT Evaluation Mod   OT Self Care / ADL (Units) 1   Total Time Spent   OT Time Spent Yes   OT Self Care / ADL (Minutes) 15   OT Evaluation (Minutes) 15   OT Total Time Spent (Calculated) 30   Initial Contact Note    Initial Contact Note Order Received and Verified, Occupational Therapy Evaluation in Progress with Full Report to " Follow.   Prior Living Situation   Prior Services Home-Independent;None   Housing / Facility 2 Story House   Steps Into Home 2   Bathroom Set up Bathtub / Shower Combination   Equipment Owned 4-Wheel Walker   Lives with - Patient's Self Care Capacity Spouse;Adult Children   Comments SO present and supportive pts goal is to dc home; able to stay on first level of home and   Prior Level of ADL Function   Self Feeding Independent   Grooming / Hygiene Independent   Bathing Independent   Dressing Independent   Toileting Independent   Prior Level of IADL Function   Medication Management Independent   Laundry Independent   Kitchen Mobility Independent   Finances Independent   Home Management Independent   Shopping Independent   Prior Level Of Mobility Independent Without Device in Community   Driving / Transportation Driving Independent   Comments working as  ~2 weeks ago   Precautions   Precautions Fall Risk;Weight Bearing As Tolerated Right Lower Extremity;Weight Bearing As Tolerated Left Lower Extremity;Non Weight Bearing Left Lower Extremity   Comments +pathological fxs   Pain 0 - 10 Group   Location Generalized   Location Orientation Right;Left   Therapist Pain Assessment During Activity;Nurse Notified;6   Cognition    Cognition / Consciousness X   Level of Consciousness Alert   Comments cooperative and motivated some limited insight   Passive ROM Upper Body   Passive ROM Upper Body X   Comments LUE limited proximally at shoulder d/t pain +glenoid fx   Active ROM Upper Body   Active ROM Upper Body  X   Dominant Hand Left   Comments limited by pain w/shoulder flexion and external rotation   Strength Upper Body   Upper Body Strength  X   Comments generalized weakness L>R   Sensation Upper Body   Upper Extremity Sensation  Not Tested   Coordination Upper Body   Coordination X   Comments LUE grossly limited by pain   Balance Assessment   Sitting Balance (Static) Fair   Sitting Balance (Dynamic) Fair -   Standing  Balance (Static) Fair -   Standing Balance (Dynamic) Poor +   Weight Shift Sitting Poor   Weight Shift Standing Poor   Bed Mobility    Supine to Sit Moderate Assist   Sit to Supine Moderate Assist   Comments log roll HOB elevated   ADL Assessment   Eating Minimal Assist   Grooming Minimal Assist;Seated   Upper Body Dressing Moderate Assist   Lower Body Dressing Maximal Assist   Toileting   (NT)   Comments limited by weakness, pain and impaired L dominant UE   How much help from another person does the patient currently need...   6 Clicks Daily Activity Score 15   Functional Mobility   Sit to Stand Minimal Assist   Bed, Chair, Wheelchair Transfer Unable to Participate   Toilet Transfers Unable to Participate   Mobility EOB sit>stand partial side steps BTB   Comments limited by +orthostatic BP   Activity Tolerance   Comments limited by pain and orthostatic BP   Patient / Family Goals   Patient / Family Goal #1 to go home   Short Term Goals   Short Term Goal # 1 pt will complete txf to BSC w/CGA   Short Term Goal # 2 pt will complete UB dressing w/set up   Short Term Goal # 3 pt will complete grooming seated w/set up   Education Group   Education Provided Spinal Precautions;Home Safety;Transfers;Role of Occupational Therapist;Activities of Daily Living;Adaptive Equipment;Weight Bearing Precautions   Role of Occupational Therapist Patient Response Patient;Acceptance;Explanation;Demonstration;Significant Other   Spinal Precautions Patient Response Patient;Acceptance;Explanation;Demonstration;Significant Other   Home Safety Patient Response Patient;Acceptance;Explanation;Demonstration;Significant Other   Transfers Patient Response Patient;Acceptance;Explanation;Demonstration;Significant Other   ADL Patient Response Patient;Acceptance;Explanation;Demonstration;Significant Other   Adaptive Equipment Patient Response Patient;Acceptance;Explanation;Demonstration;Significant Other   Weight Bearing Precautions Patient Response  Patient;Acceptance;Explanation;Demonstration;Significant Other   Occupational Therapy Initial Treatment Plan    Treatment Interventions Self Care / Activities of Daily Living;Adaptive Equipment;Manual Therapy Techniques;Neuro Re-Education / Balance;Therapeutic Exercises;Therapeutic Activity   Treatment Frequency 3 Times per Week   Duration Until Therapy Goals Met   Problem List   Problem List Decreased Active Daily Living Skills;Decreased Upper Extremity Strength Right;Decreased Upper Extremity Strength Left;Decreased Functional Mobility;Decreased Activity Tolerance;Safety Awareness Deficits / Cognition;Impaired Postural Control / Balance;Limited Knowledge of Post Op Precautions   Anticipated Discharge Equipment and Recommendations   DC Equipment Recommendations Tub / Shower Seat  (BSC order has been placed)   Discharge Recommendations   (TBD pts goal is for home but will need to monitor functional status given active tx)   Interdisciplinary Plan of Care Collaboration   IDT Collaboration with  Nursing;Family / Caregiver;Physical Therapist   Patient Position at End of Therapy In Bed;Call Light within Reach;Tray Table within Reach;Phone within Reach   Collaboration Comments RN aware of session   Session Information   Date / Session Number  1/18 #1 (1/3, 1/24)

## 2024-01-19 ENCOUNTER — HOSPITAL ENCOUNTER (OUTPATIENT)
Dept: RADIATION ONCOLOGY | Facility: MEDICAL CENTER | Age: 59
End: 2024-01-19

## 2024-01-19 LAB
ALBUMIN SERPL BCP-MCNC: 2.4 G/DL (ref 3.2–4.9)
ALBUMIN/GLOB SERPL: 1.1 G/DL
ALP SERPL-CCNC: 238 U/L (ref 30–99)
ALT SERPL-CCNC: 92 U/L (ref 2–50)
ANION GAP SERPL CALC-SCNC: 8 MMOL/L (ref 7–16)
AST SERPL-CCNC: 43 U/L (ref 12–45)
BILIRUB SERPL-MCNC: 0.2 MG/DL (ref 0.1–1.5)
BUN SERPL-MCNC: 17 MG/DL (ref 8–22)
CALCIUM ALBUM COR SERPL-MCNC: 7.6 MG/DL (ref 8.5–10.5)
CALCIUM SERPL-MCNC: 6.3 MG/DL (ref 8.5–10.5)
CHEMOTHERAPY INFUSION START DATE: NORMAL
CHEMOTHERAPY INFUSION START DATE: NORMAL
CHEMOTHERAPY INFUSION STOP DATE: NORMAL
CHEMOTHERAPY RECORDS: 2700
CHEMOTHERAPY RECORDS: 2700
CHEMOTHERAPY RECORDS: 9
CHEMOTHERAPY RECORDS: 9
CHEMOTHERAPY RECORDS: NORMAL
CHEMOTHERAPY RX CANCER: NORMAL
CHEMOTHERAPY RX CANCER: NORMAL
CHLORIDE SERPL-SCNC: 105 MMOL/L (ref 96–112)
CO2 SERPL-SCNC: 23 MMOL/L (ref 20–33)
CREAT SERPL-MCNC: 0.33 MG/DL (ref 0.5–1.4)
DATE 1ST CHEMO CANCER: NORMAL
DATE 1ST CHEMO CANCER: NORMAL
ERYTHROCYTE [DISTWIDTH] IN BLOOD BY AUTOMATED COUNT: 43.9 FL (ref 35.9–50)
GFR SERPLBLD CREATININE-BSD FMLA CKD-EPI: 133 ML/MIN/1.73 M 2
GLOBULIN SER CALC-MCNC: 2.1 G/DL (ref 1.9–3.5)
GLUCOSE BLD STRIP.AUTO-MCNC: 118 MG/DL (ref 65–99)
GLUCOSE BLD STRIP.AUTO-MCNC: 133 MG/DL (ref 65–99)
GLUCOSE BLD STRIP.AUTO-MCNC: 159 MG/DL (ref 65–99)
GLUCOSE BLD STRIP.AUTO-MCNC: 180 MG/DL (ref 65–99)
GLUCOSE SERPL-MCNC: 136 MG/DL (ref 65–99)
HCT VFR BLD AUTO: 27.7 % (ref 42–52)
HGB BLD-MCNC: 9 G/DL (ref 14–18)
MCH RBC QN AUTO: 29 PG (ref 27–33)
MCHC RBC AUTO-ENTMCNC: 32.5 G/DL (ref 32.3–36.5)
MCV RBC AUTO: 89.4 FL (ref 81.4–97.8)
PLATELET # BLD AUTO: 390 K/UL (ref 164–446)
PMV BLD AUTO: 9.2 FL (ref 9–12.9)
POTASSIUM SERPL-SCNC: 4.2 MMOL/L (ref 3.6–5.5)
PROT SERPL-MCNC: 4.5 G/DL (ref 6–8.2)
RAD ONC ARIA COURSE LAST TREATMENT DATE: NORMAL
RAD ONC ARIA COURSE LAST TREATMENT DATE: NORMAL
RAD ONC ARIA COURSE TREATMENT ELAPSED DAYS: NORMAL
RAD ONC ARIA COURSE TREATMENT ELAPSED DAYS: NORMAL
RAD ONC ARIA REFERENCE POINT DOSAGE GIVEN TO DATE: 27
RAD ONC ARIA REFERENCE POINT DOSAGE GIVEN TO DATE: 27
RAD ONC ARIA REFERENCE POINT ID: NORMAL
RAD ONC ARIA REFERENCE POINT ID: NORMAL
RAD ONC ARIA REFERENCE POINT SESSION DOSAGE GIVEN: 9
RBC # BLD AUTO: 3.1 M/UL (ref 4.7–6.1)
SODIUM SERPL-SCNC: 136 MMOL/L (ref 135–145)
WBC # BLD AUTO: 17.8 K/UL (ref 4.8–10.8)

## 2024-01-19 PROCEDURE — A9270 NON-COVERED ITEM OR SERVICE: HCPCS | Performed by: STUDENT IN AN ORGANIZED HEALTH CARE EDUCATION/TRAINING PROGRAM

## 2024-01-19 PROCEDURE — 85027 COMPLETE CBC AUTOMATED: CPT

## 2024-01-19 PROCEDURE — 80053 COMPREHEN METABOLIC PANEL: CPT

## 2024-01-19 PROCEDURE — 77280 THER RAD SIMULAJ FIELD SMPL: CPT | Performed by: RADIOLOGY

## 2024-01-19 PROCEDURE — 77334 RADIATION TREATMENT AID(S): CPT | Performed by: RADIOLOGY

## 2024-01-19 PROCEDURE — 77295 3-D RADIOTHERAPY PLAN: CPT | Performed by: RADIOLOGY

## 2024-01-19 PROCEDURE — 99232 SBSQ HOSP IP/OBS MODERATE 35: CPT | Performed by: STUDENT IN AN ORGANIZED HEALTH CARE EDUCATION/TRAINING PROGRAM

## 2024-01-19 PROCEDURE — 700102 HCHG RX REV CODE 250 W/ 637 OVERRIDE(OP): Performed by: NURSE PRACTITIONER

## 2024-01-19 PROCEDURE — A9270 NON-COVERED ITEM OR SERVICE: HCPCS | Performed by: NURSE PRACTITIONER

## 2024-01-19 PROCEDURE — 700111 HCHG RX REV CODE 636 W/ 250 OVERRIDE (IP): Mod: JZ | Performed by: STUDENT IN AN ORGANIZED HEALTH CARE EDUCATION/TRAINING PROGRAM

## 2024-01-19 PROCEDURE — 82962 GLUCOSE BLOOD TEST: CPT

## 2024-01-19 PROCEDURE — 700102 HCHG RX REV CODE 250 W/ 637 OVERRIDE(OP): Performed by: STUDENT IN AN ORGANIZED HEALTH CARE EDUCATION/TRAINING PROGRAM

## 2024-01-19 PROCEDURE — 36415 COLL VENOUS BLD VENIPUNCTURE: CPT

## 2024-01-19 PROCEDURE — 77300 RADIATION THERAPY DOSE PLAN: CPT | Mod: 26,XS | Performed by: RADIOLOGY

## 2024-01-19 PROCEDURE — 77280 THER RAD SIMULAJ FIELD SMPL: CPT | Mod: 26 | Performed by: RADIOLOGY

## 2024-01-19 PROCEDURE — 77373 STRTCTC BDY RAD THER TX DLVR: CPT | Performed by: RADIOLOGY

## 2024-01-19 PROCEDURE — 99231 SBSQ HOSP IP/OBS SF/LOW 25: CPT | Performed by: NURSE PRACTITIONER

## 2024-01-19 PROCEDURE — 77334 RADIATION TREATMENT AID(S): CPT | Mod: 26,XS | Performed by: RADIOLOGY

## 2024-01-19 PROCEDURE — 77295 3-D RADIOTHERAPY PLAN: CPT | Mod: 26,XS | Performed by: RADIOLOGY

## 2024-01-19 PROCEDURE — 770004 HCHG ROOM/CARE - ONCOLOGY PRIVATE *

## 2024-01-19 PROCEDURE — 77300 RADIATION THERAPY DOSE PLAN: CPT | Performed by: RADIOLOGY

## 2024-01-19 PROCEDURE — 700111 HCHG RX REV CODE 636 W/ 250 OVERRIDE (IP): Mod: JZ

## 2024-01-19 RX ORDER — CALCIUM GLUCONATE 20 MG/ML
2 INJECTION, SOLUTION INTRAVENOUS ONCE
Status: COMPLETED | OUTPATIENT
Start: 2024-01-19 | End: 2024-01-19

## 2024-01-19 RX ADMIN — METHOCARBAMOL 500 MG: 500 TABLET ORAL at 17:48

## 2024-01-19 RX ADMIN — DULOXETINE HYDROCHLORIDE 60 MG: 20 CAPSULE, DELAYED RELEASE ORAL at 05:21

## 2024-01-19 RX ADMIN — MORPHINE SULFATE 30 MG: 30 TABLET, FILM COATED, EXTENDED RELEASE ORAL at 17:47

## 2024-01-19 RX ADMIN — INSULIN HUMAN 1 UNITS: 100 INJECTION, SOLUTION PARENTERAL at 21:34

## 2024-01-19 RX ADMIN — FAMOTIDINE 20 MG: 20 TABLET ORAL at 05:22

## 2024-01-19 RX ADMIN — INSULIN HUMAN 1 UNITS: 100 INJECTION, SOLUTION PARENTERAL at 17:52

## 2024-01-19 RX ADMIN — OXYCODONE HYDROCHLORIDE 10 MG: 10 TABLET ORAL at 14:38

## 2024-01-19 RX ADMIN — LACTULOSE 30 ML: 20 SOLUTION ORAL at 09:34

## 2024-01-19 RX ADMIN — DEXAMETHASONE SODIUM PHOSPHATE 4 MG: 4 INJECTION INTRA-ARTICULAR; INTRALESIONAL; INTRAMUSCULAR; INTRAVENOUS; SOFT TISSUE at 05:25

## 2024-01-19 RX ADMIN — MORPHINE SULFATE 30 MG: 30 TABLET, FILM COATED, EXTENDED RELEASE ORAL at 05:22

## 2024-01-19 RX ADMIN — METHOCARBAMOL 500 MG: 500 TABLET ORAL at 13:20

## 2024-01-19 RX ADMIN — METHOCARBAMOL 500 MG: 500 TABLET ORAL at 21:32

## 2024-01-19 RX ADMIN — DOCUSATE SODIUM 50 MG AND SENNOSIDES 8.6 MG 2 TABLET: 8.6; 5 TABLET, FILM COATED ORAL at 17:48

## 2024-01-19 RX ADMIN — METHOCARBAMOL 500 MG: 500 TABLET ORAL at 09:35

## 2024-01-19 RX ADMIN — CALCIUM GLUCONATE 2 G: 20 INJECTION, SOLUTION INTRAVENOUS at 06:31

## 2024-01-19 RX ADMIN — POLYETHYLENE GLYCOL 3350 1 PACKET: 17 POWDER, FOR SOLUTION ORAL at 09:34

## 2024-01-19 RX ADMIN — DIBASIC SODIUM PHOSPHATE, MONOBASIC POTASSIUM PHOSPHATE AND MONOBASIC SODIUM PHOSPHATE 500 MG: 852; 155; 130 TABLET ORAL at 17:47

## 2024-01-19 RX ADMIN — GABAPENTIN 300 MG: 300 CAPSULE ORAL at 17:48

## 2024-01-19 RX ADMIN — DEXAMETHASONE SODIUM PHOSPHATE 4 MG: 4 INJECTION INTRA-ARTICULAR; INTRALESIONAL; INTRAMUSCULAR; INTRAVENOUS; SOFT TISSUE at 13:20

## 2024-01-19 RX ADMIN — GABAPENTIN 100 MG: 300 CAPSULE ORAL at 13:27

## 2024-01-19 RX ADMIN — DOCUSATE SODIUM 50 MG AND SENNOSIDES 8.6 MG 2 TABLET: 8.6; 5 TABLET, FILM COATED ORAL at 05:21

## 2024-01-19 RX ADMIN — ENOXAPARIN SODIUM 40 MG: 100 INJECTION SUBCUTANEOUS at 17:48

## 2024-01-19 RX ADMIN — DIBASIC SODIUM PHOSPHATE, MONOBASIC POTASSIUM PHOSPHATE AND MONOBASIC SODIUM PHOSPHATE 500 MG: 852; 155; 130 TABLET ORAL at 05:21

## 2024-01-19 RX ADMIN — GABAPENTIN 100 MG: 300 CAPSULE ORAL at 05:21

## 2024-01-19 RX ADMIN — DEXAMETHASONE SODIUM PHOSPHATE 4 MG: 4 INJECTION INTRA-ARTICULAR; INTRALESIONAL; INTRAMUSCULAR; INTRAVENOUS; SOFT TISSUE at 17:48

## 2024-01-19 ASSESSMENT — ENCOUNTER SYMPTOMS
MYALGIAS: 1
BACK PAIN: 1

## 2024-01-19 ASSESSMENT — PAIN DESCRIPTION - PAIN TYPE
TYPE: ACUTE PAIN
TYPE: ACUTE PAIN

## 2024-01-19 NOTE — ASSESSMENT & PLAN NOTE
Due to wide spread metastasis  I have consulted oncological orthopedics Dr. Burleson: Weightbearing as tolerated bilateral lower extremities with walker assist; Weightbearing as tolerated right upper extremity; Nonweightbearing left upper extremity except to assist with a walker and ADLs    Patient unable to continue working with physical therapy and occupational therapy due to severe pain.  He reported left-sided pain, x-ray imaging performed, no evidence of new fracture, previous left ischium and right superior ramus fracture noted.    Case discussed with orthopedic oncology, at this time no indication for surgical intervention.  Pelvic x-ray pending.

## 2024-01-19 NOTE — PROGRESS NOTES
"Inpatient Palliative Care     Location: Cancer nursing unit [facility] [phone]    HPI:   Andrew Wall is a 58 y.o. male with past medical history of melanoma and recently diagnosed extensive metastatic disease per MRI earlier this month who was admitted on January 15 th by  his oncology office for hypercalcemia, calcium 12.2.  Patient reports progressively worsening back pain, right leg sciatica, and generalized weakness with additional constipation x 6 days without bowel movement.  He is followed by Dr. Ross with cancer care specialty and has been on immunotherapy.  He has upcoming appointment with radiation oncology.  Patient was direct transfer from [facility] emergency department to oncology floor at [facility].     Patient and wife report a fall while still at home which precipitated increased pain in right hip area.    .  Interval:  No acute overnight events.  Patient is reporting some \"soreness\" to area of radiation to sacrum.  Additionally, reports increased fatigue.  Has utilized 1 dose of IV morphine prior to radiation therapy yesterday although \"he was not really sure he needed it\".  Vital signs are stable.  He remains on half a liter of oxygen nasal cannula.    Summary:  Discussed current pain regimen with patient which he is satisfied with.  Encouraged him to utilize short-term oxycodone in between extended release morphine for increased episodes of pain like when he first wakes up in the morning or when he is trying to wake up to eat breakfast.  He reports he will attempt to recognize his pain before it becomes difficult to manage.  Additionally encourage patient to sleep when needed as fatigue is expected side effect of radiation coupled with cancer.     Active listening, reflection, reminiscing, validation & normalization, and empathic support utilized throughout this encounter.  All questions answered and contact information provided, encouraged to call with any questions or concerns.    "   Plan:     1) continue with current pain regimen  2) PC will follow-up on Monday  3)        Thank you for allowing me the opportunity to participate in the care of  Andrew Wall.     I spent a total of 28 minutes reviewing medical records, direct face-to-face time with the patient and/or family, coordination of care, and documentation. This is separate from the time spent on advance care planning, which is documented above.     Vikki Karimi, MSN, APRN, ACNPC-AG.  Palliative Care Nurse Practitioner  [phone]

## 2024-01-19 NOTE — PROGRESS NOTES
Adam from Lab called with critical result of Calcium of 6.3 at 0600. Critical lab result read back to Adam.   Deacon Richardson notified of critical lab result at 0600.

## 2024-01-19 NOTE — CARE PLAN
Problem: Fall Risk  Goal: Patient will remain free from falls  Description: Target End Date:  Prior to discharge or change in level of care    Document interventions on the Jodi Reese Fall Risk Assessment    1.  Assess for fall risk factors  2.  Implement fall precautions  Outcome: Progressing   The patient is Watcher - Medium risk of patient condition declining or worsening    Shift Goals  Clinical Goals: Pain control, radiation  Patient Goals: Rest  Family Goals: JAYLEEN    Progress made toward(s) clinical / shift goals:  Pt remains in bed d/t pain and weakness.  Pt calls for assistance.  Refuses to be turned     Patient is not progressing towards the following goals:

## 2024-01-19 NOTE — ASSESSMENT & PLAN NOTE
No abdominal pain.  Total bilirubin normal.  Hepatitis panel negative.  Likely due to radiation?  Continue monitoring

## 2024-01-19 NOTE — PROCEDURES
DATE OF SERVICE: 1/19/2024    DIAGNOSIS:  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)  Staging form: Melanoma of the Skin, AJCC 8th Edition  - Pathologic stage from 1/8/2024: Stage IV (rpT0, pNX, pM1c(1)) - Signed by Julia Fraga M.D. on 1/16/2024  Stage prefix: Recurrence       TREATMENT:  Radiation Therapy Episodes       Active Episodes       Radiation Therapy: SBRT (1/22/2024)    Radiation Therapy: SBRT (1/17/2024)                   Radiation Treatments         Plan Last Treated On Elapsed Days Fractions Treated Prescribed Fraction Dose (cGy) Prescribed Total Dose (cGy)    SBRT Sacrum 1/19/24 2 @ [id number] 3 of 3 900 2,700                  Reference Point Last Treated On Elapsed Days Most Recent Session Dose (cGy) Total Dose (cGy)    SBRT Sacrum 1/19/2024 2 @ [id number]   900 2,700                            STEREOTACTIC PROCEDURE NOTE:    Called by Truebeam machine to verify treatment parameters including:  treatment site, treatment dose, and treatment setup prior to stereotactic treatment..    Patient was placed in the treatment position with use of immobilization device and  laser guidance. CBCT images were acquired for target localization.  Images were reviewed in the axial, coronal, and saggital views and shifts were made as necessary to ensure that patient position matched simulation position.      Treatment delivered per  prescription.  The medical physicist was present throughout the set-up, verification and treatment delivery to oversee the procedure and ensure all parameters agreed with the computerized plan.    I have personally reviewed the relevant data, performed the target localization, and determined all relevant factors for this patient’s simulation.

## 2024-01-19 NOTE — THERAPY
Physical Therapy   Initial Evaluation     Patient Name: Andrew Wall  Age:  58 y.o., Sex:  male  Medical Record #: [id number]  Today's Date: 1/18/2024     Precautions  Precautions: Fall Risk;Weight Bearing As Tolerated Left Lower Extremity;Weight Bearing As Tolerated Right Lower Extremity;Non Weight Bearing Left Upper Extremity;Weight Bearing As Tolerated Right Upper Extremity;Cervical Collar    Comments: multiple spine involvement starting at C2, with anterior tumor at that level; multiple L/spine fxs; T1/2 paraspinal involvement; radiation to right sacral area; ordered soft collar for comfort given reports of pain from cervical weakness during mobility    Assessment  Pt presents with impaired activity tolerance, ROM, strength, and balance associated with chief complaint of back pain with no bowel movement x 6 days in setting of recent diagnosis of metastatic CA; here further imagining demo'd new mets to C2 with new epidural tumor noted, multiple spine mets with inovlvement of paraspinals, multiple pathological fractures along the spine, involving S1 nerve roots bilaterally, S 2 nerve root on right, both shoulder GH involvement and scapular body mets; now receiving radition to right pelvic region; pt eager to mobilize reports he has been working on his PO intake and bed level movement; a bit confused with two step commands but otherwise appeared to internalized education well; wife at bedside to observe mobility; pt's greatest source of discomfort was right pelvic area, continued pain in S1 distribution on right as well as significant discomfort from neck weakness (ordered soft collar to assist with acute progress, wearing parameters discussed for upright mobility); pt required mod A for bed and min A for stand; could not take steps, utilizing hemiwalker on right side; BP 76/50 without symptoms in standing and recovered in supine to normal levels; will follow to assist with home dc as this is their preference;  discussed other options depending on mobility achieved;      Plan    Physical Therapy Initial Treatment Plan   Treatment Plan : Bed Mobility, Equipment, Gait Training, Manual Therapy, Neuro Re-Education / Balance, Self Care / Home Evaluation, Stair Training, Therapeutic Activities, Therapeutic Exercise  Treatment Frequency: 4 Times per Week  Duration: Until Therapy Goals Met    DC Equipment Recommendations: standard Wheelchair with removal arm rests/leg rests, ramp (discussed rental/care chest; hospital bed railing attachment)  Discharge Recommendations: Other -will depend on progress over next few sessions; pt/wife will need to demonstrate bed mobility/transfer abilities for outpatient infusion follow up unless [facility]/medical uber can provide; currently,  pt/wife prefer home with home health to pursue treatment ;if infusions are every 3 weeks, may benefit from caregiver training at acute rehab if qualifies depending on level of independence achieved and medical dc date from hospital, will update        Abridged Subjective/Objective     01/18/24 1115   Prior Living Situation   Prior Services Home-Independent;None   Housing / Facility 2 Story House   Steps Into Home 2   Equipment Owned 4-Wheel Walker   Lives with - Patient's Self Care Capacity Spouse;Adult Children   Comments SO at bedside supportive and has provided care for her mother; ordering hospital railings now and ramp   Prior Level of Functional Mobility   Bed Mobility Independent   Transfer Status Independent   Ambulation Independent   Ambulation Distance to tolerance   Assistive Devices Used None   Cognition    Cognition / Consciousness X   Level of Consciousness Alert   Ability To Follow Commands 1 Step   Comments pleasant and cooperative; a bit confused with sequencing at times   Passive ROM Lower Body   Passive ROM Lower Body X   Comments functional ranges only; no overpressure or end range observations givens multiple met involvement   Strength Lower  Body   Lower Body Strength  X   Comments functional testing only, weight bearing testing only; greatest pain is in right hip, unable to isolate knee flexion/extension in standing but could in sitting; full 5/5 at ankles   Sensation Lower Body   Lower Extremity Sensation   X   Comments right heel numbness/tingling unchanged post radiation   Balance Assessment   Sitting Balance (Static) Fair   Sitting Balance (Dynamic) Fair -   Standing Balance (Static) Fair -   Standing Balance (Dynamic) Poor +   Weight Shift Sitting Fair   Weight Shift Standing Poor   Comments B UE support in sitting/standing; disucssed NWB with Dr perez, he really feels he can WBAT at all joints discussed NWB means we cannot use a FWW, used hemiwalker for upright mobility today; unable to take a step   Bed Mobility    Supine to Sit Moderate Assist   Sit to Supine Moderate Assist   Comments slight HOB elevated, poor push rolled left side due to home set up; heavy railing use, could not push well through either shoulder; cues to maintain log rolling positioning needed throughout ;   Gait Analysis   Comments took three shuffled steps along bed   Functional Mobility   Sit to Stand Minimal Assist  (right hemiwalker)   Bed, Chair, Wheelchair Transfer Unable to Participate  (2/2 impending radiation and BP)   Patient / Family Goals    Patient / Family Goal #1 to get treatment   Short Term Goals    Short Term Goal # 1 Pt/wife will perform supine<>sit with supervision from therapist with HOB elevated and railing use wihtin 6 visits to demonstrate caregiver abilities for home.   Short Term Goal # 2 Pt will perform stand step transfer with hemiwalker with min A within 6 visits to progress to independence.   Short Term Goal # 3 Pt will ambulate x 50ft with hemiwalker and min A within 6 visits to progres sto independence.   Short Term Goal # 4 Pt will self propel w/c with bilateral LEs with supervision x 150ft within 6 vistis to ensure household mobility

## 2024-01-19 NOTE — PROGRESS NOTES
Hospital Medicine Daily Progress Note    Date of Service  1/18/2024    Chief Complaint  Andrew Wall is a 58 y.o. male admitted 1/15/2024 with back pain    Hospital Course  58 y.o. male, with history of melanoma following Dr. Ross and recently diagnosed with extensive metastatic disease with MRI earlier this month, who initially admitted to [facility] 1/11 for hypercalcemia 12.2.  Patient reports progressively worse back pain, right leg sciatica and generalized weakness, constipation with no bowel movement for 6 days.    Patient followed by oncologist Dr. Ross, has been on immunotherapy.   Images showed extensive bone metastasis and new metastasis to lung and liver.  Patient is transferred here for radiation consult.   Hypercalcemia improved with IV fluid and Zometa.    Multiple pathologic fracture due to metastasis. I have consulted oncological orthopedics Dr. Burleson: Weightbearing as tolerated bilateral lower extremities with walker assist; Weightbearing as tolerated right upper extremity; Nonweightbearing left upper extremity except to assist with a walker and ADLs    Interval Problem Update  Seen patient at bedside. Family at bedside  Patient had multiple pathologic fracture due to metastasis. I have consulted and discussed with oncological orthopedics Dr. Burleson  Pain control. Discussed with palliative care team. Start MS contin, gabapentin, decadron, cymbalta  DNR/DNI  Radiation today  Phos 1.7, replaced  Wbc 18. He is on decadron. Likely due to decadron. No sign of infectious process noted. Continue monitoring     I have discussed this patient's plan of care and discharge plan at IDT rounds today with Case Management, Nursing, Nursing leadership, and other members of the IDT team.    Consultants/Specialty  Radiation oncology    Code Status  DNAR/DNI    Disposition  The patient is not medically cleared for discharge to home or a post-acute facility.      I have placed the appropriate  orders for post-discharge needs.    Review of Systems  Review of Systems   Musculoskeletal:  Positive for back pain, joint pain and myalgias.   All other systems reviewed and are negative.       Physical Exam  Temp:  [36.6 °C (97.9 °F)-36.9 °C (98.4 °F)] 36.6 °C (97.9 °F)  Pulse:  [80-93] 80  Resp:  [18] 18  BP: (101-123)/(68-75) 101/68  SpO2:  [95 %] 95 %    Physical Exam  Vitals and nursing note reviewed.   Constitutional:       Appearance: Normal appearance. He is ill-appearing.   HENT:      Head: Normocephalic and atraumatic.      Mouth/Throat:      Pharynx: Oropharynx is clear.   Eyes:      Pupils: Pupils are equal, round, and reactive to light.   Neck:      Vascular: No carotid bruit.   Cardiovascular:      Rate and Rhythm: Normal rate and regular rhythm.   Pulmonary:      Effort: Pulmonary effort is normal.      Breath sounds: Normal breath sounds.   Abdominal:      General: Abdomen is flat. Bowel sounds are normal.      Palpations: Abdomen is soft. There is no mass.   Musculoskeletal:         General: Tenderness present. Normal range of motion.      Cervical back: Neck supple.   Skin:     General: Skin is warm and dry.   Neurological:      General: No focal deficit present.      Mental Status: He is alert and oriented to person, place, and time.   Psychiatric:         Mood and Affect: Mood normal.         Behavior: Behavior normal.         Fluids    Intake/Output Summary (Last 24 hours) at 1/18/2024 1615  Last data filed at 1/18/2024 0520  Gross per 24 hour   Intake --   Output 1000 ml   Net -1000 ml         Laboratory  Recent Labs     01/16/24  0511 01/17/24  0709 01/18/24  0052   WBC 14.6* 16.0* 18.1*   RBC 2.94* 3.19* 3.31*   HEMOGLOBIN 8.5* 9.2* 9.5*   HEMATOCRIT 25.9* 28.1* 28.9*   MCV 88.1 88.1 87.3   MCH 28.9 28.8 28.7   MCHC 32.8 32.7 32.9   RDW 41.9 42.4 42.4   PLATELETCT 406 474* 449*   MPV 9.5 9.5 9.3       Recent Labs     01/16/24  0511 01/17/24  0709 01/18/24  0052   SODIUM 140 138 137    POTASSIUM 4.1 3.6 3.9   CHLORIDE 105 104 105   CO2 24 23 22   GLUCOSE 126* 141* 154*   BUN 25* 25* 20   CREATININE 0.58 0.52 0.46*   CALCIUM 8.2* 7.3* 7.0*                     Imaging  DX-BONE SURVEY-METASTATIC LTD   Final Result         1.  Lytic changes of the left glenoid concerning for metastatic disease.   2.  Small peripherally sclerotic lytic lesion in the left femoral neck, appearance suggests small bony cyst, otherwise indeterminate.   3.  Atherosclerosis      MR-CERVICAL SPINE-WITH & W/O   Final Result         1.  Infiltrative metastatic disease with complete replacement of the bone marrow at C2, C6 and C7 with involvement of the posterior elements as described in detail above.      2.  There is ventral epidural infiltration by metastatic disease at C2, C6 and C7.      3.   Cortical breakthrough with extension of disease to the pre and paravertebral soft tissues is noted at C6 and C7 as described above.      4.  Severe right foraminal narrowing at C5-6.      5.  There is no significant spinal canal stenosis, cord compression or evidence for cord signal abnormality.      MR-THORACIC SPINE-WITH & W/O   Final Result         Multiple metastatic lesions noted throughout the thoracic spine as described above.      Large spinous process lesion noted at T3, T9.      Large right paraspinal metastatic lesion involvement of the right-sided pedicle and lamina noted at T3.      Ventral epidural extension of metastatic disease noted at the T5, T5-T6 level without significant cord impingement.      Large left paraspinous soft tissue metastatic lesion and a small right subcutaneous lesion noted at the T1-T2 level.      No significant spinal canal stenosis.                    Assessment/Plan  * Metastatic melanoma (HCC)- (present on admission)  Assessment & Plan  Patient followed by oncologist Dr. Ross, has been on immunotherapy.  He has an upcoming appointment with radiation oncology.     Images showed extensive bone  metastasis and new metastasis to lung and liver  outpatient MRI showed extensive metastases through the lumbar spine, sacrum, paraspinous muscles, psoas muscle, iliacus muscle, gluteal muscles and Bilateral S1 nerve roots and right S2 nerve root in the sacral foramen are surrounded by the mass and probably invaded/impinged.     CT here showed  R shoulder - Large lytic metastasis involving the right anterior superior glenoid and coracoid with soft tissue component;  left shoulder - metastasis of Glenoid/Scapular body/ Clavicular head/ Teres minor muscle mass      CT chest/abd/pelvis - Severe pulmonary metastatic disease; Severe and widespread osseous metastatic disease/pathologic fractures; New liver lesion suggesting metastasis. Few peritoneal nodules suggesting peritoneal malignancy     new from prior CT dated 7/11/2023. PET scan 11/2023 was negative for chest and abdomen.       MRI brain showed MRI brain noted C2 vertebral body bone metastasis. There is mild amount of anterior epidural tumor at this level. There is no spinal canal compromise. This is new since the previous study. No intracranial metastasis.     Medical oncology and radiation oncology consultation    Radiation started 1/17  Multimodal pain managements including po and iv narcotics prn. Monitoring respiratory status and sedation score         Elevated LFTs  Assessment & Plan  No abdominal pain.  Total bilirubin normal.  Hepatitis panel negative.  Likely due to radiation?  Continue monitoring    Pathologic fracture  Assessment & Plan  Due to wide spread metastasis  I have consulted oncological orthopedics Dr. Burleson: Weightbearing as tolerated bilateral lower extremities with walker assist; Weightbearing as tolerated right upper extremity; Nonweightbearing left upper extremity except to assist with a walker and ADLs        DNR (do not resuscitate)  Assessment & Plan  DNR/DNI per discussion with palliative     Hyperglycemia  Assessment & Plan  Due  to steroids  BG over 200  I started SSI  Hypoglycemia protocol    Leukocytosis  Assessment & Plan  likely due to steroid use.  No infectious signs    Fever- (present on admission)  Assessment & Plan   likely due to extensive metastasis;   no identified infection signs;   elevated procalcitonin could be related to malignancy;   blood culture NTD, continue to hold antibiotics, monitoring    Intractable low back pain- (present on admission)  Assessment & Plan   likely due to tumor compression of the S1/S2,   continue IV Decadron, his reported his pain has been improving.    I ordered Pepcid for GI prophylaxis  Multimodal pain managements including po and iv narcotics prn. Monitoring respiratory status and sedation score  Palliative consulted for pain managements    Constipation- (present on admission)  Assessment & Plan  Likely due to hypercalcemia, opioid use and bedbound  Continue aggressive bowel management    Anemia- (present on admission)  Assessment & Plan   likely due to underlying malignancy, high ferritin and B12.   No sign of active bleeding    Continue to monitor, transfuse if Hb less than 7    Hypercalcemia- (present on admission)  Assessment & Plan  due to extensive bone metastasis.    TSH 3.5, PTH 3.7 (appropriately depressed)  He has been treated with aggressive IV fluid, received 1 dose of IV Zometa, His hypercalcemia slowly improving, corrected calcium down to 11.3.   Continue IV fluid         VTE prophylaxis:    enoxaparin ppx      I have performed a physical exam and reviewed and updated ROS and Plan today (1/18/2024). In review of yesterday's note (1/17/2024), there are no changes except as documented above.    My total time spent caring for the patient on the day of the encounter was 52  minutes.   This does not include time spent on separately billable procedures/tests.

## 2024-01-19 NOTE — PROGRESS NOTES
Bedside report received from night RN, pt care assumed, assessment completed. Pt is A&O 4, pt states he is comfortable at the moment. Updated on POC, questions answered. Bed in lowest, locked position, treaded socks on, call light and belongings within reach.

## 2024-01-19 NOTE — DISCHARGE PLANNING
Case Management Discharge Planning    Admission Date: 1/15/2024  GMLOS: 3.5  ALOS: 4    6-Clicks ADL Score: 15  6-Clicks Mobility Score: 7  PT and/or OT Eval ordered: Yes  Post-acute Referrals Ordered: Yes  Post-acute Choice Obtained: Yes  Has referral(s) been sent to post-acute provider:  Yes      Anticipated Discharge Dispo: Discharge Disposition: D/T to home under [facility] care in anticipation of covered skilled care (06)    DME Needed: No    Action(s) Taken: Pt was discussed in IDT rounds; pt received radiation today. It was stated in rounds, pt will be receiving palliative radiation next week. Pending PT/OT eval for discharge disposition.    Escalations Completed: None    Medically Clear: No    Next Steps: pending PT/OT eval     Barriers to Discharge: Medical clearance

## 2024-01-19 NOTE — CARE PLAN
The patient is Watcher - Medium risk of patient condition declining or worsening    Shift Goals  Clinical Goals: Pain control, radiation, comfort  Patient Goals: Comfort  Family Goals: mack    Progress made toward(s) clinical / shift goals:    Problem: Knowledge Deficit - Standard  Goal: Patient and family/care givers will demonstrate understanding of plan of care, disease process/condition, diagnostic tests and medications  Outcome: Progressing     Problem: Pain - Standard  Goal: Alleviation of pain or a reduction in pain to the patient’s comfort goal  Outcome: Progressing     Problem: Fall Risk  Goal: Patient will remain free from falls  Outcome: Progressing

## 2024-01-20 PROBLEM — E83.51 HYPOCALCEMIA: Status: ACTIVE | Noted: 2024-01-20

## 2024-01-20 LAB
ANION GAP SERPL CALC-SCNC: 10 MMOL/L (ref 7–16)
BUN SERPL-MCNC: 15 MG/DL (ref 8–22)
CALCIUM SERPL-MCNC: 6.5 MG/DL (ref 8.5–10.5)
CHLORIDE SERPL-SCNC: 103 MMOL/L (ref 96–112)
CO2 SERPL-SCNC: 23 MMOL/L (ref 20–33)
CREAT SERPL-MCNC: 0.31 MG/DL (ref 0.5–1.4)
ERYTHROCYTE [DISTWIDTH] IN BLOOD BY AUTOMATED COUNT: 44 FL (ref 35.9–50)
GFR SERPLBLD CREATININE-BSD FMLA CKD-EPI: 135 ML/MIN/1.73 M 2
GLUCOSE BLD STRIP.AUTO-MCNC: 133 MG/DL (ref 65–99)
GLUCOSE BLD STRIP.AUTO-MCNC: 148 MG/DL (ref 65–99)
GLUCOSE BLD STRIP.AUTO-MCNC: 153 MG/DL (ref 65–99)
GLUCOSE BLD STRIP.AUTO-MCNC: 176 MG/DL (ref 65–99)
GLUCOSE SERPL-MCNC: 154 MG/DL (ref 65–99)
HCT VFR BLD AUTO: 28.3 % (ref 42–52)
HGB BLD-MCNC: 9.3 G/DL (ref 14–18)
MCH RBC QN AUTO: 29 PG (ref 27–33)
MCHC RBC AUTO-ENTMCNC: 32.9 G/DL (ref 32.3–36.5)
MCV RBC AUTO: 88.2 FL (ref 81.4–97.8)
PLATELET # BLD AUTO: 389 K/UL (ref 164–446)
PMV BLD AUTO: 9.3 FL (ref 9–12.9)
POTASSIUM SERPL-SCNC: 4.2 MMOL/L (ref 3.6–5.5)
RBC # BLD AUTO: 3.21 M/UL (ref 4.7–6.1)
SODIUM SERPL-SCNC: 136 MMOL/L (ref 135–145)
WBC # BLD AUTO: 17.8 K/UL (ref 4.8–10.8)

## 2024-01-20 PROCEDURE — A9270 NON-COVERED ITEM OR SERVICE: HCPCS | Performed by: STUDENT IN AN ORGANIZED HEALTH CARE EDUCATION/TRAINING PROGRAM

## 2024-01-20 PROCEDURE — 700111 HCHG RX REV CODE 636 W/ 250 OVERRIDE (IP): Mod: JZ | Performed by: STUDENT IN AN ORGANIZED HEALTH CARE EDUCATION/TRAINING PROGRAM

## 2024-01-20 PROCEDURE — A9270 NON-COVERED ITEM OR SERVICE: HCPCS | Performed by: NURSE PRACTITIONER

## 2024-01-20 PROCEDURE — 770004 HCHG ROOM/CARE - ONCOLOGY PRIVATE *

## 2024-01-20 PROCEDURE — 85027 COMPLETE CBC AUTOMATED: CPT

## 2024-01-20 PROCEDURE — 700102 HCHG RX REV CODE 250 W/ 637 OVERRIDE(OP): Performed by: NURSE PRACTITIONER

## 2024-01-20 PROCEDURE — 36415 COLL VENOUS BLD VENIPUNCTURE: CPT

## 2024-01-20 PROCEDURE — 82962 GLUCOSE BLOOD TEST: CPT | Mod: 91

## 2024-01-20 PROCEDURE — 80048 BASIC METABOLIC PNL TOTAL CA: CPT

## 2024-01-20 PROCEDURE — 700102 HCHG RX REV CODE 250 W/ 637 OVERRIDE(OP): Performed by: STUDENT IN AN ORGANIZED HEALTH CARE EDUCATION/TRAINING PROGRAM

## 2024-01-20 PROCEDURE — 99232 SBSQ HOSP IP/OBS MODERATE 35: CPT | Performed by: STUDENT IN AN ORGANIZED HEALTH CARE EDUCATION/TRAINING PROGRAM

## 2024-01-20 RX ORDER — CALCIUM GLUCONATE 20 MG/ML
2 INJECTION, SOLUTION INTRAVENOUS ONCE
Status: COMPLETED | OUTPATIENT
Start: 2024-01-20 | End: 2024-01-20

## 2024-01-20 RX ADMIN — INSULIN HUMAN 1 UNITS: 100 INJECTION, SOLUTION PARENTERAL at 21:33

## 2024-01-20 RX ADMIN — METHOCARBAMOL 500 MG: 500 TABLET ORAL at 21:29

## 2024-01-20 RX ADMIN — GABAPENTIN 300 MG: 300 CAPSULE ORAL at 17:59

## 2024-01-20 RX ADMIN — METHOCARBAMOL 500 MG: 500 TABLET ORAL at 12:46

## 2024-01-20 RX ADMIN — CALCIUM GLUCONATE 2 G: 20 INJECTION, SOLUTION INTRAVENOUS at 09:22

## 2024-01-20 RX ADMIN — ENOXAPARIN SODIUM 40 MG: 100 INJECTION SUBCUTANEOUS at 17:59

## 2024-01-20 RX ADMIN — OXYCODONE 5 MG: 5 TABLET ORAL at 12:45

## 2024-01-20 RX ADMIN — DULOXETINE HYDROCHLORIDE 60 MG: 20 CAPSULE, DELAYED RELEASE ORAL at 06:00

## 2024-01-20 RX ADMIN — LACTULOSE 30 ML: 20 SOLUTION ORAL at 06:00

## 2024-01-20 RX ADMIN — POLYETHYLENE GLYCOL 3350 1 PACKET: 17 POWDER, FOR SOLUTION ORAL at 04:28

## 2024-01-20 RX ADMIN — MORPHINE SULFATE 30 MG: 30 TABLET, FILM COATED, EXTENDED RELEASE ORAL at 06:01

## 2024-01-20 RX ADMIN — GABAPENTIN 100 MG: 300 CAPSULE ORAL at 12:46

## 2024-01-20 RX ADMIN — POLYETHYLENE GLYCOL 3350 1 PACKET: 17 POWDER, FOR SOLUTION ORAL at 06:00

## 2024-01-20 RX ADMIN — BISACODYL 10 MG: 10 SUPPOSITORY RECTAL at 09:14

## 2024-01-20 RX ADMIN — DEXAMETHASONE SODIUM PHOSPHATE 4 MG: 4 INJECTION INTRA-ARTICULAR; INTRALESIONAL; INTRAMUSCULAR; INTRAVENOUS; SOFT TISSUE at 12:46

## 2024-01-20 RX ADMIN — DEXAMETHASONE SODIUM PHOSPHATE 4 MG: 4 INJECTION INTRA-ARTICULAR; INTRALESIONAL; INTRAMUSCULAR; INTRAVENOUS; SOFT TISSUE at 00:03

## 2024-01-20 RX ADMIN — METHOCARBAMOL 500 MG: 500 TABLET ORAL at 09:19

## 2024-01-20 RX ADMIN — DEXAMETHASONE SODIUM PHOSPHATE 4 MG: 4 INJECTION INTRA-ARTICULAR; INTRALESIONAL; INTRAMUSCULAR; INTRAVENOUS; SOFT TISSUE at 06:01

## 2024-01-20 RX ADMIN — METHOCARBAMOL 500 MG: 500 TABLET ORAL at 17:59

## 2024-01-20 RX ADMIN — FAMOTIDINE 20 MG: 20 TABLET ORAL at 06:01

## 2024-01-20 RX ADMIN — DOCUSATE SODIUM 50 MG AND SENNOSIDES 8.6 MG 2 TABLET: 8.6; 5 TABLET, FILM COATED ORAL at 18:06

## 2024-01-20 RX ADMIN — DOCUSATE SODIUM 50 MG AND SENNOSIDES 8.6 MG 2 TABLET: 8.6; 5 TABLET, FILM COATED ORAL at 06:00

## 2024-01-20 RX ADMIN — INSULIN HUMAN 1 UNITS: 100 INJECTION, SOLUTION PARENTERAL at 07:30

## 2024-01-20 RX ADMIN — MORPHINE SULFATE 30 MG: 30 TABLET, FILM COATED, EXTENDED RELEASE ORAL at 17:59

## 2024-01-20 RX ADMIN — GABAPENTIN 100 MG: 300 CAPSULE ORAL at 06:01

## 2024-01-20 RX ADMIN — DEXAMETHASONE SODIUM PHOSPHATE 4 MG: 4 INJECTION INTRA-ARTICULAR; INTRALESIONAL; INTRAMUSCULAR; INTRAVENOUS; SOFT TISSUE at 17:59

## 2024-01-20 ASSESSMENT — ENCOUNTER SYMPTOMS
BACK PAIN: 1
MYALGIAS: 1

## 2024-01-20 ASSESSMENT — PAIN DESCRIPTION - PAIN TYPE
TYPE: ACUTE PAIN

## 2024-01-20 NOTE — PROGRESS NOTES
lab from Lab called with critical result of calcium 6.5 at 0808. Critical lab result read back to alessia.   Dr. taveras notified of critical lab result at 0816.  Critical lab result read back by Dr. Taveras  .

## 2024-01-20 NOTE — PROGRESS NOTES
Hospital Medicine Daily Progress Note    Date of Service  1/20/2024    Chief Complaint  Andrew Wall is a 58 y.o. male admitted 1/15/2024 with back pain    Hospital Course  58 y.o. male, with history of melanoma following Dr. Ross and recently diagnosed with extensive metastatic disease with MRI earlier this month, who initially admitted to [facility] 1/11 for hypercalcemia 12.2.  Patient reports progressively worse back pain, right leg sciatica and generalized weakness, constipation with no bowel movement for 6 days.    Patient followed by oncologist Dr. Ross, has been on immunotherapy.   Images showed extensive bone metastasis and new metastasis to lung and liver.  Patient is transferred here for radiation consult.   Hypercalcemia improved with IV fluid and Zometa.    Multiple pathologic fracture due to metastasis. I have consulted oncological orthopedics Dr. Burleson: Weightbearing as tolerated bilateral lower extremities with walker assist; Weightbearing as tolerated right upper extremity; Nonweightbearing left upper extremity except to assist with a walker and ADLs  S/p sacral radiation 1/17-19    Interval Problem Update  Seen patient at bedside. Wife at bedside  No acute overnight events  Will plan radiation 1/22 with other sites  Labs reviewed Ca 6.5, replaced     I have discussed this patient's plan of care and discharge plan at IDT rounds today with Case Management, Nursing, Nursing leadership, and other members of the IDT team.    Consultants/Specialty  Radiation oncology    Code Status  DNAR/DNI    Disposition  Medically Cleared  I have placed the appropriate orders for post-discharge needs.    Review of Systems  Review of Systems   Musculoskeletal:  Positive for back pain, joint pain and myalgias.   All other systems reviewed and are negative.       Physical Exam  Temp:  [36.4 °C (97.5 °F)-36.6 °C (97.8 °F)] 36.6 °C (97.8 °F)  Pulse:  [86-88] 87  Resp:  [15-18] 16  BP: (108-113)/(67-69)  110/69  SpO2:  [96 %-99 %] 96 %    Physical Exam  Vitals and nursing note reviewed.   Constitutional:       Appearance: Normal appearance. He is ill-appearing.   HENT:      Head: Normocephalic and atraumatic.      Mouth/Throat:      Pharynx: Oropharynx is clear.   Eyes:      Pupils: Pupils are equal, round, and reactive to light.   Neck:      Vascular: No carotid bruit.   Cardiovascular:      Rate and Rhythm: Normal rate and regular rhythm.   Pulmonary:      Effort: Pulmonary effort is normal.      Breath sounds: Normal breath sounds.   Abdominal:      General: Abdomen is flat. Bowel sounds are normal.      Palpations: Abdomen is soft. There is no mass.   Musculoskeletal:         General: Tenderness present. Normal range of motion.      Cervical back: Neck supple.   Skin:     General: Skin is warm and dry.   Neurological:      General: No focal deficit present.      Mental Status: He is alert and oriented to person, place, and time.   Psychiatric:         Mood and Affect: Mood normal.         Behavior: Behavior normal.         Fluids    Intake/Output Summary (Last 24 hours) at 1/20/2024 1504  Last data filed at 1/20/2024 0738  Gross per 24 hour   Intake --   Output 220 ml   Net -220 ml         Laboratory  Recent Labs     01/18/24  0052 01/19/24  0509 01/20/24  0649   WBC 18.1* 17.8* 17.8*   RBC 3.31* 3.10* 3.21*   HEMOGLOBIN 9.5* 9.0* 9.3*   HEMATOCRIT 28.9* 27.7* 28.3*   MCV 87.3 89.4 88.2   MCH 28.7 29.0 29.0   MCHC 32.9 32.5 32.9   RDW 42.4 43.9 44.0   PLATELETCT 449* 390 389   MPV 9.3 9.2 9.3       Recent Labs     01/18/24  0052 01/19/24  0509 01/20/24  0649   SODIUM 137 136 136   POTASSIUM 3.9 4.2 4.2   CHLORIDE 105 105 103   CO2 22 23 23   GLUCOSE 154* 136* 154*   BUN 20 17 15   CREATININE 0.46* 0.33* 0.31*   CALCIUM 7.0* 6.3* 6.5*                     Imaging  DX-BONE SURVEY-METASTATIC LTD   Final Result         1.  Lytic changes of the left glenoid concerning for metastatic disease.   2.  Small peripherally  sclerotic lytic lesion in the left femoral neck, appearance suggests small bony cyst, otherwise indeterminate.   3.  Atherosclerosis      MR-CERVICAL SPINE-WITH & W/O   Final Result         1.  Infiltrative metastatic disease with complete replacement of the bone marrow at C2, C6 and C7 with involvement of the posterior elements as described in detail above.      2.  There is ventral epidural infiltration by metastatic disease at C2, C6 and C7.      3.   Cortical breakthrough with extension of disease to the pre and paravertebral soft tissues is noted at C6 and C7 as described above.      4.  Severe right foraminal narrowing at C5-6.      5.  There is no significant spinal canal stenosis, cord compression or evidence for cord signal abnormality.      MR-THORACIC SPINE-WITH & W/O   Final Result         Multiple metastatic lesions noted throughout the thoracic spine as described above.      Large spinous process lesion noted at T3, T9.      Large right paraspinal metastatic lesion involvement of the right-sided pedicle and lamina noted at T3.      Ventral epidural extension of metastatic disease noted at the T5, T5-T6 level without significant cord impingement.      Large left paraspinous soft tissue metastatic lesion and a small right subcutaneous lesion noted at the T1-T2 level.      No significant spinal canal stenosis.                    Assessment/Plan  * Metastatic melanoma (HCC)- (present on admission)  Assessment & Plan  Patient followed by oncologist Dr. Ross, has been on immunotherapy.  He has an upcoming appointment with radiation oncology.     Images showed extensive bone metastasis and new metastasis to lung and liver  outpatient MRI showed extensive metastases through the lumbar spine, sacrum, paraspinous muscles, psoas muscle, iliacus muscle, gluteal muscles and Bilateral S1 nerve roots and right S2 nerve root in the sacral foramen are surrounded by the mass and probably invaded/impinged.     CT here  showed  R shoulder - Large lytic metastasis involving the right anterior superior glenoid and coracoid with soft tissue component;  left shoulder - metastasis of Glenoid/Scapular body/ Clavicular head/ Teres minor muscle mass      CT chest/abd/pelvis - Severe pulmonary metastatic disease; Severe and widespread osseous metastatic disease/pathologic fractures; New liver lesion suggesting metastasis. Few peritoneal nodules suggesting peritoneal malignancy     new from prior CT dated 7/11/2023. PET scan 11/2023 was negative for chest and abdomen.       MRI brain showed MRI brain noted C2 vertebral body bone metastasis. There is mild amount of anterior epidural tumor at this level. There is no spinal canal compromise. This is new since the previous study. No intracranial metastasis.     Medical oncology and radiation oncology consultation  Multimodal pain managements including po and iv narcotics prn. Monitoring respiratory status and sedation score  [place]rum Radiation started 1/17-19.   Planing radiation other sites 1/22         Hypocalcemia  Assessment & Plan  Corrected Ca low  Iv calcium gluconate     Elevated LFTs  Assessment & Plan  No abdominal pain.  Total bilirubin normal.  Hepatitis panel negative.  Likely due to radiation?  Continue monitoring    Pathologic fracture  Assessment & Plan  Due to wide spread metastasis  I have consulted oncological orthopedics Dr. Burleson: Weightbearing as tolerated bilateral lower extremities with walker assist; Weightbearing as tolerated right upper extremity; Nonweightbearing left upper extremity except to assist with a walker and ADLs        DNR (do not resuscitate)  Assessment & Plan  DNR/DNI per discussion with palliative     Hyperglycemia  Assessment & Plan  Due to steroids  BG over 200  I started SSI  Hypoglycemia protocol    Leukocytosis  Assessment & Plan  likely due to steroid use.  No infectious signs    Fever- (present on admission)  Assessment & Plan   likely due to  extensive metastasis;   no identified infection signs;   elevated procalcitonin could be related to malignancy;   blood culture NTD, continue to hold antibiotics, monitoring    Intractable low back pain- (present on admission)  Assessment & Plan   likely due to tumor compression of the S1/S2,   continue IV Decadron, his reported his pain has been improving.    I ordered Pepcid for GI prophylaxis  Multimodal pain managements including po and iv narcotics prn. Monitoring respiratory status and sedation score  Palliative consulted for pain managements    Constipation- (present on admission)  Assessment & Plan  Likely due to hypercalcemia, opioid use and bedbound  Continue aggressive bowel management    Anemia- (present on admission)  Assessment & Plan   likely due to underlying malignancy, high ferritin and B12.   No sign of active bleeding    Continue to monitor, transfuse if Hb less than 7    Hypercalcemia- (present on admission)  Assessment & Plan  due to extensive bone metastasis.    TSH 3.5, PTH 3.7 (appropriately depressed)  He has been treated with aggressive IV fluid, received 1 dose of IV Zometa, His hypercalcemia slowly improving, corrected calcium down to 11.3.   Continue IV fluid         VTE prophylaxis:    enoxaparin ppx      I have performed a physical exam and reviewed and updated ROS and Plan today (1/20/2024). In review of yesterday's note (1/19/2024), there are no changes except as documented above.

## 2024-01-20 NOTE — CARE PLAN
The patient is Stable - Low risk of patient condition declining or worsening    Shift Goals  Clinical Goals: comfort, pain management.  Patient Goals: comfort.  Family Goals: mack    Progress made toward(s) clinical / shift goals:  patient verbalizes understanding of plan of care. Reports no pain during this shift. Scheduled and PRN analgesic as per orders. Repositioning himself in bed.     Problem: Knowledge Deficit - Standard  Goal: Patient and family/care givers will demonstrate understanding of plan of care, disease process/condition, diagnostic tests and medications  Outcome: Progressing       Patient is not progressing towards the following goals:

## 2024-01-20 NOTE — CARE PLAN
The patient is Stable - Low risk of patient condition declining or worsening    Shift Goals  Clinical Goals: out of bed  Patient Goals: have bm  Family Goals: mack    Progress made toward(s) clinical / shift goals:  pt has not attempted oob today. Offered and offered pain medication. He did have bm.       Patient is not progressing towards the following goals:

## 2024-01-20 NOTE — THERAPY
Physical Therapy Contact Note    Patient Name: Andrew Wall  Age:  58 y.o., Sex:  male  Medical Record #: [id number]  Today's Date: 1/19/2024    Pt off unit for radiation upon attempt; will return when able;    Annelise AYERS, PT,  [phone]

## 2024-01-20 NOTE — PROGRESS NOTES
Assumed care of pt from HS RN. He is awake and oriented x4. Denies pain unless he moves. Call light in reach. Urinal emptied. Oxygen in place. Bed locked and low.

## 2024-01-20 NOTE — PROGRESS NOTES
Hospital Medicine Daily Progress Note    Date of Service  1/19/2024    Chief Complaint  Andrew Wall is a 58 y.o. male admitted 1/15/2024 with back pain    Hospital Course  58 y.o. male, with history of melanoma following Dr. Ross and recently diagnosed with extensive metastatic disease with MRI earlier this month, who initially admitted to [facility] 1/11 for hypercalcemia 12.2.  Patient reports progressively worse back pain, right leg sciatica and generalized weakness, constipation with no bowel movement for 6 days.    Patient followed by oncologist Dr. Ross, has been on immunotherapy.   Images showed extensive bone metastasis and new metastasis to lung and liver.  Patient is transferred here for radiation consult.   Hypercalcemia improved with IV fluid and Zometa.    Multiple pathologic fracture due to metastasis. I have consulted oncological orthopedics Dr. Burleson: Weightbearing as tolerated bilateral lower extremities with walker assist; Weightbearing as tolerated right upper extremity; Nonweightbearing left upper extremity except to assist with a walker and ADLs    Interval Problem Update  Seen patient at bedside. Family at bedside  No acute overnight events  Radiation today  Will plan radiation 1/22 with other sites  Labs reviewed  Dc IVF. Corrected calcium 7.6. ordered iv calcium gluconate  He is on decadron 4mg q6h, tapering? Will discuss with Dr. Wilcox    I have discussed this patient's plan of care and discharge plan at IDT rounds today with Case Management, Nursing, Nursing leadership, and other members of the IDT team.    Consultants/Specialty  Radiation oncology    Code Status  DNAR/DNI    Disposition  Medically Cleared  I have placed the appropriate orders for post-discharge needs.    Review of Systems  Review of Systems   Musculoskeletal:  Positive for back pain, joint pain and myalgias.   All other systems reviewed and are negative.       Physical Exam  Temp:  [36.2 °C (97.2  °F)-36.6 °C (97.9 °F)] 36.4 °C (97.5 °F)  Pulse:  [18-88] 86  Resp:  [15-18] 15  BP: (104-115)/(66-73) 113/68  SpO2:  [95 %-97 %] 97 %    Physical Exam  Vitals and nursing note reviewed.   Constitutional:       Appearance: Normal appearance. He is ill-appearing.   HENT:      Head: Normocephalic and atraumatic.      Mouth/Throat:      Pharynx: Oropharynx is clear.   Eyes:      Pupils: Pupils are equal, round, and reactive to light.   Neck:      Vascular: No carotid bruit.   Cardiovascular:      Rate and Rhythm: Normal rate and regular rhythm.   Pulmonary:      Effort: Pulmonary effort is normal.      Breath sounds: Normal breath sounds.   Abdominal:      General: Abdomen is flat. Bowel sounds are normal.      Palpations: Abdomen is soft. There is no mass.   Musculoskeletal:         General: Tenderness present. Normal range of motion.      Cervical back: Neck supple.   Skin:     General: Skin is warm and dry.   Neurological:      General: No focal deficit present.      Mental Status: He is alert and oriented to person, place, and time.   Psychiatric:         Mood and Affect: Mood normal.         Behavior: Behavior normal.         Fluids    Intake/Output Summary (Last 24 hours) at 1/19/2024 1629  Last data filed at 1/19/2024 0911  Gross per 24 hour   Intake 200 ml   Output 600 ml   Net -400 ml         Laboratory  Recent Labs     01/17/24  0709 01/18/24  0052 01/19/24  0509   WBC 16.0* 18.1* 17.8*   RBC 3.19* 3.31* 3.10*   HEMOGLOBIN 9.2* 9.5* 9.0*   HEMATOCRIT 28.1* 28.9* 27.7*   MCV 88.1 87.3 89.4   MCH 28.8 28.7 29.0   MCHC 32.7 32.9 32.5   RDW 42.4 42.4 43.9   PLATELETCT 474* 449* 390   MPV 9.5 9.3 9.2       Recent Labs     01/17/24  0709 01/18/24  0052 01/19/24  0509   SODIUM 138 137 136   POTASSIUM 3.6 3.9 4.2   CHLORIDE 104 105 105   CO2 23 22 23   GLUCOSE 141* 154* 136*   BUN 25* 20 17   CREATININE 0.52 0.46* 0.33*   CALCIUM 7.3* 7.0* 6.3*                     Imaging  DX-BONE SURVEY-METASTATIC LTD   Final Result          1.  Lytic changes of the left glenoid concerning for metastatic disease.   2.  Small peripherally sclerotic lytic lesion in the left femoral neck, appearance suggests small bony cyst, otherwise indeterminate.   3.  Atherosclerosis      MR-CERVICAL SPINE-WITH & W/O   Final Result         1.  Infiltrative metastatic disease with complete replacement of the bone marrow at C2, C6 and C7 with involvement of the posterior elements as described in detail above.      2.  There is ventral epidural infiltration by metastatic disease at C2, C6 and C7.      3.   Cortical breakthrough with extension of disease to the pre and paravertebral soft tissues is noted at C6 and C7 as described above.      4.  Severe right foraminal narrowing at C5-6.      5.  There is no significant spinal canal stenosis, cord compression or evidence for cord signal abnormality.      MR-THORACIC SPINE-WITH & W/O   Final Result         Multiple metastatic lesions noted throughout the thoracic spine as described above.      Large spinous process lesion noted at T3, T9.      Large right paraspinal metastatic lesion involvement of the right-sided pedicle and lamina noted at T3.      Ventral epidural extension of metastatic disease noted at the T5, T5-T6 level without significant cord impingement.      Large left paraspinous soft tissue metastatic lesion and a small right subcutaneous lesion noted at the T1-T2 level.      No significant spinal canal stenosis.                    Assessment/Plan  * Metastatic melanoma (HCC)- (present on admission)  Assessment & Plan  Patient followed by oncologist Dr. Ross, has been on immunotherapy.  He has an upcoming appointment with radiation oncology.     Images showed extensive bone metastasis and new metastasis to lung and liver  outpatient MRI showed extensive metastases through the lumbar spine, sacrum, paraspinous muscles, psoas muscle, iliacus muscle, gluteal muscles and Bilateral S1 nerve roots and right  S2 nerve root in the sacral foramen are surrounded by the mass and probably invaded/impinged.     CT here showed  R shoulder - Large lytic metastasis involving the right anterior superior glenoid and coracoid with soft tissue component;  left shoulder - metastasis of Glenoid/Scapular body/ Clavicular head/ Teres minor muscle mass      CT chest/abd/pelvis - Severe pulmonary metastatic disease; Severe and widespread osseous metastatic disease/pathologic fractures; New liver lesion suggesting metastasis. Few peritoneal nodules suggesting peritoneal malignancy     new from prior CT dated 7/11/2023. PET scan 11/2023 was negative for chest and abdomen.       MRI brain showed MRI brain noted C2 vertebral body bone metastasis. There is mild amount of anterior epidural tumor at this level. There is no spinal canal compromise. This is new since the previous study. No intracranial metastasis.     Medical oncology and radiation oncology consultation  Multimodal pain managements including po and iv narcotics prn. Monitoring respiratory status and sedation score  Sacrum Radiation started 1/17-19.   Planing radiation other sites 1/22         Elevated LFTs  Assessment & Plan  No abdominal pain.  Total bilirubin normal.  Hepatitis panel negative.  Likely due to radiation?  Continue monitoring    Pathologic fracture  Assessment & Plan  Due to wide spread metastasis  I have consulted oncological orthopedics Dr. Burleson: Weightbearing as tolerated bilateral lower extremities with walker assist; Weightbearing as tolerated right upper extremity; Nonweightbearing left upper extremity except to assist with a walker and ADLs        DNR (do not resuscitate)  Assessment & Plan  DNR/DNI per discussion with palliative     Hyperglycemia  Assessment & Plan  Due to steroids  BG over 200  I started SSI  Hypoglycemia protocol    Leukocytosis  Assessment & Plan  likely due to steroid use.  No infectious signs    Fever- (present on  admission)  Assessment & Plan   likely due to extensive metastasis;   no identified infection signs;   elevated procalcitonin could be related to malignancy;   blood culture NTD, continue to hold antibiotics, monitoring    Intractable low back pain- (present on admission)  Assessment & Plan   likely due to tumor compression of the S1/S2,   continue IV Decadron, his reported his pain has been improving.    I ordered Pepcid for GI prophylaxis  Multimodal pain managements including po and iv narcotics prn. Monitoring respiratory status and sedation score  Palliative consulted for pain managements    Constipation- (present on admission)  Assessment & Plan  Likely due to hypercalcemia, opioid use and bedbound  Continue aggressive bowel management    Anemia- (present on admission)  Assessment & Plan   likely due to underlying malignancy, high ferritin and B12.   No sign of active bleeding    Continue to monitor, transfuse if Hb less than 7    Hypercalcemia- (present on admission)  Assessment & Plan  due to extensive bone metastasis.    TSH 3.5, PTH 3.7 (appropriately depressed)  He has been treated with aggressive IV fluid, received 1 dose of IV Zometa, His hypercalcemia slowly improving, corrected calcium down to 11.3.   Continue IV fluid         VTE prophylaxis:    enoxaparin ppx      I have performed a physical exam and reviewed and updated ROS and Plan today (1/19/2024). In review of yesterday's note (1/18/2024), there are no changes except as documented above.

## 2024-01-21 ENCOUNTER — APPOINTMENT (OUTPATIENT)
Dept: RADIOLOGY | Facility: MEDICAL CENTER | Age: 59
End: 2024-01-21
Attending: INTERNAL MEDICINE
Payer: COMMERCIAL

## 2024-01-21 LAB
ANION GAP SERPL CALC-SCNC: 9 MMOL/L (ref 7–16)
BUN SERPL-MCNC: 15 MG/DL (ref 8–22)
CALCIUM SERPL-MCNC: 7 MG/DL (ref 8.5–10.5)
CHLORIDE SERPL-SCNC: 102 MMOL/L (ref 96–112)
CO2 SERPL-SCNC: 24 MMOL/L (ref 20–33)
CREAT SERPL-MCNC: 0.28 MG/DL (ref 0.5–1.4)
ERYTHROCYTE [DISTWIDTH] IN BLOOD BY AUTOMATED COUNT: 43.8 FL (ref 35.9–50)
GFR SERPLBLD CREATININE-BSD FMLA CKD-EPI: 139 ML/MIN/1.73 M 2
GLUCOSE BLD STRIP.AUTO-MCNC: 111 MG/DL (ref 65–99)
GLUCOSE BLD STRIP.AUTO-MCNC: 128 MG/DL (ref 65–99)
GLUCOSE BLD STRIP.AUTO-MCNC: 151 MG/DL (ref 65–99)
GLUCOSE BLD STRIP.AUTO-MCNC: 167 MG/DL (ref 65–99)
GLUCOSE SERPL-MCNC: 122 MG/DL (ref 65–99)
HCT VFR BLD AUTO: 30.3 % (ref 42–52)
HGB BLD-MCNC: 9.9 G/DL (ref 14–18)
MCH RBC QN AUTO: 28.7 PG (ref 27–33)
MCHC RBC AUTO-ENTMCNC: 32.7 G/DL (ref 32.3–36.5)
MCV RBC AUTO: 87.8 FL (ref 81.4–97.8)
PLATELET # BLD AUTO: 393 K/UL (ref 164–446)
PMV BLD AUTO: 9.3 FL (ref 9–12.9)
POTASSIUM SERPL-SCNC: 4.8 MMOL/L (ref 3.6–5.5)
RBC # BLD AUTO: 3.45 M/UL (ref 4.7–6.1)
SODIUM SERPL-SCNC: 135 MMOL/L (ref 135–145)
WBC # BLD AUTO: 16.4 K/UL (ref 4.8–10.8)

## 2024-01-21 PROCEDURE — 700111 HCHG RX REV CODE 636 W/ 250 OVERRIDE (IP): Mod: JZ | Performed by: STUDENT IN AN ORGANIZED HEALTH CARE EDUCATION/TRAINING PROGRAM

## 2024-01-21 PROCEDURE — 700102 HCHG RX REV CODE 250 W/ 637 OVERRIDE(OP): Performed by: STUDENT IN AN ORGANIZED HEALTH CARE EDUCATION/TRAINING PROGRAM

## 2024-01-21 PROCEDURE — 36415 COLL VENOUS BLD VENIPUNCTURE: CPT

## 2024-01-21 PROCEDURE — 99232 SBSQ HOSP IP/OBS MODERATE 35: CPT | Performed by: STUDENT IN AN ORGANIZED HEALTH CARE EDUCATION/TRAINING PROGRAM

## 2024-01-21 PROCEDURE — 85027 COMPLETE CBC AUTOMATED: CPT

## 2024-01-21 PROCEDURE — 700102 HCHG RX REV CODE 250 W/ 637 OVERRIDE(OP): Performed by: NURSE PRACTITIONER

## 2024-01-21 PROCEDURE — A9270 NON-COVERED ITEM OR SERVICE: HCPCS | Performed by: STUDENT IN AN ORGANIZED HEALTH CARE EDUCATION/TRAINING PROGRAM

## 2024-01-21 PROCEDURE — 80048 BASIC METABOLIC PNL TOTAL CA: CPT

## 2024-01-21 PROCEDURE — 82962 GLUCOSE BLOOD TEST: CPT | Mod: 91

## 2024-01-21 PROCEDURE — A9270 NON-COVERED ITEM OR SERVICE: HCPCS | Performed by: NURSE PRACTITIONER

## 2024-01-21 PROCEDURE — 770004 HCHG ROOM/CARE - ONCOLOGY PRIVATE *

## 2024-01-21 RX ADMIN — DOCUSATE SODIUM 50 MG AND SENNOSIDES 8.6 MG 2 TABLET: 8.6; 5 TABLET, FILM COATED ORAL at 17:34

## 2024-01-21 RX ADMIN — DEXAMETHASONE SODIUM PHOSPHATE 4 MG: 4 INJECTION INTRA-ARTICULAR; INTRALESIONAL; INTRAMUSCULAR; INTRAVENOUS; SOFT TISSUE at 11:45

## 2024-01-21 RX ADMIN — DOCUSATE SODIUM 50 MG AND SENNOSIDES 8.6 MG 2 TABLET: 8.6; 5 TABLET, FILM COATED ORAL at 06:15

## 2024-01-21 RX ADMIN — DEXAMETHASONE SODIUM PHOSPHATE 4 MG: 4 INJECTION INTRA-ARTICULAR; INTRALESIONAL; INTRAMUSCULAR; INTRAVENOUS; SOFT TISSUE at 00:38

## 2024-01-21 RX ADMIN — INSULIN HUMAN 1 UNITS: 100 INJECTION, SOLUTION PARENTERAL at 17:39

## 2024-01-21 RX ADMIN — MORPHINE SULFATE 30 MG: 30 TABLET, FILM COATED, EXTENDED RELEASE ORAL at 06:15

## 2024-01-21 RX ADMIN — OXYCODONE 5 MG: 5 TABLET ORAL at 17:34

## 2024-01-21 RX ADMIN — METHOCARBAMOL 500 MG: 500 TABLET ORAL at 09:54

## 2024-01-21 RX ADMIN — DEXAMETHASONE SODIUM PHOSPHATE 4 MG: 4 INJECTION INTRA-ARTICULAR; INTRALESIONAL; INTRAMUSCULAR; INTRAVENOUS; SOFT TISSUE at 06:15

## 2024-01-21 RX ADMIN — METHOCARBAMOL 500 MG: 500 TABLET ORAL at 13:19

## 2024-01-21 RX ADMIN — DULOXETINE HYDROCHLORIDE 60 MG: 20 CAPSULE, DELAYED RELEASE ORAL at 06:15

## 2024-01-21 RX ADMIN — MORPHINE SULFATE 30 MG: 30 TABLET, FILM COATED, EXTENDED RELEASE ORAL at 17:34

## 2024-01-21 RX ADMIN — METHOCARBAMOL 500 MG: 500 TABLET ORAL at 17:34

## 2024-01-21 RX ADMIN — FAMOTIDINE 20 MG: 20 TABLET ORAL at 06:15

## 2024-01-21 RX ADMIN — DEXAMETHASONE SODIUM PHOSPHATE 4 MG: 4 INJECTION INTRA-ARTICULAR; INTRALESIONAL; INTRAMUSCULAR; INTRAVENOUS; SOFT TISSUE at 17:35

## 2024-01-21 RX ADMIN — GABAPENTIN 100 MG: 300 CAPSULE ORAL at 11:45

## 2024-01-21 RX ADMIN — OXYCODONE HYDROCHLORIDE 10 MG: 10 TABLET ORAL at 00:38

## 2024-01-21 RX ADMIN — ENOXAPARIN SODIUM 40 MG: 100 INJECTION SUBCUTANEOUS at 17:34

## 2024-01-21 RX ADMIN — POLYETHYLENE GLYCOL 3350 1 PACKET: 17 POWDER, FOR SOLUTION ORAL at 06:15

## 2024-01-21 RX ADMIN — GABAPENTIN 300 MG: 300 CAPSULE ORAL at 17:34

## 2024-01-21 RX ADMIN — OXYCODONE HYDROCHLORIDE 10 MG: 10 TABLET ORAL at 11:44

## 2024-01-21 RX ADMIN — METHOCARBAMOL 500 MG: 500 TABLET ORAL at 20:52

## 2024-01-21 RX ADMIN — GABAPENTIN 100 MG: 300 CAPSULE ORAL at 06:15

## 2024-01-21 ASSESSMENT — PAIN DESCRIPTION - PAIN TYPE
TYPE: ACUTE PAIN

## 2024-01-21 ASSESSMENT — ENCOUNTER SYMPTOMS
BACK PAIN: 1
MYALGIAS: 1

## 2024-01-21 NOTE — CARE PLAN
The patient is Watcher - Medium risk of patient condition declining or worsening    Shift Goals  Clinical Goals: safety, comfort  Patient Goals: rest, pain control  Family Goals: mack    Progress made toward(s) clinical / shift goals:  Patient kept safe and free from falls. Medicated for pain using PRN's as ordered, see MAR      Problem: Knowledge Deficit - Standard  Goal: Patient and family/care givers will demonstrate understanding of plan of care, disease process/condition, diagnostic tests and medications  Outcome: Progressing     Problem: Pain - Standard  Goal: Alleviation of pain or a reduction in pain to the patient’s comfort goal  Outcome: Progressing     Problem: Fall Risk  Goal: Patient will remain free from falls  Outcome: Progressing     Problem: Skin Integrity  Goal: Skin integrity is maintained or improved  Outcome: Progressing

## 2024-01-21 NOTE — PROGRESS NOTES
Hospital Medicine Daily Progress Note    Date of Service  1/21/2024    Chief Complaint  Andrew Wall is a 58 y.o. male admitted 1/15/2024 with back pain    Hospital Course  58 y.o. male, with history of melanoma following Dr. Ross and recently diagnosed with extensive metastatic disease with MRI earlier this month, who initially admitted to [facility] 1/11 for hypercalcemia 12.2.  Patient reports progressively worse back pain, right leg sciatica and generalized weakness, constipation with no bowel movement for 6 days.    Patient followed by oncologist Dr. Ross, has been on immunotherapy.   Images showed extensive bone metastasis and new metastasis to lung and liver.  Patient is transferred here for radiation consult.   Hypercalcemia improved with IV fluid and Zometa.    Multiple pathologic fracture due to metastasis. I have consulted oncological orthopedics Dr. Burleson: Weightbearing as tolerated bilateral lower extremities with walker assist; Weightbearing as tolerated right upper extremity; Nonweightbearing left upper extremity except to assist with a walker and ADLs  S/p sacral radiation 1/17-19    Interval Problem Update  Seen patient at bedside. Wife at bedside  Will plan radiation 1/22 with other sites  Bowel movement yesterday  Continue multimodal pain managements  PT/OT  Mild BLE swelling, rec leg elevation    I have discussed this patient's plan of care and discharge plan at IDT rounds today with Case Management, Nursing, Nursing leadership, and other members of the IDT team.    Consultants/Specialty  Radiation oncology    Code Status  DNAR/DNI    Disposition  The patient is not medically cleared for discharge to home or a post-acute facility.      I have placed the appropriate orders for post-discharge needs.    Review of Systems  Review of Systems   Musculoskeletal:  Positive for back pain, joint pain and myalgias.   All other systems reviewed and are negative.       Physical Exam  Temp:   [36.4 °C (97.6 °F)-36.9 °C (98.4 °F)] 36.8 °C (98.3 °F)  Pulse:  [85-93] 87  Resp:  [16-18] 16  BP: (112-120)/(69-75) 114/75  SpO2:  [96 %-97 %] 97 %    Physical Exam  Vitals and nursing note reviewed.   Constitutional:       Appearance: Normal appearance. He is ill-appearing.   HENT:      Head: Normocephalic and atraumatic.      Mouth/Throat:      Pharynx: Oropharynx is clear.   Eyes:      Pupils: Pupils are equal, round, and reactive to light.   Neck:      Vascular: No carotid bruit.   Cardiovascular:      Rate and Rhythm: Normal rate and regular rhythm.   Pulmonary:      Effort: Pulmonary effort is normal.      Breath sounds: Normal breath sounds.   Abdominal:      General: Abdomen is flat. Bowel sounds are normal.      Palpations: Abdomen is soft. There is no mass.   Musculoskeletal:         General: Tenderness present. Normal range of motion.      Cervical back: Neck supple.   Skin:     General: Skin is warm and dry.   Neurological:      General: No focal deficit present.      Mental Status: He is alert and oriented to person, place, and time.   Psychiatric:         Mood and Affect: Mood normal.         Behavior: Behavior normal.         Fluids    Intake/Output Summary (Last 24 hours) at 1/21/2024 1519  Last data filed at 1/21/2024 0600  Gross per 24 hour   Intake --   Output 975 ml   Net -975 ml         Laboratory  Recent Labs     01/19/24  0509 01/20/24  0649 01/21/24  0801   WBC 17.8* 17.8* 16.4*   RBC 3.10* 3.21* 3.45*   HEMOGLOBIN 9.0* 9.3* 9.9*   HEMATOCRIT 27.7* 28.3* 30.3*   MCV 89.4 88.2 87.8   MCH 29.0 29.0 28.7   MCHC 32.5 32.9 32.7   RDW 43.9 44.0 43.8   PLATELETCT 390 389 393   MPV 9.2 9.3 9.3       Recent Labs     01/19/24  0509 01/20/24  0649 01/21/24  0801   SODIUM 136 136 135   POTASSIUM 4.2 4.2 4.8   CHLORIDE 105 103 102   CO2 23 23 24   GLUCOSE 136* 154* 122*   BUN 17 15 15   CREATININE 0.33* 0.31* 0.28*   CALCIUM 6.3* 6.5* 7.0*                     Imaging  DX-BONE SURVEY-METASTATIC LTD    Final Result         1.  Lytic changes of the left glenoid concerning for metastatic disease.   2.  Small peripherally sclerotic lytic lesion in the left femoral neck, appearance suggests small bony cyst, otherwise indeterminate.   3.  Atherosclerosis      MR-CERVICAL SPINE-WITH & W/O   Final Result         1.  Infiltrative metastatic disease with complete replacement of the bone marrow at C2, C6 and C7 with involvement of the posterior elements as described in detail above.      2.  There is ventral epidural infiltration by metastatic disease at C2, C6 and C7.      3.   Cortical breakthrough with extension of disease to the pre and paravertebral soft tissues is noted at C6 and C7 as described above.      4.  Severe right foraminal narrowing at C5-6.      5.  There is no significant spinal canal stenosis, cord compression or evidence for cord signal abnormality.      MR-THORACIC SPINE-WITH & W/O   Final Result         Multiple metastatic lesions noted throughout the thoracic spine as described above.      Large spinous process lesion noted at T3, T9.      Large right paraspinal metastatic lesion involvement of the right-sided pedicle and lamina noted at T3.      Ventral epidural extension of metastatic disease noted at the T5, T5-T6 level without significant cord impingement.      Large left paraspinous soft tissue metastatic lesion and a small right subcutaneous lesion noted at the T1-T2 level.      No significant spinal canal stenosis.                    Assessment/Plan  * Metastatic melanoma (HCC)- (present on admission)  Assessment & Plan  Patient followed by oncologist Dr. Ross, has been on immunotherapy.  He has an upcoming appointment with radiation oncology.     Images showed extensive bone metastasis and new metastasis to lung and liver  outpatient MRI showed extensive metastases through the lumbar spine, sacrum, paraspinous muscles, psoas muscle, iliacus muscle, gluteal muscles and Bilateral S1 nerve  roots and right S2 nerve root in the sacral foramen are surrounded by the mass and probably invaded/impinged.     CT here showed  R shoulder - Large lytic metastasis involving the right anterior superior glenoid and coracoid with soft tissue component;  left shoulder - metastasis of Glenoid/Scapular body/ Clavicular head/ Teres minor muscle mass      CT chest/abd/pelvis - Severe pulmonary metastatic disease; Severe and widespread osseous metastatic disease/pathologic fractures; New liver lesion suggesting metastasis. Few peritoneal nodules suggesting peritoneal malignancy     new from prior CT dated 7/11/2023. PET scan 11/2023 was negative for chest and abdomen.       MRI brain showed MRI brain noted C2 vertebral body bone metastasis. There is mild amount of anterior epidural tumor at this level. There is no spinal canal compromise. This is new since the previous study. No intracranial metastasis.     Medical oncology and radiation oncology consultation  S/p Sacrum Radiation started 1/17-19.     1/21: Planing radiation other sites 1/22  Multimodal pain managements including po and iv narcotics prn. Monitoring respiratory status and sedation score           Hypocalcemia  Assessment & Plan  Corrected Ca low  Iv calcium gluconate     Elevated LFTs  Assessment & Plan  No abdominal pain.  Total bilirubin normal.  Hepatitis panel negative.  Likely due to radiation?  Continue monitoring    Pathologic fracture  Assessment & Plan  Due to wide spread metastasis  I have consulted oncological orthopedics Dr. Burleson: Weightbearing as tolerated bilateral lower extremities with walker assist; Weightbearing as tolerated right upper extremity; Nonweightbearing left upper extremity except to assist with a walker and ADLs        DNR (do not resuscitate)  Assessment & Plan  DNR/DNI per discussion with palliative     Hyperglycemia  Assessment & Plan  Due to steroids  BG over 200  I started SSI  Hypoglycemia  protocol    Leukocytosis  Assessment & Plan  likely due to steroid use.  No infectious signs    Fever- (present on admission)  Assessment & Plan   likely due to extensive metastasis;   no identified infection signs;   elevated procalcitonin could be related to malignancy;   blood culture NTD, continue to hold antibiotics, monitoring    Intractable low back pain- (present on admission)  Assessment & Plan   likely due to tumor compression of the S1/S2,   continue IV Decadron, his reported his pain has been improving.    I ordered Pepcid for GI prophylaxis  Multimodal pain managements including po and iv narcotics prn. Monitoring respiratory status and sedation score  Palliative consulted for pain managements    Constipation- (present on admission)  Assessment & Plan  Likely due to hypercalcemia, opioid use and bedbound  Continue aggressive bowel management    Anemia- (present on admission)  Assessment & Plan   likely due to underlying malignancy, high ferritin and B12.   No sign of active bleeding    Continue to monitor, transfuse if Hb less than 7    Hypercalcemia- (present on admission)  Assessment & Plan  due to extensive bone metastasis.    TSH 3.5, PTH 3.7 (appropriately depressed)  He has been treated with aggressive IV fluid, received 1 dose of IV Zometa, His hypercalcemia slowly improving, corrected calcium down to 11.3.   Continue IV fluid         VTE prophylaxis:    enoxaparin ppx      I have performed a physical exam and reviewed and updated ROS and Plan today (1/21/2024). In review of yesterday's note (1/20/2024), there are no changes except as documented above.

## 2024-01-21 NOTE — CARE PLAN
The patient is Stable - Low risk of patient condition declining or worsening    Shift Goals  Clinical Goals: Pain control  Patient Goals: Rest  Family Goals: mack    Progress made toward(s) clinical / shift goals:        Problem: Knowledge Deficit - Standard  Goal: Patient and family/care givers will demonstrate understanding of plan of care, disease process/condition, diagnostic tests and medications  Description: Target End Date:  1-3 days or as soon as patient condition allows    Document in Patient Education    1.  Patient and family/caregiver oriented to unit, equipment, visitation policy and means for communicating concern  2.  Complete/review Learning Assessment  3.  Assess knowledge level of disease process/condition, treatment plan, diagnostic tests and medications  4.  Explain disease process/condition, treatment plan, diagnostic tests and medications  Outcome: Progressing  Note: Patient understands the need for pain control. Patient will report to the nurse if there is any increase in pain and report pain using a scale of 1-10.

## 2024-01-22 ENCOUNTER — PATIENT OUTREACH (OUTPATIENT)
Dept: ONCOLOGY | Facility: MEDICAL CENTER | Age: 59
End: 2024-01-22
Payer: COMMERCIAL

## 2024-01-22 ENCOUNTER — HOSPITAL ENCOUNTER (OUTPATIENT)
Dept: RADIATION ONCOLOGY | Facility: MEDICAL CENTER | Age: 59
End: 2024-01-22

## 2024-01-22 LAB
ANISOCYTOSIS BLD QL SMEAR: ABNORMAL
BASOPHILS # BLD AUTO: 0 % (ref 0–1.8)
BASOPHILS # BLD: 0 K/UL (ref 0–0.12)
CHEMOTHERAPY INFUSION START DATE: NORMAL
CHEMOTHERAPY INFUSION START DATE: NORMAL
CHEMOTHERAPY RECORDS: 3000
CHEMOTHERAPY RECORDS: 6
CHEMOTHERAPY RECORDS: NORMAL
CHEMOTHERAPY RX CANCER: NORMAL
CHEMOTHERAPY RX CANCER: NORMAL
DATE 1ST CHEMO CANCER: NORMAL
DATE 1ST CHEMO CANCER: NORMAL
EOSINOPHIL # BLD AUTO: 0 K/UL (ref 0–0.51)
EOSINOPHIL NFR BLD: 0 % (ref 0–6.9)
ERYTHROCYTE [DISTWIDTH] IN BLOOD BY AUTOMATED COUNT: 46.2 FL (ref 35.9–50)
GLUCOSE BLD STRIP.AUTO-MCNC: 124 MG/DL (ref 65–99)
GLUCOSE BLD STRIP.AUTO-MCNC: 128 MG/DL (ref 65–99)
GLUCOSE BLD STRIP.AUTO-MCNC: 131 MG/DL (ref 65–99)
GLUCOSE BLD STRIP.AUTO-MCNC: 146 MG/DL (ref 65–99)
HCT VFR BLD AUTO: 30.3 % (ref 42–52)
HGB BLD-MCNC: 9.6 G/DL (ref 14–18)
HYPOCHROMIA BLD QL SMEAR: ABNORMAL
LYMPHOCYTES # BLD AUTO: 0.12 K/UL (ref 1–4.8)
LYMPHOCYTES NFR BLD: 0.9 % (ref 22–41)
MANUAL DIFF BLD: NORMAL
MCH RBC QN AUTO: 28.7 PG (ref 27–33)
MCHC RBC AUTO-ENTMCNC: 31.7 G/DL (ref 32.3–36.5)
MCV RBC AUTO: 90.7 FL (ref 81.4–97.8)
METAMYELOCYTES NFR BLD MANUAL: 6 %
MICROCYTES BLD QL SMEAR: ABNORMAL
MONOCYTES # BLD AUTO: 0.83 K/UL (ref 0–0.85)
MONOCYTES NFR BLD AUTO: 6 % (ref 0–13.4)
MORPHOLOGY BLD-IMP: NORMAL
MYELOCYTES NFR BLD MANUAL: 1.7 %
NEUTROPHILS # BLD AUTO: 11.79 K/UL (ref 1.82–7.42)
NEUTROPHILS NFR BLD: 85.4 % (ref 44–72)
NRBC # BLD AUTO: 0.02 K/UL
NRBC BLD-RTO: 0.1 /100 WBC (ref 0–0.2)
PLATELET # BLD AUTO: 311 K/UL (ref 164–446)
PLATELET BLD QL SMEAR: NORMAL
PMV BLD AUTO: 9.1 FL (ref 9–12.9)
POLYCHROMASIA BLD QL SMEAR: NORMAL
RAD ONC ARIA COURSE LAST TREATMENT DATE: NORMAL
RAD ONC ARIA COURSE LAST TREATMENT DATE: NORMAL
RAD ONC ARIA COURSE TREATMENT ELAPSED DAYS: NORMAL
RAD ONC ARIA COURSE TREATMENT ELAPSED DAYS: NORMAL
RAD ONC ARIA REFERENCE POINT DOSAGE GIVEN TO DATE: 6
RAD ONC ARIA REFERENCE POINT ID: NORMAL
RAD ONC ARIA REFERENCE POINT SESSION DOSAGE GIVEN: 6
RBC # BLD AUTO: 3.34 M/UL (ref 4.7–6.1)
RBC BLD AUTO: PRESENT
WBC # BLD AUTO: 13.8 K/UL (ref 4.8–10.8)

## 2024-01-22 PROCEDURE — 99232 SBSQ HOSP IP/OBS MODERATE 35: CPT | Performed by: NURSE PRACTITIONER

## 2024-01-22 PROCEDURE — 85007 BL SMEAR W/DIFF WBC COUNT: CPT

## 2024-01-22 PROCEDURE — A9270 NON-COVERED ITEM OR SERVICE: HCPCS | Performed by: NURSE PRACTITIONER

## 2024-01-22 PROCEDURE — 700102 HCHG RX REV CODE 250 W/ 637 OVERRIDE(OP): Performed by: NURSE PRACTITIONER

## 2024-01-22 PROCEDURE — 700111 HCHG RX REV CODE 636 W/ 250 OVERRIDE (IP): Mod: JZ | Performed by: STUDENT IN AN ORGANIZED HEALTH CARE EDUCATION/TRAINING PROGRAM

## 2024-01-22 PROCEDURE — A9270 NON-COVERED ITEM OR SERVICE: HCPCS | Performed by: STUDENT IN AN ORGANIZED HEALTH CARE EDUCATION/TRAINING PROGRAM

## 2024-01-22 PROCEDURE — 99233 SBSQ HOSP IP/OBS HIGH 50: CPT | Performed by: STUDENT IN AN ORGANIZED HEALTH CARE EDUCATION/TRAINING PROGRAM

## 2024-01-22 PROCEDURE — 77280 THER RAD SIMULAJ FIELD SMPL: CPT | Performed by: RADIOLOGY

## 2024-01-22 PROCEDURE — 77373 STRTCTC BDY RAD THER TX DLVR: CPT | Performed by: RADIOLOGY

## 2024-01-22 PROCEDURE — 82962 GLUCOSE BLOOD TEST: CPT | Mod: 91

## 2024-01-22 PROCEDURE — 700102 HCHG RX REV CODE 250 W/ 637 OVERRIDE(OP): Performed by: STUDENT IN AN ORGANIZED HEALTH CARE EDUCATION/TRAINING PROGRAM

## 2024-01-22 PROCEDURE — 85027 COMPLETE CBC AUTOMATED: CPT

## 2024-01-22 PROCEDURE — 36415 COLL VENOUS BLD VENIPUNCTURE: CPT

## 2024-01-22 PROCEDURE — 77280 THER RAD SIMULAJ FIELD SMPL: CPT | Mod: 26 | Performed by: RADIOLOGY

## 2024-01-22 PROCEDURE — 770004 HCHG ROOM/CARE - ONCOLOGY PRIVATE *

## 2024-01-22 RX ORDER — DEXAMETHASONE 4 MG/1
6 TABLET ORAL 2 TIMES DAILY
Status: DISCONTINUED | OUTPATIENT
Start: 2024-01-22 | End: 2024-01-26

## 2024-01-22 RX ADMIN — OXYCODONE HYDROCHLORIDE 10 MG: 10 TABLET ORAL at 18:24

## 2024-01-22 RX ADMIN — DEXAMETHASONE SODIUM PHOSPHATE 4 MG: 4 INJECTION INTRA-ARTICULAR; INTRALESIONAL; INTRAMUSCULAR; INTRAVENOUS; SOFT TISSUE at 00:32

## 2024-01-22 RX ADMIN — POLYETHYLENE GLYCOL 3350 1 PACKET: 17 POWDER, FOR SOLUTION ORAL at 06:14

## 2024-01-22 RX ADMIN — METHOCARBAMOL 500 MG: 500 TABLET ORAL at 21:11

## 2024-01-22 RX ADMIN — OXYCODONE HYDROCHLORIDE 10 MG: 10 TABLET ORAL at 00:32

## 2024-01-22 RX ADMIN — MORPHINE SULFATE 4 MG: 4 INJECTION, SOLUTION INTRAMUSCULAR; INTRAVENOUS at 15:50

## 2024-01-22 RX ADMIN — FAMOTIDINE 20 MG: 20 TABLET ORAL at 06:14

## 2024-01-22 RX ADMIN — DOCUSATE SODIUM 50 MG AND SENNOSIDES 8.6 MG 2 TABLET: 8.6; 5 TABLET, FILM COATED ORAL at 17:34

## 2024-01-22 RX ADMIN — METHOCARBAMOL 500 MG: 500 TABLET ORAL at 13:23

## 2024-01-22 RX ADMIN — OXYCODONE HYDROCHLORIDE 10 MG: 10 TABLET ORAL at 14:47

## 2024-01-22 RX ADMIN — LACTULOSE 30 ML: 20 SOLUTION ORAL at 06:14

## 2024-01-22 RX ADMIN — METHOCARBAMOL 500 MG: 500 TABLET ORAL at 09:55

## 2024-01-22 RX ADMIN — ENOXAPARIN SODIUM 40 MG: 100 INJECTION SUBCUTANEOUS at 17:34

## 2024-01-22 RX ADMIN — METHOCARBAMOL 500 MG: 500 TABLET ORAL at 17:34

## 2024-01-22 RX ADMIN — DEXAMETHASONE SODIUM PHOSPHATE 4 MG: 4 INJECTION INTRA-ARTICULAR; INTRALESIONAL; INTRAMUSCULAR; INTRAVENOUS; SOFT TISSUE at 11:55

## 2024-01-22 RX ADMIN — MORPHINE SULFATE 30 MG: 30 TABLET, FILM COATED, EXTENDED RELEASE ORAL at 06:14

## 2024-01-22 RX ADMIN — DEXAMETHASONE 6 MG: 4 TABLET ORAL at 13:23

## 2024-01-22 RX ADMIN — GABAPENTIN 100 MG: 300 CAPSULE ORAL at 11:54

## 2024-01-22 RX ADMIN — DEXAMETHASONE SODIUM PHOSPHATE 4 MG: 4 INJECTION INTRA-ARTICULAR; INTRALESIONAL; INTRAMUSCULAR; INTRAVENOUS; SOFT TISSUE at 06:14

## 2024-01-22 RX ADMIN — MORPHINE SULFATE 30 MG: 30 TABLET, FILM COATED, EXTENDED RELEASE ORAL at 17:34

## 2024-01-22 RX ADMIN — DULOXETINE HYDROCHLORIDE 60 MG: 20 CAPSULE, DELAYED RELEASE ORAL at 06:14

## 2024-01-22 RX ADMIN — GABAPENTIN 100 MG: 300 CAPSULE ORAL at 06:14

## 2024-01-22 RX ADMIN — GABAPENTIN 300 MG: 300 CAPSULE ORAL at 17:34

## 2024-01-22 RX ADMIN — DOCUSATE SODIUM 50 MG AND SENNOSIDES 8.6 MG 2 TABLET: 8.6; 5 TABLET, FILM COATED ORAL at 06:14

## 2024-01-22 ASSESSMENT — ENCOUNTER SYMPTOMS
RESPIRATORY NEGATIVE: 1
BACK PAIN: 1
CARDIOVASCULAR NEGATIVE: 1
GASTROINTESTINAL NEGATIVE: 1
EYES NEGATIVE: 1
NEUROLOGICAL NEGATIVE: 1
MYALGIAS: 1
PSYCHIATRIC NEGATIVE: 1

## 2024-01-22 ASSESSMENT — PAIN DESCRIPTION - PAIN TYPE
TYPE: ACUTE PAIN

## 2024-01-22 NOTE — CARE PLAN
The patient is Watcher - Medium risk of patient condition declining or worsening    Shift Goals  Clinical Goals: safety, comfort  Patient Goals: sleep, pain control  Family Goals: mack    Progress made toward(s) clinical / shift goals:  Patient kept safe and free from falls. Medicated for pain using PRN's as ordered, see MAR.       Problem: Knowledge Deficit - Standard  Goal: Patient and family/care givers will demonstrate understanding of plan of care, disease process/condition, diagnostic tests and medications  Outcome: Progressing     Problem: Pain - Standard  Goal: Alleviation of pain or a reduction in pain to the patient’s comfort goal  Outcome: Progressing     Problem: Fall Risk  Goal: Patient will remain free from falls  Outcome: Progressing     Problem: Skin Integrity  Goal: Skin integrity is maintained or improved  Outcome: Progressing

## 2024-01-22 NOTE — PROGRESS NOTES
Inpatient Palliative Care     Location: Cancer nursing unit [facility] [phone]     HPI:   Andrew Wall is a 58 y.o. male with past medical history of melanoma and recently diagnosed extensive metastatic disease per MRI earlier this month who was admitted on January 15 th by  his oncology office for hypercalcemia, calcium 12.2.  Patient reports progressively worsening back pain, right leg sciatica, and generalized weakness with additional constipation x 6 days without bowel movement.  He is followed by Dr. Ross with cancer care specialty and has been on immunotherapy.  He has upcoming appointment with radiation oncology.  Patient was direct transfer from [facility] emergency department to oncology floor at [facility].     Patient and wife report a fall while still at home which precipitated increased pain in right hip area   .  Interval:  Completed first round of radiation on Friday 1/19.  Utilizing intermittent oxycodone for breakthrough pain, 25 mg over the past 24 hours.  Remains on 0.5 L sats adequate.  Vital signs stable.  Continues on dexamethasone IV every 6 hours.  Labs stable, alk phos decreasing.  Last bowel movement 1/20.    Summary:  Patient is evasive regarding pain qualification.  Does say he has more pain when he moves.  Has difficulty asking for as needed medication per nurse, worries he will become addicted.  He reports pain is fairly well-managed except for when he has to move.  Did sit at edge of bed yesterday.  Does not think lactulose is helpful would rather utilize suppositories for bowel management.  Discussed possibility of increasing morphine sulfate extended release with patient.  He worries that he will be altered and increasingly sleepy.  Wife expresses frustration with inability to contact cancer care specialist regarding genotyping of melanoma.  Will discuss with [facility] physician rounding this week on cancer nursing unit.  Reexplored prognosis discussion specifically with regards to  trajectory of quality of life versus quantity.  Discussed goal improve mobility, continue to manage pain and efforts to improve quality of life and with goal of getting patient home with family.  All measures currently are palliative.  Discussed option to go home with home health with home palliative care until disease process renders choice to hospice.  Unclear status of genotyping of cancer, this may offer some immunotherapy options not yet explored which may change prognosis as such.    Discussed with Dr. Bowles regarding above conversation with patient and wife.  She will follow-up with Dr. Ross and with patient and wife/family regarding results of genotyping and options for change of immunotherapy/further follow-ups with primary oncologist Dr. Ross.  Appreciate discussion and follow-up.    Follow-up with Dr. Fraga regarding plan regarding steroids.  Per Dr. Fraga okay to transition to oral steroids and taper if pain is under control-will be getting 5 more treatments, starting C-spine and right hip SBRT treatments today and finishing on Friday 1/26.     Active listening, reflection, reminiscing, validation & normalization, and empathic support utilized throughout this encounter.  All questions answered and contact information provided, encouraged to call with any questions or concerns.      Plan:     1) continue morphine sulfate extended release 30 mg orally every 12 hours, continue as needed medications.  2) transition to oral steroids-begin to taper slowly.  3) complete POLST prior to discharge  4) dexamethasone changed to oral delivery, decrease dosage to 12 mg daily total, 6 mg orally at 8 AM and 1300.  Continue taper as tolerated.     Thank you for allowing me the opportunity to participate in the care of  Andrew Lamasn.     I spent a total of 42 minutes reviewing medical records, direct face-to-face time with the patient and/or family, coordination of care, and documentation. This is separate from  the time spent on advance care planning, which is documented above.     Vikki Karimi, MSN, APRN, ACNPC-AG.  Palliative Care Nurse Practitioner  [phone]

## 2024-01-22 NOTE — PROGRESS NOTES
Referral received, pt currently inpatient, lives in [place].  Will monitor for outpatient cancer nurse navigation needs.

## 2024-01-22 NOTE — CARE PLAN
The patient is Stable - Low risk of patient condition declining or worsening    Shift Goals  Clinical Goals: Pain control, Radiation  Patient Goals: Rest  Family Goals: mack    Progress made toward(s) clinical / shift goals:        Problem: Knowledge Deficit - Standard  Goal: Patient and family/care givers will demonstrate understanding of plan of care, disease process/condition, diagnostic tests and medications  Description: Target End Date:  1-3 days or as soon as patient condition allows    Document in Patient Education    1.  Patient and family/caregiver oriented to unit, equipment, visitation policy and means for communicating concern  2.  Complete/review Learning Assessment  3.  Assess knowledge level of disease process/condition, treatment plan, diagnostic tests and medications  4.  Explain disease process/condition, treatment plan, diagnostic tests and medications  Outcome: Progressing  Note: Patient understands the need for radiation therapy and pain control. Patient will continue to rate pain using a scale of 1-10.

## 2024-01-22 NOTE — PROGRESS NOTES
Oncology/Hematology Progress Note               Author: Clara Parra M.D. Date & Time created: 1/22/2024  1:20 PM     CC: Metastatic Melanoma    Interval History:  No acute changes overnight.  Patient's wife is at the bedside today.  He continues on radiation therapy.  He continues to follow with palliative medicine for pain control.  He reports that he is significantly fatigued.  He reports that he can sit up at the edge of bed with some assistance.  He has not been ambulating much due to pain.  He and his wife would like an update regarding foundation 1 analysis and potential treatment options.    Review of Systems:  Review of Systems   Constitutional:  Positive for malaise/fatigue.   HENT: Negative.     Eyes: Negative.    Respiratory: Negative.     Cardiovascular: Negative.    Gastrointestinal: Negative.    Genitourinary: Negative.    Musculoskeletal:  Positive for back pain and joint pain.   Skin: Negative.    Neurological: Negative.    Endo/Heme/Allergies: Negative.    Psychiatric/Behavioral: Negative.         Physical Exam:  Physical Exam  Constitutional:       General: He is not in acute distress.     Appearance: He is ill-appearing.   HENT:      Head: Normocephalic and atraumatic.      Right Ear: External ear normal.      Left Ear: External ear normal.      Nose: Nose normal.   Eyes:      General: No scleral icterus.     Conjunctiva/sclera: Conjunctivae normal.   Cardiovascular:      Rate and Rhythm: Normal rate.   Pulmonary:      Effort: Pulmonary effort is normal.   Musculoskeletal:      Cervical back: Neck supple.   Neurological:      Mental Status: He is alert and oriented to person, place, and time.   Psychiatric:         Mood and Affect: Mood normal.         Behavior: Behavior normal.         Thought Content: Thought content normal.         Judgment: Judgment normal.         Labs:          Recent Labs     01/20/24  0649 01/21/24  0801   SODIUM 136 135   POTASSIUM 4.2 4.8   CHLORIDE 103 102   CO2   24   BUN 15 15   CREATININE 0.31* 0.28*   CALCIUM 6.5* 7.0*     Recent Labs     24  0649 24  0801   GLUCOSE 154* 122*     Recent Labs     24  0649 24  0824  1027   RBC 3.21* 3.45* 3.34*   HEMOGLOBIN 9.3* 9.9* 9.6*   HEMATOCRIT 28.3* 30.3* 30.3*   PLATELETCT 389 393 311     Recent Labs     24  0649 24  0824  1027   WBC 17.8* 16.4* 13.8*   NEUTSPOLYS  --   --  85.40*   LYMPHOCYTES  --   --  0.90*   MONOCYTES  --   --  6.00   EOSINOPHILS  --   --  0.00   BASOPHILS  --   --  0.00     Recent Labs     24  0649 24  0801   SODIUM 136 135   POTASSIUM 4.2 4.8   CHLORIDE 103 102   CO2  24   GLUCOSE 154* 122*   BUN 15 15   CREATININE 0.31* 0.28*   CALCIUM 6.5* 7.0*     Hemodynamics:  Temp (24hrs), Av.4 °C (97.6 °F), Min:36.2 °C (97.1 °F), Max:36.8 °C (98.3 °F)  Temperature: 36.4 °C (97.5 °F)  Pulse  Av  Min: 18  Max: 93   Blood Pressure: 110/68     Respiratory:    Respiration: 18, Pulse Oximetry: 98 %        RUL Breath Sounds: Clear, RML Breath Sounds: Clear, RLL Breath Sounds: Clear, ALKA Breath Sounds: Clear, LLL Breath Sounds: Clear  Fluids:    Intake/Output Summary (Last 24 hours) at 2024 1320  Last data filed at 2024 2329  Gross per 24 hour   Intake --   Output 800 ml   Net -800 ml        GI/Nutrition:  Orders Placed This Encounter   Procedures    Diet Order Diet: Regular     Standing Status:   Standing     Number of Occurrences:   1     Order Specific Question:   Diet:     Answer:   Regular [1]     Medical Decision Making, by Problem:  Active Hospital Problems    Diagnosis     *Metastatic melanoma (HCC) [C43.9]     Hypocalcemia [E83.51]     Pathologic fracture [M84.40XA]     Elevated LFTs [R79.89]     DNR (do not resuscitate) [Z66]     Leukocytosis [D72.829]     Hyperglycemia [R73.9]     Fever [R50.9]     Intractable low back pain [M54.59]     Constipation [K59.00]     Anemia [D64.9]     Hypercalcemia [E83.52]        Assessment and  Plan:    Metastatic melanoma--  He had an initial melanoma diagnosed in 2020, treated with surgical resection and 1 year of adjuvant Opdivo from 11/19/2020 through 11/4/2021.  He underwent further surveillance and monitoring.  He unfortunately is found to have recurrent/metastatic disease in October 2023.  He is now on palliative Opdivo and Yervoy therapy which started on 1/9/2024. Admitted with hypercalcemia of malignancy, as well as progressive back pain, right-sided sciatica, with numbness and weakness in the right leg.  Spine imaging has shown widely metastatic disease through the spine including involvement of C2, C6, and C7 with epidural infiltration at that point.  He also has a large lumbosacral mass involving the right sided nerve roots at S1 and S2.  He also has widely metastatic disease on CT imaging including more than 100 nodules in the lungs, possible liver disease, as well as widespread bony metastatic disease.     -- Reviewed Foundation One Results with the patient and his wife, he is BRAF V600E POSITIVE. Discussed the implications of this mutation on treatments for melanoma. I discussed with him that given the widespread aggressive nature of his disease, the plan is to switch him to Braftovi/Mektovi as these therapies can provided a quicker response than immunotherapy. Dr. Ross is working on authorization of this therapy and will start ASAP.  -- I discussed the administration of therapy along with the potential side effects.  -- I also discussed prognosis and goals of therapy which he understands is palliative. He wants to proceed.   -- Order baseline echocardiogram - pretreatment evaluation     2. Hypercalcemia of malignancy--he was treated with IV fluids as well as Zometa.  His calcium is now back to normal.  -- Resolved and now actually slightly low but likely corrected calcium is normal given albumin of 2.4.     3.  Metastatic disease of the spine and neck--he has some high risk lesions  including epidural involvement at C2, C6, and C7, as well as epidural extension at T5 and T6, as well as the right-sided nerve root involvement of S1 and S2.  He has been seen by Dr. Fraga.  -- Dr. Fraga following and treating with palliative radiation, plan to complete this therapy on Friday.     4.  Cancer related pain--currently seems to be under good control.  -- Will need trial of oral therapies, prior to discharge  -- Will need good control of pain prior to discharge  -- Palliative medicine is following     5.  Anemia--related to underlying disease.  No obvious signs of bleeding. Hgb has been stable in the mid-9 range throughout. Monitor labs     6.  Immobility, Reduced PS--he has some degree of immobility or difficulty ambulating related to pain as well as neurologic symptoms of the right leg.  He is being assessed by physical therapy for home needs.  -- Determine home needs prior to discharge  -- Continue PT/OT       At this point there are no active oncology interventions needed.  I will sign off on the case.  I have answered all the patient's questions satisfactorily.  We will follow along peripherally.  We will be available for any questions prior to discharge. Pt appreciative of the information. He currently has follow up with Dr. Ross on 01/30/24.       Highly complex case, high risk of morbidity and mortality.    Quality-Core Measures

## 2024-01-22 NOTE — PROGRESS NOTES
Hospital Medicine Daily Progress Note    Date of Service  1/22/2024    Chief Complaint  Andrew Wall is a 58 y.o. male admitted 1/15/2024 with back pain    Hospital Course  58 y.o. male, with history of melanoma following Dr. Ross and recently diagnosed with extensive metastatic disease with MRI earlier this month, who initially admitted to [facility] 1/11 for hypercalcemia 12.2.  Patient reports progressively worse back pain, right leg sciatica and generalized weakness, constipation with no bowel movement for 6 days.    Patient followed by oncologist Dr. Ross, has been on immunotherapy.   Images showed extensive bone metastasis and new metastasis to lung and liver.  Patient is transferred here for radiation consult.   Hypercalcemia improved with IV fluid and Zometa.    Multiple pathologic fracture due to metastasis. I have consulted oncological orthopedics Dr. Burleson: Weightbearing as tolerated bilateral lower extremities with walker assist; Weightbearing as tolerated right upper extremity; Nonweightbearing left upper extremity except to assist with a walker and ADLs  S/p sacral radiation 1/17-19    Interval Problem Update  Seen patient at bedside. Wife at bedside  Patient and wife have been asking chemo options. Patient is very lethargic. He is currently here for inpatient radiation.   I have discussed with Dr. Kuo, he is BRAF V600E POSITIVE. Planning Braftovi/Mektovi pending insurance authorization   Continue multimodal pain managements  PT/OT    I have discussed this patient's plan of care and discharge plan at IDT rounds today with Case Management, Nursing, Nursing leadership, and other members of the IDT team.    Consultants/Specialty  Radiation oncology    Code Status  DNAR/DNI    Disposition  The patient is not medically cleared for discharge to home or a post-acute facility.      I have placed the appropriate orders for post-discharge needs.    Review of Systems  Review of Systems    Musculoskeletal:  Positive for back pain, joint pain and myalgias.   All other systems reviewed and are negative.       Physical Exam  Temp:  [36.2 °C (97.1 °F)-36.8 °C (98.3 °F)] 36.4 °C (97.5 °F)  Pulse:  [82-86] 86  Resp:  [16-18] 18  BP: (108-118)/(68-72) 110/68  SpO2:  [96 %-98 %] 98 %    Physical Exam  Vitals and nursing note reviewed.   Constitutional:       Appearance: Normal appearance. He is ill-appearing.   HENT:      Head: Normocephalic and atraumatic.      Mouth/Throat:      Pharynx: Oropharynx is clear.   Eyes:      Pupils: Pupils are equal, round, and reactive to light.   Neck:      Vascular: No carotid bruit.   Cardiovascular:      Rate and Rhythm: Normal rate and regular rhythm.   Pulmonary:      Effort: Pulmonary effort is normal.      Breath sounds: Normal breath sounds.   Abdominal:      General: Abdomen is flat. Bowel sounds are normal.      Palpations: Abdomen is soft. There is no mass.   Musculoskeletal:         General: Tenderness present. Normal range of motion.      Cervical back: Neck supple.   Skin:     General: Skin is warm and dry.   Neurological:      General: No focal deficit present.      Mental Status: He is alert and oriented to person, place, and time.   Psychiatric:         Mood and Affect: Mood normal.         Behavior: Behavior normal.         Fluids    Intake/Output Summary (Last 24 hours) at 1/22/2024 1550  Last data filed at 1/21/2024 2329  Gross per 24 hour   Intake --   Output 800 ml   Net -800 ml         Laboratory  Recent Labs     01/20/24  0649 01/21/24  0801 01/22/24  1027   WBC 17.8* 16.4* 13.8*   RBC 3.21* 3.45* 3.34*   HEMOGLOBIN 9.3* 9.9* 9.6*   HEMATOCRIT 28.3* 30.3* 30.3*   MCV 88.2 87.8 90.7   MCH 29.0 28.7 28.7   MCHC 32.9 32.7 31.7*   RDW 44.0 43.8 46.2   PLATELETCT 389 393 311   MPV 9.3 9.3 9.1       Recent Labs     01/20/24  0649 01/21/24  0801   SODIUM 136 135   POTASSIUM 4.2 4.8   CHLORIDE 103 102   CO2 23 24   GLUCOSE 154* 122*   BUN 15 15   CREATININE  0.31* 0.28*   CALCIUM 6.5* 7.0*                     Imaging  DX-BONE SURVEY-METASTATIC LTD   Final Result         1.  Lytic changes of the left glenoid concerning for metastatic disease.   2.  Small peripherally sclerotic lytic lesion in the left femoral neck, appearance suggests small bony cyst, otherwise indeterminate.   3.  Atherosclerosis      MR-CERVICAL SPINE-WITH & W/O   Final Result         1.  Infiltrative metastatic disease with complete replacement of the bone marrow at C2, C6 and C7 with involvement of the posterior elements as described in detail above.      2.  There is ventral epidural infiltration by metastatic disease at C2, C6 and C7.      3.   Cortical breakthrough with extension of disease to the pre and paravertebral soft tissues is noted at C6 and C7 as described above.      4.  Severe right foraminal narrowing at C5-6.      5.  There is no significant spinal canal stenosis, cord compression or evidence for cord signal abnormality.      MR-THORACIC SPINE-WITH & W/O   Final Result         Multiple metastatic lesions noted throughout the thoracic spine as described above.      Large spinous process lesion noted at T3, T9.      Large right paraspinal metastatic lesion involvement of the right-sided pedicle and lamina noted at T3.      Ventral epidural extension of metastatic disease noted at the T5, T5-T6 level without significant cord impingement.      Large left paraspinous soft tissue metastatic lesion and a small right subcutaneous lesion noted at the T1-T2 level.      No significant spinal canal stenosis.               EC-ECHOCARDIOGRAM COMPLETE W/ CONT    (Results Pending)        Assessment/Plan  * Metastatic melanoma, BRAF V600E POSITIVE  (HCC)- (present on admission)  Assessment & Plan  Patient followed by oncologist Dr. Ross, has been on immunotherapy.  He has an upcoming appointment with radiation oncology.     Images showed extensive bone metastasis and new metastasis to lung and  liver  outpatient MRI showed extensive metastases through the lumbar spine, sacrum, paraspinous muscles, psoas muscle, iliacus muscle, gluteal muscles and Bilateral S1 nerve roots and right S2 nerve root in the sacral foramen are surrounded by the mass and probably invaded/impinged.     CT here showed  R shoulder - Large lytic metastasis involving the right anterior superior glenoid and coracoid with soft tissue component;  left shoulder - metastasis of Glenoid/Scapular body/ Clavicular head/ Teres minor muscle mass      CT chest/abd/pelvis - Severe pulmonary metastatic disease; Severe and widespread osseous metastatic disease/pathologic fractures; New liver lesion suggesting metastasis. Few peritoneal nodules suggesting peritoneal malignancy     new from prior CT dated 7/11/2023. PET scan 11/2023 was negative for chest and abdomen.       MRI brain showed MRI brain noted C2 vertebral body bone metastasis. There is mild amount of anterior epidural tumor at this level. There is no spinal canal compromise. This is new since the previous study. No intracranial metastasis.     Medical oncology and radiation oncology consultation  S/p Sacrum Radiation started 1/17-19.   1/22: Pending radiation to neck.  Following Dr. Fraga  I have discussed with Dr. Kuo, he is BRAF V600E POSITIVE. Planning Braftovi/Mektovi pending insurance authorization   Continue multimodal pain managements  PT/OT         Hypocalcemia  Assessment & Plan  Corrected Ca low  Iv calcium gluconate     Elevated LFTs  Assessment & Plan  No abdominal pain.  Total bilirubin normal.  Hepatitis panel negative.  Likely due to radiation?  Continue monitoring    Pathologic fracture  Assessment & Plan  Due to wide spread metastasis  I have consulted oncological orthopedics Dr. Burleson: Weightbearing as tolerated bilateral lower extremities with walker assist; Weightbearing as tolerated right upper extremity; Nonweightbearing left upper extremity except to assist  with a walker and ADLs        DNR (do not resuscitate)  Assessment & Plan  DNR/DNI per discussion with palliative     Hyperglycemia  Assessment & Plan  Due to steroids  BG over 200  I started SSI  Hypoglycemia protocol    Leukocytosis  Assessment & Plan  likely due to steroid use.  No infectious signs    Fever- (present on admission)  Assessment & Plan   likely due to extensive metastasis;   no identified infection signs;   elevated procalcitonin could be related to malignancy;   blood culture NTD, continue to hold antibiotics, monitoring    Intractable low back pain- (present on admission)  Assessment & Plan   likely due to tumor compression of the S1/S2,   continue IV Decadron, his reported his pain has been improving.    I ordered Pepcid for GI prophylaxis  Multimodal pain managements including po and iv narcotics prn. Monitoring respiratory status and sedation score  Palliative consulted for pain managements    Constipation- (present on admission)  Assessment & Plan  Likely due to hypercalcemia, opioid use and bedbound  Continue aggressive bowel management    Anemia- (present on admission)  Assessment & Plan   likely due to underlying malignancy, high ferritin and B12.   No sign of active bleeding    Continue to monitor, transfuse if Hb less than 7    Hypercalcemia- (present on admission)  Assessment & Plan  due to extensive bone metastasis.    TSH 3.5, PTH 3.7 (appropriately depressed)  He has been treated with aggressive IV fluid, received 1 dose of IV Zometa, His hypercalcemia slowly improving, corrected calcium down to 11.3.   Continue IV fluid         VTE prophylaxis:    enoxaparin ppx      I have performed a physical exam and reviewed and updated ROS and Plan today (1/22/2024). In review of yesterday's note (1/21/2024), there are no changes except as documented above.    My total time spent caring for the patient on the day of the encounter was 51 minutes.   This does not include time spent on separately  billable procedures/tests.

## 2024-01-23 ENCOUNTER — APPOINTMENT (OUTPATIENT)
Dept: CARDIOLOGY | Facility: MEDICAL CENTER | Age: 59
DRG: 543 | End: 2024-01-23
Attending: GENERAL PRACTICE
Payer: COMMERCIAL

## 2024-01-23 ENCOUNTER — HOSPITAL ENCOUNTER (OUTPATIENT)
Dept: RADIATION ONCOLOGY | Facility: MEDICAL CENTER | Age: 59
End: 2024-01-23

## 2024-01-23 VITALS
TEMPERATURE: 98 F | DIASTOLIC BLOOD PRESSURE: 71 MMHG | OXYGEN SATURATION: 98 % | SYSTOLIC BLOOD PRESSURE: 107 MMHG | HEART RATE: 95 BPM

## 2024-01-23 LAB
ANION GAP SERPL CALC-SCNC: 6 MMOL/L (ref 7–16)
ANISOCYTOSIS BLD QL SMEAR: ABNORMAL
BASOPHILS # BLD AUTO: 0 % (ref 0–1.8)
BASOPHILS # BLD: 0 K/UL (ref 0–0.12)
BUN SERPL-MCNC: 14 MG/DL (ref 8–22)
CALCIUM SERPL-MCNC: 7.1 MG/DL (ref 8.5–10.5)
CHEMOTHERAPY INFUSION START DATE: NORMAL
CHEMOTHERAPY INFUSION START DATE: NORMAL
CHEMOTHERAPY RECORDS: 3000
CHEMOTHERAPY RECORDS: 6
CHEMOTHERAPY RECORDS: NORMAL
CHEMOTHERAPY RX CANCER: NORMAL
CHEMOTHERAPY RX CANCER: NORMAL
CHLORIDE SERPL-SCNC: 103 MMOL/L (ref 96–112)
CO2 SERPL-SCNC: 26 MMOL/L (ref 20–33)
CREAT SERPL-MCNC: 0.36 MG/DL (ref 0.5–1.4)
DATE 1ST CHEMO CANCER: NORMAL
DATE 1ST CHEMO CANCER: NORMAL
EOSINOPHIL # BLD AUTO: 0 K/UL (ref 0–0.51)
EOSINOPHIL NFR BLD: 0 % (ref 0–6.9)
ERYTHROCYTE [DISTWIDTH] IN BLOOD BY AUTOMATED COUNT: 46.1 FL (ref 35.9–50)
GFR SERPLBLD CREATININE-BSD FMLA CKD-EPI: 129 ML/MIN/1.73 M 2
GLUCOSE BLD STRIP.AUTO-MCNC: 116 MG/DL (ref 65–99)
GLUCOSE BLD STRIP.AUTO-MCNC: 125 MG/DL (ref 65–99)
GLUCOSE BLD STRIP.AUTO-MCNC: 128 MG/DL (ref 65–99)
GLUCOSE BLD STRIP.AUTO-MCNC: 161 MG/DL (ref 65–99)
GLUCOSE SERPL-MCNC: 122 MG/DL (ref 65–99)
HCT VFR BLD AUTO: 31.1 % (ref 42–52)
HGB BLD-MCNC: 9.8 G/DL (ref 14–18)
LYMPHOCYTES # BLD AUTO: 0.47 K/UL (ref 1–4.8)
LYMPHOCYTES NFR BLD: 3.4 % (ref 22–41)
MACROCYTES BLD QL SMEAR: ABNORMAL
MANUAL DIFF BLD: NORMAL
MCH RBC QN AUTO: 28.4 PG (ref 27–33)
MCHC RBC AUTO-ENTMCNC: 31.5 G/DL (ref 32.3–36.5)
MCV RBC AUTO: 90.1 FL (ref 81.4–97.8)
METAMYELOCYTES NFR BLD MANUAL: 2.6 %
MICROCYTES BLD QL SMEAR: ABNORMAL
MONOCYTES # BLD AUTO: 0.24 K/UL (ref 0–0.85)
MONOCYTES NFR BLD AUTO: 1.7 % (ref 0–13.4)
MORPHOLOGY BLD-IMP: NORMAL
MYELOCYTES NFR BLD MANUAL: 2.6 %
NEUTROPHILS # BLD AUTO: 12.47 K/UL (ref 1.82–7.42)
NEUTROPHILS NFR BLD: 88 % (ref 44–72)
NEUTS BAND NFR BLD MANUAL: 1.7 % (ref 0–10)
NRBC # BLD AUTO: 0.03 K/UL
NRBC BLD-RTO: 0.2 /100 WBC (ref 0–0.2)
PLATELET # BLD AUTO: 320 K/UL (ref 164–446)
PLATELET BLD QL SMEAR: NORMAL
PMV BLD AUTO: 9.1 FL (ref 9–12.9)
POLYCHROMASIA BLD QL SMEAR: NORMAL
POTASSIUM SERPL-SCNC: 4.9 MMOL/L (ref 3.6–5.5)
RAD ONC ARIA COURSE LAST TREATMENT DATE: NORMAL
RAD ONC ARIA COURSE LAST TREATMENT DATE: NORMAL
RAD ONC ARIA COURSE TREATMENT ELAPSED DAYS: NORMAL
RAD ONC ARIA COURSE TREATMENT ELAPSED DAYS: NORMAL
RAD ONC ARIA REFERENCE POINT DOSAGE GIVEN TO DATE: 12
RAD ONC ARIA REFERENCE POINT ID: NORMAL
RAD ONC ARIA REFERENCE POINT SESSION DOSAGE GIVEN: 6
RBC # BLD AUTO: 3.45 M/UL (ref 4.7–6.1)
RBC BLD AUTO: PRESENT
SODIUM SERPL-SCNC: 135 MMOL/L (ref 135–145)
WBC # BLD AUTO: 13.9 K/UL (ref 4.8–10.8)

## 2024-01-23 PROCEDURE — 700111 HCHG RX REV CODE 636 W/ 250 OVERRIDE (IP): Mod: JZ | Performed by: STUDENT IN AN ORGANIZED HEALTH CARE EDUCATION/TRAINING PROGRAM

## 2024-01-23 PROCEDURE — 85027 COMPLETE CBC AUTOMATED: CPT

## 2024-01-23 PROCEDURE — 82962 GLUCOSE BLOOD TEST: CPT | Mod: 91

## 2024-01-23 PROCEDURE — 77280 THER RAD SIMULAJ FIELD SMPL: CPT | Mod: 26 | Performed by: RADIOLOGY

## 2024-01-23 PROCEDURE — 77373 STRTCTC BDY RAD THER TX DLVR: CPT | Performed by: RADIOLOGY

## 2024-01-23 PROCEDURE — A9270 NON-COVERED ITEM OR SERVICE: HCPCS | Performed by: NURSE PRACTITIONER

## 2024-01-23 PROCEDURE — A9270 NON-COVERED ITEM OR SERVICE: HCPCS | Performed by: STUDENT IN AN ORGANIZED HEALTH CARE EDUCATION/TRAINING PROGRAM

## 2024-01-23 PROCEDURE — 700102 HCHG RX REV CODE 250 W/ 637 OVERRIDE(OP): Performed by: NURSE PRACTITIONER

## 2024-01-23 PROCEDURE — 700102 HCHG RX REV CODE 250 W/ 637 OVERRIDE(OP): Performed by: STUDENT IN AN ORGANIZED HEALTH CARE EDUCATION/TRAINING PROGRAM

## 2024-01-23 PROCEDURE — 85007 BL SMEAR W/DIFF WBC COUNT: CPT

## 2024-01-23 PROCEDURE — 99232 SBSQ HOSP IP/OBS MODERATE 35: CPT | Performed by: GENERAL PRACTICE

## 2024-01-23 PROCEDURE — 77280 THER RAD SIMULAJ FIELD SMPL: CPT | Performed by: RADIOLOGY

## 2024-01-23 PROCEDURE — 770004 HCHG ROOM/CARE - ONCOLOGY PRIVATE *

## 2024-01-23 PROCEDURE — 80048 BASIC METABOLIC PNL TOTAL CA: CPT

## 2024-01-23 PROCEDURE — 99232 SBSQ HOSP IP/OBS MODERATE 35: CPT | Performed by: NURSE PRACTITIONER

## 2024-01-23 PROCEDURE — 36415 COLL VENOUS BLD VENIPUNCTURE: CPT

## 2024-01-23 RX ORDER — ENEMA 19; 7 G/133ML; G/133ML
1 ENEMA RECTAL
Status: DISCONTINUED | OUTPATIENT
Start: 2024-01-23 | End: 2024-01-29 | Stop reason: HOSPADM

## 2024-01-23 RX ORDER — BISACODYL 10 MG
10 SUPPOSITORY, RECTAL RECTAL DAILY
Status: DISCONTINUED | OUTPATIENT
Start: 2024-01-23 | End: 2024-01-29 | Stop reason: HOSPADM

## 2024-01-23 RX ORDER — BISACODYL 5 MG
5 TABLET, DELAYED RELEASE (ENTERIC COATED) ORAL
Status: DISCONTINUED | OUTPATIENT
Start: 2024-01-23 | End: 2024-01-23

## 2024-01-23 RX ORDER — MORPHINE SULFATE 4 MG/ML
1-4 INJECTION INTRAVENOUS
Status: COMPLETED | OUTPATIENT
Start: 2024-01-23 | End: 2024-01-24

## 2024-01-23 RX ORDER — MORPHINE SULFATE 60 MG/1
60 TABLET, FILM COATED, EXTENDED RELEASE ORAL EVERY 12 HOURS
Status: DISCONTINUED | OUTPATIENT
Start: 2024-01-23 | End: 2024-01-29 | Stop reason: HOSPADM

## 2024-01-23 RX ORDER — BISACODYL 5 MG
10 TABLET, DELAYED RELEASE (ENTERIC COATED) ORAL ONCE
Status: DISCONTINUED | OUTPATIENT
Start: 2024-01-23 | End: 2024-01-24

## 2024-01-23 RX ADMIN — OXYCODONE HYDROCHLORIDE 10 MG: 10 TABLET ORAL at 09:37

## 2024-01-23 RX ADMIN — OXYCODONE 5 MG: 5 TABLET ORAL at 22:27

## 2024-01-23 RX ADMIN — BISACODYL 10 MG: 10 SUPPOSITORY RECTAL at 12:39

## 2024-01-23 RX ADMIN — ENOXAPARIN SODIUM 40 MG: 100 INJECTION SUBCUTANEOUS at 17:56

## 2024-01-23 RX ADMIN — MORPHINE SULFATE 30 MG: 30 TABLET, FILM COATED, EXTENDED RELEASE ORAL at 05:37

## 2024-01-23 RX ADMIN — INSULIN HUMAN 1 UNITS: 100 INJECTION, SOLUTION PARENTERAL at 17:58

## 2024-01-23 RX ADMIN — METHOCARBAMOL 500 MG: 500 TABLET ORAL at 21:59

## 2024-01-23 RX ADMIN — OXYCODONE HYDROCHLORIDE 10 MG: 10 TABLET ORAL at 02:03

## 2024-01-23 RX ADMIN — DEXAMETHASONE 6 MG: 4 TABLET ORAL at 12:03

## 2024-01-23 RX ADMIN — FAMOTIDINE 20 MG: 20 TABLET ORAL at 05:37

## 2024-01-23 RX ADMIN — METHOCARBAMOL 500 MG: 500 TABLET ORAL at 09:37

## 2024-01-23 RX ADMIN — GABAPENTIN 300 MG: 300 CAPSULE ORAL at 17:56

## 2024-01-23 RX ADMIN — MORPHINE SULFATE 4 MG: 4 INJECTION, SOLUTION INTRAMUSCULAR; INTRAVENOUS at 07:46

## 2024-01-23 RX ADMIN — OXYCODONE HYDROCHLORIDE 10 MG: 10 TABLET ORAL at 05:37

## 2024-01-23 RX ADMIN — POLYETHYLENE GLYCOL 3350 1 PACKET: 17 POWDER, FOR SOLUTION ORAL at 09:36

## 2024-01-23 RX ADMIN — DOCUSATE SODIUM 50 MG AND SENNOSIDES 8.6 MG 2 TABLET: 8.6; 5 TABLET, FILM COATED ORAL at 17:56

## 2024-01-23 RX ADMIN — MORPHINE SULFATE 60 MG: 60 TABLET, FILM COATED, EXTENDED RELEASE ORAL at 17:56

## 2024-01-23 RX ADMIN — DOCUSATE SODIUM 50 MG AND SENNOSIDES 8.6 MG 2 TABLET: 8.6; 5 TABLET, FILM COATED ORAL at 09:37

## 2024-01-23 RX ADMIN — METHOCARBAMOL 500 MG: 500 TABLET ORAL at 17:56

## 2024-01-23 RX ADMIN — METHOCARBAMOL 500 MG: 500 TABLET ORAL at 12:03

## 2024-01-23 RX ADMIN — DEXAMETHASONE 6 MG: 4 TABLET ORAL at 07:45

## 2024-01-23 RX ADMIN — GABAPENTIN 100 MG: 300 CAPSULE ORAL at 12:01

## 2024-01-23 RX ADMIN — DULOXETINE HYDROCHLORIDE 60 MG: 20 CAPSULE, DELAYED RELEASE ORAL at 05:37

## 2024-01-23 RX ADMIN — GABAPENTIN 100 MG: 300 CAPSULE ORAL at 05:37

## 2024-01-23 ASSESSMENT — PAIN DESCRIPTION - PAIN TYPE
TYPE: ACUTE PAIN

## 2024-01-23 ASSESSMENT — ENCOUNTER SYMPTOMS: WEAKNESS: 1

## 2024-01-23 NOTE — ON TREATMENT VISIT
ON TREATMENT  NOTE  RADIATION ONCOLOGY DEPARTMENT    Patient name:  Andrew Wall    Primary Physician:  Jose Luis Juarez M.D. MRN: [id number]  CSN: [id number]   Referring physician:  No ref. provider found : 1965, 58 y.o.     ENCOUNTER DATE:  24    DIAGNOSIS:    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)  Staging form: Melanoma of the Skin, AJCC 8th Edition  - Pathologic stage from 2024: Stage IV (rpT0, pNX, pM1c(1)) - Signed by Julia Fraga M.D. on 2024  Stage prefix: Recurrence      TREATMENT SUMMARY:  Aria Treatment Information          2024   Aria Course Treatment Dates   Course First Treatment Date  2024    Course Last Treatment Date  2024    Aria Treatment Summary   SBRT C-Spine [SBRT C1-3]  Plan from Course C2_C_spine SBRT   Fraction 2 of 5   Elapsed Course Days 1 @ [id number]   Prescribed Fraction Dose 600 cGy   Prescribed Total Dose 3,000 cGy   SBRT C-Spine [SBRT C6-7]  Plan from Course C2_C_spine SBRT   Fraction 2 of 5   Elapsed Course Days 1 @ [id number]   Prescribed Fraction Dose 600 cGy   Prescribed Total Dose 3,000 cGy   C1-3  Reference Point from Course C2_C_spine SBRT   Elapsed Course Days 1 @ [id number]   Session Dose 600 cGy   Total Dose 1,200 cGy   C6-7  Reference Point from Course C2_C_spine SBRT   Elapsed Course Days 1 @ [id number]   Session Dose 600 cGy   Total Dose 1,200 cGy   SBRT R Hip  Plan from Course C3_R_hip   Fraction 2 of 5   Elapsed Course Days 1 @ [id number]   Prescribed Fraction Dose 600 cGy   Prescribed Total Dose 3,000 cGy   SBRT R Hip  Reference Point from Course C3_R_hip   Elapsed Course Days 1 @ [id number]   Session Dose 600 cGy   Total Dose 1,200 cGy      Details          More values are hidden. Newest values shown. Go to activity for more data.               SUBJECTIVE:  Tolerating therapy fairly well.  The good news is that he is BRAF positive and Dr. Ross is trying to get the drugs on board as soon  as possible.  He does have a little difficulty tolerating the radiation therapy because his shoulders also have known disease and the mask lies on the shoulders making it slightly uncomfortable.  He is medicated but because were doing 3 sites  the pain medication does wear off a little bit.        VITAL SIGNS:  KPS: 40, Disabled; requires special care and assistance (ECOG equivalent 3)  Encounter Vitals  Temperature: 36.7 °C (98 °F)  Temp src: Temporal  Blood Pressure: 107/71  Pulse: 95  Pulse Oximetry: 98 %       No data to display                   PHYSICAL EXAM:    No change lying in bed          1/23/2024     8:57 AM 1/17/2024    12:51 PM   Toxicity Assessment   Toxicity Assessment Spine Male Pelvis   Fatigue (lethargy, malaise, asthenia) Severe (e.g., decrease in performance status by 2 or more ECOG levels or 40% Karnofsky) or loss of ability to perform some activities Bedridden or disabling   Radiation Dermatitis None None   Photosensitivity None    Dry Skin Normal    Anorexia  None   Colitis  None   Constipation  Requiring stool softener or dietary modification   Dehydration None None   Diarrhea w/o Colostomy  None   Flatulence  Mild   Nausea None None   Proctitis  None   Vomiting  None   Dyspepsia/heartburn None    RT Dysphagia-Esophageal None    RT - Pain due to RT  None   Tumor Pain (onset or exacerbation of tumor pain due to treatment) Moderate pain, pain or analgesics interfering with function, but not interfering with activities of daily living None   Dysuria (painful urination)  None   Urinary Frequency  Normal   Urinary Urgency  None   Bladder Spasms  Absent   Incontinence  None   Urinary Retention  Normal           IMPRESSION:  Cancer Staging   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)  Staging form: Melanoma of the Skin, AJCC 8th Edition  - Pathologic stage from 1/8/2024: Stage IV (rpT0, pNX, pM1c(1)) - Signed by Julia Fraga M.D. on 1/16/2024      PLAN:  No change in treatment  plan    Disposition:  Treatment plan reviewed. Questions answered. Continue therapy outlined.     Julia Fraga M.D.    No orders of the defined types were placed in this encounter.

## 2024-01-23 NOTE — PROGRESS NOTES
"Inpatient Palliative Care     Location: Cancer nursing unit room 3/3/2013     HPI:   Andrew Wall is a 58 y.o. male with past medical history of melanoma and recently diagnosed extensive metastatic disease per MRI earlier this month who was admitted on January 15 th by  his oncology office for hypercalcemia, calcium 12.2.  Patient reports progressively worsening back pain, right leg sciatica, and generalized weakness with additional constipation x 6 days without bowel movement.  He is followed by Dr. Ross with cancer care specialty and has been on immunotherapy.  He has upcoming appointment with radiation oncology.  Patient was direct transfer from [facility] emergency department to oncology floor at [facility].     Patient and wife report a fall while still at home which precipitated increased pain in right hip area   .   .  Interval:  Completed first round of radiation on Friday 1/19.  Utilizing intermittent oxycodone for breakthrough pain, 25 mg over the past 24 hours.  Remains on 0.5 L sats adequate.  Vital signs stable.  Continues on dexamethasone IV every 6 hours.  Labs stable, alk phos decreasing.  Last bowel movement 1/20.  Steroids converted from IV to p.o. and mild taper initiated.  First cycle of SBRT to upper spine and hip area initiated last night.  Utilized 40 mg of oral oxycodone and 8 mg of IV morphine in addition to morphine sulfate extended release 30 mg every 12 hours in the past 24 hours.       Summary:  Discussed bowel regimen with patient.  Patient prefers suppository daily to manage bowels.  He states he is having trouble connecting with rectal sphincter to \"have the strength to push stool out\".  When he gets the suppository he feels like it is much easier.  Sent order for daily suppository.  Has not noticed a difference in pain with regards to mild steroid taper initiated yesterday.  Discussed increasing morphine sulfate extended release as per yesterday's conversation.  He is " amenable to this plan.  He is encouraged to continue to use as needed dosing.  Per bedside nurse, patient had increased pain yesterday during SBRT due to length and laying on table for extended period of time.  Requesting procedural dosing as needed pain.  Patient reports he sat at the edge of bed a couple of times yesterday and logrolled quite a lot but is very fatigued today.  Urged frequent rest periods however patient should work with therapies available today.  He is agreeable to [facility] for home palliative services.     Active listening, reflection, reminiscing, validation & normalization, and empathic support utilized throughout this encounter.  All questions answered and contact information provided, encouraged to call with any questions or concerns.      Plan:     1) morphine sulfate extended release increased to 60 mg p.o. every 12 hours, continue as needed breakthrough medication as ordered  2) added morphine 1 to 4 mg IV as needed procedural pain moderate to severe.  3) added daily Dulcolax DC per patient's preference, as needed Dulcolax 5 mg p.o. daily for no BM.    Thank you for allowing me the opportunity to participate in the care of  Andrew Wall.     I spent a total of 38 minutes reviewing medical records, direct face-to-face time with the patient and/or family, coordination of care, and documentation. This is separate from the time spent on advance care planning, which is documented above.     Vikki Karimi, MSN, APRN, ACN-AG.  Palliative Care Nurse Practitioner  [phone]

## 2024-01-23 NOTE — CARE PLAN
The patient is Watcher - Medium risk of patient condition declining or worsening    Shift Goals  Clinical Goals: safety, comfort  Patient Goals: sleep, pain control  Family Goals: mack    Progress made toward(s) clinical / shift goals:  Patient kept safe and free from falls. Medicated for pain using PRN's as ordered, see MAR      Problem: Knowledge Deficit - Standard  Goal: Patient and family/care givers will demonstrate understanding of plan of care, disease process/condition, diagnostic tests and medications  Outcome: Progressing     Problem: Pain - Standard  Goal: Alleviation of pain or a reduction in pain to the patient’s comfort goal  Outcome: Progressing     Problem: Fall Risk  Goal: Patient will remain free from falls  Outcome: Progressing     Problem: Skin Integrity  Goal: Skin integrity is maintained or improved  Outcome: Progressing

## 2024-01-23 NOTE — PROCEDURES
DATE OF SERVICE: 1/22/2024    DIAGNOSIS:  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)  Staging form: Melanoma of the Skin, AJCC 8th Edition  - Pathologic stage from 1/8/2024: Stage IV (rpT0, pNX, pM1c(1)) - Signed by Julia Fraga M.D. on 1/16/2024  Stage prefix: Recurrence       TREATMENT:  Radiation Therapy Episodes       Active Episodes       Radiation Therapy: SBRT (1/22/2024)    Radiation Therapy: SBRT (1/17/2024)                   Radiation Treatments         Plan Last Treated On Elapsed Days Fractions Treated Prescribed Fraction Dose (cGy) Prescribed Total Dose (cGy)    SBRT Sacrum 1/19/2024 2 @ [id number] 3 of 3 900 2,700    SBRT C-Spine [SBRT C1-3] 1/22/2024 0 @ [id number] 1 of 5 600 3,000    SBRT C-Spine [SBRT C6-7] 1/22/2024 0 @ [id number] 1 of 5 600 3,000    SBRT R Hip 1/22/2024 0 @ [id number] 1 of 5 600 3,000                  Reference Point Last Treated On Elapsed Days Most Recent Session Dose (cGy) Total Dose (cGy)    SBRT Sacrum 1/19/2024 2 @ [id number] -- 2,700    C1-3 1/22/2024 0 @ [id number] 600 600    C6-7 1/22/2024 0 @ [id number] 600 600    SBRT R Hip 1/22/2024 0 @ [id number] 600 600                            STEREOTACTIC PROCEDURE NOTE:    Called by [employer] machine to verify treatment parameters including:  treatment site, treatment dose, and treatment setup prior to stereotactic treatment..    Patient was placed in the treatment position with use of immobilization device and  laser guidance. CBCT images were acquired for target localization.  Images were reviewed in the axial, coronal, and saggital views and shifts were made as necessary to ensure that patient position matched simulation position.      Treatment delivered per  prescription.  The medical physicist was present throughout the set-up, verification and treatment delivery to oversee the procedure and ensure all parameters agreed with the computerized plan.    I have personally reviewed the relevant data,  performed the target localization, and determined all relevant factors for this patient’s simulation.

## 2024-01-23 NOTE — DISCHARGE PLANNING
Case Management Discharge Planning    Admission Date: 1/15/2024  GMLOS: 3.5  ALOS: 8    6-Clicks ADL Score: 15  6-Clicks Mobility Score: 7  PT and/or OT Eval ordered: Yes  Post-acute Referrals Ordered: Yes  Post-acute Choice Obtained: Yes  Has referral(s) been sent to post-acute provider:  Yes      Anticipated Discharge Dispo: Discharge Disposition: D/T to home under [facility] care in anticipation of covered skilled care (06)    DME Needed: Yes    DME Ordered: Yes    Action(s) Taken: Discussed in IDT rounds, pt is pending insurance authorization for immunotherapy per [facility]. Pt to continue receiving Palliative radiation until 1/26/24. Family requesting HH order and wheelchair requested DME order from hospitalist. Pt does not want post acute placement, pt would like to go home. Per pt they have hospital bed at home.     Escalations Completed: None    Medically Clear: No    Next Steps: Pending completion of Palliative Radiation.     Barriers to Discharge: Medical clearance    Is the patient up for discharge tomorrow: No

## 2024-01-23 NOTE — PROCEDURES
DATE OF SERVICE: 1/23/2024    DIAGNOSIS:  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)  Staging form: Melanoma of the Skin, AJCC 8th Edition  - Pathologic stage from 1/8/2024: Stage IV (rpT0, pNX, pM1c(1)) - Signed by Julia Fraga M.D. on 1/16/2024  Stage prefix: Recurrence       TREATMENT:  Radiation Therapy Episodes       Active Episodes       Radiation Therapy: SBRT (1/22/2024)    Radiation Therapy: SBRT (1/17/2024)                   Radiation Treatments         Plan Last Treated On Elapsed Days Fractions Treated Prescribed Fraction Dose (cGy) Prescribed Total Dose (cGy)    SBRT Sacrum 1/19/2024 2 @ [id number] 3 of 3 900 2,700    SBRT C-Spine [SBRT C1-3] 1/23/2024 1 @ [id number] 2 of 5 600 3,000    SBRT C-Spine [SBRT C6-7] 1/23/2024 1 @ [id number] 2 of 5 600 3,000    SBRT R Hip 1/23/2024 1 @ [id number] 2 of 5 600 3,000                  Reference Point Last Treated On Elapsed Days Most Recent Session Dose (cGy) Total Dose (cGy)    SBRT Sacrum 1/19/2024 2 @ [id number] -- 2,700    C1-3 1/23/2024 1 @ [id number] 600 1,200    C6-7 1/23/2024 1 @ [id number] 600 1,200    SBRT R Hip 1/23/2024 1 @ [id number] 600 1,200                            STEREOTACTIC PROCEDURE NOTE:    Called by [employer] machine to verify treatment parameters including:  treatment site, treatment dose, and treatment setup prior to stereotactic treatment..    Patient was placed in the treatment position with use of immobilization device and  laser guidance. CBCT images were acquired for target localization.  Images were reviewed in the axial, coronal, and saggital views and shifts were made as necessary to ensure that patient position matched simulation position.      Treatment delivered per  prescription.  The medical physicist was present throughout the set-up, verification and treatment delivery to oversee the procedure and ensure all parameters agreed with the computerized plan.    I have personally reviewed the relevant  data, performed the target localization, and determined all relevant factors for this patient’s simulation.

## 2024-01-23 NOTE — FACE TO FACE
Face to Face Note  -  Durable Medical Equipment    Tiffany Adams D.O. - NPI: [id number]  I certify that this patient is under my care and that they have had a durable medical equipment(DME)face to face encounter by myself that meets the physician DME face-to-face encounter requirements with this patient on:    Date of encounter:   Patient:                    MRN:                       YOB: 2025  Andrew Chavira [facility]  [id number]  1965     The encounter with the patient was in whole, or in part, for the following medical condition, which is the primary reason for durable medical equipment:  Other - metastatic melanoma    I certify that, based on my findings, the following durable medical equipment is medically necessary:  Wheelchair   Patient needs manual wheelchair for use inside the home based on the above diagnosis. Per guidelines patient meets criteria in the following ways:   A.  Patient has significant impairment in the following Toileting, Feeding, Dressing, Grooming, and Bathing and is is unable to complete these tasks in a reasonable timeframe and places the patient at a heightened risk of morbidity.   B.  The patient's mobility limitations cannot be sufficiently resolved by use of  fitted cane or walker.   C.  The patient reports his home provides adequate access between rooms,  maneuvering space, and surfaces for use of the manual wheelchair that is  provided.   D.  The use of the manual wheelchair will significantly improve the patient's  ability to participate in MRADLs and the patient will use it on a regular basis in  the home.   E.  The patient has not expressed an unwillingness to use the manual  wheelchair.   F. The patient has limitations of strength, endurance, range of motion, or coordination per OT notes:.        ------------------------------------------------------------------------------------------------------------------    Face to Face Supporting Documentation -  Home Health    The encounter with this patient was in whole or in part the primary reason for home health admission.    Date of encounter:   Patient:                    MRN:                       YOB: 2025  Andrew Chavira [facility]  [id number]  1965     Home health to see patient for:  Skilled Nursing care for assessment, interventions & education, Registered dietitian consult, Medical social work consult, Home health aide, Physical Therapy evaluation and treatment, and Occupational therapy evaluation and treatment    Skilled need for:  Exacerbation of Chronic Disease State metastatic melanoma    Skilled nursing interventions to include:  Comment: HH/Pt/OT    Homebound evidenced status by:  Need the aid of supportive devices such as crutches, canes, wheelchairs or walkers. Leaving home must require a considerable and taxing effort. There must exist a normal inability to leave the home.    Community Physician to provide follow up care: Jose Luis Juarez M.D.     Optional Interventions    Wound information & treatment:    Home Infusion Therapy orders:    Line/Drain/Airway:    I certify the face to face encounter for this home care referral meets the CMS requirements and the encounter/clinical assessment with the patient was, in whole, or in part, for the medical condition(s) listed above, which is the primary reason for home health care. Based on my clinical findings: the service(s) are medically necessary, support the need for home health care, and the homebound criteria are met.  I certify that this patient has had a face to face encounter by myself.  Tiffany Adams D.O. - JERMAINEI: [id number]    *Debility, frailty and advanced age in the absence of an acute deterioration or exacerbation of a condition do not qualify a patient for home health.

## 2024-01-23 NOTE — CARE PLAN
The patient is Stable - Low risk of patient condition declining or worsening    Shift Goals  Clinical Goals: Pain control, Radiation tx  Patient Goals: Pain control  Family Goals: mack    Progress made toward(s) clinical / shift goals:        Problem: Knowledge Deficit - Standard  Goal: Patient and family/care givers will demonstrate understanding of plan of care, disease process/condition, diagnostic tests and medications  Description: Target End Date:  1-3 days or as soon as patient condition allows    Document in Patient Education    1.  Patient and family/caregiver oriented to unit, equipment, visitation policy and means for communicating concern  2.  Complete/review Learning Assessment  3.  Assess knowledge level of disease process/condition, treatment plan, diagnostic tests and medications  4.  Explain disease process/condition, treatment plan, diagnostic tests and medications  Outcome: Progressing  Note: Patient understands the need for radiation treatment. Patient will continue to rate pain using a scale of 1-10.

## 2024-01-23 NOTE — PROGRESS NOTES
Patient brought to [facility] for treatment number two of five to the C-spine and right hip. Patient saw Dr Fraga after treatment and was returned by transport to his room. He will return tomorrow for additional treatments.

## 2024-01-23 NOTE — HOSPITAL COURSE
This is a 58-year-old male with past medical history of metastatic melanoma on immunotherapy who presented to the ED on 1/15/2024 with hypercalcemia.    Patient started on aggressive IV fluid and Zometa with resolution and hypercalcemia.     Patient with history of extensive bone metastasis and now with new metastasis to lung and liver. Patient's oncology group was consulted, recommended transfer to our facility for inpatient radiation. Patient received radiation to sacrum 1/17 -1/19.  Patient then received 5-day course of radiation for spine and hip.    Patient with multiple pathological fractures due to metastasis, oncological orthopedics consulted, weightbearing as tolerated with walker assist, weightbearing as tolerated in the right upper extremity, nonweightbearing in left upper extremity except to assist with walker and ADLs.     Prolonged admission due to pain control and inability to ambulate. On day of discharge, patient able to transfer to wheelchair comfortably.  Hospital bed delivered.  Patient has follow-up with oncology this week.

## 2024-01-24 ENCOUNTER — APPOINTMENT (OUTPATIENT)
Dept: CARDIOLOGY | Facility: MEDICAL CENTER | Age: 59
DRG: 543 | End: 2024-01-24
Attending: GENERAL PRACTICE
Payer: COMMERCIAL

## 2024-01-24 ENCOUNTER — HOSPITAL ENCOUNTER (OUTPATIENT)
Dept: RADIATION ONCOLOGY | Facility: MEDICAL CENTER | Age: 59
End: 2024-01-24

## 2024-01-24 PROBLEM — E83.39 HYPOPHOSPHATEMIA: Status: ACTIVE | Noted: 2024-01-24

## 2024-01-24 LAB
ALBUMIN SERPL BCP-MCNC: 2.6 G/DL (ref 3.2–4.9)
ALBUMIN/GLOB SERPL: 1 G/DL
ALP SERPL-CCNC: 291 U/L (ref 30–99)
ALT SERPL-CCNC: 52 U/L (ref 2–50)
ANION GAP SERPL CALC-SCNC: 12 MMOL/L (ref 7–16)
AST SERPL-CCNC: 32 U/L (ref 12–45)
BASOPHILS # BLD AUTO: 0 % (ref 0–1.8)
BASOPHILS # BLD: 0 K/UL (ref 0–0.12)
BILIRUB SERPL-MCNC: 0.4 MG/DL (ref 0.1–1.5)
BUN SERPL-MCNC: 13 MG/DL (ref 8–22)
CALCIUM ALBUM COR SERPL-MCNC: 8.2 MG/DL (ref 8.5–10.5)
CALCIUM SERPL-MCNC: 7.1 MG/DL (ref 8.5–10.5)
CHEMOTHERAPY INFUSION START DATE: NORMAL
CHEMOTHERAPY INFUSION START DATE: NORMAL
CHEMOTHERAPY RECORDS: 3000
CHEMOTHERAPY RECORDS: 6
CHEMOTHERAPY RECORDS: NORMAL
CHEMOTHERAPY RX CANCER: NORMAL
CHEMOTHERAPY RX CANCER: NORMAL
CHLORIDE SERPL-SCNC: 101 MMOL/L (ref 96–112)
CO2 SERPL-SCNC: 21 MMOL/L (ref 20–33)
CREAT SERPL-MCNC: 0.24 MG/DL (ref 0.5–1.4)
DATE 1ST CHEMO CANCER: NORMAL
DATE 1ST CHEMO CANCER: NORMAL
EOSINOPHIL # BLD AUTO: 0 K/UL (ref 0–0.51)
EOSINOPHIL NFR BLD: 0 % (ref 0–6.9)
ERYTHROCYTE [DISTWIDTH] IN BLOOD BY AUTOMATED COUNT: 45.2 FL (ref 35.9–50)
GFR SERPLBLD CREATININE-BSD FMLA CKD-EPI: 146 ML/MIN/1.73 M 2
GLOBULIN SER CALC-MCNC: 2.7 G/DL (ref 1.9–3.5)
GLUCOSE BLD STRIP.AUTO-MCNC: 121 MG/DL (ref 65–99)
GLUCOSE SERPL-MCNC: 113 MG/DL (ref 65–99)
HCT VFR BLD AUTO: 28.3 % (ref 42–52)
HGB BLD-MCNC: 9.2 G/DL (ref 14–18)
LV EJECT FRACT  99904: 60
LV EJECT FRACT MOD 2C 99903: 71.57
LV EJECT FRACT MOD 4C 99902: 70.67
LV EJECT FRACT MOD BP 99901: 69.83
LYMPHOCYTES # BLD AUTO: 0.53 K/UL (ref 1–4.8)
LYMPHOCYTES NFR BLD: 4.3 % (ref 22–41)
MAGNESIUM SERPL-MCNC: 1.7 MG/DL (ref 1.5–2.5)
MANUAL DIFF BLD: NORMAL
MCH RBC QN AUTO: 28.8 PG (ref 27–33)
MCHC RBC AUTO-ENTMCNC: 32.5 G/DL (ref 32.3–36.5)
MCV RBC AUTO: 88.7 FL (ref 81.4–97.8)
MONOCYTES # BLD AUTO: 0.43 K/UL (ref 0–0.85)
MONOCYTES NFR BLD AUTO: 3.5 % (ref 0–13.4)
MORPHOLOGY BLD-IMP: NORMAL
MYELOCYTES NFR BLD MANUAL: 0.9 %
NEUTROPHILS # BLD AUTO: 11.32 K/UL (ref 1.82–7.42)
NEUTROPHILS NFR BLD: 91.3 % (ref 44–72)
NRBC # BLD AUTO: 0 K/UL
NRBC BLD-RTO: 0 /100 WBC (ref 0–0.2)
PHOSPHATE SERPL-MCNC: 1.8 MG/DL (ref 2.5–4.5)
PLATELET # BLD AUTO: 270 K/UL (ref 164–446)
PLATELET BLD QL SMEAR: NORMAL
PMV BLD AUTO: 9.1 FL (ref 9–12.9)
POLYCHROMASIA BLD QL SMEAR: NORMAL
POTASSIUM SERPL-SCNC: 4.5 MMOL/L (ref 3.6–5.5)
PROT SERPL-MCNC: 5.3 G/DL (ref 6–8.2)
RAD ONC ARIA COURSE LAST TREATMENT DATE: NORMAL
RAD ONC ARIA COURSE LAST TREATMENT DATE: NORMAL
RAD ONC ARIA COURSE TREATMENT ELAPSED DAYS: NORMAL
RAD ONC ARIA COURSE TREATMENT ELAPSED DAYS: NORMAL
RAD ONC ARIA REFERENCE POINT DOSAGE GIVEN TO DATE: 18
RAD ONC ARIA REFERENCE POINT ID: NORMAL
RAD ONC ARIA REFERENCE POINT SESSION DOSAGE GIVEN: 6
RBC # BLD AUTO: 3.19 M/UL (ref 4.7–6.1)
RBC BLD AUTO: PRESENT
SODIUM SERPL-SCNC: 134 MMOL/L (ref 135–145)
WBC # BLD AUTO: 12.4 K/UL (ref 4.8–10.8)

## 2024-01-24 PROCEDURE — A9270 NON-COVERED ITEM OR SERVICE: HCPCS | Performed by: STUDENT IN AN ORGANIZED HEALTH CARE EDUCATION/TRAINING PROGRAM

## 2024-01-24 PROCEDURE — 700111 HCHG RX REV CODE 636 W/ 250 OVERRIDE (IP): Mod: JZ | Performed by: STUDENT IN AN ORGANIZED HEALTH CARE EDUCATION/TRAINING PROGRAM

## 2024-01-24 PROCEDURE — 82962 GLUCOSE BLOOD TEST: CPT | Mod: 91

## 2024-01-24 PROCEDURE — 97535 SELF CARE MNGMENT TRAINING: CPT

## 2024-01-24 PROCEDURE — 83735 ASSAY OF MAGNESIUM: CPT

## 2024-01-24 PROCEDURE — A9270 NON-COVERED ITEM OR SERVICE: HCPCS | Performed by: NURSE PRACTITIONER

## 2024-01-24 PROCEDURE — 700117 HCHG RX CONTRAST REV CODE 255: Performed by: GENERAL PRACTICE

## 2024-01-24 PROCEDURE — 85007 BL SMEAR W/DIFF WBC COUNT: CPT

## 2024-01-24 PROCEDURE — 84100 ASSAY OF PHOSPHORUS: CPT

## 2024-01-24 PROCEDURE — 700102 HCHG RX REV CODE 250 W/ 637 OVERRIDE(OP): Performed by: NURSE PRACTITIONER

## 2024-01-24 PROCEDURE — 77280 THER RAD SIMULAJ FIELD SMPL: CPT | Performed by: RADIOLOGY

## 2024-01-24 PROCEDURE — 700102 HCHG RX REV CODE 250 W/ 637 OVERRIDE(OP): Performed by: GENERAL PRACTICE

## 2024-01-24 PROCEDURE — 77280 THER RAD SIMULAJ FIELD SMPL: CPT | Mod: 26 | Performed by: RADIOLOGY

## 2024-01-24 PROCEDURE — 700102 HCHG RX REV CODE 250 W/ 637 OVERRIDE(OP): Performed by: STUDENT IN AN ORGANIZED HEALTH CARE EDUCATION/TRAINING PROGRAM

## 2024-01-24 PROCEDURE — A9270 NON-COVERED ITEM OR SERVICE: HCPCS | Performed by: GENERAL PRACTICE

## 2024-01-24 PROCEDURE — 770004 HCHG ROOM/CARE - ONCOLOGY PRIVATE *

## 2024-01-24 PROCEDURE — 36415 COLL VENOUS BLD VENIPUNCTURE: CPT

## 2024-01-24 PROCEDURE — 80053 COMPREHEN METABOLIC PANEL: CPT

## 2024-01-24 PROCEDURE — 99231 SBSQ HOSP IP/OBS SF/LOW 25: CPT | Performed by: NURSE PRACTITIONER

## 2024-01-24 PROCEDURE — 93306 TTE W/DOPPLER COMPLETE: CPT

## 2024-01-24 PROCEDURE — 85027 COMPLETE CBC AUTOMATED: CPT

## 2024-01-24 PROCEDURE — 93306 TTE W/DOPPLER COMPLETE: CPT | Mod: 26 | Performed by: INTERNAL MEDICINE

## 2024-01-24 PROCEDURE — 77336 RADIATION PHYSICS CONSULT: CPT | Mod: XE | Performed by: RADIOLOGY

## 2024-01-24 PROCEDURE — 77373 STRTCTC BDY RAD THER TX DLVR: CPT | Performed by: RADIOLOGY

## 2024-01-24 PROCEDURE — 99232 SBSQ HOSP IP/OBS MODERATE 35: CPT | Performed by: GENERAL PRACTICE

## 2024-01-24 PROCEDURE — 700111 HCHG RX REV CODE 636 W/ 250 OVERRIDE (IP): Mod: JZ | Performed by: NURSE PRACTITIONER

## 2024-01-24 RX ADMIN — MORPHINE SULFATE 60 MG: 60 TABLET, FILM COATED, EXTENDED RELEASE ORAL at 06:14

## 2024-01-24 RX ADMIN — GABAPENTIN 100 MG: 300 CAPSULE ORAL at 06:13

## 2024-01-24 RX ADMIN — DOCUSATE SODIUM 50 MG AND SENNOSIDES 8.6 MG 2 TABLET: 8.6; 5 TABLET, FILM COATED ORAL at 06:15

## 2024-01-24 RX ADMIN — MORPHINE SULFATE 4 MG: 4 INJECTION, SOLUTION INTRAMUSCULAR; INTRAVENOUS at 15:29

## 2024-01-24 RX ADMIN — DEXAMETHASONE 6 MG: 4 TABLET ORAL at 12:27

## 2024-01-24 RX ADMIN — DULOXETINE HYDROCHLORIDE 60 MG: 20 CAPSULE, DELAYED RELEASE ORAL at 06:13

## 2024-01-24 RX ADMIN — MORPHINE SULFATE 60 MG: 60 TABLET, FILM COATED, EXTENDED RELEASE ORAL at 18:10

## 2024-01-24 RX ADMIN — ENOXAPARIN SODIUM 40 MG: 100 INJECTION SUBCUTANEOUS at 18:09

## 2024-01-24 RX ADMIN — OXYCODONE 5 MG: 5 TABLET ORAL at 08:31

## 2024-01-24 RX ADMIN — METHOCARBAMOL 500 MG: 500 TABLET ORAL at 08:31

## 2024-01-24 RX ADMIN — DOCUSATE SODIUM 50 MG AND SENNOSIDES 8.6 MG 2 TABLET: 8.6; 5 TABLET, FILM COATED ORAL at 18:09

## 2024-01-24 RX ADMIN — FAMOTIDINE 20 MG: 20 TABLET ORAL at 06:14

## 2024-01-24 RX ADMIN — OXYCODONE 5 MG: 5 TABLET ORAL at 18:10

## 2024-01-24 RX ADMIN — GABAPENTIN 300 MG: 300 CAPSULE ORAL at 18:10

## 2024-01-24 RX ADMIN — GABAPENTIN 100 MG: 300 CAPSULE ORAL at 12:28

## 2024-01-24 RX ADMIN — METHOCARBAMOL 500 MG: 500 TABLET ORAL at 18:20

## 2024-01-24 RX ADMIN — METHOCARBAMOL 500 MG: 500 TABLET ORAL at 20:31

## 2024-01-24 RX ADMIN — DEXAMETHASONE 6 MG: 4 TABLET ORAL at 08:31

## 2024-01-24 RX ADMIN — HUMAN ALBUMIN MICROSPHERES AND PERFLUTREN 3 ML: 10; .22 INJECTION, SOLUTION INTRAVENOUS at 09:14

## 2024-01-24 RX ADMIN — DIBASIC SODIUM PHOSPHATE, MONOBASIC POTASSIUM PHOSPHATE AND MONOBASIC SODIUM PHOSPHATE 500 MG: 852; 155; 130 TABLET ORAL at 08:31

## 2024-01-24 RX ADMIN — OXYCODONE HYDROCHLORIDE 10 MG: 10 TABLET ORAL at 12:28

## 2024-01-24 RX ADMIN — METHOCARBAMOL 500 MG: 500 TABLET ORAL at 12:28

## 2024-01-24 ASSESSMENT — PAIN DESCRIPTION - PAIN TYPE
TYPE: ACUTE PAIN

## 2024-01-24 ASSESSMENT — GAIT ASSESSMENTS: GAIT LEVEL OF ASSIST: UNABLE TO PARTICIPATE

## 2024-01-24 ASSESSMENT — COGNITIVE AND FUNCTIONAL STATUS - GENERAL
CLIMB 3 TO 5 STEPS WITH RAILING: TOTAL
MOVING TO AND FROM BED TO CHAIR: UNABLE
MOVING FROM LYING ON BACK TO SITTING ON SIDE OF FLAT BED: UNABLE
MOBILITY SCORE: 6
SUGGESTED CMS G CODE MODIFIER MOBILITY: CN
WALKING IN HOSPITAL ROOM: TOTAL
STANDING UP FROM CHAIR USING ARMS: TOTAL
TURNING FROM BACK TO SIDE WHILE IN FLAT BAD: UNABLE

## 2024-01-24 ASSESSMENT — ENCOUNTER SYMPTOMS: WEAKNESS: 1

## 2024-01-24 NOTE — RADIATION COMPLETION NOTES
END OF TREATMENT SUMMARY    Patient name:  Andrew Wall    Primary Physician:  Jose Luis Juarez M.D. MRN: [id number]  CSN: [id number]   Referring physician:  Dr. Moran : 1965, 58 y.o.       TREATMENT SUMMARY:        Course First Treatment Date 2024    Course Last Treatment Date 2024   Course Elapsed Days 1 @ [id number]   Course Intent Palliative     Radiation Therapy Episodes       Active Episodes       Radiation Therapy: SBRT (2024)    Radiation Therapy: SBRT (2024)                   Radiation Treatments         Plan Last Treated On Elapsed Days Fractions Treated Prescribed Fraction Dose (cGy) Prescribed Total Dose (cGy)    SBRT Sacrum 2024 2 @ [id number] 3 of 3 900 2,700    SBRT C-Spine [SBRT C1-3] 2024 1 @ [id number] 2 of 5 600 3,000    SBRT C-Spine [SBRT C6-7] 2024 1 @ [id number] 2 of 5 600 3,000    SBRT R Hip 2024 1 @ [id number] 2 of 5 600 3,000                  Reference Point Last Treated On Elapsed Days Most Recent Session Dose (cGy) Total Dose (cGy)    SBRT Sacrum 2024 2 @ [id number] -- 2,700    C1-3 2024 1 @ [id number] 600 1,200    C6-7 2024 1 @ [id number] 600 1,200    SBRT R Hip 2024 1 @ [id number] 600 1,200                                     STAGE:   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)  Staging form: Melanoma of the Skin, AJCC 8th Edition  - Pathologic stage from 2024: Stage IV (rpT0, pNX, pM1c(1)) - Signed by Julia Fraga M.D. on 2024  Stage prefix: Recurrence       TREATMENT INDICATION:   Local control,palliation     CONCURRENT SYSTEMIC TREATMENT:   no     RT COURSE DISCONTINUED EARLY:   No     PATIENT EXPERIENCE:       2024     8:57 AM 2024    12:51 PM   Toxicity Assessment   Toxicity Assessment Spine Male Pelvis   Fatigue (lethargy, malaise, asthenia) Severe (e.g., decrease in performance status by 2 or more ECOG levels or 40% Karnofsky) or loss of ability to  perform some activities Bedridden or disabling   Radiation Dermatitis None None   Photosensitivity None    Dry Skin Normal    Anorexia  None   Colitis  None   Constipation  Requiring stool softener or dietary modification   Dehydration None None   Diarrhea w/o Colostomy  None   Flatulence  Mild   Nausea None None   Proctitis  None   Vomiting  None   Dyspepsia/heartburn None    RT Dysphagia-Esophageal None    RT - Pain due to RT  None   Tumor Pain (onset or exacerbation of tumor pain due to treatment) Moderate pain, pain or analgesics interfering with function, but not interfering with activities of daily living None   Dysuria (painful urination)  None   Urinary Frequency  Normal   Urinary Urgency  None   Bladder Spasms  Absent   Incontinence  None   Urinary Retention  Normal        FOLLOW-UP PLAN:   6 Weeks     COMMENT:          ANATOMIC TARGET SUMMARY    ANATOMIC TARGET MODALITY TECHNIQUE   sacrum   External beam, photons SBRT            COMMENT:         DIAGRAMS:        DOSE VOLUME HISTOGRAMS:

## 2024-01-24 NOTE — PROGRESS NOTES
[facility] Medicine Daily Progress Note    Date of Service  1/23/2024    Chief Complaint  Andrew Wall is a 58 y.o. male admitted 1/15/2024 with abnormal labs    Hospital Course  This is a 58-year-old male with past medical history of metastatic melanoma on immunotherapy who presented to the ED on 1/15/2024 with hypercalcemia.    Patient started on aggressive IV fluid and Zometa with resolution and hypercalcemia.     Patient with history of extensive bone metastasis and now with new metastasis to lung and liver. Patient's oncology group was consulted, recommended transfer to our facility for inpatient radiation. Patient received radiation to sacrum 1/17 -1/19.    Patient with multiple pathological fractures due to metastasis, oncological orthopedics consulted, weightbearing as tolerated with walker assist, weightbearing as tolerated in the right upper extremity, nonweightbearing in left upper extremity except to assist with walker and ADLs.     Interval Problem Update  Patient seen at bedside today, patient reports pain is tolerable, pain regimen has been adjusted by palliative care.    Patient started on aggressive bowel regimen, unable to have a bowel movement.    Patient continuing with radiation.    Goal for patient and patient's wife is to discharge home, we will have patient work with PT/OT while in house, patient was to be able to sit up at the edge of the bed and transfer into wheelchair.    Patient's wife at bedside, updated on plan of care, all questions answered.    I have discussed this patient's plan of care and discharge plan at IDT rounds today with Case Management, Nursing, Nursing leadership, and other members of the IDT team.    Consultants/Specialty  oncology, orthopedics, palliative care, and radiation oncology    Code Status  DNAR/DNI    Disposition  The patient is not medically cleared for discharge to home or a post-acute facility.  Anticipate discharge to: home with organized home  healthcare and close outpatient follow-up    I have placed the appropriate orders for post-discharge needs.    Review of Systems  Review of Systems   Constitutional:  Positive for malaise/fatigue.   Neurological:  Positive for weakness.   All other systems reviewed and are negative.       Physical Exam  Temp:  [36.3 °C (97.3 °F)-36.7 °C (98 °F)] 36.4 °C (97.6 °F)  Pulse:  [] 90  Resp:  [16] 16  BP: ()/(58-71) 118/70  SpO2:  [96 %-98 %] 96 %    Physical Exam  Vitals and nursing note reviewed.   Constitutional:       Appearance: Normal appearance. He is ill-appearing.   HENT:      Head: Normocephalic and atraumatic.      Mouth/Throat:      Pharynx: Oropharynx is clear.   Eyes:      Pupils: Pupils are equal, round, and reactive to light.   Neck:      Vascular: No carotid bruit.   Cardiovascular:      Rate and Rhythm: Normal rate and regular rhythm.   Pulmonary:      Effort: Pulmonary effort is normal.      Breath sounds: Normal breath sounds.   Abdominal:      General: Abdomen is flat. Bowel sounds are normal.      Palpations: Abdomen is soft. There is no mass.   Musculoskeletal:         General: Tenderness present. Normal range of motion.      Cervical back: Neck supple.   Skin:     General: Skin is warm and dry.   Neurological:      General: No focal deficit present.      Mental Status: He is alert and oriented to person, place, and time.   Psychiatric:         Mood and Affect: Mood normal.         Behavior: Behavior normal.         Fluids    Intake/Output Summary (Last 24 hours) at 1/23/2024 1936  Last data filed at 1/23/2024 1037  Gross per 24 hour   Intake 120 ml   Output 1650 ml   Net -1530 ml       Laboratory  Recent Labs     01/21/24  0801 01/22/24  1027 01/23/24  0122   WBC 16.4* 13.8* 13.9*   RBC 3.45* 3.34* 3.45*   HEMOGLOBIN 9.9* 9.6* 9.8*   HEMATOCRIT 30.3* 30.3* 31.1*   MCV 87.8 90.7 90.1   MCH 28.7 28.7 28.4   MCHC 32.7 31.7* 31.5*   RDW 43.8 46.2 46.1   PLATELETCT 393 311 320   MPV 9.3 9.1  9.1     Recent Labs     01/21/24  0801 01/23/24  0122   SODIUM 135 135   POTASSIUM 4.8 4.9   CHLORIDE 102 103   CO2 24 26   GLUCOSE 122* 122*   BUN 15 14   CREATININE 0.28* 0.36*   CALCIUM 7.0* 7.1*                   Imaging  DX-BONE SURVEY-METASTATIC LTD   Final Result         1.  Lytic changes of the left glenoid concerning for metastatic disease.   2.  Small peripherally sclerotic lytic lesion in the left femoral neck, appearance suggests small bony cyst, otherwise indeterminate.   3.  Atherosclerosis      MR-CERVICAL SPINE-WITH & W/O   Final Result         1.  Infiltrative metastatic disease with complete replacement of the bone marrow at C2, C6 and C7 with involvement of the posterior elements as described in detail above.      2.  There is ventral epidural infiltration by metastatic disease at C2, C6 and C7.      3.   Cortical breakthrough with extension of disease to the pre and paravertebral soft tissues is noted at C6 and C7 as described above.      4.  Severe right foraminal narrowing at C5-6.      5.  There is no significant spinal canal stenosis, cord compression or evidence for cord signal abnormality.      MR-THORACIC SPINE-WITH & W/O   Final Result         Multiple metastatic lesions noted throughout the thoracic spine as described above.      Large spinous process lesion noted at T3, T9.      Large right paraspinal metastatic lesion involvement of the right-sided pedicle and lamina noted at T3.      Ventral epidural extension of metastatic disease noted at the T5, T5-T6 level without significant cord impingement.      Large left paraspinous soft tissue metastatic lesion and a small right subcutaneous lesion noted at the T1-T2 level.      No significant spinal canal stenosis.               EC-ECHOCARDIOGRAM COMPLETE W/ CONT    (Results Pending)        Assessment/Plan  * Metastatic melanoma, BRAF V600E POSITIVE  (HCC)- (present on admission)  Assessment & Plan  Patient followed by oncologist Dr. Ross,  has been on immunotherapy.  He has an upcoming appointment with radiation oncology.     Images showed extensive bone metastasis and new metastasis to lung and liver  outpatient MRI showed extensive metastases through the lumbar spine, sacrum, paraspinous muscles, psoas muscle, iliacus muscle, gluteal muscles and Bilateral S1 nerve roots and right S2 nerve root in the sacral foramen are surrounded by the mass and probably invaded/impinged.     CT here showed  R shoulder - Large lytic metastasis involving the right anterior superior glenoid and coracoid with soft tissue component;  left shoulder - metastasis of Glenoid/Scapular body/ Clavicular head/ Teres minor muscle mass      CT chest/abd/pelvis - Severe pulmonary metastatic disease; Severe and widespread osseous metastatic disease/pathologic fractures; New liver lesion suggesting metastasis. Few peritoneal nodules suggesting peritoneal malignancy     new from prior CT dated 7/11/2023. PET scan 11/2023 was negative for chest and abdomen.       MRI brain showed MRI brain noted C2 vertebral body bone metastasis. There is mild amount of anterior epidural tumor at this level. There is no spinal canal compromise. This is new since the previous study. No intracranial metastasis.     Medical oncology and radiation oncology consultation  S/p Sacrum Radiation started 1/17-19.   1/22: Pending radiation to neck.  Following Dr. Fraga  I have discussed with Dr. Kuo, he is BRAF V600E POSITIVE. Planning Braftovi/Mektovi pending insurance authorization   Continue multimodal pain managements  PT/OT         Constipation- (present on admission)  Assessment & Plan  Likely due to hypercalcemia, opioid use and bedbound  Aggressive bowel regimen, oral Dulcolax, senna, MiraLAX  Dulcolax suppository    Hypercalcemia- (present on admission)  Assessment & Plan  due to extensive bone metastasis.    TSH 3.5, PTH 3.7 (appropriately depressed)  He has been treated with aggressive IV fluid,  received 1 dose of IV Zometa, His hypercalcemia slowly improving, corrected calcium down to 11.3.   Labs reviewed, resolved    Hypocalcemia  Assessment & Plan  Corrected Ca low  Iv calcium gluconate     Elevated LFTs  Assessment & Plan  No abdominal pain.  Total bilirubin normal.  Hepatitis panel negative.  Likely due to radiation?  Continue monitoring    Pathologic fracture  Assessment & Plan  Due to wide spread metastasis  I have consulted oncological orthopedics Dr. Burleson: Weightbearing as tolerated bilateral lower extremities with walker assist; Weightbearing as tolerated right upper extremity; Nonweightbearing left upper extremity except to assist with a walker and ADLs        DNR (do not resuscitate)  Assessment & Plan  DNR/DNI per discussion with palliative     Hyperglycemia  Assessment & Plan  Due to steroids  BG over 200  I started SSI  Hypoglycemia protocol    Leukocytosis  Assessment & Plan  likely due to steroid use.  No infectious signs    Fever- (present on admission)  Assessment & Plan   likely due to extensive metastasis;   no identified infection signs;   elevated procalcitonin could be related to malignancy;   blood culture NTD, continue to hold antibiotics, monitoring    Intractable low back pain- (present on admission)  Assessment & Plan   likely due to tumor compression of the S1/S2,   continue IV Decadron, his reported his pain has been improving.    I ordered Pepcid for GI prophylaxis  Multimodal pain managements including po and iv narcotics prn. Monitoring respiratory status and sedation score  Palliative consulted for pain managements    Anemia- (present on admission)  Assessment & Plan   likely due to underlying malignancy, high ferritin and B12.   No sign of active bleeding    Continue to monitor, transfuse if Hb less than 7         VTE prophylaxis: Lovenox    I have performed a physical exam and reviewed and updated ROS and Plan today (1/23/2024). In review of yesterday's note  (1/22/2024), there are no changes except as documented above.

## 2024-01-24 NOTE — ADDENDUM NOTE
Encounter addended by: Julia Fraga M.D. on: 1/24/2024 1:52 PM   Actions taken: Problem List reviewed, Medication List reviewed, Allergies reviewed

## 2024-01-24 NOTE — PROGRESS NOTES
Inpatient Palliative Care     Location: [place] [address]     HPI:   Andrew Wall is a 58 y.o. male with past medical history of melanoma and recently diagnosed extensive metastatic disease per MRI earlier this month who was admitted on January 15 th by  his oncology office for hypercalcemia, calcium 12.2.  Patient reports progressively worsening back pain, right leg sciatica, and generalized weakness with additional constipation x 6 days without bowel movement.  He is followed by Dr. Ross with cancer care specialty and has been on immunotherapy.  He has upcoming appointment with radiation oncology.  Patient was direct transfer from [facility] emergency department to oncology floor at [facility].     Patient and wife report a fall while still at home which precipitated increased pain in right hip area.   .  Interval:  Last bowel movement 1/23.  s/p Steroids converted from IV to p.o. and mild taper initiated.  Ongoing SBRT to upper spine and hip area. Utilized 20 mg of oral oxycodone and 8 mg of IV morphine in addition to morphine sulfate extended release 60 mg every 12 hours in the past 24 hours.    Summary:  Pain fairly well managed. Patient continues to struggle with mobility, but now understands he needs to manage his pain in order to increase mobility. Discussed wife's concerns regarding home care and care burden. Re visited hospice discussion as well.      Active listening, reflection, reminiscing, validation & normalization, and empathic support utilized throughout this encounter.  All questions answered and contact information provided, encouraged to call with any questions or concerns.      Plan:     1) Continue taper Dexamethasone 1/25  2) PC to continue to follow  3) Home palliative consult placed-will f/u with same upon DC.        Thank you for allowing me the opportunity to participate in the care of  Andrew Wall.     I spent a total of 32 minutes reviewing medical records, direct face-to-face time  with the patient and/or family, coordination of care, and documentation. This is separate from the time spent on advance care planning, which is documented above.     Vikki Karimi, MSN, APRN, ACNPC-AG.  Palliative Care Nurse Practitioner  [phone]

## 2024-01-24 NOTE — PROGRESS NOTES
Hypoglycemia Intervention    Hypoglycemia protocol intervention:  Blood glucose 65 at 2204. Patient asymptomatic, regular diet ordered.  Intervention: 8 oz of fruit juice   Repeat blood glucose 125 at 2247.  Intervention: Blood glucose increased to 125 after drinking juice. No further intervention required   Additional interventions needed: none  BISHOP Ron, notified of the above at 2234, no new orders at this time.  Informed pharmacy.  .

## 2024-01-24 NOTE — RADIATION COMPLETION NOTES
END OF TREATMENT SUMMARY    Patient name:  Andrew Wall    Primary Physician:  Jose Luis Juarez M.D. MRN: [id number]  CSN: [id number]   Referring physician:  Dr. Moran : 1965, 58 y.o.       TREATMENT SUMMARY:        Course First Treatment Date 2024    Course Last Treatment Date 2024   Course Elapsed Days 1 @ [id number]   Course Intent Palliative     Radiation Therapy Episodes       Active Episodes       Radiation Therapy: SBRT (2024)    Radiation Therapy: SBRT (2024)                   Radiation Treatments         Plan Last Treated On Elapsed Days Fractions Treated Prescribed Fraction Dose (cGy) Prescribed Total Dose (cGy)    SBRT Sacrum 2024 2 @ [id number] 3 of 3 900 2,700    SBRT C-Spine [SBRT C1-3] 2024 1 @ [id number] 2 of 5 600 3,000    SBRT C-Spine [SBRT C6-7] 2024 1 @ 733895594283 2 of 5 600 3,000    SBRT R Hip 2024 1 @ 076951790845 2 of 5 600 3,000                  Reference Point Last Treated On Elapsed Days Most Recent Session Dose (cGy) Total Dose (cGy)    SBRT Sacrum 2024 2 @ [id number] -- 2,700    C1-3 2024 1 @ 180126019300 600 1,200    C6-7 2024 1 @ 588390210492 600 1,200    SBRT R Hip 2024 1 @ 957344336061 600 1,200                                     STAGE:   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)  Staging form: Melanoma of the Skin, AJCC 8th Edition  - Pathologic stage from 2024: Stage IV (rpT0, pNX, pM1c(1)) - Signed by Julia Fraga M.D. on 2024  Stage prefix: Recurrence       TREATMENT INDICATION:   ***     CONCURRENT SYSTEMIC TREATMENT:   ***     RT COURSE DISCONTINUED EARLY:   No     PATIENT EXPERIENCE:       2024     8:57 AM 2024    12:51 PM   Toxicity Assessment   Toxicity Assessment Spine Male Pelvis   Fatigue (lethargy, malaise, asthenia) Severe (e.g., decrease in performance status by 2 or more ECOG levels or 40% Karnofsky) or loss of ability to perform some activities  "Bedridden or disabling   Radiation Dermatitis None None   Photosensitivity None    Dry Skin Normal    Anorexia  None   Colitis  None   Constipation  Requiring stool softener or dietary modification   Dehydration None None   Diarrhea w/o Colostomy  None   Flatulence  Mild   Nausea None None   Proctitis  None   Vomiting  None   Dyspepsia/heartburn None    RT Dysphagia-Esophageal None    RT - Pain due to RT  None   Tumor Pain (onset or exacerbation of tumor pain due to treatment) Moderate pain, pain or analgesics interfering with function, but not interfering with activities of daily living None   Dysuria (painful urination)  None   Urinary Frequency  Normal   Urinary Urgency  None   Bladder Spasms  Absent   Incontinence  None   Urinary Retention  Normal        FOLLOW-UP PLAN:   {avpfollowuplist:51182::\"8 Weeks\"}     COMMENT:          ANATOMIC TARGET SUMMARY    ANATOMIC TARGET MODALITY TECHNIQUE   ***   {RAD ONC MODALITY:08928} {RAD ONC Technique:59910::\"IMRT\"}            COMMENT:         DIAGRAMS:        DOSE VOLUME HISTOGRAMS:                    "

## 2024-01-24 NOTE — DISCHARGE PLANNING
Referral sent per choice to Preferred [facility]    1540- Referral sent to [facility]

## 2024-01-24 NOTE — CARE PLAN
The patient is Watcher - Medium risk of patient condition declining or worsening    Shift Goals  Clinical Goals: safety, comfort  Patient Goals: pain control      Progress made toward(s) clinical / shift goals:  pt kept safe and free from falls      Problem: Knowledge Deficit - Standard  Goal: Patient and family/care givers will demonstrate understanding of plan of care, disease process/condition, diagnostic tests and medications  Outcome: Progressing     Problem: Pain - Standard  Goal: Alleviation of pain or a reduction in pain to the patient’s comfort goal  Outcome: Progressing     Problem: Fall Risk  Goal: Patient will remain free from falls  Outcome: Progressing

## 2024-01-24 NOTE — ADDENDUM NOTE
Encounter addended by: Meredith De La Vega, Med Ass't on: 1/24/2024 12:59 PM   Actions taken: Pend clinical note

## 2024-01-25 ENCOUNTER — APPOINTMENT (OUTPATIENT)
Dept: RADIOLOGY | Facility: MEDICAL CENTER | Age: 59
DRG: 543 | End: 2024-01-25
Attending: GENERAL PRACTICE
Payer: COMMERCIAL

## 2024-01-25 ENCOUNTER — HOSPITAL ENCOUNTER (OUTPATIENT)
Dept: RADIATION ONCOLOGY | Facility: MEDICAL CENTER | Age: 59
End: 2024-01-25

## 2024-01-25 LAB
ANION GAP SERPL CALC-SCNC: 12 MMOL/L (ref 7–16)
BASOPHILS # BLD AUTO: 0 % (ref 0–1.8)
BASOPHILS # BLD: 0 K/UL (ref 0–0.12)
BUN SERPL-MCNC: 13 MG/DL (ref 8–22)
CALCIUM SERPL-MCNC: 7.3 MG/DL (ref 8.5–10.5)
CHEMOTHERAPY INFUSION START DATE: NORMAL
CHEMOTHERAPY INFUSION START DATE: NORMAL
CHEMOTHERAPY RECORDS: 3000
CHEMOTHERAPY RECORDS: 6
CHEMOTHERAPY RECORDS: NORMAL
CHEMOTHERAPY RX CANCER: NORMAL
CHEMOTHERAPY RX CANCER: NORMAL
CHLORIDE SERPL-SCNC: 100 MMOL/L (ref 96–112)
CO2 SERPL-SCNC: 22 MMOL/L (ref 20–33)
CREAT SERPL-MCNC: 0.24 MG/DL (ref 0.5–1.4)
DATE 1ST CHEMO CANCER: NORMAL
DATE 1ST CHEMO CANCER: NORMAL
EOSINOPHIL # BLD AUTO: 0 K/UL (ref 0–0.51)
EOSINOPHIL NFR BLD: 0 % (ref 0–6.9)
ERYTHROCYTE [DISTWIDTH] IN BLOOD BY AUTOMATED COUNT: 45.6 FL (ref 35.9–50)
GFR SERPLBLD CREATININE-BSD FMLA CKD-EPI: 146 ML/MIN/1.73 M 2
GLUCOSE BLD STRIP.AUTO-MCNC: 124 MG/DL (ref 65–99)
GLUCOSE BLD STRIP.AUTO-MCNC: 135 MG/DL (ref 65–99)
GLUCOSE BLD STRIP.AUTO-MCNC: 135 MG/DL (ref 65–99)
GLUCOSE BLD STRIP.AUTO-MCNC: 65 MG/DL (ref 65–99)
GLUCOSE SERPL-MCNC: 128 MG/DL (ref 65–99)
HCT VFR BLD AUTO: 27.5 % (ref 42–52)
HGB BLD-MCNC: 9 G/DL (ref 14–18)
LYMPHOCYTES # BLD AUTO: 0.46 K/UL (ref 1–4.8)
LYMPHOCYTES NFR BLD: 3.5 % (ref 22–41)
MAGNESIUM SERPL-MCNC: 1.7 MG/DL (ref 1.5–2.5)
MANUAL DIFF BLD: NORMAL
MCH RBC QN AUTO: 28.4 PG (ref 27–33)
MCHC RBC AUTO-ENTMCNC: 32.7 G/DL (ref 32.3–36.5)
MCV RBC AUTO: 86.8 FL (ref 81.4–97.8)
METAMYELOCYTES NFR BLD MANUAL: 1.7 %
MICROCYTES BLD QL SMEAR: ABNORMAL
MONOCYTES # BLD AUTO: 0.44 K/UL (ref 0–0.85)
MONOCYTES NFR BLD AUTO: 3.4 % (ref 0–13.4)
MORPHOLOGY BLD-IMP: NORMAL
MYELOCYTES NFR BLD MANUAL: 3.4 %
NEUTROPHILS # BLD AUTO: 11.44 K/UL (ref 1.82–7.42)
NEUTROPHILS NFR BLD: 87.1 % (ref 44–72)
NEUTS BAND NFR BLD MANUAL: 0.9 % (ref 0–10)
NRBC # BLD AUTO: 0 K/UL
NRBC BLD-RTO: 0 /100 WBC (ref 0–0.2)
PHOSPHATE SERPL-MCNC: 2.7 MG/DL (ref 2.5–4.5)
PLATELET # BLD AUTO: 238 K/UL (ref 164–446)
PLATELET BLD QL SMEAR: NORMAL
PMV BLD AUTO: 8.8 FL (ref 9–12.9)
POTASSIUM SERPL-SCNC: 4.7 MMOL/L (ref 3.6–5.5)
RAD ONC ARIA COURSE LAST TREATMENT DATE: NORMAL
RAD ONC ARIA COURSE LAST TREATMENT DATE: NORMAL
RAD ONC ARIA COURSE TREATMENT ELAPSED DAYS: NORMAL
RAD ONC ARIA COURSE TREATMENT ELAPSED DAYS: NORMAL
RAD ONC ARIA REFERENCE POINT DOSAGE GIVEN TO DATE: 24
RAD ONC ARIA REFERENCE POINT ID: NORMAL
RAD ONC ARIA REFERENCE POINT SESSION DOSAGE GIVEN: 6
RBC # BLD AUTO: 3.17 M/UL (ref 4.7–6.1)
RBC BLD AUTO: PRESENT
SODIUM SERPL-SCNC: 134 MMOL/L (ref 135–145)
WBC # BLD AUTO: 13 K/UL (ref 4.8–10.8)

## 2024-01-25 PROCEDURE — A9270 NON-COVERED ITEM OR SERVICE: HCPCS | Performed by: NURSE PRACTITIONER

## 2024-01-25 PROCEDURE — 700102 HCHG RX REV CODE 250 W/ 637 OVERRIDE(OP): Performed by: STUDENT IN AN ORGANIZED HEALTH CARE EDUCATION/TRAINING PROGRAM

## 2024-01-25 PROCEDURE — 77280 THER RAD SIMULAJ FIELD SMPL: CPT | Performed by: RADIOLOGY

## 2024-01-25 PROCEDURE — 77373 STRTCTC BDY RAD THER TX DLVR: CPT | Performed by: RADIOLOGY

## 2024-01-25 PROCEDURE — 80048 BASIC METABOLIC PNL TOTAL CA: CPT

## 2024-01-25 PROCEDURE — 85007 BL SMEAR W/DIFF WBC COUNT: CPT

## 2024-01-25 PROCEDURE — 36415 COLL VENOUS BLD VENIPUNCTURE: CPT

## 2024-01-25 PROCEDURE — 700102 HCHG RX REV CODE 250 W/ 637 OVERRIDE(OP): Performed by: NURSE PRACTITIONER

## 2024-01-25 PROCEDURE — 99233 SBSQ HOSP IP/OBS HIGH 50: CPT | Performed by: GENERAL PRACTICE

## 2024-01-25 PROCEDURE — 84100 ASSAY OF PHOSPHORUS: CPT

## 2024-01-25 PROCEDURE — 77280 THER RAD SIMULAJ FIELD SMPL: CPT | Mod: 26 | Performed by: RADIOLOGY

## 2024-01-25 PROCEDURE — 83735 ASSAY OF MAGNESIUM: CPT

## 2024-01-25 PROCEDURE — 770004 HCHG ROOM/CARE - ONCOLOGY PRIVATE *

## 2024-01-25 PROCEDURE — 700111 HCHG RX REV CODE 636 W/ 250 OVERRIDE (IP): Mod: JZ | Performed by: STUDENT IN AN ORGANIZED HEALTH CARE EDUCATION/TRAINING PROGRAM

## 2024-01-25 PROCEDURE — 73590 X-RAY EXAM OF LOWER LEG: CPT | Mod: LT

## 2024-01-25 PROCEDURE — 73502 X-RAY EXAM HIP UNI 2-3 VIEWS: CPT | Mod: RT

## 2024-01-25 PROCEDURE — A9270 NON-COVERED ITEM OR SERVICE: HCPCS | Performed by: STUDENT IN AN ORGANIZED HEALTH CARE EDUCATION/TRAINING PROGRAM

## 2024-01-25 PROCEDURE — 85027 COMPLETE CBC AUTOMATED: CPT

## 2024-01-25 PROCEDURE — 73552 X-RAY EXAM OF FEMUR 2/>: CPT | Mod: LT

## 2024-01-25 RX ADMIN — MORPHINE SULFATE 4 MG: 4 INJECTION, SOLUTION INTRAMUSCULAR; INTRAVENOUS at 10:32

## 2024-01-25 RX ADMIN — DEXAMETHASONE 6 MG: 4 TABLET ORAL at 12:50

## 2024-01-25 RX ADMIN — DOCUSATE SODIUM 50 MG AND SENNOSIDES 8.6 MG 2 TABLET: 8.6; 5 TABLET, FILM COATED ORAL at 17:33

## 2024-01-25 RX ADMIN — MORPHINE SULFATE 60 MG: 60 TABLET, FILM COATED, EXTENDED RELEASE ORAL at 06:25

## 2024-01-25 RX ADMIN — ENOXAPARIN SODIUM 40 MG: 100 INJECTION SUBCUTANEOUS at 17:33

## 2024-01-25 RX ADMIN — OXYCODONE HYDROCHLORIDE 10 MG: 10 TABLET ORAL at 12:49

## 2024-01-25 RX ADMIN — GABAPENTIN 300 MG: 300 CAPSULE ORAL at 17:33

## 2024-01-25 RX ADMIN — MORPHINE SULFATE 60 MG: 60 TABLET, FILM COATED, EXTENDED RELEASE ORAL at 17:33

## 2024-01-25 RX ADMIN — OXYCODONE HYDROCHLORIDE 10 MG: 10 TABLET ORAL at 07:02

## 2024-01-25 RX ADMIN — METHOCARBAMOL 500 MG: 500 TABLET ORAL at 20:33

## 2024-01-25 RX ADMIN — GABAPENTIN 100 MG: 300 CAPSULE ORAL at 12:50

## 2024-01-25 RX ADMIN — DOCUSATE SODIUM 50 MG AND SENNOSIDES 8.6 MG 2 TABLET: 8.6; 5 TABLET, FILM COATED ORAL at 06:25

## 2024-01-25 RX ADMIN — FAMOTIDINE 20 MG: 20 TABLET ORAL at 06:25

## 2024-01-25 RX ADMIN — METHOCARBAMOL 500 MG: 500 TABLET ORAL at 17:33

## 2024-01-25 RX ADMIN — OXYCODONE HYDROCHLORIDE 10 MG: 10 TABLET ORAL at 21:36

## 2024-01-25 RX ADMIN — GABAPENTIN 100 MG: 300 CAPSULE ORAL at 06:25

## 2024-01-25 RX ADMIN — DEXAMETHASONE 6 MG: 4 TABLET ORAL at 08:09

## 2024-01-25 RX ADMIN — DULOXETINE HYDROCHLORIDE 60 MG: 20 CAPSULE, DELAYED RELEASE ORAL at 06:25

## 2024-01-25 RX ADMIN — METHOCARBAMOL 500 MG: 500 TABLET ORAL at 12:50

## 2024-01-25 RX ADMIN — METHOCARBAMOL 500 MG: 500 TABLET ORAL at 08:10

## 2024-01-25 ASSESSMENT — PAIN DESCRIPTION - PAIN TYPE
TYPE: ACUTE PAIN

## 2024-01-25 ASSESSMENT — COGNITIVE AND FUNCTIONAL STATUS - GENERAL
PERSONAL GROOMING: A LOT
DAILY ACTIVITIY SCORE: 12
EATING MEALS: A LOT
TOILETING: A LOT
SUGGESTED CMS G CODE MODIFIER DAILY ACTIVITY: CL
DRESSING REGULAR UPPER BODY CLOTHING: A LOT
DRESSING REGULAR LOWER BODY CLOTHING: A LOT
HELP NEEDED FOR BATHING: A LOT

## 2024-01-25 ASSESSMENT — ENCOUNTER SYMPTOMS: WEAKNESS: 1

## 2024-01-25 NOTE — THERAPY
Occupational Therapy  Daily Treatment     Patient Name: Andrew Wall  Age:  58 y.o., Sex:  male  Medical Record #: [id number]  Today's Date: 1/25/2024     Precautions  Precautions: Fall Risk  Comments: almost whole spine involvement starting at C2, with anterior tumor at that level; multiple L/spine fxs; T1/2 paraspinal involvement; radiation or right sacral area; ordered soft collar for comfort given reports with mobility      Plan    Treatment Plan Status:    Type of Treatment: Self Care / Activities of Daily Living, Adaptive Equipment, Manual Therapy Techniques, Neuro Re-Education / Balance, Therapeutic Exercises, Therapeutic Activity  Treatment Frequency: 3 Times per Week  Treatment Duration: Until Therapy Goals Met    DC Equipment Recommendations: Tub / Shower Seat (BSC order has been placed)  Discharge Recommendations: (P) Recommend post-acute placement for additional occupational therapy services prior to discharge home

## 2024-01-25 NOTE — PROCEDURES
DATE OF SERVICE: 1/24/2024    DIAGNOSIS:  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)  Staging form: Melanoma of the Skin, AJCC 8th Edition  - Pathologic stage from 1/8/2024: Stage IV (rpT0, pNX, pM1c(1)) - Signed by Julia Fraga M.D. on 1/16/2024  Stage prefix: Recurrence       TREATMENT:  Radiation Therapy Episodes       Active Episodes       Radiation Therapy: SBRT (1/22/2024)    Radiation Therapy: SBRT (1/17/2024)                   Radiation Treatments         Plan Last Treated On Elapsed Days Fractions Treated Prescribed Fraction Dose (cGy) Prescribed Total Dose (cGy)    SBRT Sacrum 1/19/2024 2 @ [id number] 3 of 3 900 2,700    SBRT C-Spine [SBRT C1-3] 1/24/2024 2 @ [id number] 3 of 5 600 3,000    SBRT C-Spine [SBRT C6-7] 1/24/2024 2 @ [id number] 3 of 5 600 3,000    SBRT R Hip 1/24/2024 2 @ [id number] 3 of 5 600 3,000                  Reference Point Last Treated On Elapsed Days Most Recent Session Dose (cGy) Total Dose (cGy)    SBRT Sacrum 1/19/2024 2 @ [id number] -- 2,700    C1-3 1/24/2024 2 @ [id number] 600 1,800    C6-7 1/24/2024 2 @ [id number] 600 1,800    SBRT R Hip 1/24/2024 2 @ [id number] 600 1,800                            STEREOTACTIC PROCEDURE NOTE:    Called by [employer] machine to verify treatment parameters including:  treatment site, treatment dose, and treatment setup prior to stereotactic treatment..    Patient was placed in the treatment position with use of immobilization device and  laser guidance. CBCT images were acquired for target localization.  Images were reviewed in the axial, coronal, and saggital views and shifts were made as necessary to ensure that patient position matched simulation position.      Treatment delivered per  prescription.  The medical physicist was present throughout the set-up, verification and treatment delivery to oversee the procedure and ensure all parameters agreed with the computerized plan.    I have personally reviewed the relevant  data, performed the target localization, and determined all relevant factors for this patient’s simulation.

## 2024-01-25 NOTE — THERAPY
Occupational Therapy  Daily Treatment      01/24/24 1147   Bed Mobility    Supine to Sit   (declined 2/2 pn)   Functional Mobility   Sit to Stand   (declined 2/2 pn)   Comments did assist with posisioning of head for comfort   Education Group   Additional Comments reviewed bed mobility with pt's wife.   Anticipated Discharge Equipment and Recommendations   Discharge Recommendations Recommend post-acute placement for additional occupational therapy services prior to discharge home   Interdisciplinary Plan of Care Collaboration   IDT Collaboration with  Nursing;Family / Caregiver   Patient Position at End of Therapy In Bed

## 2024-01-25 NOTE — PROGRESS NOTES
Patient brought to [facility] for treatment number 3 of five to the C-spine and right hip. Patient saw Dr Fraga after treatment and was returned by transport to his room. He will return tomorrow for additional treatments.

## 2024-01-25 NOTE — DISCHARGE PLANNING
Case Management Discharge Planning    Admission Date: 1/15/2024  GMLOS: 3.5  ALOS: 10    6-Clicks ADL Score: 12  6-Clicks Mobility Score: 6  PT and/or OT Eval ordered: Yes  Post-acute Referrals Ordered: Yes  Post-acute Choice Obtained: Yes  Has referral(s) been sent to post-acute provider:  Yes      Anticipated Discharge Dispo: Discharge Disposition: D/T to home under [facility] care in anticipation of covered skilled care (06)    DME Needed: Yes    DME Ordered: Yes    Action(s) Taken: Discussed in IDT rounds, patient pending acceptance at [facility], they have declined, all local SNFs have declined. Hospitalist mentioned Hospice to family, met with wife at bedside to discuss hospice, she stated she would like further imaging prior to making decision.     Escalations Completed: None    Medically Clear: No    Next Steps: Pending imaging and final discharge plan.    Barriers to Discharge: Medical clearance    Is the patient up for discharge tomorrow: No

## 2024-01-25 NOTE — THERAPY
Physical Therapy   Daily Treatment     Patient Name: Andrew Wall  Age:  58 y.o., Sex:  male  Medical Record #: [id number]  Today's Date: 1/24/2024     Precautions  Precautions: Fall Risk    Assessment    Pt having a lot of increased pain now that he is receiving radiation to his spine; c2 was greatest source of discomfort last session during sitting/standing an soft collar was ordered; pt painful in bed with cervical positioning; pt reports he has been doing some bed level exercises that were discouraged on eval (leg lifts, shoulder lifts) and encouraged to save energy for mobility and focus on ROM of knees/feet as leg lifts will irritate his lumbar and cspine; wife now reporting they would be open to SNF due to his 'fragility'; discussed barriers to strengthening given pain here, though today is a bad day due to positioning for other tests; no longer going for transfusions outpatient; hopeful to get functional enough to go home, possible with enhanced home health; however now open to SNF; will follow.     Plan    Treatment Plan Status: Continue Current Treatment Plan  Type of Treatment: Bed Mobility, Equipment, Gait Training, Manual Therapy, Neuro Re-Education / Balance, Self Care / Home Evaluation, Stair Training, Therapeutic Activities, Therapeutic Exercise  Treatment Frequency: 4 Times per Week  Treatment Duration: Until Therapy Goals Met    DC Equipment Recommendations: Wheelchair, standard; removable leg rests/arm rests   Discharge Recommendations: Other -      Abridged Subjective/Objective     01/24/24 1150   Education Group   Role of Physical Therapist Patient Response Patient;Acceptance;Demonstration;Explanation;Verbal Demonstration;Action Demonstration   Additional Comments home health vs enhcnaged home health vs SNF   Physical Therapy Treatment Plan   Physical Therapy Treatment Plan Continue Current Treatment Plan

## 2024-01-25 NOTE — CARE PLAN
The patient is Stable - Low risk of patient condition declining or worsening    Shift Goals  Clinical Goals: Pain 3/10 or less, mobility  Patient Goals: pain control, rest  Family Goals: not present      Problem: Knowledge Deficit - Standard  Goal: Patient and family/care givers will demonstrate understanding of plan of care, disease process/condition, diagnostic tests and medications  Outcome: Progressing     Problem: Pain - Standard  Goal: Alleviation of pain or a reduction in pain to the patient’s comfort goal  Outcome: Progressing     Problem: Fall Risk  Goal: Patient will remain free from falls  Outcome: Progressing

## 2024-01-25 NOTE — CARE PLAN
The patient is Stable - Low risk of patient condition declining or worsening    Shift Goals  Clinical Goals: PRS 3/10 or less. Patient will participate in physical therapy  Patient Goals: Pain control  Family Goals: Pain control    Progress made toward(s) clinical / shift goals:      Problem: Knowledge Deficit - Standard  Goal: Patient and family/care givers will demonstrate understanding of plan of care, disease process/condition, diagnostic tests and medications  Outcome: Progressing     Problem: Pain - Standard  Goal: Alleviation of pain or a reduction in pain to the patient’s comfort goal  Outcome: Progressing     Problem: Fall Risk  Goal: Patient will remain free from falls  Outcome: Progressing     Problem: Skin Integrity  Goal: Skin integrity is maintained or improved  Outcome: Progressing       Patient is not progressing towards the following goals:

## 2024-01-25 NOTE — PROCEDURES
DATE OF SERVICE: 1/25/2024    DIAGNOSIS:  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)  Staging form: Melanoma of the Skin, AJCC 8th Edition  - Pathologic stage from 1/8/2024: Stage IV (rpT0, pNX, pM1c(1)) - Signed by Julia Fraga M.D. on 1/16/2024  Stage prefix: Recurrence       TREATMENT:  Radiation Therapy Episodes       Active Episodes       Radiation Therapy: SBRT (1/22/2024)    Radiation Therapy: SBRT (1/17/2024)                   Radiation Treatments         Plan Last Treated On Elapsed Days Fractions Treated Prescribed Fraction Dose (cGy) Prescribed Total Dose (cGy)    SBRT Sacrum 1/19/2024 2 @ [id number] 3 of 3 900 2,700    SBRT C-Spine [SBRT C1-3] 1/25/2024 3 @ [id number] 4 of 5 600 3,000    SBRT C-Spine [SBRT C6-7] 1/25/2024 3 @ [id number] 4 of 5 600 3,000    SBRT R Hip 1/25/2024 3 @ [id number] 4 of 5 600 3,000                  Reference Point Last Treated On Elapsed Days Most Recent Session Dose (cGy) Total Dose (cGy)    SBRT Sacrum 1/19/2024 2 @ [id number] -- 2,700    C1-3 1/25/2024 3 @ [id number] 600 2,400    C6-7 1/25/2024 3 @ [id number] 600 2,400    SBRT R Hip 1/25/2024 3 @ [id number] 600 2,400                            STEREOTACTIC PROCEDURE NOTE:    Called by [employer] machine to verify treatment parameters including:  treatment site, treatment dose, and treatment setup prior to stereotactic treatment..    Patient was placed in the treatment position with use of immobilization device and  laser guidance. CBCT images were acquired for target localization.  Images were reviewed in the axial, coronal, and saggital views and shifts were made as necessary to ensure that patient position matched simulation position.      Treatment delivered per  prescription.  The medical physicist was present throughout the set-up, verification and treatment delivery to oversee the procedure and ensure all parameters agreed with the computerized plan.    I have personally reviewed the relevant  data, performed the target localization, and determined all relevant factors for this patient’s simulation.

## 2024-01-25 NOTE — THERAPY
Occupational Therapy  Daily Treatment     Patient Name: Andrew Wall  Age:  58 y.o., Sex:  male  Medical Record #: [id number]  Today's Date: 1/25/2024     Precautions  Precautions: Fall Risk  Comments: almost whole spine involvement starting at C2, with anterior tumor at that level; multiple L/spine fxs; T1/2 paraspinal involvement; radiation or right sacral area; ordered soft collar for comfort given reports with mobility    Assessment    Pt currently limited by decreased functional mobility, activity tolerance, strength, AROM, coordination, balance, and pain which are affecting pt's ability to complete ADLs/IADLs at baseline. Pt would benefit from OT services in the acute care setting to maximize functional recovery.      Plan    Treatment Plan Status: (P) Continue Current Treatment Plan  Type of Treatment: Self Care / Activities of Daily Living, Adaptive Equipment, Manual Therapy Techniques, Neuro Re-Education / Balance, Therapeutic Exercises, Therapeutic Activity  Treatment Frequency: 3 Times per Week  Treatment Duration: Until Therapy Goals Met    DC Equipment Recommendations: Tub / Shower Seat (BSC order has been placed)  Discharge Recommendations: (P) Recommend post-acute placement for additional occupational therapy services prior to discharge home         01/25/24 0812   Activities of Daily Living   Upper Body Dressing Maximal Assist   Lower Body Dressing Minimal Assist   Functional Mobility   Sit to Stand Unable to Participate   Bed, Chair, Wheelchair Transfer Unable to Participate   Short Term Goals   Short Term Goal # 1 pt will complete txf to BSC w/CGA   Goal Outcome # 1 Progressing slower than expected   Short Term Goal # 2 pt will complete UB dressing w/set up   Goal Outcome # 2 Progressing slower than expected   Short Term Goal # 3 pt will complete grooming seated w/set up   Goal Outcome # 3 Progressing slower than expected   Occupational Therapy Treatment Plan    O.T. Treatment Plan Continue  Current Treatment Plan   Anticipated Discharge Equipment and Recommendations   Discharge Recommendations Recommend post-acute placement for additional occupational therapy services prior to discharge home

## 2024-01-25 NOTE — PROGRESS NOTES
Patient brought to [facility] for treatment number 4 of five to the C-spine and right hip. Patient was returned by transport to his room. He will return tomorrow for additional treatments.

## 2024-01-25 NOTE — PROGRESS NOTES
[facility] Medicine Daily Progress Note    Date of Service  1/24/2024    Chief Complaint  Andrew Wall is a 58 y.o. male admitted 1/15/2024 with abnormal labs    Hospital Course  This is a 58-year-old male with past medical history of metastatic melanoma on immunotherapy who presented to the ED on 1/15/2024 with hypercalcemia.    Patient started on aggressive IV fluid and Zometa with resolution and hypercalcemia.     Patient with history of extensive bone metastasis and now with new metastasis to lung and liver. Patient's oncology group was consulted, recommended transfer to our facility for inpatient radiation. Patient received radiation to sacrum 1/17 -1/19.  Patient then received 5-day course of radiation for spine and hip.    Patient with multiple pathological fractures due to metastasis, oncological orthopedics consulted, weightbearing as tolerated with walker assist, weightbearing as tolerated in the right upper extremity, nonweightbearing in left upper extremity except to assist with walker and ADLs.     Interval Problem Update  Patient seen at bedside today, patient reports pain is tolerable, pain regimen has been adjusted by palliative care.    Patient had large bowel movement 1/23 PM, continue with aggressive bowel regimen.    Patient continuing with radiation day 3 of 5 for spine and hip.    Due to deconditioning and pain, patient unable to transfer from bed to wheelchair at this time.  If continues this way, both patient and wife are amendable to SNF.    Patient's wife at bedside, updated on plan of care, all questions answered.    I have discussed this patient's plan of care and discharge plan at IDT rounds today with Case Management, Nursing, Nursing leadership, and other members of the IDT team.    Consultants/Specialty  oncology, orthopedics, palliative care, and radiation oncology    Code Status  DNAR/DNI    Disposition  The patient is not medically cleared for discharge to home or a post-acute  facility.  Anticipate discharge to: skilled nursing facility    I have placed the appropriate orders for post-discharge needs.    Review of Systems  Review of Systems   Constitutional:  Positive for malaise/fatigue.   Neurological:  Positive for weakness.   All other systems reviewed and are negative.       Physical Exam  Temp:  [36.7 °C (98 °F)-36.9 °C (98.5 °F)] 36.9 °C (98.5 °F)  Pulse:  [] 101  Resp:  [16] 16  BP: ()/(60-72) 104/61  SpO2:  [96 %-98 %] 98 %    Physical Exam  Vitals and nursing note reviewed.   Constitutional:       Appearance: Normal appearance. He is ill-appearing.   HENT:      Head: Normocephalic and atraumatic.      Mouth/Throat:      Pharynx: Oropharynx is clear.   Eyes:      Pupils: Pupils are equal, round, and reactive to light.   Neck:      Vascular: No carotid bruit.   Cardiovascular:      Rate and Rhythm: Normal rate and regular rhythm.   Pulmonary:      Effort: Pulmonary effort is normal.      Breath sounds: Normal breath sounds.   Abdominal:      General: Abdomen is flat. Bowel sounds are normal.      Palpations: Abdomen is soft. There is no mass.   Musculoskeletal:         General: Tenderness present. Normal range of motion.      Cervical back: Neck supple.   Skin:     General: Skin is warm and dry.   Neurological:      General: No focal deficit present.      Mental Status: He is alert and oriented to person, place, and time.   Psychiatric:         Mood and Affect: Mood normal.         Behavior: Behavior normal.         Fluids    Intake/Output Summary (Last 24 hours) at 1/24/2024 1607  Last data filed at 1/23/2024 2249  Gross per 24 hour   Intake --   Output 475 ml   Net -475 ml       Laboratory  Recent Labs     01/22/24  1027 01/23/24  0122 01/24/24  0127   WBC 13.8* 13.9* 12.4*   RBC 3.34* 3.45* 3.19*   HEMOGLOBIN 9.6* 9.8* 9.2*   HEMATOCRIT 30.3* 31.1* 28.3*   MCV 90.7 90.1 88.7   MCH 28.7 28.4 28.8   MCHC 31.7* 31.5* 32.5   RDW 46.2 46.1 45.2   PLATELETCT 311 320 270    MPV 9.1 9.1 9.1     Recent Labs     01/23/24  0122 01/24/24  0127   SODIUM 135 134*   POTASSIUM 4.9 4.5   CHLORIDE 103 101   CO2 26 21   GLUCOSE 122* 113*   BUN 14 13   CREATININE 0.36* 0.24*   CALCIUM 7.1* 7.1*                   Imaging  EC-ECHOCARDIOGRAM COMPLETE W/ CONT   Final Result      DX-BONE SURVEY-METASTATIC LTD   Final Result         1.  Lytic changes of the left glenoid concerning for metastatic disease.   2.  Small peripherally sclerotic lytic lesion in the left femoral neck, appearance suggests small bony cyst, otherwise indeterminate.   3.  Atherosclerosis      MR-CERVICAL SPINE-WITH & W/O   Final Result         1.  Infiltrative metastatic disease with complete replacement of the bone marrow at C2, C6 and C7 with involvement of the posterior elements as described in detail above.      2.  There is ventral epidural infiltration by metastatic disease at C2, C6 and C7.      3.   Cortical breakthrough with extension of disease to the pre and paravertebral soft tissues is noted at C6 and C7 as described above.      4.  Severe right foraminal narrowing at C5-6.      5.  There is no significant spinal canal stenosis, cord compression or evidence for cord signal abnormality.      MR-THORACIC SPINE-WITH & W/O   Final Result         Multiple metastatic lesions noted throughout the thoracic spine as described above.      Large spinous process lesion noted at T3, T9.      Large right paraspinal metastatic lesion involvement of the right-sided pedicle and lamina noted at T3.      Ventral epidural extension of metastatic disease noted at the T5, T5-T6 level without significant cord impingement.      Large left paraspinous soft tissue metastatic lesion and a small right subcutaneous lesion noted at the T1-T2 level.      No significant spinal canal stenosis.                    Assessment/Plan  * Metastatic melanoma, BRAF V600E POSITIVE  (HCC)- (present on admission)  Assessment & Plan  Patient followed by  oncologist Dr. Ross, has been on immunotherapy.  He has an upcoming appointment with radiation oncology.     Images showed extensive bone metastasis and new metastasis to lung and liver  outpatient MRI showed extensive metastases through the lumbar spine, sacrum, paraspinous muscles, psoas muscle, iliacus muscle, gluteal muscles and Bilateral S1 nerve roots and right S2 nerve root in the sacral foramen are surrounded by the mass and probably invaded/impinged.     CT here showed  R shoulder - Large lytic metastasis involving the right anterior superior glenoid and coracoid with soft tissue component;  left shoulder - metastasis of Glenoid/Scapular body/ Clavicular head/ Teres minor muscle mass      CT chest/abd/pelvis - Severe pulmonary metastatic disease; Severe and widespread osseous metastatic disease/pathologic fractures; New liver lesion suggesting metastasis. Few peritoneal nodules suggesting peritoneal malignancy. new from prior CT dated 7/11/2023. PET scan 11/2023 was negative for chest and abdomen.       MRI brain showed MRI brain noted C2 vertebral body bone metastasis. There is mild amount of anterior epidural tumor at this level. There is no spinal canal compromise. This is new since the previous study. No intracranial metastasis.     Medical oncology and radiation oncology consultation  S/p Sacrum Radiation started 1/17-19.   Completing course of radiation to spine and hip - following Dr. Fraga  Discussed with Dr. Kuo, he is BRAF V600E POSITIVE. Planning Braftovi/Mektovi pending insurance authorization   Continue multimodal pain managements  TTE with LVEF of 60%, no valvular abnormalities.         Constipation- (present on admission)  Assessment & Plan  Likely due to hypercalcemia, opioid use and bedbound  Aggressive bowel regimen, oral Dulcolax, senna, MiraLAX  Dulcolax suppository  Patient had large bowel movement 1/23 PM, continue with aggressive bowel regimen.    Hypercalcemia- (present on  admission)  Assessment & Plan  due to extensive bone metastasis.    TSH 3.5, PTH 3.7 (appropriately depressed)  He has been treated with aggressive IV fluid, received 1 dose of IV Zometa, His hypercalcemia slowly improving, corrected calcium down to 11.3.   Labs reviewed, resolved    Hypophosphatemia  Assessment & Plan  Oral supplementation  Serial BMP, magnesium, phosphorus levels ordered for tomorrow morning to continue to monitor electrolytes     Hypocalcemia  Assessment & Plan  Corrected Ca low  Iv calcium gluconate     Elevated LFTs  Assessment & Plan  No abdominal pain.  Total bilirubin normal.  Hepatitis panel negative.  Likely due to radiation?  Continue monitoring    Pathologic fracture  Assessment & Plan  Due to wide spread metastasis  I have consulted oncological orthopedics Dr. Burleson: Weightbearing as tolerated bilateral lower extremities with walker assist; Weightbearing as tolerated right upper extremity; Nonweightbearing left upper extremity except to assist with a walker and ADLs        DNR (do not resuscitate)  Assessment & Plan  DNR/DNI per discussion with palliative     Hyperglycemia  Assessment & Plan  Due to steroids  BG over 200  I started SSI  Hypoglycemia protocol    Leukocytosis  Assessment & Plan  likely due to steroid use.  No infectious signs    Fever- (present on admission)  Assessment & Plan   likely due to extensive metastasis;   no identified infection signs;   elevated procalcitonin could be related to malignancy;   blood culture NTD, continue to hold antibiotics, monitoring    Intractable low back pain- (present on admission)  Assessment & Plan   likely due to tumor compression of the S1/S2,   continue Decadron, his reported his pain has been improving.    Pepcid for GI prophylaxis  Multimodal pain managements including po and iv narcotics prn. Monitoring respiratory status and sedation score  Patient seen at bedside today, patient reports pain is tolerable, pain regimen has been  adjusted by palliative care.    Anemia- (present on admission)  Assessment & Plan  Likely due to underlying malignancy, high ferritin and B12.   No sign of active bleeding    Continue to monitor, transfuse if Hb less than 7         VTE prophylaxis: Lovenox    I have performed a physical exam and reviewed and updated ROS and Plan today (1/24/2024). In review of yesterday's note (1/23/2024), there are no changes except as documented above.

## 2024-01-25 NOTE — DISCHARGE PLANNING
Spoke To: Celsa  Agency/Facility Name: [facility]  Plan or Request: Per Celsa, left two vm's for pt/family to talk about high insurance deductible for the wheelchair.

## 2024-01-25 NOTE — PROGRESS NOTES
Assumed care of patient and received report from Joselin VERA. Assessment completed.Pt A&Ox 4. Respirations are even and unlabored on 0.5LNC. Pt reports pain being at a tolerable level. Pt declines intervention at this time. Medical pt, call light and belongings are within reach. POC updated. Pt educated on room and call light, pt verbalized understanding. Needs met.

## 2024-01-26 ENCOUNTER — HOSPITAL ENCOUNTER (OUTPATIENT)
Dept: RADIATION ONCOLOGY | Facility: MEDICAL CENTER | Age: 59
End: 2024-01-26

## 2024-01-26 ENCOUNTER — APPOINTMENT (OUTPATIENT)
Dept: RADIOLOGY | Facility: MEDICAL CENTER | Age: 59
DRG: 543 | End: 2024-01-26
Attending: ORTHOPAEDIC SURGERY
Payer: COMMERCIAL

## 2024-01-26 ENCOUNTER — HOSPITAL ENCOUNTER (OUTPATIENT)
Dept: RADIATION ONCOLOGY | Facility: MEDICAL CENTER | Age: 59
End: 2024-01-26
Payer: COMMERCIAL

## 2024-01-26 LAB
BASOPHILS # BLD AUTO: 0 % (ref 0–1.8)
BASOPHILS # BLD: 0 K/UL (ref 0–0.12)
CHEMOTHERAPY INFUSION START DATE: NORMAL
CHEMOTHERAPY INFUSION STOP DATE: NORMAL
CHEMOTHERAPY INFUSION STOP DATE: NORMAL
CHEMOTHERAPY RECORDS: 3000
CHEMOTHERAPY RECORDS: 6
CHEMOTHERAPY RECORDS: NORMAL
CHEMOTHERAPY RX CANCER: NORMAL
DATE 1ST CHEMO CANCER: NORMAL
EOSINOPHIL # BLD AUTO: 0 K/UL (ref 0–0.51)
EOSINOPHIL NFR BLD: 0 % (ref 0–6.9)
ERYTHROCYTE [DISTWIDTH] IN BLOOD BY AUTOMATED COUNT: 46.2 FL (ref 35.9–50)
HCT VFR BLD AUTO: 27.7 % (ref 42–52)
HGB BLD-MCNC: 8.9 G/DL (ref 14–18)
LYMPHOCYTES # BLD AUTO: 0 K/UL (ref 1–4.8)
LYMPHOCYTES NFR BLD: 0 % (ref 22–41)
MANUAL DIFF BLD: NORMAL
MCH RBC QN AUTO: 28.3 PG (ref 27–33)
MCHC RBC AUTO-ENTMCNC: 32.1 G/DL (ref 32.3–36.5)
MCV RBC AUTO: 87.9 FL (ref 81.4–97.8)
METAMYELOCYTES NFR BLD MANUAL: 1.8 %
MICROCYTES BLD QL SMEAR: ABNORMAL
MONOCYTES # BLD AUTO: 0.51 K/UL (ref 0–0.85)
MONOCYTES NFR BLD AUTO: 4.4 % (ref 0–13.4)
MORPHOLOGY BLD-IMP: NORMAL
MYELOCYTES NFR BLD MANUAL: 2.6 %
NEUTROPHILS # BLD AUTO: 10.49 K/UL (ref 1.82–7.42)
NEUTROPHILS NFR BLD: 89.5 % (ref 44–72)
NEUTS BAND NFR BLD MANUAL: 1.7 % (ref 0–10)
NRBC # BLD AUTO: 0 K/UL
NRBC BLD-RTO: 0 /100 WBC (ref 0–0.2)
PLATELET # BLD AUTO: 241 K/UL (ref 164–446)
PLATELET BLD QL SMEAR: NORMAL
PMV BLD AUTO: 9.1 FL (ref 9–12.9)
POLYCHROMASIA BLD QL SMEAR: NORMAL
RAD ONC ARIA COURSE LAST TREATMENT DATE: NORMAL
RAD ONC ARIA COURSE TREATMENT ELAPSED DAYS: NORMAL
RAD ONC ARIA REFERENCE POINT DOSAGE GIVEN TO DATE: 30
RAD ONC ARIA REFERENCE POINT ID: NORMAL
RAD ONC ARIA REFERENCE POINT SESSION DOSAGE GIVEN: 6
RBC # BLD AUTO: 3.15 M/UL (ref 4.7–6.1)
RBC BLD AUTO: PRESENT
WBC # BLD AUTO: 11.5 K/UL (ref 4.8–10.8)

## 2024-01-26 PROCEDURE — 77373 STRTCTC BDY RAD THER TX DLVR: CPT | Performed by: RADIOLOGY

## 2024-01-26 PROCEDURE — A9270 NON-COVERED ITEM OR SERVICE: HCPCS | Performed by: STUDENT IN AN ORGANIZED HEALTH CARE EDUCATION/TRAINING PROGRAM

## 2024-01-26 PROCEDURE — 700102 HCHG RX REV CODE 250 W/ 637 OVERRIDE(OP)

## 2024-01-26 PROCEDURE — 700102 HCHG RX REV CODE 250 W/ 637 OVERRIDE(OP): Performed by: NURSE PRACTITIONER

## 2024-01-26 PROCEDURE — 99232 SBSQ HOSP IP/OBS MODERATE 35: CPT | Performed by: NURSE PRACTITIONER

## 2024-01-26 PROCEDURE — 72190 X-RAY EXAM OF PELVIS: CPT

## 2024-01-26 PROCEDURE — 770004 HCHG ROOM/CARE - ONCOLOGY PRIVATE *

## 2024-01-26 PROCEDURE — 36415 COLL VENOUS BLD VENIPUNCTURE: CPT

## 2024-01-26 PROCEDURE — 700111 HCHG RX REV CODE 636 W/ 250 OVERRIDE (IP): Mod: JZ | Performed by: STUDENT IN AN ORGANIZED HEALTH CARE EDUCATION/TRAINING PROGRAM

## 2024-01-26 PROCEDURE — 700102 HCHG RX REV CODE 250 W/ 637 OVERRIDE(OP): Performed by: STUDENT IN AN ORGANIZED HEALTH CARE EDUCATION/TRAINING PROGRAM

## 2024-01-26 PROCEDURE — 85027 COMPLETE CBC AUTOMATED: CPT

## 2024-01-26 PROCEDURE — 99233 SBSQ HOSP IP/OBS HIGH 50: CPT | Performed by: GENERAL PRACTICE

## 2024-01-26 PROCEDURE — A9270 NON-COVERED ITEM OR SERVICE: HCPCS | Performed by: NURSE PRACTITIONER

## 2024-01-26 PROCEDURE — 77280 THER RAD SIMULAJ FIELD SMPL: CPT | Performed by: RADIOLOGY

## 2024-01-26 PROCEDURE — 77280 THER RAD SIMULAJ FIELD SMPL: CPT | Mod: 26 | Performed by: RADIOLOGY

## 2024-01-26 PROCEDURE — 700111 HCHG RX REV CODE 636 W/ 250 OVERRIDE (IP): Mod: JZ | Performed by: GENERAL PRACTICE

## 2024-01-26 PROCEDURE — 85007 BL SMEAR W/DIFF WBC COUNT: CPT

## 2024-01-26 RX ORDER — DEXAMETHASONE 4 MG/1
4 TABLET ORAL 2 TIMES DAILY
Status: DISCONTINUED | OUTPATIENT
Start: 2024-01-26 | End: 2024-01-29

## 2024-01-26 RX ORDER — MORPHINE SULFATE 4 MG/ML
4 INJECTION INTRAVENOUS
Status: DISCONTINUED | OUTPATIENT
Start: 2024-01-26 | End: 2024-01-29 | Stop reason: HOSPADM

## 2024-01-26 RX ADMIN — BISACODYL 10 MG: 10 SUPPOSITORY RECTAL at 06:28

## 2024-01-26 RX ADMIN — GABAPENTIN 300 MG: 300 CAPSULE ORAL at 17:58

## 2024-01-26 RX ADMIN — MORPHINE SULFATE 60 MG: 60 TABLET, FILM COATED, EXTENDED RELEASE ORAL at 06:25

## 2024-01-26 RX ADMIN — OXYCODONE HYDROCHLORIDE 10 MG: 10 TABLET ORAL at 21:33

## 2024-01-26 RX ADMIN — METHOCARBAMOL 500 MG: 500 TABLET ORAL at 20:27

## 2024-01-26 RX ADMIN — ENCORAFENIB 450 MG: 75 CAPSULE ORAL at 17:58

## 2024-01-26 RX ADMIN — METHOCARBAMOL 500 MG: 500 TABLET ORAL at 13:07

## 2024-01-26 RX ADMIN — METHOCARBAMOL 500 MG: 500 TABLET ORAL at 17:58

## 2024-01-26 RX ADMIN — POLYETHYLENE GLYCOL 3350 1 PACKET: 17 POWDER, FOR SOLUTION ORAL at 06:28

## 2024-01-26 RX ADMIN — DEXAMETHASONE 6 MG: 4 TABLET ORAL at 07:38

## 2024-01-26 RX ADMIN — MORPHINE SULFATE 60 MG: 60 TABLET, FILM COATED, EXTENDED RELEASE ORAL at 17:58

## 2024-01-26 RX ADMIN — ENOXAPARIN SODIUM 40 MG: 100 INJECTION SUBCUTANEOUS at 17:58

## 2024-01-26 RX ADMIN — GABAPENTIN 100 MG: 300 CAPSULE ORAL at 13:07

## 2024-01-26 RX ADMIN — FAMOTIDINE 20 MG: 20 TABLET ORAL at 06:26

## 2024-01-26 RX ADMIN — DOCUSATE SODIUM 50 MG AND SENNOSIDES 8.6 MG 2 TABLET: 8.6; 5 TABLET, FILM COATED ORAL at 06:28

## 2024-01-26 RX ADMIN — GABAPENTIN 100 MG: 300 CAPSULE ORAL at 06:26

## 2024-01-26 RX ADMIN — DOCUSATE SODIUM 50 MG AND SENNOSIDES 8.6 MG 2 TABLET: 8.6; 5 TABLET, FILM COATED ORAL at 17:58

## 2024-01-26 RX ADMIN — DULOXETINE HYDROCHLORIDE 60 MG: 20 CAPSULE, DELAYED RELEASE ORAL at 06:25

## 2024-01-26 RX ADMIN — OXYCODONE 5 MG: 5 TABLET ORAL at 13:07

## 2024-01-26 RX ADMIN — MORPHINE SULFATE 4 MG: 4 INJECTION, SOLUTION INTRAMUSCULAR; INTRAVENOUS at 07:37

## 2024-01-26 RX ADMIN — DEXAMETHASONE 4 MG: 4 TABLET ORAL at 13:07

## 2024-01-26 RX ADMIN — BINIMETINIB 45 MG: 15 TABLET, FILM COATED ORAL at 17:59

## 2024-01-26 ASSESSMENT — ENCOUNTER SYMPTOMS
WEAKNESS: 1
BACK PAIN: 1
PSYCHIATRIC NEGATIVE: 1
GASTROINTESTINAL NEGATIVE: 1
CARDIOVASCULAR NEGATIVE: 1
EYES NEGATIVE: 1
RESPIRATORY NEGATIVE: 1
NEUROLOGICAL NEGATIVE: 1

## 2024-01-26 ASSESSMENT — PAIN DESCRIPTION - PAIN TYPE
TYPE: ACUTE PAIN
TYPE: ACUTE PAIN

## 2024-01-26 NOTE — PROGRESS NOTES
[facility] Medicine Daily Progress Note    Date of Service  1/26/2024    Chief Complaint  Andrew Wall is a 58 y.o. male admitted 1/15/2024 with abnormal labs    Hospital Course  This is a 58-year-old male with past medical history of metastatic melanoma on immunotherapy who presented to the ED on 1/15/2024 with hypercalcemia.    Patient started on aggressive IV fluid and Zometa with resolution and hypercalcemia.     Patient with history of extensive bone metastasis and now with new metastasis to lung and liver. Patient's oncology group was consulted, recommended transfer to our facility for inpatient radiation. Patient received radiation to sacrum 1/17 -1/19.  Patient then received 5-day course of radiation for spine and hip.    Patient with multiple pathological fractures due to metastasis, oncological orthopedics consulted, weightbearing as tolerated with walker assist, weightbearing as tolerated in the right upper extremity, nonweightbearing in left upper extremity except to assist with walker and ADLs.     Interval Problem Update  Patient completed palliative radiation to spine, sacrum and right hip.    Case discussed today with orthopedic oncology, at this time no indication for surgical intervention.  Pelvic x-ray pending.    Case discussed today with oncology, patient to start BRAF inhibitor tomorrow.     Will continue to have patient work with physical therapy and Occupational Therapy.    Patient's wife at bedside, updated on plan of care, all questions answered.    I have discussed this patient's plan of care and discharge plan at IDT rounds today with Case Management, Nursing, Nursing leadership, and other members of the IDT team.    Consultants/Specialty  oncology, orthopedics, palliative care, and radiation oncology    Code Status  DNAR/DNI    Disposition  The patient is not medically cleared for discharge to home or a post-acute facility.  Anticipate discharge to: home with organized home  healthcare and close outpatient follow-up    I have placed the appropriate orders for post-discharge needs.    Review of Systems  Review of Systems   Constitutional:  Positive for malaise/fatigue.   Neurological:  Positive for weakness.   All other systems reviewed and are negative.       Physical Exam  Temp:  [36.4 °C (97.6 °F)-36.6 °C (97.9 °F)] 36.4 °C (97.6 °F)  Pulse:  [] 95  Resp:  [16-18] 16  BP: (103-109)/(58-70) 103/58  SpO2:  [91 %-94 %] 91 %    Physical Exam  Vitals and nursing note reviewed.   Constitutional:       Appearance: Normal appearance. He is ill-appearing.   HENT:      Head: Normocephalic and atraumatic.      Mouth/Throat:      Pharynx: Oropharynx is clear.   Eyes:      Pupils: Pupils are equal, round, and reactive to light.   Neck:      Vascular: No carotid bruit.   Cardiovascular:      Rate and Rhythm: Normal rate and regular rhythm.   Pulmonary:      Effort: Pulmonary effort is normal.      Breath sounds: Normal breath sounds.   Abdominal:      General: Abdomen is flat. Bowel sounds are normal.      Palpations: Abdomen is soft. There is no mass.   Musculoskeletal:         General: Tenderness present. Normal range of motion.      Cervical back: Neck supple.   Skin:     General: Skin is warm and dry.   Neurological:      General: No focal deficit present.      Mental Status: He is alert and oriented to person, place, and time.   Psychiatric:         Mood and Affect: Mood normal.         Behavior: Behavior normal.         Fluids    Intake/Output Summary (Last 24 hours) at 1/26/2024 1608  Last data filed at 1/26/2024 0631  Gross per 24 hour   Intake --   Output 600 ml   Net -600 ml       Laboratory  Recent Labs     01/24/24  0127 01/25/24  0051 01/26/24  0027   WBC 12.4* 13.0* 11.5*   RBC 3.19* 3.17* 3.15*   HEMOGLOBIN 9.2* 9.0* 8.9*   HEMATOCRIT 28.3* 27.5* 27.7*   MCV 88.7 86.8 87.9   MCH 28.8 28.4 28.3   MCHC 32.5 32.7 32.1*   RDW 45.2 45.6 46.2   PLATELETCT 270 238 241   MPV 9.1 8.8*  9.1     Recent Labs     01/24/24  0127 01/25/24  0051   SODIUM 134* 134*   POTASSIUM 4.5 4.7   CHLORIDE 101 100   CO2 21 22   GLUCOSE 113* 128*   BUN 13 13   CREATININE 0.24* 0.24*   CALCIUM 7.1* 7.3*                   Imaging  DX-FEMUR-2+ LEFT   Final Result      1. Stable subcortical peripherally sclerotic lytic lesion in the left femoral neck measuring 8 mm in diameter.   2. The remainder of the left femur radiography is within normal limits.      DX-HIP-COMPLETE - UNILATERAL 2+ RIGHT   Final Result      1.  Bone metastases and right-sided pathologic fracture again noted.      2.  Findings are consistent with mild osteoarthritis.   3.  2. Fracture or malalignment identified.      DX-TIBIA AND FIBULA LEFT   Final Result      Normal left tibia/fibula radiography.      EC-ECHOCARDIOGRAM COMPLETE W/ CONT   Final Result      DX-BONE SURVEY-METASTATIC LTD   Final Result         1.  Lytic changes of the left glenoid concerning for metastatic disease.   2.  Small peripherally sclerotic lytic lesion in the left femoral neck, appearance suggests small bony cyst, otherwise indeterminate.   3.  Atherosclerosis      MR-CERVICAL SPINE-WITH & W/O   Final Result         1.  Infiltrative metastatic disease with complete replacement of the bone marrow at C2, C6 and C7 with involvement of the posterior elements as described in detail above.      2.  There is ventral epidural infiltration by metastatic disease at C2, C6 and C7.      3.   Cortical breakthrough with extension of disease to the pre and paravertebral soft tissues is noted at C6 and C7 as described above.      4.  Severe right foraminal narrowing at C5-6.      5.  There is no significant spinal canal stenosis, cord compression or evidence for cord signal abnormality.      MR-THORACIC SPINE-WITH & W/O   Final Result         Multiple metastatic lesions noted throughout the thoracic spine as described above.      Large spinous process lesion noted at T3, T9.      Large  right paraspinal metastatic lesion involvement of the right-sided pedicle and lamina noted at T3.      Ventral epidural extension of metastatic disease noted at the T5, T5-T6 level without significant cord impingement.      Large left paraspinous soft tissue metastatic lesion and a small right subcutaneous lesion noted at the T1-T2 level.      No significant spinal canal stenosis.               DX-PELVIS-TRAUMA SERIES  3-    (Results Pending)        Assessment/Plan  * Metastatic melanoma, BRAF V600E POSITIVE  (HCC)- (present on admission)  Assessment & Plan  Patient followed by oncologist Dr. Ross, has been on immunotherapy.  He has an upcoming appointment with radiation oncology.     Images showed extensive bone metastasis and new metastasis to lung and liver  outpatient MRI showed extensive metastases through the lumbar spine, sacrum, paraspinous muscles, psoas muscle, iliacus muscle, gluteal muscles and Bilateral S1 nerve roots and right S2 nerve root in the sacral foramen are surrounded by the mass and probably invaded/impinged.     CT here showed  R shoulder - Large lytic metastasis involving the right anterior superior glenoid and coracoid with soft tissue component;  left shoulder - metastasis of Glenoid/Scapular body/ Clavicular head/ Teres minor muscle mass      CT chest/abd/pelvis - Severe pulmonary metastatic disease; Severe and widespread osseous metastatic disease/pathologic fractures; New liver lesion suggesting metastasis. Few peritoneal nodules suggesting peritoneal malignancy. new from prior CT dated 7/11/2023. PET scan 11/2023 was negative for chest and abdomen.       MRI brain showed MRI brain noted C2 vertebral body bone metastasis. There is mild amount of anterior epidural tumor at this level. There is no spinal canal compromise. This is new since the previous study. No intracranial metastasis.     Medical oncology and radiation oncology consultation  S/p Sacrum Radiation started 1/17-19.    Completing course of radiation to spine and hip - following Dr. Fraga  Discussed with Dr. Kuo, he is BRAF V600E POSITIVE. Planning Braftovi/Mektovi, now insurance approved 1/25, will start 1/27  Continue multimodal pain managements  TTE with LVEF of 60%, no valvular abnormalities.         Constipation- (present on admission)  Assessment & Plan  Likely due to hypercalcemia, opioid use and bedbound  Aggressive bowel regimen, oral Dulcolax, senna, MiraLAX  Dulcolax suppository  Patient had large bowel movement 1/23 PM, continue with aggressive bowel regimen.    Hypercalcemia- (present on admission)  Assessment & Plan  due to extensive bone metastasis.    TSH 3.5, PTH 3.7 (appropriately depressed)  He has been treated with aggressive IV fluid, received 1 dose of IV Zometa, His hypercalcemia slowly improving, corrected calcium down to 11.3.   Labs reviewed, resolved    Hypophosphatemia  Assessment & Plan  Oral supplementation  Serial BMP, magnesium, phosphorus levels ordered for tomorrow morning to continue to monitor electrolytes     Hypocalcemia  Assessment & Plan  Corrected Ca low  Iv calcium gluconate     Elevated LFTs  Assessment & Plan  No abdominal pain.  Total bilirubin normal.  Hepatitis panel negative.  Likely due to radiation?  Continue monitoring    Pathologic fracture  Assessment & Plan  Due to wide spread metastasis  I have consulted oncological orthopedics Dr. Burleson: Weightbearing as tolerated bilateral lower extremities with walker assist; Weightbearing as tolerated right upper extremity; Nonweightbearing left upper extremity except to assist with a walker and ADLs    Patient unable to continue working with physical therapy and occupational therapy due to severe pain.  He reported left-sided pain, x-ray imaging performed, no evidence of new fracture, previous left ischium and right superior ramus fracture noted.    Case discussed with orthopedic oncology, at this time no indication for surgical  intervention.  Pelvic x-ray pending.      DNR (do not resuscitate)  Assessment & Plan  DNR/DNI per discussion with palliative     Hyperglycemia  Assessment & Plan  Due to steroids  BG over 200  I started SSI  Hypoglycemia protocol    Leukocytosis  Assessment & Plan  likely due to steroid use.  No infectious signs    Fever- (present on admission)  Assessment & Plan   likely due to extensive metastasis;   no identified infection signs;   elevated procalcitonin could be related to malignancy;   blood culture NTD, continue to hold antibiotics, monitoring    Intractable low back pain- (present on admission)  Assessment & Plan   likely due to tumor compression of the S1/S2,   continue Decadron, his reported his pain has been improving.    Pepcid for GI prophylaxis  Multimodal pain managements including po and iv narcotics prn. Monitoring respiratory status and sedation score  Patient seen at bedside today, patient reports pain is tolerable, pain regimen has been adjusted by palliative care.    Anemia- (present on admission)  Assessment & Plan  Likely due to underlying malignancy, high ferritin and B12.   No sign of active bleeding    Continue to monitor, transfuse if Hb less than 7         VTE prophylaxis: Lovenox    I have performed a physical exam and reviewed and updated ROS and Plan today (1/26/2024). In review of yesterday's note (1/25/2024), there are no changes except as documented above.      Greater than 56 minutes spent prepping to see patient (e.g. reviewing EMR) obtaining and/or reviewing separately obtained history. Performing a medically appropriate examination and evaluation. Independently interpreting results and communicating results to patient/family/caregiver. Counseling and educating the patient/family/caregiver. Ordering medications, tests, and/or procedures. Referring and communicating with other health care professionals.  Documenting clinical information in EPIC. Care coordination.

## 2024-01-26 NOTE — PROGRESS NOTES
"Inpatient Palliative Care     Location: Cancer nursing unit [facility] [phone]     HPI:   Andrew Wall is a 58 y.o. male with past medical history of melanoma and recently diagnosed extensive metastatic disease per MRI earlier this month who was admitted on January 15 th by  his oncology office for hypercalcemia, calcium 12.2.  Patient reports progressively worsening back pain, right leg sciatica, and generalized weakness with additional constipation x 6 days without bowel movement.  He is followed by Dr. Ross with cancer care specialty and has been on immunotherapy.  He has upcoming appointment with radiation oncology.  Patient was direct transfer from [facility] emergency department to oncology floor at [facility].     Patient and wife report a fall while still at home which precipitated increased pain in right hip area.    .  Interval:  Seen today status post last SBRT.  Patient is not going home today due to mobility issues.  Continues to have difficulty with weightbearing and reports that \"he almost blacked out yesterday when he tried to bear weight through his right lower extremity\".  Status post conversation with primary oncologist, Dr. Ross, in which he has been told that medication has been approved and available.  Wife has picked up medication and he is to start on Sunday 1/28.    Summary:  Met with patient, wife at bedside.  Reportedly hospice was discussed yesterday once again per primary hospitalist.  Wife and patient requested further imaging.  Additional discussion regarding surgical oncology reconsulting after most recent imaging completed.  Patient and wife moving forward with new BRAF positive medication to treat static melanoma.  Are hoping to discharge home with [facility] and home health, had been accepted by [facility] per case management.  Patient is okay to continue taper of steroids.     Active listening, reflection, reminiscing, validation & normalization, and empathic support utilized " throughout this encounter.  All questions answered and contact information provided, encouraged to call with any questions or concerns.      Plan:     1) Decrease Dexamethasone to 8 mg, 4 mg at 8 AM and 4 mg at 1300.  2) continue multimodal pain strategies as previously ordered.  3) due to patient's insurance he is not eligible for home palliative program, although he is a patient of [facility] plan to refer to [facility]/palliative/Dr. Bedolla upon discharge.     Thank you for allowing me the opportunity to participate in the care of  Andrew Chavira Mae.     I spent a total of 42 minutes reviewing medical records, direct face-to-face time with the patient and/or family, coordination of care, and documentation. This is separate from the time spent on advance care planning, which is documented above.     Vikki Karimi, MSN, APRN, ACNPC-AG.  Palliative Care Nurse Practitioner  [phone]

## 2024-01-26 NOTE — PROGRESS NOTES
"          Subjective: I was asked to re-evaluate Sebastian secondary to new pain in the left lower extremity. Today he reports the pain occurred when he was attempting to get out of bed. It is described as \"sciatica,\" type pain associated with numbness and tingling. It resolved when he laid back down. He has not had any recurrent episodes since this happened yesterday, but he has also not attempted to sit up out of bed again. He states before that he was mobilizing without pain in his legs.     He denies any back pain or right lower extremity pain. His shoulder pain is stable and unchanged. No new numbness or weakness. No bowel or bladder incontinence or retention. No perineal numbness.     He reports he will be starting the BRAF inhibitor tomorrow.     Objective:  /58   Pulse 95   Temp 36.4 °C (97.6 °F) (Oral)   Resp 16   Wt 85.6 kg (188 lb 11.4 oz)   SpO2 91%   BMI 26.32 kg/m²     Gen - NAD, resting comfortably  CV - Extremities pink and well perfused, DP pulses 2+  Resp - breathing non labored, symmetric chest rise  Abd - flat non-distended  MSK - BL LE are nontender to palpation of the hips, thighs, knees and legs. Tolerates log roll of the hips without pain. Able to perform a straight leg raise bilaterally. Motor 5/5 with ankle and toe DF/PF and eversion. SILT L3-S1 bilaterally.     Imaging:  Multiple radiographs of the left hip, femur and leg were reviewed and demonstrate no new lesions or fractures. Joint spaces and alignment are maintained and stable from priors.       Assessment/Plan:  1.  Widely metastatic bone disease secondary to melanoma  -osseous (spine, ribs, scapula, pelvis, right femoral neck, sacrum, clavicle), lungs, liver, lymph   2.  Pathologic fracture left glenoid neck, metastatic melanoma  3.  Pathologic fracture right superior ramus, metastatic melanoma  4.  Pathologic fracture left ischium, metastatic melanoma    -scheduled to start BRAF inhibitor tomorrow. Completed palliative " radiation to the c spine, sacrum and right hip. I counseled him on my findings on the repeat radiographs. I do not see any indications for surgery on the left lower extremity. The pain may be secondary to inflammation/irritation of the sacral or lumbar nerve roots from the known disease in those locations. He is neuro intact however and the episode of pain was fleeting. I again reviewed signs/symptoms of impending pathologic fracture and we discussed my biggest concern is his right hip, however, he is not having pain there so we agreed to continue watching it. I have given he and his wife my contact number and they will reach out if symptoms change. I am encouraged that he is starting therapy tomorrow. I will also review the pelvis films once completed to see if there are any changes or concerns there. Otherwise he will work with nursing and therapy to mobilize again and see if the pain recurs.     Weightbearing recommendations:  1.  Weightbearing as tolerated bilateral lower extremities with walker assist  2.  Weightbearing as tolerated right upper extremity  3.  Nonweightbearing left upper extremity except to assist with a walker and ADLs    Sofia Burleson, DO  Orthopedic Oncology

## 2024-01-26 NOTE — PROGRESS NOTES
[facility] Medicine Daily Progress Note    Date of Service  1/25/2024    Chief Complaint  Andrew Wall is a 58 y.o. male admitted 1/15/2024 with abnormal labs    Hospital Course  This is a 58-year-old male with past medical history of metastatic melanoma on immunotherapy who presented to the ED on 1/15/2024 with hypercalcemia.    Patient started on aggressive IV fluid and Zometa with resolution and hypercalcemia.     Patient with history of extensive bone metastasis and now with new metastasis to lung and liver. Patient's oncology group was consulted, recommended transfer to our facility for inpatient radiation. Patient received radiation to sacrum 1/17 -1/19.  Patient then received 5-day course of radiation for spine and hip.    Patient with multiple pathological fractures due to metastasis, oncological orthopedics consulted, weightbearing as tolerated with walker assist, weightbearing as tolerated in the right upper extremity, nonweightbearing in left upper extremity except to assist with walker and ADLs.     Interval Problem Update  Patient continuing with radiation day 4 of 5 for spine and hip.    Patient unable to continue working with physical therapy and occupational therapy due to severe pain.  He reported left-sided pain, x-ray imaging performed, no evidence of new fracture, previous left ischium and right superior ramus fracture noted.    Will have orthopedic oncology surgery review imaging and given patient's inability to stand, if surgery would be an option.    Patient unable to transfer safely, all SNFs are currently declining.    Case discussed today with oncology, medication approved, will discuss with patient in regards to initiation if he chooses to not pursue hospice.    Discussed with patient and patient's wife about hospice, they want time to discuss this.    Patient's wife at bedside, updated on plan of care, all questions answered.    I have discussed this patient's plan of care and  discharge plan at IDT rounds today with Case Management, Nursing, Nursing leadership, and other members of the IDT team.    Consultants/Specialty  oncology, orthopedics, palliative care, and radiation oncology    Code Status  DNAR/DNI    Disposition  The patient is not medically cleared for discharge to home or a post-acute facility.      I have placed the appropriate orders for post-discharge needs.    Review of Systems  Review of Systems   Constitutional:  Positive for malaise/fatigue.   Neurological:  Positive for weakness.   All other systems reviewed and are negative.       Physical Exam  Temp:  [36.5 °C (97.7 °F)-37.2 °C (98.9 °F)] 36.9 °C (98.5 °F)  Pulse:  [] 95  Resp:  [16] 16  BP: (108-116)/(59-75) 110/59  SpO2:  [92 %-97 %] 93 %    Physical Exam  Vitals and nursing note reviewed.   Constitutional:       Appearance: Normal appearance. He is ill-appearing.   HENT:      Head: Normocephalic and atraumatic.      Mouth/Throat:      Pharynx: Oropharynx is clear.   Eyes:      Pupils: Pupils are equal, round, and reactive to light.   Neck:      Vascular: No carotid bruit.   Cardiovascular:      Rate and Rhythm: Normal rate and regular rhythm.   Pulmonary:      Effort: Pulmonary effort is normal.      Breath sounds: Normal breath sounds.   Abdominal:      General: Abdomen is flat. Bowel sounds are normal.      Palpations: Abdomen is soft. There is no mass.   Musculoskeletal:         General: Tenderness present. Normal range of motion.      Cervical back: Neck supple.   Skin:     General: Skin is warm and dry.   Neurological:      General: No focal deficit present.      Mental Status: He is alert and oriented to person, place, and time.   Psychiatric:         Mood and Affect: Mood normal.         Behavior: Behavior normal.         Fluids    Intake/Output Summary (Last 24 hours) at 1/25/2024 1730  Last data filed at 1/25/2024 0300  Gross per 24 hour   Intake --   Output 800 ml   Net -800 ml        Laboratory  Recent Labs     01/23/24  0122 01/24/24  0127 01/25/24  0051   WBC 13.9* 12.4* 13.0*   RBC 3.45* 3.19* 3.17*   HEMOGLOBIN 9.8* 9.2* 9.0*   HEMATOCRIT 31.1* 28.3* 27.5*   MCV 90.1 88.7 86.8   MCH 28.4 28.8 28.4   MCHC 31.5* 32.5 32.7   RDW 46.1 45.2 45.6   PLATELETCT 320 270 238   MPV 9.1 9.1 8.8*     Recent Labs     01/23/24  0122 01/24/24  0127 01/25/24  0051   SODIUM 135 134* 134*   POTASSIUM 4.9 4.5 4.7   CHLORIDE 103 101 100   CO2 26 21 22   GLUCOSE 122* 113* 128*   BUN 14 13 13   CREATININE 0.36* 0.24* 0.24*   CALCIUM 7.1* 7.1* 7.3*                   Imaging  DX-FEMUR-2+ LEFT   Final Result      1. Stable subcortical peripherally sclerotic lytic lesion in the left femoral neck measuring 8 mm in diameter.   2. The remainder of the left femur radiography is within normal limits.      DX-HIP-COMPLETE - UNILATERAL 2+ RIGHT   Final Result      1.  Bone metastases and right-sided pathologic fracture again noted.      2.  Findings are consistent with mild osteoarthritis.   3.  2. Fracture or malalignment identified.      DX-TIBIA AND FIBULA LEFT   Final Result      Normal left tibia/fibula radiography.      EC-ECHOCARDIOGRAM COMPLETE W/ CONT   Final Result      DX-BONE SURVEY-METASTATIC LTD   Final Result         1.  Lytic changes of the left glenoid concerning for metastatic disease.   2.  Small peripherally sclerotic lytic lesion in the left femoral neck, appearance suggests small bony cyst, otherwise indeterminate.   3.  Atherosclerosis      MR-CERVICAL SPINE-WITH & W/O   Final Result         1.  Infiltrative metastatic disease with complete replacement of the bone marrow at C2, C6 and C7 with involvement of the posterior elements as described in detail above.      2.  There is ventral epidural infiltration by metastatic disease at C2, C6 and C7.      3.   Cortical breakthrough with extension of disease to the pre and paravertebral soft tissues is noted at C6 and C7 as described above.      4.   Severe right foraminal narrowing at C5-6.      5.  There is no significant spinal canal stenosis, cord compression or evidence for cord signal abnormality.      MR-THORACIC SPINE-WITH & W/O   Final Result         Multiple metastatic lesions noted throughout the thoracic spine as described above.      Large spinous process lesion noted at T3, T9.      Large right paraspinal metastatic lesion involvement of the right-sided pedicle and lamina noted at T3.      Ventral epidural extension of metastatic disease noted at the T5, T5-T6 level without significant cord impingement.      Large left paraspinous soft tissue metastatic lesion and a small right subcutaneous lesion noted at the T1-T2 level.      No significant spinal canal stenosis.                    Assessment/Plan  * Metastatic melanoma, BRAF V600E POSITIVE  (HCC)- (present on admission)  Assessment & Plan  Patient followed by oncologist Dr. Ross, has been on immunotherapy.  He has an upcoming appointment with radiation oncology.     Images showed extensive bone metastasis and new metastasis to lung and liver  outpatient MRI showed extensive metastases through the lumbar spine, sacrum, paraspinous muscles, psoas muscle, iliacus muscle, gluteal muscles and Bilateral S1 nerve roots and right S2 nerve root in the sacral foramen are surrounded by the mass and probably invaded/impinged.     CT here showed  R shoulder - Large lytic metastasis involving the right anterior superior glenoid and coracoid with soft tissue component;  left shoulder - metastasis of Glenoid/Scapular body/ Clavicular head/ Teres minor muscle mass      CT chest/abd/pelvis - Severe pulmonary metastatic disease; Severe and widespread osseous metastatic disease/pathologic fractures; New liver lesion suggesting metastasis. Few peritoneal nodules suggesting peritoneal malignancy. new from prior CT dated 7/11/2023. PET scan 11/2023 was negative for chest and abdomen.       MRI brain showed MRI brain  noted C2 vertebral body bone metastasis. There is mild amount of anterior epidural tumor at this level. There is no spinal canal compromise. This is new since the previous study. No intracranial metastasis.     Medical oncology and radiation oncology consultation  S/p Sacrum Radiation started 1/17-19.   Completing course of radiation to spine and hip - following Dr. Fraga  Discussed with Dr. Kuo, he is BRAF V600E POSITIVE. Planning Braftovi/Mektovi, now insurance approved 1/25  Continue multimodal pain managements  TTE with LVEF of 60%, no valvular abnormalities.         Constipation- (present on admission)  Assessment & Plan  Likely due to hypercalcemia, opioid use and bedbound  Aggressive bowel regimen, oral Dulcolax, senna, MiraLAX  Dulcolax suppository  Patient had large bowel movement 1/23 PM, continue with aggressive bowel regimen.    Hypercalcemia- (present on admission)  Assessment & Plan  due to extensive bone metastasis.    TSH 3.5, PTH 3.7 (appropriately depressed)  He has been treated with aggressive IV fluid, received 1 dose of IV Zometa, His hypercalcemia slowly improving, corrected calcium down to 11.3.   Labs reviewed, resolved    Hypophosphatemia  Assessment & Plan  Oral supplementation  Serial BMP, magnesium, phosphorus levels ordered for tomorrow morning to continue to monitor electrolytes     Hypocalcemia  Assessment & Plan  Corrected Ca low  Iv calcium gluconate     Elevated LFTs  Assessment & Plan  No abdominal pain.  Total bilirubin normal.  Hepatitis panel negative.  Likely due to radiation?  Continue monitoring    Pathologic fracture  Assessment & Plan  Due to wide spread metastasis  I have consulted oncological orthopedics Dr. Burleson: Weightbearing as tolerated bilateral lower extremities with walker assist; Weightbearing as tolerated right upper extremity; Nonweightbearing left upper extremity except to assist with a walker and ADLs    Patient unable to continue working with physical  therapy and occupational therapy due to severe pain.  He reported left-sided pain, x-ray imaging performed, no evidence of new fracture, previous left ischium and right superior ramus fracture noted.    Will have orthopedic oncology surgery review imaging and given patient's inability to stand, if surgery would be an option.      DNR (do not resuscitate)  Assessment & Plan  DNR/DNI per discussion with palliative     Hyperglycemia  Assessment & Plan  Due to steroids  BG over 200  I started SSI  Hypoglycemia protocol    Leukocytosis  Assessment & Plan  likely due to steroid use.  No infectious signs    Fever- (present on admission)  Assessment & Plan   likely due to extensive metastasis;   no identified infection signs;   elevated procalcitonin could be related to malignancy;   blood culture NTD, continue to hold antibiotics, monitoring    Intractable low back pain- (present on admission)  Assessment & Plan   likely due to tumor compression of the S1/S2,   continue Decadron, his reported his pain has been improving.    Pepcid for GI prophylaxis  Multimodal pain managements including po and iv narcotics prn. Monitoring respiratory status and sedation score  Patient seen at bedside today, patient reports pain is tolerable, pain regimen has been adjusted by palliative care.    Anemia- (present on admission)  Assessment & Plan  Likely due to underlying malignancy, high ferritin and B12.   No sign of active bleeding    Continue to monitor, transfuse if Hb less than 7         VTE prophylaxis: Lovenox    I have performed a physical exam and reviewed and updated ROS and Plan today (1/25/2024). In review of yesterday's note (1/24/2024), there are no changes except as documented above.      Greater than 56 minutes spent prepping to see patient (e.g. reviewing EMR) obtaining and/or reviewing separately obtained history. Performing a medically appropriate examination and evaluation. Independently interpreting results and  communicating results to patient/family/caregiver. Counseling and educating the patient/family/caregiver. Ordering medications, tests, and/or procedures. Referring and communicating with other health care professionals.  Documenting clinical information in EPIC. Care coordination.

## 2024-01-26 NOTE — PROGRESS NOTES
Patient delivered to [facility] for his final treatments to C-spine and right hip. Treatment delivered successfully and patient returned to his room by transport.

## 2024-01-26 NOTE — FACE TO FACE
Face to Face Note  -  Durable Medical Equipment    Tiffany Adams D.O. - NPI: [id number]  I certify that this patient is under my care and that they had a durable medical equipment(DME)face to face encounter by myself that meets the physician DME face-to-face encounter requirements with this patient on:    Date of encounter:   Patient:                    MRN:                       YOB: 2025  Andrew Chavira Mae  [id number]  1965     The encounter with the patient was in whole, or in part, for the following medical condition, which is the primary reason for durable medical equipment:  Other - Metastatic Melanoma with multiple pathologic fractures    I certify that, based on my findings, the following durable medical equipment is medically necessary:    Beds.    My Clinical findings support the need for the above equipment due to:  Bedbound/non-weight bearing, Frequent Falls    Patient will require frequent repositioning to reduce pressure on the spine and help alleviate the back pain. Patient has multple pathologic fractures throughout upper extremities and hip, limiting his mobolity. Due to deconditioning patient is unable to fully position themself as necessary.

## 2024-01-26 NOTE — DISCHARGE PLANNING
Case Management Discharge Planning    Admission Date: 1/15/2024  GMLOS: 3.5  ALOS: 11    6-Clicks ADL Score: 12  6-Clicks Mobility Score: 6  PT and/or OT Eval ordered: Yes  Post-acute Referrals Ordered: Yes  Post-acute Choice Obtained: Yes  Has referral(s) been sent to post-acute provider:  Yes      Anticipated Discharge Dispo: Discharge Disposition: D/T to home under HHA care in anticipation of covered skilled care (06)    DME Needed: Yes    DME Ordered: Yes    Action(s) Taken: Discussed in IDT rounds, pt has been accepted with [facility], DME- wheelchair has been delivered at bedside. Discussed with [facility] pt to begin oral chemotherapy medication today once wife brings it to bedside. Family would like hospital bed per Palliative team, when speaking with wife she would like to wait sending order to [facility] as she is going to call around and see where she can rent a hospital bed from as family would like electric hospital bed. Orthopedic Surgical oncology re consulted for recommendations. Pt received last dose of radiation today. Plan for discharge when medically cleared will be home with [facility] and follow up outpatient with Dr. Ross at [facility].     Escalations Completed: None    Medically Clear: No    Next Steps: Starting oral chemotherapy and orthopedic surgical oncology.     Barriers to Discharge: Medical clearance    Is the patient up for discharge tomorrow: No

## 2024-01-26 NOTE — PROGRESS NOTES
Oncology/Hematology Progress Note               Author: Clara Parra M.D. Date & Time created: 1/26/2024  1:14 PM     CC: Metastatic Melanoma    Interval History:  No acute changes overnight. Pt wife is at the bedside. He feels he is slightly better today. Working with therapies.    Review of Systems:  Review of Systems   Constitutional:  Positive for malaise/fatigue.   HENT: Negative.     Eyes: Negative.    Respiratory: Negative.     Cardiovascular: Negative.    Gastrointestinal: Negative.    Genitourinary: Negative.    Musculoskeletal:  Positive for back pain and joint pain.   Skin: Negative.    Neurological: Negative.    Endo/Heme/Allergies: Negative.    Psychiatric/Behavioral: Negative.         Physical Exam:  Physical Exam  Constitutional:       General: He is not in acute distress.     Appearance: He is ill-appearing.   HENT:      Head: Normocephalic and atraumatic.      Right Ear: External ear normal.      Left Ear: External ear normal.      Nose: Nose normal.   Eyes:      General: No scleral icterus.     Conjunctiva/sclera: Conjunctivae normal.   Cardiovascular:      Rate and Rhythm: Normal rate.   Pulmonary:      Effort: Pulmonary effort is normal.   Musculoskeletal:      Cervical back: Neck supple.   Neurological:      Mental Status: He is alert and oriented to person, place, and time.   Psychiatric:         Mood and Affect: Mood normal.         Behavior: Behavior normal.         Thought Content: Thought content normal.         Judgment: Judgment normal.         Labs:          Recent Labs     01/24/24 0127 01/25/24 0051   SODIUM 134* 134*   POTASSIUM 4.5 4.7   CHLORIDE 101 100   CO2 21 22   BUN 13 13   CREATININE 0.24* 0.24*   MAGNESIUM 1.7 1.7   PHOSPHORUS 1.8* 2.7   CALCIUM 7.1* 7.3*       Recent Labs     01/24/24  0127 01/25/24  0051   ALTSGPT 52*  --    ASTSGOT 32  --    ALKPHOSPHAT 291*  --    TBILIRUBIN 0.4  --    GLUCOSE 113* 128*       Recent Labs     01/24/24  0127 01/25/24  0051  24  002   RBC 3.19* 3.17* 3.15*   HEMOGLOBIN 9.2* 9.0* 8.9*   HEMATOCRIT 28.3* 27.5* 27.7*   PLATELETCT 270 238 241       Recent Labs     24   WBC 12.4* 13.0* 11.5*   NEUTSPOLYS 91.30* 87.10* 89.50*   LYMPHOCYTES 4.30* 3.50* 0.00*   MONOCYTES 3.50 3.40 4.40   EOSINOPHILS 0.00 0.00 0.00   BASOPHILS 0.00 0.00 0.00   ASTSGOT 32  --   --    ALTSGPT 52*  --   --    ALKPHOSPHAT 291*  --   --    TBILIRUBIN 0.4  --   --        Recent Labs     24   SODIUM 134* 134*   POTASSIUM 4.5 4.7   CHLORIDE 101 100   CO2 21 22   GLUCOSE 113* 128*   BUN 13 13   CREATININE 0.24* 0.24*   CALCIUM 7.1* 7.3*       Hemodynamics:  Temp (24hrs), Av.7 °C (98 °F), Min:36.4 °C (97.6 °F), Max:36.9 °C (98.5 °F)  Temperature: 36.4 °C (97.6 °F)  Pulse  Av.9  Min: 18  Max: 105   Blood Pressure: 103/58     Respiratory:    Respiration: 16, Pulse Oximetry: 91 %     Work Of Breathing / Effort: Within Normal Limits  RUL Breath Sounds: Clear, RML Breath Sounds: Clear, RLL Breath Sounds: Fine Crackles, ALKA Breath Sounds: Clear, LLL Breath Sounds: Fine Crackles  Fluids:    Intake/Output Summary (Last 24 hours) at 2024 1320  Last data filed at 2024 2329  Gross per 24 hour   Intake --   Output 800 ml   Net -800 ml        GI/Nutrition:  Orders Placed This Encounter   Procedures    Diet Order Diet: Regular     Standing Status:   Standing     Number of Occurrences:   1     Order Specific Question:   Diet:     Answer:   Regular [1]     Medical Decision Making, by Problem:  Active Hospital Problems    Diagnosis     *Metastatic melanoma (HCC) [C43.9]     Hypocalcemia [E83.51]     Pathologic fracture [M84.40XA]     Elevated LFTs [R79.89]     DNR (do not resuscitate) [Z66]     Leukocytosis [D72.829]     Hyperglycemia [R73.9]     Fever [R50.9]     Intractable low back pain [M54.59]     Constipation [K59.00]     Anemia [D64.9]     Hypercalcemia [E83.52]        Assessment and  Plan:    1.Metastatic melanoma--  He had an initial melanoma diagnosed in 2020, treated with surgical resection and 1 year of adjuvant Opdivo from 11/19/2020 through 11/4/2021.  He underwent further surveillance and monitoring.  He unfortunately is found to have recurrent/metastatic disease in October 2023.  He is now on palliative Opdivo and Yervoy therapy which started on 1/9/2024. Admitted with hypercalcemia of malignancy, as well as progressive back pain, right-sided sciatica, with numbness and weakness in the right leg.  Spine imaging has shown widely metastatic disease through the spine including involvement of C2, C6, and C7 with epidural infiltration at that point.  He also has a large lumbosacral mass involving the right sided nerve roots at S1 and S2.  He also has widely metastatic disease on CT imaging including more than 100 nodules in the lungs, possible liver disease, as well as widespread bony metastatic disease.     -- Reviewed [facility] Results with the patient and his wife, he is BRAF V600E POSITIVE. Discussed the implications of this mutation on treatments for melanoma. I discussed with him that given the widespread aggressive nature of his disease, the plan is to switch him to Braftovi/Mektovi as these therapies can provided a quicker response than immunotherapy.   -- I discussed the administration of therapy along with the potential side effects.  -- I also discussed prognosis and goals of therapy which he understands is palliative. He wants to proceed.   -- Baseline Echo is adequate  -- Will start Braftovi 450 mg po daily, and Mektovi 45 mg po q 12 hours tomorrow - pt wife will bring medication in.     2. Hypercalcemia of malignancy--he was treated with IV fluids as well as Zometa.  His calcium is now back to normal.  -- Resolved and now actually slightly low but likely corrected calcium is normal given low albumin.     3.  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  He has been seen by Dr. Fraga.  -- Completed palliative radiation therapy with Dr. Fraga     4.  Cancer related pain--currently seems to be under good control.  -- Palliative medicine is following     5.  Anemia--related to underlying disease.  No obvious signs of bleeding. Hgb has been stable in the mid-9 range throughout. Monitor labs     6.  Immobility, Reduced PS--he has some degree of immobility or difficulty ambulating related to pain as well as neurologic symptoms of the right leg.  He is being assessed by physical therapy for home needs.  -- Determine home needs prior to discharge  -- Continue PT/OT       Highly complex case, high risk of morbidity and mortality.    Quality-Core Measures

## 2024-01-27 LAB
ANISOCYTOSIS BLD QL SMEAR: ABNORMAL
BASOPHILS # BLD AUTO: 0 % (ref 0–1.8)
BASOPHILS # BLD: 0 K/UL (ref 0–0.12)
EOSINOPHIL # BLD AUTO: 0 K/UL (ref 0–0.51)
EOSINOPHIL NFR BLD: 0 % (ref 0–6.9)
ERYTHROCYTE [DISTWIDTH] IN BLOOD BY AUTOMATED COUNT: 46.2 FL (ref 35.9–50)
HCT VFR BLD AUTO: 27.3 % (ref 42–52)
HGB BLD-MCNC: 8.6 G/DL (ref 14–18)
LYMPHOCYTES # BLD AUTO: 0.14 K/UL (ref 1–4.8)
LYMPHOCYTES NFR BLD: 1.7 % (ref 22–41)
MANUAL DIFF BLD: NORMAL
MCH RBC QN AUTO: 27.7 PG (ref 27–33)
MCHC RBC AUTO-ENTMCNC: 31.5 G/DL (ref 32.3–36.5)
MCV RBC AUTO: 88.1 FL (ref 81.4–97.8)
METAMYELOCYTES NFR BLD MANUAL: 1.7 %
MICROCYTES BLD QL SMEAR: ABNORMAL
MONOCYTES # BLD AUTO: 0.37 K/UL (ref 0–0.85)
MONOCYTES NFR BLD AUTO: 4.4 % (ref 0–13.4)
MORPHOLOGY BLD-IMP: NORMAL
MYELOCYTES NFR BLD MANUAL: 1.7 %
NEUTROPHILS # BLD AUTO: 7.51 K/UL (ref 1.82–7.42)
NEUTROPHILS NFR BLD: 89.6 % (ref 44–72)
NEUTS BAND NFR BLD MANUAL: 0.9 % (ref 0–10)
NRBC # BLD AUTO: 0.02 K/UL
NRBC BLD-RTO: 0.2 /100 WBC (ref 0–0.2)
PLATELET # BLD AUTO: 215 K/UL (ref 164–446)
PLATELET BLD QL SMEAR: NORMAL
PMV BLD AUTO: 9 FL (ref 9–12.9)
POLYCHROMASIA BLD QL SMEAR: NORMAL
RBC # BLD AUTO: 3.1 M/UL (ref 4.7–6.1)
RBC BLD AUTO: PRESENT
WBC # BLD AUTO: 8.3 K/UL (ref 4.8–10.8)

## 2024-01-27 PROCEDURE — 97550 CAREGIVER TRAING 1ST 30 MIN: CPT

## 2024-01-27 PROCEDURE — 700102 HCHG RX REV CODE 250 W/ 637 OVERRIDE(OP): Performed by: NURSE PRACTITIONER

## 2024-01-27 PROCEDURE — 85007 BL SMEAR W/DIFF WBC COUNT: CPT

## 2024-01-27 PROCEDURE — 700102 HCHG RX REV CODE 250 W/ 637 OVERRIDE(OP)

## 2024-01-27 PROCEDURE — 99232 SBSQ HOSP IP/OBS MODERATE 35: CPT | Performed by: GENERAL PRACTICE

## 2024-01-27 PROCEDURE — 700111 HCHG RX REV CODE 636 W/ 250 OVERRIDE (IP): Mod: JZ | Performed by: STUDENT IN AN ORGANIZED HEALTH CARE EDUCATION/TRAINING PROGRAM

## 2024-01-27 PROCEDURE — A9270 NON-COVERED ITEM OR SERVICE: HCPCS | Performed by: STUDENT IN AN ORGANIZED HEALTH CARE EDUCATION/TRAINING PROGRAM

## 2024-01-27 PROCEDURE — A9270 NON-COVERED ITEM OR SERVICE: HCPCS | Performed by: NURSE PRACTITIONER

## 2024-01-27 PROCEDURE — 700102 HCHG RX REV CODE 250 W/ 637 OVERRIDE(OP): Performed by: STUDENT IN AN ORGANIZED HEALTH CARE EDUCATION/TRAINING PROGRAM

## 2024-01-27 PROCEDURE — 36415 COLL VENOUS BLD VENIPUNCTURE: CPT

## 2024-01-27 PROCEDURE — 85027 COMPLETE CBC AUTOMATED: CPT

## 2024-01-27 PROCEDURE — 770004 HCHG ROOM/CARE - ONCOLOGY PRIVATE *

## 2024-01-27 RX ADMIN — MORPHINE SULFATE 60 MG: 60 TABLET, FILM COATED, EXTENDED RELEASE ORAL at 06:47

## 2024-01-27 RX ADMIN — BINIMETINIB 45 MG: 15 TABLET, FILM COATED ORAL at 16:25

## 2024-01-27 RX ADMIN — METHOCARBAMOL 500 MG: 500 TABLET ORAL at 12:44

## 2024-01-27 RX ADMIN — MORPHINE SULFATE 60 MG: 60 TABLET, FILM COATED, EXTENDED RELEASE ORAL at 16:25

## 2024-01-27 RX ADMIN — DOCUSATE SODIUM 50 MG AND SENNOSIDES 8.6 MG 2 TABLET: 8.6; 5 TABLET, FILM COATED ORAL at 16:25

## 2024-01-27 RX ADMIN — ENCORAFENIB 450 MG: 75 CAPSULE ORAL at 16:26

## 2024-01-27 RX ADMIN — METHOCARBAMOL 500 MG: 500 TABLET ORAL at 16:25

## 2024-01-27 RX ADMIN — POLYETHYLENE GLYCOL 3350 1 PACKET: 17 POWDER, FOR SOLUTION ORAL at 06:46

## 2024-01-27 RX ADMIN — ENOXAPARIN SODIUM 40 MG: 100 INJECTION SUBCUTANEOUS at 16:24

## 2024-01-27 RX ADMIN — DULOXETINE HYDROCHLORIDE 60 MG: 20 CAPSULE, DELAYED RELEASE ORAL at 06:47

## 2024-01-27 RX ADMIN — BINIMETINIB 45 MG: 15 TABLET, FILM COATED ORAL at 06:48

## 2024-01-27 RX ADMIN — DOCUSATE SODIUM 50 MG AND SENNOSIDES 8.6 MG 2 TABLET: 8.6; 5 TABLET, FILM COATED ORAL at 06:46

## 2024-01-27 RX ADMIN — GABAPENTIN 100 MG: 300 CAPSULE ORAL at 12:44

## 2024-01-27 RX ADMIN — DEXAMETHASONE 4 MG: 4 TABLET ORAL at 08:30

## 2024-01-27 RX ADMIN — BISACODYL 10 MG: 10 SUPPOSITORY RECTAL at 09:50

## 2024-01-27 RX ADMIN — GABAPENTIN 100 MG: 300 CAPSULE ORAL at 06:47

## 2024-01-27 RX ADMIN — GABAPENTIN 300 MG: 300 CAPSULE ORAL at 16:25

## 2024-01-27 RX ADMIN — METHOCARBAMOL 500 MG: 500 TABLET ORAL at 20:48

## 2024-01-27 RX ADMIN — DEXAMETHASONE 4 MG: 4 TABLET ORAL at 12:44

## 2024-01-27 RX ADMIN — OXYCODONE HYDROCHLORIDE 10 MG: 10 TABLET ORAL at 12:44

## 2024-01-27 RX ADMIN — FAMOTIDINE 20 MG: 20 TABLET ORAL at 06:47

## 2024-01-27 ASSESSMENT — ENCOUNTER SYMPTOMS
NEUROLOGICAL NEGATIVE: 1
CARDIOVASCULAR NEGATIVE: 1
BACK PAIN: 1
PSYCHIATRIC NEGATIVE: 1
GASTROINTESTINAL NEGATIVE: 1
RESPIRATORY NEGATIVE: 1
EYES NEGATIVE: 1
WEAKNESS: 1

## 2024-01-27 ASSESSMENT — PAIN DESCRIPTION - PAIN TYPE
TYPE: ACUTE PAIN

## 2024-01-27 NOTE — DISCHARGE PLANNING
Case Management Discharge Planning    Admission Date: 1/15/2024  GMLOS: 3.5  ALOS: 12    6-Clicks ADL Score: 12  6-Clicks Mobility Score: 6  PT and/or OT Eval ordered: Yes  Post-acute Referrals Ordered: Yes  Post-acute Choice Obtained: Yes  Has referral(s) been sent to post-acute provider:  Yes      Anticipated Discharge Dispo: Discharge Disposition: D/T to home under HHA care in anticipation of covered skilled care (06)    DME Needed: hospital bed    Action(s) Taken: Request to DPA to please check on status of hospital bed.    Medically Clear: No    Next Steps:   Follow up with [facility].    Barriers to Discharge: DME and Transportation, medical clearance

## 2024-01-27 NOTE — THERAPY
01/27/24 1126   Charge Group   PT Caregiver Training First 30 Minutes Caregiver Training - 1st 30 Minutes   Total Time Spent   PT Self Care/Home Evaluation Time Spent (Mins) 30   Precautions   Precautions Fall Risk   Other Treatments   Other Treatments Provided Pt unable to particiapte in transfer training due to having asuppository. Provided extensive family with spouse and pt on W/C parts and operation, home safety, body mechanics, lifting techniques with W/C into hatchback trunk, postioning for squat pivot transfers, hand placement all with good understanding from pt and spouse. Will attempt to return later in day for trasnfer training if able   Interdisciplinary Plan of Care Collaboration   IDT Collaboration with  Nursing;Family / Caregiver   Patient Position at End of Therapy In Bed   Collaboration Comments re; pt education radha   Session Information   Date / Session Number  1/27-3 ( 3/4, 1/24)

## 2024-01-27 NOTE — DISCHARGE PLANNING
Spoke To: Celsa  Agency/Facility Name: [facility]  Plan or Request: Hospital bed planned to be delivered Monday.

## 2024-01-27 NOTE — PROGRESS NOTES
Oncology/Hematology Progress Note               Author: Clara Parra M.D. Date & Time created: 1/27/2024  2:30 PM     CC: Metastatic Melanoma    Interval History:  No acute changes overnight. Pt started Braftovi and Mektovi last night had reports no SE to date. He reports his pain is controlled. He is working on mobility. He did sit up on the edge of the bed. He continues to work with PT/OT.    Review of Systems:  Review of Systems   Constitutional:  Positive for malaise/fatigue.   HENT: Negative.     Eyes: Negative.    Respiratory: Negative.     Cardiovascular: Negative.    Gastrointestinal: Negative.    Genitourinary: Negative.    Musculoskeletal:  Positive for back pain and joint pain.   Skin: Negative.    Neurological: Negative.    Endo/Heme/Allergies: Negative.    Psychiatric/Behavioral: Negative.         Physical Exam:  Physical Exam  Constitutional:       General: He is not in acute distress.     Appearance: He is ill-appearing.   HENT:      Head: Normocephalic and atraumatic.      Right Ear: External ear normal.      Left Ear: External ear normal.      Nose: Nose normal.   Eyes:      General: No scleral icterus.     Conjunctiva/sclera: Conjunctivae normal.   Cardiovascular:      Rate and Rhythm: Normal rate.   Pulmonary:      Effort: Pulmonary effort is normal.   Musculoskeletal:      Cervical back: Neck supple.   Neurological:      Mental Status: He is alert and oriented to person, place, and time.   Psychiatric:         Mood and Affect: Mood normal.         Behavior: Behavior normal.         Thought Content: Thought content normal.         Judgment: Judgment normal.         Labs:          Recent Labs     01/25/24  0051   SODIUM 134*   POTASSIUM 4.7   CHLORIDE 100   CO2 22   BUN 13   CREATININE 0.24*   MAGNESIUM 1.7   PHOSPHORUS 2.7   CALCIUM 7.3*       Recent Labs     01/25/24  0051   GLUCOSE 128*       Recent Labs     01/25/24  0051 01/26/24  0027 01/27/24  0348   RBC 3.17* 3.15* 3.10*   HEMOGLOBIN  9.0* 8.9* 8.6*   HEMATOCRIT 27.5* 27.7* 27.3*   PLATELETCT 238 241 215       Recent Labs     24  0051 24  0027 24  0348   WBC 13.0* 11.5* 8.3   NEUTSPOLYS 87.10* 89.50* 89.60*   LYMPHOCYTES 3.50* 0.00* 1.70*   MONOCYTES 3.40 4.40 4.40   EOSINOPHILS 0.00 0.00 0.00   BASOPHILS 0.00 0.00 0.00       Recent Labs     24  0051   SODIUM 134*   POTASSIUM 4.7   CHLORIDE 100   CO2 22   GLUCOSE 128*   BUN 13   CREATININE 0.24*   CALCIUM 7.3*       Hemodynamics:  Temp (24hrs), Av.7 °C (98 °F), Min:36.5 °C (97.7 °F), Max:36.9 °C (98.5 °F)  Temperature: 36.6 °C (97.9 °F)  Pulse  Av.3  Min: 18  Max: 105   Blood Pressure: 108/69     Respiratory:    Respiration: 18, Pulse Oximetry: 96 %     Work Of Breathing / Effort: Within Normal Limits  RUL Breath Sounds: Clear, RML Breath Sounds: Clear, RLL Breath Sounds: Diminished, ALKA Breath Sounds: Clear, LLL Breath Sounds: Diminished  Fluids:    Intake/Output Summary (Last 24 hours) at 2024 1320  Last data filed at 2024 2329  Gross per 24 hour   Intake --   Output 800 ml   Net -800 ml        GI/Nutrition:  Orders Placed This Encounter   Procedures    Diet Order Diet: Regular     Standing Status:   Standing     Number of Occurrences:   1     Order Specific Question:   Diet:     Answer:   Regular [1]     Medical Decision Making, by Problem:  Active Hospital Problems    Diagnosis     *Metastatic melanoma (HCC) [C43.9]     Hypocalcemia [E83.51]     Pathologic fracture [M84.40XA]     Elevated LFTs [R79.89]     DNR (do not resuscitate) [Z66]     Leukocytosis [D72.829]     Hyperglycemia [R73.9]     Fever [R50.9]     Intractable low back pain [M54.59]     Constipation [K59.00]     Anemia [D64.9]     Hypercalcemia [E83.52]        Assessment and Plan:    1.Metastatic melanoma--  He had an initial melanoma diagnosed in , treated with surgical resection and 1 year of adjuvant Opdivo from 2020 through 2021.  He underwent further surveillance and  monitoring.  He unfortunately is found to have recurrent/metastatic disease in October 2023.  He is now on palliative Opdivo and Yervoy therapy which started on 1/9/2024. Admitted with hypercalcemia of malignancy, as well as progressive back pain, right-sided sciatica, with numbness and weakness in the right leg.  Spine imaging has shown widely metastatic disease through the spine including involvement of C2, C6, and C7 with epidural infiltration at that point.  He also has a large lumbosacral mass involving the right sided nerve roots at S1 and S2.  He also has widely metastatic disease on CT imaging including more than 100 nodules in the lungs, possible liver disease, as well as widespread bony metastatic disease.     -- Reviewed [facility] Results with the patient and his wife, he is BRAF V600E POSITIVE. Discussed the implications of this mutation on treatments for melanoma. I discussed with him that given the widespread aggressive nature of his disease, the plan is to switch him to Braftovi/Mektovi as these therapies can provided a quicker response than immunotherapy.   -- I discussed the administration of therapy along with the potential side effects.  -- I also discussed prognosis and goals of therapy which he understands is palliative. He wants to proceed.   -- Baseline Echo is adequate  -- Started Braftovi 450 mg po daily, and Mektovi 45 mg po q 12 hours and is tolerating thus far, continue therapy.     2. Hypercalcemia of malignancy--he was treated with IV fluids as well as Zometa.  His calcium is now back to normal.  -- Resolved and now actually slightly low but likely corrected calcium is normal given low albumin.     3.  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  He has been seen by Dr. Fraga.  -- Completed  palliative radiation therapy with Dr. Fraga     4.  Cancer related pain--currently seems to be under good control.  -- Palliative medicine is following     5.  Anemia--related to underlying disease.  No obvious signs of bleeding. Hgb has been stable in the mid-9 range throughout. Monitor labs     6.  Immobility, Reduced PS--he has some degree of immobility or difficulty ambulating related to pain as well as neurologic symptoms of the right leg.  He is being assessed by physical therapy for home needs.  -- Determine home needs prior to discharge  -- Continue PT/OT       Highly complex case, high risk of morbidity and mortality.    Quality-Core Measures

## 2024-01-27 NOTE — CARE PLAN
The patient is Stable - Low risk of patient condition declining or worsening    Shift Goals  Clinical Goals: Pain control, Work on getting out of bed  Patient Goals: Pain control  Family Goals: Not present    Progress made toward(s) clinical / shift goals:        Problem: Knowledge Deficit - Standard  Goal: Patient and family/care givers will demonstrate understanding of plan of care, disease process/condition, diagnostic tests and medications  Description: Target End Date:  1-3 days or as soon as patient condition allows    Document in Patient Education    1.  Patient and family/caregiver oriented to unit, equipment, visitation policy and means for communicating concern  2.  Complete/review Learning Assessment  3.  Assess knowledge level of disease process/condition, treatment plan, diagnostic tests and medications  4.  Explain disease process/condition, treatment plan, diagnostic tests and medications  Outcome: Progressing  Note: Patient understands the need for continued work on getting out of bed and sitting at edge of bed more. Patient understands that importance of this to help him better care for himself at home. Patient will continue to rate pain using a scale of 1-10.

## 2024-01-27 NOTE — CARE PLAN
The patient is Stable - Low risk of patient condition declining or worsening    Shift Goals  Clinical Goals: Patient will have bowel motility. Patient will complete radiation and have PRS 3/10 or less  Patient Goals: DC plan after radiation  Family Goals: Not present    Progress made toward(s) clinical / shift goals:  Patient completed radiation and tolerated well. Patient denies PRN medication for pain by end of shift.     Problem: Knowledge Deficit - Standard  Goal: Patient and family/care givers will demonstrate understanding of plan of care, disease process/condition, diagnostic tests and medications  Outcome: Progressing; Discussed POC and DC plan with patient and family at bedside. Questions answered as appropriate.      Problem: Pain - Standard  Goal: Alleviation of pain or a reduction in pain to the patient’s comfort goal  Outcome: Progressing; Patient tolerates PRN medication well; reduced demand for intervention this shift compared to earlier in the week.     Problem: Fall Risk  Goal: Patient will remain free from falls  Outcome: Progressing; patient remains free from injury and falls this shift. Fall precautions in place. Patient calls appropriately for assistance.      Problem: Skin Integrity  Goal: Skin integrity is maintained or improved  Outcome: Progressing; patient repositions self well. No new S/S of skin breakdown       Patient is not progressing towards the following goals: No BM this shift.       Problem: Mobility  Goal: Patient's capacity to carry out activities will improve  Outcome: Not Progressing; patient limited by pain and unable to mobilize.

## 2024-01-27 NOTE — PROGRESS NOTES
[facility] Medicine Daily Progress Note    Date of Service  1/27/2024    Chief Complaint  Andrew Wall is a 58 y.o. male admitted 1/15/2024 with abnormal labs    Hospital Course  This is a 58-year-old male with past medical history of metastatic melanoma on immunotherapy who presented to the ED on 1/15/2024 with hypercalcemia.    Patient started on aggressive IV fluid and Zometa with resolution and hypercalcemia.     Patient with history of extensive bone metastasis and now with new metastasis to lung and liver. Patient's oncology group was consulted, recommended transfer to our facility for inpatient radiation. Patient received radiation to sacrum 1/17 -1/19.  Patient then received 5-day course of radiation for spine and hip.    Patient with multiple pathological fractures due to metastasis, oncological orthopedics consulted, weightbearing as tolerated with walker assist, weightbearing as tolerated in the right upper extremity, nonweightbearing in left upper extremity except to assist with walker and ADLs.     Interval Problem Update  Patient completed palliative radiation to spine, sacrum and right hip.    Will continue to have patient work with physical therapy and Occupational Therapy, with goals the patient can transfer from bed to wheelchair.    Hospital bed ordered, delivery on Monday.    Patient's wife at bedside, updated on plan of care, all questions answered.    I have discussed this patient's plan of care and discharge plan at IDT rounds today with Case Management, Nursing, Nursing leadership, and other members of the IDT team.    Consultants/Specialty  oncology, orthopedics, palliative care, and radiation oncology    Code Status  DNAR/DNI    Disposition  The patient is not medically cleared for discharge to home or a post-acute facility.  Anticipate discharge to: home with organized home healthcare and close outpatient follow-up    I have placed the appropriate orders for post-discharge  needs.    Review of Systems  Review of Systems   Constitutional:  Positive for malaise/fatigue.   Neurological:  Positive for weakness.   All other systems reviewed and are negative.       Physical Exam  Temp:  [36.5 °C (97.7 °F)-36.9 °C (98.5 °F)] 36.6 °C (97.9 °F)  Pulse:  [86-98] 98  Resp:  [17-18] 18  BP: (108-117)/(69-72) 108/69  SpO2:  [93 %-96 %] 96 %    Physical Exam  Vitals and nursing note reviewed.   Constitutional:       Appearance: Normal appearance. He is ill-appearing.   HENT:      Head: Normocephalic and atraumatic.      Mouth/Throat:      Pharynx: Oropharynx is clear.   Eyes:      Pupils: Pupils are equal, round, and reactive to light.   Neck:      Vascular: No carotid bruit.   Cardiovascular:      Rate and Rhythm: Normal rate and regular rhythm.   Pulmonary:      Effort: Pulmonary effort is normal.      Breath sounds: Normal breath sounds.   Abdominal:      General: Abdomen is flat. Bowel sounds are normal.      Palpations: Abdomen is soft. There is no mass.   Musculoskeletal:         General: Tenderness present. Normal range of motion.      Cervical back: Neck supple.   Skin:     General: Skin is warm and dry.   Neurological:      General: No focal deficit present.      Mental Status: He is alert and oriented to person, place, and time.   Psychiatric:         Mood and Affect: Mood normal.         Behavior: Behavior normal.         Fluids  No intake or output data in the 24 hours ending 01/27/24 1408      Laboratory  Recent Labs     01/25/24  0051 01/26/24  0027 01/27/24  0348   WBC 13.0* 11.5* 8.3   RBC 3.17* 3.15* 3.10*   HEMOGLOBIN 9.0* 8.9* 8.6*   HEMATOCRIT 27.5* 27.7* 27.3*   MCV 86.8 87.9 88.1   MCH 28.4 28.3 27.7   MCHC 32.7 32.1* 31.5*   RDW 45.6 46.2 46.2   PLATELETCT 238 241 215   MPV 8.8* 9.1 9.0       Recent Labs     01/25/24  0051   SODIUM 134*   POTASSIUM 4.7   CHLORIDE 100   CO2 22   GLUCOSE 128*   BUN 13   CREATININE 0.24*   CALCIUM 7.3*                      Imaging  DX-PELVIS-TRAUMA SERIES  3-   Final Result      1. Multiple ill-defined lytic lesions with pathologic fractures in the pelvis, several ill-defined secondary to overlying stool and bowel gas.   2. The hips are located.      DX-FEMUR-2+ LEFT   Final Result      1. Stable subcortical peripherally sclerotic lytic lesion in the left femoral neck measuring 8 mm in diameter.   2. The remainder of the left femur radiography is within normal limits.      DX-HIP-COMPLETE - UNILATERAL 2+ RIGHT   Final Result      1.  Bone metastases and right-sided pathologic fracture again noted.      2.  Findings are consistent with mild osteoarthritis.   3.  2. Fracture or malalignment identified.      DX-TIBIA AND FIBULA LEFT   Final Result      Normal left tibia/fibula radiography.      EC-ECHOCARDIOGRAM COMPLETE W/ CONT   Final Result      DX-BONE SURVEY-METASTATIC LTD   Final Result         1.  Lytic changes of the left glenoid concerning for metastatic disease.   2.  Small peripherally sclerotic lytic lesion in the left femoral neck, appearance suggests small bony cyst, otherwise indeterminate.   3.  Atherosclerosis      MR-CERVICAL SPINE-WITH & W/O   Final Result         1.  Infiltrative metastatic disease with complete replacement of the bone marrow at C2, C6 and C7 with involvement of the posterior elements as described in detail above.      2.  There is ventral epidural infiltration by metastatic disease at C2, C6 and C7.      3.   Cortical breakthrough with extension of disease to the pre and paravertebral soft tissues is noted at C6 and C7 as described above.      4.  Severe right foraminal narrowing at C5-6.      5.  There is no significant spinal canal stenosis, cord compression or evidence for cord signal abnormality.      MR-THORACIC SPINE-WITH & W/O   Final Result         Multiple metastatic lesions noted throughout the thoracic spine as described above.      Large spinous process lesion noted at T3, T9.       Large right paraspinal metastatic lesion involvement of the right-sided pedicle and lamina noted at T3.      Ventral epidural extension of metastatic disease noted at the T5, T5-T6 level without significant cord impingement.      Large left paraspinous soft tissue metastatic lesion and a small right subcutaneous lesion noted at the T1-T2 level.      No significant spinal canal stenosis.                    Assessment/Plan  * Metastatic melanoma, BRAF V600E POSITIVE  (HCC)- (present on admission)  Assessment & Plan  Patient followed by oncologist Dr. Ross, has been on immunotherapy.  He has an upcoming appointment with radiation oncology.     Images showed extensive bone metastasis and new metastasis to lung and liver  outpatient MRI showed extensive metastases through the lumbar spine, sacrum, paraspinous muscles, psoas muscle, iliacus muscle, gluteal muscles and Bilateral S1 nerve roots and right S2 nerve root in the sacral foramen are surrounded by the mass and probably invaded/impinged.     CT here showed  R shoulder - Large lytic metastasis involving the right anterior superior glenoid and coracoid with soft tissue component;  left shoulder - metastasis of Glenoid/Scapular body/ Clavicular head/ Teres minor muscle mass      CT chest/abd/pelvis - Severe pulmonary metastatic disease; Severe and widespread osseous metastatic disease/pathologic fractures; New liver lesion suggesting metastasis. Few peritoneal nodules suggesting peritoneal malignancy. new from prior CT dated 7/11/2023. PET scan 11/2023 was negative for chest and abdomen.       MRI brain showed MRI brain noted C2 vertebral body bone metastasis. There is mild amount of anterior epidural tumor at this level. There is no spinal canal compromise. This is new since the previous study. No intracranial metastasis.     Medical oncology and radiation oncology consultation  S/p Sacrum Radiation started 1/17-19.   Completing course of radiation to spine and  hip - following Dr. Fraga  Discussed with Dr. Kuo, he is BRAF V600E POSITIVE. Planning Braftovi/Mektovi, now insurance approved 1/25, will start 1/27  Continue multimodal pain managements  TTE with LVEF of 60%, no valvular abnormalities.       Constipation- (present on admission)  Assessment & Plan  Likely due to hypercalcemia, opioid use and bedbound  Aggressive bowel regimen, oral Dulcolax, senna, MiraLAX  Dulcolax suppository  Patient had large bowel movement 1/23 PM, continue with aggressive bowel regimen.    Hypercalcemia- (present on admission)  Assessment & Plan  due to extensive bone metastasis.    TSH 3.5, PTH 3.7 (appropriately depressed)  He has been treated with aggressive IV fluid, received 1 dose of IV Zometa, His hypercalcemia slowly improving, corrected calcium down to 11.3.   Labs reviewed, resolved    Hypophosphatemia  Assessment & Plan  Oral supplementation  Serial BMP, magnesium, phosphorus levels ordered for tomorrow morning to continue to monitor electrolytes     Hypocalcemia  Assessment & Plan  Corrected Ca low  Iv calcium gluconate     Elevated LFTs  Assessment & Plan  No abdominal pain.  Total bilirubin normal.  Hepatitis panel negative.  Likely due to radiation?  Continue monitoring    Pathologic fracture  Assessment & Plan  Due to wide spread metastasis  I have consulted oncological orthopedics Dr. Burleson: Weightbearing as tolerated bilateral lower extremities with walker assist; Weightbearing as tolerated right upper extremity; Nonweightbearing left upper extremity except to assist with a walker and ADLs    Patient unable to continue working with physical therapy and occupational therapy due to severe pain.  He reported left-sided pain, x-ray imaging performed, no evidence of new fracture, previous left ischium and right superior ramus fracture noted.    Case discussed with orthopedic oncology, at this time no indication for surgical intervention.  Pelvic x-ray pending.      DNR (do  not resuscitate)  Assessment & Plan  DNR/DNI per discussion with palliative     Hyperglycemia  Assessment & Plan  Due to steroids  BG over 200  I started SSI  Hypoglycemia protocol    Leukocytosis  Assessment & Plan  likely due to steroid use.  No infectious signs    Fever- (present on admission)  Assessment & Plan   likely due to extensive metastasis;   no identified infection signs;   elevated procalcitonin could be related to malignancy;   blood culture NTD, continue to hold antibiotics, monitoring    Intractable low back pain- (present on admission)  Assessment & Plan   likely due to tumor compression of the S1/S2,   continue Decadron, his reported his pain has been improving.    Pepcid for GI prophylaxis  Multimodal pain managements including po and iv narcotics prn. Monitoring respiratory status and sedation score  Patient seen at bedside today, patient reports pain is tolerable, pain regimen has been adjusted by palliative care.    Anemia- (present on admission)  Assessment & Plan  Likely due to underlying malignancy, high ferritin and B12.   No sign of active bleeding    Continue to monitor, transfuse if Hb less than 7         VTE prophylaxis: Lovenox    I have performed a physical exam and reviewed and updated ROS and Plan today (1/27/2024). In review of yesterday's note (1/26/2024), there are no changes except as documented above.

## 2024-01-28 LAB
ALBUMIN SERPL BCP-MCNC: 2.9 G/DL (ref 3.2–4.9)
ALBUMIN/GLOB SERPL: 1.2 G/DL
ALP SERPL-CCNC: 296 U/L (ref 30–99)
ALT SERPL-CCNC: 34 U/L (ref 2–50)
ANION GAP SERPL CALC-SCNC: 9 MMOL/L (ref 7–16)
AST SERPL-CCNC: 68 U/L (ref 12–45)
BILIRUB SERPL-MCNC: 0.5 MG/DL (ref 0.1–1.5)
BUN SERPL-MCNC: 19 MG/DL (ref 8–22)
CALCIUM ALBUM COR SERPL-MCNC: 8.7 MG/DL (ref 8.5–10.5)
CALCIUM SERPL-MCNC: 7.8 MG/DL (ref 8.5–10.5)
CHLORIDE SERPL-SCNC: 100 MMOL/L (ref 96–112)
CO2 SERPL-SCNC: 23 MMOL/L (ref 20–33)
CREAT SERPL-MCNC: 0.3 MG/DL (ref 0.5–1.4)
GFR SERPLBLD CREATININE-BSD FMLA CKD-EPI: 136 ML/MIN/1.73 M 2
GLOBULIN SER CALC-MCNC: 2.5 G/DL (ref 1.9–3.5)
GLUCOSE SERPL-MCNC: 137 MG/DL (ref 65–99)
POTASSIUM SERPL-SCNC: 4.5 MMOL/L (ref 3.6–5.5)
PROT SERPL-MCNC: 5.4 G/DL (ref 6–8.2)
SODIUM SERPL-SCNC: 132 MMOL/L (ref 135–145)

## 2024-01-28 PROCEDURE — A9270 NON-COVERED ITEM OR SERVICE: HCPCS | Performed by: STUDENT IN AN ORGANIZED HEALTH CARE EDUCATION/TRAINING PROGRAM

## 2024-01-28 PROCEDURE — 770004 HCHG ROOM/CARE - ONCOLOGY PRIVATE *

## 2024-01-28 PROCEDURE — 700102 HCHG RX REV CODE 250 W/ 637 OVERRIDE(OP): Performed by: NURSE PRACTITIONER

## 2024-01-28 PROCEDURE — 80053 COMPREHEN METABOLIC PANEL: CPT

## 2024-01-28 PROCEDURE — 700111 HCHG RX REV CODE 636 W/ 250 OVERRIDE (IP): Mod: JZ | Performed by: GENERAL PRACTICE

## 2024-01-28 PROCEDURE — A9270 NON-COVERED ITEM OR SERVICE: HCPCS | Performed by: NURSE PRACTITIONER

## 2024-01-28 PROCEDURE — 700102 HCHG RX REV CODE 250 W/ 637 OVERRIDE(OP): Performed by: STUDENT IN AN ORGANIZED HEALTH CARE EDUCATION/TRAINING PROGRAM

## 2024-01-28 PROCEDURE — 99232 SBSQ HOSP IP/OBS MODERATE 35: CPT | Performed by: GENERAL PRACTICE

## 2024-01-28 PROCEDURE — 700111 HCHG RX REV CODE 636 W/ 250 OVERRIDE (IP): Mod: JZ | Performed by: STUDENT IN AN ORGANIZED HEALTH CARE EDUCATION/TRAINING PROGRAM

## 2024-01-28 PROCEDURE — 36415 COLL VENOUS BLD VENIPUNCTURE: CPT

## 2024-01-28 PROCEDURE — RXMED WILLOW AMBULATORY MEDICATION CHARGE: Performed by: GENERAL PRACTICE

## 2024-01-28 PROCEDURE — 700102 HCHG RX REV CODE 250 W/ 637 OVERRIDE(OP)

## 2024-01-28 RX ORDER — AMOXICILLIN 250 MG
2 CAPSULE ORAL 2 TIMES DAILY
Qty: 120 TABLET | Refills: 0 | Status: SHIPPED | OUTPATIENT
Start: 2024-01-28 | End: 2024-02-27

## 2024-01-28 RX ORDER — MORPHINE SULFATE 60 MG/1
60 TABLET, FILM COATED, EXTENDED RELEASE ORAL EVERY 12 HOURS
Qty: 14 TABLET | Refills: 0 | Status: SHIPPED | OUTPATIENT
Start: 2024-01-29 | End: 2024-01-28

## 2024-01-28 RX ORDER — DULOXETIN HYDROCHLORIDE 60 MG/1
60 CAPSULE, DELAYED RELEASE ORAL DAILY
Qty: 30 CAPSULE | Refills: 0 | Status: SHIPPED | OUTPATIENT
Start: 2024-01-30 | End: 2024-02-29

## 2024-01-28 RX ORDER — DEXAMETHASONE 4 MG/1
4 TABLET ORAL 2 TIMES DAILY
Qty: 28 TABLET | Refills: 0 | Status: SHIPPED | OUTPATIENT
Start: 2024-01-29 | End: 2024-01-29

## 2024-01-28 RX ORDER — MORPHINE SULFATE 60 MG/1
60 TABLET, FILM COATED, EXTENDED RELEASE ORAL EVERY 12 HOURS
Qty: 14 TABLET | Refills: 0 | Status: SHIPPED | OUTPATIENT
Start: 2024-01-28 | End: 2024-02-05 | Stop reason: SDUPTHER

## 2024-01-28 RX ORDER — BISACODYL 10 MG
10 SUPPOSITORY, RECTAL RECTAL DAILY
Qty: 30 SUPPOSITORY | Refills: 0 | Status: SHIPPED | OUTPATIENT
Start: 2024-01-29 | End: 2024-02-28

## 2024-01-28 RX ORDER — OXYCODONE HYDROCHLORIDE 10 MG/1
10 TABLET ORAL EVERY 6 HOURS PRN
Qty: 28 TABLET | Refills: 0 | Status: SHIPPED | OUTPATIENT
Start: 2024-01-29 | End: 2024-01-28

## 2024-01-28 RX ORDER — METHOCARBAMOL 500 MG/1
500 TABLET, FILM COATED ORAL 4 TIMES DAILY
Qty: 120 TABLET | Refills: 0 | Status: SHIPPED | OUTPATIENT
Start: 2024-01-28 | End: 2024-02-28

## 2024-01-28 RX ORDER — FAMOTIDINE 20 MG/1
20 TABLET, FILM COATED ORAL DAILY
Qty: 30 TABLET | Refills: 0 | Status: SHIPPED | OUTPATIENT
Start: 2024-01-30 | End: 2024-02-29

## 2024-01-28 RX ORDER — OXYCODONE HYDROCHLORIDE 10 MG/1
10 TABLET ORAL EVERY 6 HOURS PRN
Qty: 28 TABLET | Refills: 0 | Status: SHIPPED | OUTPATIENT
Start: 2024-01-28 | End: 2024-02-05

## 2024-01-28 RX ADMIN — METHOCARBAMOL 500 MG: 500 TABLET ORAL at 08:49

## 2024-01-28 RX ADMIN — MORPHINE SULFATE 4 MG: 4 INJECTION, SOLUTION INTRAMUSCULAR; INTRAVENOUS at 12:40

## 2024-01-28 RX ADMIN — FAMOTIDINE 20 MG: 20 TABLET ORAL at 06:05

## 2024-01-28 RX ADMIN — METHOCARBAMOL 500 MG: 500 TABLET ORAL at 20:52

## 2024-01-28 RX ADMIN — DULOXETINE HYDROCHLORIDE 60 MG: 20 CAPSULE, DELAYED RELEASE ORAL at 06:05

## 2024-01-28 RX ADMIN — GABAPENTIN 100 MG: 300 CAPSULE ORAL at 06:05

## 2024-01-28 RX ADMIN — DEXAMETHASONE 4 MG: 4 TABLET ORAL at 08:49

## 2024-01-28 RX ADMIN — ENOXAPARIN SODIUM 40 MG: 100 INJECTION SUBCUTANEOUS at 17:26

## 2024-01-28 RX ADMIN — DOCUSATE SODIUM 50 MG AND SENNOSIDES 8.6 MG 2 TABLET: 8.6; 5 TABLET, FILM COATED ORAL at 06:04

## 2024-01-28 RX ADMIN — BINIMETINIB 45 MG: 15 TABLET, FILM COATED ORAL at 06:05

## 2024-01-28 RX ADMIN — MORPHINE SULFATE 60 MG: 60 TABLET, FILM COATED, EXTENDED RELEASE ORAL at 06:05

## 2024-01-28 RX ADMIN — MORPHINE SULFATE 60 MG: 60 TABLET, FILM COATED, EXTENDED RELEASE ORAL at 17:25

## 2024-01-28 RX ADMIN — GABAPENTIN 100 MG: 300 CAPSULE ORAL at 12:40

## 2024-01-28 RX ADMIN — DOCUSATE SODIUM 50 MG AND SENNOSIDES 8.6 MG 2 TABLET: 8.6; 5 TABLET, FILM COATED ORAL at 17:25

## 2024-01-28 RX ADMIN — DEXAMETHASONE 4 MG: 4 TABLET ORAL at 12:40

## 2024-01-28 RX ADMIN — GABAPENTIN 300 MG: 300 CAPSULE ORAL at 17:25

## 2024-01-28 RX ADMIN — METHOCARBAMOL 500 MG: 500 TABLET ORAL at 12:40

## 2024-01-28 RX ADMIN — OXYCODONE HYDROCHLORIDE 10 MG: 10 TABLET ORAL at 09:48

## 2024-01-28 RX ADMIN — OXYCODONE HYDROCHLORIDE 10 MG: 10 TABLET ORAL at 15:45

## 2024-01-28 RX ADMIN — BINIMETINIB 45 MG: 15 TABLET, FILM COATED ORAL at 17:26

## 2024-01-28 RX ADMIN — METHOCARBAMOL 500 MG: 500 TABLET ORAL at 17:26

## 2024-01-28 RX ADMIN — ENCORAFENIB 450 MG: 75 CAPSULE ORAL at 17:27

## 2024-01-28 RX ADMIN — POLYETHYLENE GLYCOL 3350 1 PACKET: 17 POWDER, FOR SOLUTION ORAL at 06:04

## 2024-01-28 ASSESSMENT — ENCOUNTER SYMPTOMS
GASTROINTESTINAL NEGATIVE: 1
PSYCHIATRIC NEGATIVE: 1
BACK PAIN: 1
WEAKNESS: 1
CARDIOVASCULAR NEGATIVE: 1
NEUROLOGICAL NEGATIVE: 1
RESPIRATORY NEGATIVE: 1
EYES NEGATIVE: 1

## 2024-01-28 ASSESSMENT — PAIN DESCRIPTION - PAIN TYPE
TYPE: ACUTE PAIN

## 2024-01-28 NOTE — DISCHARGE PLANNING
Case Management Discharge Planning    Admission Date: 1/15/2024  GMLOS: 3.5  ALOS: 13    6-Clicks ADL Score: 12  6-Clicks Mobility Score: 6  PT and/or OT Eval ordered: Yes  Post-acute Referrals Ordered: Yes  Post-acute Choice Obtained: Yes  Has referral(s) been sent to post-acute provider:  Yes      Anticipated Discharge Dispo: Discharge Disposition: D/T to home under [facility] care in anticipation of covered skilled care (06)    DME Needed: No, has received w/c and hospital bed to be delivered tomorrow.    Action(s) Taken: Updated Provider/Nurse on Discharge Plan    Escalations Completed: None    Medically Clear: No    Next Steps: Received message from charge RN to set up medical transportation for discharge to home tomorrow. Met with pt and wife to discuss. DME to be delivered to home in am. Transport will be scheduled for 1400. Pt works for [employer] and this transport is not to be billed. Spoke with Fan at [facility] [phone]. He confirmed that pt will not be billed for transport. Faxed forms to [facility].     Barriers to Discharge: Medical clearance    Is the patient up for discharge tomorrow: Yes    Is transport arranged for discharge disposition: Yes, [facility] at 1400 1/29/2024.

## 2024-01-28 NOTE — PROGRESS NOTES
Oncology/Hematology Progress Note               Author: Clara Parra M.D. Date & Time created: 1/28/2024  12:12 PM     CC: Metastatic Melanoma    Interval History:  No acute changes overnight. Pt tolerating Braftovi and Mektovi. Is able to transfer to wheelchair with minimal assist. Pain is controlled.    Review of Systems:  Review of Systems   Constitutional:  Positive for malaise/fatigue.   HENT: Negative.     Eyes: Negative.    Respiratory: Negative.     Cardiovascular: Negative.    Gastrointestinal: Negative.    Genitourinary: Negative.    Musculoskeletal:  Positive for back pain and joint pain.   Skin: Negative.    Neurological: Negative.    Endo/Heme/Allergies: Negative.    Psychiatric/Behavioral: Negative.         Physical Exam:  Physical Exam  Constitutional:       General: He is not in acute distress.  HENT:      Head: Normocephalic and atraumatic.      Right Ear: External ear normal.      Left Ear: External ear normal.      Nose: Nose normal.   Eyes:      General: No scleral icterus.     Conjunctiva/sclera: Conjunctivae normal.   Cardiovascular:      Rate and Rhythm: Normal rate.   Pulmonary:      Effort: Pulmonary effort is normal.   Musculoskeletal:      Cervical back: Neck supple.   Skin:     Coloration: Skin is not jaundiced.   Neurological:      Mental Status: He is alert and oriented to person, place, and time.   Psychiatric:         Mood and Affect: Mood normal.         Behavior: Behavior normal.         Thought Content: Thought content normal.         Judgment: Judgment normal.         Labs:          Recent Labs     01/28/24  0707   SODIUM 132*   POTASSIUM 4.5   CHLORIDE 100   CO2 23   BUN 19   CREATININE 0.30*   CALCIUM 7.8*       Recent Labs     01/28/24  0707   ALTSGPT 34   ASTSGOT 68*   ALKPHOSPHAT 296*   TBILIRUBIN 0.5   GLUCOSE 137*       Recent Labs     01/26/24  0027 01/27/24  0348   RBC 3.15* 3.10*   HEMOGLOBIN 8.9* 8.6*   HEMATOCRIT 27.7* 27.3*   PLATELETCT 241 215       Recent Labs      24  0027 24  0348 24  0707   WBC 11.5* 8.3  --    NEUTSPOLYS 89.50* 89.60*  --    LYMPHOCYTES 0.00* 1.70*  --    MONOCYTES 4.40 4.40  --    EOSINOPHILS 0.00 0.00  --    BASOPHILS 0.00 0.00  --    ASTSGOT  --   --  68*   ALTSGPT  --   --  34   ALKPHOSPHAT  --   --  296*   TBILIRUBIN  --   --  0.5       Recent Labs     24  0707   SODIUM 132*   POTASSIUM 4.5   CHLORIDE 100   CO2 23   GLUCOSE 137*   BUN 19   CREATININE 0.30*   CALCIUM 7.8*       Hemodynamics:  Temp (24hrs), Av.7 °C (98.1 °F), Min:36.6 °C (97.9 °F), Max:36.9 °C (98.5 °F)  Temperature: 36.9 °C (98.5 °F)  Pulse  Av.7  Min: 18  Max: 105   Blood Pressure: 109/62     Respiratory:    Respiration: 16, Pulse Oximetry: 95 %     Work Of Breathing / Effort: Within Normal Limits  RUL Breath Sounds: Clear, RML Breath Sounds: Clear, RLL Breath Sounds: Clear;Diminished, ALKA Breath Sounds: Clear, LLL Breath Sounds: Clear;Diminished  Fluids:    Intake/Output Summary (Last 24 hours) at 2024 1320  Last data filed at 2024 2329  Gross per 24 hour   Intake --   Output 800 ml   Net -800 ml        GI/Nutrition:  Orders Placed This Encounter   Procedures    Diet Order Diet: Regular     Standing Status:   Standing     Number of Occurrences:   1     Order Specific Question:   Diet:     Answer:   Regular [1]     Medical Decision Making, by Problem:  Active Hospital Problems    Diagnosis     *Metastatic melanoma (HCC) [C43.9]     Hypocalcemia [E83.51]     Pathologic fracture [M84.40XA]     Elevated LFTs [R79.89]     DNR (do not resuscitate) [Z66]     Leukocytosis [D72.829]     Hyperglycemia [R73.9]     Fever [R50.9]     Intractable low back pain [M54.59]     Constipation [K59.00]     Anemia [D64.9]     Hypercalcemia [E83.52]        Assessment and Plan:    1.Metastatic melanoma--  He had an initial melanoma diagnosed in , treated with surgical resection and 1 year of adjuvant Opdivo from 2020 through 2021.  He underwent  further surveillance and monitoring.  He unfortunately is found to have recurrent/metastatic disease in October 2023. Admitted with hypercalcemia of malignancy, as well as progressive back pain, right-sided sciatica, with numbness and weakness in the right leg.  Spine imaging has shown widely metastatic disease through the spine including involvement of C2, C6, and C7 with epidural infiltration at that point.  He also has a large lumbosacral mass involving the right sided nerve roots at S1 and S2.  He also has widely metastatic disease on CT imaging including more than 100 nodules in the lungs, possible liver disease, as well as widespread bony metastatic disease.     -- Reviewed [facility] Results with the patient and his wife, he is BRAF V600E POSITIVE. Discussed the implications of this mutation on treatments for melanoma. I discussed with him that given the widespread aggressive nature of his disease, he was switched to Braftovi/Mektovi as these therapies can provided a quicker response than immunotherapy.   -- I discussed the administration of therapy along with the potential side effects.  -- I also discussed prognosis and goals of therapy which he understands is palliative. He wants to proceed.   -- Baseline Echo is adequate  -- Started Braftovi 450 mg po daily, and Mektovi 45 mg po q 12 hours and is tolerating thus far, continue therapy.     2. Hypercalcemia of malignancy--he was treated with IV fluids as well as Zometa.  His calcium is now back to normal.  -- Resolved and now actually slightly low but likely corrected calcium is normal given low albumin.     3.  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  He has been seen by Dr. Fraga.  -- Completed palliative radiation therapy with Dr. Fraga     4.  Cancer related  pain--currently seems to be under good control.  -- Palliative medicine is following     5.  Anemia--related to underlying disease.  No obvious signs of bleeding.Monitor labs     6.  Immobility, Reduced PS--he has some degree of immobility or difficulty ambulating related to pain as well as neurologic symptoms of the right leg.  He is being assessed by physical therapy for home needs.  -- Determine home needs prior to discharge  -- Continue PT/OT     Oncology will sign off, he has follow up with Dr. Ross on 1/30/24 @ 10:30 AM.    Highly complex case, high risk of morbidity and mortality.    Quality-Core Measures

## 2024-01-28 NOTE — PROGRESS NOTES
[facility] Medicine Daily Progress Note    Date of Service  1/28/2024    Chief Complaint  Andrew Wall is a 58 y.o. male admitted 1/15/2024 with abnormal labs    Hospital Course  This is a 58-year-old male with past medical history of metastatic melanoma on immunotherapy who presented to the ED on 1/15/2024 with hypercalcemia.    Patient started on aggressive IV fluid and Zometa with resolution and hypercalcemia.     Patient with history of extensive bone metastasis and now with new metastasis to lung and liver. Patient's oncology group was consulted, recommended transfer to our facility for inpatient radiation. Patient received radiation to sacrum 1/17 -1/19.  Patient then received 5-day course of radiation for spine and hip.    Patient with multiple pathological fractures due to metastasis, oncological orthopedics consulted, weightbearing as tolerated with walker assist, weightbearing as tolerated in the right upper extremity, nonweightbearing in left upper extremity except to assist with walker and ADLs.     Interval Problem Update  Patient completed palliative radiation to spine, sacrum and right hip.    Patient more mobile, able to transfer with some assistance to wheelchair.    Hospital bed ordered, delivery on Monday.    Once a bed sent for all his medications.  Anticipate discharge home in the next 24 hours.    I have discussed this patient's plan of care and discharge plan at IDT rounds today with Case Management, Nursing, Nursing leadership, and other members of the IDT team.    Consultants/Specialty  oncology, orthopedics, palliative care, and radiation oncology    Code Status  DNAR/DNI    Disposition  The patient is medically cleared for discharge to home or a post-acute facility.  Anticipate discharge to: home with organized home healthcare and close outpatient follow-up    I have placed the appropriate orders for post-discharge needs.    Review of Systems  Review of Systems   Constitutional:   Positive for malaise/fatigue.   Neurological:  Positive for weakness.   All other systems reviewed and are negative.       Physical Exam  Temp:  [36.6 °C (97.9 °F)-36.9 °C (98.5 °F)] 36.9 °C (98.5 °F)  Pulse:  [87-99] 99  Resp:  [16-17] 16  BP: ()/(61-64) 109/62  SpO2:  [95 %-96 %] 95 %    Physical Exam  Vitals and nursing note reviewed.   Constitutional:       Appearance: Normal appearance. He is ill-appearing.   HENT:      Head: Normocephalic and atraumatic.      Mouth/Throat:      Pharynx: Oropharynx is clear.   Eyes:      Pupils: Pupils are equal, round, and reactive to light.   Neck:      Vascular: No carotid bruit.   Cardiovascular:      Rate and Rhythm: Normal rate and regular rhythm.   Pulmonary:      Effort: Pulmonary effort is normal.      Breath sounds: Normal breath sounds.   Abdominal:      General: Abdomen is flat. Bowel sounds are normal.      Palpations: Abdomen is soft. There is no mass.   Musculoskeletal:         General: Tenderness present. Normal range of motion.      Cervical back: Neck supple.   Skin:     General: Skin is warm and dry.   Neurological:      General: No focal deficit present.      Mental Status: He is alert and oriented to person, place, and time.   Psychiatric:         Mood and Affect: Mood normal.         Behavior: Behavior normal.         Fluids    Intake/Output Summary (Last 24 hours) at 1/28/2024 1339  Last data filed at 1/28/2024 0908  Gross per 24 hour   Intake --   Output 300 ml   Net -300 ml       Laboratory  Recent Labs     01/26/24  0027 01/27/24  0348   WBC 11.5* 8.3   RBC 3.15* 3.10*   HEMOGLOBIN 8.9* 8.6*   HEMATOCRIT 27.7* 27.3*   MCV 87.9 88.1   MCH 28.3 27.7   MCHC 32.1* 31.5*   RDW 46.2 46.2   PLATELETCT 241 215   MPV 9.1 9.0     Recent Labs     01/28/24  0707   SODIUM 132*   POTASSIUM 4.5   CHLORIDE 100   CO2 23   GLUCOSE 137*   BUN 19   CREATININE 0.30*   CALCIUM 7.8*                   Imaging  DX-PELVIS-TRAUMA SERIES  3-   Final Result      1. Multiple  ill-defined lytic lesions with pathologic fractures in the pelvis, several ill-defined secondary to overlying stool and bowel gas.   2. The hips are located.      DX-FEMUR-2+ LEFT   Final Result      1. Stable subcortical peripherally sclerotic lytic lesion in the left femoral neck measuring 8 mm in diameter.   2. The remainder of the left femur radiography is within normal limits.      DX-HIP-COMPLETE - UNILATERAL 2+ RIGHT   Final Result      1.  Bone metastases and right-sided pathologic fracture again noted.      2.  Findings are consistent with mild osteoarthritis.   3.  2. Fracture or malalignment identified.      DX-TIBIA AND FIBULA LEFT   Final Result      Normal left tibia/fibula radiography.      EC-ECHOCARDIOGRAM COMPLETE W/ CONT   Final Result      DX-BONE SURVEY-METASTATIC LTD   Final Result         1.  Lytic changes of the left glenoid concerning for metastatic disease.   2.  Small peripherally sclerotic lytic lesion in the left femoral neck, appearance suggests small bony cyst, otherwise indeterminate.   3.  Atherosclerosis      MR-CERVICAL SPINE-WITH & W/O   Final Result         1.  Infiltrative metastatic disease with complete replacement of the bone marrow at C2, C6 and C7 with involvement of the posterior elements as described in detail above.      2.  There is ventral epidural infiltration by metastatic disease at C2, C6 and C7.      3.   Cortical breakthrough with extension of disease to the pre and paravertebral soft tissues is noted at C6 and C7 as described above.      4.  Severe right foraminal narrowing at C5-6.      5.  There is no significant spinal canal stenosis, cord compression or evidence for cord signal abnormality.      MR-THORACIC SPINE-WITH & W/O   Final Result         Multiple metastatic lesions noted throughout the thoracic spine as described above.      Large spinous process lesion noted at T3, T9.      Large right paraspinal metastatic lesion involvement of the right-sided  pedicle and lamina noted at T3.      Ventral epidural extension of metastatic disease noted at the T5, T5-T6 level without significant cord impingement.      Large left paraspinous soft tissue metastatic lesion and a small right subcutaneous lesion noted at the T1-T2 level.      No significant spinal canal stenosis.                    Assessment/Plan  * Metastatic melanoma, BRAF V600E POSITIVE  (HCC)- (present on admission)  Assessment & Plan  Patient followed by oncologist Dr. Ross, has been on immunotherapy.  He has an upcoming appointment with radiation oncology.     Images showed extensive bone metastasis and new metastasis to lung and liver  outpatient MRI showed extensive metastases through the lumbar spine, sacrum, paraspinous muscles, psoas muscle, iliacus muscle, gluteal muscles and Bilateral S1 nerve roots and right S2 nerve root in the sacral foramen are surrounded by the mass and probably invaded/impinged.     CT here showed  R shoulder - Large lytic metastasis involving the right anterior superior glenoid and coracoid with soft tissue component;  left shoulder - metastasis of Glenoid/Scapular body/ Clavicular head/ Teres minor muscle mass      CT chest/abd/pelvis - Severe pulmonary metastatic disease; Severe and widespread osseous metastatic disease/pathologic fractures; New liver lesion suggesting metastasis. Few peritoneal nodules suggesting peritoneal malignancy. new from prior CT dated 7/11/2023. PET scan 11/2023 was negative for chest and abdomen.       MRI brain showed MRI brain noted C2 vertebral body bone metastasis. There is mild amount of anterior epidural tumor at this level. There is no spinal canal compromise. This is new since the previous study. No intracranial metastasis.     Medical oncology and radiation oncology consultation  S/p Sacrum Radiation started 1/17-19.   Completing course of radiation to spine and hip - following Dr. Fraga  Discussed with Dr. Kuo, he is BRAF V600E  POSITIVE. Planning Braftovi/Mektovi, now insurance approved 1/25, will start 1/27  Continue multimodal pain managements  TTE with LVEF of 60%, no valvular abnormalities.         Constipation- (present on admission)  Assessment & Plan  Likely due to hypercalcemia, opioid use and bedbound  Aggressive bowel regimen, oral Dulcolax, senna, MiraLAX  Dulcolax suppository  Patient had large bowel movement 1/23 PM, continue with aggressive bowel regimen.    Hypercalcemia- (present on admission)  Assessment & Plan  due to extensive bone metastasis.    TSH 3.5, PTH 3.7 (appropriately depressed)  He has been treated with aggressive IV fluid, received 1 dose of IV Zometa, His hypercalcemia slowly improving, corrected calcium down to 11.3.   Labs reviewed, resolved    Hypophosphatemia  Assessment & Plan  Oral supplementation  Serial BMP, magnesium, phosphorus levels ordered for tomorrow morning to continue to monitor electrolytes     Hypocalcemia  Assessment & Plan  Corrected Ca low  Iv calcium gluconate     Elevated LFTs  Assessment & Plan  No abdominal pain.  Total bilirubin normal.  Hepatitis panel negative.  Likely due to radiation?  Continue monitoring    Pathologic fracture  Assessment & Plan  Due to wide spread metastasis  I have consulted oncological orthopedics Dr. Burleson: Weightbearing as tolerated bilateral lower extremities with walker assist; Weightbearing as tolerated right upper extremity; Nonweightbearing left upper extremity except to assist with a walker and ADLs    Patient unable to continue working with physical therapy and occupational therapy due to severe pain.  He reported left-sided pain, x-ray imaging performed, no evidence of new fracture, previous left ischium and right superior ramus fracture noted.    Case discussed with orthopedic oncology, at this time no indication for surgical intervention.  Pelvic x-ray pending.      DNR (do not resuscitate)  Assessment & Plan  DNR/DNI per discussion with  palliative     Hyperglycemia  Assessment & Plan  Due to steroids  BG over 200  I started SSI  Hypoglycemia protocol    Leukocytosis  Assessment & Plan  likely due to steroid use.  No infectious signs    Fever- (present on admission)  Assessment & Plan   likely due to extensive metastasis;   no identified infection signs;   elevated procalcitonin could be related to malignancy;   blood culture NTD, continue to hold antibiotics, monitoring    Intractable low back pain- (present on admission)  Assessment & Plan   likely due to tumor compression of the S1/S2,   continue Decadron, his reported his pain has been improving.    Pepcid for GI prophylaxis  Multimodal pain managements including po and iv narcotics prn. Monitoring respiratory status and sedation score  Patient seen at bedside today, patient reports pain is tolerable, pain regimen has been adjusted by palliative care.    Anemia- (present on admission)  Assessment & Plan  Likely due to underlying malignancy, high ferritin and B12.   No sign of active bleeding    Continue to monitor, transfuse if Hb less than 7         VTE prophylaxis: Lovenox    I have performed a physical exam and reviewed and updated ROS and Plan today (1/28/2024). In review of yesterday's note (1/27/2024), there are no changes except as documented above.

## 2024-01-28 NOTE — CARE PLAN
The patient is Stable - Low risk of patient condition declining or worsening    Shift Goals  Clinical Goals: Pain control  Patient Goals: [facility], rest  Family Goals: Not present    Progress made toward(s) clinical / shift goals:        Problem: Knowledge Deficit - Standard  Goal: Patient and family/care givers will demonstrate understanding of plan of care, disease process/condition, diagnostic tests and medications  Description: Target End Date:  1-3 days or as soon as patient condition allows    Document in Patient Education    1.  Patient and family/caregiver oriented to unit, equipment, visitation policy and means for communicating concern  2.  Complete/review Learning Assessment  3.  Assess knowledge level of disease process/condition, treatment plan, diagnostic tests and medications  4.  Explain disease process/condition, treatment plan, diagnostic tests and medications  Outcome: Progressing  Note: Patient will help nurse keep pain under a 5 for the day. Patient will report pain using a scale of 1-10.

## 2024-01-29 ENCOUNTER — PHARMACY VISIT (OUTPATIENT)
Dept: PHARMACY | Facility: MEDICAL CENTER | Age: 59
End: 2024-01-29
Payer: COMMERCIAL

## 2024-01-29 VITALS
DIASTOLIC BLOOD PRESSURE: 69 MMHG | HEART RATE: 109 BPM | SYSTOLIC BLOOD PRESSURE: 114 MMHG | OXYGEN SATURATION: 94 % | RESPIRATION RATE: 16 BRPM | TEMPERATURE: 98.9 F | WEIGHT: 188.71 LBS | BODY MASS INDEX: 26.32 KG/M2

## 2024-01-29 LAB
APPEARANCE UR: CLEAR
BACTERIA #/AREA URNS HPF: NEGATIVE /HPF
BILIRUB UR QL STRIP.AUTO: NEGATIVE
COLOR UR: ABNORMAL
EPI CELLS #/AREA URNS HPF: NEGATIVE /HPF
GLUCOSE UR STRIP.AUTO-MCNC: NEGATIVE MG/DL
HYALINE CASTS #/AREA URNS LPF: ABNORMAL /LPF
KETONES UR STRIP.AUTO-MCNC: NEGATIVE MG/DL
LEUKOCYTE ESTERASE UR QL STRIP.AUTO: ABNORMAL
MICRO URNS: ABNORMAL
NITRITE UR QL STRIP.AUTO: NEGATIVE
PH UR STRIP.AUTO: 7.5 [PH] (ref 5–8)
PROT UR QL STRIP: NEGATIVE MG/DL
RBC # URNS HPF: ABNORMAL /HPF
RBC UR QL AUTO: NEGATIVE
SP GR UR STRIP.AUTO: 1.02
UROBILINOGEN UR STRIP.AUTO-MCNC: 4 MG/DL
WBC #/AREA URNS HPF: ABNORMAL /HPF

## 2024-01-29 PROCEDURE — RXMED WILLOW AMBULATORY MEDICATION CHARGE: Performed by: GENERAL PRACTICE

## 2024-01-29 PROCEDURE — 700102 HCHG RX REV CODE 250 W/ 637 OVERRIDE(OP): Performed by: STUDENT IN AN ORGANIZED HEALTH CARE EDUCATION/TRAINING PROGRAM

## 2024-01-29 PROCEDURE — 700111 HCHG RX REV CODE 636 W/ 250 OVERRIDE (IP): Mod: JZ | Performed by: GENERAL PRACTICE

## 2024-01-29 PROCEDURE — 99231 SBSQ HOSP IP/OBS SF/LOW 25: CPT | Performed by: NURSE PRACTITIONER

## 2024-01-29 PROCEDURE — 99239 HOSP IP/OBS DSCHRG MGMT >30: CPT | Performed by: GENERAL PRACTICE

## 2024-01-29 PROCEDURE — A9270 NON-COVERED ITEM OR SERVICE: HCPCS | Performed by: NURSE PRACTITIONER

## 2024-01-29 PROCEDURE — 700102 HCHG RX REV CODE 250 W/ 637 OVERRIDE(OP)

## 2024-01-29 PROCEDURE — A9270 NON-COVERED ITEM OR SERVICE: HCPCS | Performed by: STUDENT IN AN ORGANIZED HEALTH CARE EDUCATION/TRAINING PROGRAM

## 2024-01-29 PROCEDURE — 700102 HCHG RX REV CODE 250 W/ 637 OVERRIDE(OP): Performed by: NURSE PRACTITIONER

## 2024-01-29 PROCEDURE — 81001 URINALYSIS AUTO W/SCOPE: CPT

## 2024-01-29 RX ORDER — DEXAMETHASONE 4 MG/1
2 TABLET ORAL 2 TIMES DAILY
Status: DISCONTINUED | OUTPATIENT
Start: 2024-01-29 | End: 2024-01-29 | Stop reason: HOSPADM

## 2024-01-29 RX ORDER — HYDROXYZINE HYDROCHLORIDE 25 MG/1
25 TABLET, FILM COATED ORAL 3 TIMES DAILY PRN
Qty: 30 TABLET | Refills: 0 | Status: SHIPPED | OUTPATIENT
Start: 2024-01-29 | End: 2024-03-05

## 2024-01-29 RX ORDER — DEXAMETHASONE 2 MG/1
2 TABLET ORAL 2 TIMES DAILY
Qty: 8 TABLET | Refills: 0 | Status: SHIPPED | OUTPATIENT
Start: 2024-01-30 | End: 2024-02-02

## 2024-01-29 RX ORDER — DEXAMETHASONE 4 MG/1
2 TABLET ORAL DAILY
Status: DISCONTINUED | OUTPATIENT
Start: 2024-02-01 | End: 2024-01-29 | Stop reason: HOSPADM

## 2024-01-29 RX ORDER — DEXAMETHASONE 2 MG/1
2 TABLET ORAL DAILY
Qty: 3 TABLET | Refills: 0 | Status: SHIPPED | OUTPATIENT
Start: 2024-02-03 | End: 2024-02-06

## 2024-01-29 RX ADMIN — DEXAMETHASONE 2 MG: 4 TABLET ORAL at 12:24

## 2024-01-29 RX ADMIN — FAMOTIDINE 20 MG: 20 TABLET ORAL at 06:15

## 2024-01-29 RX ADMIN — METHOCARBAMOL 500 MG: 500 TABLET ORAL at 08:40

## 2024-01-29 RX ADMIN — GABAPENTIN 100 MG: 300 CAPSULE ORAL at 06:15

## 2024-01-29 RX ADMIN — OXYCODONE HYDROCHLORIDE 10 MG: 10 TABLET ORAL at 12:24

## 2024-01-29 RX ADMIN — DULOXETINE HYDROCHLORIDE 60 MG: 20 CAPSULE, DELAYED RELEASE ORAL at 06:15

## 2024-01-29 RX ADMIN — POLYETHYLENE GLYCOL 3350 1 PACKET: 17 POWDER, FOR SOLUTION ORAL at 06:15

## 2024-01-29 RX ADMIN — BISACODYL 10 MG: 10 SUPPOSITORY RECTAL at 06:15

## 2024-01-29 RX ADMIN — MORPHINE SULFATE 4 MG: 4 INJECTION, SOLUTION INTRAMUSCULAR; INTRAVENOUS at 13:43

## 2024-01-29 RX ADMIN — BINIMETINIB 45 MG: 15 TABLET, FILM COATED ORAL at 06:15

## 2024-01-29 RX ADMIN — MORPHINE SULFATE 60 MG: 60 TABLET, FILM COATED, EXTENDED RELEASE ORAL at 06:15

## 2024-01-29 RX ADMIN — DOCUSATE SODIUM 50 MG AND SENNOSIDES 8.6 MG 2 TABLET: 8.6; 5 TABLET, FILM COATED ORAL at 06:15

## 2024-01-29 RX ADMIN — METHOCARBAMOL 500 MG: 500 TABLET ORAL at 12:24

## 2024-01-29 RX ADMIN — DEXAMETHASONE 4 MG: 4 TABLET ORAL at 08:40

## 2024-01-29 RX ADMIN — GABAPENTIN 100 MG: 300 CAPSULE ORAL at 12:24

## 2024-01-29 ASSESSMENT — PAIN DESCRIPTION - PAIN TYPE
TYPE: ACUTE PAIN

## 2024-01-29 NOTE — DISCHARGE SUMMARY
Discharge Summary    CHIEF COMPLAINT ON ADMISSION  No chief complaint on file.      Reason for Admission  Metastatic melanoma (HCC)     Admission Date  1/15/2024    CODE STATUS  DNAR/DNI    HPI & HOSPITAL COURSE  This is a 58-year-old male with past medical history of metastatic melanoma on immunotherapy who presented to the ED on 1/15/2024 with hypercalcemia.    Patient started on aggressive IV fluid and Zometa with resolution and hypercalcemia.     Patient with history of extensive bone metastasis and now with new metastasis to lung and liver. Patient's oncology group was consulted, recommended transfer to our facility for inpatient radiation. Patient received radiation to sacrum 1/17 -1/19.  Patient then received 5-day course of radiation for spine and hip.    Patient with multiple pathological fractures due to metastasis, oncological orthopedics consulted, weightbearing as tolerated with walker assist, weightbearing as tolerated in the right upper extremity, nonweightbearing in left upper extremity except to assist with walker and ADLs.     Prolonged admission due to pain control and inability to ambulate. On day of discharge, patient able to transfer to wheelchair comfortably.  Hospital bed delivered.  Patient has follow-up with oncology this week.    Therefore, he is discharged in fair and stable condition to home with organized home healthcare and close outpatient follow-up.    The patient met 2-midnight criteria for an inpatient stay at the time of discharge.    Discharge Date  1/29/2024    FOLLOW UP ITEMS POST DISCHARGE  Oncology    DISCHARGE DIAGNOSES  Principal Problem:    Metastatic melanoma, BRAF V600E POSITIVE  (HCC) (POA: Yes)  Active Problems:    Hypercalcemia (POA: Yes)    Constipation (POA: Yes)    Anemia (POA: Yes)    Intractable low back pain (POA: Yes)    Fever (POA: Yes)    Leukocytosis (POA: Unknown)    Hyperglycemia (POA: Unknown)    DNR (do not resuscitate) (POA: Unknown)    Pathologic  fracture (POA: Unknown)    Elevated LFTs (POA: Unknown)    Hypocalcemia (POA: Unknown)    Hypophosphatemia (POA: Unknown)  Resolved Problems:    * No resolved hospital problems. *      FOLLOW UP  Future Appointments   Date Time Provider Department Center   2/13/2024  8:00 AM Julia Fraga M.D. RADT None     Jose Luis Juarez M.D.  [address]  [facility] [place] [id number] [phone]    Follow up      [facility]  [id number]  [facility] [address]  [phone]          MEDICATIONS ON DISCHARGE     Medication List        START taking these medications        Instructions   Binimetinib 15 MG Tabs   Take 45 mg by mouth 2 times a day.  Dose: 45 mg     bisacodyl 10 MG Supp  Commonly known as: Dulcolax   Insert 1 Suppository into the rectum every day for 30 days.  Dose: 10 mg     * dexamethasone 2 MG tablet  Start taking on: January 30, 2024  Commonly known as: Decadron   Take 1 Tablet by mouth 2 times a day for 5 doses.  Dose: 2 mg     * dexamethasone 2 MG tablet  Start taking on: February 3, 2024  Commonly known as: Decadron   Take 1 Tablet by mouth every day for 3 doses.  Dose: 2 mg     DULoxetine 60 MG Cpep delayed-release capsule  Start taking on: January 30, 2024  Commonly known as: Cymbalta   Take 1 Capsule by mouth every day for 30 days.  Dose: 60 mg     Encorafenib 75 MG Caps   Take 450 mg by mouth every day.  Dose: 450 mg     famotidine 20 MG Tabs  Start taking on: January 30, 2024  Commonly known as: Pepcid   Take 1 Tablet by mouth every day for 30 days.  Dose: 20 mg     hydrOXYzine HCl 25 MG Tabs  Commonly known as: Atarax   Take 1 Tablet by mouth 3 times a day as needed for Itching or Anxiety for up to 30 days.  Dose: 25 mg     methocarbamol 500 MG Tabs  Commonly known as: Robaxin   Take 1 Tablet by mouth 4 times a day for 30 days.  Dose: 500 mg     morphine ER 60 MG Tbcr tablet  Commonly known as: Ms Contin   Take 1 Tablet by mouth every 12 hours for 7  days.  Dose: 60 mg     oxyCODONE immediate release 10 MG immediate release tablet  Commonly known as: Roxicodone   Take 1 Tablet by mouth every 6 hours as needed for Severe Pain for up to 7 days.  Dose: 10 mg     senna-docusate 8.6-50 MG Tabs  Commonly known as: Pericolace Or Senokot S   Take 2 Tablets by mouth 2 times a day for 30 days.  Dose: 2 Tablet           * This list has 2 medication(s) that are the same as other medications prescribed for you. Read the directions carefully, and ask your doctor or other care provider to review them with you.                CONTINUE taking these medications        Instructions   budesonide 0.25 MG/2ML Susp  Commonly known as: Pulmicort   Take 250 mcg by nebulization 2 times a day.  Dose: 250 mcg     CALCIUM/VITAMIN D3/ADULT GUMMY PO   Take 1 Tablet by mouth every day.  Dose: 1 Tablet     fluticasone-salmeterol 250-50 MCG/ACT Aepb  Commonly known as: Advair   Inhale 1 Puff 2 times a day.  Dose: 1 Puff     gabapentin 100 MG Caps  Start taking on: January 4, 2024  Commonly known as: Neurontin   Take 1 Capsule by mouth at bedtime for 7 days, THEN 1 Capsule 2 times a day for 7 days, THEN 1 Capsule 3 times a day for 30 days.     ibuprofen 200 MG Tabs  Commonly known as: Motrin   Take 200 mg by mouth 2 times a day as needed for Mild Pain.  Dose: 200 mg     MULTIVITAMIN ADULT PO   Take 1 Tablet by mouth every day.  Dose: 1 Tablet     ondansetron 4 MG Tbdp  Commonly known as: Zofran ODT   Take 1 Tablet by mouth every 6 hours as needed for Nausea/Vomiting.  Dose: 4 mg            STOP taking these medications      oxyCODONE-acetaminophen  MG Tabs  Commonly known as: Percocet-10              Allergies  Allergies   Allergen Reactions    Augmentin Vomiting and Nausea       DIET  Orders Placed This Encounter   Procedures    Diet Order Diet: Regular     Standing Status:   Standing     Number of Occurrences:   1     Order Specific Question:   Diet:     Answer:   Regular [1]        ACTIVITY  As tolerated.  Weight bearing as tolerated    CONSULTATIONS  Oncology  Orthopedic oncology  Radiation oncology    PROCEDURES  Radiation    LABORATORY  Lab Results   Component Value Date    SODIUM 132 (L) 01/28/2024    POTASSIUM 4.5 01/28/2024    CHLORIDE 100 01/28/2024    CO2 23 01/28/2024    GLUCOSE 137 (H) 01/28/2024    BUN 19 01/28/2024    CREATININE 0.30 (L) 01/28/2024        Lab Results   Component Value Date    WBC 8.3 01/27/2024    HEMOGLOBIN 8.6 (L) 01/27/2024    HEMATOCRIT 27.3 (L) 01/27/2024    PLATELETCT 215 01/27/2024      DX-PELVIS-TRAUMA SERIES  3-   Final Result      1. Multiple ill-defined lytic lesions with pathologic fractures in the pelvis, several ill-defined secondary to overlying stool and bowel gas.   2. The hips are located.      DX-FEMUR-2+ LEFT   Final Result      1. Stable subcortical peripherally sclerotic lytic lesion in the left femoral neck measuring 8 mm in diameter.   2. The remainder of the left femur radiography is within normal limits.      DX-HIP-COMPLETE - UNILATERAL 2+ RIGHT   Final Result      1.  Bone metastases and right-sided pathologic fracture again noted.      2.  Findings are consistent with mild osteoarthritis.   3.  2. Fracture or malalignment identified.      DX-TIBIA AND FIBULA LEFT   Final Result      Normal left tibia/fibula radiography.      EC-ECHOCARDIOGRAM COMPLETE W/ CONT   Final Result      DX-BONE SURVEY-METASTATIC LTD   Final Result         1.  Lytic changes of the left glenoid concerning for metastatic disease.   2.  Small peripherally sclerotic lytic lesion in the left femoral neck, appearance suggests small bony cyst, otherwise indeterminate.   3.  Atherosclerosis      MR-CERVICAL SPINE-WITH & W/O   Final Result         1.  Infiltrative metastatic disease with complete replacement of the bone marrow at C2, C6 and C7 with involvement of the posterior elements as described in detail above.      2.  There is ventral epidural infiltration by  metastatic disease at C2, C6 and C7.      3.   Cortical breakthrough with extension of disease to the pre and paravertebral soft tissues is noted at C6 and C7 as described above.      4.  Severe right foraminal narrowing at C5-6.      5.  There is no significant spinal canal stenosis, cord compression or evidence for cord signal abnormality.      MR-THORACIC SPINE-WITH & W/O   Final Result         Multiple metastatic lesions noted throughout the thoracic spine as described above.      Large spinous process lesion noted at T3, T9.      Large right paraspinal metastatic lesion involvement of the right-sided pedicle and lamina noted at T3.      Ventral epidural extension of metastatic disease noted at the T5, T5-T6 level without significant cord impingement.      Large left paraspinous soft tissue metastatic lesion and a small right subcutaneous lesion noted at the T1-T2 level.      No significant spinal canal stenosis.                    Total time of the discharge process exceeds 45 minutes.

## 2024-01-29 NOTE — PROGRESS NOTES
PALLIATIVE CARE SOCIAL WORK NOTE    Patient: Sebastian Wall  Age: 58  Gender: Male  MRN: [id number]  Insurance: [employer]  Date Admitted: 1/15/24  Date of Service: 1/29/24    LMSW met with patient and wife, Sherly, to discuss POLST document. Patient chose DNR with selective treatment. They have not discussed option of a feeding tube and chose not to decide yet. LMSW obtained copy of POLST for medical record and provided original to wife. She reports patient has an advance directive and provided copy to RN when patient first came in.     Lety Lazo, LAKEISHA  Palliative Care

## 2024-01-29 NOTE — RADIATION COMPLETION NOTES
END OF TREATMENT SUMMARY    Patient name:  Andrew Wall    Primary Physician:  Jose Luis Juarez M.D. MRN: [id number]  CSN: [id number]   Referring physician:  Dr. Moran : 1965, 58 y.o.       TREATMENT SUMMARY:        Course First Treatment Date 2024    Course Last Treatment Date 2024   Course Elapsed Days 4 @ [id number]   Course Intent Palliative     Radiation Therapy Episodes       Active Episodes       Radiation Therapy: SBRT (2024)    Radiation Therapy: SBRT (2024)                   Radiation Treatments         Plan Last Treated On Elapsed Days Fractions Treated Prescribed Fraction Dose (cGy) Prescribed Total Dose (cGy)    SBRT Sacrum 2024 2 @ [id number] 3 of 3 900 2,700    SBRT C-Spine [SBRT C1-3] 2024 1 @ [id number] 2 of 5 600 3,000    SBRT C-Spine [SBRT C6-7] 2024 1 @ [id number] 2 of 5 600 3,000    SBRT R Hip 2024 1 @ [id number] 2 of 5 600 3,000                  Reference Point Last Treated On Elapsed Days Most Recent Session Dose (cGy) Total Dose (cGy)    SBRT Sacrum 2024 2 @ [id number] -- 2,700    C1-3 2024 1 @ [id number] 600 1,200    C6-7 2024 1 @ [id number] 600 1,200    SBRT R Hip 2024 1 @ [id number] 600 1,200                                     STAGE:   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)  Staging form: Melanoma of the Skin, AJCC 8th Edition  - Pathologic stage from 2024: Stage IV (rpT0, pNX, pM1c(1)) - Signed by Julia Fraga M.D. on 2024  Stage prefix: Recurrence       TREATMENT INDICATION:    local control, palliation     CONCURRENT SYSTEMIC TREATMENT:   no     RT COURSE DISCONTINUED EARLY:   No     PATIENT EXPERIENCE:       2024     8:57 AM 2024    12:51 PM   Toxicity Assessment   Toxicity Assessment Spine Male Pelvis   Fatigue (lethargy, malaise, asthenia) Severe (e.g., decrease in performance status by 2 or more ECOG levels or 40% Karnofsky) or loss of ability to  perform some activities Bedridden or disabling   Radiation Dermatitis None None   Photosensitivity None    Dry Skin Normal    Anorexia  None   Colitis  None   Constipation  Requiring stool softener or dietary modification   Dehydration None None   Diarrhea w/o Colostomy  None   Flatulence  Mild   Nausea None None   Proctitis  None   Vomiting  None   Dyspepsia/heartburn None    RT Dysphagia-Esophageal None    RT - Pain due to RT  None   Tumor Pain (onset or exacerbation of tumor pain due to treatment) Moderate pain, pain or analgesics interfering with function, but not interfering with activities of daily living None   Dysuria (painful urination)  None   Urinary Frequency  Normal   Urinary Urgency  None   Bladder Spasms  Absent   Incontinence  None   Urinary Retention  Normal        FOLLOW-UP PLAN:    2 weeks     COMMENT:          ANATOMIC TARGET SUMMARY    ANATOMIC TARGET MODALITY TECHNIQUE    C-spine; right hip   External beam, photons SBRT            COMMENT:         DIAGRAMS:        DOSE VOLUME HISTOGRAMS:

## 2024-01-29 NOTE — DISCHARGE PLANNING
Case Management Discharge Planning    Admission Date: 1/15/2024  GMLOS: 3.5  ALOS: 14    6-Clicks ADL Score: 12  6-Clicks Mobility Score: 6  PT and/or OT Eval ordered: Yes  Post-acute Referrals Ordered: Yes  Post-acute Choice Obtained: Yes  Has referral(s) been sent to post-acute provider:  Yes      Anticipated Discharge Dispo: Discharge Disposition: D/T to home under [facility] care in anticipation of covered skilled care (06)    DME Needed: No    Action(s) Taken: Discussed in IDT rounds, pt is medically cleared to DC home today, [facility] confirmed transport for 1400. Pt accepted with [facility] and will follow up with [facility] tomorrow. Family inquired about in home caregivers, provided resource list to family. No other CM needs at this time, pt to dc home with [facility] and all DME has been delivered.     Arranged Dr. Darrell hardy for 2/5/24 @ 8:15AM.     Escalations Completed: None    Medically Clear: Yes

## 2024-01-29 NOTE — PROGRESS NOTES
Inpatient Palliative Care     Location: Cancer nursing unit [facility] [address]     HPI:   Andrew Wall is a 58 y.o. male with past medical history of melanoma and recently diagnosed extensive metastatic disease per MRI earlier this month who was admitted on January 15 th by  his oncology office for hypercalcemia, calcium 12.2.  Patient reports progressively worsening back pain, right leg sciatica, and generalized weakness with additional constipation x 6 days without bowel movement.  He is followed by Dr. Ross with cancer care specialty and has been on immunotherapy.  He has upcoming appointment with radiation oncology.  Patient was direct transfer from [facility] emergency department to oncology floor at [facility].     Patient and wife report a fall while still at home which precipitated increased pain in right hip area.     .  Interval:  Patient up out of bed over the weekend and wheelchair, when outside per patient.  Pain well-managed no issues.  Was seen by orthopedic oncology no new recommendations.  Appointment with Dr. Ross per cancer care specialists on 1/30.  Has orders for discharge today.    Summary:  Patient is seen lying in bed.  He is slightly flushed but reports pain has been well-managed.  He is attempting to rest for pending discharge home.  He is agreeable to outpatient referral to [facility]/Dr. Bedolla.  Last bowel movement 1/27.     Active listening, reflection, reminiscing, validation & normalization, and empathic support utilized throughout this encounter.  All questions answered and contact information provided, encouraged to call with any questions or concerns.      Plan:     1) continue multimodal pain regimen.  2) follow-up with Dr. Bedolla at [facility], social work aware for scheduling appointment.  3) POLST to be completed prior to discharge     Thank you for allowing me the opportunity to participate in the care of  Andrew Wall.     I spent a  total of 32 minutes reviewing medical records, direct face-to-face time with the patient and/or family, coordination of care, and documentation. This is separate from the time spent on advance care planning, which is documented above.     Vikki Karimi, MSN, APRN, ACN-AG.  Palliative Care Nurse Practitioner  [phone]     Detail Level: Detailed

## 2024-02-01 ENCOUNTER — PATIENT OUTREACH (OUTPATIENT)
Dept: ONCOLOGY | Facility: MEDICAL CENTER | Age: 59
End: 2024-02-01
Payer: COMMERCIAL

## 2024-02-01 NOTE — PROGRESS NOTES
Call placed to patient and spoke with wife, Sherly.  Introduced self and role of navigation.  She reports patient is doing good since being home.  He was up in wheelchair for 30-40 minutes yesterday and 30 minutes today.  She said he does have a sore throat from radiation and is eating soft foods only.  He has appointment with palliative care on Monday and sees Dr Oscar, medical oncology on Tuesday.  Plan for cancer treatment is continued oral targeted treatment which he takes via feeding tube.  She denies any current questions.  Pt currently with no active cancer treatment at [facility], nurse navigator will sign off.  Provided contact information to wife and discussed can be resource if needed.

## 2024-02-02 ENCOUNTER — TELEMEDICINE (OUTPATIENT)
Dept: MEDICAL GROUP | Facility: LAB | Age: 59
End: 2024-02-02
Payer: COMMERCIAL

## 2024-02-02 VITALS
HEIGHT: 70 IN | SYSTOLIC BLOOD PRESSURE: 118 MMHG | OXYGEN SATURATION: 98 % | WEIGHT: 155 LBS | DIASTOLIC BLOOD PRESSURE: 62 MMHG | HEART RATE: 94 BPM | RESPIRATION RATE: 16 BRPM | BODY MASS INDEX: 22.19 KG/M2

## 2024-02-02 DIAGNOSIS — M54.59 INTRACTABLE LOW BACK PAIN: ICD-10-CM

## 2024-02-02 DIAGNOSIS — C43.9 METASTATIC MELANOMA (HCC): ICD-10-CM

## 2024-02-02 DIAGNOSIS — C79.51 METASTASIS TO BONE (HCC): ICD-10-CM

## 2024-02-02 PROCEDURE — 99214 OFFICE O/P EST MOD 30 MIN: CPT | Mod: 95 | Performed by: FAMILY MEDICINE

## 2024-02-02 ASSESSMENT — FIBROSIS 4 INDEX: FIB4 SCORE: 3.2

## 2024-02-02 NOTE — PROGRESS NOTES
"Virtual Visit: Established Patient   This visit was conducted via Zoom using secure and encrypted videoconferencing technology.   The patient was in their home in the [facility].    The patient's identity was confirmed and verbal consent was obtained for this virtual visit.     Subjective:   CC:   Chief Complaint   Patient presents with    Transitional Care Management Hospital Follow-up       Andrew Wall is a 58 y.o. male presenting for evaluation and management of:    #Metastatic Melanoma   -Patient is a pleasant 58-year-old male with history of malignant melanoma.  Recently discharged from inpatient rehabilitation after experiencing hypercalcemia secondary to metastatic disease to the bones.  This is caused significant back pain with radiculopathy at the time.  Patient is currently being followed by oncology, on chemotherapy.  Undergoing palliative radiation therapy to lesions in back and neck.  He states that the radiation therapy has helped relieve some of the neuropathy but continues to have pain.  He is treating with morphine ER, oxycodone as needed.  Radiation therapy has caused some difficulty with swallowing, pain with swallowing.  Is on soft food diet but has noted weight loss.  -Overall patient states that he is doing well.  He is tolerating both chemotherapy and radiation as well as expected.  He is planning on following up with palliative care next week.    Hospital discharge summary/inpatient rehabilitation summary from recent hospitalization from 1/15/2024 to 1/29/2024 as below:    \"This is a 58-year-old male with past medical history of metastatic melanoma on immunotherapy who presented to the ED on 1/15/2024 with hypercalcemia.     Patient started on aggressive IV fluid and Zometa with resolution and hypercalcemia.      Patient with history of extensive bone metastasis and now with new metastasis to lung and liver. Patient's oncology group was consulted, recommended transfer to our " "facility for inpatient radiation. Patient received radiation to sacrum 1/17 -1/19.  Patient then received 5-day course of radiation for spine and hip.     Patient with multiple pathological fractures due to metastasis, oncological orthopedics consulted, weightbearing as tolerated with walker assist, weightbearing as tolerated in the right upper extremity, nonweightbearing in left upper extremity except to assist with walker and ADLs.      Prolonged admission due to pain control and inability to ambulate. On day of discharge, patient able to transfer to wheelchair comfortably.  Hospital bed delivered.  Patient has follow-up with oncology this week.     Therefore, he is discharged in fair and stable condition to home with organized home healthcare and close outpatient follow-up.     The patient met 2-midnight criteria for an inpatient stay at the time of discharge.\"        ROS   Denies any fevers, chills, radicular symptoms.  Endorses difficulty swallowing, weight loss, myalgias.    Current medicines (including changes today)  Current Outpatient Medications   Medication Sig Dispense Refill    dexamethasone (DECADRON) 2 MG tablet Take 1 Tablet by mouth 2 times a day for 5 doses THEN Take 1 Tablet by mouth every day for 3 doses. 8 Tablet 0    [START ON 2/3/2024] dexamethasone (DECADRON) 2 MG tablet Take 1 Tablet by mouth every day for 3 doses. 3 Tablet 0    hydrOXYzine HCl (ATARAX) 25 MG Tab Take 1 Tablet by mouth 3 times a day as needed for Itching or Anxiety for up to 30 days. 30 Tablet 0    Binimetinib 15 MG Tab Take 45 mg by mouth 2 times a day.      bisacodyl (DULCOLAX) 10 MG Suppos Insert 1 Suppository into the rectum every day for 30 days. 30 Suppository 0    DULoxetine (CYMBALTA) 60 MG Cap DR Particles delayed-release capsule Take 1 Capsule by mouth every day for 30 days. 30 Capsule 0    Encorafenib 75 MG Cap Take 450 mg by mouth every day.      famotidine (PEPCID) 20 MG Tab Take 1 Tablet by mouth every day for " 30 days. 30 Tablet 0    methocarbamol (ROBAXIN) 500 MG Tab Take 1 Tablet by mouth 4 times a day for 30 days. 120 Tablet 0    senna-docusate (PERICOLACE OR SENOKOT S) 8.6-50 MG Tab Take 2 Tablets by mouth 2 times a day for 30 days. 120 Tablet 0    morphine ER (MS CONTIN) 60 MG Tab CR tablet Take 1 Tablet by mouth every 12 hours for 7 days. 14 Tablet 0    oxyCODONE immediate release (ROXICODONE) 10 MG immediate release tablet Take 1 Tablet by mouth every 6 hours as needed for Severe Pain for up to 7 days. 28 Tablet 0    ondansetron (ZOFRAN ODT) 4 MG TABLET DISPERSIBLE Take 1 Tablet by mouth every 6 hours as needed for Nausea/Vomiting. 20 Tablet 3    gabapentin (NEURONTIN) 100 MG Cap Take 1 Capsule by mouth at bedtime for 7 days, THEN 1 Capsule 2 times a day for 7 days, THEN 1 Capsule 3 times a day for 30 days. 111 Capsule 1    budesonide (PULMICORT) 0.25 MG/2ML Suspension Take 250 mcg by nebulization 2 times a day.      ibuprofen (MOTRIN) 200 MG Tab Take 200 mg by mouth 2 times a day as needed for Mild Pain.      Calcium-Phosphorus-Vitamin D (CALCIUM/VITAMIN D3/ADULT GUMMY PO) Take 1 Tablet by mouth every day.      fluticasone-salmeterol (ADVAIR) 250-50 MCG/ACT AEROSOL POWDER, BREATH ACTIVATED Inhale 1 Puff 2 times a day. 60 Each 3    Multiple Vitamins-Minerals (MULTIVITAMIN ADULT PO) Take 1 Tablet by mouth every day.       No current facility-administered medications for this visit.       Patient Active Problem List    Diagnosis Date Noted    Hypophosphatemia 01/24/2024    Hypocalcemia 01/20/2024    Pathologic fracture 01/18/2024    Elevated LFTs 01/18/2024    DNR (do not resuscitate) 01/17/2024    Metastasis to bone (HCC) 01/16/2024    Leukocytosis 01/15/2024    Hyperglycemia 01/15/2024    Fever 01/14/2024    Intractable low back pain 01/13/2024    Hypokalemia 01/13/2024    Metastatic melanoma, BRAF V600E POSITIVE  (HCC) 01/12/2024    Elevated glucose 01/12/2024    Anemia 01/12/2024    Constipation 01/12/2024     "Hypercalcemia 01/11/2024    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) 11/16/2023    Moderate persistent asthma without complication 08/11/2021    Chronic pansinusitis 08/11/2021    Bilateral tinnitus 08/11/2021    Melanoma of buttock (HCC) 10/20/2020    Mild intermittent asthma without complication 08/07/2020    Hypertrophy of nasal turbinates         Objective:   /62   Pulse 94   Resp 16   Ht 1.778 m (5' 10\")   Wt 70.3 kg (155 lb)   SpO2 98%   BMI 22.24 kg/m²     Physical Exam:  Constitutional: Alert, no distress, well-groomed.  Skin: No rashes in visible areas.  Eye: Round. Conjunctiva clear, lids normal. No icterus.   ENMT: Lips pink without lesions, good dentition, moist mucous membranes. Phonation normal.  Neck: No masses, no thyromegaly. Moves freely without pain.  Respiratory: Unlabored respiratory effort, no cough or audible wheeze  Psych: Alert and oriented x3, normal affect and mood.     Assessment and Plan:   The following treatment plan was discussed:     This is a very complicated situation given significant symptoms from metastases.  He is improving with radiation therapy.  At this time pain seems to be well-controlled with morphine ER and oxycodone as needed.  With that being said further evaluation and treatment with palliative care is recommended at this time.  Patient did have questions regarding dexamethasone.  He states that he started a course of steroids in the hospital which she felt helped significantly.  We discussed potential use of dexamethasone in the future if neuropathic symptoms return.  I encourage patient to discuss this also with palliative care.  Patient has been on gabapentin as well taking it at bedtime which has been helping with sleep and keeping a little bit more at bay.  He wishes to continue this medication at this time which have no problem with.  Had long conversation with patient regarding medication regiment, diet regimen including increasing caloric " intake is much as possible and protein intake is much as possible.  Discussed strict return precautions.  Patient has any other questions or concerns he will follow-up with me immediately.    1. Metastatic melanoma, BRAF V600E POSITIVE  (HCC)      2. Intractable low back pain      3. Metastasis to bone (HCC)    My total time spent caring for the patient on the day of the encounter was 30 minutes.   This does not include time spent on separately billable procedures/tests.        Follow-up: No follow-ups on file.

## 2024-02-05 ENCOUNTER — PHARMACY VISIT (OUTPATIENT)
Dept: PHARMACY | Facility: MEDICAL CENTER | Age: 59
End: 2024-02-05
Payer: COMMERCIAL

## 2024-02-05 ENCOUNTER — HOSPITAL ENCOUNTER (OUTPATIENT)
Dept: HEMATOLOGY ONCOLOGY | Facility: MEDICAL CENTER | Age: 59
End: 2024-02-05
Attending: FAMILY MEDICINE
Payer: COMMERCIAL

## 2024-02-05 VITALS — TEMPERATURE: 98.4 F

## 2024-02-05 DIAGNOSIS — Z71.89 GOALS OF CARE, COUNSELING/DISCUSSION: ICD-10-CM

## 2024-02-05 DIAGNOSIS — C79.51 METASTASIS TO BONE (HCC): ICD-10-CM

## 2024-02-05 DIAGNOSIS — C77.9 MALIGNANT MELANOMA METASTATIC TO LYMPH NODE (HCC): ICD-10-CM

## 2024-02-05 DIAGNOSIS — C43.9 METASTATIC MELANOMA (HCC): ICD-10-CM

## 2024-02-05 DIAGNOSIS — R11.2 NAUSEA AND VOMITING, UNSPECIFIED VOMITING TYPE: ICD-10-CM

## 2024-02-05 DIAGNOSIS — G89.3 CANCER RELATED PAIN: ICD-10-CM

## 2024-02-05 DIAGNOSIS — M84.50XA PATHOLOGICAL FRACTURE DUE TO NEOPLASTIC DISEASE, UNSPECIFIED FRACTURE SITE, INITIAL ENCOUNTER: ICD-10-CM

## 2024-02-05 PROCEDURE — RXMED WILLOW AMBULATORY MEDICATION CHARGE: Performed by: FAMILY MEDICINE

## 2024-02-05 PROCEDURE — 99204 OFFICE O/P NEW MOD 45 MIN: CPT | Performed by: FAMILY MEDICINE

## 2024-02-05 PROCEDURE — 99212 OFFICE O/P EST SF 10 MIN: CPT | Performed by: FAMILY MEDICINE

## 2024-02-05 RX ORDER — MORPHINE SULFATE 60 MG/1
60 TABLET, FILM COATED, EXTENDED RELEASE ORAL EVERY 12 HOURS
Qty: 28 TABLET | Refills: 0 | Status: SHIPPED | OUTPATIENT
Start: 2024-02-05 | End: 2024-02-18 | Stop reason: SDUPTHER

## 2024-02-05 RX ORDER — ONDANSETRON 4 MG/1
4 TABLET, ORALLY DISINTEGRATING ORAL EVERY 6 HOURS PRN
Qty: 20 TABLET | Refills: 3 | Status: ON HOLD | OUTPATIENT
Start: 2024-02-05 | End: 2024-03-12

## 2024-02-05 ASSESSMENT — ENCOUNTER SYMPTOMS
MYALGIAS: 1
NERVOUS/ANXIOUS: 0
CHILLS: 0
DEPRESSION: 0
NAUSEA: 1
BLURRED VISION: 0
BACK PAIN: 1
VOMITING: 1
BRUISES/BLEEDS EASILY: 0
DIARRHEA: 0
COUGH: 0
WEIGHT LOSS: 1
PALPITATIONS: 0
CONSTIPATION: 0
SHORTNESS OF BREATH: 0
HEADACHES: 0
NECK PAIN: 1
FEVER: 0

## 2024-02-05 ASSESSMENT — PAIN SCALES - GENERAL: PAINLEVEL: 10=SEVERE PAIN

## 2024-02-05 NOTE — ASSESSMENT & PLAN NOTE
Patient with metastatic melanoma.  Has had a rapid decline in functioning.  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  Currently most the time is spent in bed and stress of traveling to visit had a significant impact on his overall wellbeing.  Discussed the role of treatments and how to best achieve quality.  Patient does have trouble with many of his activities of daily living.  Did discuss the hospice philosophy of care and hospice benefit.  Recommended patient and his wife further discuss goals of care and if they are being reached through cancer treatments.  I also let them know I will place a hospice referral and they may set up a time to visit with hospice to better learn about their services to help guide a decision on when to enroll.

## 2024-02-05 NOTE — PROGRESS NOTES
Date & Time note created:    2/5/2024   9:09 AM       Requesting Provider: Inpatient palliative team  Reason for Referral: Symptom management and goals of care    Chief Complaint: I have pain  HPI:   Andrew Wall is a 58 y.o. male presenting to palliative care clinic accompanied by his wife.  The role of palliative care was discussed and they were open to conversation.  Patient was in mild to moderate distress secondary to nausea.  He was in a wheelchair and his wife reported he spends most of his time in bed and this was the first time they had gotten out of the house since his hospitalization and it appears to have hit him pretty hard.  History was obtained by both him and his wife.  They shared that this has been a rapid change in his overall health.  Just a few months ago he was an active  at [facility] and after dealing with pain for 2 months his cancer was found.  He started on an initial immunotherapy but after further genetic testing we will switch to an alternative oral immunotherapy regimen.  He has pain in various areas of his skeletal system and was recently discharged from the hospital on a pain regimen that included the use of opioid medications and he has been using morphine extended release 60 mg with good relief and has not required any of his short acting oxycodone.  We then also had a discussion on goals of care and expressed my concern that with the rapid decline of his functional status if it was in line with a quality of life excepting to him.  They did share that they were told by their medical oncologist that the role of therapy would be to stop progression of the disease but would likely not change it much from its current state.  I did discuss that the way we reach quality could be through cancer treatments or could be through a hospice philosophy of care.  They had been introduced to the hospice philosophy of care and hospice benefit during the last hospitalization.  We  concluded the visit with a plan to refill his morphine extended release and also placed a referral to hospice for them to schedule a time to have a meeting with him to learn about all their options and to then decide if he would be ready to enroll in hospice versus continue with his cancer treatments for some time.  I also offered that we could follow-up virtually in 2 weeks due to the significant impact he is traveling to visits have caused him this morning.    Oncological history: Metastatic melanoma    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)     Nasal polyp      Past Surgical History:   Procedure Laterality Date    LYMPH NODE EXCISION Right 11/16/2023    Procedure: RIGHT INGUINAL NODE SUPERFICIAL DISSECTION;  Surgeon: Davon Valera M.D.;  Location: [facility];  Service: General    NODE BIOPSY SENTINEL Bilateral 10/20/2020    Procedure: BIOPSY, LYMPH NODE, SENTINEL- GROIN;  Surgeon: Davon Valera M.D.;  Location: SURGERY SAME DAY [facility];  Service: General    WIDE EXCISION Right 10/20/2020    Procedure: WIDE EXCISION, LESION- BUTTOCKS, RADICAL MALIGNANT MELANOMA;  Surgeon: Davon Valera M.D.;  Location: SURGERY SAME DAY [facility];  Service: General    ANTROSTOMY Bilateral 11/15/2019    Procedure: MAXILLARY ANTROSTOMY- REVISION ENDOSCOPICMOMETASONE INJECTION, PROPEL STENT PLACEMENT;  Surgeon: Felicitas Tenorio M.D.;  Location: [facility];  Service: Ent    SINUSCOPY Bilateral 11/15/2019    Procedure: ENDOSCOPY, PARANASAL SINUSES- FOR FRONTAL EXPLORATION;  Surgeon: Felicitas Tenorio M.D.;  Location: [facility];  Service: Ent    TURBINOPLASTY Bilateral 11/15/2019    Procedure: TURBINOPLASTY;   Surgeon: Felicitas Tenorio M.D.;  Location: SURGERY [facility];  Service: Ent    SPHENOIDECTOMY Bilateral 11/15/2019    Procedure: SPHENOIDECTOMY- FOR SPHENOIDOTOMY;  Surgeon: Felicitas Tenorio M.D.;  Location: SURGERY [facility];  Service: Ent    ETHMOIDECTOMY Bilateral 11/15/2019    Procedure: ETHMOIDECTOMY- ENDOSCOPIC;  Surgeon: Felicitas Tenorio M.D.;  Location: SURGERY [facility];  Service: Ent    NASAL POLYPECTOMY Bilateral 11/15/2019    Procedure: POLYPECTOMY, NASAL CAVITY;  Surgeon: Felicitas Tenorio M.D.;  Location: SURGERY [facility];  Service: Ent    NASAL POLYPECTOMY  2015, 2017, 2019    x 3    OTHER  11/20    removed melanoma     Current Medications:  Current Outpatient Medications on File Prior to Encounter   Medication Sig Dispense Refill    dexamethasone (DECADRON) 2 MG tablet Take 1 Tablet by mouth every day for 3 doses. 3 Tablet 0    hydrOXYzine HCl (ATARAX) 25 MG Tab Take 1 Tablet by mouth 3 times a day as needed for Itching or Anxiety for up to 30 days. 30 Tablet 0    Binimetinib 15 MG Tab Take 45 mg by mouth 2 times a day.      bisacodyl (DULCOLAX) 10 MG Suppos Insert 1 Suppository into the rectum every day for 30 days. 30 Suppository 0    DULoxetine (CYMBALTA) 60 MG Cap DR Particles delayed-release capsule Take 1 Capsule by mouth every day for 30 days. 30 Capsule 0    Encorafenib 75 MG Cap Take 450 mg by mouth every day.      famotidine (PEPCID) 20 MG Tab Take 1 Tablet by mouth every day for 30 days. 30 Tablet 0    methocarbamol (ROBAXIN) 500 MG Tab Take 1 Tablet by mouth 4 times a day for 30 days. 120 Tablet 0    senna-docusate (PERICOLACE OR SENOKOT S) 8.6-50 MG Tab Take 2 Tablets by mouth 2 times a day for 30 days. 120 Tablet 0    oxyCODONE immediate release (ROXICODONE) 10 MG immediate release tablet Take 1 Tablet by mouth every 6 hours as needed for Severe Pain for up to 7 days. 28 Tablet 0    gabapentin (NEURONTIN) 100 MG Cap Take 1 Capsule by mouth at  bedtime for 7 days, THEN 1 Capsule 2 times a day for 7 days, THEN 1 Capsule 3 times a day for 30 days. 111 Capsule 1    budesonide (PULMICORT) 0.25 MG/2ML Suspension Take 250 mcg by nebulization 2 times a day.      ibuprofen (MOTRIN) 200 MG Tab Take 200 mg by mouth 2 times a day as needed for Mild Pain.      Calcium-Phosphorus-Vitamin D (CALCIUM/VITAMIN D3/ADULT GUMMY PO) Take 1 Tablet by mouth every day.      fluticasone-salmeterol (ADVAIR) 250-50 MCG/ACT AEROSOL POWDER, BREATH ACTIVATED Inhale 1 Puff 2 times a day. 60 Each 3    Multiple Vitamins-Minerals (MULTIVITAMIN ADULT PO) Take 1 Tablet by mouth every day.       No current facility-administered medications on file prior to encounter.     Medication Allergy/Sensitivities:  Allergies   Allergen Reactions    Augmentin Vomiting and Nausea     No family history on file.    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    Palliative Performance Scale: 60-70%  ECOG:  2    Advanced Care Planning: Advanced directive and POLST completed at last visit currently on file in EMR.      ROS:    Review of Systems   Constitutional:  Positive for malaise/fatigue and weight loss. Negative for chills and fever.   HENT:  Negative for congestion and hearing loss.    Eyes:  Negative for blurred vision.   Respiratory:  Negative for cough and shortness of breath.    Cardiovascular:  Negative for chest pain and palpitations.   Gastrointestinal:  Positive for nausea and vomiting. Negative for constipation and diarrhea.   Musculoskeletal:  Positive for back pain, joint pain, myalgias and neck pain.   Skin:  Negative for rash.   Neurological:  Negative for headaches.   Endo/Heme/Allergies:  Does not bruise/bleed easily.   Psychiatric/Behavioral:  Negative for depression. The patient is not nervous/anxious.        PE:   Recent vital signs  Weight/BMI: There is no height or weight on file to calculate BMI.  Temp 36.9 °C (98.4 °F) (Temporal)   Vitals:    02/05/24 0813   Temp: 36.9 °C (98.4 °F)   TempSrc: Temporal     Oxygen Therapy:     Physical Exam  Constitutional:       Appearance: He is ill-appearing.   HENT:      Head: Normocephalic and atraumatic.      Mouth/Throat:      Mouth: Mucous membranes are moist.      Pharynx: Oropharynx is clear.   Eyes:      Pupils: Pupils are equal, round, and reactive to light.   Cardiovascular:      Rate and Rhythm: Normal rate.   Pulmonary:      Effort: Pulmonary effort is normal.   Abdominal:      General: Abdomen is flat.   Musculoskeletal:         General: Tenderness present. Normal range of motion.      Cervical back: Tenderness present.   Skin:     General: Skin is warm.   Neurological:      General: No focal deficit present.   Psychiatric:         Mood and Affect: Mood normal.         Behavior: Behavior normal.           ASSESSMENT/PLAN WITH SHARED DECISION MAKING:   Cancer related pain  Patient with known metastatic melanoma.  Was recently hospitalized and had  pain regimen maximized.  Currently using morphine extended release 60 mg twice daily and is providing adequate relief.  Will refill morphine.  Has not had a need for much use of short acting oxycodone.  No refill needed.  PDMP reviewed, no signs of diversion or abuse, risk and benefits of medication discussed, controlled substance agreement on file.  Follow-up virtually in 2 weeks for reevaluation of symptoms.  Did let patient know we have several options for pain if we need to further adjust.    Nausea and vomiting  Worsening nausea reported today.  Refill requested of Zofran.  Refill sent to pharmacy.    Goals of care, counseling/discussion  Patient with metastatic melanoma.  Has had a rapid decline in functioning.  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  Currently most the time is spent in bed and stress of traveling to visit had a significant impact on his overall wellbeing.  Discussed the role of treatments and how to best achieve quality.  Patient does have trouble with many of his activities of daily living.  Did discuss the hospice philosophy of care and hospice benefit.  Recommended patient and his wife further discuss goals of care and if they are being reached through cancer treatments.  I also let them know I will place a hospice referral and they may set up a time to visit with hospice to better learn about their services to help guide a decision on when to enroll.    Metastatic melanoma, BRAF V600E POSITIVE  (HCC)  Patient with metastatic melanoma with significant metastatic disease.  Has had a rapid decline in the quality of life and his daily functioning.  Did discuss with patient possibility of enrolling in hospice.  They would like to visit with the hospice team to better understand their options.  Will place hospice referral.  Patient does have hospice qualifying diagnosis with metastatic cancer if he were to choose to discontinue disease modifying  treatments.  Has also had a rapid decline in daily functioning.  Prognosis likely weeks to months and most likely less than 6 months unless there is a more significant positive response to treatments.  Will follow-up in 2 weeks for further goals of care discussions.    47 minutes of total spent on patient encounter including chart review and consultation with patient's oncological providers.

## 2024-02-05 NOTE — ASSESSMENT & PLAN NOTE
Patient with metastatic melanoma with significant metastatic disease.  Has had a rapid decline in the quality of life and his daily functioning.  Did discuss with patient possibility of enrolling in hospice.  They would like to visit with the hospice team to better understand their options.  Will place hospice referral.  Patient does have hospice qualifying diagnosis with metastatic cancer if he were to choose to discontinue disease modifying treatments.  Has also had a rapid decline in daily functioning.  Prognosis likely weeks to months and most likely less than 6 months unless there is a more significant positive response to treatments.  Will follow-up in 2 weeks for further goals of care discussions.

## 2024-02-05 NOTE — ASSESSMENT & PLAN NOTE
Patient with known metastatic melanoma.  Was recently hospitalized and had pain regimen maximized.  Currently using morphine extended release 60 mg twice daily and is providing adequate relief.  Will refill morphine.  Has not had a need for much use of short acting oxycodone.  No refill needed.  PDMP reviewed, no signs of diversion or abuse, risk and benefits of medication discussed, controlled substance agreement on file.  Follow-up virtually in 2 weeks for reevaluation of symptoms.  Did let patient know we have several options for pain if we need to further adjust.

## 2024-02-13 ENCOUNTER — HOSPITAL ENCOUNTER (OUTPATIENT)
Dept: RADIATION ONCOLOGY | Facility: MEDICAL CENTER | Age: 59
End: 2024-02-29
Attending: RADIOLOGY
Payer: COMMERCIAL

## 2024-02-13 ASSESSMENT — LIFESTYLE VARIABLES
SMOKING_STATUS: NO
TOBACCO_USE: NO

## 2024-02-13 NOTE — PROGRESS NOTES
Telephone Appointment Visit   This telephone visit was initiated by the patient and they verbally consented.    Reason for Call:  Symptom Follow-up    HPI:    58-year-old with metastatic melanoma to bone now status post radiation therapy to the C-spine sacrum and right hip  complete 1/26/2024.  He was found to be BRAF positive and started Braftovi and Mektovi on 1/25/2024.  Patient states that since he started the clavicular mass that was not irradiated does seem to have diminished in size.  He is very hopeful.  He is continuing on long-acting morphine and gabapentin still has quite a bit of pain particularly in the neck.  He did also notice some taste loss after the radiation and severe sore throat which she is slowly getting over.  He is now on 2 mg of dexamethasone a day.  His wheelchair and do a little bit of shuffling.       Labs / Images Reviewed:   none     Assessment and Plan:      Metastatic BRAF positive melanoma status post radiation therapy to the sacrum C-spine and right hip complete 1/26/2024 now on Braftovi and Mektovi.    Follow-up:   Patient is going to continue his follow-up with Dr. Sofia Burleson and Dr. Ross.  He understands I am retiring and if he does require any further radiation therapy he can follow-up with one of my partners.    Total Time Spent (mins): 10    Julia Fraga M.D.

## 2024-02-18 ENCOUNTER — PHARMACY VISIT (OUTPATIENT)
Dept: PHARMACY | Facility: MEDICAL CENTER | Age: 59
End: 2024-02-18
Payer: COMMERCIAL

## 2024-02-18 DIAGNOSIS — C77.9 MALIGNANT MELANOMA METASTATIC TO LYMPH NODE (HCC): ICD-10-CM

## 2024-02-18 DIAGNOSIS — M84.50XA PATHOLOGICAL FRACTURE DUE TO NEOPLASTIC DISEASE, UNSPECIFIED FRACTURE SITE, INITIAL ENCOUNTER: ICD-10-CM

## 2024-02-18 DIAGNOSIS — C79.51 METASTASIS TO BONE (HCC): ICD-10-CM

## 2024-02-18 PROCEDURE — RXMED WILLOW AMBULATORY MEDICATION CHARGE: Performed by: FAMILY MEDICINE

## 2024-02-18 RX ORDER — MORPHINE SULFATE 60 MG/1
60 TABLET, FILM COATED, EXTENDED RELEASE ORAL EVERY 12 HOURS
Qty: 28 TABLET | Refills: 0 | Status: SHIPPED | OUTPATIENT
Start: 2024-02-18 | End: 2024-02-28 | Stop reason: SDUPTHER

## 2024-02-20 ENCOUNTER — PATIENT MESSAGE (OUTPATIENT)
Dept: HEMATOLOGY ONCOLOGY | Facility: MEDICAL CENTER | Age: 59
End: 2024-02-20

## 2024-02-20 ENCOUNTER — APPOINTMENT (OUTPATIENT)
Dept: HEMATOLOGY ONCOLOGY | Facility: MEDICAL CENTER | Age: 59
End: 2024-02-20
Payer: COMMERCIAL

## 2024-02-20 ENCOUNTER — TELEPHONE (OUTPATIENT)
Dept: HEMATOLOGY ONCOLOGY | Facility: MEDICAL CENTER | Age: 59
End: 2024-02-20

## 2024-02-28 ENCOUNTER — APPOINTMENT (OUTPATIENT)
Dept: RADIOLOGY | Facility: MEDICAL CENTER | Age: 59
End: 2024-02-28
Attending: NURSE PRACTITIONER
Payer: COMMERCIAL

## 2024-02-28 DIAGNOSIS — C77.9 MALIGNANT MELANOMA METASTATIC TO LYMPH NODE (HCC): ICD-10-CM

## 2024-02-28 DIAGNOSIS — C79.51 METASTASIS TO BONE (HCC): ICD-10-CM

## 2024-02-28 DIAGNOSIS — M84.50XA PATHOLOGICAL FRACTURE DUE TO NEOPLASTIC DISEASE, UNSPECIFIED FRACTURE SITE, INITIAL ENCOUNTER: ICD-10-CM

## 2024-02-28 RX ORDER — PROCHLORPERAZINE MALEATE 10 MG
10 TABLET ORAL EVERY 6 HOURS PRN
Qty: 30 TABLET | Refills: 6 | Status: SHIPPED | OUTPATIENT
Start: 2024-02-28 | End: 2024-03-09

## 2024-02-28 RX ORDER — MORPHINE SULFATE 60 MG/1
60 TABLET, FILM COATED, EXTENDED RELEASE ORAL EVERY 12 HOURS
Qty: 28 TABLET | Refills: 0 | Status: CANCELLED | OUTPATIENT
Start: 2024-02-28 | End: 2024-03-13

## 2024-02-29 ENCOUNTER — TELEPHONE (OUTPATIENT)
Dept: HEMATOLOGY ONCOLOGY | Facility: MEDICAL CENTER | Age: 59
End: 2024-02-29
Payer: COMMERCIAL

## 2024-02-29 RX ORDER — MORPHINE SULFATE 60 MG/1
60 TABLET, FILM COATED, EXTENDED RELEASE ORAL EVERY 12 HOURS
Qty: 28 TABLET | Refills: 0 | Status: SHIPPED | OUTPATIENT
Start: 2024-02-29 | End: 2024-03-04 | Stop reason: SDUPTHER

## 2024-03-04 ENCOUNTER — HOSPITAL ENCOUNTER (OUTPATIENT)
Dept: HEMATOLOGY ONCOLOGY | Facility: MEDICAL CENTER | Age: 59
End: 2024-03-04
Attending: FAMILY MEDICINE
Payer: COMMERCIAL

## 2024-03-04 DIAGNOSIS — C77.9 MALIGNANT MELANOMA METASTATIC TO LYMPH NODE (HCC): ICD-10-CM

## 2024-03-04 DIAGNOSIS — C79.51 METASTASIS TO BONE (HCC): ICD-10-CM

## 2024-03-04 DIAGNOSIS — M84.50XA PATHOLOGICAL FRACTURE DUE TO NEOPLASTIC DISEASE, UNSPECIFIED FRACTURE SITE, INITIAL ENCOUNTER: ICD-10-CM

## 2024-03-04 PROCEDURE — 99214 OFFICE O/P EST MOD 30 MIN: CPT | Mod: 95 | Performed by: FAMILY MEDICINE

## 2024-03-04 RX ORDER — MORPHINE SULFATE 60 MG/1
60 TABLET, FILM COATED, EXTENDED RELEASE ORAL EVERY 12 HOURS
Qty: 28 TABLET | Refills: 0 | Status: ON HOLD | OUTPATIENT
Start: 2024-03-15 | End: 2024-03-28

## 2024-03-05 ENCOUNTER — APPOINTMENT (OUTPATIENT)
Dept: RADIOLOGY | Facility: MEDICAL CENTER | Age: 59
DRG: 542 | End: 2024-03-05
Payer: COMMERCIAL

## 2024-03-05 ENCOUNTER — APPOINTMENT (OUTPATIENT)
Dept: RADIOLOGY | Facility: MEDICAL CENTER | Age: 59
DRG: 542 | End: 2024-03-05
Attending: EMERGENCY MEDICINE
Payer: COMMERCIAL

## 2024-03-05 ENCOUNTER — APPOINTMENT (OUTPATIENT)
Dept: HEMATOLOGY ONCOLOGY | Facility: MEDICAL CENTER | Age: 59
End: 2024-03-05
Payer: COMMERCIAL

## 2024-03-05 ENCOUNTER — HOSPITAL ENCOUNTER (INPATIENT)
Facility: MEDICAL CENTER | Age: 59
LOS: 2 days | DRG: 542 | End: 2024-03-07
Attending: EMERGENCY MEDICINE | Admitting: FAMILY MEDICINE
Payer: COMMERCIAL

## 2024-03-05 DIAGNOSIS — C79.51 METASTASIS TO BONE (HCC): ICD-10-CM

## 2024-03-05 DIAGNOSIS — H93.13 BILATERAL TINNITUS: ICD-10-CM
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
## 2024-03-05 DIAGNOSIS — C77.9 MALIGNANT MELANOMA METASTATIC TO LYMPH NODE (HCC): ICD-10-CM

## 2024-03-05 DIAGNOSIS — M48.50XA PATHOLOGICAL COMPRESSION FRACTURE OF VERTEBRA, INITIAL ENCOUNTER (HCC): ICD-10-CM

## 2024-03-05 DIAGNOSIS — S12.101A CLOSED NONDISPLACED FRACTURE OF SECOND CERVICAL VERTEBRA, UNSPECIFIED FRACTURE MORPHOLOGY, INITIAL ENCOUNTER (HCC): ICD-10-CM

## 2024-03-05 DIAGNOSIS — C43.9 METASTATIC MELANOMA (HCC): ICD-10-CM

## 2024-03-05 PROBLEM — S12.9XXA: Status: ACTIVE | Noted: 2024-03-05
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
## 2024-03-05 LAB
ANION GAP SERPL CALC-SCNC: 12 MMOL/L (ref 7–16)
APTT PPP: 42.6 SEC (ref 24.7–36)
BUN SERPL-MCNC: 17 MG/DL (ref 8–22)
CALCIUM SERPL-MCNC: 7.7 MG/DL (ref 8.5–10.5)
CHLORIDE SERPL-SCNC: 96 MMOL/L (ref 96–112)
CO2 SERPL-SCNC: 23 MMOL/L (ref 20–33)
CREAT SERPL-MCNC: 0.42 MG/DL (ref 0.5–1.4)
ERYTHROCYTE [DISTWIDTH] IN BLOOD BY AUTOMATED COUNT: 58.6 FL (ref 35.9–50)
GFR SERPLBLD CREATININE-BSD FMLA CKD-EPI: 123 ML/MIN/1.73 M 2
GLUCOSE SERPL-MCNC: 111 MG/DL (ref 65–99)
HCT VFR BLD AUTO: 34.5 % (ref 42–52)
HGB BLD-MCNC: 10.3 G/DL (ref 14–18)
INR PPP: 1.42 (ref 0.87–1.13)
MAGNESIUM SERPL-MCNC: 1.5 MG/DL (ref 1.5–2.5)
MCH RBC QN AUTO: 26 PG (ref 27–33)
MCHC RBC AUTO-ENTMCNC: 29.9 G/DL (ref 32.3–36.5)
MCV RBC AUTO: 87.1 FL (ref 81.4–97.8)
PHOSPHATE SERPL-MCNC: 2 MG/DL (ref 2.5–4.5)
PLATELET # BLD AUTO: 360 K/UL (ref 164–446)
PMV BLD AUTO: 9.1 FL (ref 9–12.9)
POTASSIUM SERPL-SCNC: 3.8 MMOL/L (ref 3.6–5.5)
PROTHROMBIN TIME: 17.5 SEC (ref 12–14.6)
RBC # BLD AUTO: 3.96 M/UL (ref 4.7–6.1)
SODIUM SERPL-SCNC: 131 MMOL/L (ref 135–145)
WBC # BLD AUTO: 6.9 K/UL (ref 4.8–10.8)

## 2024-03-05 PROCEDURE — 73521 X-RAY EXAM HIPS BI 2 VIEWS: CPT

## 2024-03-05 PROCEDURE — 85730 THROMBOPLASTIN TIME PARTIAL: CPT

## 2024-03-05 PROCEDURE — 99222 1ST HOSP IP/OBS MODERATE 55: CPT | Mod: GC | Performed by: FAMILY MEDICINE

## 2024-03-05 PROCEDURE — 700105 HCHG RX REV CODE 258

## 2024-03-05 PROCEDURE — 80048 BASIC METABOLIC PNL TOTAL CA: CPT

## 2024-03-05 PROCEDURE — 36415 COLL VENOUS BLD VENIPUNCTURE: CPT

## 2024-03-05 PROCEDURE — 51798 US URINE CAPACITY MEASURE: CPT

## 2024-03-05 PROCEDURE — 83735 ASSAY OF MAGNESIUM: CPT

## 2024-03-05 PROCEDURE — 700105 HCHG RX REV CODE 258: Performed by: EMERGENCY MEDICINE

## 2024-03-05 PROCEDURE — A9270 NON-COVERED ITEM OR SERVICE: HCPCS

## 2024-03-05 PROCEDURE — 700102 HCHG RX REV CODE 250 W/ 637 OVERRIDE(OP)

## 2024-03-05 PROCEDURE — 85027 COMPLETE CBC AUTOMATED: CPT

## 2024-03-05 PROCEDURE — 72125 CT NECK SPINE W/O DYE: CPT

## 2024-03-05 PROCEDURE — 87040 BLOOD CULTURE FOR BACTERIA: CPT

## 2024-03-05 PROCEDURE — 700111 HCHG RX REV CODE 636 W/ 250 OVERRIDE (IP)

## 2024-03-05 PROCEDURE — 85610 PROTHROMBIN TIME: CPT

## 2024-03-05 PROCEDURE — 99285 EMERGENCY DEPT VISIT HI MDM: CPT

## 2024-03-05 PROCEDURE — 70450 CT HEAD/BRAIN W/O DYE: CPT

## 2024-03-05 PROCEDURE — 770004 HCHG ROOM/CARE - ONCOLOGY PRIVATE *

## 2024-03-05 PROCEDURE — 84100 ASSAY OF PHOSPHORUS: CPT

## 2024-03-05 RX ORDER — SODIUM CHLORIDE 9 MG/ML
INJECTION, SOLUTION INTRAVENOUS CONTINUOUS
Status: DISCONTINUED | OUTPATIENT
Start: 2024-03-05 | End: 2024-03-05

## 2024-03-05 RX ORDER — LABETALOL HYDROCHLORIDE 5 MG/ML
10 INJECTION, SOLUTION INTRAVENOUS EVERY 4 HOURS PRN
Status: DISCONTINUED | OUTPATIENT
Start: 2024-03-05 | End: 2024-03-07 | Stop reason: HOSPADM

## 2024-03-05 RX ORDER — HYDROXYZINE HYDROCHLORIDE 25 MG/1
25 TABLET, FILM COATED ORAL
Status: DISCONTINUED | OUTPATIENT
Start: 2024-03-05 | End: 2024-03-07 | Stop reason: HOSPADM

## 2024-03-05 RX ORDER — HYDROXYZINE HYDROCHLORIDE 25 MG/1
25 TABLET, FILM COATED ORAL
Status: ON HOLD | COMMUNITY
End: 2024-03-12

## 2024-03-05 RX ORDER — ENOXAPARIN SODIUM 100 MG/ML
40 INJECTION SUBCUTANEOUS DAILY
Status: DISCONTINUED | OUTPATIENT
Start: 2024-03-05 | End: 2024-03-05

## 2024-03-05 RX ORDER — ACETAMINOPHEN 325 MG/1
650 TABLET ORAL EVERY 6 HOURS PRN
Status: DISCONTINUED | OUTPATIENT
Start: 2024-03-05 | End: 2024-03-07 | Stop reason: HOSPADM

## 2024-03-05 RX ORDER — SODIUM CHLORIDE 9 MG/ML
1000 INJECTION, SOLUTION INTRAVENOUS ONCE
Status: COMPLETED | OUTPATIENT
Start: 2024-03-05 | End: 2024-03-05

## 2024-03-05 RX ORDER — MORPHINE SULFATE 60 MG/1
60 TABLET, FILM COATED, EXTENDED RELEASE ORAL EVERY 12 HOURS
Status: DISCONTINUED | OUTPATIENT
Start: 2024-03-05 | End: 2024-03-06

## 2024-03-05 RX ORDER — BUDESONIDE 0.25 MG/2ML
0.25 INHALANT ORAL DAILY
Status: DISCONTINUED | OUTPATIENT
Start: 2024-03-05 | End: 2024-03-07 | Stop reason: HOSPADM

## 2024-03-05 RX ORDER — ONDANSETRON 2 MG/ML
4 INJECTION INTRAMUSCULAR; INTRAVENOUS EVERY 4 HOURS PRN
Status: DISCONTINUED | OUTPATIENT
Start: 2024-03-05 | End: 2024-03-07 | Stop reason: HOSPADM

## 2024-03-05 RX ORDER — PROCHLORPERAZINE EDISYLATE 5 MG/ML
5-10 INJECTION INTRAMUSCULAR; INTRAVENOUS EVERY 4 HOURS PRN
Status: DISCONTINUED | OUTPATIENT
Start: 2024-03-05 | End: 2024-03-07 | Stop reason: HOSPADM

## 2024-03-05 RX ORDER — METHYLPREDNISOLONE 4 MG/1
TABLET ORAL
COMMUNITY
Start: 2024-02-20 | End: 2024-03-09

## 2024-03-05 RX ORDER — AMOXICILLIN 250 MG
2 CAPSULE ORAL EVERY EVENING
Status: DISCONTINUED | OUTPATIENT
Start: 2024-03-05 | End: 2024-03-07 | Stop reason: HOSPADM

## 2024-03-05 RX ORDER — SODIUM CHLORIDE, SODIUM LACTATE, POTASSIUM CHLORIDE, CALCIUM CHLORIDE 600; 310; 30; 20 MG/100ML; MG/100ML; MG/100ML; MG/100ML
INJECTION, SOLUTION INTRAVENOUS CONTINUOUS
Status: DISCONTINUED | OUTPATIENT
Start: 2024-03-05 | End: 2024-03-06

## 2024-03-05 RX ORDER — ONDANSETRON 4 MG/1
4 TABLET, ORALLY DISINTEGRATING ORAL EVERY 4 HOURS PRN
Status: DISCONTINUED | OUTPATIENT
Start: 2024-03-05 | End: 2024-03-07 | Stop reason: HOSPADM

## 2024-03-05 RX ORDER — METAXALONE 800 MG/1
800 TABLET ORAL 3 TIMES DAILY
Status: DISCONTINUED | OUTPATIENT
Start: 2024-03-05 | End: 2024-03-07 | Stop reason: HOSPADM

## 2024-03-05 RX ORDER — PROMETHAZINE HYDROCHLORIDE 25 MG/1
12.5-25 TABLET ORAL EVERY 4 HOURS PRN
Status: DISCONTINUED | OUTPATIENT
Start: 2024-03-05 | End: 2024-03-07 | Stop reason: HOSPADM

## 2024-03-05 RX ORDER — POLYETHYLENE GLYCOL 3350 17 G/17G
1 POWDER, FOR SOLUTION ORAL
Status: DISCONTINUED | OUTPATIENT
Start: 2024-03-05 | End: 2024-03-07 | Stop reason: HOSPADM

## 2024-03-05 RX ORDER — PROMETHAZINE HYDROCHLORIDE 12.5 MG/1
12.5-25 SUPPOSITORY RECTAL EVERY 4 HOURS PRN
Status: DISCONTINUED | OUTPATIENT
Start: 2024-03-05 | End: 2024-03-07 | Stop reason: HOSPADM

## 2024-03-05 RX ADMIN — DOCUSATE SODIUM 50 MG AND SENNOSIDES 8.6 MG 2 TABLET: 8.6; 5 TABLET, FILM COATED ORAL at 18:06

## 2024-03-05 RX ADMIN — METAXALONE 800 MG: 800 TABLET ORAL at 20:28

## 2024-03-05 RX ADMIN — MORPHINE SULFATE 60 MG: 60 TABLET, FILM COATED, EXTENDED RELEASE ORAL at 18:06

## 2024-03-05 RX ADMIN — SODIUM CHLORIDE 1000 ML: 9 INJECTION, SOLUTION INTRAVENOUS at 10:37

## 2024-03-05 RX ADMIN — SODIUM CHLORIDE, POTASSIUM CHLORIDE, SODIUM LACTATE AND CALCIUM CHLORIDE: 600; 310; 30; 20 INJECTION, SOLUTION INTRAVENOUS at 20:28

## 2024-03-05 RX ADMIN — PROCHLORPERAZINE EDISYLATE 10 MG: 5 INJECTION INTRAMUSCULAR; INTRAVENOUS at 18:06

## 2024-03-05 RX ADMIN — ACETAMINOPHEN 650 MG: 325 TABLET, FILM COATED ORAL at 18:28

## 2024-03-05 RX ADMIN — SODIUM CHLORIDE: 9 INJECTION, SOLUTION INTRAVENOUS at 15:17

## 2024-03-05 ASSESSMENT — ENCOUNTER SYMPTOMS
COUGH: 0
VOMITING: 0
NAUSEA: 0
CHILLS: 0
FEVER: 0
CONSTIPATION: 0
PALPITATIONS: 0
SHORTNESS OF BREATH: 0
DIARRHEA: 0
MYALGIAS: 1
BACK PAIN: 1

## 2024-03-05 ASSESSMENT — COGNITIVE AND FUNCTIONAL STATUS - GENERAL
TURNING FROM BACK TO SIDE WHILE IN FLAT BAD: A LOT
SUGGESTED CMS G CODE MODIFIER MOBILITY: CL
DAILY ACTIVITIY SCORE: 12
MOVING FROM LYING ON BACK TO SITTING ON SIDE OF FLAT BED: A LOT
MOVING TO AND FROM BED TO CHAIR: A LOT
SUGGESTED CMS G CODE MODIFIER DAILY ACTIVITY: CL
EATING MEALS: A LOT
WALKING IN HOSPITAL ROOM: TOTAL
STANDING UP FROM CHAIR USING ARMS: A LOT
CLIMB 3 TO 5 STEPS WITH RAILING: TOTAL
TOILETING: A LOT
MOBILITY SCORE: 10
DRESSING REGULAR UPPER BODY CLOTHING: A LOT
PERSONAL GROOMING: A LOT
DRESSING REGULAR LOWER BODY CLOTHING: A LOT
HELP NEEDED FOR BATHING: A LOT

## 2024-03-05 ASSESSMENT — LIFESTYLE VARIABLES
ALCOHOL_USE: NO
ON A TYPICAL DAY WHEN YOU DRINK ALCOHOL HOW MANY DRINKS DO YOU HAVE: 0
EVER HAD A DRINK FIRST THING IN THE MORNING TO STEADY YOUR NERVES TO GET RID OF A HANGOVER: NO
EVER FELT BAD OR GUILTY ABOUT YOUR DRINKING: NO
HOW MANY TIMES IN THE PAST YEAR HAVE YOU HAD 5 OR MORE DRINKS IN A DAY: 0
TOTAL SCORE: 0
HAVE PEOPLE ANNOYED YOU BY CRITICIZING YOUR DRINKING: NO
TOTAL SCORE: 0
AVERAGE NUMBER OF DAYS PER WEEK YOU HAVE A DRINK CONTAINING ALCOHOL: 0
HAVE YOU EVER FELT YOU SHOULD CUT DOWN ON YOUR DRINKING: NO
TOTAL SCORE: 0
CONSUMPTION TOTAL: NEGATIVE

## 2024-03-05 ASSESSMENT — PATIENT HEALTH QUESTIONNAIRE - PHQ9
1. LITTLE INTEREST OR PLEASURE IN DOING THINGS: NOT AT ALL
2. FEELING DOWN, DEPRESSED, IRRITABLE, OR HOPELESS: NOT AT ALL
SUM OF ALL RESPONSES TO PHQ9 QUESTIONS 1 AND 2: 0

## 2024-03-05 ASSESSMENT — PAIN DESCRIPTION - PAIN TYPE
TYPE: ACUTE PAIN
TYPE: ACUTE PAIN

## 2024-03-05 ASSESSMENT — FIBROSIS 4 INDEX: FIB4 SCORE: 3.2

## 2024-03-05 NOTE — ED NOTES
Med Rec complete per pt's spouse at bedside   Allergies reviewed  Antibiotics in the past 30 days:no  Anticoagulant in past 14 days:no  Pharmacy patient utilizes:[facility] on [address]    Per spouse patient took 1 pill of Mektovi today instead of the usual 3 tabs    Per spouse patient took 2 pills of Braftovi instead of 6 tabs at bedtime    Per spouse pt takes Mektovi  @ 7a+7p  Per spouse pt takes Morphine @ 7a+7p  Per spouse today was an unusual day for patient when it came to taking medications    Pt has oral chemo medications at bedside if needed

## 2024-03-05 NOTE — ED PROVIDER NOTES
ER Provider Note    Scribed for Tristen Wick M.D. by Yasmine Abebe. 3/5/2024   8:06 AM    Primary Care Provider: Jose Luis Juarez M.D.    CHIEF COMPLAINT  Chief Complaint   Patient presents with    GLF     Pt was getting out of bed and into his wheelchair this morning when the wheelchair moved resulting in a GLF. +HS, -for blood thinners, -LOC. Pt sustained a skin tear on his left arm as a result and slight neck pain. No other complaints at this time.     EXTERNAL RECORDS REVIEWED  Inpatient Notes: The patient has a history of metastatic melanoma.    HPI/ROS  LIMITATION TO HISTORY   Select: : None  OUTSIDE HISTORIAN(S):  Family Andrew Wall is a 58 y.o. male who presents to the ED for evaluation following a fall occurring this morning. The patient reports he was getting out of bed into his wheelchair when he fell. He notes a positive head strike. He reports associated neck pain and left arm skin tear. He denies any loss of consciousness. He states he has felt as his baseline recently aside from some dehydration. The patient has a history of metastatic melanoma.    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)     Nasal polyp        SURGICAL HISTORY  Past Surgical History:   Procedure Laterality Date    LYMPH NODE EXCISION Right 11/16/2023    Procedure: RIGHT INGUINAL NODE SUPERFICIAL DISSECTION;  Surgeon: Davon Valera M.D.;  Location: SURGERY [facility];  Service: General    NODE BIOPSY SENTINEL Bilateral 10/20/2020    Procedure: BIOPSY, LYMPH NODE, SENTINEL- GROIN;  Surgeon: Davon Valera M.D.;  Location: SURGERY SAME DAY [facility];  Service: General    WIDE EXCISION Right 10/20/2020    Procedure: WIDE  EXCISION, LESION- BUTTOCKS, RADICAL MALIGNANT MELANOMA;  Surgeon: Davon Valera M.D.;  Location: SURGERY SAME DAY [facility];  Service: General    ANTROSTOMY Bilateral 11/15/2019    Procedure: MAXILLARY ANTROSTOMY- REVISION ENDOSCOPICMOMETASONE INJECTION, PROPEL STENT PLACEMENT;  Surgeon: Felicitas Tenorio M.D.;  Location: SURGERY [facility];  Service: Ent    SINUSCOPY Bilateral 11/15/2019    Procedure: ENDOSCOPY, PARANASAL SINUSES- FOR FRONTAL EXPLORATION;  Surgeon: Felicitas Tenorio M.D.;  Location: [facility];  Service: Ent    TURBINOPLASTY Bilateral 11/15/2019    Procedure: TURBINOPLASTY;  Surgeon: Felicitas Tenorio M.D.;  Location: [facility];  Service: Ent    SPHENOIDECTOMY Bilateral 11/15/2019    Procedure: SPHENOIDECTOMY- FOR SPHENOIDOTOMY;  Surgeon: Felicitas Tenorio M.D.;  Location: [facility];  Service: Ent    ETHMOIDECTOMY Bilateral 11/15/2019    Procedure: ETHMOIDECTOMY- ENDOSCOPIC;  Surgeon: Felicitas Tenorio M.D.;  Location: [facility];  Service: Ent    NASAL POLYPECTOMY Bilateral 11/15/2019    Procedure: POLYPECTOMY, NASAL CAVITY;  Surgeon: Felicitas Tenorio M.D.;  Location: [facility];  Service: Ent    NASAL POLYPECTOMY  2015, 2017, 2019    x 3    OTHER  11/20    removed melanoma       FAMILY HISTORY  None noted    SOCIAL HISTORY   reports that he has never smoked. He has never used smokeless tobacco. He reports current alcohol use of about 0.6 oz of alcohol per week. He reports that he does not use drugs.    CURRENT MEDICATIONS  Previous Medications    BINIMETINIB 15 MG TAB    Take 45 mg by mouth 2 times a day.    BUDESONIDE (PULMICORT) 0.25 MG/2ML SUSPENSION    Take 250 mcg by nebulization 2 times a day.    CALCIUM-PHOSPHORUS-VITAMIN D (CALCIUM/VITAMIN D3/ADULT GUMMY PO)    Take 1 Tablet by mouth every day.    ENCORAFENIB 75 MG CAP    Take 450 mg by mouth every day.    FLUTICASONE-SALMETEROL (ADVAIR) 250-50  "MCG/ACT AEROSOL POWDER, BREATH ACTIVATED    Inhale 1 Puff 2 times a day.    HYDROXYZINE HCL (ATARAX) 25 MG TAB    Take 1 Tablet by mouth 3 times a day as needed for Itching or Anxiety for up to 30 days.    IBUPROFEN (MOTRIN) 200 MG TAB    Take 200 mg by mouth 2 times a day as needed for Mild Pain.    MORPHINE ER (MS CONTIN) 60 MG TAB CR TABLET    Take 1 Tablet by mouth every 12 hours for 14 days.    MULTIPLE VITAMINS-MINERALS (MULTIVITAMIN ADULT PO)    Take 1 Tablet by mouth every day.    ONDANSETRON (ZOFRAN ODT) 4 MG TABLET DISPERSIBLE    Take 1 Tablet by mouth every 6 hours as needed for Nausea/Vomiting.    PROCHLORPERAZINE (COMPAZINE) 10 MG TAB    Take 1 Tablet by mouth every 6 hours as needed for Nausea/Vomiting.       ALLERGIES  Allergies   Allergen Reactions    Augmentin Vomiting and Nausea        PHYSICAL EXAM  /77   Pulse (!) 108   Temp 36.8 °C (98.3 °F) (Temporal)   Resp 20   Ht 1.803 m (5' 11\")   Wt 64.4 kg (142 lb)   SpO2 98%   BMI 19.80 kg/m²    Nursing note and vitals reviewed.  Constitutional: Chromically ill appearing.  Uncomfortable appearing.  HENT: Head is normocephalic and atraumatic. Oropharynx is clear without exudate or erythema. Somewhat dry mucous membranes  Neck: Diffuse tenderness of the cervical spine  Eyes: Pupils are equal, round, and reactive to light. Conjunctiva are normal.   Cardiovascular: Normal rate and regular rhythm. No murmur heard. Normal radial pulses.  Pulmonary/Chest: Breath sounds normal. No wheezes or rales.   Abdominal: Soft and non-tender. No distention    Musculoskeletal: Extremities exhibit normal range of motion without edema or tenderness.   Neurological: Awake, alert and oriented to person, place, and time. No focal deficits noted. Normal strength and sensation bilateral upper and lower extremities.  Skin: Skin is warm and dry. No rash. Left forearm skin tear covered with a dressing  Psychiatric: Normal mood and affect. Appropriate for clinical " situation    DIAGNOSTIC STUDIES    Radiology:   This attending emergency physician has independently interpreted the diagnostic imaging associated with this visit and is awaiting the final reading from the radiologist.   Preliminary interpretation is a follows: CT scan demonstrates pathologic lesions.  C2 fracture noted.  C6 compression fracture noted.    Radiologist interpretation:   CT-CSPINE WITHOUT PLUS RECONS   Final Result      1.  Acute pathologic type II dens fracture with mild leftward displacement of the dens fragment.   2.  Permeative lytic lesions at the C6 and C7 levels are again noted with pathologic fractures as described with likely new or increased height loss from the prior MRI.      These findings were discussed with THIAGO HERNÁNDEZ on 3/5/2024 9:26 AM.      CT-HEAD W/O   Final Result      No acute intracranial abnormality.                       INITIAL ASSESSMENT AND PLAN    8:06 AM - Patient was evaluated at bedside for a ground level fall. Ordered for CT-Head without & CT-C spine without to evaluate. Patient verbalizes understanding and support with my plan of care.  Patient has a fairly low risk mechanism but has multiple co morbidities including metastatic cancer currently on treatment complaining of head and neck pain. Given this I feel that he would benefit from CT scan to rule out acute traumatic injury.    ED Observation Status? No; Patient does not meet criteria for ED Observation.      COURSE AND MEDICAL DECISION MAKING    9:43 AM - Paged Spine.    9:53 AM I discussed the patient's case and the above findings with Dr. Lawrence (Spine) who recommends medicine admission and an aspen collar.    11:57 AM - I discussed the patient's case and the above findings with Dr. Franco (hospitalist) who will consult on the patient for hospitalization.    Patient presents today after a fall.  Has metastatic cancer.  CT scan demonstrates pathologic fractures.  Neurologically intact.  Placed in a  cervical collar.    HYDRATION: Based on the patient's presentation of Tachycardia the patient was given IV fluids. IV Hydration was used because oral hydration was not adequate alone. Upon recheck following hydration, the patient was improved.    DISPOSITION AND DISCUSSIONS    I have discussed management of the patient with the following physicians and AZAEL's:  Dr. Franco (hospitalist) & Dr. Lawrence (Spine)     DISPOSITION:  Patient will be hospitalized by Dr. Franco (hospitalist) in guarded condition.    CRITICAL CARE  I provided critical care services, which included medication orders, frequent reevaluations of the patient's condition and response to treatment, ordering and reviewing test results, and discussing the case with various consultants.  The critical care time associated with the care of the patient was 35 minutes. Review chart for interventions. This time is exclusive of any other billable procedures.        FINAL DIAGNOSIS  1. Fall, initial encounter    2. Pathological compression fracture of vertebra, initial encounter (HCC)    3. Closed nondisplaced fracture of second cervical vertebra, unspecified fracture morphology, initial encounter (HCC)    4. Metastatic melanoma, BRAF V600E POSITIVE  (HCC)    5. Metastasis to bone (HCC)         Yasmine TOLEDO (Bridget), am scribing for, and in the presence of, Tristen Wick M.D..    Electronically signed by: Yasmine Abebe (Bridget), 3/5/2024    Tristen TOLEDO M.D. personally performed the services described in this documentation, as scribed by Yasmine Abebe in my presence, and it is both accurate and complete.      The note accurately reflects work and decisions made by me.  Tristen Wick M.D.  3/5/2024  3:03 PM

## 2024-03-05 NOTE — ED TRIAGE NOTES
Chief Complaint   Patient presents with    GLF     Pt was getting out of bed and into his wheelchair this morning when the wheelchair moved resulting in a GLF. +HS, -for blood thinners, -LOC. Pt sustained a skin tear on his left arm as a result and slight neck pain. No other complaints at this time.     Pt is currently receiving treatment for metastatic melanoma.

## 2024-03-05 NOTE — H&P
FAMILY MEDICINE HISTORY AND PHYSICAL     THIS IS A MEDICAL STUDENT NOTE AND EXISTS FOR EDUCATIONAL PURPOSES ONLY. PLEASE REFER TO RESIDENT PHYSICIAN NOTE FOR MOST ACCURATE INFORMATION REGARDING PATIENT CARE.    PATIENT ID:  NAME:  Andrew Wall  MRN:               [id number]  YOB: 1965    Date of Admission: 3/5/2024     Attending: Yony Franco M.d.     Resident: Chey Davis MD (PGY2)    Student: Femi Jensen Student (MS3)    Primary Care Physician:  Jose Luis Juarez M.D.    CC:    Chief Complaint   Patient presents with    GLF     Pt was getting out of bed and into his wheelchair this morning when the wheelchair moved resulting in a GLF. +HS, -for blood thinners, -LOC. Pt sustained a skin tear on his left arm as a result and slight neck pain. No other complaints at this time.       HPI: Andrew Wall is a 58 y.o. male with a past medical history of metastatic melanoma BRAF V600E positive with metastasis to the bone and lymph nodes who presented to the ED with pathologic fractures of the C2 dens, C6 vertebral body, and C7 vertebral body following ground level fall at home earlier today. Per the patient's wife, he was getting out of bed into his wheelchair when he suddenly fell resulting in a fall where he hit his head. He did not lose consciousness during the fall and is not on any chronic anticoagulation. He does not complain of any headache, but is experiencing bilateral hip pain worse since the fall. Per his wife and daughter, the patient reports experiencing new right arm weakness onset within the last 2 weeks as well as bilateral numbess and tingling in both arms. He experiences chronic left leg weakness as well secondary to a lymph node that was compressing the sciatic nerve, but this is improving since lymph node excision. Of note, this patient is followed by oncology and recently received radiation to the C2, C6, and C7 vertebra due to metastasis. He is followed by   [facility] with palliative medicine and currently takes morphine ER 60 mg for pain control.       ERCourse:  CT-head without contrast showing no acute intracranial abnormalities  CT-C spine without contrast showing acute pathologic type II dens fracture with mild leftward displacement of the dens fragment and permeative lytic lesions at the C6 and C7 levels with pathologic fractures   Hgb 10.3,, Na 131, Cr 0.42, Ca 7.7   PT 17.5, INR 1.42, PTT 42.6  Vitals: /77, , Temp 98.3, RR 20, O2 98% on room air   Aspen collar placed   IV fluids given with resolution of tachycardia    REVIEW OF SYSTEMS:   Ten systems reviewed and were negative except as noted in the HPI.                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     PAST SURGICAL HISTORY:   has a past surgical history that includes nasal polypectomy (2015, 2017, 2019); antrostomy (Bilateral, 11/15/2019); sinuscopy (Bilateral, 11/15/2019); turbinoplasty (Bilateral, 11/15/2019); sphenoidectomy (Bilateral, 11/15/2019); ethmoidectomy (Bilateral, 11/15/2019); nasal polypectomy (Bilateral, 11/15/2019); node biopsy sentinel (Bilateral, 10/20/2020); wide excision (Right, 10/20/2020); other (11/20); and lymph node excision (Right, 11/16/2023).     FAMILY HISTORY:  family history is not on file.     SOCIAL HISTORY:   Employment:  prior to cancer diagnosis   Living Situation: Lives at home with his wife   Smoking: Never smoker   Etoh: approximately 0.6 oz/week    Recreational Drug: Denies    DIET:   Orders Placed This Encounter   Procedures    Diet NPO Restrict to: Sips with Medications     Standing Status:   Standing     Number of Occurrences:   1     Order  Specific Question:   Diet NPO Restrict to:     Answer:   Sips with Medications [3]       ALLERGIES:  Allergies   Allergen Reactions    Augmentin Vomiting and Nausea       OUTPATIENT MEDICATIONS:    Current Facility-Administered Medications:     Binimetinib TABS 45 mg, 45 mg, Oral, BID, Chey Davis M.D.    budesonide (Pulmicort) neb susp 0.25 mg, 0.25 mg, Nebulization, DAILY, Chey Davis M.D.    Encorafenib CAPS 450 mg, 450 mg, Oral, DAILY, Chey Davis M.D.    hydrOXYzine HCl (Atarax) tablet 25 mg, 25 mg, Oral, QDAY PRN, Chey Davis M.D.    morphine ER (Ms Contin) tablet 60 mg, 60 mg, Oral, Q12HRS, Chey Davis M.D.    enoxaparin (Lovenox) inj 40 mg, 40 mg, Subcutaneous, DAILY AT 1800, Chey Davis M.D.    acetaminophen (Tylenol) tablet 650 mg, 650 mg, Oral, Q6HRS PRN, Chey Davis M.D.    senna-docusate (Pericolace Or Senokot S) 8.6-50 MG per tablet 2 Tablet, 2 Tablet, Oral, Q EVENING **AND** polyethylene glycol/lytes (Miralax) Packet 1 Packet, 1 Packet, Oral, QDAY PRN, Chey Davis M.D.    labetalol (Normodyne/Trandate) injection 10 mg, 10 mg, Intravenous, Q4HRS PRN, Chey Davis M.D.    ondansetron (Zofran) syringe/vial injection 4 mg, 4 mg, Intravenous, Q4HRS PRN, Chey Davis M.D.    ondansetron (Zofran ODT) dispertab 4 mg, 4 mg, Oral, Q4HRS PRN, Chey Davis M.D.    promethazine (Phenergan) tablet 12.5-25 mg, 12.5-25 mg, Oral, Q4HRS PRN, Chey Davis M.D.    promethazine (Phenergan) suppository 12.5-25 mg, 12.5-25 mg, Rectal, Q4HRS PRN, Chey Davis M.D.    prochlorperazine (Compazine) injection 5-10 mg, 5-10 mg, Intravenous, Q4HRS PRN, Chey Davis M.D.    NS infusion, , Intravenous, Continuous, Chey Davis M.D.    PHYSICAL EXAM:  Vitals:    03/05/24 0904 03/05/24 1127 03/05/24 1202 03/05/24 1301   BP: 117/80 101/64 97/66 102/63   Pulse: (!) 109 (!) 117 (!) 118 (!) 127   Resp: 16 16 20 15   Temp:       TempSrc:       SpO2: 98% 88% 96% 94%    Weight:       Height:       , Temp (24hrs), Av.8 °C (98.3 °F), Min:36.8 °C (98.3 °F), Max:36.8 °C (98.3 °F)  , Pulse Oximetry: 94 %, O2 Delivery Device: None - Room Air    Physical Exam  Constitutional:       Comments: Thin male lying in gurney in aspen collar, speech is soft and quiet    HENT:      Head: Normocephalic and atraumatic.      Mouth/Throat:      Mouth: Mucous membranes are dry.   Eyes:      Extraocular Movements: Extraocular movements intact.      Conjunctiva/sclera: Conjunctivae normal.      Pupils: Pupils are equal, round, and reactive to light.   Neck:      Comments: Stable in aspen collar  Cardiovascular:      Rate and Rhythm: Normal rate and regular rhythm.   Pulmonary:      Comments: Diminished breath sounds bilaterally  Skin:     General: Skin is warm and dry.      Capillary Refill: Capillary refill takes less than 2 seconds.   Neurological:      General: No focal deficit present.      Mental Status: He is oriented to person, place, and time.      Cranial Nerves: No cranial nerve deficit.      Sensory: No sensory deficit.      Motor: No weakness.         LAB TESTS:   Results for orders placed or performed during the hospital encounter of 24   CBC WITHOUT DIFFERENTIAL   Result Value Ref Range    WBC 6.9 4.8 - 10.8 K/uL    RBC 3.96 (L) 4.70 - 6.10 M/uL    Hemoglobin 10.3 (L) 14.0 - 18.0 g/dL    Hematocrit 34.5 (L) 42.0 - 52.0 %    MCV 87.1 81.4 - 97.8 fL    MCH 26.0 (L) 27.0 - 33.0 pg    MCHC 29.9 (L) 32.3 - 36.5 g/dL    RDW 58.6 (H) 35.9 - 50.0 fL    Platelet Count 360 164 - 446 K/uL    MPV 9.1 9.0 - 12.9 fL   BASIC METABOLIC PANEL   Result Value Ref Range    Sodium 131 (L) 135 - 145 mmol/L    Potassium 3.8 3.6 - 5.5 mmol/L    Chloride 96 96 - 112 mmol/L    Co2 23 20 - 33 mmol/L    Glucose 111 (H) 65 - 99 mg/dL    Bun 17 8 - 22 mg/dL    Creatinine 0.42 (L) 0.50 - 1.40 mg/dL    Calcium 7.7 (L) 8.5 - 10.5 mg/dL    Anion Gap 12.0 7.0 - 16.0   PROTHROMBIN TIME (INR)   Result Value Ref  Range    PT 17.5 (H) 12.0 - 14.6 sec    INR 1.42 (H) 0.87 - 1.13   APTT   Result Value Ref Range    APTT 42.6 (H) 24.7 - 36.0 sec   ESTIMATED GFR   Result Value Ref Range    GFR (CKD-EPI) 123 >60 mL/min/1.73 m 2        CULTURES:   Results       ** No results found for the last 168 hours. **            IMAGES:  CT-CSPINE WITHOUT PLUS RECONS   Final Result      1.  Acute pathologic type II dens fracture with mild leftward displacement of the dens fragment.   2.  Permeative lytic lesions at the C6 and C7 levels are again noted with pathologic fractures as described with likely new or increased height loss from the prior MRI.      These findings were discussed with THIAGO HERNÁNDEZ on 3/5/2024 9:26 AM.      CT-HEAD W/O   Final Result      No acute intracranial abnormality.                       CONSULTS:   Dr. Lawrence (Spine)    ASSESSMENT/PLAN:   58 y.o. male with past medical history of metastatic melanoma BRAF V600E positive with metastasis to the bone and lymph nodes admitted for acute pathological compression fracture of the C2 dens with pathologic fractures of the C6 and C7 vertebra following a ground level fall at home.     #Ground  level fall   #Acute pathologic fracture of C2 vertebra   #Pathological fractures of C6 and C7 vertebra  Patient with a GLF involving hitting his head this morning at home. No LOC. Not on chronic anticoagulation. He is neurologically intact and his exam is reassuring for any focal deficits. CT-Head without contrast unremarkable for any intracranial abnormalities. CT-C spine showing acute pathologic type II dens fracture with mild leftward displacement of the dens fragment as well as permeative lytic lesions at the C6 and C7 levels with pathologic fractures as described with likely new or increased height loss from the prior MRI. He does experience new right arm weakness within the last two weeks, although strength and sensation is equal in all extremities.   - Consult neurosurgery for  evaluation and further recommendations   - MRI pending     #Metastatic melanoma BRAF V600E positive  #Metastasis to bone   #Metastasis to lymph nodes   Patient currently undergoing treatment with outpatient oncology   - Continue outpatient binimetinib 45mg BID   - Continue encorafenib 450mg once daily     #Hyponatremia   Present on admission. Replete with MIVF.   - Recheck CMP     #Current opoid use 2/2 cancer pain   Followed by palliative medicine and stable on morphine extended release 60mg BID. Will continue pain control at this dose.   - Consult palliative medicine   - Tylenol 650mg q6 PRN   - senna-docusate 2 tablets once daily   - miralax once daily PRN     #Nausea and vomiting 2/2 cancer treatment   - Zofran 4mg q4 PRN  - Compazine 5-10mg injection q4 PRN   - Phenergan 12.5-25 mg tablet q4 PRN     #FEN   Patient NPO for now pending neurosurgery recommendations. MIVF with NS at 125ml/hr      #DVT Ppx  - enoxaparin 40mg once daily injection           No new Assessment & Plan notes have been filed under this hospital service since the last note was generated.  Service: Family Medicine        Core Measures:  Fluids: NS  Lines: Peripheral IV for intravenous access  Abx: None  Diet:  NPO  PPX: enoxaparin ppx    Disposition  Patient is not medically cleared for discharge.   Anticipate discharge to to home with close outpatient follow-up.  I have placed the appropriate orders for post-discharge needs.    I have personally seen and examined the patient at bedside. I discussed the plan of care with patient, family, and  Yony Franco M.d..    CODE Status: DNAR/DNI      Femi Jensen, Student   MS3  [employer] [facility]

## 2024-03-05 NOTE — PROGRESS NOTES
Virtual Visit: Established Patient   This visit was conducted via Zoom using secure and encrypted videoconferencing technology.   The patient was in their home in the [facility].    The patient's identity was confirmed and verbal consent was obtained for this virtual visit.     Subjective:   CC:   Chief Complaint   Patient presents with    Cancer     FV       Andrew Wall is a 58 y.o. male presenting to palliative care virtually accompanied by his wife.  He was seen on video in a hospital bed at his home.  They did report since last visit they did meet with hospice and have established a relationship but at this time will hold on admission while he evaluates any progress he can make with treatments.  He reports his pain has been well-controlled with the use of long-acting morphine.  Only occasionally uses his breakthrough opioids.  Has had some increase in his strength and endurance to be mobile in the house.  Does not feel he needs any adjustments in his medication today.  They do feel that things are stable so we concluded the visit with a plan to follow-up in 1 month.  Does not endorse any significant nausea or vomiting, constipation or diarrhea.    Review of Systems   Constitutional:  Positive for malaise/fatigue. Negative for chills and fever.   Respiratory:  Negative for cough and shortness of breath.    Cardiovascular:  Negative for chest pain and palpitations.   Gastrointestinal:  Negative for constipation, diarrhea, nausea and vomiting.   Musculoskeletal:  Positive for back pain and myalgias.       Current medicines (including changes today)  Current Outpatient Medications   Medication Sig Dispense Refill    [START ON 3/15/2024] morphine ER (MS CONTIN) 60 MG Tab CR tablet Take 1 Tablet by mouth every 12 hours for 14 days. 28 Tablet 0    prochlorperazine (COMPAZINE) 10 MG Tab Take 1 Tablet by mouth every 6 hours as needed for Nausea/Vomiting. 30 Tablet 6    ondansetron (ZOFRAN ODT) 4 MG TABLET  DISPERSIBLE Take 1 Tablet by mouth every 6 hours as needed for Nausea/Vomiting. 20 Tablet 3    hydrOXYzine HCl (ATARAX) 25 MG Tab Take 1 Tablet by mouth 3 times a day as needed for Itching or Anxiety for up to 30 days. 30 Tablet 0    Binimetinib 15 MG Tab Take 45 mg by mouth 2 times a day.      Encorafenib 75 MG Cap Take 450 mg by mouth every day.      budesonide (PULMICORT) 0.25 MG/2ML Suspension Take 250 mcg by nebulization 2 times a day.      ibuprofen (MOTRIN) 200 MG Tab Take 200 mg by mouth 2 times a day as needed for Mild Pain.      Calcium-Phosphorus-Vitamin D (CALCIUM/VITAMIN D3/ADULT GUMMY PO) Take 1 Tablet by mouth every day.      fluticasone-salmeterol (ADVAIR) 250-50 MCG/ACT AEROSOL POWDER, BREATH ACTIVATED Inhale 1 Puff 2 times a day. 60 Each 3    Multiple Vitamins-Minerals (MULTIVITAMIN ADULT PO) Take 1 Tablet by mouth every day.       No current facility-administered medications for this encounter.       Patient Active Problem List    Diagnosis Date Noted    Cancer related pain 02/05/2024    Nausea and vomiting 02/05/2024    Goals of care, counseling/discussion 02/05/2024    Hypophosphatemia 01/24/2024    Hypocalcemia 01/20/2024    Pathologic fracture 01/18/2024    Elevated LFTs 01/18/2024    DNR (do not resuscitate) 01/17/2024    Metastasis to bone (HCC) 01/16/2024    Leukocytosis 01/15/2024    Hyperglycemia 01/15/2024    Fever 01/14/2024    Intractable low back pain 01/13/2024    Hypokalemia 01/13/2024    Metastatic melanoma, BRAF V600E POSITIVE  (HCC) 01/12/2024    Elevated glucose 01/12/2024    Anemia 01/12/2024    Constipation 01/12/2024    Hypercalcemia 01/11/2024    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) 11/16/2023    Moderate persistent asthma without complication 08/11/2021    Chronic pansinusitis 08/11/2021    Bilateral tinnitus 08/11/2021    Melanoma of buttock (HCC) 10/20/2020    Mild intermittent asthma without complication 08/07/2020    Hypertrophy of nasal turbinates          Objective:   There were no vitals taken for this visit.    Physical Exam:  Constitutional: Alert, no distress, well-groomed.  Skin: No rashes in visible areas.  Eye: Round. Conjunctiva clear, lids normal. No icterus.   ENMT: Lips pink without lesions, good dentition, moist mucous membranes. Phonation normal.  Neck: No masses, no thyromegaly. Moves freely without pain.  Respiratory: Unlabored respiratory effort, no cough or audible wheeze  Psych: Alert and oriented x3, normal affect and mood.     Assessment and Plan:   The following treatment plan was discussed:     Patient with known metastatic melanoma.  Was put on regimen of long and short acting opioids when inpatient.  Currently using 60 mg morphine extended release twice daily along with 10 mg oxycodone for breakthrough.  Pain is well-controlled.  Did recently have a refill sent.  PDMP reviewed, no signs of diversion or abuse, risk and benefits of medication discussed, controlled subs agreement on file.  Patient has established a relationship with hospice but at this time is continuing disease modifying treatments.  Will follow-up in 1 month for next refills or sooner if needed.    1. Metastasis to bone (HCC)  - morphine ER (MS CONTIN) 60 MG Tab CR tablet; Take 1 Tablet by mouth every 12 hours for 14 days.  Dispense: 28 Tablet; Refill: 0    2. 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)  - morphine ER (MS CONTIN) 60 MG Tab CR tablet; Take 1 Tablet by mouth every 12 hours for 14 days.  Dispense: 28 Tablet; Refill: 0    3. Pathological fracture due to neoplastic disease, unspecified fracture site, initial encounter  - morphine ER (MS CONTIN) 60 MG Tab CR tablet; Take 1 Tablet by mouth every 12 hours for 14 days.  Dispense: 28 Tablet; Refill: 0    25 minutes in total spent on patient encounter including chart review and consultation with patient's oncological providers.    Follow-up: Return in about 4 weeks (around 4/1/2024).

## 2024-03-05 NOTE — PROGRESS NOTES
Pt arrived to unit via gurney. Pt in [place] c-collar, reports pain as tolerable at this time. Pt transferred from gurney to bed with use of slideboard. Family at bedside.

## 2024-03-05 NOTE — PROGRESS NOTES
[place] C-collar applied to patient to replace ED collar. C-spine precautions maintained during procedure. Patient tolerates new collar well.

## 2024-03-06 ENCOUNTER — APPOINTMENT (OUTPATIENT)
Dept: RADIOLOGY | Facility: MEDICAL CENTER | Age: 59
DRG: 542 | End: 2024-03-06
Attending: INTERNAL MEDICINE
Payer: COMMERCIAL

## 2024-03-06 PROBLEM — R41.0 DELIRIUM: Status: ACTIVE | Noted: 2024-03-06

## 2024-03-06 LAB
ALBUMIN SERPL BCP-MCNC: 2.1 G/DL (ref 3.2–4.9)
ALBUMIN/GLOB SERPL: 1 G/DL
ALP SERPL-CCNC: 243 U/L (ref 30–99)
ALT SERPL-CCNC: 9 U/L (ref 2–50)
ANION GAP SERPL CALC-SCNC: 7 MMOL/L (ref 7–16)
APPEARANCE UR: CLEAR
AST SERPL-CCNC: 23 U/L (ref 12–45)
BACTERIA #/AREA URNS HPF: NEGATIVE /HPF
BILIRUB SERPL-MCNC: 0.4 MG/DL (ref 0.1–1.5)
BILIRUB UR QL STRIP.AUTO: ABNORMAL
BUN SERPL-MCNC: 10 MG/DL (ref 8–22)
CALCIUM ALBUM COR SERPL-MCNC: 8.5 MG/DL (ref 8.5–10.5)
CALCIUM SERPL-MCNC: 7 MG/DL (ref 8.5–10.5)
CHLORIDE SERPL-SCNC: 102 MMOL/L (ref 96–112)
CO2 SERPL-SCNC: 22 MMOL/L (ref 20–33)
COLOR UR: ABNORMAL
CREAT SERPL-MCNC: 0.24 MG/DL (ref 0.5–1.4)
EPI CELLS #/AREA URNS HPF: NEGATIVE /HPF
ERYTHROCYTE [DISTWIDTH] IN BLOOD BY AUTOMATED COUNT: 58.9 FL (ref 35.9–50)
GFR SERPLBLD CREATININE-BSD FMLA CKD-EPI: 146 ML/MIN/1.73 M 2
GLOBULIN SER CALC-MCNC: 2.2 G/DL (ref 1.9–3.5)
GLUCOSE SERPL-MCNC: 93 MG/DL (ref 65–99)
GLUCOSE UR STRIP.AUTO-MCNC: NEGATIVE MG/DL
HCT VFR BLD AUTO: 25.6 % (ref 42–52)
HGB BLD-MCNC: 7.9 G/DL (ref 14–18)
HYALINE CASTS #/AREA URNS LPF: ABNORMAL /LPF
KETONES UR STRIP.AUTO-MCNC: 40 MG/DL
LEUKOCYTE ESTERASE UR QL STRIP.AUTO: ABNORMAL
MCH RBC QN AUTO: 26.3 PG (ref 27–33)
MCHC RBC AUTO-ENTMCNC: 30.9 G/DL (ref 32.3–36.5)
MCV RBC AUTO: 85.3 FL (ref 81.4–97.8)
MICRO URNS: ABNORMAL
NITRITE UR QL STRIP.AUTO: NEGATIVE
PH UR STRIP.AUTO: 6.5 [PH] (ref 5–8)
PLATELET # BLD AUTO: 237 K/UL (ref 164–446)
PMV BLD AUTO: 9.2 FL (ref 9–12.9)
POTASSIUM SERPL-SCNC: 3.6 MMOL/L (ref 3.6–5.5)
PROT SERPL-MCNC: 4.3 G/DL (ref 6–8.2)
PROT UR QL STRIP: 30 MG/DL
RBC # BLD AUTO: 3 M/UL (ref 4.7–6.1)
RBC # URNS HPF: ABNORMAL /HPF
RBC UR QL AUTO: NEGATIVE
SODIUM SERPL-SCNC: 131 MMOL/L (ref 135–145)
SP GR UR STRIP.AUTO: 1.03
UROBILINOGEN UR STRIP.AUTO-MCNC: 4 MG/DL
WBC # BLD AUTO: 4.8 K/UL (ref 4.8–10.8)
WBC #/AREA URNS HPF: ABNORMAL /HPF

## 2024-03-06 PROCEDURE — 770004 HCHG ROOM/CARE - ONCOLOGY PRIVATE *

## 2024-03-06 PROCEDURE — A9270 NON-COVERED ITEM OR SERVICE: HCPCS

## 2024-03-06 PROCEDURE — 700111 HCHG RX REV CODE 636 W/ 250 OVERRIDE (IP)

## 2024-03-06 PROCEDURE — 99232 SBSQ HOSP IP/OBS MODERATE 35: CPT | Mod: GC | Performed by: FAMILY MEDICINE

## 2024-03-06 PROCEDURE — 81001 URINALYSIS AUTO W/SCOPE: CPT

## 2024-03-06 PROCEDURE — 700117 HCHG RX CONTRAST REV CODE 255: Performed by: INTERNAL MEDICINE

## 2024-03-06 PROCEDURE — 85027 COMPLETE CBC AUTOMATED: CPT

## 2024-03-06 PROCEDURE — 80053 COMPREHEN METABOLIC PANEL: CPT

## 2024-03-06 PROCEDURE — 36415 COLL VENOUS BLD VENIPUNCTURE: CPT

## 2024-03-06 PROCEDURE — 700102 HCHG RX REV CODE 250 W/ 637 OVERRIDE(OP)

## 2024-03-06 PROCEDURE — 71260 CT THORAX DX C+: CPT

## 2024-03-06 PROCEDURE — 87086 URINE CULTURE/COLONY COUNT: CPT

## 2024-03-06 PROCEDURE — 700105 HCHG RX REV CODE 258

## 2024-03-06 RX ORDER — SODIUM CHLORIDE 9 MG/ML
500 INJECTION, SOLUTION INTRAVENOUS ONCE
Status: COMPLETED | OUTPATIENT
Start: 2024-03-06 | End: 2024-03-06

## 2024-03-06 RX ORDER — SODIUM CHLORIDE 9 MG/ML
INJECTION, SOLUTION INTRAVENOUS CONTINUOUS
Status: DISCONTINUED | OUTPATIENT
Start: 2024-03-06 | End: 2024-03-07

## 2024-03-06 RX ADMIN — IOHEXOL 80 ML: 350 INJECTION, SOLUTION INTRAVENOUS at 16:29

## 2024-03-06 RX ADMIN — METAXALONE 800 MG: 800 TABLET ORAL at 04:12

## 2024-03-06 RX ADMIN — PROCHLORPERAZINE EDISYLATE 10 MG: 5 INJECTION INTRAMUSCULAR; INTRAVENOUS at 15:10

## 2024-03-06 RX ADMIN — MORPHINE SULFATE 60 MG: 60 TABLET, FILM COATED, EXTENDED RELEASE ORAL at 04:12

## 2024-03-06 RX ADMIN — MORPHINE SULFATE 45 MG: 30 TABLET, FILM COATED, EXTENDED RELEASE ORAL at 17:32

## 2024-03-06 RX ADMIN — DOCUSATE SODIUM 50 MG AND SENNOSIDES 8.6 MG 2 TABLET: 8.6; 5 TABLET, FILM COATED ORAL at 17:33

## 2024-03-06 RX ADMIN — SODIUM CHLORIDE 500 ML: 9 INJECTION, SOLUTION INTRAVENOUS at 04:12

## 2024-03-06 RX ADMIN — METAXALONE 800 MG: 800 TABLET ORAL at 12:28

## 2024-03-06 RX ADMIN — SODIUM CHLORIDE: 9 INJECTION, SOLUTION INTRAVENOUS at 05:54

## 2024-03-06 RX ADMIN — METAXALONE 800 MG: 800 TABLET ORAL at 17:33

## 2024-03-06 ASSESSMENT — PAIN DESCRIPTION - PAIN TYPE
TYPE: ACUTE PAIN

## 2024-03-06 NOTE — DIETARY
"Nutrition services: Day 1 of admit.  Andrew Wall is a 58 y.o. male with admitting DX of Fracture dislocation of cervical spine    Consult received for unintentional weight loss and poor oral intake; MST 3    Met with pt, wife and daughter at bedside. Pt was sleeping so obtained history from wife. She shared that he has continued to lose weight since November. Pt weight in November was 170 lbs, this is a 16% weight loss x 4 months which is clinically significant. Wife reports that pt is taking in <1000 kcals per day which is <50% of nutritional needs (recommended calorie range is minimum of 2000 kcals). At home, pt will typically drink ~1 Ensure per day, cream of wheat, 1/2 toast. Pt is experiencing a decrease in appetite, changes in taste, and fatigue. RD inquired about starting an appetite stimulant and family is open to this. RD also inquired on beliefs/feelings surrounding feeding tubes and wife expressed that pt is adamant about not starting nutrition support. Family is agreeable to sending Ensure shakes in a milkshake as well as obtaining some food preferences.     Assessment:  Height: 180.3 cm (5' 11\")  Weight: 64.4 kg (142 lb) via other healthcare   Body mass index is 19.8 kg/m²., BMI classification: Normal  Diet/Intake: Full liquid; No meals recorded at this time    Wt Readings from Last 7 Encounters:   03/05/24 64.4 kg (142 lb)   02/02/24 70.3 kg (155 lb)   01/15/24 85.6 kg (188 lb 11.4 oz)   01/15/24 85.6 kg (188 lb 11.4 oz)   01/04/24 73.9 kg (163 lb)   12/09/23 77.1 kg (170 lb)   12/06/23 78 kg (172 lb)     Weight Change: Stand up scale weight on 1/11 was 159 lbs per chart review. This is an 11% weight loss x 2 mo which is clinically significant.       Evaluation:   Medical history of metastatic melanoma BRAF V600E positive with metastasis to the bone and lymph nodes who presented after a ground level fall. Per H&P note,  followed by Dr. Bedolla with palliative medicine and taking morphine for " pain.  Labs: Na 131, phos 2.0  Meds: NS infusion, senna, NS (bolus)  +BM: 3/3  Nutrition focused physical exam: Pt was asleep and not appropriate for a physical exam.     Malnutrition Risk: Pt meets ASPEN criteria for severe malnutrition in the context of chronic illness related to metastatic melanoma AEB 16% weight loss x 4 mo per family report and PO intake <50% of all meals x 4 mo per family report.     Recommendations/Plan:  Recommend diet advancement as medically feasible.   Consider an appetite stimulant-discussed with MD  Provide oral nutrition supplements TID    Encourage intake of >50% of meals and supplements   Document intake of all meals and supplements  as % taken in ADL's to provide interdisciplinary communication across all shifts.   Monitor weight.  Nutrition rep will continue to see patient for ongoing meal and snack preferences.     RD following.

## 2024-03-06 NOTE — H&P
[facility] MEDICINE H&P        PATIENT ID:  NAME:  Andrew Wall  MRN:               [id number]  YOB: 1965    Date of Admission: 3/5/2024     Attending: Yony Franco M.d.    Resident: Chey Davis M.D.    Primary Care Physician:  Jose Luis Juarez M.D.    CC:  GLF     HPI: Andrew Wall is a 58 y.o. male with medical history of metastatic melanoma BRAF V600E positive with metastasis to the bone and lymph nodes who presented after a ground level fall.  Patient is accompanied by wife and daughter.  Wife reports that patient had a ground-level fall while trying to get out of bed into his wheelchair in which it appeared he fell on his head as well as his left and right hip.  Wife reports patient has been very fatigued with poor oral intake.  Wife denies loss of consciousness.  Denies patient is currently on blood thinners.  Patient denies any pain at this time.  Wife does feel patient has been recently complaining of right arm weakness as well as chronic left leg weakness due to metastatic lymph node in the groin.  Patient is currently being followed by oncology.  Has previously received radiation of C2, C6, and C6-7 vertebrae.  Also being followed by Dr. Bedolla with palliative medicine and taking morphine for pain.    ERCourse:  Vitals stable in ED. CT head without contrast showing no acute intracranial abnormalities. CT-C spine without contrast showing acute pathologic type II dens fracture with mild leftward displacement of the dens fragment and permeative lytic lesions at the C6 and C7 levels with pathologic fractures. Labs pertinent for stable normocytic anemia. CMP with low sodium at 131. Hypocalcemia (uncorrected) at 7.7. ERP consulted neurosurgery who advised they will place [place] collar.   IV fluids given with resolution of tachycardia    REVIEW OF SYSTEMS:   Ten systems reviewed and were negative except as noted in the HPI.                PAST MEDICAL HISTORY:  Past  Medical History:   Diagnosis Date    Asthma     Cancer (HCC) 10/20    melanoma    Cancer related pain 2/5/2024    Constipation 1/12/2024    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) 11/16/2023    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) 11/16/2023    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)     Nasal polyp      PAST SURGICAL HISTORY:  Past Surgical History:   Procedure Laterality Date    LYMPH NODE EXCISION Right 11/16/2023    Procedure: RIGHT INGUINAL NODE SUPERFICIAL DISSECTION;  Surgeon: Davon Valera M.D.;  Location: SURGERY [facility];  Service: General    NODE BIOPSY SENTINEL Bilateral 10/20/2020    Procedure: BIOPSY, LYMPH NODE, SENTINEL- GROIN;  Surgeon: Davon Valera M.D.;  Location: SURGERY SAME DAY [facility];  Service: General    WIDE EXCISION Right 10/20/2020    Procedure: WIDE EXCISION, LESION- BUTTOCKS, RADICAL MALIGNANT MELANOMA;  Surgeon: Davon Valera M.D.;  Location: SURGERY SAME DAY [facility];  Service: General    ANTROSTOMY Bilateral 11/15/2019    Procedure: MAXILLARY ANTROSTOMY- REVISION ENDOSCOPICMOMETASONE INJECTION, PROPEL STENT PLACEMENT;  Surgeon: Felicitas Tenorio M.D.;  Location: [facility];  Service: Ent    SINUSCOPY Bilateral 11/15/2019    Procedure: ENDOSCOPY, PARANASAL SINUSES- FOR FRONTAL EXPLORATION;  Surgeon: Felicitas Tenorio M.D.;  Location: [facility];  Service: Ent    TURBINOPLASTY Bilateral 11/15/2019    Procedure: TURBINOPLASTY;  Surgeon: Felicitas Tenorio M.D.;  Location: [facility];  Service: Ent    SPHENOIDECTOMY Bilateral 11/15/2019    Procedure: SPHENOIDECTOMY- FOR SPHENOIDOTOMY;  Surgeon: Felicitas Tenorio M.D.;  Location: [facility];  Service: Ent    ETHMOIDECTOMY Bilateral 11/15/2019    Procedure: ETHMOIDECTOMY- ENDOSCOPIC;  Surgeon: Felicitas Tenorio M.D.;  Location: [facility];  Service: Ent    NASAL POLYPECTOMY Bilateral 11/15/2019    Procedure: POLYPECTOMY,  NASAL CAVITY;  Surgeon: Felicitas Tenorio M.D.;  Location: SURGERY [facility];  Service: Ent    NASAL POLYPECTOMY  2015, 2017, 2019    x 3    OTHER  11/20    removed melanoma     FAMILY HISTORY:  No family history on file.    SOCIAL HISTORY:   Pt lives at home with wife.   Smoking denies   Etoh use denies   Drug use denies     DIET:   Orders Placed This Encounter   Procedures    Diet NPO Restrict to: Sips with Medications     Standing Status:   Standing     Number of Occurrences:   1     Order Specific Question:   Diet NPO Restrict to:     Answer:   Sips with Medications [3]       ALLERGIES:  Allergies   Allergen Reactions    Augmentin Vomiting and Nausea       OUTPATIENT MEDICATIONS:    Current Facility-Administered Medications:     budesonide (Pulmicort) neb susp 0.25 mg, 0.25 mg, Nebulization, DAILY, Chey Davis M.D.    hydrOXYzine HCl (Atarax) tablet 25 mg, 25 mg, Oral, QDAY PRN, Chey Davis M.D.    morphine ER (Ms Contin) tablet 60 mg, 60 mg, Oral, Q12HRS, Chey Davis M.D.    enoxaparin (Lovenox) inj 40 mg, 40 mg, Subcutaneous, DAILY AT 1800, Chey Davis M.D.    acetaminophen (Tylenol) tablet 650 mg, 650 mg, Oral, Q6HRS PRN, Chey Davis M.D.    senna-docusate (Pericolace Or Senokot S) 8.6-50 MG per tablet 2 Tablet, 2 Tablet, Oral, Q EVENING **AND** polyethylene glycol/lytes (Miralax) Packet 1 Packet, 1 Packet, Oral, QDAY PRN, Chey Davis M.D.    labetalol (Normodyne/Trandate) injection 10 mg, 10 mg, Intravenous, Q4HRS PRN, Chey Davis M.D.    ondansetron (Zofran) syringe/vial injection 4 mg, 4 mg, Intravenous, Q4HRS PRN, Chey Davis M.D.    ondansetron (Zofran ODT) dispertab 4 mg, 4 mg, Oral, Q4HRS PRN, Chey Davis M.D.    promethazine (Phenergan) tablet 12.5-25 mg, 12.5-25 mg, Oral, Q4HRS PRN, Chey Davis M.D.    promethazine (Phenergan) suppository 12.5-25 mg, 12.5-25 mg, Rectal, Q4HRS PRN, Chey Davis M.D.    prochlorperazine (Compazine)  "injection 5-10 mg, 5-10 mg, Intravenous, Q4HRS PRN, Chey Davis M.D.    NS infusion, , Intravenous, Continuous, Chey Davis M.D., Last Rate: 125 mL/hr at 24 1517, New Bag at 24 1517    PHYSICAL EXAM:  Vitals:    24 1202 24 1301 24 1441 24 1534   BP: 97/66 102/63 98/62    Pulse: (!) 118 (!) 127 (!) 122    Resp: 20 15 16    Temp:   36.8 °C (98.3 °F)    TempSrc:   Temporal    SpO2: 96% 94% 97%    Weight:       Height:    1.803 m (5' 11\")   , Temp (24hrs), Av.8 °C (98.3 °F), Min:36.8 °C (98.3 °F), Max:36.8 °C (98.3 °F)  , Pulse Oximetry: 97 %, O2 Delivery Device: None - Room Air    General: Pt resting in NAD, cooperative, cachetic  Skin:  Pink, warm and dry.  No rashes  HEENT: NC/AT. PERRL. EOMI. Dry mucus membranesNo nasal discharge. Oropharynx nonerythematous without exudate/plaques  Neck:  in c collar   Lungs:  Symmetrical.  CTAB with no W/R/R.  Good air movement   Cardiovascular:  S1/S2 RRR without M/R/G.  Abdomen:  Abdomen is soft NT/ND. +BS. No masses noted.  Extremities:  Full range of motion. No gross deformities noted. 2+ pulses in all extremities. No C/C/E   Spine:  Straight without vertebral anomalies.  CNS:  Muscle tone is normal. Cranial nerves II-X intact could not check XII c collar is in place .         LAB TESTS:   Recent Labs     24  0945   WBC 6.9   RBC 3.96*   HEMOGLOBIN 10.3*   HEMATOCRIT 34.5*   MCV 87.1   MCH 26.0*   RDW 58.6*   PLATELETCT 360   MPV 9.1         Recent Labs     24  0945   SODIUM 131*   POTASSIUM 3.8   CHLORIDE 96   CO2 23   BUN 17   CREATININE 0.42*   CALCIUM 7.7*       CULTURES:   Results       ** No results found for the last 168 hours. **            IMAGES:  CT-CSPINE WITHOUT PLUS RECONS   Final Result      1.  Acute pathologic type II dens fracture with mild leftward displacement of the dens fragment.   2.  Permeative lytic lesions at the C6 and C7 levels are again noted with pathologic fractures as described with " likely new or increased height loss from the prior MRI.      These findings were discussed with THIAGO HERNÁNDEZ on 3/5/2024 9:26 AM.      CT-HEAD W/O   Final Result      No acute intracranial abnormality.                  DX-HIP-BILATERAL-WITH PELVIS-2 VIEWS    (Results Pending)       CONSULTS:   Neurosurgery     ASSESSMENT/PLAN: 58 year old male with medical history of metastatic melanoma BRAF V600E positive with metastasis to the bone and lymph nodes who presented after a ground level fall admitted for pathological fracture of C spine.     * Fracture dislocation of cervical spine, initial encounter (HCC)- (present on admission)  Assessment & Plan  Patient presents today after a ground-level fall.  CT of the C-spine indicated acute pathologic type II dens fracture with mild leftward displacement of the dens fragment as well as permeative lytic lesions of C6 and C7 with pathological fractures.  Per ERP during our conversation for admission he reports he consulted neurosurgery who advised patient be placed in a c-collar until they can formally evaluate.  Plan  -Cervical spine collar was placed on patient  -Patient is not currently complaining of pain however continue home medication of MS Contin  -Will make n.p.o. at midnight pending possible procedure per neurosurgery.        Ground-level fall  Assessment & Plan  Patient presents today after ground-level fall.  So far imaging performed in the ER includes a negative CT head.  CT C-spine with pathological fracture of C2 as well as fractures of C6 and 7.  Plan  -Patient also complaining of hip pain.  Ordered bilateral hip x-ray at this time  -Will have PT and OT evaluate patient during this hospitalization.  Unclear patient's prognosis may be discussing palliative care options or how to make more comfortable while here and at home.      Cancer related pain- (present on admission)  Assessment & Plan  Patient with known history of metastatic melanoma to the bone.   Patient being followed by palliative care physician with his oncologist.  Patient is currently on MS Contin 60 mg every 12 hours.  Plan  -Will continue patient's home medication at this time.  Patient is not wanting to consider other pain meds however if his pain persist due to new pathologic fractures of the cervical spine can expand pain regimen.  Patient will be kept on telemetry and continuous pulse ox given cervical spine fracture and heavy morphine dose.    Constipation- (present on admission)  Assessment & Plan  Patient currently on high-dose opioids.  He does have minimal intake per wife.  He currently only has a largely liquid diet.  Will however continue patient on bowel regimen unless patient complaining of diarrhea to avoid patient getting constipation or ileus while in the hospital.    Metastatic melanoma, BRAF V600E POSITIVE  (HCC)- (present on admission)  Assessment & Plan  Patient with known history of metastatic melanoma.  Patient is currently on immunotherapy outpatient.  Plan  -Discussed case with Dr. Bedolla who is patient's palliative care physician with his oncology team   -Will discuss case with oncology tomorrow if we need to continue immunotherapy as his medications are not currently on our MAR and will need to be brought from home.    Moderate persistent asthma without complication- (present on admission)  Assessment & Plan  Continue home Pulmicort daily       Core Measures:  Fluids:  NS     Lines: PIV  Abx: None   Diet: NPO at midnight, likely liquid or as tolerated in the interim   PPX:  Will hold lovenox   CODE STATUS: DNR/DNI  DISPO:  Inpatient     Chey Davis M.D. PGY-2  [facility]

## 2024-03-06 NOTE — PROGRESS NOTES
Dr. Bosch notified of patient's 101.2 degree temp, patient confused, agitated, and not at baseline mentation per wife and daughter. Pt also having urinary urgency. Provider ordered blood cultures x2, UA with culture, lactated ringer's infusion at 100 mL/hr, labs for mag and phos, and Skelaxin 800 mg TID. All orders were read back and verified by Dr. Bosch. Orders entered by this nurse due to provider not having computer readily available at this time.

## 2024-03-06 NOTE — ASSESSMENT & PLAN NOTE
Patient presents today after a ground-level fall.  CT of the C-spine indicated acute pathologic type II dens fracture with mild leftward displacement of the dens fragment as well as permeative lytic lesions of C6 and C7 with pathological fractures.  Per ERP during our conversation for admission he reports he consulted neurosurgery who advised patient be placed in a c-collar until they can formally evaluate. Spine surgery talked to family, they advised against surgery.   Plan  -Cervical spine collar is in place.   -Patient is not currently complaining of pain however continue home medication of MS Contin  - Oncology team following. Pending CT imaging to evaluate the extent of metastatic cancer

## 2024-03-06 NOTE — ASSESSMENT & PLAN NOTE
Patient presents today after ground-level fall.  Likely due to deconditioned state maybe a component of high opioid use for cancer related pain. So far imaging performed in the ER includes a negative CT head.  CT C-spine with pathological fracture of C2 as well as fractures of C6 and 7. Hip Xray indicated chronic bone metastasis no acute fracture  Plan  -Considered PT and OT evaluate patient during this hospitalization.  Patient declines PT or OT evaluation at this time. Just wants to be comfortable. Family agreeable to reducing dose to 45 mg BID to see if this helps with delirium. Will increase back up to 60 mg if not controlling pain.

## 2024-03-06 NOTE — ASSESSMENT & PLAN NOTE
Patient with known history of metastatic melanoma to the bone.  Patient being followed by palliative care physician with his oncologist.  Patient is currently on MS Contin 60 mg every 12 hours.  Plan  -Will continue patient's home medication at this time.  Patient is not wanting to consider other pain meds however if his pain persist due to new pathologic fractures of the cervical spine can expand pain regimen.  Patient will be kept on telemetry and continuous pulse ox given cervical spine fracture.  - 3/6 Family agreeable to reduction of MS contin to 45 mg BID to see if this helps with delirium. No changes in delirium but more pain. Will discuss with family if they would like to stay on 60 mg BID as this is likely not contributing to delirium.

## 2024-03-06 NOTE — PROGRESS NOTES
4 Eyes Skin Assessment Completed by JODY Gómez and JODY Houston.    Head WDL  Ears WDL  Nose WDL  Mouth WDL  Neck WDL (c-collar in place, skin beneath is WDL)  Breast/Chest WDL  Shoulder Blades WDL  Spine Redness, Blanching, and Bruising  (R) Arm/Elbow/Hand Bruising  (L) Arm/Elbow/Hand Bruising and Abrasion (skin tear to forearm and blood blister to thumb)  Abdomen WDL  Groin WDL  Scrotum/Coccyx/Buttocks Redness, Blanching, and Discoloration (per wife, had a prior surgery at base of spine/sacrum and now has very thin skin in this area)  (R) Leg WDL  (L) Leg WDL  (R) Heel/Foot/Toe WDL  (L) Heel/Foot/Toe WDL          Devices In Places Tele Box, Pulse Ox, and Cervical Collar      Interventions In Place Q2 Turns and Pressure Redistribution Mattress    Possible Skin Injury No    Pictures Uploaded Into Epic N/A  Wound Consult Placed N/A  RN Wound Prevention Protocol Ordered No

## 2024-03-06 NOTE — ASSESSMENT & PLAN NOTE
Patient with known history of metastatic melanoma.  Patient is currently on immunotherapy outpatient.  Plan  -Discussed case with Dr. Bedolla who is patient's palliative care physician with his oncology team   -Dr. Ross with oncology advised can continue to hold immunotherapy while inpatient.   -Family would like to talk with hospice instead of pursuing further oncology treatment

## 2024-03-06 NOTE — PROGRESS NOTES
Full consult note to follow    Plan from spine standpoint:  C collar full time  No repeat bending twisting no lifting over 10 lbs  Patient will continue with palliative care

## 2024-03-06 NOTE — CONSULTS
DATE OF SERVICE:  03/06/2024     Consultation was called by Dr. Yony Franco.     REASON FOR CONSULTATION:   1.  BRAF positive metastatic melanoma.  2.  Recent fall with fracture of cervical vertebra.  2.  Adult failure to thrive.     HISTORY OF PRESENT ILLNESS:  The patient is a 58-year-old gentleman, who was   under my care for metastatic melanoma, which is BRAF positive.  The patient   was started on Braftovi and Mektovi in February and his pains did improve;   however, the patient had a recent fall at home due to which he fractured his   C2 vertebra.  Hem-onc consultation was called for further management of his   condition.  His wife is at the bedside.  The patient's oral intake is also   very poor.  He has not been eating or drinking much for the past few days.     PAST MEDICAL HISTORY:  Melanoma, nasal polyps, allergic rhinitis.     PAST SURGICAL HISTORY:  Nasal polypectomy, vasectomy, melanoma surgery, lymph   node biopsy.     PERSONAL AND SOCIAL HISTORY:  , lives with his spouse, 2 children.    Nonsmoker.     FAMILY HISTORY:  Not pertinent to this hospitalization.     REVIEW OF SYSTEMS:  Could not be done since the patient is very lethargic and   sleepy.     PHYSICAL EXAMINATION:    GENERAL:  He is alert and oriented x2.  HEENT:  Pupils are equal.  Conjunctivae are pale.  Oral mucosa is dry.  Neck   is in collar.  LUNGS:  Clear to auscultation with good bilateral air entry.  HEART:  Reveals no S3, S4.  ABDOMEN:  Reveals no hepatosplenomegaly.  There is no tenderness.  EXTREMITIES:  There is no edema of lower extremities.  NEUROLOGICAL:  Shows that he is able to move all of his four extremities.  PSYCHIATRIC:  Evaluation could not be done because of his mentation.     ASSESSMENT AND PLAN:    1.  Metastatic melanoma.  The patient has metastatic melanoma, which is BRAF   positive and he was put on Braftovi and Mektovi; however, most recently, he   has had issues with falls and another fracture of  his C-spine.  Also, his   general condition is worse.  He has lost weight because of his poor oral   intake.  I had a long discussion with the patient and his wife regarding the   future plans.  Chances of response to Braftovi and Mektovi is about 70% and it   initially appeared that he was responding to the drug with significant   improvement in his pain.  I will get another CT scan chest, abdomen and pelvis   done.  Further management will depend upon the results of those tests.  The   patient may decide not to pursue any further treatment as per his wife.  2.  Pain management.  The patient will be followed up by palliative care.  I   did discuss his case with  ____.  He will reevaluate the patient and see if   reducing his pain medications may help with his mentation, adult failure to   thrive, and a nutritional consult was also obtained.  3.  Fracture of spine.  I did discuss his case with Dr. Lawrence, orthopedic   surgeon.  Dr. Lawrence will reevaluate the patient.  Currently, the patient is not   a candidate for any surgery.  I also discussed all these issues with the   patient's wife and questions were answered to her satisfaction.        ______________________________  MD RUTH Crane/BEATRIZ    DD:  03/06/2024 11:30  DT:  03/06/2024 12:12    Job#:  [id number]

## 2024-03-06 NOTE — CARE PLAN
The patient is Stable - Low risk of patient condition declining or worsening    Shift Goals  Clinical Goals: Pt will remain afebrile throughout shift  Patient Goals: Pts pain will remain within a tolerable level throughout shift.  Family Goals: Updates on POC    Progress made toward(s) clinical / shift goals:  Pt's pain has remained at a tolerable level throughout shift thus far and is being administered Morphine ER Q12.  Pt resting comfortably. C-Collar in place.  Pt and family updated on POC no further questions at this time.  VSS. Bed in lowest position/locked, Hourly rounding in progress.     Problem: Pain - Standard  Goal: Alleviation of pain or a reduction in pain to the patient’s comfort goal  Outcome: Progressing     Problem: Knowledge Deficit - Standard  Goal: Patient and family/care givers will demonstrate understanding of plan of care, disease process/condition, diagnostic tests and medications  Outcome: Progressing     Problem: Fall Risk  Goal: Patient will remain free from falls  Outcome: Progressing       Patient is not progressing towards the following goals:

## 2024-03-06 NOTE — ASSESSMENT & PLAN NOTE
Pt with fever 3/5 that has since resolve. WBC wnl. UA, urine cultures, blood cultures obtained. Likely not infectious maybe reactive. Will continue to monitor following recommendations of oncology.

## 2024-03-06 NOTE — PROGRESS NOTES
[facility] [place] [facility] [place]    Attending:   Dr. Yony Franco     Resident:   Chey Davis M.D. PGY-2     PATIENT:   Andrew Wall; [id number]; 1965    ID:   58 y.o. male  with medical history of metastatic melanoma BRAF V600E positive with metastasis to the bone and lymph nodes who presented after a ground level fall admitted for pathological fracture of C spine.      SUBJECTIVE:   Per wife patient was more confused overnight that is improved this morning. She then said at times patient continues to be disoriented. Pt has been on current dose of MS contin 60 mg for the past month, may not be due to that. Pt has continued to have poor PO intake. Patient also noted to have a fever overnight that then resolved. Denies new cough, congestion, vomiting, or diarrhea. He did not complain of pain overnight. Wife also reports that spine surgery did come by and talk to family. They did not advise surgery at this time as prognosis would be poor. Wife and patient agreed with this plan. Oncologist came to bedside as well. Would like to monitor patient for the next 24 hours inpatient and then discharge with possible continuation of immunotherapy versus possible hospice. Wife and patient are in agreement to stay another 24 hours and continue working on goals of care. They are also agreeable to lowering dose of MS contin to 45 mg and see if this helps with delirium and still provides pain control.     OBJECTIVE:  Vitals:    03/05/24 1825 03/05/24 1959 03/06/24 0025 03/06/24 0413   BP:  98/65 100/67 96/69   Pulse:  (!) 123 (!) 126 (!) 119   Resp:  16 16 16   Temp: (!) 38.4 °C (101.2 °F) 37.6 °C (99.7 °F) 37.6 °C (99.7 °F) 37.2 °C (98.9 °F)   TempSrc: Temporal Oral Oral Oral   SpO2:  94% 95% 93%   Weight:       Height:           Intake/Output Summary (Last 24 hours) at 3/6/2024 0641  Last data filed at 3/5/2024 1202  Gross per 24 hour   Intake 1000 ml   Output --   Net 1000 ml     PHYSICAL EXAM:  General: No acute  distress, afebrile, resting comfortably, cachetic, fatigued appearing   HEENT: NC/AT. EOMI. C collar in place   Cardiovascular: RRR without murmurs. Normal capillary refill   Respiratory: CTAB  Abdomen: soft, nontender, nondistended, no masses  EXT:  DAVIS, no edema  Skin: No erythema/lesions   Neuro: Non-focal    LABS:  Recent Labs     03/05/24  0945   WBC 6.9   RBC 3.96*   HEMOGLOBIN 10.3*   HEMATOCRIT 34.5*   MCV 87.1   MCH 26.0*   RDW 58.6*   PLATELETCT 360   MPV 9.1     Recent Labs     03/05/24  0945 03/05/24 2035   SODIUM 131*  --    POTASSIUM 3.8  --    CHLORIDE 96  --    CO2 23  --    BUN 17  --    CREATININE 0.42*  --    CALCIUM 7.7*  --    MAGNESIUM  --  1.5   PHOSPHORUS  --  2.0*     Estimated GFR/CRCL = Estimated Creatinine Clearance: 172.5 mL/min (A) (by C-G formula based on SCr of 0.42 mg/dL (L)).  Recent Labs     03/05/24  0945   GLUCOSE 111*     Recent Labs     03/05/24  0945   INR 1.42*             Recent Labs     03/05/24  0945   INR 1.42*   APTT 42.6*     IMAGING:  DX-HIP-BILATERAL-WITH PELVIS-2 VIEWS   Final Result      1.  Chronic metastases of the left ischium and right pubis/ileum with associated pathologic fracture, chronic      2.  No acute fracture identified      CT-CSPINE WITHOUT PLUS RECONS   Final Result      1.  Acute pathologic type II dens fracture with mild leftward displacement of the dens fragment.   2.  Permeative lytic lesions at the C6 and C7 levels are again noted with pathologic fractures as described with likely new or increased height loss from the prior MRI.      These findings were discussed with THIAGO HERNÁNDEZ on 3/5/2024 9:26 AM.      CT-HEAD W/O   Final Result      No acute intracranial abnormality.                      MEDS:  Current Facility-Administered Medications   Medication Last Admin    NS infusion New Bag at 03/06/24 0554    budesonide (Pulmicort) neb susp 0.25 mg      hydrOXYzine HCl (Atarax) tablet 25 mg      morphine ER (Ms Contin) tablet 60 mg 60 mg at  03/06/24 0412    acetaminophen (Tylenol) tablet 650 mg 650 mg at 03/05/24 1828    senna-docusate (Pericolace Or Senokot S) 8.6-50 MG per tablet 2 Tablet 2 Tablet at 03/05/24 1806    And    polyethylene glycol/lytes (Miralax) Packet 1 Packet      labetalol (Normodyne/Trandate) injection 10 mg      ondansetron (Zofran) syringe/vial injection 4 mg      ondansetron (Zofran ODT) dispertab 4 mg      promethazine (Phenergan) tablet 12.5-25 mg      promethazine (Phenergan) suppository 12.5-25 mg      prochlorperazine (Compazine) injection 5-10 mg 10 mg at 03/05/24 1806    metaxalone (Skelaxin) tablet 800 mg 800 mg at 03/06/24 0412     ASSESSMENT/PLAN: 58 year old male with medical history of metastatic melanoma BRAF V600E positive with metastasis to the bone and lymph nodes who presented after a ground level fall admitted for pathological fracture of C spine.      * Fracture dislocation of cervical spine, initial encounter (HCC)- (present on admission)  Assessment & Plan  Patient presents today after a ground-level fall.  CT of the C-spine indicated acute pathologic type II dens fracture with mild leftward displacement of the dens fragment as well as permeative lytic lesions of C6 and C7 with pathological fractures.  Per ERP during our conversation for admission he reports he consulted neurosurgery who advised patient be placed in a c-collar until they can formally evaluate. Spine surgery talked to family, they advised against surgery.   Plan  -Cervical spine collar is in place.   -Patient is not currently complaining of pain however continue home medication of MS Contin  - Oncology team will be by to talk to patient today. Likely will be discharged with no further intervention.         Delirium  Assessment & Plan  Patient with waxing and waning delirium. Wife feels this is new for patient. CT head was unremarkable. Neurologic exam was performed yet limited due to C collar- CN II-X and XII intact. Differential includes  dehydration due to poor po intake, high opioid use, hospitalization.   Plan:   - Continue to reorient patient as needed   - Family agreeable to reducing dose to 45 mg BID to see if this helps with delirium. Will increase back up to 60 mg if not controlling pain.   - Continue patient on NS 83 mL/hr     Ground-level fall  Assessment & Plan  Patient presents today after ground-level fall.  Likely due to deconditioned state maybe a component of high opioid use for cancer related pain. So far imaging performed in the ER includes a negative CT head.  CT C-spine with pathological fracture of C2 as well as fractures of C6 and 7. Hip Xray indicated chronic bone metastasis no acute fracture  Plan  -Considered PT and OT evaluate patient during this hospitalization.  Patient declines PT or OT evaluation at this time. Just wants to be comfortable. Family agreeable to reducing dose to 45 mg BID to see if this helps with delirium. Will increase back up to 60 mg if not controlling pain.       Cancer related pain- (present on admission)  Assessment & Plan  Patient with known history of metastatic melanoma to the bone.  Patient being followed by palliative care physician with his oncologist.  Patient is currently on MS Contin 60 mg every 12 hours.  Plan  -Will continue patient's home medication at this time.  Patient is not wanting to consider other pain meds however if his pain persist due to new pathologic fractures of the cervical spine can expand pain regimen.  Patient will be kept on telemetry and continuous pulse ox given cervical spine fracture and heavy morphine dose.  - Family agreeable to reducing dose to 45 mg BID to see if this helps with delirium. Will increase back up to 60 mg if not controlling pain.     Fever- (present on admission)  Assessment & Plan  Pt with fever 3/5 that has since resolve. WBC wnl. UA, urine cultures, blood cultures obtained. Likely not infectious maybe reactive. Will continue to monitor following  recommendations of oncology.     Constipation- (present on admission)  Assessment & Plan  Patient currently on high-dose opioids.  He does have minimal intake per wife.  He currently only has a largely liquid diet.  Will however continue patient on bowel regimen unless patient complaining of diarrhea to avoid patient getting constipation or ileus while in the hospital.    Metastatic melanoma, BRAF V600E POSITIVE  (HCC)- (present on admission)  Assessment & Plan  Patient with known history of metastatic melanoma.  Patient is currently on immunotherapy outpatient.  Plan  -Discussed case with Dr. Bedolla who is patient's palliative care physician with his oncology team   -Dr. Ross with oncology advised can continue to hold immunotherapy while inpatient.     Moderate persistent asthma without complication- (present on admission)  Assessment & Plan  Continue home Pulmicort daily       Core Measures:  Fluids: NS 83 mL/hr     Lines: PIV  Abx: None   Diet: Liquid diet   PPX:  Will hold lovenox   CODE STATUS: DNR/DNI  DISPO:  Inpatient, continue to monitor vitals and labs while inpatient. Family to decide on goals of care.      Chey Davis M.D. PGY-2  [facility]

## 2024-03-06 NOTE — CONSULTS
Spine Surgery Consult Note      3/6/2024    CC: neck  HPI: 58 y.o. male With metastatic melanoma that presents after ground-level fall.  Has increased neck pain.  Has known widely metastatic disease.  Multiple sites of metastases throughout the cervical spine.  Previously had radiation to the cervical spine.  Also has widely metastatic disease with multiple sites of metastases throughout his lumbar spine.  He has generalized weakness.  He ambulates short distances with a walker but spends most of his day in bed.  He requires a wheelchair for longer distances.  He has had longstanding generalized weakness in his upper extremities.  Denies new neurologic deficits.  I spoke with his oncologist regarding his prognosis.  It was noted that approximately 70% of people do respond to this regimen of chemotherapy.  The oncologist.  Fairly optimistic about the potential for response and improvement of metastatic lesions in his spine.    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)     Nasal polyp      Past Surgical History:   Procedure Laterality Date    LYMPH NODE EXCISION Right 11/16/2023    Procedure: RIGHT INGUINAL NODE SUPERFICIAL DISSECTION;  Surgeon: Davon Valera M.D.;  Location: SURGERY [facility];  Service: General    NODE BIOPSY SENTINEL Bilateral 10/20/2020    Procedure: BIOPSY, LYMPH NODE, SENTINEL- GROIN;  Surgeon: Davon Valera M.D.;  Location: SURGERY SAME DAY [facility];  Service: General    WIDE EXCISION Right 10/20/2020    Procedure: WIDE EXCISION, LESION- BUTTOCKS, RADICAL MALIGNANT MELANOMA;  Surgeon: Davon Valera M.D.;  Location: SURGERY SAME DAY [facility];  Service: General    ANTROSTOMY Bilateral 11/15/2019    Procedure: MAXILLARY  ANTROSTOMY- REVISION ENDOSCOPICMOMETASONE INJECTION, PROPEL STENT PLACEMENT;  Surgeon: Felicitas Tenorio M.D.;  Location: [facility];  Service: Ent    SINUSCOPY Bilateral 11/15/2019    Procedure: ENDOSCOPY, PARANASAL SINUSES- FOR FRONTAL EXPLORATION;  Surgeon: Felicitas Tenorio M.D.;  Location: [facility];  Service: Ent    TURBINOPLASTY Bilateral 11/15/2019    Procedure: TURBINOPLASTY;  Surgeon: Felicitas Tenorio M.D.;  Location: [facility];  Service: Ent    SPHENOIDECTOMY Bilateral 11/15/2019    Procedure: SPHENOIDECTOMY- FOR SPHENOIDOTOMY;  Surgeon: Felicitas Tenorio M.D.;  Location: [facility];  Service: Ent    ETHMOIDECTOMY Bilateral 11/15/2019    Procedure: ETHMOIDECTOMY- ENDOSCOPIC;  Surgeon: Felicitas Tenorio M.D.;  Location: [facility];  Service: Ent    NASAL POLYPECTOMY Bilateral 11/15/2019    Procedure: POLYPECTOMY, NASAL CAVITY;  Surgeon: Felicitas Tenorio M.D.;  Location: [facility];  Service: Ent    NASAL POLYPECTOMY  2015, 2017, 2019    x 3    OTHER  11/20    removed melanoma       Medications  No current facility-administered medications on file prior to encounter.     Current Outpatient Medications on File Prior to Encounter   Medication Sig Dispense Refill    methylPREDNISolone (MEDROL DOSEPAK) 4 MG Tablet Therapy Pack TAKE 6 TABLETS ON DAY 1 AS DIRECTED ON PACKAGE AND DECREASE BY 1 TAB EACH DAY FOR A TOTAL OF 6 DAYS      hydrOXYzine HCl (ATARAX) 25 MG Tab Take 25 mg by mouth 1 time a day as needed for Anxiety.      [START ON 3/15/2024] morphine ER (MS CONTIN) 60 MG Tab CR tablet Take 1 Tablet by mouth every 12 hours for 14 days. 28 Tablet 0    prochlorperazine (COMPAZINE) 10 MG Tab Take 1 Tablet by mouth every 6 hours as needed for Nausea/Vomiting. 30 Tablet 6    Binimetinib 15 MG Tab Take 45 mg by mouth 2 times a day.      Encorafenib 75 MG Cap Take 450 mg by mouth every day.      ibuprofen (MOTRIN) 200  "MG Tab Take 200 mg by mouth one time as needed for Fever.      ondansetron (ZOFRAN ODT) 4 MG TABLET DISPERSIBLE Take 1 Tablet by mouth every 6 hours as needed for Nausea/Vomiting. 20 Tablet 3    budesonide (PULMICORT) 0.25 MG/2ML Suspension Take 250 mcg by nebulization every day.         Allergies  Augmentin    ROS   . All other systems were reviewed and found to be negative    No family history on file.    Social History     Socioeconomic History    Marital status:    Tobacco Use    Smoking status: Never    Smokeless tobacco: Never   Vaping Use    Vaping Use: Never used   Substance and Sexual Activity    Alcohol use: Yes     Alcohol/week: 0.6 oz     Types: 1 Cans of beer per week     Comment: reports 4/month    Drug use: No    Sexual activity: Yes     Partners: Female     Comment: used to manage [employer]       Physical Exam  Vitals  BP 91/56   Pulse (!) 113   Temp 36.7 °C (98 °F) (Oral)   Resp 17   Ht 1.803 m (5' 11\")   Wt 64.4 kg (142 lb)   SpO2 94%   General: Well Developed, Well Nourished, no acute distress  HEENT: Normocephalic, atraumatic  Eyes: Anicteric  Skin: Intact, no open wounds  Neuro: Affect appropriate  Vascular: Capillary refill <2 seconds      Deltoid  Bi  Tri  WE  Flex  Finger Flexion  Right      3  4   4   4     4   4  Left      3  4   4   4    4   4    SILT C5-T1 BUE  2+ Bi BR reflexes  - Gibson        Radiographs:  I independently reviewed imaging below agree with the results    DX-HIP-BILATERAL-WITH PELVIS-2 VIEWS   Final Result      1.  Chronic metastases of the left ischium and right pubis/ileum with associated pathologic fracture, chronic      2.  No acute fracture identified      CT-CSPINE WITHOUT PLUS RECONS   Final Result      1.  Acute pathologic type II dens fracture with mild leftward displacement of the dens fragment.   2.  Permeative lytic lesions at the C6 and C7 levels are again noted with pathologic fractures as described with likely new or increased " height loss from the prior MRI.      These findings were discussed with THIAGO HERNÁNDEZ on 3/5/2024 9:26 AM.      CT-HEAD W/O   Final Result      No acute intracranial abnormality.                  CT-CHEST,ABDOMEN,PELVIS WITH    (Results Pending)         Laboratory Values  Lab Results   Component Value Date/Time    WBC 4.8 03/06/2024 07:08 AM    RBC 3.00 (L) 03/06/2024 07:08 AM    HEMOGLOBIN 7.9 (L) 03/06/2024 07:08 AM    HEMATOCRIT 25.6 (L) 03/06/2024 07:08 AM    MCV 85.3 03/06/2024 07:08 AM    MCH 26.3 (L) 03/06/2024 07:08 AM    MCHC 30.9 (L) 03/06/2024 07:08 AM    MPV 9.2 03/06/2024 07:08 AM    NEUTSPOLYS 89.60 (H) 01/27/2024 03:48 AM    LYMPHOCYTES 1.70 (L) 01/27/2024 03:48 AM    MONOCYTES 4.40 01/27/2024 03:48 AM    EOSINOPHILS 0.00 01/27/2024 03:48 AM    BASOPHILS 0.00 01/27/2024 03:48 AM    HYPOCHROMIA 1+ 01/22/2024 10:27 AM    ANISOCYTOSIS 1+ 01/27/2024 03:48 AM        Lab Results   Component Value Date/Time    SODIUM 131 (L) 03/06/2024 07:08 AM    POTASSIUM 3.6 03/06/2024 07:08 AM    CHLORIDE 102 03/06/2024 07:08 AM    CO2 22 03/06/2024 07:08 AM    GLUCOSE 93 03/06/2024 07:08 AM    BUN 10 03/06/2024 07:08 AM    CREATININE 0.24 (L) 03/06/2024 07:08 AM             Impression: 58-year-old male with widely metastatic melanoma.  Has pathologic fractures and C2 C5-C6.  Currently I do not recommend any surgical treatment.  He is receiving palliative care.  We are going to observe him closely.  He will follow-up with me in clinic in approximately 2 weeks.  He will continue cervical collar full-time.  Currently I think surgery risks would outweigh the benefits.  He appears quite frail.  If he is able to clear the metastatic disease from his cervical spine there is a chance that the pathologic fractures heal with conservative management.  If he is unable to clear the metastatic disease likely surgery would be too high risk with limited benefit.  In the event that he clears his metastatic disease but is unable to  heal the fractures then surgery would potentially be on the table.  Will continue to follow as outpatient.    Plan: Continue cervical collar full-time  No lifting over 10 pounds  Follow-up in approximately 2 weeks    Kevin Lawrence MD  Orthopedic Spine Surgeon

## 2024-03-06 NOTE — CARE PLAN
The patient is Stable - Low risk of patient condition declining or worsening    Shift Goals  Clinical Goals: Maintain C-spine precautions, nausea/pain control  Patient Goals: Rest  Family Goals: Stay updated    Progress made toward(s) clinical / shift goals: Home pain medications reordered by MD, prn nausea medications in place and administered as ordered, c-collar in place.       Problem: Pain - Standard  Goal: Alleviation of pain or a reduction in pain to the patient’s comfort goal  Outcome: Progressing     Problem: Knowledge Deficit - Standard  Goal: Patient and family/care givers will demonstrate understanding of plan of care, disease process/condition, diagnostic tests and medications  Outcome: Progressing     Problem: Skin Integrity  Goal: Skin integrity is maintained or improved  Outcome: Progressing     Problem: Fall Risk  Goal: Patient will remain free from falls  Outcome: Progressing       Patient is not progressing towards the following goals:

## 2024-03-07 ENCOUNTER — TELEPHONE (OUTPATIENT)
Dept: SURGICAL ONCOLOGY | Facility: MEDICAL CENTER | Age: 59
End: 2024-03-07

## 2024-03-07 ENCOUNTER — APPOINTMENT (OUTPATIENT)
Dept: SURGICAL ONCOLOGY | Facility: MEDICAL CENTER | Age: 59
End: 2024-03-07
Payer: COMMERCIAL

## 2024-03-07 VITALS
DIASTOLIC BLOOD PRESSURE: 56 MMHG | WEIGHT: 142 LBS | HEART RATE: 114 BPM | OXYGEN SATURATION: 94 % | RESPIRATION RATE: 18 BRPM | SYSTOLIC BLOOD PRESSURE: 87 MMHG | HEIGHT: 71 IN | TEMPERATURE: 99.6 F | BODY MASS INDEX: 19.88 KG/M2

## 2024-03-07 PROBLEM — R50.9 FEVER: Status: RESOLVED | Noted: 2024-01-14 | Resolved: 2024-03-07

## 2024-03-07 LAB
ANION GAP SERPL CALC-SCNC: 12 MMOL/L (ref 7–16)
BUN SERPL-MCNC: 9 MG/DL (ref 8–22)
CALCIUM SERPL-MCNC: 7.1 MG/DL (ref 8.5–10.5)
CHLORIDE SERPL-SCNC: 103 MMOL/L (ref 96–112)
CO2 SERPL-SCNC: 19 MMOL/L (ref 20–33)
CREAT SERPL-MCNC: 0.18 MG/DL (ref 0.5–1.4)
ERYTHROCYTE [DISTWIDTH] IN BLOOD BY AUTOMATED COUNT: 59.5 FL (ref 35.9–50)
GFR SERPLBLD CREATININE-BSD FMLA CKD-EPI: 159 ML/MIN/1.73 M 2
GLUCOSE SERPL-MCNC: 89 MG/DL (ref 65–99)
HCT VFR BLD AUTO: 26.4 % (ref 42–52)
HGB BLD-MCNC: 8 G/DL (ref 14–18)
MCH RBC QN AUTO: 26 PG (ref 27–33)
MCHC RBC AUTO-ENTMCNC: 30.3 G/DL (ref 32.3–36.5)
MCV RBC AUTO: 85.7 FL (ref 81.4–97.8)
PLATELET # BLD AUTO: 250 K/UL (ref 164–446)
PMV BLD AUTO: 9 FL (ref 9–12.9)
POTASSIUM SERPL-SCNC: 3.5 MMOL/L (ref 3.6–5.5)
RBC # BLD AUTO: 3.08 M/UL (ref 4.7–6.1)
SODIUM SERPL-SCNC: 134 MMOL/L (ref 135–145)
WBC # BLD AUTO: 3.9 K/UL (ref 4.8–10.8)

## 2024-03-07 PROCEDURE — 36415 COLL VENOUS BLD VENIPUNCTURE: CPT

## 2024-03-07 PROCEDURE — 97163 PT EVAL HIGH COMPLEX 45 MIN: CPT

## 2024-03-07 PROCEDURE — A9270 NON-COVERED ITEM OR SERVICE: HCPCS

## 2024-03-07 PROCEDURE — 700105 HCHG RX REV CODE 258

## 2024-03-07 PROCEDURE — 99238 HOSP IP/OBS DSCHRG MGMT 30/<: CPT | Mod: GC | Performed by: FAMILY MEDICINE

## 2024-03-07 PROCEDURE — 700102 HCHG RX REV CODE 250 W/ 637 OVERRIDE(OP)

## 2024-03-07 PROCEDURE — 85027 COMPLETE CBC AUTOMATED: CPT

## 2024-03-07 PROCEDURE — 97167 OT EVAL HIGH COMPLEX 60 MIN: CPT

## 2024-03-07 PROCEDURE — 80048 BASIC METABOLIC PNL TOTAL CA: CPT

## 2024-03-07 PROCEDURE — 700111 HCHG RX REV CODE 636 W/ 250 OVERRIDE (IP): Mod: JZ

## 2024-03-07 RX ORDER — MEGESTROL ACETATE 40 MG/ML
200 SUSPENSION ORAL DAILY
Qty: 480 ML | Refills: 0 | Status: ON HOLD | OUTPATIENT
Start: 2024-03-07 | End: 2024-03-12

## 2024-03-07 RX ORDER — SODIUM CHLORIDE, SODIUM LACTATE, POTASSIUM CHLORIDE, CALCIUM CHLORIDE 600; 310; 30; 20 MG/100ML; MG/100ML; MG/100ML; MG/100ML
INJECTION, SOLUTION INTRAVENOUS CONTINUOUS
Status: DISCONTINUED | OUTPATIENT
Start: 2024-03-07 | End: 2024-03-07 | Stop reason: HOSPADM

## 2024-03-07 RX ORDER — POTASSIUM CHLORIDE 7.45 MG/ML
10 INJECTION INTRAVENOUS
Status: COMPLETED | OUTPATIENT
Start: 2024-03-07 | End: 2024-03-07

## 2024-03-07 RX ADMIN — METAXALONE 800 MG: 800 TABLET ORAL at 17:38

## 2024-03-07 RX ADMIN — SODIUM CHLORIDE, POTASSIUM CHLORIDE, SODIUM LACTATE AND CALCIUM CHLORIDE: 600; 310; 30; 20 INJECTION, SOLUTION INTRAVENOUS at 08:56

## 2024-03-07 RX ADMIN — POTASSIUM CHLORIDE 10 MEQ: 7.46 INJECTION, SOLUTION INTRAVENOUS at 08:57

## 2024-03-07 RX ADMIN — METAXALONE 800 MG: 800 TABLET ORAL at 05:34

## 2024-03-07 RX ADMIN — MORPHINE SULFATE 45 MG: 30 TABLET, FILM COATED, EXTENDED RELEASE ORAL at 17:38

## 2024-03-07 RX ADMIN — MORPHINE SULFATE 45 MG: 30 TABLET, FILM COATED, EXTENDED RELEASE ORAL at 05:34

## 2024-03-07 RX ADMIN — ONDANSETRON 4 MG: 2 INJECTION INTRAMUSCULAR; INTRAVENOUS at 08:53

## 2024-03-07 RX ADMIN — POTASSIUM CHLORIDE 10 MEQ: 7.46 INJECTION, SOLUTION INTRAVENOUS at 10:19

## 2024-03-07 RX ADMIN — METAXALONE 800 MG: 800 TABLET ORAL at 12:47

## 2024-03-07 RX ADMIN — POTASSIUM CHLORIDE 10 MEQ: 7.46 INJECTION, SOLUTION INTRAVENOUS at 12:48

## 2024-03-07 RX ADMIN — DOCUSATE SODIUM 50 MG AND SENNOSIDES 8.6 MG 2 TABLET: 8.6; 5 TABLET, FILM COATED ORAL at 17:37

## 2024-03-07 RX ADMIN — POTASSIUM CHLORIDE 10 MEQ: 7.46 INJECTION, SOLUTION INTRAVENOUS at 11:39

## 2024-03-07 ASSESSMENT — ACTIVITIES OF DAILY LIVING (ADL): TOILETING: REQUIRES ASSIST

## 2024-03-07 ASSESSMENT — GAIT ASSESSMENTS
DEVIATION: DECREASED BASE OF SUPPORT;BRADYKINETIC;SHUFFLED GAIT
ASSISTIVE DEVICE: FRONT WHEEL WALKER
DISTANCE (FEET): 20
GAIT LEVEL OF ASSIST: MINIMAL ASSIST

## 2024-03-07 ASSESSMENT — COGNITIVE AND FUNCTIONAL STATUS - GENERAL
TOILETING: A LOT
WALKING IN HOSPITAL ROOM: A LITTLE
MOVING TO AND FROM BED TO CHAIR: A LITTLE
DRESSING REGULAR UPPER BODY CLOTHING: A LOT
HELP NEEDED FOR BATHING: A LOT
TURNING FROM BACK TO SIDE WHILE IN FLAT BAD: A LOT
DAILY ACTIVITIY SCORE: 12
SUGGESTED CMS G CODE MODIFIER MOBILITY: CK
STANDING UP FROM CHAIR USING ARMS: A LITTLE
MOBILITY SCORE: 15
CLIMB 3 TO 5 STEPS WITH RAILING: A LOT
MOVING FROM LYING ON BACK TO SITTING ON SIDE OF FLAT BED: A LOT
DRESSING REGULAR LOWER BODY CLOTHING: A LOT
PERSONAL GROOMING: A LOT
SUGGESTED CMS G CODE MODIFIER DAILY ACTIVITY: CL
EATING MEALS: A LOT

## 2024-03-07 NOTE — PROGRESS NOTES
[facility] [place]  Attending:   Dr. Nam Gill     Resident:   Chey Davis M.D. PGY-2     PATIENT:   Andrew Wall; [id number]; 1965    ID:   58 y.o. male  with medical history of metastatic melanoma BRAF V600E positive with metastasis to the bone and lymph nodes who presented after a ground level fall admitted for pathological fracture of C spine hospital day 2.      SUBJECTIVE:   Wife at bedside. She states that patient slept well overnight. He continues to have very minimal intake and output at this time. Pt did not complain of pain. Pat continues to be disoriented.     OBJECTIVE:  Vitals:    03/06/24 1643 03/06/24 1929 03/07/24 0008 03/07/24 0444   BP: 94/60 102/67 103/68 103/67   Pulse: (!) 115 (!) 116 (!) 110 (!) 114   Resp: 17 17 17 17   Temp: 36.7 °C (98 °F) 37.2 °C (98.9 °F) 37.2 °C (99 °F) 37.2 °C (98.9 °F)   TempSrc: Oral Oral Oral Oral   SpO2: 94% 90% 90% 92%   Weight:       Height:           Intake/Output Summary (Last 24 hours) at 3/6/2024 0641  Last data filed at 3/5/2024 1202  Gross per 24 hour   Intake 1000 ml   Output --   Net 1000 ml     PHYSICAL EXAM:  General: No acute distress, afebrile, resting comfortably, cachetic, fatigued appearing   HEENT: NC/AT. EOMI. C collar in place   Cardiovascular: RRR without murmurs. Normal capillary refill   Respiratory: CTAB  Abdomen: soft, nontender, nondistended, no masses  EXT:  DAVIS, no edema  Skin: No erythema/lesions   Neuro: Non-focal    LABS:  Recent Labs     03/05/24  0945 03/06/24  0708 03/07/24  0022   WBC 6.9 4.8 3.9*   RBC 3.96* 3.00* 3.08*   HEMOGLOBIN 10.3* 7.9* 8.0*   HEMATOCRIT 34.5* 25.6* 26.4*   MCV 87.1 85.3 85.7   MCH 26.0* 26.3* 26.0*   RDW 58.6* 58.9* 59.5*   PLATELETCT 360 237 250   MPV 9.1 9.2 9.0     Recent Labs     03/05/24  0945 03/05/24 2035 03/06/24  0708 03/07/24  0022   SODIUM 131*  --  131* 134*   POTASSIUM 3.8  --  3.6 3.5*   CHLORIDE 96  --  102 103   CO2 23  --  22 19*   BUN 17  --  10 9    CREATININE 0.42*  --  0.24* 0.18*   CALCIUM 7.7*  --  7.0* 7.1*   MAGNESIUM  --  1.5  --   --    PHOSPHORUS  --  2.0*  --   --    ALBUMIN  --   --  2.1*  --      Estimated GFR/CRCL = Estimated Creatinine Clearance: 402.5 mL/min (A) (by C-G formula based on SCr of 0.18 mg/dL (L)).  Recent Labs     03/05/24  0945 03/06/24  0708 03/07/24  0022   GLUCOSE 111* 93 89     Recent Labs     03/05/24  0945 03/06/24  0708   ASTSGOT  --  23   ALTSGPT  --  9   TBILIRUBIN  --  0.4   ALKPHOSPHAT  --  243*   GLOBULIN  --  2.2   INR 1.42*  --              Recent Labs     03/05/24 0945   INR 1.42*   APTT 42.6*     IMAGING:  DX-HIP-BILATERAL-WITH PELVIS-2 VIEWS   Final Result      1.  Chronic metastases of the left ischium and right pubis/ileum with associated pathologic fracture, chronic      2.  No acute fracture identified      CT-CSPINE WITHOUT PLUS RECONS   Final Result      1.  Acute pathologic type II dens fracture with mild leftward displacement of the dens fragment.   2.  Permeative lytic lesions at the C6 and C7 levels are again noted with pathologic fractures as described with likely new or increased height loss from the prior MRI.      These findings were discussed with THIAGO HERNÁNDEZ on 3/5/2024 9:26 AM.      CT-HEAD W/O   Final Result      No acute intracranial abnormality.                  CT-CHEST,ABDOMEN,PELVIS WITH    (Results Pending)       MEDS:  Current Facility-Administered Medications   Medication Last Admin    NS infusion New Bag at 03/06/24 0554    morphine ER (Ms Contin) tablet 45 mg 45 mg at 03/06/24 1732    budesonide (Pulmicort) neb susp 0.25 mg      hydrOXYzine HCl (Atarax) tablet 25 mg      acetaminophen (Tylenol) tablet 650 mg 650 mg at 03/05/24 1828    senna-docusate (Pericolace Or Senokot S) 8.6-50 MG per tablet 2 Tablet 2 Tablet at 03/06/24 1733    And    polyethylene glycol/lytes (Miralax) Packet 1 Packet      labetalol (Normodyne/Trandate) injection 10 mg      ondansetron (Zofran) syringe/vial  injection 4 mg      ondansetron (Zofran ODT) dispertab 4 mg      promethazine (Phenergan) tablet 12.5-25 mg      promethazine (Phenergan) suppository 12.5-25 mg      prochlorperazine (Compazine) injection 5-10 mg 10 mg at 03/06/24 1510    metaxalone (Skelaxin) tablet 800 mg 800 mg at 03/06/24 1733     ASSESSMENT/PLAN: 58 year old male with medical history of metastatic melanoma BRAF V600E positive with metastasis to the bone and lymph nodes who presented after a ground level fall admitted for pathological fracture of C spine.      * Fracture dislocation of cervical spine, initial encounter (HCC)- (present on admission)  Assessment & Plan  Patient presents today after a ground-level fall.  CT of the C-spine indicated acute pathologic type II dens fracture with mild leftward displacement of the dens fragment as well as permeative lytic lesions of C6 and C7 with pathological fractures.  Per ERP during our conversation for admission he reports he consulted neurosurgery who advised patient be placed in a c-collar until they can formally evaluate. Spine surgery talked to family, they advised against surgery.   Plan  -Cervical spine collar is in place.   -Patient is not currently complaining of pain however continue home medication of MS Contin  - Oncology team following. Pending CT imaging to evaluate the extent of metastatic cancer         Delirium  Assessment & Plan  Patient with waxing and waning delirium. Wife feels this is new for patient. CT head was unremarkable. Neurologic exam was performed yet limited due to C collar- CN II-X and XII intact. Differential includes terminal delirium, dehydration due to poor po intake, chronic opioid use, hospitalization.   Plan:   - Continue to reorient patient as needed   - 3/6 Family agreeable to reducing dose to 45 mg BID to see if this helps with delirium. Will increase back up to 60 mg if not controlling pain.   - Continue patient on  mL/hr       Ground-level  fall  Assessment & Plan  Patient presents today after ground-level fall.  Likely due to deconditioned state maybe a component of high opioid use for cancer related pain. So far imaging performed in the ER includes a negative CT head.  CT C-spine with pathological fracture of C2 as well as fractures of C6 and 7. Hip Xray indicated chronic bone metastasis no acute fracture  Plan  -Considered PT and OT evaluate patient during this hospitalization.  Patient declines PT or OT evaluation at this time. Just wants to be comfortable. Family agreeable to reducing dose to 45 mg BID to see if this helps with delirium. Will increase back up to 60 mg if not controlling pain.       Cancer related pain- (present on admission)  Assessment & Plan  Patient with known history of metastatic melanoma to the bone.  Patient being followed by palliative care physician with his oncologist.  Patient is currently on MS Contin 60 mg every 12 hours.  Plan  -Will continue patient's home medication at this time.  Patient is not wanting to consider other pain meds however if his pain persist due to new pathologic fractures of the cervical spine can expand pain regimen.  Patient will be kept on telemetry and continuous pulse ox given cervical spine fracture.  - 3/6 Family agreeable to reduction of MS contin to 45 mg BID to see if this helps with delirium. No changes in delirium but more pain. Will discuss with family if they would like to stay on 60 mg BID as this is likely not contributing to delirium.     Fever- (present on admission)  Assessment & Plan  Pt with fever 3/5 that has since resolve. WBC wnl. UA, urine cultures, blood cultures obtained. Likely not infectious maybe reactive. Will continue to monitor following recommendations of oncology.     Constipation- (present on admission)  Assessment & Plan  Patient currently on chronic opioids.  He does have minimal intake per wife.  He currently only has a largely liquid diet.  Will however  continue patient on bowel regimen unless patient complaining of diarrhea to avoid patient getting constipation or ileus while in the hospital.    Metastatic melanoma, BRAF V600E POSITIVE  (HCC)- (present on admission)  Assessment & Plan  Patient with known history of metastatic melanoma.  Patient is currently on immunotherapy outpatient.  Plan  -Discussed case with Dr. Bedolla who is patient's palliative care physician with his oncology team   -Dr. Ross with oncology advised can continue to hold immunotherapy while inpatient.   -Family would like to talk with hospice instead of pursuing further oncology treatment     Moderate persistent asthma without complication- (present on admission)  Assessment & Plan  Continue home Pulmicort daily       Core Measures:  Fluids:  mL/hr     Lines: PIV  Abx: None   Diet: Liquid diet   PPX:  Will hold lovenox   CODE STATUS: DNR/DNI  DISPO:  Inpatient, continue to monitor vitals and labs while inpatient. Continue IV fluids. Pending CT results and further discussion with oncology and hospice today.      Chey Davis M.D. PGY-2  [facility]

## 2024-03-07 NOTE — ASSESSMENT & PLAN NOTE
Patient with waxing and waning delirium. Wife feels this is new for patient. CT head was unremarkable. Neurologic exam was performed yet limited due to C collar- CN II-X and XII intact. Differential includes terminal delirium, dehydration due to poor po intake, chronic opioid use, hospitalization.   Plan:   - Continue to reorient patient as needed   - 3/6 Family agreeable to reducing dose to 45 mg BID to see if this helps with delirium. Will increase back up to 60 mg if not controlling pain.   - Continue patient on  mL/hr

## 2024-03-07 NOTE — DISCHARGE INSTRUCTIONS
Sebastian,     I gave you a prescription for megestrol for appetite stimulation. It is a liquid medication. It has been shown to have several side effects. Feel free to discuss more with oncology for further management. Can lead to hypercoagulability (clotting), can also lead to adrenal suppression (things like fatigue, anxiety, feeling cold, weak) so please watch for these symptoms. I hope you feel better soon!     Kind regards,     Dr. Davis

## 2024-03-07 NOTE — THERAPY
Physical Therapy   Initial Evaluation     Patient Name: Andrew Wall  Age:  58 y.o., Sex:  male  Medical Record #: [id number]  Today's Date: 3/7/2024     Precautions  Precautions: Fall Risk  Comments: unofficial spine precautions, cervical collar AAT, multiple mets in pelvis, C spine, and L spine. L humerus fx in January, was NWB with FWW use allowed    Assessment  Patient is a 58 y.o. male with hx of asthma and metastatic melanoma with diffuse mets as detailed above. Pt is now admitted s/p GLF with C2 fx (non op) when pt attempted to transfer to his WC by himself. PT eval complete, pt currently presents below his functional baseline due to pain and impaired strength, balance, activity tolerance, and mobility. Pt is limited by fatigue overall, however his pain was not significant during the eval and he was generally at Min-Mod A for mobility with FWW. Pt's RLE is consistently weaker than his LLE and has been reportedly buckling at home (pt states he can tell when it is going to happen). Pt's wife remains very involved with the pt care and states she can continue to provide consistent care at home as needed. Recommend HHPT at this time as long as pt can maintain at least his CLOF, pt does demonstrate potential to progress with further mobility OOB. Will need to be vigilant of pt's pain and symptoms with associated mets to continue facilitating safe mobility. Will follow while admitted for skilled PT intervention.     Plan    Physical Therapy Initial Treatment Plan   Treatment Plan : Bed Mobility, Gait Training, Neuro Re-Education / Balance, Self Care / Home Evaluation, Stair Training, Therapeutic Activities, Therapeutic Exercise  Treatment Frequency: 3 Times per Week  Duration: Until Therapy Goals Met    DC Equipment Recommendations: None  Discharge Recommendations: Recommend home health for continued physical therapy services         Vitals   O2 (LPM) 0   O2 Delivery Device None - Room Air   Pain 0 - 10 Group    Therapist Pain Assessment   (no c/o pain)   Prior Living Situation   Prior Services Intermittent Physical Support for ADL Per Family   Housing / Facility 2 Story House   Steps Into Home 2   Equipment Owned 4-Wheel Walker;Wheelchair   Lives with - Patient's Self Care Capacity Spouse;Adult Children   Comments pt's wife present and supportive, stating that she will be at home to assist as needed   Prior Level of Functional Mobility   Bed Mobility Independent   Transfer Status Independent   Ambulation Independent   Ambulation Distance short household distances   Assistive Devices Used 4-Wheel Walker   Cognition    Cognition / Consciousness X   Level of Consciousness Alert   Ability To Follow Commands 1 Step   Attention Impaired   Comments flat affect but participatory   Active ROM Lower Body    Active ROM Lower Body  WDL   Strength Lower Body   Lower Body Strength  X   Comments formal MMT NT, pt's RLE slighlty weaker than his LLE, functional overall for in room ambulation with fww   Balance Assessment   Sitting Balance (Static) Fair   Sitting Balance (Dynamic) Fair -   Standing Balance (Static) Fair -   Standing Balance (Dynamic) Poor +   Weight Shift Sitting Fair   Weight Shift Standing Poor   Comments FWW in standing   Bed Mobility    Supine to Sit Moderate Assist   Sit to Supine Minimal Assist   Scooting Minimal Assist   Rolling Moderate Assist to Rt.   Comments HOB slightly elevated, poor initiation rolling to R due to limited LUE initiation   Gait Analysis   Gait Level Of Assist Minimal Assist   Assistive Device Front Wheel Walker   Distance (Feet) 20   # of Times Distance was Traveled 1   Deviation Decreased Base Of Support;Bradykinetic;Shuffled Gait   Weight Bearing Status no restrictions   Comments distance limited by fatigue   Functional Mobility   Sit to Stand Minimal Assist   Mobility STS x2 at EOB with FWW   Comments cues for hand positioning and sequencing   6 Clicks Assessment - How much HELP from from  another person do you currently need... (If the patient hasn't done an activity recently, how much help from another person do you think he/she would need if he/she tried?)   Turning from your back to your side while in a flat bed without using bedrails? 2   Moving from lying on your back to sitting on the side of a flat bed without using bedrails? 2   Moving to and from a bed to a chair (including a wheelchair)? 3   Standing up from a chair using your arms (e.g., wheelchair, or bedside chair)? 3   Walking in hospital room? 3   Climbing 3-5 steps with a railing? 2   6 clicks Mobility Score 15   Short Term Goals    Short Term Goal # 1 Pt/wife will perform supine<>sit with supervision from therapist with HOB elevated and railing use within 6 visits to demonstrate caregiver abilities for home.   Short Term Goal # 2 Pt will perform stand step transfer with fww with cga within 6 visits to progress to independence.   Short Term Goal # 3 Pt will ambulate x 50ft with fww and cga within 6 visits to progres sto independence.   Short Term Goal # 4 Pt will self propel w/c with bilateral LEs with supervision x 150ft within 6 vistis to ensure household mobility   Education Group   Education Provided Role of Physical Therapist   Role of Physical Therapist Patient Response Patient;Significant Other;Acceptance;Explanation;Verbal Demonstration   Physical Therapy Initial Treatment Plan    Treatment Plan  Bed Mobility;Gait Training;Neuro Re-Education / Balance;Self Care / Home Evaluation;Stair Training;Therapeutic Activities;Therapeutic Exercise   Treatment Frequency 3 Times per Week   Duration Until Therapy Goals Met   Problem List    Problems Impaired Bed Mobility;Impaired Transfers;Impaired Ambulation;Functional Strength Deficit;Impaired Balance;Impaired Coordination;Decreased Activity Tolerance   Anticipated Discharge Equipment and Recommendations   DC Equipment Recommendations None   Discharge Recommendations Recommend home health  for continued physical therapy services   Interdisciplinary Plan of Care Collaboration   IDT Collaboration with  Family / Caregiver;Nursing;Occupational Therapist   Patient Position at End of Therapy In Bed;Bed Alarm On;Call Light within Reach;Tray Table within Reach;Phone within Reach;Family / Friend in Room   Collaboration Comments RN updated; co-eval with OT due to pt medical complexity, limited endurance for multiple episodes of getting OOB, and pt safety   Session Information   Date / Session Number  3/7- 1 (1/2, 3/13)

## 2024-03-07 NOTE — THERAPY
Occupational Therapy   Initial Evaluation     Patient Name: Andrew Wall  Age:  58 y.o., Sex:  male  Medical Record #: [id number]  Today's Date: 3/7/2024     Precautions  Precautions: Fall Risk  Comments: unofficial spine precautions, cervical collar AAT, multiple mets in pelvis, C spine, and L spine. L humerus fx in January, was NWB with FWW use allowed    Assessment  Patient is 58 y.o. male with a diagnosis of fall, C2 fx.  Pt currently limited by decreased functional mobility, activity tolerance, strength, AROM, coordination, balance, and pain which are affecting pt's ability to complete ADLs/IADLs at baseline. Pt would benefit from OT services in the acute care setting to maximize functional recovery.      Plan    Occupational Therapy Initial Treatment Plan   Treatment Interventions: (P) Self Care / Activities of Daily Living, Therapeutic Activity  Treatment Frequency: (P) 2 Times per Week  Duration: (P) 2 Visits (will trial for 2x/wk)       Discharge Recommendations: (P) Recommend post-acute placement for additional occupational therapy services prior to discharge home        03/07/24 0846   Prior Living Situation   Prior Services Intermittent Physical Support for ADL Per Family   Housing / Facility 2 Story House   Steps Into Home 2   Equipment Owned 4-Wheel Walker;Wheelchair   Lives with - Patient's Self Care Capacity Spouse;Adult Children   Prior Level of ADL Function   Self Feeding Independent   Grooming / Hygiene Independent   Bathing Independent   Dressing Requires Assist   Toileting Requires Assist   ADL Assessment   Grooming Minimal Assist   Upper Body Dressing Moderate Assist   Lower Body Dressing Maximal Assist   Toileting Maximal Assist   Functional Mobility   Sit to Stand Minimal Assist   Bed, Chair, Wheelchair Transfer Minimal Assist   Short Term Goals   Short Term Goal # 1 supervised with UB dressing   Short Term Goal # 2 min A with LB dressing   Short Term Goal # 3 SBA with ADL txfs    Occupational Therapy Initial Treatment Plan    Treatment Interventions Self Care / Activities of Daily Living;Therapeutic Activity   Treatment Frequency 2 Times per Week   Duration 2 Visits  (will trial for 2x/wk)   Anticipated Discharge Equipment and Recommendations   Discharge Recommendations Recommend post-acute placement for additional occupational therapy services prior to discharge home

## 2024-03-07 NOTE — TELEPHONE ENCOUNTER
Wife called said Sebastian is in the hospital and will not make it to appointment, wanted to let you know he is hospital.

## 2024-03-07 NOTE — PROGRESS NOTES
Assumed care of patient and received report from Rosemarie VERA. Assessment completed.Pt A&Ox 4; very lethargic and confused at times. Respirations are even and unlabored on room air. Medical pt, call light and belongings are within reach. POC updated. Pt wife at bedside. C collar in place. Needs met.

## 2024-03-07 NOTE — DISCHARGE PLANNING
Case Management Discharge Planning    Admission Date: 3/5/2024  GMLOS: 3.5  ALOS: 2    6-Clicks ADL Score: 12  6-Clicks Mobility Score: 15  PT and/or OT Eval ordered: Yes  Post-acute Referrals Ordered: Yes  Post-acute Choice Obtained: Yes  Has referral(s) been sent to post-acute provider:  Yes      Anticipated Discharge Dispo: Discharge Disposition: Discharged to home/self care (01)    DME Needed: No    Action(s) Taken: RNCM spoke to patient and spouse at bedside. Patient is wanting to go home with hospice today. Per patient's spouse, they already have a hospital bed and wheelchair at home. RNCM placed call to [facility] who stated they will be at bedside in 30 minutes to speak with patient and spouse. RNCM obtained hospice choice for [facility]. RNCM voalted Resident to place hospice order. Once order is placed, RNCM will faxed choice to [facility]    1430: RNCM spoke with patient and patient's spouse at bedside who stated the CT came back and now they're no longer wanting hospice. Patient is now wanting to resume care with [facility]. RNCM informed MD to place HH referral. RNCM obtained choice for [facility].    Escalations Completed: None    Medically Clear: Yes    Next Steps: pending hospice order    Barriers to Discharge: None    Is the patient up for discharge tomorrow: No

## 2024-03-07 NOTE — CARE PLAN
The patient is Stable - Low risk of patient condition declining or worsening    Shift Goals  Clinical Goals: Pt will remain afebrile throughout shift  Patient Goals: Pts pain will remain within a tolerable level throughout shift.  Family Goals: Updates on POC      Problem: Pain - Standard  Goal: Alleviation of pain or a reduction in pain to the patient’s comfort goal  Outcome: Progressing     Problem: Knowledge Deficit - Standard  Goal: Patient and family/care givers will demonstrate understanding of plan of care, disease process/condition, diagnostic tests and medications  Outcome: Progressing     Problem: Skin Integrity  Goal: Skin integrity is maintained or improved  Outcome: Progressing

## 2024-03-07 NOTE — DISCHARGE SUMMARY
[facility] DISCHARGE SUMMARY     ADMIT DATE: 3/5/2024       DISCHARGE DATE: 3/7/2024    SERVICE: [facility]  ATTENDING PHYSICIAN: Nam Gill M.d.   SENIOR RESIDENT: Chey Davis M.D.    FINAL DIAGNOSES:   1. Fall, initial encounter        2. Pathological compression fracture of vertebra, initial encounter (HCC)        3. Closed nondisplaced fracture of second cervical vertebra, unspecified fracture morphology, initial encounter (HCC)        4. Metastatic melanoma, BRAF V600E POSITIVE  (HCC)        5. Metastasis to bone (HCC)             HOSPITAL COURSE:   Patient was admitted on 3/5/24 after a ground level fall. Wife accompanied patient to ED and has been staying with patient throughout hospitalization. Wife reports patient has had very minimal PO intake, weak, and delirious at home. Patient was attempting to transfer from bed to wheelchair and then fell. In ED, CT-C spine without contrast showing acute pathologic type II dens fracture with mild leftward displacement of the dens fragment and permeative lytic lesions at the C6 and C7 levels with pathologic fractures. Labs pertinent for stable normocytic anemia. CMP with low sodium at 131. Hypocalcemia (uncorrected) at 7.7. ERP consulted spine surgery who advised they will place [place] collar.  On admission patient was started on IV maintenance fluids and continued on home medication of MS Contin 60 mg twice daily. Patient complained of bilateral hip pain since the fall. Hip Xray did not indicate acute fractures but metastasis .  He did have a fever overnight of 100.1 F.  Infectious workup included a normal UA, normal blood cultures, normal lactic acid.  Spine surgery evaluated patient at bedside on hospital day 1. Advised against surgery due to poor prognosis.  Patient's oncologist was consulted.  They advised to get a CT chest abdomen pelvis with contrast to further evaluate the extent of patient's metastasis. On hospital day 2 CT  imaging indicated improved size of tumors metastasize in the bone liver abdominal cavity.  Wife then decided patient would not be placed on hospice.  Patient to continue home health PT OT.  They will follow-up with oncology outpatient.  Wife also requesting appetite stimulant as this was the recommendation by inpatient dietitian.  Dr. Ross also in agreement with discharge home at this time with follow-up outpatient.  Patient was then stable for discharge home to continue MS Contin 60 mg twice daily as well as can start Sang Strahl for appetite stimulation.  Patient was advised to follow-up with Dr. Ross regarding this medication due to its adverse reactions.    CONSULTANTS:   Oncology, spine surgery     PROCEDURES:   None     IMAGING/ LABS:   DX-HIP-BILATERAL-WITH PELVIS-2 VIEWS   Final Result      1.  Chronic metastases of the left ischium and right pubis/ileum with associated pathologic fracture, chronic      2.  No acute fracture identified      CT-CSPINE WITHOUT PLUS RECONS   Final Result      1.  Acute pathologic type II dens fracture with mild leftward displacement of the dens fragment.   2.  Permeative lytic lesions at the C6 and C7 levels are again noted with pathologic fractures as described with likely new or increased height loss from the prior MRI.      These findings were discussed with THIAGO HERNÁNDEZ on 3/5/2024 9:26 AM.      CT-HEAD W/O   Final Result      No acute intracranial abnormality.                  CT-CHEST,ABDOMEN,PELVIS WITH    (Results Pending)         DISCHARGE MEDICATIONS:     Medication Reconciliation Completed     Medication List        ASK your doctor about these medications        Instructions   Binimetinib 15 MG Tabs   Take 45 mg by mouth 2 times a day.  Dose: 45 mg     budesonide 0.25 MG/2ML Susp  Commonly known as: Pulmicort   Take 250 mcg by nebulization every day.  Dose: 250 mcg     Encorafenib 75 MG Caps   Take 450 mg by mouth every day.  Dose: 450 mg     hydrOXYzine HCl 25  MG Tabs  Commonly known as: Atarax  Ask about: Which instructions should I use?   Take 25 mg by mouth 1 time a day as needed for Anxiety.  Dose: 25 mg     ibuprofen 200 MG Tabs  Commonly known as: Motrin   Take 200 mg by mouth one time as needed for Fever.  Dose: 200 mg     methylPREDNISolone 4 MG Tbpk  Commonly known as: Medrol Dosepak   TAKE 6 TABLETS ON DAY 1 AS DIRECTED ON PACKAGE AND DECREASE BY 1 TAB EACH DAY FOR A TOTAL OF 6 DAYS     morphine ER 60 MG Tbcr tablet  Start taking on: March 15, 2024  Commonly known as: Ms Contin   Take 1 Tablet by mouth every 12 hours for 14 days.  Dose: 60 mg     ondansetron 4 MG Tbdp  Commonly known as: Zofran ODT   Take 1 Tablet by mouth every 6 hours as needed for Nausea/Vomiting.  Dose: 4 mg     prochlorperazine 10 MG Tabs  Commonly known as: Compazine   Take 1 Tablet by mouth every 6 hours as needed for Nausea/Vomiting.  Dose: 10 mg               DISPOSITION: stable     DIET: regular     ACTIVITY: with assistance     DISCHARGE LABS:    Recent Labs     03/05/24 0945 03/06/24 0708 03/07/24  0022   WBC 6.9 4.8 3.9*   RBC 3.96* 3.00* 3.08*   HEMOGLOBIN 10.3* 7.9* 8.0*   HEMATOCRIT 34.5* 25.6* 26.4*   MCV 87.1 85.3 85.7   MCH 26.0* 26.3* 26.0*   RDW 58.6* 58.9* 59.5*   PLATELETCT 360 237 250   MPV 9.1 9.2 9.0     Lab Results   Component Value Date    SDSDNMSG64 2444 (H) 01/12/2024    FERRITIN 1327.0 (H) 01/12/2024    IRON 26 (L) 01/12/2024    TOTIRONBC 165 (L) 01/12/2024       Estimated GFR/CRCL = Estimated Creatinine Clearance: 402.5 mL/min (A) (by C-G formula based on SCr of 0.18 mg/dL (L)).  Recent Labs     03/05/24 0945 03/05/24 2035 03/06/24  0708 03/07/24  0022   SODIUM 131*  --  131* 134*   POTASSIUM 3.8  --  3.6 3.5*   CHLORIDE 96  --  102 103   CO2 23  --  22 19*   BUN 17  --  10 9   CREATININE 0.42*  --  0.24* 0.18*   CALCIUM 7.7*  --  7.0* 7.1*   MAGNESIUM  --  1.5  --   --    PHOSPHORUS  --  2.0*  --   --    ALBUMIN  --   --  2.1*  --        Recent Labs      "03/05/24  0945 03/06/24  0708   ALTSGPT  --  9   ASTSGOT  --  23   ALKPHOSPHAT  --  243*   TBILIRUBIN  --  0.4   ALBUMIN  --  2.1*   GLOBULIN  --  2.2   INR 1.42*  --        No results for input(s): \"POCGLUCOSE\" in the last 72 hours.  Lab Results   Component Value Date    CORTISOL 15.1 11/13/2020       INSTRUCTIONS:   The patient was instructed to return to the ER in the event of worsening symptoms. I have counseled the patient on the importance of compliance and the patient has agreed to proceed with all medical recommendations and follow up plan indicated above.   The patient understands that all medications come with benefits and risks. Risks may include permanent injury or death and these risks can be minimized with close reassessment and monitoring.        PCP:  Jose Luis Juarez M.D.  Copy of discharge summary given to the patient    F/U APPOINTMENT:   follow up with oncology team outpatient     PENDING STUDIES:  none     Chey Davis M.D. PGY-2   "

## 2024-03-08 LAB
BACTERIA UR CULT: NORMAL
SIGNIFICANT IND 70042: NORMAL
SITE SITE: NORMAL
SOURCE SOURCE: NORMAL

## 2024-03-08 NOTE — DISCHARGE PLANNING
DC Transport Scheduled    Transport Company Scheduled:  [employer]  Spoke with Holly at [facility] to schedule transport.    Scheduled Date: 3/7/2024  Scheduled Time: 1740    Destination: Pts home   Destination address: [address] [place]       Notified care team of scheduled transport via [employer].     If there are any changes needed to the DC transportation scheduled, please contact [facility] at ext. [phone] between the hours of 4229-5387 Mon-Fri. If outside those hours, contact the ED Case Manager at ext. [phone].

## 2024-03-09 ENCOUNTER — HOSPITAL ENCOUNTER (INPATIENT)
Facility: MEDICAL CENTER | Age: 59
LOS: 3 days | DRG: 092 | End: 2024-03-12
Attending: EMERGENCY MEDICINE | Admitting: HOSPITALIST
Payer: COMMERCIAL

## 2024-03-09 ENCOUNTER — APPOINTMENT (OUTPATIENT)
Dept: RADIOLOGY | Facility: MEDICAL CENTER | Age: 59
DRG: 092 | End: 2024-03-09
Attending: EMERGENCY MEDICINE
Payer: COMMERCIAL

## 2024-03-09 ENCOUNTER — APPOINTMENT (OUTPATIENT)
Dept: RADIOLOGY | Facility: MEDICAL CENTER | Age: 59
DRG: 092 | End: 2024-03-09
Payer: COMMERCIAL

## 2024-03-09 DIAGNOSIS — S12.9XXA: ICD-10-CM

## 2024-03-09 DIAGNOSIS — R11.2 NAUSEA AND VOMITING, UNSPECIFIED VOMITING TYPE: ICD-10-CM

## 2024-03-09 DIAGNOSIS — E83.51 HYPOCALCEMIA: ICD-10-CM

## 2024-03-09 DIAGNOSIS — R00.0 TACHYCARDIA: ICD-10-CM

## 2024-03-09 DIAGNOSIS — I95.9 HYPOTENSION, UNSPECIFIED HYPOTENSION TYPE: ICD-10-CM

## 2024-03-09 DIAGNOSIS — R41.0 CONFUSION: ICD-10-CM

## 2024-03-09 PROBLEM — A41.9 SEPSIS (HCC): Status: ACTIVE | Noted: 2024-03-09

## 2024-03-09 LAB
ALBUMIN SERPL BCP-MCNC: 1.8 G/DL (ref 3.2–4.9)
ALBUMIN/GLOB SERPL: 0.8 G/DL
ALP SERPL-CCNC: 267 U/L (ref 30–99)
ALT SERPL-CCNC: 8 U/L (ref 2–50)
ANION GAP SERPL CALC-SCNC: 9 MMOL/L (ref 7–16)
APPEARANCE UR: CLEAR
AST SERPL-CCNC: 25 U/L (ref 12–45)
BACTERIA #/AREA URNS HPF: NEGATIVE /HPF
BASOPHILS # BLD AUTO: 1 % (ref 0–1.8)
BASOPHILS # BLD: 0.04 K/UL (ref 0–0.12)
BILIRUB SERPL-MCNC: 0.4 MG/DL (ref 0.1–1.5)
BILIRUB UR QL STRIP.AUTO: ABNORMAL
BUN SERPL-MCNC: 11 MG/DL (ref 8–22)
CALCIUM ALBUM COR SERPL-MCNC: 8.7 MG/DL (ref 8.5–10.5)
CALCIUM SERPL-MCNC: 6.9 MG/DL (ref 8.5–10.5)
CHLORIDE SERPL-SCNC: 98 MMOL/L (ref 96–112)
CO2 SERPL-SCNC: 23 MMOL/L (ref 20–33)
COLOR UR: ABNORMAL
CORTIS SERPL-MCNC: 9.9 UG/DL (ref 0–23)
CREAT SERPL-MCNC: 0.23 MG/DL (ref 0.5–1.4)
EOSINOPHIL # BLD AUTO: 0.16 K/UL (ref 0–0.51)
EOSINOPHIL NFR BLD: 4.1 % (ref 0–6.9)
EPI CELLS #/AREA URNS HPF: NEGATIVE /HPF
ERYTHROCYTE [DISTWIDTH] IN BLOOD BY AUTOMATED COUNT: 56.3 FL (ref 35.9–50)
FLUAV RNA SPEC QL NAA+PROBE: NEGATIVE
FLUBV RNA SPEC QL NAA+PROBE: NEGATIVE
GFR SERPLBLD CREATININE-BSD FMLA CKD-EPI: 148 ML/MIN/1.73 M 2
GLOBULIN SER CALC-MCNC: 2.3 G/DL (ref 1.9–3.5)
GLUCOSE SERPL-MCNC: 102 MG/DL (ref 65–99)
GLUCOSE UR STRIP.AUTO-MCNC: NEGATIVE MG/DL
HCT VFR BLD AUTO: 25.2 % (ref 42–52)
HGB BLD-MCNC: 7.7 G/DL (ref 14–18)
HYALINE CASTS #/AREA URNS LPF: ABNORMAL /LPF
IMM GRANULOCYTES # BLD AUTO: 0.05 K/UL (ref 0–0.11)
IMM GRANULOCYTES NFR BLD AUTO: 1.3 % (ref 0–0.9)
KETONES UR STRIP.AUTO-MCNC: ABNORMAL MG/DL
LACTATE SERPL-SCNC: 0.8 MMOL/L (ref 0.5–2)
LACTATE SERPL-SCNC: 0.8 MMOL/L (ref 0.5–2)
LACTATE SERPL-SCNC: 1.4 MMOL/L (ref 0.5–2)
LEUKOCYTE ESTERASE UR QL STRIP.AUTO: ABNORMAL
LYMPHOCYTES # BLD AUTO: 0.2 K/UL (ref 1–4.8)
LYMPHOCYTES NFR BLD: 5.1 % (ref 22–41)
MCH RBC QN AUTO: 25.4 PG (ref 27–33)
MCHC RBC AUTO-ENTMCNC: 30.6 G/DL (ref 32.3–36.5)
MCV RBC AUTO: 83.2 FL (ref 81.4–97.8)
MICRO URNS: ABNORMAL
MONOCYTES # BLD AUTO: 0.5 K/UL (ref 0–0.85)
MONOCYTES NFR BLD AUTO: 12.7 % (ref 0–13.4)
NEUTROPHILS # BLD AUTO: 2.98 K/UL (ref 1.82–7.42)
NEUTROPHILS NFR BLD: 75.8 % (ref 44–72)
NITRITE UR QL STRIP.AUTO: NEGATIVE
NRBC # BLD AUTO: 0 K/UL
NRBC BLD-RTO: 0 /100 WBC (ref 0–0.2)
PH UR STRIP.AUTO: 5.5 [PH] (ref 5–8)
PLATELET # BLD AUTO: 248 K/UL (ref 164–446)
PMV BLD AUTO: 9.2 FL (ref 9–12.9)
POTASSIUM SERPL-SCNC: 3.7 MMOL/L (ref 3.6–5.5)
PROCALCITONIN SERPL-MCNC: 0.23 NG/ML
PROT SERPL-MCNC: 4.1 G/DL (ref 6–8.2)
PROT UR QL STRIP: NEGATIVE MG/DL
RBC # BLD AUTO: 3.03 M/UL (ref 4.7–6.1)
RBC # URNS HPF: ABNORMAL /HPF
RBC UR QL AUTO: NEGATIVE
RSV RNA SPEC QL NAA+PROBE: NEGATIVE
SARS-COV-2 RNA RESP QL NAA+PROBE: NOTDETECTED
SODIUM SERPL-SCNC: 130 MMOL/L (ref 135–145)
SP GR UR STRIP.AUTO: 1.02
SPECIMEN SOURCE: NORMAL
UROBILINOGEN UR STRIP.AUTO-MCNC: 1 MG/DL
WBC # BLD AUTO: 3.9 K/UL (ref 4.8–10.8)
WBC #/AREA URNS HPF: ABNORMAL /HPF

## 2024-03-09 PROCEDURE — 99223 1ST HOSP IP/OBS HIGH 75: CPT | Performed by: HOSPITALIST

## 2024-03-09 PROCEDURE — 83605 ASSAY OF LACTIC ACID: CPT

## 2024-03-09 PROCEDURE — 87040 BLOOD CULTURE FOR BACTERIA: CPT

## 2024-03-09 PROCEDURE — 700101 HCHG RX REV CODE 250: Performed by: HOSPITALIST

## 2024-03-09 PROCEDURE — 85025 COMPLETE CBC W/AUTO DIFF WBC: CPT

## 2024-03-09 PROCEDURE — 770000 HCHG ROOM/CARE - INTERMEDIATE ICU *

## 2024-03-09 PROCEDURE — 700111 HCHG RX REV CODE 636 W/ 250 OVERRIDE (IP): Mod: JZ | Performed by: HOSPITALIST

## 2024-03-09 PROCEDURE — A9270 NON-COVERED ITEM OR SERVICE: HCPCS | Performed by: HOSPITALIST

## 2024-03-09 PROCEDURE — 99285 EMERGENCY DEPT VISIT HI MDM: CPT

## 2024-03-09 PROCEDURE — 87086 URINE CULTURE/COLONY COUNT: CPT

## 2024-03-09 PROCEDURE — 700102 HCHG RX REV CODE 250 W/ 637 OVERRIDE(OP): Performed by: HOSPITALIST

## 2024-03-09 PROCEDURE — 700105 HCHG RX REV CODE 258: Performed by: HOSPITALIST

## 2024-03-09 PROCEDURE — 81001 URINALYSIS AUTO W/SCOPE: CPT

## 2024-03-09 PROCEDURE — 93971 EXTREMITY STUDY: CPT | Mod: RT

## 2024-03-09 PROCEDURE — 36415 COLL VENOUS BLD VENIPUNCTURE: CPT

## 2024-03-09 PROCEDURE — 71045 X-RAY EXAM CHEST 1 VIEW: CPT

## 2024-03-09 PROCEDURE — 84145 PROCALCITONIN (PCT): CPT

## 2024-03-09 PROCEDURE — 82533 TOTAL CORTISOL: CPT

## 2024-03-09 PROCEDURE — 80053 COMPREHEN METABOLIC PANEL: CPT

## 2024-03-09 PROCEDURE — 0241U HCHG SARS-COV-2 COVID-19 NFCT DS RESP RNA 4 TRGT MIC: CPT

## 2024-03-09 PROCEDURE — 700105 HCHG RX REV CODE 258: Performed by: EMERGENCY MEDICINE

## 2024-03-09 RX ORDER — OXYCODONE HYDROCHLORIDE 10 MG/1
10 TABLET ORAL
Status: DISCONTINUED | OUTPATIENT
Start: 2024-03-09 | End: 2024-03-12 | Stop reason: HOSPADM

## 2024-03-09 RX ORDER — FLUTICASONE PROPIONATE AND SALMETEROL 250; 50 UG/1; UG/1
1 POWDER RESPIRATORY (INHALATION) DAILY
COMMUNITY
End: 2024-03-21

## 2024-03-09 RX ORDER — SODIUM CHLORIDE 9 MG/ML
30 INJECTION, SOLUTION INTRAVENOUS ONCE
Status: COMPLETED | OUTPATIENT
Start: 2024-03-09 | End: 2024-03-09

## 2024-03-09 RX ORDER — METHOCARBAMOL 500 MG/1
1000 TABLET, FILM COATED ORAL 3 TIMES DAILY
Status: ON HOLD | COMMUNITY
End: 2024-03-12

## 2024-03-09 RX ORDER — MORPHINE SULFATE 60 MG/1
60 TABLET, FILM COATED, EXTENDED RELEASE ORAL EVERY 12 HOURS
Status: DISCONTINUED | OUTPATIENT
Start: 2024-03-09 | End: 2024-03-12 | Stop reason: HOSPADM

## 2024-03-09 RX ORDER — FAMOTIDINE 20 MG/1
20 TABLET, FILM COATED ORAL DAILY
Status: DISCONTINUED | OUTPATIENT
Start: 2024-03-10 | End: 2024-03-12 | Stop reason: HOSPADM

## 2024-03-09 RX ORDER — METHOCARBAMOL 500 MG/1
1000 TABLET, FILM COATED ORAL 3 TIMES DAILY PRN
Status: DISCONTINUED | OUTPATIENT
Start: 2024-03-09 | End: 2024-03-12 | Stop reason: HOSPADM

## 2024-03-09 RX ORDER — SODIUM CHLORIDE, SODIUM LACTATE, POTASSIUM CHLORIDE, CALCIUM CHLORIDE 600; 310; 30; 20 MG/100ML; MG/100ML; MG/100ML; MG/100ML
INJECTION, SOLUTION INTRAVENOUS CONTINUOUS
Status: DISCONTINUED | OUTPATIENT
Start: 2024-03-09 | End: 2024-03-10

## 2024-03-09 RX ORDER — PROCHLORPERAZINE MALEATE 10 MG
10 TABLET ORAL 3 TIMES DAILY
Status: ON HOLD | COMMUNITY
End: 2024-03-12

## 2024-03-09 RX ORDER — ENOXAPARIN SODIUM 100 MG/ML
40 INJECTION SUBCUTANEOUS DAILY
Status: DISCONTINUED | OUTPATIENT
Start: 2024-03-09 | End: 2024-03-12 | Stop reason: HOSPADM

## 2024-03-09 RX ORDER — HYDROMORPHONE HYDROCHLORIDE 1 MG/ML
0.5 INJECTION, SOLUTION INTRAMUSCULAR; INTRAVENOUS; SUBCUTANEOUS
Status: DISCONTINUED | OUTPATIENT
Start: 2024-03-09 | End: 2024-03-12 | Stop reason: HOSPADM

## 2024-03-09 RX ORDER — GABAPENTIN 100 MG/1
100 CAPSULE ORAL 3 TIMES DAILY
Status: DISCONTINUED | OUTPATIENT
Start: 2024-03-09 | End: 2024-03-12 | Stop reason: HOSPADM

## 2024-03-09 RX ORDER — FAMOTIDINE 20 MG/1
20 TABLET, FILM COATED ORAL DAILY
Status: ON HOLD | COMMUNITY

## 2024-03-09 RX ORDER — MEGESTROL ACETATE 40 MG/ML
200 SUSPENSION ORAL DAILY
Status: DISCONTINUED | OUTPATIENT
Start: 2024-03-10 | End: 2024-03-12 | Stop reason: HOSPADM

## 2024-03-09 RX ORDER — GABAPENTIN 100 MG/1
100 CAPSULE ORAL DAILY
Status: ON HOLD | COMMUNITY

## 2024-03-09 RX ADMIN — CEFTRIAXONE SODIUM 2000 MG: 10 INJECTION, POWDER, FOR SOLUTION INTRAVENOUS at 16:34

## 2024-03-09 RX ADMIN — ENOXAPARIN SODIUM 40 MG: 100 INJECTION SUBCUTANEOUS at 17:21

## 2024-03-09 RX ADMIN — OXYCODONE HYDROCHLORIDE 10 MG: 10 TABLET ORAL at 21:56

## 2024-03-09 RX ADMIN — GABAPENTIN 100 MG: 100 CAPSULE ORAL at 21:56

## 2024-03-09 RX ADMIN — SODIUM CHLORIDE, POTASSIUM CHLORIDE, SODIUM LACTATE AND CALCIUM CHLORIDE: 600; 310; 30; 20 INJECTION, SOLUTION INTRAVENOUS at 17:35

## 2024-03-09 RX ADMIN — GABAPENTIN 100 MG: 100 CAPSULE ORAL at 16:35

## 2024-03-09 RX ADMIN — MOMETASONE FUROATE AND FORMOTEROL FUMARATE DIHYDRATE 2 PUFF: 200; 5 AEROSOL RESPIRATORY (INHALATION) at 17:24

## 2024-03-09 RX ADMIN — SODIUM CHLORIDE 1905 ML: 9 INJECTION, SOLUTION INTRAVENOUS at 11:15

## 2024-03-09 RX ADMIN — MORPHINE SULFATE 60 MG: 60 TABLET, FILM COATED, EXTENDED RELEASE ORAL at 17:20

## 2024-03-09 ASSESSMENT — ENCOUNTER SYMPTOMS
PALPITATIONS: 0
HEADACHES: 0
CHILLS: 0
BACK PAIN: 0
WEIGHT LOSS: 1
COUGH: 0
WEAKNESS: 1
SHORTNESS OF BREATH: 0
VOMITING: 0
LOSS OF CONSCIOUSNESS: 0
ABDOMINAL PAIN: 0
DIZZINESS: 0
NAUSEA: 0
FEVER: 1
DIARRHEA: 0

## 2024-03-09 ASSESSMENT — PAIN DESCRIPTION - PAIN TYPE
TYPE: ACUTE PAIN

## 2024-03-09 ASSESSMENT — FIBROSIS 4 INDEX: FIB4 SCORE: 1.81

## 2024-03-09 NOTE — ED NOTES
Break RN: Patient requesting food and water. Discussed with ERP, will have intensivist talk with patient before giving food and drink.

## 2024-03-09 NOTE — H&P
Hospital Medicine History & Physical Note    Date of Service  3/9/2024    Primary Care Physician  Jose Luis Juarez M.D.    Consultants      Specialist Names:     Code Status  DNAR/DNI    Chief Complaint  Chief Complaint   Patient presents with    Leg Swelling     Starting at 0845 this am per ems. Pt has hx of stg 4 melanoma and is a poor historian. Per ems pt called out in pain at 0845 this am. States she noticed right leg swelling and painful. Pt wife gave 60 mg of morphine which he is reportedly prescribed.        History of Presenting Illness  Andrew Wall is a 58 y.o. male who presented 3/9/2024 with history of metastatic melanoma BRAF V6 00 E+ with metastases to bone and lymph nodes.  Patient has been maintained on MS Contin for chronic metastatic bone pain.  He was recently in the hospital admitted on March 5, and discharged 2 days prior to presentation again.  He was here with pain following some trauma, and was found to have a C2 vertebral fracture as well as pathologic fractures of C6 and C7    Family brought the patient back, as they noticed that his mental status was worsening again.  He reports that he will oftentimes have hallucinations, and not be totally aware where he is.  These episodes wax and wane.  He was discharged on scheduled MS Contin, gabapentin, Robaxin and in addition he takes hydroxyzine for anxiety, and Megace for appetite stimulant.  He has been taking his medications as prescribed.  Wife notes that he had a fever this morning as well which was 100.3.  And therefore brought him to the emergency room.  Here he has been found to be hypotensive with systolics generally in the 80s, wife notes that he normally runs around 100 110 systolic.  He continues to be leukopenic which is chronic for him  I discussed the plan of care with patient and family.    Review of Systems  Review of Systems   Constitutional:  Positive for fever, malaise/fatigue and weight loss. Negative for chills.    Respiratory:  Negative for cough and shortness of breath.    Cardiovascular:  Negative for chest pain, palpitations and leg swelling.   Gastrointestinal:  Negative for abdominal pain, diarrhea, nausea and vomiting.   Genitourinary:  Negative for dysuria and urgency.   Musculoskeletal:  Negative for back pain.   Skin:  Negative for rash.   Neurological:  Positive for weakness. Negative for dizziness, loss of consciousness and headaches.       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    Surgical History   has a past surgical history that includes nasal polypectomy (2015, 2017, 2019); antrostomy (Bilateral, 11/15/2019); sinuscopy (Bilateral, 11/15/2019); turbinoplasty (Bilateral, 11/15/2019); sphenoidectomy (Bilateral, 11/15/2019); ethmoidectomy (Bilateral, 11/15/2019); nasal polypectomy (Bilateral, 11/15/2019); node biopsy sentinel (Bilateral, 10/20/2020); wide excision (Right, 10/20/2020); other (11/20); and lymph node excision (Right, 11/16/2023).     Family History  family history is not on file.   Family history reviewed with patient. There is no family history that is pertinent to the chief complaint.     Social History   reports that he has never smoked. He has never used smokeless tobacco. He reports current alcohol use of about 0.6 oz of alcohol per week. He reports that he does not use drugs.    Allergies  Allergies   Allergen Reactions    Augmentin Vomiting and Nausea       Medications  Prior to Admission Medications   Prescriptions Last Dose Informant Patient Reported? Taking?   Binimetinib 15 MG Tab 3/9/2024 at AM Significant Other, Patient No Yes   Sig: Take 45 mg by mouth 2 times a day.   Encorafenib 75 MG Cap  3/4/2024 at UNK Significant Other, Patient No No   Sig: Take 450 mg by mouth every day.   famotidine (PEPCID) 20 MG Tab 3/9/2024 at AM Significant Other, Patient Yes Yes   Sig: Take 20 mg by mouth every day.   fluticasone-salmeterol (WIXELA INHUB) 250-50 MCG/ACT AEROSOL POWDER, BREATH ACTIVATED 3/8/2024 at UNK Significant Other, Patient Yes Yes   Sig: Inhale 1 Puff every day.   gabapentin (NEURONTIN) 100 MG Cap 3/9/2024 at AM Significant Other, Patient Yes Yes   Sig: Take 100 mg by mouth 3 times a day.   hydrOXYzine HCl (ATARAX) 25 MG Tab FEW DAYS AGO at PRN Significant Other, Patient Yes No   Sig: Take 25 mg by mouth 1 time a day as needed for Anxiety.   megestrol (MEGACE) 40 MG/ML Suspension 3/9/2024 at AM Significant Other, Patient No Yes   Sig: Take 5 mL by mouth every day. Can take 5mL daily, can increase to 10 mL as needed.   methocarbamol (ROBAXIN) 500 MG Tab 3/9/2024 at AM Significant Other, Patient Yes Yes   Sig: Take 1,000 mg by mouth 3 times a day.   morphine ER (MS CONTIN) 60 MG Tab CR tablet 3/9/2024 at 0800 Significant Other, Patient No Yes   Sig: Take 1 Tablet by mouth every 12 hours for 14 days.   ondansetron (ZOFRAN ODT) 4 MG TABLET DISPERSIBLE PRN at PRN Significant Other, Patient No No   Sig: Take 1 Tablet by mouth every 6 hours as needed for Nausea/Vomiting.   prochlorperazine (COMPAZINE) 10 MG Tab 3/9/2024 at AM Significant Other, Patient Yes Yes   Sig: Take 10 mg by mouth in the morning, at noon, and at bedtime.      Facility-Administered Medications: None       Physical Exam  Temp:  [37.3 °C (99.2 °F)] 37.3 °C (99.2 °F)  Pulse:  [102-121] 102  Resp:  [10-21] 13  BP: (87-96)/(49-64) 88/53  SpO2:  [94 %-98 %] 94 %  Blood Pressure: (!) 88/53   Temperature: 37.3 °C (99.2 °F)   Pulse: (!) 102   Respiration: 13   Pulse Oximetry: 94 %       Physical Exam  Constitutional:       General: He is not in acute distress.     Appearance: He is well-developed. He is not diaphoretic.   HENT:      Head:  "Normocephalic and atraumatic.      Mouth/Throat:      Mouth: Mucous membranes are dry.   Eyes:      Conjunctiva/sclera: Conjunctivae normal.   Neck:      Vascular: No JVD.   Cardiovascular:      Rate and Rhythm: Normal rate.      Heart sounds: No murmur heard.     No gallop.   Pulmonary:      Effort: Pulmonary effort is normal. No respiratory distress.      Breath sounds: No stridor. No wheezing or rales.   Abdominal:      Palpations: Abdomen is soft.      Tenderness: There is no abdominal tenderness. There is no guarding or rebound.   Musculoskeletal:      Right lower leg: Edema present.      Left lower leg: Edema present.   Skin:     General: Skin is warm and dry.      Findings: No rash.   Neurological:      Mental Status: He is oriented to person, place, and time.      Comments: Patient is alert oriented and interactive.  He is confused historian.  Speech is clear content logical.  Face is symmetric.  Strength is antigravity x 4 extremities.   Psychiatric:         Mood and Affect: Mood normal.         Behavior: Behavior normal.         Laboratory:  Recent Labs     03/07/24  0022 03/09/24  1037   WBC 3.9* 3.9*   RBC 3.08* 3.03*   HEMOGLOBIN 8.0* 7.7*   HEMATOCRIT 26.4* 25.2*   MCV 85.7 83.2   MCH 26.0* 25.4*   MCHC 30.3* 30.6*   RDW 59.5* 56.3*   PLATELETCT 250 248   MPV 9.0 9.2     Recent Labs     03/07/24  0022 03/09/24  1037   SODIUM 134* 130*   POTASSIUM 3.5* 3.7   CHLORIDE 103 98   CO2 19* 23   GLUCOSE 89 102*   BUN 9 11   CREATININE 0.18* 0.23*   CALCIUM 7.1* 6.9*     Recent Labs     03/07/24  0022 03/09/24  1037   ALTSGPT  --  8   ASTSGOT  --  25   ALKPHOSPHAT  --  267*   TBILIRUBIN  --  0.4   GLUCOSE 89 102*         No results for input(s): \"NTPROBNP\" in the last 72 hours.      No results for input(s): \"TROPONINT\" in the last 72 hours.    Imaging:  DX-CHEST-PORTABLE (1 VIEW)   Final Result      No evidence of acute cardiopulmonary process.      US-EXTREMITY VENOUS LOWER UNILAT RIGHT   Final Result    "   MR-BRAIN-WITH & W/O    (Results Pending)       X-Ray:  I have personally reviewed the images and compared with prior images.    Assessment/Plan:  Justification for Admission Status  I anticipate this patient will require at least two midnights for appropriate medical management, necessitating inpatient admission because sepsis    Patient will need a Intermediate Care (Adult and Pediatrics) bed on MEDICAL service .  The need is secondary to sepsis.    * Sepsis (HCC)- (present on admission)  Assessment & Plan  Present on admission  Signs include fever and tachycardia as well as hypotension  Source not determined  Patient has received fluid bolus here in the ED we will continue IV fluids upstairs  Empiric coverage with Rocephin  Await results of UA and follow results of urine and blood cultures  Check Pro-Aman      Delirium- (present on admission)  Assessment & Plan  Patient presents with hallucinations and confusion which has been going on for about a week  He is currently on multiple medications which could be involved, as well there is a possibility metastatic disease to his brain.  CT scan last week was negative however he has not had an MRI in 3 months.  In addition the patient is febrile which raises the possibility of infection.  Admit to the hospital  Follow blood and urine cultures  Start empiric Rocephin  MRI of the brain  Change Robaxin from scheduled as needed but otherwise continues pain medications.  Follow serial lab and exam    Cancer related pain- (present on admission)  Assessment & Plan  Patient is on morphine 60 mg continuous release, gabapentin, and scheduled Robaxin.  He has breakthrough oxycodone when needed but has not been needing it at home.  Will change the Robaxin to as needed in deference to his mental status but otherwise continue his medications unchanged for the moment as we evaluate as cause of her mental status change    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)- (present on  admission)  Assessment & Plan  Patient is on Binimetanib, Encorafinib per his oncologist Dr. Ross which we will continue         VTE prophylaxis: enoxaparin ppx

## 2024-03-09 NOTE — ED TRIAGE NOTES
"Chief Complaint   Patient presents with    Leg Swelling     Starting at 0845 this am per ems. Pt has hx of stg 4 melanoma and is a poor historian. Per ems pt called out in pain at 0845 this am. States she noticed right leg swelling and painful. Pt wife gave 60 mg of morphine which he is reportedly prescribed.      Pt found to have SBP in the 80's. EMS gave 700cc IVF. Pt also had a GLF 2 days ago and fractured c2, c3, and c7. Aspen collar is in place.     BP (!) 87/58   Pulse (!) 121   Resp 16   Ht 1.803 m (5' 11\")   Wt 63.5 kg (140 lb)   SpO2 96%   BMI 19.53 kg/m²     "

## 2024-03-09 NOTE — ED NOTES
Med Rec completed per patient and wife   Allergies reviewed  No ORAL antibiotics in last 30 days

## 2024-03-09 NOTE — PROGRESS NOTES
Pulmonary and Critical Care Medicine Progress Note    I had the pleasure of being asked to see this charming gentleman in the ED for admission disposition.  He has a history of BRAF positive metastatic melanoma diagnosed in 2020.  He was just admitted to hospital from on or about 3/5/2024 to 3/7/2024 following a ground-level fall where he suffered a type II dens fracture.  He is in an [place] collar.  He comes back into the ED because of right foot swelling.  There was a concern that he may have a DVT.  Lower extremity venous Doppler is negative for DVT.  His family notes that he has had some confusion which waxes and wanes.  He was tachycardic on presentation with a heart rate of 121.  His blood pressure was 87/49.  His heart rate is now down to about 102-104 and his blood pressure has improved to 97/64.  His oral intake has not been the best.  At this time, he may be safely admitted to the IMCU.    Salo Hooker MD  Pulmonary and Critical Care Medicine

## 2024-03-09 NOTE — ED PROVIDER NOTES
ED Provider Note    CHIEF COMPLAINT  Chief Complaint   Patient presents with    Leg Swelling     Starting at 0845 this am per ems. Pt has hx of stg 4 melanoma and is a poor historian. Per ems pt called out in pain at 0845 this am. States she noticed right leg swelling and painful. Pt wife gave 60 mg of morphine which he is reportedly prescribed.        EXTERNAL RECORDS REVIEWED  Inpatient Notes the patient has an inpatient discharge summary from March 7, 2024, 2 days ago when he was discharged after a fall with a negative head CT.    HPI/ROS  LIMITATION TO HISTORY   Select: Confusion  OUTSIDE HISTORIAN(S):  Sister and daughter    Andrew Wall is a 58 y.o. male who presents with past medical history significant for malignant melanoma, he is currently followed by Dr. Ross oncologist on chemotherapy.  He was admitted recently after falling and had a negative head CT.  After returning home he developed confusion and hypotension and tachycardia.    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    SURGICAL HISTORY   has a past surgical history that includes nasal polypectomy (2015, 2017, 2019); antrostomy (Bilateral, 11/15/2019); sinuscopy (Bilateral, 11/15/2019); turbinoplasty (Bilateral, 11/15/2019); sphenoidectomy (Bilateral, 11/15/2019); ethmoidectomy (Bilateral, 11/15/2019); nasal polypectomy (Bilateral, 11/15/2019); node biopsy sentinel (Bilateral, 10/20/2020); wide excision (Right, 10/20/2020); other (11/20); and lymph node excision (Right, 11/16/2023).    FAMILY HISTORY  No family history on file.    SOCIAL HISTORY  Social History     Tobacco Use    Smoking status: Never    Smokeless tobacco: Never   Vaping Use    Vaping Use:  "Never used   Substance and Sexual Activity    Alcohol use: Yes     Alcohol/week: 0.6 oz     Types: 1 Cans of beer per week     Comment: reports 4/month    Drug use: No    Sexual activity: Yes     Partners: Female     Comment: used to manage [employer]       CURRENT MEDICATIONS  Home Medications       Reviewed by Chris Maria (Pharmacy Tech) on 03/09/24 at 1248  Med List Status: Complete     Medication Last Dose Status   Binimetinib 15 MG Tab 3/9/2024 Active   Encorafenib 75 MG Cap 3/4/2024 Active   famotidine (PEPCID) 20 MG Tab 3/9/2024 Active   fluticasone-salmeterol (WIXELA INHUB) 250-50 MCG/ACT AEROSOL POWDER, BREATH ACTIVATED 3/8/2024 Active   gabapentin (NEURONTIN) 100 MG Cap 3/9/2024 Active   hydrOXYzine HCl (ATARAX) 25 MG Tab FEW DAYS AGO Active   megestrol (MEGACE) 40 MG/ML Suspension 3/9/2024 Active   methocarbamol (ROBAXIN) 500 MG Tab 3/9/2024 Active   morphine ER (MS CONTIN) 60 MG Tab CR tablet 3/9/2024 Active   ondansetron (ZOFRAN ODT) 4 MG TABLET DISPERSIBLE PRN Active   prochlorperazine (COMPAZINE) 10 MG Tab 3/9/2024 Active                    ALLERGIES  Allergies   Allergen Reactions    Augmentin Vomiting and Nausea       PHYSICAL EXAM  VITAL SIGNS: BP 91/64   Pulse (!) 113   Temp 37.3 °C (99.2 °F) (Temporal)   Resp 13   Ht 1.803 m (5' 11\")   Wt 63.5 kg (140 lb)   SpO2 94%   BMI 19.53 kg/m²    Constitutional: Alert.  Confused.  HENT: No signs of trauma, Bilateral external ears normal, Nose normal.   Eyes: Pupils are equal and reactive, Conjunctiva normal, Non-icteric.   Neck: Patient is an immobilized in a collar.  Lymphatic: No lymphadenopathy noted.   Cardiovascular: Regular rate and rhythm, no murmurs.   Thorax & Lungs: Normal breath sounds, No respiratory distress, No wheezing, No chest tenderness.   Abdomen: Bowel sounds normal, Soft, No tenderness, No peritoneal signs, No masses, No pulsatile masses.   Skin: Warm, Dry, No erythema, No rash.   Back: No bony tenderness, No " CVA tenderness.   Extremities: Intact distal pulses, No edema, No tenderness, No cyanosis.  Musculoskeletal: Good range of motion in all major joints. No tenderness to palpation or major deformities noted.   Neurologic: Alert , Normal motor function, Normal sensory function, No focal deficits noted.  The patient is confused.  Psychiatric: Affect normal, Judgment normal, Mood normal.       DIAGNOSTIC STUDIES / PROCEDURES      LABS  Labs Reviewed   CBC WITH DIFFERENTIAL - Abnormal; Notable for the following components:       Result Value    WBC 3.9 (*)     RBC 3.03 (*)     Hemoglobin 7.7 (*)     Hematocrit 25.2 (*)     MCH 25.4 (*)     MCHC 30.6 (*)     RDW 56.3 (*)     Neutrophils-Polys 75.80 (*)     Lymphocytes 5.10 (*)     Immature Granulocytes 1.30 (*)     Lymphs (Absolute) 0.20 (*)     All other components within normal limits   COMP METABOLIC PANEL - Abnormal; Notable for the following components:    Sodium 130 (*)     Glucose 102 (*)     Creatinine 0.23 (*)     Calcium 6.9 (*)     Alkaline Phosphatase 267 (*)     Albumin 1.8 (*)     Total Protein 4.1 (*)     All other components within normal limits   LACTIC ACID   ESTIMATED GFR   LACTIC ACID   LACTIC ACID   URINALYSIS   URINE CULTURE(NEW)   BLOOD CULTURE    Narrative:     Blood Cultures X2. Draw one blood culture from central line  and one blood culture peripherally. If no line present, draw  blood cultures times two peripherally from different sites.   BLOOD CULTURE    Narrative:     Blood Cultures X2. Blood Cultures X2. Draw one blood culture  from central line and one blood culture peripherally. If no  line present, draw blood cultures times two peripherally from  different sites.         RADIOLOGY  I have independently interpreted the diagnostic imaging associated with this visit and am waiting the final reading from the radiologist.   My preliminary interpretation is as follows: Negative chest x-ray.  Radiologist interpretation:     DX-CHEST-PORTABLE (1  VIEW)   Final Result      No evidence of acute cardiopulmonary process.      US-EXTREMITY VENOUS LOWER UNILAT RIGHT   Final Result            COURSE & MEDICAL DECISION MAKING    ED Observation Status? No; Patient does not meet criteria for ED Observation.     INITIAL ASSESSMENT, COURSE AND PLAN  Care Narrative:     The patient presents with tachycardia, hypotension, differential diagnose includes sepsis, dehydration.  Labs were ordered, chest x-ray was ordered, he has leg swelling, DVT study was ordered.      1:05 PM chest x-ray is negative, ultrasound of the lower extremities negative for DVT.  His calcium is markedly low.    HYDRATION: Based on the patient's presentation of Hypotension the patient was given IV fluids. IV Hydration was used because oral hydration was not adequate alone. Upon recheck following hydration, the patient was less hypotensive.      ADDITIONAL PROBLEM LIST  Metastatic melanoma, tachycardia, hypotension  DISPOSITION AND DISCUSSIONS  I have discussed management of the patient with the following physicians and AZAEL's: I spoke with Dr. Ross who is on-call for his oncologist who will assess the patient tomorrow.  I spoke with Dr. Palma who will assess the patient.    Discussion of management with other [facility] or appropriate source(s): None         Barriers to care at this time, including but not limited to:  None .     Decision tools and prescription drugs considered including, but not limited to:  IV fluids here in the emergency department, sepsis criteria triggered sepsis workup .      CRITICAL CARE  The very real possibilty of a deterioration of this patient's condition required the highest level of my preparedness for sudden, emergent intervention.  I provided critical care services, which included medication orders, frequent reevaluations of the patient's condition and response to treatment, ordering and reviewing test results, and discussing the case with various consultants.  The  critical care time associated with the care of the patient was 40 minutes. Review chart for interventions. This time is exclusive of any other billable procedures.       FINAL DIAGNOSIS  1. Hypotension, unspecified hypotension type    2. Tachycardia    3. Confusion    4. Hypocalcemia           Total critical care time 40 minutes as outlined above    Electronically signed by: Kameron Gomez M.D., 3/9/2024 10:59 AM

## 2024-03-09 NOTE — ASSESSMENT & PLAN NOTE
Patient is on morphine 60 mg continuous release, gabapentin, and scheduled Robaxin.  He has breakthrough oxycodone when needed but has not been needing it at home.  Robaxin changed to PRN on admission: has not needed it so will DC

## 2024-03-09 NOTE — ASSESSMENT & PLAN NOTE
Sepsis vs dehydration/reaction to chemo  Present on admission  Signs include fever and tachycardia as well as hypotension  Cultures neg thus far  CXR clear   UA clear  Procal 0.23  Empiric coverage with Rocephin  Cont empiric Abx's x 24hrs

## 2024-03-09 NOTE — ED NOTES
Condom catheter placed at request of pt's wife at 1100.  Pt has not been able to provide urine sample at this time,.

## 2024-03-09 NOTE — ED NOTES
from lab called with a critical result of Ca=6.4 at 1119.  Critical lab read back to .  Dr. Lott notified of critical lab result at 1120.  Critical lab result voalte texted to Dr. Lott.

## 2024-03-09 NOTE — ASSESSMENT & PLAN NOTE
Patient is on Binimetanib, Encorafinib per his oncologist Dr. Ross  DW Dr Ross: hold as they may be the etiology of his fever

## 2024-03-09 NOTE — ASSESSMENT & PLAN NOTE
Patient presents with hallucinations and confusion which has been going on for about a week  Likely polypharmacy   CT scan last week was negative     MRI brain pending  Changed Robaxin from scheduled as needed but otherwise continues pain medications.    3/11 AO X4, no more fever, no more hallucination

## 2024-03-10 LAB
ANION GAP SERPL CALC-SCNC: 10 MMOL/L (ref 7–16)
BACTERIA BLD CULT: NORMAL
BACTERIA BLD CULT: NORMAL
BASOPHILS # BLD AUTO: 0.6 % (ref 0–1.8)
BASOPHILS # BLD: 0.02 K/UL (ref 0–0.12)
BUN SERPL-MCNC: 6 MG/DL (ref 8–22)
CALCIUM SERPL-MCNC: 6.9 MG/DL (ref 8.5–10.5)
CHLORIDE SERPL-SCNC: 101 MMOL/L (ref 96–112)
CO2 SERPL-SCNC: 21 MMOL/L (ref 20–33)
CREAT SERPL-MCNC: 0.18 MG/DL (ref 0.5–1.4)
EOSINOPHIL # BLD AUTO: 0.19 K/UL (ref 0–0.51)
EOSINOPHIL NFR BLD: 5.4 % (ref 0–6.9)
ERYTHROCYTE [DISTWIDTH] IN BLOOD BY AUTOMATED COUNT: 57.6 FL (ref 35.9–50)
GFR SERPLBLD CREATININE-BSD FMLA CKD-EPI: 159 ML/MIN/1.73 M 2
GLUCOSE SERPL-MCNC: 102 MG/DL (ref 65–99)
HCT VFR BLD AUTO: 24.9 % (ref 42–52)
HGB BLD-MCNC: 7.6 G/DL (ref 14–18)
IMM GRANULOCYTES # BLD AUTO: 0.06 K/UL (ref 0–0.11)
IMM GRANULOCYTES NFR BLD AUTO: 1.7 % (ref 0–0.9)
LYMPHOCYTES # BLD AUTO: 0.21 K/UL (ref 1–4.8)
LYMPHOCYTES NFR BLD: 6 % (ref 22–41)
MCH RBC QN AUTO: 25.9 PG (ref 27–33)
MCHC RBC AUTO-ENTMCNC: 30.5 G/DL (ref 32.3–36.5)
MCV RBC AUTO: 84.7 FL (ref 81.4–97.8)
MONOCYTES # BLD AUTO: 0.29 K/UL (ref 0–0.85)
MONOCYTES NFR BLD AUTO: 8.3 % (ref 0–13.4)
NEUTROPHILS # BLD AUTO: 2.73 K/UL (ref 1.82–7.42)
NEUTROPHILS NFR BLD: 78 % (ref 44–72)
NRBC # BLD AUTO: 0 K/UL
NRBC BLD-RTO: 0 /100 WBC (ref 0–0.2)
PLATELET # BLD AUTO: 248 K/UL (ref 164–446)
PMV BLD AUTO: 9.2 FL (ref 9–12.9)
POTASSIUM SERPL-SCNC: 3.4 MMOL/L (ref 3.6–5.5)
RBC # BLD AUTO: 2.94 M/UL (ref 4.7–6.1)
SIGNIFICANT IND 70042: NORMAL
SIGNIFICANT IND 70042: NORMAL
SITE SITE: NORMAL
SITE SITE: NORMAL
SODIUM SERPL-SCNC: 132 MMOL/L (ref 135–145)
SOURCE SOURCE: NORMAL
SOURCE SOURCE: NORMAL
WBC # BLD AUTO: 3.5 K/UL (ref 4.8–10.8)

## 2024-03-10 PROCEDURE — 99233 SBSQ HOSP IP/OBS HIGH 50: CPT | Performed by: HOSPITALIST

## 2024-03-10 PROCEDURE — 770004 HCHG ROOM/CARE - ONCOLOGY PRIVATE *

## 2024-03-10 PROCEDURE — 700111 HCHG RX REV CODE 636 W/ 250 OVERRIDE (IP): Mod: JZ | Performed by: HOSPITALIST

## 2024-03-10 PROCEDURE — A9270 NON-COVERED ITEM OR SERVICE: HCPCS | Performed by: HOSPITALIST

## 2024-03-10 PROCEDURE — 700105 HCHG RX REV CODE 258: Performed by: HOSPITALIST

## 2024-03-10 PROCEDURE — 80048 BASIC METABOLIC PNL TOTAL CA: CPT

## 2024-03-10 PROCEDURE — 700102 HCHG RX REV CODE 250 W/ 637 OVERRIDE(OP): Performed by: HOSPITALIST

## 2024-03-10 PROCEDURE — 85025 COMPLETE CBC W/AUTO DIFF WBC: CPT

## 2024-03-10 PROCEDURE — 700101 HCHG RX REV CODE 250: Performed by: HOSPITALIST

## 2024-03-10 RX ORDER — POTASSIUM CHLORIDE 20 MEQ/1
40 TABLET, EXTENDED RELEASE ORAL ONCE
Status: COMPLETED | OUTPATIENT
Start: 2024-03-10 | End: 2024-03-10

## 2024-03-10 RX ADMIN — ENOXAPARIN SODIUM 40 MG: 100 INJECTION SUBCUTANEOUS at 17:15

## 2024-03-10 RX ADMIN — CEFTRIAXONE SODIUM 2000 MG: 10 INJECTION, POWDER, FOR SOLUTION INTRAVENOUS at 17:15

## 2024-03-10 RX ADMIN — GABAPENTIN 100 MG: 100 CAPSULE ORAL at 08:55

## 2024-03-10 RX ADMIN — MOMETASONE FUROATE AND FORMOTEROL FUMARATE DIHYDRATE 2 PUFF: 200; 5 AEROSOL RESPIRATORY (INHALATION) at 05:49

## 2024-03-10 RX ADMIN — SODIUM CHLORIDE, POTASSIUM CHLORIDE, SODIUM LACTATE AND CALCIUM CHLORIDE: 600; 310; 30; 20 INJECTION, SOLUTION INTRAVENOUS at 04:32

## 2024-03-10 RX ADMIN — MEGESTROL ACETATE 200 MG: 40 SUSPENSION ORAL at 09:02

## 2024-03-10 RX ADMIN — FAMOTIDINE 20 MG: 20 TABLET, FILM COATED ORAL at 05:49

## 2024-03-10 RX ADMIN — MORPHINE SULFATE 60 MG: 60 TABLET, FILM COATED, EXTENDED RELEASE ORAL at 17:15

## 2024-03-10 RX ADMIN — GABAPENTIN 100 MG: 100 CAPSULE ORAL at 15:48

## 2024-03-10 RX ADMIN — POTASSIUM CHLORIDE 40 MEQ: 1500 TABLET, EXTENDED RELEASE ORAL at 08:55

## 2024-03-10 RX ADMIN — MOMETASONE FUROATE AND FORMOTEROL FUMARATE DIHYDRATE 2 PUFF: 200; 5 AEROSOL RESPIRATORY (INHALATION) at 17:14

## 2024-03-10 RX ADMIN — GABAPENTIN 100 MG: 100 CAPSULE ORAL at 20:13

## 2024-03-10 RX ADMIN — MORPHINE SULFATE 60 MG: 60 TABLET, FILM COATED, EXTENDED RELEASE ORAL at 05:49

## 2024-03-10 ASSESSMENT — ENCOUNTER SYMPTOMS
PALPITATIONS: 0
HEADACHES: 0
NECK PAIN: 1
ABDOMINAL PAIN: 0
VOMITING: 0
WEAKNESS: 1
NAUSEA: 0
COUGH: 0
LOSS OF CONSCIOUSNESS: 0
DIARRHEA: 0
WEIGHT LOSS: 1
DIZZINESS: 0
FEVER: 0
BACK PAIN: 0
SHORTNESS OF BREATH: 0
CHILLS: 0

## 2024-03-10 ASSESSMENT — PAIN DESCRIPTION - PAIN TYPE
TYPE: ACUTE PAIN

## 2024-03-10 ASSESSMENT — FIBROSIS 4 INDEX: FIB4 SCORE: 2.1

## 2024-03-10 NOTE — PROGRESS NOTES
Lab called with critical result of Calcium at 6.9. Critical lab result read back to Lab.   Dr. Novoa notified of critical lab result at 0643.  Critical lab result read back by Dr. Novoa.

## 2024-03-10 NOTE — ASSESSMENT & PLAN NOTE
Infection workup negative so far  Likely due to chemo    3/11 No fever today  Oncology is ok to resume chemo today  Monitor temp, will hold the meds if develop fever again and reconsult Oncology   I discontinued rocephin

## 2024-03-10 NOTE — PROGRESS NOTES
Report called to Nelly VERA, plan of care discussed.   Pt was removed from monitor and transported by bed to [facility] with all of his belongings and family at his side.

## 2024-03-10 NOTE — CARE PLAN
The patient is Watcher - Medium risk of patient condition declining or worsening    Shift Goals  Clinical Goals: hemodynamic stability, pain management  Patient Goals: rest, pain management  Family Goals: updates    Progress made toward(s) clinical / shift goals:       Problem: Knowledge Deficit - Standard  Goal: Patient and family/care givers will demonstrate understanding of plan of care, disease process/condition, diagnostic tests and medications  Outcome: Progressing     Problem: Hemodynamics  Goal: Patient's hemodynamics, fluid balance and neurologic status will be stable or improve  Outcome: Progressing     Problem: Pain - Standard  Goal: Alleviation of pain or a reduction in pain to the patient’s comfort goal  Outcome: Progressing     Problem: Skin Integrity  Goal: Skin integrity is maintained or improved  Outcome: Progressing     Problem: Fall Risk  Goal: Patient will remain free from falls  Outcome: Progressing       The patient is Watcher - Medium risk of patient condition declining or worsening    Shift Goals  Clinical Goals: Stay alert, Stable VS, MRI  Patient Goals: Eat good  Family Goals: comfort, updates    Progress made toward(s) clinical / shift goals:      Patient is not progressing towards the following goals:

## 2024-03-10 NOTE — PROGRESS NOTES
4 Eyes Skin Assessment Completed by JODY Mares and JODY Bradshaw.    Head WDL  Ears WDL  Nose WDL  Mouth WDL  Neck WDL  Breast/Chest WDL  Shoulder Blades Non-Blanching right shoulder red spot  Spine Non-Blanching dark non blanching line on left flank  (R) Arm/Elbow/Hand Redness and Blanching  (L) Arm/Elbow/Hand Redness and Blanching  Abdomen WDL  Groin WDL  Scrotum/Coccyx/Buttocks Non-Blanching, Excoriation, Moisture Fissure, and Discoloration open sore  (R) Leg Swelling  (L) Leg Swelling  (R) Heel/Foot/Toe WDL  (L) Heel/Foot/Toe WDL    Sore noted on scrotum           Devices In Places ECG, Tele Box, Blood Pressure Cuff, Pulse Ox, and Condom Cath      Interventions In Place Heel Mepilex, Sacral Mepilex, Waffle Overlay, Pillows, Q2 Turns, Low Air Loss Mattress, and Barrier Cream    Possible Skin Injury Yes    Pictures Uploaded Into Epic Yes  Wound Consult Placed Yes  RN Wound Prevention Protocol Ordered Yes

## 2024-03-10 NOTE — WOUND TEAM
[facility]  Inpatient Services  Initial Wound and Skin Care Evaluation    Admission Date: 3/9/2024     Last order of IP CONSULT TO WOUND CARE was found on 3/9/2024 from Hospital Encounter on 3/9/2024     HPI, PMH, SH: Reviewed    Past Surgical History:   Procedure Laterality Date    LYMPH NODE EXCISION Right 11/16/2023    Procedure: RIGHT INGUINAL NODE SUPERFICIAL DISSECTION;  Surgeon: Davon Valera M.D.;  Location: SURGERY [facility];  Service: General    NODE BIOPSY SENTINEL Bilateral 10/20/2020    Procedure: BIOPSY, LYMPH NODE, SENTINEL- GROIN;  Surgeon: Davon Valera M.D.;  Location: SURGERY SAME DAY [facility];  Service: General    WIDE EXCISION Right 10/20/2020    Procedure: WIDE EXCISION, LESION- BUTTOCKS, RADICAL MALIGNANT MELANOMA;  Surgeon: Davon Valera M.D.;  Location: SURGERY SAME DAY [facility];  Service: General    ANTROSTOMY Bilateral 11/15/2019    Procedure: MAXILLARY ANTROSTOMY- REVISION ENDOSCOPICMOMETASONE INJECTION, PROPEL STENT PLACEMENT;  Surgeon: Felicitas Tenorio M.D.;  Location: [facility];  Service: Ent    SINUSCOPY Bilateral 11/15/2019    Procedure: ENDOSCOPY, PARANASAL SINUSES- FOR FRONTAL EXPLORATION;  Surgeon: Felicitas Tenorio M.D.;  Location: [facility];  Service: Ent    TURBINOPLASTY Bilateral 11/15/2019    Procedure: TURBINOPLASTY;  Surgeon: Felicitas Tenorio M.D.;  Location: [facility];  Service: Ent    SPHENOIDECTOMY Bilateral 11/15/2019    Procedure: SPHENOIDECTOMY- FOR SPHENOIDOTOMY;  Surgeon: Felicitas Tenorio M.D.;  Location: [facility];  Service: Ent    ETHMOIDECTOMY Bilateral 11/15/2019    Procedure: ETHMOIDECTOMY- ENDOSCOPIC;  Surgeon: Felicitas Tenorio M.D.;  Location: [facility];  Service: Ent    NASAL POLYPECTOMY Bilateral 11/15/2019    Procedure: POLYPECTOMY, NASAL CAVITY;  Surgeon: Felicitas Tenorio M.D.;  Location: [facility];  Service: Ent    NASAL  POLYPECTOMY  2015, 2017, 2019    x 3    OTHER  11/20    removed melanoma     Social History     Tobacco Use    Smoking status: Never    Smokeless tobacco: Never   Substance Use Topics    Alcohol use: Yes     Alcohol/week: 0.6 oz     Types: 1 Cans of beer per week     Comment: reports 4/month     Chief Complaint   Patient presents with    Leg Swelling     Starting at 0845 this am per ems. Pt has hx of stg 4 melanoma and is a poor historian. Per ems pt called out in pain at 0845 this am. States she noticed right leg swelling and painful. Pt wife gave 60 mg of morphine which he is reportedly prescribed.      Diagnosis: Sepsis (HCC) [A41.9]    Unit where seen by Wound Team: [id number]/00     WOUND CONSULT RELATED TO:  Neck, back, sacrum and heels    WOUND TEAM PLAN OF CARE - Frequency of Follow-up:   Nursing to follow dressing orders written for wound care. Contact wound team if area fails to progress, deteriorates or with any questions/concerns if something comes up before next scheduled follow up (See below as to whether wound is following and frequency of wound follow up)   Not following, consult as needed  - any area    WOUND HISTORY:   Pt is a 58yr old male with history of stage 4 melanoma with metastasis to the liver, bones and abdominal cavity. Pt was recently admitted in early march related to GLF resulting in C2 vertebral fracture and was also noted to have pathological fractures of C6 and C7. Pt was evaluated by neurosurgery at that time and was determined not to be a surgical candidate. Pt was placed in a C-Collar and discharged home on 3/7/24. Pt now is brought in related to painful right leg swelling. Pt is intermittently confused and does have hallucinations. Pt was found to have several POA pressure injuries for which wound team was consulted.        WOUND ASSESSMENT/LDA         Wound 03/09/24 Pressure Injury Coccyx POA sDTI (Active)   Wound Image     03/10/24 1500   Site Assessment Purple    Periwound  Assessment Pink;Intact    Margins Attached edges    Closure None    Drainage Amount None    Drainage Description Sanguineous    Treatments Cleansed;Offloading    Offloading/DME Heel float boot    Wound Cleansing Foam Cleanser/Washcloth    Periwound Protectant Barrier Paste    Dressing Status Clean;Dry;Intact    Dressing Changed Changed    Dressing Options Offloading Dressing - Sacral    Dressing Change/Treatment Frequency Every 72 hrs, and As Needed    NEXT Dressing Change/Treatment Date 03/13/24    NEXT Weekly Photo (Inpatient Only) 03/17/24    Wound Team Following Not following    WOUND NURSE ONLY - Pressure Injury Stage DTPI        Wound 03/09/24 Pressure Injury Heel Left POA sDTI (Active)   Wound Image     03/10/24 1500   Site Assessment Pink;Purple;Red    Periwound Assessment Clean;Dry;Intact;Pink    Margins Attached edges    Closure Open to air    Drainage Amount None    Treatments Offloading    Offloading/DME Heel float boot    Wound Cleansing Foam Cleanser/Washcloth    Periwound Protectant Not Applicable    Dressing Status Open to Air    Dressing Changed Changed    Dressing Cleansing/Solutions Not Applicable    Dressing Options Offloading Dressing - Heel    Dressing Change/Treatment Frequency Daily, and As Needed    NEXT Weekly Photo (Inpatient Only) 03/17/24    Wound Team Following Not following    WOUND NURSE ONLY - Pressure Injury Stage DTPI        Wound 03/09/24 Pressure Injury Heel Right POA sDTI (Active)   Wound Image     03/10/24 1500   Site Assessment Purple;Red    Periwound Assessment Pink;Intact    Margins Attached edges    Closure None    Drainage Amount None    Treatments Cleansed;Offloading    Offloading/DME Heel float boot    Wound Cleansing Foam Cleanser/Washcloth    Periwound Protectant Not Applicable    Dressing Status Open to Air    Dressing Changed Changed    Dressing Cleansing/Solutions Not Applicable    Dressing Options Offloading Dressing - Heel    Dressing Change/Treatment Frequency As  Needed    NEXT Weekly Photo (Inpatient Only) 03/17/24    Wound Team Following Not following    WOUND NURSE ONLY - Pressure Injury Stage DTPI        Wound 03/09/24 Pressure Injury Neck Posterior Left POA sDTI (Active)   Wound Image    03/10/24 1500   Site Assessment Purple;Red    Periwound Assessment Intact    Margins Attached edges    Closure Open to air    Drainage Amount None    Treatments Cleansed;Site care;Offloading    Offloading/DME Heel float boot    Dressing Status Dry;Clean;Intact    Dressing Changed New    Dressing Cleansing/Solutions Not Applicable    Dressing Options Silicone Adhesive Foam    Dressing Change/Treatment Frequency Every 72 hrs, and As Needed    NEXT Dressing Change/Treatment Date 03/13/24    NEXT Weekly Photo (Inpatient Only) 03/17/24    Wound Team Following Not following    WOUND NURSE ONLY - Pressure Injury Stage DTPI        Wound 03/09/24 Pressure Injury Back;Flank Left POA sDTI (Active)   Wound Image    03/10/24 1500   Site Assessment Purple;Red    Periwound Assessment Intact    Margins Attached edges    Closure None    Drainage Amount None    Treatments Offloading    Dressing Status Open to Air    NEXT Weekly Photo (Inpatient Only) 03/17/24    Wound Team Following Not following    WOUND NURSE ONLY - Pressure Injury Stage DTPI        Wound 03/09/24 Pressure Injury Back Mid;Upper POA sDTI (Active)   Wound Image    03/10/24 1500   Site Assessment Red;Purple    Periwound Assessment Intact    Margins Attached edges    Closure None    Drainage Amount None    Treatments Cleansed;Site care;Offloading    Offloading/DME Heel float boot    Wound Cleansing Foam Cleanser/Washcloth    Dressing Status Clean;Dry;Intact    Dressing Changed New    Dressing Cleansing/Solutions Not Applicable    Dressing Options Offloading Dressing - Heel    Dressing Change/Treatment Frequency Every 72 hrs, and As Needed    NEXT Dressing Change/Treatment Date 03/13/24    NEXT Weekly Photo (Inpatient Only) 03/17/24     Wound Team Following Not following    WOUND NURSE ONLY - Pressure Injury Stage DTPI                     Vascular:    NAYANA:   No results found.    Lab Values:    Lab Results   Component Value Date/Time    WBC 3.5 (L) 03/10/2024 05:15 AM    RBC 2.94 (L) 03/10/2024 05:15 AM    HEMOGLOBIN 7.6 (L) 03/10/2024 05:15 AM    HEMATOCRIT 24.9 (L) 03/10/2024 05:15 AM         Culture Results show:  No results found for this or any previous visit (from the past 720 hour(s)).    Pain Level/Medicated:  None, Tolerated without pain medication       INTERVENTIONS BY WOUND TEAM:  Chart and images reviewed. Discussed with bedside RN. All areas of concern (based on picture review, LDA review and discussion with bedside RN) have been thoroughly assessed. Documentation of areas based on significant findings. This RN in to assess patient. Performed standard wound care which includes appropriate positioning, dressing removal and non-selective debridement. Pictures and measurements obtained weekly if/when required.    Wound:  Left Posterior Neck POA sDTI  Preparation for Dressing removal: Removed without difficulty  Cleansed/Non-selectively Debrided with:  N/A  Ana wound: Cleansed with N/A, Prepped with N/A  Primary Dressing:  Offloading dressing  Secondary (Outer) Dressing: C-Collar     Wound:  Left Posterior Upper Neck/Back POA sDTI  Preparation for Dressing removal: Removed without difficulty  Cleansed/Non-selectively Debrided with:  N/A  Ana wound: Cleansed with N/A, Prepped with N/A  Primary Dressing:  Offloading dressing  Secondary (Outer) Dressing: C-Collar    Wound:  Left Posterior Mid Back/Flank POA sDTI  Preparation for Dressing removal: Removed without difficulty  Cleansed/Non-selectively Debrided with:  N/A  Ana wound: Cleansed with N/A, Prepped with N/A  Primary Dressing:  Offloading dressing    Wound:  Sacrococcygeal area POA sDTI  Preparation for Dressing removal: Removed without difficulty  Cleansed/Non-selectively  Debrided with:  N/A  Ana wound: Cleansed with N/A, Prepped with N/A  Primary Dressing:  Offloading dressing  Secondary (Outer) Dressing: pillows for turns    Wound:  Bilateral Heels POA sDTI  Preparation for Dressing removal: Removed without difficulty  Cleansed/Non-selectively Debrided with:  N/A  Ana wound: Cleansed with N/A, Prepped with N/A  Primary Dressing:  moon boots  Secondary (Outer) Dressing: pillows     Pt Chin, back intact, bilateral hips intact, pt did have discoloration to posterior lower leg and small partial thickness wound to scrotum. Barrier paste to be utilized to this area.    Advanced Wound Care Discharge Planning  Number of Clinicians necessary to complete wound care: 2  Is patient requiring IV pain medications for dressing changes:  No   Length of time for dressing change 30 min. (This does not include chart review, pre-medication time, set up, clean up or time spent charting.)    Interdisciplinary consultation: Patient, Bedside RN (Cindy), Holly TINEO (Wound RN).  Pressure injury and staging reviewed with Holly TINEO (Wound RN).    EVALUATION / RATIONALE FOR TREATMENT:     Date:  03/10/24  Wound Status:  Initial evaluation    Pt with several POA sDTIs related to decreased mobility at home as well as C-Collar use. Pt does have waffle overlay at home, encouraged wife to take moon boots and TAPs with her at time of DC and ensure pt is padded with proper offloading dressings.          Goals: Steady decrease in wound area and depth weekly.    NURSING PLAN OF CARE ORDERS:  RN Prevention Protocol    NUTRITION RECOMMENDATIONS   Wound Team Recommendations:  N/A    DIET ORDERS (From admission to next 24h)       Start     Ordered    03/09/24 1425  Diet Order Diet: Regular  ALL MEALS        Question:  Diet:  Answer:  Regular    03/09/24 1427                    PREVENTATIVE INTERVENTIONS:    Q shift Sachin - performed per nursing policy  Q shift pressure point assessments - performed per nursing  policy    Surface/Positioning  Low Airloss - Currently in Place  Reposition q 2 hours - Currently in Place  TAPs Turning system - Ordered  Waffle overlay  - Removed  Z Rowena Pillow - Ordered    Offloading/Redistribution  Sacral offloading dressing (Silicone dressing) - Applied this Visit  Heel offloading dressing (Silicone dressing) - Removed  Heel float boots (Prevalon boot) - Currently in Place      Respiratory  N/A    Containment/Moisture Prevention    Dri-rowena pad - Currently in Place    Anticipated discharge plans:  TBD and Self/Family Care        Vac Discharge Needs:  Vac Discharge plan is purely a recommendation from wound team and not a requirement for discharge unless otherwise stated by physician.  Not Applicable Pt not on a wound vac

## 2024-03-10 NOTE — PROGRESS NOTES
[facility] Medicine Daily Progress Note    Date of Service  3/10/2024    Chief Complaint  Andrew Wall is a 58 y.o. male admitted 3/9/2024 with fever, altered mental status    Hospital Course  Andrew Wall is a 58 y.o. male who presented 3/9/2024 with history of metastatic melanoma BRAF V6 00 E+ with metastases to bone and lymph nodes.  Patient has been maintained on MS Contin for chronic metastatic bone pain.  He was recently in the hospital admitted on March 5, and discharged 2 days prior to presentation again.  He was here with pain following some trauma, and was found to have a C2 vertebral fracture as well as pathologic fractures of C6 and C7     Family brought the patient back, as they noticed that his mental status was worsening again.  He reports that he will oftentimes have hallucinations, and not be totally aware where he is.  These episodes wax and wane.  He was discharged on scheduled MS Contin, gabapentin, Robaxin and in addition he takes hydroxyzine for anxiety, and Megace for appetite stimulant.  He has been taking his medications as prescribed.  Wife notes that he had a fever this morning as well which was 100.3.  And therefore brought him to the emergency room.  Here he has been found to be hypotensive with systolics generally in the 80s, wife notes that he normally runs around 100 110 systolic.  He continues to be leukopenic which is chronic for him    Interval Problem Update  Pt states he's feeling better today.  Had a quiet night, no F/C.  Some pain in his neck but it's the pain he's used to, nothing new.  No sore throat, diarrhea, abd/CP, rash, dysuria, oral pain    DW pt's wife and daughter at bedside x 20mins    AFebrile  Sinus   SBP   UOP 800ml/24hrs    I have discussed this patient's plan of care and discharge plan at IDT rounds today with Case Management, Nursing, Nursing leadership, and other members of the IDT team.    Consultants/Specialty      Code  Status  DNAR/DNI    Disposition  The patient is not medically cleared for discharge to home or a post-acute facility.      I have placed the appropriate orders for post-discharge needs.    Review of Systems  Review of Systems   Constitutional:  Positive for malaise/fatigue and weight loss. Negative for chills and fever.   Respiratory:  Negative for cough and shortness of breath.    Cardiovascular:  Negative for chest pain, palpitations and leg swelling.   Gastrointestinal:  Negative for abdominal pain, diarrhea, nausea and vomiting.   Genitourinary:  Negative for dysuria and urgency.   Musculoskeletal:  Positive for neck pain. Negative for back pain.   Skin:  Negative for rash.   Neurological:  Positive for weakness. Negative for dizziness, loss of consciousness and headaches.        Physical Exam  Temp:  [36.3 °C (97.3 °F)-37.3 °C (99.2 °F)] 36.3 °C (97.3 °F)  Pulse:  [] 107  Resp:  [10-30] 16  BP: ()/(49-73) 104/68  SpO2:  [90 %-99 %] 98 %    Physical Exam  Constitutional:       General: He is not in acute distress.     Appearance: He is well-developed. He is not diaphoretic.   HENT:      Head: Normocephalic and atraumatic.   Eyes:      Conjunctiva/sclera: Conjunctivae normal.   Neck:      Vascular: No JVD.   Cardiovascular:      Rate and Rhythm: Normal rate.      Heart sounds: No murmur heard.     No gallop.   Pulmonary:      Effort: Pulmonary effort is normal. No respiratory distress.      Breath sounds: No stridor. No wheezing or rales.   Abdominal:      Palpations: Abdomen is soft.      Tenderness: There is no abdominal tenderness. There is no guarding or rebound.   Musculoskeletal:      Right lower leg: Edema present.      Left lower leg: Edema present.   Skin:     General: Skin is warm and dry.      Capillary Refill: Capillary refill takes less than 2 seconds.      Coloration: Skin is pale.      Findings: No rash.   Neurological:      Mental Status: He is oriented to person, place, and time.    Psychiatric:         Mood and Affect: Mood normal.         Behavior: Behavior normal.         Thought Content: Thought content normal.         Fluids    Intake/Output Summary (Last 24 hours) at 3/10/2024 0630  Last data filed at 3/10/2024 0100  Gross per 24 hour   Intake 1950 ml   Output 600 ml   Net 1350 ml       Laboratory  Recent Labs     03/09/24  1037 03/10/24  0515   WBC 3.9* 3.5*   RBC 3.03* 2.94*   HEMOGLOBIN 7.7* 7.6*   HEMATOCRIT 25.2* 24.9*   MCV 83.2 84.7   MCH 25.4* 25.9*   MCHC 30.6* 30.5*   RDW 56.3* 57.6*   PLATELETCT 248 248   MPV 9.2 9.2     Recent Labs     03/09/24  1037 03/10/24  0515   SODIUM 130* 132*   POTASSIUM 3.7 3.4*   CHLORIDE 98 101   CO2 23 21   GLUCOSE 102* 102*   BUN 11 6*   CREATININE 0.23* 0.18*   CALCIUM 6.9* 6.9*                   Imaging  DX-CHEST-PORTABLE (1 VIEW)   Final Result      No evidence of acute cardiopulmonary process.      US-EXTREMITY VENOUS LOWER UNILAT RIGHT   Final Result      MR-BRAIN-WITH & W/O    (Results Pending)        Assessment/Plan  * Sepsis (HCC)- (present on admission)  Assessment & Plan  Sepsis vs dehydration/reaction to chemo  Present on admission  Signs include fever and tachycardia as well as hypotension  Cultures neg thus far  CXR clear   UA clear  Procal 0.23  Empiric coverage with Rocephin  Cont empiric Abx's x 24hrs       Delirium- (present on admission)  Assessment & Plan  Patient presents with hallucinations and confusion which has been going on for about a week  He is currently on multiple medications which could be involved, as well there is a possibility metastatic disease to his brain.  CT scan last week was negative however he has not had an MRI in 3 months.  In addition the patient is febrile which raises the possibility of infection.  Admit to the hospital  Follow blood and urine cultures  Start empiric Rocephin  MRI of the brain  Change Robaxin from scheduled as needed but otherwise continues pain medications.  Follow serial lab and  exam    3/10  Pt alert and oriented this am  AFebrile  Cultures neg thus far  Clinically no evidence of infection on exam or ROS today.  Cont rocephin through tomorrow if pt remains stable and cultures neg  MRI pending    Cancer related pain- (present on admission)  Assessment & Plan  Patient is on morphine 60 mg continuous release, gabapentin, and scheduled Robaxin.  He has breakthrough oxycodone when needed but has not been needing it at home.  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 Ross  DW Dr Ross: hold as they may be the etiology of his fever         VTE prophylaxis:    enoxaparin ppx      I have performed a physical exam and reviewed and updated ROS and Plan today (3/10/2024). In review of yesterday's note (3/9/2024), there are no changes except as documented above.

## 2024-03-10 NOTE — PROGRESS NOTES
4 Eyes Skin Assessment Completed by JODY Mishra and JODY Ayala.    Head WDL  Ears WDL  Nose WDL  Mouth WDL  Neck Non blanching redness on the back of neck and head, under C-collar    Breast/Chest WDL  Shoulder Blades Non blanching right shoulder spot  Spine Non- blanching redness at the end of C-collar, and non blanching red line on (L) back            (R) Arm/Elbow/Hand: Redness/blanching elbows, bruising on the arm/ scab, small red area on (R) AC              (L) Arm/Elbow/Hand: Redness/blanching elbows, bruising and skin tear on arm, non blanching red spot in index finger          Abdomen WDL  Groin: Red blanching spot, scar       Scrotum: Small skin tear/ open area/ redness    Coccyx/Buttocks: Some blanching and some non blanching area, excoriation, moisture fissure, small purple discoloration/ open sore          (R) Leg Swelling/dry/flaky  (L) Leg Swelling/dry/flaky, small bruising on popliteal area  (R) Heel/Foot/Toe: One area Non blanching redness/DTI, other area is red/blanching        (L) Heel/Foot/Toe : Small area of Non blanching redness/DTI, redness/blanching area around DTI, redness/blanching on anterior foot                Devices In Places ECG, Blood Pressure Cuff, Pulse Ox, and Cervical Collar      Interventions In Place:   Heel Mepilex and heel float boots in place  Waffle overlay  Barrier paste applied to sacrum/coccyx and sacral mepilex applied on top  Q2H turns using TAP system and wedges  Mepitel skin dressing applied to skin tear  Low air loss/pressure redistribution mattress    Possible Skin Injury Yes    Pictures Uploaded Into Epic Yes  Wound Consult Placed: Wound consult modified accordingly  RN Wound Prevention Protocol Ordered Yes

## 2024-03-10 NOTE — CARE PLAN
The patient is Watcher - Medium risk of patient condition declining or worsening    Shift Goals  Clinical Goals: hemodynamic stability, pain management  Patient Goals: rest, pain management  Family Goals: updates    Progress made toward(s) clinical / shift goals:       Problem: Knowledge Deficit - Standard  Goal: Patient and family/care givers will demonstrate understanding of plan of care, disease process/condition, diagnostic tests and medications  Outcome: Progressing     Problem: Hemodynamics  Goal: Patient's hemodynamics, fluid balance and neurologic status will be stable or improve  Outcome: Progressing     Problem: Pain - Standard  Goal: Alleviation of pain or a reduction in pain to the patient’s comfort goal  Outcome: Progressing     Problem: Skin Integrity  Goal: Skin integrity is maintained or improved  Outcome: Progressing     Problem: Fall Risk  Goal: Patient will remain free from falls  Outcome: Progressing

## 2024-03-10 NOTE — CONSULTS
DATE OF SERVICE:  03/10/2024     CONSULTATION CALLED BY:  Dae Rosado M.D.     REASON FOR CONSULTATION:  1.  Metastatic melanoma, BRAF positive.  2.  Altered mental status, possibly due to pain medications.  ?fever.     HISTORY OF PRESENT ILLNESS:  The patient is a very pleasant gentleman under my   care for metastatic melanoma, which is BRAF positive.  He was recently   started on Braftovi and Mektovi orally.  Scans done during his last   hospitalization last week showed excellent response to this therapy.  However,   he has had multiple pathological fracture including a pathological fracture   to his cervical vertebral after a fall.  The patient was sent home after that   appointment with pain medications; however, he became obtunded and more   lethargic due to which he was brought back to the ER.  Pain is well controlled   at this time.  Wife is at the bedside.     PAST MEDICAL HISTORY: Reviewed.  No change.     PERSONAL AND SOCIAL HISTORY:  Reviewed.  No change.     REVIEW OF SYSTEMS:  GENERAL AND CONSTITUTIONAL:  Less fatigued.  Oral intake is excellent.  No   fever.  HEAD AND NECK, EAR, NOSE AND THROAT:  Denies any headaches or any visual   symptoms.  MUSCULOSKELETAL:  Complains of back and neck pain.  Slightly better than   before.  NEUROLOGICAL:  Denies any seizures or any stroke.  PSYCHIATRIC:  Anxious, but denies any depression.     PHYSICAL EXAMINATION:  GENERAL:  The patient is alert and oriented x3, lying comfortably in the bed.  VITAL SIGNS:  Stable.  HEENT:  Pupils are equal.  Oral mucosa reveals no thrush or oropharynx.  No   mucositis.  NECK:  Neck is in a collar and movements could not be checked.  ABDOMEN:  Unremarkable with no hepatosplenomegaly.  LUNGS:  Clear to auscultation.  HEART:  Reveals no S3, S4.  EXTREMITIES:  There is no edema of lower extremities.     LABORATORY DATA: Reviewed.     RADIOLOGICAL STUDIES:  Reviewed.     ASSESSMENT AND PLAN:  1.  Malignant melanoma.  The  patient has a malignant melanoma.  I had stopped   his Braftovi and Mektovi because of slight elevation of his temperature in the   ER.  If he has not had any fevers, we will resume his treatment tomorrow.  I   explained to the patient and his wife that in case he develops any fever, he   is to let me know immediately and stop taking Braftovi and Mektovi.  At this   time, it is not clear if the temperature elevation was caused by these drugs   or was reactive.  Currently, there is no evidence of any ongoing fevers as   well.  2.  Neck pain.  This is better.  He will continue to follow up with palliative   care.  Confusion, we will be getting an MRI of the brain.  Last CT of the   brain did not show any metastatic disease.  I will sign off the case,   reconsult p.r.n.        ______________________________  EusebiojMD RUTH Nuñez/NAHID/ROMEL    DD:  03/10/2024 13:54  DT:  03/10/2024 15:01    Job#:  [id number]

## 2024-03-10 NOTE — PROGRESS NOTES
Pt up from red 12 and now in T-601. POC and unit routine discussed.  Call light within reach. Bed alarm on.      Multiple skin wound ds noted to heels, sacrum, shoulder blades, and back. Pictures taken and mepilex's placed.     Wound consult ordered.

## 2024-03-11 ENCOUNTER — TELEPHONE (OUTPATIENT)
Dept: HEMATOLOGY ONCOLOGY | Facility: MEDICAL CENTER | Age: 59
End: 2024-03-11
Payer: COMMERCIAL

## 2024-03-11 ENCOUNTER — APPOINTMENT (OUTPATIENT)
Dept: RADIOLOGY | Facility: MEDICAL CENTER | Age: 59
DRG: 092 | End: 2024-03-11
Attending: STUDENT IN AN ORGANIZED HEALTH CARE EDUCATION/TRAINING PROGRAM
Payer: COMMERCIAL

## 2024-03-11 PROBLEM — A41.9 SEPSIS (HCC): Status: RESOLVED | Noted: 2024-03-09 | Resolved: 2024-03-11

## 2024-03-11 PROBLEM — G93.40 ACUTE ENCEPHALOPATHY: Status: ACTIVE | Noted: 2024-03-06

## 2024-03-11 LAB
ANION GAP SERPL CALC-SCNC: 11 MMOL/L (ref 7–16)
BACTERIA UR CULT: NORMAL
BUN SERPL-MCNC: 4 MG/DL (ref 8–22)
CALCIUM SERPL-MCNC: 6.9 MG/DL (ref 8.5–10.5)
CHLORIDE SERPL-SCNC: 102 MMOL/L (ref 96–112)
CO2 SERPL-SCNC: 20 MMOL/L (ref 20–33)
CREAT SERPL-MCNC: 0.2 MG/DL (ref 0.5–1.4)
ERYTHROCYTE [DISTWIDTH] IN BLOOD BY AUTOMATED COUNT: 57 FL (ref 35.9–50)
GFR SERPLBLD CREATININE-BSD FMLA CKD-EPI: 154 ML/MIN/1.73 M 2
GLUCOSE SERPL-MCNC: 102 MG/DL (ref 65–99)
HCT VFR BLD AUTO: 23.3 % (ref 42–52)
HCT VFR BLD AUTO: 25.4 % (ref 42–52)
HGB BLD-MCNC: 7.1 G/DL (ref 14–18)
HGB BLD-MCNC: 7.7 G/DL (ref 14–18)
MCH RBC QN AUTO: 25.3 PG (ref 27–33)
MCHC RBC AUTO-ENTMCNC: 30.5 G/DL (ref 32.3–36.5)
MCV RBC AUTO: 82.9 FL (ref 81.4–97.8)
PLATELET # BLD AUTO: 257 K/UL (ref 164–446)
PMV BLD AUTO: 9.1 FL (ref 9–12.9)
POTASSIUM SERPL-SCNC: 3.9 MMOL/L (ref 3.6–5.5)
RBC # BLD AUTO: 2.81 M/UL (ref 4.7–6.1)
SIGNIFICANT IND 70042: NORMAL
SITE SITE: NORMAL
SODIUM SERPL-SCNC: 133 MMOL/L (ref 135–145)
SOURCE SOURCE: NORMAL
WBC # BLD AUTO: 3.9 K/UL (ref 4.8–10.8)

## 2024-03-11 PROCEDURE — 700102 HCHG RX REV CODE 250 W/ 637 OVERRIDE(OP): Performed by: STUDENT IN AN ORGANIZED HEALTH CARE EDUCATION/TRAINING PROGRAM

## 2024-03-11 PROCEDURE — A9270 NON-COVERED ITEM OR SERVICE: HCPCS | Performed by: STUDENT IN AN ORGANIZED HEALTH CARE EDUCATION/TRAINING PROGRAM

## 2024-03-11 PROCEDURE — A9270 NON-COVERED ITEM OR SERVICE: HCPCS | Performed by: HOSPITALIST

## 2024-03-11 PROCEDURE — 85014 HEMATOCRIT: CPT

## 2024-03-11 PROCEDURE — 700101 HCHG RX REV CODE 250: Performed by: HOSPITALIST

## 2024-03-11 PROCEDURE — 36415 COLL VENOUS BLD VENIPUNCTURE: CPT

## 2024-03-11 PROCEDURE — A9579 GAD-BASE MR CONTRAST NOS,1ML: HCPCS | Performed by: STUDENT IN AN ORGANIZED HEALTH CARE EDUCATION/TRAINING PROGRAM

## 2024-03-11 PROCEDURE — 700102 HCHG RX REV CODE 250 W/ 637 OVERRIDE(OP): Performed by: HOSPITALIST

## 2024-03-11 PROCEDURE — 700111 HCHG RX REV CODE 636 W/ 250 OVERRIDE (IP): Mod: JZ | Performed by: HOSPITALIST

## 2024-03-11 PROCEDURE — 85027 COMPLETE CBC AUTOMATED: CPT

## 2024-03-11 PROCEDURE — 700117 HCHG RX CONTRAST REV CODE 255: Performed by: STUDENT IN AN ORGANIZED HEALTH CARE EDUCATION/TRAINING PROGRAM

## 2024-03-11 PROCEDURE — 85018 HEMOGLOBIN: CPT

## 2024-03-11 PROCEDURE — 700102 HCHG RX REV CODE 250 W/ 637 OVERRIDE(OP): Performed by: INTERNAL MEDICINE

## 2024-03-11 PROCEDURE — 97166 OT EVAL MOD COMPLEX 45 MIN: CPT

## 2024-03-11 PROCEDURE — 97162 PT EVAL MOD COMPLEX 30 MIN: CPT

## 2024-03-11 PROCEDURE — 80048 BASIC METABOLIC PNL TOTAL CA: CPT

## 2024-03-11 PROCEDURE — 770004 HCHG ROOM/CARE - ONCOLOGY PRIVATE *

## 2024-03-11 PROCEDURE — 99233 SBSQ HOSP IP/OBS HIGH 50: CPT | Performed by: STUDENT IN AN ORGANIZED HEALTH CARE EDUCATION/TRAINING PROGRAM

## 2024-03-11 PROCEDURE — 700111 HCHG RX REV CODE 636 W/ 250 OVERRIDE (IP): Mod: JZ | Performed by: STUDENT IN AN ORGANIZED HEALTH CARE EDUCATION/TRAINING PROGRAM

## 2024-03-11 PROCEDURE — 97535 SELF CARE MNGMENT TRAINING: CPT

## 2024-03-11 PROCEDURE — 70553 MRI BRAIN STEM W/O & W/DYE: CPT

## 2024-03-11 RX ORDER — POLYETHYLENE GLYCOL 3350 17 G/17G
1 POWDER, FOR SOLUTION ORAL DAILY
Status: DISCONTINUED | OUTPATIENT
Start: 2024-03-11 | End: 2024-03-12 | Stop reason: HOSPADM

## 2024-03-11 RX ORDER — POLYETHYLENE GLYCOL 3350 17 G/17G
1 POWDER, FOR SOLUTION ORAL
Status: DISCONTINUED | OUTPATIENT
Start: 2024-03-11 | End: 2024-03-12 | Stop reason: HOSPADM

## 2024-03-11 RX ORDER — BISACODYL 5 MG
5 TABLET, DELAYED RELEASE (ENTERIC COATED) ORAL
Status: DISCONTINUED | OUTPATIENT
Start: 2024-03-11 | End: 2024-03-12 | Stop reason: HOSPADM

## 2024-03-11 RX ORDER — SENNOSIDES A AND B 8.6 MG/1
17.2 TABLET, FILM COATED ORAL 2 TIMES DAILY
Status: DISCONTINUED | OUTPATIENT
Start: 2024-03-11 | End: 2024-03-12 | Stop reason: HOSPADM

## 2024-03-11 RX ORDER — ONDANSETRON 2 MG/ML
4 INJECTION INTRAMUSCULAR; INTRAVENOUS EVERY 4 HOURS PRN
Status: DISCONTINUED | OUTPATIENT
Start: 2024-03-11 | End: 2024-03-12 | Stop reason: HOSPADM

## 2024-03-11 RX ADMIN — ENCORAFENIB 450 MG: 75 CAPSULE ORAL at 09:52

## 2024-03-11 RX ADMIN — GADOTERIDOL 14 ML: 279.3 INJECTION, SOLUTION INTRAVENOUS at 14:13

## 2024-03-11 RX ADMIN — GABAPENTIN 100 MG: 100 CAPSULE ORAL at 20:15

## 2024-03-11 RX ADMIN — BINIMETINIB 45 MG: 15 TABLET, FILM COATED ORAL at 17:14

## 2024-03-11 RX ADMIN — POLYETHYLENE GLYCOL 3350 1 PACKET: 17 POWDER, FOR SOLUTION ORAL at 09:57

## 2024-03-11 RX ADMIN — ENOXAPARIN SODIUM 40 MG: 100 INJECTION SUBCUTANEOUS at 17:14

## 2024-03-11 RX ADMIN — OXYCODONE HYDROCHLORIDE 10 MG: 10 TABLET ORAL at 00:29

## 2024-03-11 RX ADMIN — MORPHINE SULFATE 60 MG: 60 TABLET, FILM COATED, EXTENDED RELEASE ORAL at 17:13

## 2024-03-11 RX ADMIN — ONDANSETRON 4 MG: 2 INJECTION INTRAMUSCULAR; INTRAVENOUS at 14:50

## 2024-03-11 RX ADMIN — MORPHINE SULFATE 60 MG: 60 TABLET, FILM COATED, EXTENDED RELEASE ORAL at 04:17

## 2024-03-11 RX ADMIN — MOMETASONE FUROATE AND FORMOTEROL FUMARATE DIHYDRATE 2 PUFF: 200; 5 AEROSOL RESPIRATORY (INHALATION) at 17:14

## 2024-03-11 RX ADMIN — ONDANSETRON 4 MG: 2 INJECTION INTRAMUSCULAR; INTRAVENOUS at 20:23

## 2024-03-11 RX ADMIN — GABAPENTIN 100 MG: 100 CAPSULE ORAL at 08:18

## 2024-03-11 RX ADMIN — SENNOSIDES 17.2 MG: 8.6 TABLET, FILM COATED ORAL at 09:58

## 2024-03-11 RX ADMIN — METHOCARBAMOL 1000 MG: 500 TABLET ORAL at 04:17

## 2024-03-11 RX ADMIN — BINIMETINIB 45 MG: 15 TABLET, FILM COATED ORAL at 09:53

## 2024-03-11 RX ADMIN — SENNOSIDES 17.2 MG: 8.6 TABLET, FILM COATED ORAL at 17:13

## 2024-03-11 RX ADMIN — FAMOTIDINE 20 MG: 20 TABLET, FILM COATED ORAL at 04:17

## 2024-03-11 RX ADMIN — MEGESTROL ACETATE 200 MG: 40 SUSPENSION ORAL at 04:32

## 2024-03-11 ASSESSMENT — COGNITIVE AND FUNCTIONAL STATUS - GENERAL
SUGGESTED CMS G CODE MODIFIER DAILY ACTIVITY: CK
SUGGESTED CMS G CODE MODIFIER DAILY ACTIVITY: CK
MOVING FROM LYING ON BACK TO SITTING ON SIDE OF FLAT BED: A LITTLE
DAILY ACTIVITIY SCORE: 15
TURNING FROM BACK TO SIDE WHILE IN FLAT BAD: A LITTLE
TURNING FROM BACK TO SIDE WHILE IN FLAT BAD: A LITTLE
MOVING FROM LYING ON BACK TO SITTING ON SIDE OF FLAT BED: A LITTLE
PERSONAL GROOMING: A LITTLE
DRESSING REGULAR UPPER BODY CLOTHING: A LITTLE
DRESSING REGULAR LOWER BODY CLOTHING: A LITTLE
DRESSING REGULAR UPPER BODY CLOTHING: A LITTLE
WALKING IN HOSPITAL ROOM: A LITTLE
TOILETING: A LITTLE
WALKING IN HOSPITAL ROOM: A LOT
EATING MEALS: A LITTLE
MOBILITY SCORE: 18
PERSONAL GROOMING: A LITTLE
CLIMB 3 TO 5 STEPS WITH RAILING: A LOT
CLIMB 3 TO 5 STEPS WITH RAILING: A LITTLE
DRESSING REGULAR LOWER BODY CLOTHING: A LOT
MOBILITY SCORE: 14
DAILY ACTIVITIY SCORE: 18
SUGGESTED CMS G CODE MODIFIER MOBILITY: CL
TOILETING: A LOT
MOVING TO AND FROM BED TO CHAIR: A LOT
STANDING UP FROM CHAIR USING ARMS: A LOT
STANDING UP FROM CHAIR USING ARMS: A LITTLE
EATING MEALS: A LITTLE
SUGGESTED CMS G CODE MODIFIER MOBILITY: CK
HELP NEEDED FOR BATHING: A LITTLE
HELP NEEDED FOR BATHING: A LOT
MOVING TO AND FROM BED TO CHAIR: A LITTLE

## 2024-03-11 ASSESSMENT — ENCOUNTER SYMPTOMS
HEADACHES: 0
LOSS OF CONSCIOUSNESS: 0
PALPITATIONS: 0
DIZZINESS: 0
NAUSEA: 0
SHORTNESS OF BREATH: 0
FEVER: 0
DIARRHEA: 0
WEAKNESS: 1
BACK PAIN: 0
CHILLS: 0
NECK PAIN: 1
COUGH: 0
ABDOMINAL PAIN: 0
WEIGHT LOSS: 1
VOMITING: 0

## 2024-03-11 ASSESSMENT — GAIT ASSESSMENTS
ASSISTIVE DEVICE: FRONT WHEEL WALKER
DISTANCE (FEET): 200
DEVIATION: BRADYKINETIC;DECREASED HEEL STRIKE;DECREASED TOE OFF
GAIT LEVEL OF ASSIST: STANDBY ASSIST

## 2024-03-11 ASSESSMENT — ACTIVITIES OF DAILY LIVING (ADL): TOILETING: REQUIRES ASSIST

## 2024-03-11 NOTE — DISCHARGE PLANNING
Care Transition Team Assessment    Patient is a readmit. Patient was previously discharged on 3/7/24 with [facility]. Patient lives with his wife. Patient has a hospital bed and wheel chair at home. Patient's wife is good support for him. The patient's PCP is Dr. Jose Luis Juarez. Patient's preferred pharmacy is [facility].     Patient was discussed in IDT rounds. Patient is pending a brain MRI. PT informed this RNCM they are recommending HH upon discharge. RNCM informed MD for HH order. RNCM obtained HH choice for Bernie for resumption of care.    Information Source  Orientation Level: Oriented X4  Who is responsible for making decisions for patient? : Patient    Readmission Evaluation  Is this a readmission?: Yes - unplanned readmission    Elopement Risk  Legal Hold: No  Ambulatory or Self Mobile in Wheelchair: No-Not an Elopement Risk  Elopement Risk: Not at Risk for Elopement    Discharge Preparedness  What is your plan after discharge?: Home health care  What are your discharge supports?: Spouse  Prior Functional Level: Needs Assist with Activities of Daily Living, Needs Assist with Medication Management, Uses Wheelchair    Functional Assesment  Prior Functional Level: Needs Assist with Activities of Daily Living, Needs Assist with Medication Management, Uses Wheelchair    Finances  Financial Barriers to Discharge: No    Advance Directive  Advance Directive?: DPOA for Health Care  Durable Power of  Name and Contact : ashanti Wall [phone]    Domestic Abuse  Have you ever been the victim of abuse or violence?: No    Psychological Assessment  History of Substance Abuse: None  History of Psychiatric Problems: No  Non-compliant with Treatment: No    Discharge Risks or Barriers  Discharge risks or barriers?: Complex medical needs  Patient risk factors: Complex medical needs    Anticipated Discharge Information  Discharge Disposition: D/T to home under HHA care in anticipation of covered skilled care  (06)

## 2024-03-11 NOTE — DOCUMENTATION QUERY
[facility]                                                                       Query Response Note      PATIENT:               CARLOS HEREDIA  ACCT #:                  [id number]  MRN:                     [id number]  :                      1965  ADMIT DATE:       3/5/2024 7:34 AM  DISCH DATE:        3/7/2024 6:18 PM  RESPONDING  PROVIDER #:        [id number]           QUERY TEXT:    Malnutrition is documented in the Medical Record.  Please specify the severity.      The patient's Clinical Indicators include:  Findings: 3/6 Dietary: BMI 19.8  meets ASPEN criteria for severe malnutrition in the context of chronic illness related to metastatic melanoma AEB 16% weight loss x 4 mo per family report and PO intake <50% of all meals x 4 mo per family report. weight in November was 170 lbs, this is a 16% weight loss x 4 months which is clinically significant.  Wife reports that pt is taking in <1000 kcals per day which is <50% of nutritional needs (recommended calorie range is minimum of 2000 kcals).       Treatment: Dietary consult oral supplementation ensure    Risk Factors: malignant melanoma, bony metastasis cancer pain     ThankyouLety RN BSN  Clinical Documentation   [email or web address]  Connect via Voalte Messenger  Options provided:   -- Severe protein calorie malnutrition   -- Mild protein calorie malnutrition   -- Moderate protein calorie malnutrition   -- Other explanation, (please specify other explanation)      Query created by: Lety Grullon on 3/7/2024 7:08 AM    RESPONSE TEXT:    Severe protein calorie malnutrition          Electronically signed by:  LISSETH DENT MD 3/11/2024 12:28 PM

## 2024-03-11 NOTE — CARE PLAN
The patient is Stable - Low risk of patient condition declining or worsening    Shift Goals  Clinical Goals: MRI, patient safety  Patient Goals: Pain control, sleep      Progress made toward(s) clinical / shift goals:     Problem: Knowledge Deficit - Standard  Goal: Patient and family/care givers will demonstrate understanding of plan of care, disease process/condition, diagnostic tests and medications  Outcome: Progressing     Problem: Hemodynamics  Goal: Patient's hemodynamics, fluid balance and neurologic status will be stable or improve  Outcome: Progressing     Problem: Pain - Standard  Goal: Alleviation of pain or a reduction in pain to the patient’s comfort goal  Outcome: Progressing     Problem: Skin Integrity  Goal: Skin integrity is maintained or improved  Outcome: Progressing     Problem: Fall Risk  Goal: Patient will remain free from falls  Outcome: Progressing

## 2024-03-11 NOTE — CARE PLAN
The patient is Stable - Low risk of patient condition declining or worsening    Shift Goals  Clinical Goals: Brain MRI, pain control  Patient Goals: Rest  Family Goals: comfort, updates    Progress made toward(s) clinical / shift goals:        Problem: Knowledge Deficit - Standard  Goal: Patient and family/care givers will demonstrate understanding of plan of care, disease process/condition, diagnostic tests and medications  Outcome: Progressing  Note: Discussed POC with pt and family, pending brain MRI, pt understands and agrees.      Problem: Fall Risk  Goal: Patient will remain free from falls  Outcome: Progressing  Note: Pt is A&Ox4, up x1 assist with fww, calls appropriately for assistance, family at bedside.

## 2024-03-11 NOTE — PROGRESS NOTES
4 Eyes Skin Assessment Completed by JODY Randall and JODY Munguia.    Head Non-Blanching  Ears WDL  Nose WDL  Mouth WDL  Neck Non-Blanching  Breast/Chest WDL  Shoulder Blades WDL  Spine Non-Blanching  (R) Arm/Elbow/Hand WDL  (L) Arm/Elbow/Hand WDL  Abdomen WDL  Groin Blanching  Scrotum/Coccyx/Buttocks Non-Blanching and Discoloration  (R) Leg WDL  (L) Leg WDL  (R) Heel/Foot/Toe Non-Blanching  (L) Heel/Foot/Toe Non-Blanching          Devices In Places SCD's and Cervical Collar      Interventions In Place Heel Mepilex, Sacral Mepilex, Heel Float Boots, Pillows, Elbow Mepilex, and Low Air Loss Mattress    Possible Skin Injury Yes    Pictures Uploaded Into Epic Yes  Wound Consult Placed Yes  RN Wound Prevention Protocol Ordered No

## 2024-03-11 NOTE — PROGRESS NOTES
Hospital Medicine Daily Progress Note    Date of Service  3/11/2024    Chief Complaint  Andrew Wall is a 58 y.o. male admitted 3/9/2024 with fever, altered mental status    Hospital Course  Andrew Wall is a 58 y.o. male who presented 3/9/2024 with history of metastatic melanoma BRAF V6 00 E+ with metastases to bone and lymph nodes.  Patient has been maintained on MS Contin for chronic metastatic bone pain.  He was recently in the hospital admitted on March 5, and discharged 2 days prior to presentation again.  He was here with pain following some trauma, and was found to have a C2 vertebral fracture as well as pathologic fractures of C6 and C7     Family brought the patient back, as they noticed that his mental status was worsening again.  He reports that he will oftentimes have hallucinations, and not be totally aware where he is.  These episodes wax and wane.  He was discharged on scheduled MS Contin, gabapentin, Robaxin and in addition he takes hydroxyzine for anxiety, and Megace for appetite stimulant.  He has been taking his medications as prescribed.  Wife notes that he had a fever this morning as well which was 100.3.  And therefore brought him to the emergency room.  Here he has been found to be hypotensive with systolics generally in the 80s, wife notes that he normally runs around 100 110 systolic.  He continues to be leukopenic which is chronic for him    Interval Problem Update  Patient seen and examined at bedside  AO X4, no more fever, no more hallucination  Care discussed with the wife and daughter at bedside    Pending brain MRI    His fever is more likely due to chemo.   Oncology is ok to resume chemo today  Monitor temp, will hold the meds if develop fever again and reconsult Oncology     Ca 6.9, corrected Ca 10  Hb 10>7.6>7.1, I ordered a repeat h&h     Wbc 3.5>3.9, Proc 0.23, no signs of infection, I discontinued rocephin    No BM for 5 days, I ordered aggressive bowel management.      PT OT recommended HH, I have ordered    I have discussed this patient's plan of care and discharge plan at IDT rounds today with Case Management, Nursing, Nursing leadership, and other members of the IDT team.    Consultants/Specialty      Code Status  DNAR/DNI    Disposition  The patient is not medically cleared for discharge to home or a post-acute facility.      I have placed the appropriate orders for post-discharge needs.    Review of Systems  Review of Systems   Constitutional:  Positive for malaise/fatigue and weight loss. Negative for chills and fever.   Respiratory:  Negative for cough and shortness of breath.    Cardiovascular:  Negative for chest pain, palpitations and leg swelling.   Gastrointestinal:  Negative for abdominal pain, diarrhea, nausea and vomiting.   Genitourinary:  Negative for dysuria and urgency.   Musculoskeletal:  Positive for neck pain. Negative for back pain.   Skin:  Negative for rash.   Neurological:  Positive for weakness. Negative for dizziness, loss of consciousness and headaches.        Physical Exam  Temp:  [36.4 °C (97.5 °F)-37 °C (98.6 °F)] 36.4 °C (97.5 °F)  Pulse:  [100-117] 106  Resp:  [17-20] 20  BP: ()/(58-69) 93/63  SpO2:  [95 %-100 %] 96 %    Physical Exam  Constitutional:       General: He is not in acute distress.     Appearance: He is well-developed. He is not diaphoretic.   HENT:      Head: Normocephalic and atraumatic.   Eyes:      Conjunctiva/sclera: Conjunctivae normal.   Neck:      Vascular: No JVD.   Cardiovascular:      Rate and Rhythm: Normal rate.      Heart sounds: No murmur heard.     No gallop.   Pulmonary:      Effort: Pulmonary effort is normal. No respiratory distress.      Breath sounds: No stridor. No wheezing or rales.   Abdominal:      Palpations: Abdomen is soft.      Tenderness: There is no abdominal tenderness. There is no guarding or rebound.   Musculoskeletal:      Right lower leg: Edema present.      Left lower leg: Edema present.    Skin:     General: Skin is warm and dry.      Capillary Refill: Capillary refill takes less than 2 seconds.      Coloration: Skin is pale.      Findings: No rash.   Neurological:      Mental Status: He is oriented to person, place, and time.   Psychiatric:         Mood and Affect: Mood normal.         Behavior: Behavior normal.         Thought Content: Thought content normal.         Fluids    Intake/Output Summary (Last 24 hours) at 3/11/2024 1425  Last data filed at 3/11/2024 1037  Gross per 24 hour   Intake 360 ml   Output 0 ml   Net 360 ml       Laboratory  Recent Labs     03/09/24  1037 03/10/24  0515 03/11/24  0009   WBC 3.9* 3.5* 3.9*   RBC 3.03* 2.94* 2.81*   HEMOGLOBIN 7.7* 7.6* 7.1*   HEMATOCRIT 25.2* 24.9* 23.3*   MCV 83.2 84.7 82.9   MCH 25.4* 25.9* 25.3*   MCHC 30.6* 30.5* 30.5*   RDW 56.3* 57.6* 57.0*   PLATELETCT 248 248 257   MPV 9.2 9.2 9.1     Recent Labs     03/09/24  1037 03/10/24  0515 03/11/24  0009   SODIUM 130* 132* 133*   POTASSIUM 3.7 3.4* 3.9   CHLORIDE 98 101 102   CO2 23 21 20   GLUCOSE 102* 102* 102*   BUN 11 6* 4*   CREATININE 0.23* 0.18* 0.20*   CALCIUM 6.9* 6.9* 6.9*                   Imaging  DX-CHEST-PORTABLE (1 VIEW)   Final Result      No evidence of acute cardiopulmonary process.      US-EXTREMITY VENOUS LOWER UNILAT RIGHT   Final Result      MR-BRAIN-WITH & W/O    (Results Pending)        Assessment/Plan  * Fever- (present on admission)  Assessment & Plan  Infection workup negative so far  Likely due to chemo    3/11 No fever today  Oncology is ok to resume chemo today  Monitor temp, will hold the meds if develop fever again and reconsult Oncology   I discontinued rocephin      Acute encephalopathy- (present on admission)  Assessment & Plan  Patient presents with hallucinations and confusion which has been going on for about a week  Likely polypharmacy   CT scan last week was negative     MRI brain pending  Changed Robaxin from scheduled as needed but otherwise continues pain  medications.    3/11 AO X4, no more fever, no more hallucination      Cancer related pain- (present on admission)  Assessment & Plan  Patient is on morphine 60 mg continuous release, gabapentin, and scheduled Robaxin.  He has breakthrough oxycodone when needed but has not been needing it at home.  Robaxin changed to PRN on admission: has not needed it so will DC    Constipation- (present on admission)  Assessment & Plan  No BM for 5 days, I ordered aggressive bowel management.   monitor    Acute anemia  Assessment & Plan  Hb drop to 7.1  No active bleeding sigh. 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 Ross  DW Dr Ross: hold as they may be the etiology of his fever         VTE prophylaxis: lovenox    I have performed a physical exam and reviewed and updated ROS and Plan today (3/11/2024). In review of yesterday's note (3/10/2024), there are no changes except as documented above.    I spent greater than 52 minutes for chart review, obtaining history independently, performing medically appropriate examination,  documenting , ordering medications, tests, or procedures, referring and communicating with other health care professionals, Independently interpreting results and communicating results with patient/family/caregiver. More than 50% of time was spent in face-to-face clinical encounter.

## 2024-03-11 NOTE — ASSESSMENT & PLAN NOTE
Hb drop to 7.1  No active bleeding sigh. Likely due to underling malignacy    I ordered h&h  Transfuse if Hb>7

## 2024-03-11 NOTE — THERAPY
"Physical Therapy   Initial Evaluation     Patient Name: Andrew Wall  Age:  58 y.o., Sex:  male  Medical Record #: [id number]  Today's Date: 3/11/2024     Precautions  Precautions: Fall Risk;Weight Bearing As Tolerated Right Lower Extremity;Weight Bearing As Tolerated Left Lower Extremity  Comments: Collar AAT, L humerus fx with NWB except FWW    Assessment  Patient is a 58 y.o. male with a complex recent medical history, who was re-admitted with confusion and fever. PMH most significant for metastatic melanoma with multiple bony metastasis, recent C2 fx with pathologic fx to C6-7.    Pt seen for PT evaluation with wife and daughter at bedside. Pt was able to demonstrate ability to mobilize to EOB, STS, transfer, and ambulate in the hallway with a FWW at a CGA to standby assist level. Given episodes of confusion and recent fall at home, discussed recommendations for return home with family and consistent SBA to decrease risk of falls. Family in agreement with this. Discussed with RN recommendation for pt to mobilize with family while admitted and to place alarms when family not present. No further acute PT needs at this time.    Patient will not be actively followed for physical therapy services at this time, however may be seen if requested by physician for 1 more visit within 30 days to address any discharge or equipment needs.     Plan    Physical Therapy Initial Treatment Plan   Duration: Discharge Needs Only    DC Equipment Recommendations: None  Discharge Recommendations: Recommend home health for continued physical therapy services     Subjective    \"I want to walk\"     Objective       03/11/24 1156   Precautions   Precautions Fall Risk;Weight Bearing As Tolerated Right Lower Extremity;Weight Bearing As Tolerated Left Lower Extremity   Comments Collar AAT, L humerus fx with NWB except FWW   Vitals   Vitals Comments Asymptomatic with position changes   Pain 0 - 10 Group   Therapist Pain Assessment Post " Activity Pain Same as Prior to Activity;Nurse Notified;0   Prior Living Situation   Prior Services Intermittent Physical Support for ADL Per Family   Housing / Facility 2 Story House   Steps Into Home 2   Steps In Home 0   Equipment Owned 4-Wheel Walker;Wheelchair   Lives with - Patient's Self Care Capacity Spouse;Adult Children   Comments Wife and daughters are able to assist if needed   Prior Level of Functional Mobility   Bed Mobility Independent   Transfer Status Independent   Ambulation Independent   Ambulation Distance household distances up to 150ft   Assistive Devices Used 4-Wheel Walker   Comments Pt receiving standby assist from family at home   History of Falls   History of Falls Yes   Date of Last Fall   (reason for admission last week)   Cognition    Level of Consciousness Alert   Ability To Follow Commands 1 Step   Comments Pleasant and cooperative, motivated to mobilize. Poor insight into current deficits, asked when he can drive without noting he is in a cervical collar AAT   Passive ROM Lower Body   Passive ROM Lower Body WDL   Active ROM Lower Body    Active ROM Lower Body  WDL   Strength Lower Body   Comments Functional for activity, NT due to pathologic pelvic fx   Sensation Lower Body   Comments R heel numbness, unchanged from prior   Lower Body Muscle Tone   Lower Body Muscle Tone  WDL   Coordination Lower Body    Coordination Lower Body  WDL   Balance Assessment   Sitting Balance (Static) Fair   Sitting Balance (Dynamic) Fair   Standing Balance (Static) Fair   Standing Balance (Dynamic) Fair -   Weight Shift Sitting Fair   Weight Shift Standing Fair   Comments FWW in standing   Bed Mobility    Supine to Sit Standby Assist   Scooting Standby Assist   Rolling Standby Assist   Comments HOB 15 deg, family reports they are able to assist at home   Gait Analysis   Gait Level Of Assist Standby Assist   Assistive Device Front Wheel Walker   Distance (Feet) 200   # of Times Distance was Traveled 1    Deviation Bradykinetic;Decreased Heel Strike;Decreased Toe Off   Comments No loss of balance, provided edu on energy conservation   Functional Mobility   Sit to Stand Standby Assist   Bed, Chair, Wheelchair Transfer Standby Assist   Mobility up with FWW   6 Clicks Assessment - How much HELP from from another person do you currently need... (If the patient hasn't done an activity recently, how much help from another person do you think he/she would need if he/she tried?)   Turning from your back to your side while in a flat bed without using bedrails? 3   Moving from lying on your back to sitting on the side of a flat bed without using bedrails? 3   Moving to and from a bed to a chair (including a wheelchair)? 3   Standing up from a chair using your arms (e.g., wheelchair, or bedside chair)? 3   Walking in hospital room? 3   Climbing 3-5 steps with a railing? 3   6 clicks Mobility Score 18   Education Group   Education Provided Role of Physical Therapist   Role of Physical Therapist Patient Response Patient;Significant Other;Acceptance;Explanation;Verbal Demonstration   Physical Therapy Initial Treatment Plan    Duration Discharge Needs Only   Anticipated Discharge Equipment and Recommendations   DC Equipment Recommendations None   Discharge Recommendations Recommend home health for continued physical therapy services   Interdisciplinary Plan of Care Collaboration   IDT Collaboration with  Nursing;Family / Caregiver   Patient Position at End of Therapy Seated;Call Light within Reach;Tray Table within Reach;Phone within Reach;Family / Friend in Room   Collaboration Comments RN updated, family able to assist pt with mobility when they are present, otherwise will notify staff to engage bed/chair alarm

## 2024-03-11 NOTE — THERAPY
Occupational Therapy   Initial Evaluation     Patient Name: Andrew Wall  Age:  58 y.o., Sex:  male  Medical Record #: [id number]  Today's Date: 3/11/2024          Assessment  Patient is 58 y.o. male with a diagnosis of AMS.  Pt is at or near his/her functional baseline. Pt with no further skilled OT needs in the acute care setting at this time.      Plan    Occupational Therapy Initial Treatment Plan   Duration: (P) Discharge Needs Only       Discharge Recommendations: (P) Recommend home health for continued occupational therapy services        03/11/24 1128   Prior Living Situation   Prior Services Intermittent Physical Support for ADL Per Family   Housing / Facility 2 Story House   Steps Into Home 2   Bathroom Set up Bathtub / Shower Combination   Equipment Owned 4-Wheel Walker;Wheelchair   Lives with - Patient's Self Care Capacity Spouse;Adult Children   Comments wife and DTRs will be assisting pt at home   Prior Level of ADL Function   Self Feeding Independent   Grooming / Hygiene Independent   Bathing Requires Assist   Dressing Requires Assist   Toileting Requires Assist   Strength Upper Body   Comments 3-/5 bilateral UEs   ADL Assessment   Grooming Minimal Assist   Upper Body Dressing Minimal Assist   Lower Body Dressing Minimal Assist   Toileting Minimal Assist   Comments family assists with ADLs   Functional Mobility   Sit to Stand Standby Assist   Bed, Chair, Wheelchair Transfer Standby Assist   Occupational Therapy Initial Treatment Plan    Duration Discharge Needs Only   Anticipated Discharge Equipment and Recommendations   Discharge Recommendations Recommend home health for continued occupational therapy services

## 2024-03-11 NOTE — DISCHARGE PLANNING
Received choice form @: [id number]  Agency/Facility name: [facility]  Sent referral per choice form @: [phone]

## 2024-03-11 NOTE — PROGRESS NOTES
[place] replacement C collar pads have been sent to station # [id number]. Per nursing staffs request.

## 2024-03-12 VITALS
BODY MASS INDEX: 20.37 KG/M2 | HEART RATE: 91 BPM | OXYGEN SATURATION: 98 % | DIASTOLIC BLOOD PRESSURE: 67 MMHG | SYSTOLIC BLOOD PRESSURE: 101 MMHG | RESPIRATION RATE: 18 BRPM | WEIGHT: 145.5 LBS | TEMPERATURE: 97.8 F | HEIGHT: 71 IN

## 2024-03-12 PROBLEM — R50.9 FEVER: Status: RESOLVED | Noted: 2024-01-14 | Resolved: 2024-03-12

## 2024-03-12 LAB
ANION GAP SERPL CALC-SCNC: 11 MMOL/L (ref 7–16)
BUN SERPL-MCNC: 6 MG/DL (ref 8–22)
CALCIUM SERPL-MCNC: 7.1 MG/DL (ref 8.5–10.5)
CHLORIDE SERPL-SCNC: 104 MMOL/L (ref 96–112)
CO2 SERPL-SCNC: 22 MMOL/L (ref 20–33)
CREAT SERPL-MCNC: 0.25 MG/DL (ref 0.5–1.4)
ERYTHROCYTE [DISTWIDTH] IN BLOOD BY AUTOMATED COUNT: 59.4 FL (ref 35.9–50)
GFR SERPLBLD CREATININE-BSD FMLA CKD-EPI: 144 ML/MIN/1.73 M 2
GLUCOSE SERPL-MCNC: 77 MG/DL (ref 65–99)
HCT VFR BLD AUTO: 24 % (ref 42–52)
HGB BLD-MCNC: 7.4 G/DL (ref 14–18)
MAGNESIUM SERPL-MCNC: 1.6 MG/DL (ref 1.5–2.5)
MCH RBC QN AUTO: 26.1 PG (ref 27–33)
MCHC RBC AUTO-ENTMCNC: 30.8 G/DL (ref 32.3–36.5)
MCV RBC AUTO: 84.8 FL (ref 81.4–97.8)
PHOSPHATE SERPL-MCNC: 3.2 MG/DL (ref 2.5–4.5)
PLATELET # BLD AUTO: 271 K/UL (ref 164–446)
PMV BLD AUTO: 9.3 FL (ref 9–12.9)
POTASSIUM SERPL-SCNC: 3.6 MMOL/L (ref 3.6–5.5)
RBC # BLD AUTO: 2.83 M/UL (ref 4.7–6.1)
SODIUM SERPL-SCNC: 137 MMOL/L (ref 135–145)
WBC # BLD AUTO: 2.7 K/UL (ref 4.8–10.8)

## 2024-03-12 PROCEDURE — 700102 HCHG RX REV CODE 250 W/ 637 OVERRIDE(OP): Performed by: STUDENT IN AN ORGANIZED HEALTH CARE EDUCATION/TRAINING PROGRAM

## 2024-03-12 PROCEDURE — 700111 HCHG RX REV CODE 636 W/ 250 OVERRIDE (IP): Mod: JZ | Performed by: STUDENT IN AN ORGANIZED HEALTH CARE EDUCATION/TRAINING PROGRAM

## 2024-03-12 PROCEDURE — A9270 NON-COVERED ITEM OR SERVICE: HCPCS | Performed by: STUDENT IN AN ORGANIZED HEALTH CARE EDUCATION/TRAINING PROGRAM

## 2024-03-12 PROCEDURE — 85027 COMPLETE CBC AUTOMATED: CPT

## 2024-03-12 PROCEDURE — 700102 HCHG RX REV CODE 250 W/ 637 OVERRIDE(OP): Performed by: HOSPITALIST

## 2024-03-12 PROCEDURE — 700102 HCHG RX REV CODE 250 W/ 637 OVERRIDE(OP): Performed by: INTERNAL MEDICINE

## 2024-03-12 PROCEDURE — 84100 ASSAY OF PHOSPHORUS: CPT

## 2024-03-12 PROCEDURE — 80048 BASIC METABOLIC PNL TOTAL CA: CPT

## 2024-03-12 PROCEDURE — A9270 NON-COVERED ITEM OR SERVICE: HCPCS | Performed by: HOSPITALIST

## 2024-03-12 PROCEDURE — 83735 ASSAY OF MAGNESIUM: CPT

## 2024-03-12 PROCEDURE — 700101 HCHG RX REV CODE 250: Performed by: HOSPITALIST

## 2024-03-12 PROCEDURE — 99239 HOSP IP/OBS DSCHRG MGMT >30: CPT | Performed by: STUDENT IN AN ORGANIZED HEALTH CARE EDUCATION/TRAINING PROGRAM

## 2024-03-12 PROCEDURE — 36415 COLL VENOUS BLD VENIPUNCTURE: CPT

## 2024-03-12 RX ORDER — IBUPROFEN 400 MG/1
400 TABLET ORAL ONCE
Status: COMPLETED | OUTPATIENT
Start: 2024-03-12 | End: 2024-03-12

## 2024-03-12 RX ORDER — PROCHLORPERAZINE MALEATE 10 MG
10 TABLET ORAL 3 TIMES DAILY
Qty: 30 TABLET | Refills: 3 | Status: SHIPPED | OUTPATIENT
Start: 2024-03-12 | End: 2024-03-21

## 2024-03-12 RX ORDER — CALCIUM CARBONATE 500 MG/1
500 TABLET, CHEWABLE ORAL 3 TIMES DAILY PRN
Qty: 30 TABLET | Refills: 3 | Status: SHIPPED | OUTPATIENT
Start: 2024-03-12 | End: 2024-03-21

## 2024-03-12 RX ORDER — CALCIUM CARBONATE 500 MG/1
500 TABLET, CHEWABLE ORAL 3 TIMES DAILY PRN
Status: DISCONTINUED | OUTPATIENT
Start: 2024-03-12 | End: 2024-03-12 | Stop reason: HOSPADM

## 2024-03-12 RX ADMIN — FAMOTIDINE 20 MG: 20 TABLET, FILM COATED ORAL at 04:56

## 2024-03-12 RX ADMIN — BINIMETINIB 45 MG: 15 TABLET, FILM COATED ORAL at 04:57

## 2024-03-12 RX ADMIN — MOMETASONE FUROATE AND FORMOTEROL FUMARATE DIHYDRATE 2 PUFF: 200; 5 AEROSOL RESPIRATORY (INHALATION) at 04:57

## 2024-03-12 RX ADMIN — GABAPENTIN 100 MG: 100 CAPSULE ORAL at 15:20

## 2024-03-12 RX ADMIN — ANTACID TABLETS 500 MG: 500 TABLET, CHEWABLE ORAL at 11:43

## 2024-03-12 RX ADMIN — ENCORAFENIB 450 MG: 75 CAPSULE ORAL at 04:57

## 2024-03-12 RX ADMIN — SENNOSIDES 17.2 MG: 8.6 TABLET, FILM COATED ORAL at 04:56

## 2024-03-12 RX ADMIN — MORPHINE SULFATE 60 MG: 60 TABLET, FILM COATED, EXTENDED RELEASE ORAL at 04:56

## 2024-03-12 RX ADMIN — IBUPROFEN 400 MG: 400 TABLET, FILM COATED ORAL at 15:20

## 2024-03-12 RX ADMIN — GABAPENTIN 100 MG: 100 CAPSULE ORAL at 08:34

## 2024-03-12 RX ADMIN — POLYETHYLENE GLYCOL 3350 1 PACKET: 17 POWDER, FOR SOLUTION ORAL at 04:56

## 2024-03-12 RX ADMIN — ONDANSETRON 4 MG: 2 INJECTION INTRAMUSCULAR; INTRAVENOUS at 08:34

## 2024-03-12 RX ADMIN — MEGESTROL ACETATE 200 MG: 40 SUSPENSION ORAL at 04:56

## 2024-03-12 ASSESSMENT — PAIN DESCRIPTION - PAIN TYPE: TYPE: ACUTE PAIN

## 2024-03-12 NOTE — CARE PLAN
"The patient is Stable - Low risk of patient condition declining or worsening    Shift Goals  Clinical Goals: safety, skin integrity  Patient Goals: \"go home\"  Family Goals: comfort, updates    Progress made toward(s) clinical / shift goals:    Problem: Knowledge Deficit - Standard  Goal: Patient and family/care givers will demonstrate understanding of plan of care, disease process/condition, diagnostic tests and medications  Description: Target End Date:  1-3 days or as soon as patient condition allows    Document in Patient Education    1.  Patient and family/caregiver oriented to unit, equipment, visitation policy and means for communicating concern  2.  Complete/review Learning Assessment  3.  Assess knowledge level of disease process/condition, treatment plan, diagnostic tests and medications  4.  Explain disease process/condition, treatment plan, diagnostic tests and medications  Outcome: Discharged - Not Met     Problem: Hemodynamics  Goal: Patient's hemodynamics, fluid balance and neurologic status will be stable or improve  Description: Target End Date:  Prior to discharge or change in level of care    Document on Assessment and I/O flowsheet templates    1.  Monitor vital signs, pulse oximetry and cardiac monitor per provider order and/or policy  2.  Maintain blood pressure per provider order  3.  Hemodynamic monitoring per provider order  4.  Manage IV fluids and IV infusions  5.  Monitor intake and output  6.  Daily weights per unit policy or provider order  7.  Assess peripheral pulses and capillary refill  8.  Assess color and body temperature  9.  Position patient for maximum circulation/cardiac output  10. Monitor for signs/symptoms of excessive bleeding  11. Assess mental status, restlessness and changes in level of consciousness  12. Monitor temperature and report fever or hypothermia to provider immediately. Consideration of targeted temperature management.  Outcome: Discharged - Not Met   "   Problem: Fluid Volume  Goal: Fluid volume balance will be maintained  Description: Target End Date:  Prior to discharge or change in level of care    Document on I/O flowsheet    1.  Monitor intake and output as ordered  2.  Promote oral intake as appropriate  3.  Report inadequate intake or output to physician  4.  Administer IV therapy as ordered  5.  Weights per provider order  6.  Assess for signs and symptoms of bleeding  7.  Monitor for signs of fluid overload (respiratory changes, edema, weight gain, increased abdominal girth)  8.  Monitor of signs for inadequate fluid volume (poor skin turgor, dry mucous membranes)  9.  Instruct patient on adherence to fluid restrictions  Outcome: Discharged - Not Met     Problem: Urinary - Renal Perfusion  Goal: Ability to achieve and maintain adequate renal perfusion and functioning will improve  Description: Target End Date:  Prior to discharge or change in level of care    Document on I/O and Assessment flowsheet    1.  Urine output will remain greater than 0.5ml/Kg/HR  2.  Monitor amount and/or characteristics of urine per order/policy. Specific gravity per order/policy  3.  Assess signs and symptoms of renal dysfunction  Outcome: Discharged - Not Met     Problem: Respiratory  Goal: Patient will achieve/maintain optimum respiratory ventilation and gas exchange  Description: Target End Date:  Prior to discharge or change in level of care    Document on Assessment flowsheet    1.  Assess and monitor rate, rhythm, depth and effort of respiration  2.  Breath sounds assessed qshift and/or as needed  3.  Assess O2 saturation, administer/titrate oxygen as ordered  4.  Position patient for maximum ventilatory efficiency  5.  Turn, cough, and deep breath with splinting to improve effectiveness  6.  Collaborate with RT to administer medication/treatments per order  7.  Encourage use of incentive spirometer and encourage patient to cough after use and utilize splinting  techniques if applicable  8.  Airway suctioning  9.  Monitor sputum production for changes in color, consistency and frequency  10. Perform frequent oral hygiene  11. Alternate physical activity with rest periods  Outcome: Discharged - Not Met     Problem: Mechanical Ventilation  Goal: Safe management of artificial airway and ventilation  Description: Target End Date:  when vent discontinued    Document on VAP flowsheet and Airway LDA    1.  Daily awakening trials per provider order/policy  2.  Suctioning and care of ET/Trach tube (document on LDA)  3.  Collaborate with RT to administer medications/treatments  4.  Ambu bag at bedside and available for transport  5.  Trach patient - replacement trach at bedside  6.  Provide communication tools if applicable  Outcome: Discharged - Not Met  Goal: Successful weaning off mechanical ventilator, spontaneously maintains adequate gas exchange  Description: Target End Date:  when vent discontinued    1.  Follow universal weaning protocol for patients on mechanical ventilation per order  2.  Review contraindication list.  Obtain provider order to wean in presence of contraindication.  3.  Obtain Jannie Sedation-Agitation Score  4.  Jannie Score 1-2:  sedation vacation  5.  Jannie Score 3-4:  Collaborate with provider and/or RT to determine readiness for trial  6.  Begin 2 hour trial of weaning following protocol  7.  Evaluate for fatigue parameters per protocol  8.  Fatigue parameters triggered:  Stop wean and return to previous ASV% setting or increase % minute volume to offset work or breathing  Outcome: Discharged - Not Met  Goal: Patient will be able to express needs and understand communication  Description: Target End Date:  when vent discontinued    1.  Assess ability to communicate and understand  2.  Provide communication tools  3.  Collaborate with Speech Therapy for [facility]  Outcome: Discharged - Not Met     Problem: Physical Regulation  Goal: Diagnostic test results will  improve  Description: Target End Date:  Prior to discharge or change in level of care    1.  Monitor lactic acid levels  2.  Monitor ABG's  3.  Monitor diagnostic test results  Outcome: Discharged - Not Met  Goal: Signs and symptoms of infection will decrease  Description: Target End Date:  Prior to discharge or change in level of care    1.  Remove potential routes of infection, such as central lines and urinary catheter  2.  Follow facility protocol for changing IV tubing and sites  3.  Collaborate with Infectious Disease  4.  Antibiotic therapy per provider order  5.  Note drug effects and monitor for antibiotic toxicity  Outcome: Discharged - Not Met     Problem: Pain - Standard  Goal: Alleviation of pain or a reduction in pain to the patient’s comfort goal  Description: Target End Date:  Prior to discharge or change in level of care    Document on Vitals flowsheet    1.  Document pain using the appropriate pain scale per order or unit policy  2.  Educate and implement non-pharmacologic comfort measures (i.e. relaxation, distraction, massage, cold/heat therapy, etc.)  3.  Pain management medications as ordered  4.  Reassess pain after pain med administration per policy  5.  If opiods administered assess patient's response to pain medication is appropriate per POSS sedation scale  6.  Follow pain management plan developed in collaboration with patient and interdisciplinary team (including palliative care or pain specialists if applicable)  Outcome: Discharged - Not Met     Problem: Skin Integrity  Goal: Skin integrity is maintained or improved  Description: Target End Date:  Prior to discharge or change in level of care    Document interventions on Skin Risk/Sachin flowsheet groups and corresponding LDA    1.  Assess and monitor skin integrity, appearance and/or temperature  2.  Assess risk factors for impaired skin integrity and/or pressures ulcers  3.  Implement precautions to protect skin integrity in  collaboration with interdisciplinary team  4.  Implement pressure ulcer prevention protocol if at risk for skin breakdown  5.  Confirm wound care consult if at risk for skin breakdown  6.  Ensure patient use of pressure relieving devices  (Low air loss bed, waffle overlay, heel protectors, ROHO cushion, etc)  Outcome: Discharged - Not Met     Problem: Fall Risk  Goal: Patient will remain free from falls  Description: Target End Date:  Prior to discharge or change in level of care    Document interventions on the [facility] Fall Risk Assessment    1.  Assess for fall risk factors  2.  Implement fall precautions  Outcome: Discharged - Not Met     Pt discharged per order.   PIV removed.   Pt instructed to wear cervical collar until follows up with ortho doc; pt verbalized understanding. Mepilex pads provided as pt complaining of discomfort from collar.   Discharge paperwork reviewed with pt and wife. All questions answered. Signed copy in chart, copy with pt.   Pt and all belongings wheeled off unit to private vehicle.     Patient is not progressing towards the following goals:

## 2024-03-12 NOTE — HOSPITAL COURSE
Andrew Wall is a 58 y.o. male who presented 3/9/2024 with history of metastatic melanoma BRAF V6 00 E+ with metastases to bone and lymph nodes.  Patient has been maintained on MS Contin for chronic metastatic bone pain.  He was recently in the hospital admitted on March 5, and discharged 2 days prior to presentation again.  He was here with pain following some trauma, and was found to have a C2 vertebral fracture as well as pathologic fractures of C6 and C7     Family brought the patient back, as they noticed that his mental status was worsening again.  He reports hallucinations.  These episodes wax and wane.  He was discharged on scheduled MS Contin, gabapentin, Robaxin and in addition he takes hydroxyzine for anxiety, and Megace for appetite stimulant.  Additionally, he just recently completed a Medrol Dosepak.  He also recently increase his gabapentin dosing.    MRI brain 3/11: No evidence of metastatic disease.  No acute processes to indicate etiology of hallucinations.    Given unremarkable organic workup-it is likely that his hallucinations and delusions were secondary to polypharmacy, recent steroid dosing and new medications.  I encouraged patient to discontinue hydroxyzine and Megace.  Patient will only use methocarbamol as needed for severe muscle spasms.  He has completed his Medrol Dosepak already.    I discussed with patient and his wife that decreased appetite in the setting of metastatic cancer is an unfortunate side effect.  I do not believe that he will gain significant benefit from Megace or other appetite stimulants at this time.  He notes that over the last few weeks he has noticed an increase in his appetite and ability to tolerate more p.o. intake (this was prior to starting Megace).    Encourage patient to follow-up with his primary care physician within the next few days.  He is discharged home with his wife and home health.  He will continue his oral chemotherapy agents and follow up  with oncology as indicated (a few weeks from now)

## 2024-03-12 NOTE — CARE PLAN
The patient is Stable - Low risk of patient condition declining or worsening    Shift Goals  Clinical Goals: pain control, monitor for nausea  Patient Goals: rest, tolerate dinner  Family Goals: comfort, updates    Progress made toward(s) clinical / shift goals:    Problem: Knowledge Deficit - Standard  Goal: Patient and family/care givers will demonstrate understanding of plan of care, disease process/condition, diagnostic tests and medications  Outcome: Progressing       Patient aaox4, reports mild nausea at this time, medicated per MAR with zofran. No other issues, denies pain. Call light and personal items within reach, encouraged to use call light for assistance

## 2024-03-12 NOTE — DISCHARGE INSTRUCTIONS
Please discontinue these medications:  -Methocarbamol; can use as needed  - Discontinue hydroxyzine  - Discontinue Megace    FOLLOW UP WITH ORTHO - DR. TARANGO ~ 3/20

## 2024-03-12 NOTE — DISCHARGE SUMMARY
Discharge Summary    CHIEF COMPLAINT ON ADMISSION  Chief Complaint   Patient presents with    Leg Swelling     Starting at 0845 this am per ems. Pt has hx of stg 4 melanoma and is a poor historian. Per ems pt called out in pain at 0845 this am. States she noticed right leg swelling and painful. Pt wife gave 60 mg of morphine which he is reportedly prescribed.        Reason for Admission  ems     Admission Date  3/9/2024    CODE STATUS  DNAR/DNI    HPI & HOSPITAL COURSE    Andrew Wall is a 58 y.o. male who presented 3/9/2024 with history of metastatic melanoma BRAF V6 00 E+ with metastases to bone and lymph nodes.  Patient has been maintained on MS Contin for chronic metastatic bone pain.  He was recently in the hospital admitted on March 5, and discharged 2 days prior to presentation again.  He was here with pain following some trauma, and was found to have a C2 vertebral fracture as well as pathologic fractures of C6 and C7     Family brought the patient back, as they noticed that his mental status was worsening again.  He reports hallucinations.  These episodes wax and wane.  He was discharged on scheduled MS Contin, gabapentin, Robaxin and in addition he takes hydroxyzine for anxiety, and Megace for appetite stimulant.  Additionally, he just recently completed a Medrol Dosepak.  He also recently increase his gabapentin dosing.    MRI brain 3/11: No evidence of metastatic disease.  No acute processes to indicate etiology of hallucinations.    Given unremarkable organic workup-it is likely that his hallucinations and delusions were secondary to polypharmacy, recent steroid dosing and new medications.  I encouraged patient to discontinue hydroxyzine and Megace.  Patient will only use methocarbamol as needed for severe muscle spasms.  He has completed his Medrol Dosepak already.    I discussed with patient and his wife that decreased appetite in the setting of metastatic cancer is an unfortunate side effect.   I do not believe that he will gain significant benefit from Megace or other appetite stimulants at this time.  He notes that over the last few weeks he has noticed an increase in his appetite and ability to tolerate more p.o. intake (this was prior to starting Megace).    Encourage patient to follow-up with his primary care physician within the next few days.  He is discharged home with his wife and home health.  He will continue his oral chemotherapy agents and follow up with oncology as indicated (a few weeks from now)    Therefore, he is discharged in good and stable condition to home with close outpatient follow-up.    The patient met 2-midnight criteria for an inpatient stay at the time of discharge.    Discharge Date  3/12/2024    FOLLOW UP ITEMS POST DISCHARGE  Follow-up with primary care 3 to 5 days  Follow-up with oncology as indicated-a few weeks from discharge per family    DISCHARGE DIAGNOSES  Principal Problem (Resolved):    Fever (POA: Yes)  Active Problems:    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) (POA: Yes)    Acute anemia (POA: Unknown)    Constipation (POA: Yes)    DNR (do not resuscitate) (POA: Yes)    Cancer related pain (POA: Yes)    Acute encephalopathy (POA: Yes)  Resolved Problems:    Sepsis (HCC) (POA: Yes)      FOLLOW UP  Future Appointments   Date Time Provider Department Center   3/26/2024  5:30 PM [facility] MRI 1 [facility]     [id number]  [facility] [address]  [phone]        Jose Luis Juarez M.D.  [address] [address] [facility] [address] [place] [id number]  [phone]    Follow up in 1 week(s)        MEDICATIONS ON DISCHARGE     Medication List        START taking these medications        Instructions   calcium carbonate 500 MG Chew  Commonly known as: Tums   Chew 1 Tablet 3 times a day as needed (heartburn).  Dose: 500 mg            CONTINUE taking these medications        Instructions   Binimetinib 15 MG Tabs   Take 45 mg by mouth  2 times a day.  Dose: 45 mg     Encorafenib 75 MG Caps   Take 450 mg by mouth every day.  Dose: 450 mg     famotidine 20 MG Tabs  Commonly known as: Pepcid   Take 20 mg by mouth every day.  Dose: 20 mg     gabapentin 100 MG Caps  Commonly known as: Neurontin   Take 100 mg by mouth 3 times a day.  Dose: 100 mg     morphine ER 60 MG Tbcr tablet  Start taking on: March 15, 2024  Commonly known as: Ms Contin   Take 1 Tablet by mouth every 12 hours for 14 days.  Dose: 60 mg     prochlorperazine 10 MG Tabs  Commonly known as: Compazine   Take 1 Tablet by mouth in the morning, at noon, and at bedtime.  Dose: 10 mg     Wixela Inhub 250-50 MCG/ACT Aepb  Generic drug: fluticasone-salmeterol   Inhale 1 Puff every day.  Dose: 1 Puff            STOP taking these medications      hydrOXYzine HCl 25 MG Tabs  Commonly known as: Atarax     megestrol 40 MG/ML Susp  Commonly known as: Megace     methocarbamol 500 MG Tabs  Commonly known as: Robaxin     ondansetron 4 MG Tbdp  Commonly known as: Zofran ODT              Allergies  Allergies   Allergen Reactions    Augmentin Vomiting and Nausea       DIET  Orders Placed This Encounter   Procedures    Diet Order Diet: Regular     Standing Status:   Standing     Number of Occurrences:   1     Order Specific Question:   Diet:     Answer:   Regular [1]       ACTIVITY  As tolerated.  Weight bearing as tolerated    CONSULTATIONS  None     PROCEDURES  None     LABORATORY  Lab Results   Component Value Date    SODIUM 137 03/12/2024    POTASSIUM 3.6 03/12/2024    CHLORIDE 104 03/12/2024    CO2 22 03/12/2024    GLUCOSE 77 03/12/2024    BUN 6 (L) 03/12/2024    CREATININE 0.25 (L) 03/12/2024        Lab Results   Component Value Date    WBC 2.7 (L) 03/12/2024    HEMOGLOBIN 7.4 (L) 03/12/2024    HEMATOCRIT 24.0 (L) 03/12/2024    PLATELETCT 271 03/12/2024        Total time of the discharge process exceeds 42 minutes.

## 2024-03-13 ENCOUNTER — OFFICE VISIT (OUTPATIENT)
Dept: MEDICAL GROUP | Facility: LAB | Age: 59
End: 2024-03-13
Payer: COMMERCIAL

## 2024-03-13 VITALS
HEIGHT: 70 IN | BODY MASS INDEX: 20.04 KG/M2 | SYSTOLIC BLOOD PRESSURE: 82 MMHG | DIASTOLIC BLOOD PRESSURE: 52 MMHG | WEIGHT: 140 LBS | RESPIRATION RATE: 16 BRPM | HEART RATE: 64 BPM | TEMPERATURE: 97.8 F

## 2024-03-13 DIAGNOSIS — S12.100A CLOSED ODONTOID FRACTURE, INITIAL ENCOUNTER (HCC): ICD-10-CM

## 2024-03-13 DIAGNOSIS — C77.9 MALIGNANT MELANOMA METASTATIC TO LYMPH NODE (HCC): ICD-10-CM

## 2024-03-13 DIAGNOSIS — Z09 HOSPITAL DISCHARGE FOLLOW-UP: ICD-10-CM

## 2024-03-13 DIAGNOSIS — D64.9 ANEMIA, UNSPECIFIED TYPE: ICD-10-CM

## 2024-03-13 PROCEDURE — 99215 OFFICE O/P EST HI 40 MIN: CPT | Performed by: FAMILY MEDICINE

## 2024-03-13 PROCEDURE — 3074F SYST BP LT 130 MM HG: CPT | Performed by: FAMILY MEDICINE

## 2024-03-13 PROCEDURE — 3078F DIAST BP <80 MM HG: CPT | Performed by: FAMILY MEDICINE

## 2024-03-13 ASSESSMENT — FIBROSIS 4 INDEX: FIB4 SCORE: 1.92

## 2024-03-13 NOTE — PROGRESS NOTES
Verbal consent was acquired by the patient to use Conj[employer] ambient listening note generation during this visit Yes    Subjective:   Andrew Wall is a 58 y.o. male here today for   No chief complaint on file.    History of Present Illness  The patient is a 58-year-old male who presents for evaluation of multiple medical concerns. He is accompanied by an adult female.    He is feeling good other than his stomach. The adult female states he threw up everything last night and this morning. His appetite is the issue. They are going to take Zofran half an hour before he takes all his other pills, so he has time along with some food. He drinks 530-calorie Ensure. He had hallucinations, confusion, and psychosis. Dr. Paniagua went through all the prescriptions and did out the ones that she thought would not cause the most possible concern. He has been fine today. He is not taking hydroxyzine or Megace. He has not used methocarbamol. His first dose of Megace was a couple of hours before they went into the hospital. It did not have any effect on the psychosis earlier. He takes Compazine 3 times a day. He denies any blood in his stool, black tarry stools, or hematemesis. He has not tried any over-the-counter medications for his appetite.    His pain is not an issue right now. They are thinking of cutting back the morphine a little bit because the cancer is not in the bones anymore. They are going to see if just going to 45 instead of 60 might be. He is getting constipated. He has to do a suppository today. He has home health coming to check on that. He had edema in one leg when they came out last time. His foot is a little bit swollen, but not bad. He has been walking a lot.    His sinuses are okay. He was getting a sinus headache yesterday because he has not been able to use the budesonide. He wants to get back on the budesonide.    He wants to get a disability placard. He still has pain in the lumbar area. He had a  CT scan.   He has a bump on his back. It does not hurt like it used to.      Allergies   Allergen Reactions    Augmentin Vomiting and Nausea         Current medicines (including changes today)  Current Outpatient Medications   Medication Sig Dispense Refill    prochlorperazine (COMPAZINE) 10 MG Tab Take 1 Tablet by mouth in the morning, at noon, and at bedtime. 30 Tablet 3    calcium carbonate (TUMS) 500 MG Chew Tab Chew 1 Tablet 3 times a day as needed (heartburn). 30 Tablet 3    fluticasone-salmeterol (WIXELA INHUB) 250-50 MCG/ACT AEROSOL POWDER, BREATH ACTIVATED Inhale 1 Puff every day.      famotidine (PEPCID) 20 MG Tab Take 20 mg by mouth every day.      gabapentin (NEURONTIN) 100 MG Cap Take 100 mg by mouth 3 times a day.      [START ON 3/15/2024] morphine ER (MS CONTIN) 60 MG Tab CR tablet Take 1 Tablet by mouth every 12 hours for 14 days. 28 Tablet 0    Binimetinib 15 MG Tab Take 45 mg by mouth 2 times a day.      Encorafenib 75 MG Cap Take 450 mg by mouth every day.       No current facility-administered medications for this visit.     He  has a past medical history of Asthma, Cancer ([facility]) (10/20), Cancer related pain (2/5/2024), Constipation (1/12/2024), Malignant melanoma metastatic to lymph node ([facility]) (11/16/2023), 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) (11/16/2023), Malignant melanoma of skin of buttock ([facility]), and Nasal polyp.    He has no past medical history of Acute nasopharyngitis, Anesthesia, Anginal syndrome (HCC), Arrhythmia, Arthritis, Blood clotting disorder (HCC), Breath shortness, Bronchitis, Carcinoma in situ of respiratory system, Cataract, Congestive heart failure (HCC), Continuous ambulatory peritoneal dialysis status ([facility]), Coughing blood, Dental disorder, Diabetes (HCC), Dialysis patient (HCC), Disorder of thyroid, Emphysema of lung (HCC), Glaucoma, Gynecological disorder, Heart burn, Heart murmur, Heart valve disease, Hemorrhagic disorder (HCC), Hepatitis A, Hepatitis B,  "Hepatitis C, Hiatus hernia syndrome, High cholesterol, Hyperlipidemia, Indigestion, Infectious disease, Jaundice, Myocardial infarct (HCC), Pacemaker, Pneumonia, Pregnant, Psychiatric problem, Renal disorder, Rheumatic fever, Seizure (HCC), Sleep apnea, Snoring, Stroke (HCC), Tuberculosis, Urinary bladder disorder, or Urinary incontinence.    ROS   ROS  -See HPI     Objective:     Physical Exam:  BP (!) 82/52   Pulse 64   Temp 36.6 °C (97.8 °F) (Temporal)   Resp 16   Ht 1.778 m (5' 10\")   Wt 63.5 kg (140 lb)  Body mass index is 20.09 kg/m².   Constitutional: Alert, no distress.  Skin: Warm, dry, good turgor, no rashes in visible areas.  Eye: Equal, round and reactive, conjunctiva clear, lids normal.  ENMT: TM's clear bilaterally, lips without lesions, good dentition, oropharynx clear.  Neck: Trachea midline, no masses, no thyromegaly. No cervical or supraclavicular lymphadenopathy.  Respiratory: Unlabored respiratory effort, lungs clear to auscultation, no wheezes, no rhonchi.  Cardiovascular: Normal S1, S2, no murmur, no edema.  Abdomen: Soft, non-tender, no masses, no hepatosplenomegaly.  Psych: Alert and oriented x3, normal affect and mood.    Results  Laboratory Studies  Labs were reviewed with the patient.    Imaging  CT scan was reviewed with the patient.  Images were completed showing pathological fractures at C6 and C7s including a dense type 2 fracture.    Assessment and Plan:     Assessment & Plan  The patient is a 58-year-old male with he/him pronouns. He has a history of metastatic melanoma with metastases to bones and lymph nodes. He was recently hospitalized due to hallucinations and delusions, which was thought to be iatrogenic from polypharmacy.    1. Hospital follow-up.  Acute encephalopathy was iatrogenic secondary to polypharmacy.  He was discharged with instructions to discontinue Megace as an appetite stimulant and hydroxyzine.  She continues to work on weight gain and appropriate " nutrition.  Is drinking Ensure.  Finding other items that he is able to tolerate.  He is doing Zofran as needed for the nausea.  He can switch to Phenergan if needed.  He will let me know if switch as needed.    2.  Type II dens fracture  During hospitalization, images were completed showing pathological fractures at C6 and C7s including a dense type 2 fracture. He was placed in a C-collar and told to follow up with orthopedics. I will also refer to orthopedics to follow up on the neck fracture.    3. Anemia.  During the hospitalization, the patient was also shown to have anemia with a very low hemoglobin of 7.4. We are going to recheck a CBC to assure hemoglobin is stable or improving.    4.  Metastatic melanoma causing back pain.  We also discussed back pain and neck pain associated with malignant melanoma with metastases to the bones. He is only on morphine 60 mg every 12 hours. He is dealing with a lot of constipation.  He is considering dropping morphine down to see if he can tolerate pain as well as have some relief from the constipation. I will follow up with patient via phone a message regarding recent blood work. They will call me and let me know if nausea and decreased appetite continues.    Follow-up      Orders:  1. Hospital discharge follow-up    2. Closed odontoid fracture, initial encounter (HCC)  - Referral to Orthopedics    3. Anemia, unspecified type  - CBC WITHOUT DIFFERENTIAL; Future    4. 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)      My total time spent caring for the patient on the day of the encounter was 40 minutes.   This does not include time spent on separately billable procedures/tests.      Followup: No follow-ups on file.         PLEASE NOTE: This dictation was created using voice recognition and [employer] ambient listening software. I have made every reasonable attempt to correct obvious errors, but I expect that there are errors of grammar and possibly content that I did  not discover before finalizing the note.

## 2024-03-14 ENCOUNTER — TELEPHONE (OUTPATIENT)
Dept: MEDICAL GROUP | Facility: LAB | Age: 59
End: 2024-03-14
Payer: COMMERCIAL

## 2024-03-14 DIAGNOSIS — C79.51 METASTASIS TO BONE (HCC): ICD-10-CM

## 2024-03-14 DIAGNOSIS — C77.9 MALIGNANT MELANOMA METASTATIC TO LYMPH NODE (HCC): ICD-10-CM

## 2024-03-14 DIAGNOSIS — S12.100A CLOSED ODONTOID FRACTURE, INITIAL ENCOUNTER (HCC): ICD-10-CM

## 2024-03-14 LAB
BACTERIA BLD CULT: NORMAL
SIGNIFICANT IND 70042: NORMAL
SITE SITE: NORMAL
SOURCE SOURCE: NORMAL

## 2024-03-14 NOTE — DOCUMENTATION QUERY
[facility]                                                                       Query Response Note      PATIENT:               CARLOS HEREDIA  ACCT #:                  [id number]  MRN:                     [id number]  :                      1965  ADMIT DATE:       3/9/2024 10:23 AM  DISCH DATE:        3/12/2024 6:27 PM  RESPONDING  PROVIDER #:        [id number]           QUERY TEXT:    Encephalopathy is documented in the Medical Record. Please specify type.      The patient's Clinical Indicators include:  Findings: 3/11 Acute encephalopathy  presents with hallucinations and confusion which has been going on for about a week likely polypharmacy    3/12 DC Summary: unremarkable organic workup-it is likely that his hallucinations and delusions were secondary to polypharmacy, recent steroid dosing and new medications.  I encouraged patient to discontinue hydroxyzine and Megace.    Treatment:  Discontinue hydroxyzine and megace, monitor       Risk Factors: polypharmacy,  scheduled MS Contin, gabapentin, Robaxin and in addition he takes hydroxyzine  for anxiety, and Megace for appetite stimulant.  Additionally, he just recently completed a Medrol Dosepak.  He also recently increase his gabapentin dosing.      LoreleiyoLety thompson RN BSN  Clinical Documentation   [email or web address]  Connect via [employer]  Options provided:   -- Due to medications or drugs   -- Toxic encephalopathy   -- Other type of encephalopathy   -- Other explanation, (please specify other explanation)      Query created by: Lety Grullon on 3/13/2024 5:57 AM    RESPONSE TEXT:    Due to medications or drugs       QUERY TEXT:    Based on the findings above please specify if there is a correlating diagnosis or if the lab values are insignificant.              The patient's Clinical Indicators include:  Findings: 3/9 Labs: Sodium 130, 3/10 sodium 132, sodium  3/11 133    Treatment: Labs monitor IV fluids    Risk Factors: metastatic melanoma, fever, dehydration, currently on chemo    Lety Paige RN BSN  Clinical Documentation   [email or web address]  Connect via [employer]  Options provided:   -- Hyponatremia clinically significant   -- Lab values not clinically significant   -- Other explanation, (please specify other explanation)      Query created by: Lety Grullon on 3/12/2024 3:54 AM    RESPONSE TEXT:    Hyponatremia clinically significant       QUERY TEXT:    The diagnosis of sepsis has been documented in the medical record. Based on the findings above and further workup can this diagnosis be further specified.      Sepsis - real or suspected infection plus 2 or more SIRS criteria + organ dysfunction related to sepsis    Temp <96.8 or >101  HR >90  RR >20  WBC <4,000 or > 12,000 or >10% bandemia    Examples of organ dysfunction:  - acute renal failure creatinine > 2  - acute respiratory failure  - Total bilirubin >2  - Platelet count <100,000  - INR >1.5 or aPTT >60sec  - encephalopathy/altered mental status   - Lactate >2  - SBP <90 MAP <65 or SBP decrease of more than 40mmhg  - septic shock             The patient's Clinical Indicators include:  Findings: 3/10 sepsis vs dehydration/reaction to chemo cultures thus far negative chest xray clear ua clear fever completing infectious workup but thus far negative suspect etiology is chemo     3/11 PN: fever likely due to chemo, infection workup negative so far     Epic vitals 3/9 99.2-87/-52-96% wbc 3.9 lactic acid 1.4     Treatment: empiric coverage with rocephin, labs, imaging    Risk Factors: dehydration, currently on chemo, chronic leukopenia  malignant melanoma    Lety Paige RN BSN  Clinical Documentation   [email or web address]  Connect via [employer]  Options provided:   -- Sepsis exists, (provide SIRS criteria plus sepsis-related organ dysfunction)   --  Sepsis does not exist - amended documentation in the medical record and updated problem list   -- Other explanation, Please specify      Query created by: Lety Gruloln on 3/12/2024 3:55 AM    RESPONSE TEXT:    Sepsis does not exist - amended documentation in the medical record and updated problem list          Electronically signed by:  CHEVY SORTO MD 3/14/2024 6:11 AM

## 2024-03-15 ENCOUNTER — HOSPITAL ENCOUNTER (OUTPATIENT)
Dept: LAB | Facility: MEDICAL CENTER | Age: 59
End: 2024-03-15
Attending: FAMILY MEDICINE
Payer: COMMERCIAL

## 2024-03-15 DIAGNOSIS — D64.9 ANEMIA, UNSPECIFIED TYPE: ICD-10-CM

## 2024-03-18 ENCOUNTER — TELEPHONE (OUTPATIENT)
Dept: MEDICAL GROUP | Facility: LAB | Age: 59
End: 2024-03-18
Payer: COMMERCIAL

## 2024-03-18 NOTE — TELEPHONE ENCOUNTER
Received a voicemail from Bernie DUVALL (Joanne)     Patient BP is low asymptotic, recommending ER precautions, Bp was 80-54 and pulse 100.     Left voicemail Friday evening after MA was off.

## 2024-03-20 ENCOUNTER — TELEPHONE (OUTPATIENT)
Dept: MEDICAL GROUP | Facility: LAB | Age: 59
End: 2024-03-20
Payer: COMMERCIAL

## 2024-03-20 NOTE — TELEPHONE ENCOUNTER
Received a voicemail from JODY Rubio at [facility], Notified that their office believes patient should get a referral to [facility], declining hospice, but needs higher level of care than what our home health can provide, The nurse, explained in detail with the patient and wife next steps in care for patient         Referral not pending as I can only pend a pulmonary rehab referral?

## 2024-03-21 ENCOUNTER — APPOINTMENT (OUTPATIENT)
Dept: RADIOLOGY | Facility: MEDICAL CENTER | Age: 59
End: 2024-03-21
Attending: EMERGENCY MEDICINE
Payer: COMMERCIAL

## 2024-03-21 ENCOUNTER — HOSPITAL ENCOUNTER (INPATIENT)
Facility: MEDICAL CENTER | Age: 59
LOS: 7 days | End: 2024-03-28
Attending: EMERGENCY MEDICINE | Admitting: STUDENT IN AN ORGANIZED HEALTH CARE EDUCATION/TRAINING PROGRAM
Payer: COMMERCIAL

## 2024-03-21 DIAGNOSIS — C43.9 METASTATIC MELANOMA (HCC): ICD-10-CM

## 2024-03-21 DIAGNOSIS — R50.9 ACUTE FEBRILE ILLNESS: ICD-10-CM

## 2024-03-21 DIAGNOSIS — R65.21 SEPTIC SHOCK (HCC): ICD-10-CM

## 2024-03-21 DIAGNOSIS — S12.9XXA: ICD-10-CM

## 2024-03-21 DIAGNOSIS — A41.9 SEPTIC SHOCK (HCC): ICD-10-CM

## 2024-03-21 DIAGNOSIS — K59.03 DRUG-INDUCED CONSTIPATION: ICD-10-CM

## 2024-03-21 PROBLEM — S12.100A C2 CERVICAL FRACTURE (HCC): Status: ACTIVE | Noted: 2024-03-21

## 2024-03-21 LAB
ALBUMIN SERPL BCP-MCNC: 2.2 G/DL (ref 3.2–4.9)
ALBUMIN/GLOB SERPL: 0.8 G/DL
ALP SERPL-CCNC: 207 U/L (ref 30–99)
ALT SERPL-CCNC: 15 U/L (ref 2–50)
ANION GAP SERPL CALC-SCNC: 10 MMOL/L (ref 7–16)
APPEARANCE UR: CLEAR
AST SERPL-CCNC: 33 U/L (ref 12–45)
BASOPHILS # BLD AUTO: 2 % (ref 0–1.8)
BASOPHILS # BLD: 0.07 K/UL (ref 0–0.12)
BILIRUB SERPL-MCNC: 0.2 MG/DL (ref 0.1–1.5)
BILIRUB UR QL STRIP.AUTO: NEGATIVE
BUN SERPL-MCNC: 12 MG/DL (ref 8–22)
CALCIUM ALBUM COR SERPL-MCNC: 9.2 MG/DL (ref 8.5–10.5)
CALCIUM SERPL-MCNC: 7.8 MG/DL (ref 8.5–10.5)
CHLORIDE SERPL-SCNC: 102 MMOL/L (ref 96–112)
CO2 SERPL-SCNC: 22 MMOL/L (ref 20–33)
COLOR UR: ABNORMAL
CREAT SERPL-MCNC: 0.32 MG/DL (ref 0.5–1.4)
EKG IMPRESSION: NORMAL
EOSINOPHIL # BLD AUTO: 0.11 K/UL (ref 0–0.51)
EOSINOPHIL NFR BLD: 3.1 % (ref 0–6.9)
ERYTHROCYTE [DISTWIDTH] IN BLOOD BY AUTOMATED COUNT: 59.6 FL (ref 35.9–50)
FLUAV RNA SPEC QL NAA+PROBE: NEGATIVE
FLUBV RNA SPEC QL NAA+PROBE: NEGATIVE
GFR SERPLBLD CREATININE-BSD FMLA CKD-EPI: 134 ML/MIN/1.73 M 2
GLOBULIN SER CALC-MCNC: 2.8 G/DL (ref 1.9–3.5)
GLUCOSE BLD STRIP.AUTO-MCNC: 91 MG/DL (ref 65–99)
GLUCOSE SERPL-MCNC: 113 MG/DL (ref 65–99)
GLUCOSE UR STRIP.AUTO-MCNC: NEGATIVE MG/DL
HCT VFR BLD AUTO: 28.2 % (ref 42–52)
HGB BLD-MCNC: 9 G/DL (ref 14–18)
IMM GRANULOCYTES # BLD AUTO: 0.02 K/UL (ref 0–0.11)
IMM GRANULOCYTES NFR BLD AUTO: 0.6 % (ref 0–0.9)
KETONES UR STRIP.AUTO-MCNC: NEGATIVE MG/DL
LACTATE SERPL-SCNC: 1.4 MMOL/L (ref 0.5–2)
LEUKOCYTE ESTERASE UR QL STRIP.AUTO: NEGATIVE
LYMPHOCYTES # BLD AUTO: 0.37 K/UL (ref 1–4.8)
LYMPHOCYTES NFR BLD: 10.5 % (ref 22–41)
MCH RBC QN AUTO: 27.4 PG (ref 27–33)
MCHC RBC AUTO-ENTMCNC: 31.9 G/DL (ref 32.3–36.5)
MCV RBC AUTO: 85.7 FL (ref 81.4–97.8)
MICRO URNS: ABNORMAL
MONOCYTES # BLD AUTO: 0.38 K/UL (ref 0–0.85)
MONOCYTES NFR BLD AUTO: 10.7 % (ref 0–13.4)
NEUTROPHILS # BLD AUTO: 2.59 K/UL (ref 1.82–7.42)
NEUTROPHILS NFR BLD: 73.1 % (ref 44–72)
NITRITE UR QL STRIP.AUTO: NEGATIVE
NRBC # BLD AUTO: 0 K/UL
NRBC BLD-RTO: 0 /100 WBC (ref 0–0.2)
PH UR STRIP.AUTO: 7 [PH] (ref 5–8)
PLATELET # BLD AUTO: 359 K/UL (ref 164–446)
PMV BLD AUTO: 9.2 FL (ref 9–12.9)
POTASSIUM SERPL-SCNC: 4.4 MMOL/L (ref 3.6–5.5)
PROCALCITONIN SERPL-MCNC: 0.12 NG/ML
PROT SERPL-MCNC: 5 G/DL (ref 6–8.2)
PROT UR QL STRIP: NEGATIVE MG/DL
RBC # BLD AUTO: 3.29 M/UL (ref 4.7–6.1)
RBC UR QL AUTO: NEGATIVE
RSV RNA SPEC QL NAA+PROBE: NEGATIVE
SARS-COV-2 RNA RESP QL NAA+PROBE: NOTDETECTED
SODIUM SERPL-SCNC: 134 MMOL/L (ref 135–145)
SP GR UR STRIP.AUTO: >=1.045
TROPONIN T SERPL-MCNC: 39 NG/L (ref 6–19)
UROBILINOGEN UR STRIP.AUTO-MCNC: 1 MG/DL
WBC # BLD AUTO: 3.5 K/UL (ref 4.8–10.8)

## 2024-03-21 PROCEDURE — 700102 HCHG RX REV CODE 250 W/ 637 OVERRIDE(OP): Performed by: EMERGENCY MEDICINE

## 2024-03-21 PROCEDURE — 700105 HCHG RX REV CODE 258: Performed by: EMERGENCY MEDICINE

## 2024-03-21 PROCEDURE — 71275 CT ANGIOGRAPHY CHEST: CPT

## 2024-03-21 PROCEDURE — 700101 HCHG RX REV CODE 250: Performed by: EMERGENCY MEDICINE

## 2024-03-21 PROCEDURE — 700111 HCHG RX REV CODE 636 W/ 250 OVERRIDE (IP): Performed by: EMERGENCY MEDICINE

## 2024-03-21 PROCEDURE — 83605 ASSAY OF LACTIC ACID: CPT

## 2024-03-21 PROCEDURE — 0241U HCHG SARS-COV-2 COVID-19 NFCT DS RESP RNA 4 TRGT ED POC: CPT

## 2024-03-21 PROCEDURE — 700117 HCHG RX CONTRAST REV CODE 255: Performed by: EMERGENCY MEDICINE

## 2024-03-21 PROCEDURE — 71045 X-RAY EXAM CHEST 1 VIEW: CPT

## 2024-03-21 PROCEDURE — A9270 NON-COVERED ITEM OR SERVICE: HCPCS | Performed by: EMERGENCY MEDICINE

## 2024-03-21 PROCEDURE — 96375 TX/PRO/DX INJ NEW DRUG ADDON: CPT

## 2024-03-21 PROCEDURE — 96366 THER/PROPH/DIAG IV INF ADDON: CPT

## 2024-03-21 PROCEDURE — 87086 URINE CULTURE/COLONY COUNT: CPT

## 2024-03-21 PROCEDURE — 93005 ELECTROCARDIOGRAM TRACING: CPT

## 2024-03-21 PROCEDURE — 82962 GLUCOSE BLOOD TEST: CPT

## 2024-03-21 PROCEDURE — A9270 NON-COVERED ITEM OR SERVICE: HCPCS | Performed by: STUDENT IN AN ORGANIZED HEALTH CARE EDUCATION/TRAINING PROGRAM

## 2024-03-21 PROCEDURE — 96368 THER/DIAG CONCURRENT INF: CPT

## 2024-03-21 PROCEDURE — 84145 PROCALCITONIN (PCT): CPT

## 2024-03-21 PROCEDURE — 770000 HCHG ROOM/CARE - INTERMEDIATE ICU *

## 2024-03-21 PROCEDURE — 74177 CT ABD & PELVIS W/CONTRAST: CPT

## 2024-03-21 PROCEDURE — 72131 CT LUMBAR SPINE W/O DYE: CPT

## 2024-03-21 PROCEDURE — 36415 COLL VENOUS BLD VENIPUNCTURE: CPT

## 2024-03-21 PROCEDURE — 700105 HCHG RX REV CODE 258: Performed by: STUDENT IN AN ORGANIZED HEALTH CARE EDUCATION/TRAINING PROGRAM

## 2024-03-21 PROCEDURE — 84484 ASSAY OF TROPONIN QUANT: CPT

## 2024-03-21 PROCEDURE — 99291 CRITICAL CARE FIRST HOUR: CPT | Performed by: STUDENT IN AN ORGANIZED HEALTH CARE EDUCATION/TRAINING PROGRAM

## 2024-03-21 PROCEDURE — 96365 THER/PROPH/DIAG IV INF INIT: CPT

## 2024-03-21 PROCEDURE — 93005 ELECTROCARDIOGRAM TRACING: CPT | Performed by: EMERGENCY MEDICINE

## 2024-03-21 PROCEDURE — 700102 HCHG RX REV CODE 250 W/ 637 OVERRIDE(OP): Performed by: STUDENT IN AN ORGANIZED HEALTH CARE EDUCATION/TRAINING PROGRAM

## 2024-03-21 PROCEDURE — 81003 URINALYSIS AUTO W/O SCOPE: CPT

## 2024-03-21 PROCEDURE — 99285 EMERGENCY DEPT VISIT HI MDM: CPT

## 2024-03-21 PROCEDURE — 85025 COMPLETE CBC W/AUTO DIFF WBC: CPT

## 2024-03-21 PROCEDURE — 87040 BLOOD CULTURE FOR BACTERIA: CPT

## 2024-03-21 PROCEDURE — 80053 COMPREHEN METABOLIC PANEL: CPT

## 2024-03-21 PROCEDURE — 87641 MR-STAPH DNA AMP PROBE: CPT

## 2024-03-21 PROCEDURE — 70450 CT HEAD/BRAIN W/O DYE: CPT

## 2024-03-21 RX ORDER — DULOXETIN HYDROCHLORIDE 60 MG/1
60 CAPSULE, DELAYED RELEASE ORAL DAILY
Status: DISCONTINUED | OUTPATIENT
Start: 2024-03-22 | End: 2024-03-21

## 2024-03-21 RX ORDER — MORPHINE SULFATE 30 MG/1
60 TABLET, FILM COATED, EXTENDED RELEASE ORAL EVERY 12 HOURS
Status: DISCONTINUED | OUTPATIENT
Start: 2024-03-21 | End: 2024-03-23

## 2024-03-21 RX ORDER — ONDANSETRON 2 MG/ML
4 INJECTION INTRAMUSCULAR; INTRAVENOUS EVERY 4 HOURS PRN
Status: DISCONTINUED | OUTPATIENT
Start: 2024-03-21 | End: 2024-03-28 | Stop reason: HOSPADM

## 2024-03-21 RX ORDER — PROMETHAZINE HYDROCHLORIDE 25 MG/1
12.5-25 SUPPOSITORY RECTAL EVERY 4 HOURS PRN
Status: DISCONTINUED | OUTPATIENT
Start: 2024-03-21 | End: 2024-03-28 | Stop reason: HOSPADM

## 2024-03-21 RX ORDER — SODIUM CHLORIDE 9 MG/ML
INJECTION, SOLUTION INTRAVENOUS CONTINUOUS
Status: DISCONTINUED | OUTPATIENT
Start: 2024-03-21 | End: 2024-03-22

## 2024-03-21 RX ORDER — ONDANSETRON 4 MG/1
4 TABLET, ORALLY DISINTEGRATING ORAL EVERY 6 HOURS PRN
Status: ON HOLD | COMMUNITY
End: 2024-04-08

## 2024-03-21 RX ORDER — PROMETHAZINE HYDROCHLORIDE 25 MG/1
12.5-25 TABLET ORAL EVERY 4 HOURS PRN
Status: DISCONTINUED | OUTPATIENT
Start: 2024-03-21 | End: 2024-03-21

## 2024-03-21 RX ORDER — GABAPENTIN 100 MG/1
100 CAPSULE ORAL DAILY
Status: DISCONTINUED | OUTPATIENT
Start: 2024-03-22 | End: 2024-03-28 | Stop reason: HOSPADM

## 2024-03-21 RX ORDER — PROCHLORPERAZINE MALEATE 10 MG
10 TABLET ORAL
Status: ON HOLD | COMMUNITY
End: 2024-04-08

## 2024-03-21 RX ORDER — ONDANSETRON 4 MG/1
4 TABLET, ORALLY DISINTEGRATING ORAL EVERY 4 HOURS PRN
Status: DISCONTINUED | OUTPATIENT
Start: 2024-03-21 | End: 2024-03-21

## 2024-03-21 RX ORDER — ACETAMINOPHEN 325 MG/1
650 TABLET ORAL EVERY 6 HOURS PRN
Status: DISCONTINUED | OUTPATIENT
Start: 2024-03-21 | End: 2024-03-28 | Stop reason: HOSPADM

## 2024-03-21 RX ORDER — PROCHLORPERAZINE EDISYLATE 5 MG/ML
5-10 INJECTION INTRAMUSCULAR; INTRAVENOUS EVERY 4 HOURS PRN
Status: DISCONTINUED | OUTPATIENT
Start: 2024-03-21 | End: 2024-03-28 | Stop reason: HOSPADM

## 2024-03-21 RX ORDER — DULOXETIN HYDROCHLORIDE 60 MG/1
60 CAPSULE, DELAYED RELEASE ORAL DAILY
Status: ON HOLD | COMMUNITY
End: 2024-04-08

## 2024-03-21 RX ORDER — ACETAMINOPHEN 500 MG
1000 TABLET ORAL ONCE
Status: COMPLETED | OUTPATIENT
Start: 2024-03-21 | End: 2024-03-21

## 2024-03-21 RX ORDER — SODIUM CHLORIDE, SODIUM LACTATE, POTASSIUM CHLORIDE, AND CALCIUM CHLORIDE .6; .31; .03; .02 G/100ML; G/100ML; G/100ML; G/100ML
30 INJECTION, SOLUTION INTRAVENOUS ONCE
Status: COMPLETED | OUTPATIENT
Start: 2024-03-21 | End: 2024-03-21

## 2024-03-21 RX ORDER — ENOXAPARIN SODIUM 100 MG/ML
40 INJECTION SUBCUTANEOUS DAILY
Status: DISCONTINUED | OUTPATIENT
Start: 2024-03-22 | End: 2024-03-28 | Stop reason: HOSPADM

## 2024-03-21 RX ORDER — ONDANSETRON 4 MG/1
4 TABLET, ORALLY DISINTEGRATING ORAL EVERY 6 HOURS PRN
Status: DISCONTINUED | OUTPATIENT
Start: 2024-03-21 | End: 2024-03-28 | Stop reason: HOSPADM

## 2024-03-21 RX ORDER — NOREPINEPHRINE BITARTRATE 0.03 MG/ML
0-1 INJECTION, SOLUTION INTRAVENOUS CONTINUOUS
Status: DISCONTINUED | OUTPATIENT
Start: 2024-03-21 | End: 2024-03-22

## 2024-03-21 RX ORDER — PROCHLORPERAZINE MALEATE 10 MG
10 TABLET ORAL
Status: DISCONTINUED | OUTPATIENT
Start: 2024-03-21 | End: 2024-03-28 | Stop reason: HOSPADM

## 2024-03-21 RX ORDER — CEFEPIME HYDROCHLORIDE 2 G/1
2 INJECTION, POWDER, FOR SOLUTION INTRAVENOUS ONCE
Status: COMPLETED | OUTPATIENT
Start: 2024-03-21 | End: 2024-03-21

## 2024-03-21 RX ADMIN — ACETAMINOPHEN 1000 MG: 500 TABLET, FILM COATED ORAL at 13:52

## 2024-03-21 RX ADMIN — NOREPINEPHRINE BITARTRATE 0.1 MCG/KG/MIN: 1 INJECTION INTRAVENOUS at 16:31

## 2024-03-21 RX ADMIN — SODIUM CHLORIDE, POTASSIUM CHLORIDE, SODIUM LACTATE AND CALCIUM CHLORIDE 2040 ML: 600; 310; 30; 20 INJECTION, SOLUTION INTRAVENOUS at 13:49

## 2024-03-21 RX ADMIN — MORPHINE SULFATE 60 MG: 30 TABLET, FILM COATED, EXTENDED RELEASE ORAL at 19:57

## 2024-03-21 RX ADMIN — VANCOMYCIN HYDROCHLORIDE 1750 MG: 5 INJECTION, POWDER, LYOPHILIZED, FOR SOLUTION INTRAVENOUS at 16:27

## 2024-03-21 RX ADMIN — SODIUM CHLORIDE: 9 INJECTION, SOLUTION INTRAVENOUS at 20:01

## 2024-03-21 RX ADMIN — NOREPINEPHRINE BITARTRATE 0.05 MCG/KG/MIN: 1 INJECTION INTRAVENOUS at 15:40

## 2024-03-21 RX ADMIN — IOHEXOL 100 ML: 350 INJECTION, SOLUTION INTRAVENOUS at 14:20

## 2024-03-21 RX ADMIN — CEFEPIME 2 G: 2 INJECTION, POWDER, FOR SOLUTION INTRAVENOUS at 14:06

## 2024-03-21 ASSESSMENT — COGNITIVE AND FUNCTIONAL STATUS - GENERAL
PERSONAL GROOMING: A LITTLE
TOILETING: A LITTLE
WALKING IN HOSPITAL ROOM: A LOT
MOVING FROM LYING ON BACK TO SITTING ON SIDE OF FLAT BED: A LITTLE
MOBILITY SCORE: 13
HELP NEEDED FOR BATHING: A LITTLE
MOVING TO AND FROM BED TO CHAIR: A LOT
DRESSING REGULAR LOWER BODY CLOTHING: A LITTLE
STANDING UP FROM CHAIR USING ARMS: A LOT
SUGGESTED CMS G CODE MODIFIER MOBILITY: CL
DRESSING REGULAR UPPER BODY CLOTHING: A LITTLE
DAILY ACTIVITIY SCORE: 19
SUGGESTED CMS G CODE MODIFIER DAILY ACTIVITY: CK
CLIMB 3 TO 5 STEPS WITH RAILING: TOTAL
TURNING FROM BACK TO SIDE WHILE IN FLAT BAD: A LITTLE

## 2024-03-21 ASSESSMENT — LIFESTYLE VARIABLES
DOES PATIENT WANT TO STOP DRINKING: NO
HAVE PEOPLE ANNOYED YOU BY CRITICIZING YOUR DRINKING: NO
ALCOHOL_USE: NO
TOTAL SCORE: 0
HAVE YOU EVER FELT YOU SHOULD CUT DOWN ON YOUR DRINKING: NO
EVER HAD A DRINK FIRST THING IN THE MORNING TO STEADY YOUR NERVES TO GET RID OF A HANGOVER: NO
HOW MANY TIMES IN THE PAST YEAR HAVE YOU HAD 5 OR MORE DRINKS IN A DAY: 0
AVERAGE NUMBER OF DAYS PER WEEK YOU HAVE A DRINK CONTAINING ALCOHOL: 0
CONSUMPTION TOTAL: NEGATIVE
EVER FELT BAD OR GUILTY ABOUT YOUR DRINKING: NO
ON A TYPICAL DAY WHEN YOU DRINK ALCOHOL HOW MANY DRINKS DO YOU HAVE: 0

## 2024-03-21 ASSESSMENT — ENCOUNTER SYMPTOMS
PSYCHIATRIC NEGATIVE: 1
GASTROINTESTINAL NEGATIVE: 1
HEADACHES: 1
RESPIRATORY NEGATIVE: 1
MUSCULOSKELETAL NEGATIVE: 1
EYES NEGATIVE: 1
WEAKNESS: 1
CARDIOVASCULAR NEGATIVE: 1

## 2024-03-21 ASSESSMENT — FIBROSIS 4 INDEX
FIB4 SCORE: 1.4
FIB4 SCORE: 1.92

## 2024-03-21 NOTE — DISCHARGE PLANNING
Referral received from [facility] for consideration for IRF level of care. Patient has now come to the emergency room. If appropriate please consider a PM&R referral to assist with discharge planning. Will require PT/OT evaluations for consideration, skin assessment.  Mj pina.

## 2024-03-21 NOTE — ED TRIAGE NOTES
Pt BIB [facility] #32. Pt is currently being treated for Bone CA and is in a C collar for a neck fx prior. Pt was up walking around today and had a H/A and felt fatigued. Pt spouse checked BP and it was 60/palp. Per EMS BP was 90's/60's. Pt denies Pain at this time. Pt is A&O. BGL

## 2024-03-21 NOTE — ED PROVIDER NOTES
ER Provider Note    Scribed for Dr. Vincenzo Correia MD. by Haydee Cruz. 3/21/2024  1:09 PM    Primary Care Provider: Jose Luis Juarez M.D.    CHIEF COMPLAINT  Chief Complaint   Patient presents with    Headache    Fatigue       EXTERNAL RECORDS REVIEWED  Inpatient Notes Patient was last hospitalized to the [facility] ED on 3/9/24 for leg swelling. He was also hospitalized on 3/5/24 following a ground level fall where he sustained multiple cervical fractures.     HPI/ROS  LIMITATION TO HISTORY   Select: : None    OUTSIDE HISTORIAN(S):  Significant other At bedside gives relevant history    Andrew Wall is a 58 y.o. male, with a history of metastatic Bone cancer, who presents to the ED via EMS to bedside, for sudden onset fatigue and hypotension prior to arrival. Patient states he is currently being treated with chemotherapy and was undergoing chemo injections this morning. Significant other is at bedside and states that last night she noticed the patient had been restless and talking a lot in his sleep. She notes that he usually takes his daily medications at around 7 AM, but would not take his medications this morning without becoming drowsy and falling asleep. He did take his Morphine with help from his wife, but no other medications from his regimen. She notes that he went on a walk outside with his at-home caregiver and he was unable to get out of the wheelchair, and he complained of a headache. His significant other states that he does not usually get headaches. She checked his blood pressure and got a recording of 59/palp. When the at-home nurse checked, it was also 60 systolic. She notes that he is way more confused then at baseline. He is currently in a neck brace secondary to a prior ground level fall. With EMS his blood pressure was 90/60. He currently denies any symptoms of fever, chills, cough, dysuria, urinary frequency, diarrhea, constipation, or pain. His wife states he had an episode of  vomiting last night, but no difficulty breathing or cough. She notes no recent medication changes and 2 days ago he was assessed for a blood clot, which he did not have. He has had 3 melanomas removed in the past.     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)     Nasal polyp        SURGICAL HISTORY  Past Surgical History:   Procedure Laterality Date    LYMPH NODE EXCISION Right 11/16/2023    Procedure: RIGHT INGUINAL NODE SUPERFICIAL DISSECTION;  Surgeon: Davon Valera M.D.;  Location: [facility];  Service: General    NODE BIOPSY SENTINEL Bilateral 10/20/2020    Procedure: BIOPSY, LYMPH NODE, SENTINEL- GROIN;  Surgeon: Davon Valera M.D.;  Location: SURGERY SAME DAY [facility];  Service: General    WIDE EXCISION Right 10/20/2020    Procedure: WIDE EXCISION, LESION- BUTTOCKS, RADICAL MALIGNANT MELANOMA;  Surgeon: Davon Valera M.D.;  Location: SURGERY SAME DAY [facility];  Service: General    ANTROSTOMY Bilateral 11/15/2019    Procedure: MAXILLARY ANTROSTOMY- REVISION ENDOSCOPICMOMETASONE INJECTION, PROPEL STENT PLACEMENT;  Surgeon: Felicitas Tenorio M.D.;  Location: [facility];  Service: Ent    SINUSCOPY Bilateral 11/15/2019    Procedure: ENDOSCOPY, PARANASAL SINUSES- FOR FRONTAL EXPLORATION;  Surgeon: Felicitas Tenorio M.D.;  Location: [facility];  Service: Ent    TURBINOPLASTY Bilateral 11/15/2019    Procedure: TURBINOPLASTY;  Surgeon: Felicitas Tenorio M.D.;  Location: [facility];  Service: Ent    SPHENOIDECTOMY Bilateral 11/15/2019    Procedure: SPHENOIDECTOMY- FOR SPHENOIDOTOMY;  Surgeon: Felicitas Tenorio M.D.;  Location: [facility];  Service:  "Ent    ETHMOIDECTOMY Bilateral 11/15/2019    Procedure: ETHMOIDECTOMY- ENDOSCOPIC;  Surgeon: Felicitas Tenorio M.D.;  Location: SURGERY [facility];  Service: Ent    NASAL POLYPECTOMY Bilateral 11/15/2019    Procedure: POLYPECTOMY, NASAL CAVITY;  Surgeon: Felicitas Tenorio M.D.;  Location: SURGERY [facility];  Service: Ent    NASAL POLYPECTOMY  2015, 2017, 2019    x 3    OTHER  11/20    removed melanoma       FAMILY HISTORY  History reviewed. No pertinent family history.    SOCIAL HISTORY   reports that he has never smoked. He has never used smokeless tobacco. He reports current alcohol use of about 0.6 oz of alcohol per week. He reports that he does not use drugs.    CURRENT MEDICATIONS  Previous Medications    BINIMETINIB 15 MG TAB    Take 45 mg by mouth 2 times a day.    CALCIUM CARBONATE (TUMS) 500 MG CHEW TAB    Chew 1 Tablet 3 times a day as needed (heartburn).    ENCORAFENIB 75 MG CAP    Take 450 mg by mouth every day.    FAMOTIDINE (PEPCID) 20 MG TAB    Take 20 mg by mouth every day.    FLUTICASONE-SALMETEROL (WIXELA INHUB) 250-50 MCG/ACT AEROSOL POWDER, BREATH ACTIVATED    Inhale 1 Puff every day.    GABAPENTIN (NEURONTIN) 100 MG CAP    Take 100 mg by mouth 3 times a day.    MORPHINE ER (MS CONTIN) 60 MG TAB CR TABLET    Take 1 Tablet by mouth every 12 hours for 14 days.    PROCHLORPERAZINE (COMPAZINE) 10 MG TAB    Take 1 Tablet by mouth in the morning, at noon, and at bedtime.       ALLERGIES  Augmentin    PHYSICAL EXAM  BP 96/61   Pulse (!) 122   Temp (!) 38.1 °C (100.5 °F) (Oral)   Resp 18   Ht 1.803 m (5' 11\")   Wt 68 kg (150 lb)   SpO2 94%   BMI 20.92 kg/m²   Constitutional: Alert and appears fatigued  HENT: No signs of trauma, Bilateral external ears normal, Nose normal. Uvula midline.   Eyes: Pupils are equal and reactive, Conjunctiva normal, Non-icteric.   Neck: C-collar in place. No stridor.  Lymphatic: No lymphadenopathy noted.   Cardiovascular: Tachycardic rate and regular " rhythm, no murmurs.   Thorax & Lungs: Normal breath sounds, No respiratory distress, No wheezing, No chest tenderness.   Abdomen: Bowel sounds normal, Soft, No tenderness, No peritoneal signs, No masses, No pulsatile masses.   Back: No bony tenderness, No CVA tenderness.   Extremities: Intact distal pulses, No edema, No tenderness, No cyanosis.  Musculoskeletal: Good range of motion in all major joints. No tenderness to palpation or major deformities noted.   Neurologic: Cranial nerves II through XII intact.  5 out of 5 strength x4.  Sensation intact light touch.  Normal finger-nose-finger.  Normal reflexes bilaterally.  No clonus. EOMI. PERRL. GCS 15. Alert , Normal motor function, Normal sensory function, No focal deficits noted.   Psychiatric: Affect normal, Judgment normal, Mood normal.   Skin: Palm sized Stage II bed ulcer. Warm, Dry, No erythema, No rash.       DIAGNOSTIC STUDIES & PROCEDURES    Labs:   Results for orders placed or performed during the hospital encounter of 03/21/24   Lactic Acid   Result Value Ref Range    Lactic Acid 1.4 0.5 - 2.0 mmol/L   CBC with Differential   Result Value Ref Range    WBC 3.5 (L) 4.8 - 10.8 K/uL    RBC 3.29 (L) 4.70 - 6.10 M/uL    Hemoglobin 9.0 (L) 14.0 - 18.0 g/dL    Hematocrit 28.2 (L) 42.0 - 52.0 %    MCV 85.7 81.4 - 97.8 fL    MCH 27.4 27.0 - 33.0 pg    MCHC 31.9 (L) 32.3 - 36.5 g/dL    RDW 59.6 (H) 35.9 - 50.0 fL    Platelet Count 359 164 - 446 K/uL    MPV 9.2 9.0 - 12.9 fL    Neutrophils-Polys 73.10 (H) 44.00 - 72.00 %    Lymphocytes 10.50 (L) 22.00 - 41.00 %    Monocytes 10.70 0.00 - 13.40 %    Eosinophils 3.10 0.00 - 6.90 %    Basophils 2.00 (H) 0.00 - 1.80 %    Immature Granulocytes 0.60 0.00 - 0.90 %    Nucleated RBC 0.00 0.00 - 0.20 /100 WBC    Neutrophils (Absolute) 2.59 1.82 - 7.42 K/uL    Lymphs (Absolute) 0.37 (L) 1.00 - 4.80 K/uL    Monos (Absolute) 0.38 0.00 - 0.85 K/uL    Eos (Absolute) 0.11 0.00 - 0.51 K/uL    Baso (Absolute) 0.07 0.00 - 0.12 K/uL     Immature Granulocytes (abs) 0.02 0.00 - 0.11 K/uL    NRBC (Absolute) 0.00 K/uL   Complete Metabolic Panel   Result Value Ref Range    Sodium 134 (L) 135 - 145 mmol/L    Potassium 4.4 3.6 - 5.5 mmol/L    Chloride 102 96 - 112 mmol/L    Co2 22 20 - 33 mmol/L    Anion Gap 10.0 7.0 - 16.0    Glucose 113 (H) 65 - 99 mg/dL    Bun 12 8 - 22 mg/dL    Creatinine 0.32 (L) 0.50 - 1.40 mg/dL    Calcium 7.8 (L) 8.5 - 10.5 mg/dL    Correct Calcium 9.2 8.5 - 10.5 mg/dL    AST(SGOT) 33 12 - 45 U/L    ALT(SGPT) 15 2 - 50 U/L    Alkaline Phosphatase 207 (H) 30 - 99 U/L    Total Bilirubin 0.2 0.1 - 1.5 mg/dL    Albumin 2.2 (L) 3.2 - 4.9 g/dL    Total Protein 5.0 (L) 6.0 - 8.2 g/dL    Globulin 2.8 1.9 - 3.5 g/dL    A-G Ratio 0.8 g/dL   Urinalysis    Specimen: Urine   Result Value Ref Range    Color DK Yellow     Character Clear     Specific Gravity >=1.045 (A) <1.035    Ph 7.0 5.0 - 8.0    Glucose Negative Negative mg/dL    Ketones Negative Negative mg/dL    Protein Negative Negative mg/dL    Bilirubin Negative Negative    Urobilinogen, Urine 1.0 Negative    Nitrite Negative Negative    Leukocyte Esterase Negative Negative    Occult Blood Negative Negative    Micro Urine Req see below    TROPONIN   Result Value Ref Range    Troponin T 39 (H) 6 - 19 ng/L   ESTIMATED GFR   Result Value Ref Range    GFR (CKD-EPI) 134 >60 mL/min/1.73 m 2   PROCALCITONIN   Result Value Ref Range    Procalcitonin 0.12 <0.25 ng/mL   EKG   Result Value Ref Range    Report       [facility] Emergency Dept.    Test Date:  2024  Pt Name:    CARLOS HEREDIA                 Department: ER  MRN:        [id number]                      Room:        05  Gender:     Male                         Technician: [id number]  :        1965                   Requested By:ER TRIAGE PROTOCOL  Order #:    [id number]                    Blayne MD:    Measurements  Intervals                                Axis  Rate:       127                          P:           79  AR:         137                          QRS:        89  QRSD:       103                          T:          268  QT:         360  QTc:        524    Interpretive Statements  Sinus tachycardia  Abnormal T, consider ischemia, diffuse leads  Prolonged QT interval  Compared to ECG 01/11/2024 21:25:28  T-wave abnormality now present  Possible ischemia now present  Prolonged QT interval now present     POCT glucose device results   Result Value Ref Range    POC Glucose, Blood 91 65 - 99 mg/dL   POC CoV-2, FLU A/B, RSV by PCR   Result Value Ref Range    POC Influenza A RNA, PCR Negative Negative    POC Influenza B RNA, PCR Negative Negative    POC RSV, by PCR Negative Negative    POC SARS-CoV-2, PCR NotDetected      All labs reviewed by me.    EKG:   I have independently interpreted this EKG     Radiology:   The attending Emergency Physician has independently interpreted the diagnostic imaging associated with this visit and is awaiting the final reading from the radiologist, which will be displayed below.    Preliminary interpretation is a follows: No PTX, No brain bleed on CT-head  Radiologist interpretation:    CT-LSPINE W/O PLUS RECONS   Final Result      1.  Stable pathologic fractures of the lumbosacral spine. No new acute osseous abnormality identified.   2.  Stable large lytic right sacral ala mass again noted.      CT-ABDOMEN-PELVIS WITH   Final Result      1.  Multiple lytic and sclerotic osseous metastases are again noted with redemonstrated pathologic pelvic and lumbosacral spine fractures redemonstrated.   2.  Hepatic steatosis.      CT-CTA CHEST PULMONARY ARTERY W/ RECONS   Final Result         1. No CT evidence of pulmonary embolism.      2. Several bilateral pulmonary nodules in the lower lungs are more conspicuous than prior, likely worsening metastasis.      3. Grossly unchanged osseous metastases.      4. No airspace opacity. No pleural effusion or pneumothorax.      CT-HEAD W/O   Final Result       1.  No acute intracranial abnormality.   2.  Expansile osseous lesion is again noted in the odontoid process consistent with known metastatic disease.         DX-CHEST-PORTABLE (1 VIEW)   Final Result      No acute cardiopulmonary disease evident.           COURSE & MEDICAL DECISION MAKING    ED Observation Status? No; Patient does not meet criteria for ED Observation.     INITIAL ASSESSMENT AND PLAN  Care Narrative:       1:09 PM - Patient seen and evaluated at bedside by medical student.     1:27 PM- I evaluated the patient at this time and discussed history with patient's wife at bedside. I had a lengthy discussion with the patient and his wife regarding the signs of shock noted by his heart rate in the 120s with a hypotensive blood pressure in the 60s. Discussed that with fever he is shocking signs of possible sepsis. With this in mind, the patient and patient's wife discussed DNR/DNI wishes on behalf of the patient. Discussed plan of care, including full septic work up, consult with pharmacy, and need for hospitalization. Offered medications for pain and discomfort, which the patient denies upon initial evaluation. Patient agrees to plan of care. Patient will be treated with Antibiotics and IV fluids for his symptoms. Ordered CT-abdomen-pelvis, CT-head, CT-CTA chest pulmonary arteries, DX-chest, Lactic acid, CBC with differential, CMP, UA, Urine culture, blood culture to evaluate.     Differential diagnoses include but are not limited to:   #sepsis, fracture, progressive metastatic cancer, ALOC, UTI  -Given ecchymosis I will order L-spine CT to rule out fracture  -Given concern for sepsis patient given antibiotic within 3 hours of my initial assessment  Patient given 2L bolus    Patient reassessed by me multiple times throughout emergency department stay with gradual improvement.     1:35 PM- Spoke with pharmacy regarding appropriate antibiotics at this time.    2:09 PM- Patient reassessed at this time. Blood  pressure of 89/63.    2:36 PM- Patient currently receiving 2 L of fluid. On re-evaluation his blood pressure is 76/43. He states that he feels dizzy. Discussed results from CT head and other imaging. Advised hospitalization once again, to which patient gives verbal agreement to.     3:59 PM- Spoke with pharmacy regarding patient. Advised that I order 1750 mg Vancocin in  mL IVPB.     4:42 PM I discussed the patient's case and the above findings with Dr. Crooks (Intensivist) who agrees to hospitalize the patient and take over the plan of care moving forward.     HYDRATION: Based on the patient's presentation of Hypotension, Sepsis, and Tachycardia the patient was given IV fluids. IV Hydration was used because oral hydration was not adequate alone. Upon recheck following hydration, the patient was mildly improved.    CRITICAL CARE: Sustained hypotension, sepsis protocol  The very real possibilty of a deterioration of this patient's condition required the highest level of my preparedness for sudden, emergent intervention.  I provided critical care services, which included medication orders, frequent reevaluations of the patient's condition and response to treatment, ordering and reviewing test results, and discussing the case with various consultants.  The critical care time associated with the care of the patient was 84 minutes. Review chart for interventions. This time is exclusive of any other billable procedures.                  DISPOSITION AND DISCUSSIONS  I have discussed management of the patient with the following physicians and AZAEL's: Dr. Mustafa (Intensivist)    Discussion of management with other [facility] or appropriate source(s): Pharmacy Discussed antibiotics to be given to the patient with pharmacy      DISPOSITION:  Patient will be hospitalized by Dr. marinelli in critical condition.    FINAL IMPRESSION   1. Acute febrile illness    2. Septic shock (HCC)         Haydee TOLEDO), am scribing for,  and in the presence of, Vincenzo Correia M.D..    Electronically signed by: Haydee Cruz (Scribe), 3/21/2024    I, Vincenzo Correia M.D. personally performed the services described in this documentation, as scribed by Haydee Cruz in my presence, and it is both accurate and complete.    The note accurately reflects work and decisions made by me.  Vincenzo Correia M.D.  3/21/2024  9:25 PM

## 2024-03-21 NOTE — TELEPHONE ENCOUNTER
1. Caller Name: Sherly Wall                          Call Back Number: [phone] (home)         How would the patient prefer to be contacted with a response: Phone call OK to leave a detailed message    Received voicemail, Reporting Sherly is taking patient to the ED, BP has drop below 82 and lethargic, not all there, wife is requesting a call back, Please advise.

## 2024-03-22 ENCOUNTER — TELEPHONE (OUTPATIENT)
Dept: MEDICAL GROUP | Facility: LAB | Age: 59
End: 2024-03-22
Payer: COMMERCIAL

## 2024-03-22 PROBLEM — E86.0 DEHYDRATION: Status: ACTIVE | Noted: 2024-03-22

## 2024-03-22 LAB
ANION GAP SERPL CALC-SCNC: 8 MMOL/L (ref 7–16)
ANISOCYTOSIS BLD QL SMEAR: ABNORMAL
BASOPHILS # BLD AUTO: 2.6 % (ref 0–1.8)
BASOPHILS # BLD: 0.06 K/UL (ref 0–0.12)
BUN SERPL-MCNC: 7 MG/DL (ref 8–22)
CALCIUM SERPL-MCNC: 7.4 MG/DL (ref 8.5–10.5)
CHLORIDE SERPL-SCNC: 106 MMOL/L (ref 96–112)
CO2 SERPL-SCNC: 22 MMOL/L (ref 20–33)
COMMENT 1642: NORMAL
CREAT SERPL-MCNC: 0.19 MG/DL (ref 0.5–1.4)
EOSINOPHIL # BLD AUTO: 0.14 K/UL (ref 0–0.51)
EOSINOPHIL NFR BLD: 6.2 % (ref 0–6.9)
ERYTHROCYTE [DISTWIDTH] IN BLOOD BY AUTOMATED COUNT: 58.5 FL (ref 35.9–50)
GFR SERPLBLD CREATININE-BSD FMLA CKD-EPI: 156 ML/MIN/1.73 M 2
GLUCOSE SERPL-MCNC: 93 MG/DL (ref 65–99)
HCT VFR BLD AUTO: 26.1 % (ref 42–52)
HGB BLD-MCNC: 7.8 G/DL (ref 14–18)
HYPOCHROMIA BLD QL SMEAR: ABNORMAL
IMM GRANULOCYTES # BLD AUTO: 0.01 K/UL (ref 0–0.11)
IMM GRANULOCYTES NFR BLD AUTO: 0.4 % (ref 0–0.9)
LYMPHOCYTES # BLD AUTO: 0.33 K/UL (ref 1–4.8)
LYMPHOCYTES NFR BLD: 14.5 % (ref 22–41)
MCH RBC QN AUTO: 25.7 PG (ref 27–33)
MCHC RBC AUTO-ENTMCNC: 29.9 G/DL (ref 32.3–36.5)
MCV RBC AUTO: 85.9 FL (ref 81.4–97.8)
MICROCYTES BLD QL SMEAR: ABNORMAL
MONOCYTES # BLD AUTO: 0.28 K/UL (ref 0–0.85)
MONOCYTES NFR BLD AUTO: 12.3 % (ref 0–13.4)
MORPHOLOGY BLD-IMP: NORMAL
NEUTROPHILS # BLD AUTO: 1.45 K/UL (ref 1.82–7.42)
NEUTROPHILS NFR BLD: 64 % (ref 44–72)
NRBC # BLD AUTO: 0 K/UL
NRBC BLD-RTO: 0 /100 WBC (ref 0–0.2)
PLATELET # BLD AUTO: 323 K/UL (ref 164–446)
PLATELET BLD QL SMEAR: NORMAL
PMV BLD AUTO: 8.8 FL (ref 9–12.9)
POTASSIUM SERPL-SCNC: 3.9 MMOL/L (ref 3.6–5.5)
RBC # BLD AUTO: 3.04 M/UL (ref 4.7–6.1)
RBC BLD AUTO: PRESENT
SCCMEC + MECA PNL NOSE NAA+PROBE: NEGATIVE
SODIUM SERPL-SCNC: 136 MMOL/L (ref 135–145)
WBC # BLD AUTO: 2.3 K/UL (ref 4.8–10.8)

## 2024-03-22 PROCEDURE — 80048 BASIC METABOLIC PNL TOTAL CA: CPT

## 2024-03-22 PROCEDURE — A9270 NON-COVERED ITEM OR SERVICE: HCPCS | Mod: JZ | Performed by: HOSPITALIST

## 2024-03-22 PROCEDURE — 700105 HCHG RX REV CODE 258: Performed by: STUDENT IN AN ORGANIZED HEALTH CARE EDUCATION/TRAINING PROGRAM

## 2024-03-22 PROCEDURE — 85025 COMPLETE CBC W/AUTO DIFF WBC: CPT

## 2024-03-22 PROCEDURE — 99233 SBSQ HOSP IP/OBS HIGH 50: CPT | Performed by: HOSPITALIST

## 2024-03-22 PROCEDURE — 700111 HCHG RX REV CODE 636 W/ 250 OVERRIDE (IP): Mod: JZ | Performed by: STUDENT IN AN ORGANIZED HEALTH CARE EDUCATION/TRAINING PROGRAM

## 2024-03-22 PROCEDURE — 700102 HCHG RX REV CODE 250 W/ 637 OVERRIDE(OP): Performed by: STUDENT IN AN ORGANIZED HEALTH CARE EDUCATION/TRAINING PROGRAM

## 2024-03-22 PROCEDURE — 770000 HCHG ROOM/CARE - INTERMEDIATE ICU *

## 2024-03-22 PROCEDURE — 700111 HCHG RX REV CODE 636 W/ 250 OVERRIDE (IP): Mod: JZ | Performed by: HOSPITALIST

## 2024-03-22 PROCEDURE — A9270 NON-COVERED ITEM OR SERVICE: HCPCS | Performed by: STUDENT IN AN ORGANIZED HEALTH CARE EDUCATION/TRAINING PROGRAM

## 2024-03-22 PROCEDURE — 700105 HCHG RX REV CODE 258: Performed by: HOSPITALIST

## 2024-03-22 PROCEDURE — 700101 HCHG RX REV CODE 250: Performed by: HOSPITALIST

## 2024-03-22 PROCEDURE — 700102 HCHG RX REV CODE 250 W/ 637 OVERRIDE(OP): Mod: JZ | Performed by: HOSPITALIST

## 2024-03-22 RX ORDER — POTASSIUM CHLORIDE 20 MEQ/1
40 TABLET, EXTENDED RELEASE ORAL ONCE
Status: COMPLETED | OUTPATIENT
Start: 2024-03-22 | End: 2024-03-22

## 2024-03-22 RX ORDER — SODIUM CHLORIDE, SODIUM LACTATE, POTASSIUM CHLORIDE, CALCIUM CHLORIDE 600; 310; 30; 20 MG/100ML; MG/100ML; MG/100ML; MG/100ML
INJECTION, SOLUTION INTRAVENOUS CONTINUOUS
Status: DISCONTINUED | OUTPATIENT
Start: 2024-03-22 | End: 2024-03-23

## 2024-03-22 RX ORDER — NOREPINEPHRINE BITARTRATE 0.03 MG/ML
0-1 INJECTION, SOLUTION INTRAVENOUS CONTINUOUS
Status: DISCONTINUED | OUTPATIENT
Start: 2024-03-22 | End: 2024-03-25

## 2024-03-22 RX ORDER — MEGESTROL ACETATE 20 MG/1
20 TABLET ORAL 4 TIMES DAILY
Status: DISCONTINUED | OUTPATIENT
Start: 2024-03-22 | End: 2024-03-22

## 2024-03-22 RX ORDER — MEGESTROL ACETATE 40 MG/ML
800 SUSPENSION ORAL DAILY
Status: DISCONTINUED | OUTPATIENT
Start: 2024-03-22 | End: 2024-03-28 | Stop reason: HOSPADM

## 2024-03-22 RX ADMIN — MEGESTROL ACETATE 800 MG: 40 SUSPENSION ORAL at 14:49

## 2024-03-22 RX ADMIN — ONDANSETRON 4 MG: 2 INJECTION INTRAMUSCULAR; INTRAVENOUS at 12:16

## 2024-03-22 RX ADMIN — SODIUM CHLORIDE, POTASSIUM CHLORIDE, SODIUM LACTATE AND CALCIUM CHLORIDE: 600; 310; 30; 20 INJECTION, SOLUTION INTRAVENOUS at 17:15

## 2024-03-22 RX ADMIN — CEFEPIME 2 G: 2 INJECTION, POWDER, FOR SOLUTION INTRAVENOUS at 22:52

## 2024-03-22 RX ADMIN — MORPHINE SULFATE 60 MG: 30 TABLET, FILM COATED, EXTENDED RELEASE ORAL at 20:03

## 2024-03-22 RX ADMIN — CEFEPIME 2 G: 2 INJECTION, POWDER, FOR SOLUTION INTRAVENOUS at 02:04

## 2024-03-22 RX ADMIN — NOREPINEPHRINE BITARTRATE 0.04 MCG/KG/MIN: 1 INJECTION INTRAVENOUS at 10:49

## 2024-03-22 RX ADMIN — PROCHLORPERAZINE EDISYLATE 10 MG: 5 INJECTION INTRAMUSCULAR; INTRAVENOUS at 15:48

## 2024-03-22 RX ADMIN — CEFEPIME 2 G: 2 INJECTION, POWDER, FOR SOLUTION INTRAVENOUS at 14:42

## 2024-03-22 RX ADMIN — VANCOMYCIN HYDROCHLORIDE 1000 MG: 5 INJECTION, POWDER, LYOPHILIZED, FOR SOLUTION INTRAVENOUS at 04:00

## 2024-03-22 RX ADMIN — MORPHINE SULFATE 60 MG: 30 TABLET, FILM COATED, EXTENDED RELEASE ORAL at 05:07

## 2024-03-22 RX ADMIN — GABAPENTIN 100 MG: 100 CAPSULE ORAL at 05:07

## 2024-03-22 RX ADMIN — POTASSIUM CHLORIDE 40 MEQ: 1500 TABLET, EXTENDED RELEASE ORAL at 10:41

## 2024-03-22 RX ADMIN — ENOXAPARIN SODIUM 40 MG: 100 INJECTION SUBCUTANEOUS at 17:05

## 2024-03-22 RX ADMIN — SODIUM CHLORIDE: 9 INJECTION, SOLUTION INTRAVENOUS at 14:56

## 2024-03-22 ASSESSMENT — PAIN DESCRIPTION - PAIN TYPE
TYPE: ACUTE PAIN

## 2024-03-22 ASSESSMENT — ENCOUNTER SYMPTOMS
PALPITATIONS: 0
DIZZINESS: 0
SHORTNESS OF BREATH: 0
ABDOMINAL PAIN: 0
BACK PAIN: 0
NECK PAIN: 1
NAUSEA: 0
DIARRHEA: 0
HEADACHES: 0
CHILLS: 0
SORE THROAT: 0
VOMITING: 0
FEVER: 0
COUGH: 0
FOCAL WEAKNESS: 1
LOSS OF CONSCIOUSNESS: 0

## 2024-03-22 ASSESSMENT — FIBROSIS 4 INDEX: FIB4 SCORE: 1.64

## 2024-03-22 NOTE — ASSESSMENT & PLAN NOTE
Had a recent mechanical fall resulting in Acute pathologic type II dens fracture with mild leftward displacement of the dens fragment on March 5  Aspen collar in place  PT  Physiatry consulted    3/25/2024  MSContin decreased from 60mg to 45mg BID 3/23.  Pt seems to be tolerating decreased dose well.  Reviewed MRI of C-spine findings with patient.  Reviewed previous consultation notes from spine surgery Dr. Lawrence. On 3/27 I placed a call to his office and left a message with his medical assistantTo update him as patient had an outpatient follow-up with him later this week to update him on hospitalization and MRI findings

## 2024-03-22 NOTE — PROGRESS NOTES
4 Eyes Skin Assessment Completed by JODY Brown and JODY Coy.    Head WDL  Ears WDL  Nose WDL  Mouth WDL  Neck WDL  Breast/Chest WDL  Shoulder Blades WDL  Spine WDL  (R) Arm/Elbow/Hand WDL  (L) Arm/Elbow/Hand WDL  Abdomen WDL  Groin WDL  Scrotum/Coccyx/Buttocks Redness, Non-Blanching, Excoriation, and Discoloration      (R) Leg Redness and Bruising    (L) Leg WDL  (R) Heel/Foot/Toe Redness, Non-Blanching, Discoloration, and Bruising Dry, blister      (L) Heel/Foot/Toe Non-Blanching and Discoloration Blister            Devices In Places ECG, Tele Box, Blood Pressure Cuff, and Pulse Ox      Interventions In Place Heel Float Boots, Pillows, Low Air Loss Mattress, Barrier Cream, and Heels Loaded W/Pillows    Possible Skin Injury No    Pictures Uploaded Into Epic Yes  Wound Consult Placed N/A  RN Wound Prevention Protocol Ordered Yes

## 2024-03-22 NOTE — ED NOTES
Medication history reviewed with patient and patients wife at bedside.   Med rec is complete  Allergies reviewed.   Patient has not had any outpatient antibiotics in the last 30 days.   Anticoagulants: No    Chris Mijares

## 2024-03-22 NOTE — H&P
Hospital Medicine History & Physical Note    Date of Service  3/21/2024    Primary Care Physician  Jose Luis Juarez M.D.    Consultants  Intensivist    Code Status  DNAR/DNI    Chief Complaint  Chief Complaint   Patient presents with    Headache    Fatigue       History of Presenting Illness  Andrew Wall is a 58 y.o. male who presented 3/21/2024 with generalized weakness.  Patient has history of metastatic melanoma to lymph nodes and bones on daily chemotherapy.  He woke up this morning and was found to have altered mental status.  He was not responding to questions appropriately, and reported dizziness.  He was found to be pale.  Wife checked his blood pressure and found him to have systolic blood pressure of 59.  She checked it 3 times without any change.  She then decided to bring him in to ER for evaluation.    In ER, patient found to be hypotensive, febrile, tachycardic.  Started on Levophed, vancomycin, cefepime.  Pan CT, UA, chest x-ray does not show any clear source of infection.  Intensivist consulted, patient can be monitored under IMCU.    I discussed the plan of care with patient.    Review of Systems  Review of Systems   Constitutional:  Positive for malaise/fatigue.   HENT: Negative.     Eyes: Negative.    Respiratory: Negative.     Cardiovascular: Negative.    Gastrointestinal: Negative.    Genitourinary: Negative.    Musculoskeletal: Negative.    Skin: Negative.    Neurological:  Positive for weakness and headaches.   Endo/Heme/Allergies: Negative.    Psychiatric/Behavioral: Negative.         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    Surgical History   has a past surgical history that includes nasal  polypectomy (2015, 2017, 2019); antrostomy (Bilateral, 11/15/2019); sinuscopy (Bilateral, 11/15/2019); turbinoplasty (Bilateral, 11/15/2019); sphenoidectomy (Bilateral, 11/15/2019); ethmoidectomy (Bilateral, 11/15/2019); nasal polypectomy (Bilateral, 11/15/2019); node biopsy sentinel (Bilateral, 10/20/2020); wide excision (Right, 10/20/2020); other (11/20); and lymph node excision (Right, 11/16/2023).     Family History  family history is not on file.   Family history reviewed with patient. There is no family history that is pertinent to the chief complaint.     Social History   reports that he has never smoked. He has never used smokeless tobacco. He reports current alcohol use of about 0.6 oz of alcohol per week. He reports that he does not use drugs.    Allergies  Allergies   Allergen Reactions    Augmentin Vomiting and Nausea       Medications  Prior to Admission Medications   Prescriptions Last Dose Informant Patient Reported? Taking?   Binimetinib 15 MG Tab 3/20/2024 at am Patient, Family Member No Yes   Sig: Take 45 mg by mouth 2 times a day.   Patient taking differently: Take 45 mg by mouth 2 times a day. 3 tablets=45mg   CALCIUM CARBONATE ANTACID PO 3/20/2024 at am Patient, Family Member Yes Yes   Sig: Take 2 Tablets by mouth every day.   DULoxetine (CYMBALTA) 60 MG Cap DR Particles delayed-release capsule 3 weeks ago at stopped Patient, Family Member Yes Yes   Sig: Take 60 mg by mouth every day.   Encorafenib 75 MG Cap 3/19/2024 at am Patient, Family Member No No   Sig: Take 450 mg by mouth every day.   Patient taking differently: Take 450 mg by mouth every day. 6 capsules=450mg   Mometasone Furo-Formoterol Fum (DULERA INH) 3/19/2024 at pm Patient, Family Member Yes Yes   Sig: Inhale 2 Puffs 2 times a day.   famotidine (PEPCID) 20 MG Tab 3/20/2024 at am Patient, Family Member Yes Yes   Sig: Take 20 mg by mouth every day.   gabapentin (NEURONTIN) 100 MG Cap 3/20/2024 at am Patient, Family Member Yes Yes    Sig: Take 100 mg by mouth every day.   morphine ER (MS CONTIN) 60 MG Tab CR tablet 3/21/2024 at 0800 Patient, Family Member No Yes   Sig: Take 1 Tablet by mouth every 12 hours for 14 days.   ondansetron (ZOFRAN ODT) 4 MG TABLET DISPERSIBLE 3/20/2024 at 2030 Patient, Family Member Yes Yes   Sig: Take 4 mg by mouth every 6 hours as needed for Nausea/Vomiting.   prochlorperazine (COMPAZINE) 10 MG Tab 3/21/2024 at 0800 Patient, Family Member Yes Yes   Sig: Take 10 mg by mouth 1 time a day as needed for Nausea/Vomiting.      Facility-Administered Medications: None       Physical Exam  Temp:  [37.2 °C (98.9 °F)-38.1 °C (100.5 °F)] 37.2 °C (98.9 °F)  Pulse:  [] 84  Resp:  [11-30] 25  BP: ()/(45-64) 91/55  SpO2:  [90 %-97 %] 96 %  Blood Pressure: 91/55   Temperature: 37.2 °C (98.9 °F)   Pulse: 84   Respiration: (!) 25   Pulse Oximetry: 96 %       Physical Exam  Constitutional:       Appearance: Normal appearance.      Comments: Overall generalized weakness   HENT:      Head: Normocephalic.      Nose: Nose normal.      Mouth/Throat:      Mouth: Mucous membranes are moist.   Eyes:      Pupils: Pupils are equal, round, and reactive to light.   Neck:      Comments: Aspen collar in place  Cardiovascular:      Rate and Rhythm: Normal rate and regular rhythm.      Pulses: Normal pulses.   Pulmonary:      Effort: Pulmonary effort is normal.      Breath sounds: Normal breath sounds.   Abdominal:      General: Abdomen is flat. Bowel sounds are normal.      Palpations: Abdomen is soft.   Musculoskeletal:         General: Normal range of motion.   Skin:     General: Skin is warm.   Neurological:      General: No focal deficit present.      Mental Status: He is alert and oriented to person, place, and time. Mental status is at baseline.   Psychiatric:         Mood and Affect: Mood normal.         Behavior: Behavior normal.         Thought Content: Thought content normal.         Judgment: Judgment normal.  "        Laboratory:  Recent Labs     03/21/24  1253   WBC 3.5*   RBC 3.29*   HEMOGLOBIN 9.0*   HEMATOCRIT 28.2*   MCV 85.7   MCH 27.4   MCHC 31.9*   RDW 59.6*   PLATELETCT 359   MPV 9.2     Recent Labs     03/21/24  1253   SODIUM 134*   POTASSIUM 4.4   CHLORIDE 102   CO2 22   GLUCOSE 113*   BUN 12   CREATININE 0.32*   CALCIUM 7.8*     Recent Labs     03/21/24  1253   ALTSGPT 15   ASTSGOT 33   ALKPHOSPHAT 207*   TBILIRUBIN 0.2   GLUCOSE 113*         No results for input(s): \"NTPROBNP\" in the last 72 hours.      Recent Labs     03/21/24  1403   TROPONINT 39*       Imaging:  CT-LSPINE W/O PLUS RECONS   Final Result      1.  Stable pathologic fractures of the lumbosacral spine. No new acute osseous abnormality identified.   2.  Stable large lytic right sacral ala mass again noted.      CT-ABDOMEN-PELVIS WITH   Final Result      1.  Multiple lytic and sclerotic osseous metastases are again noted with redemonstrated pathologic pelvic and lumbosacral spine fractures redemonstrated.   2.  Hepatic steatosis.      CT-CTA CHEST PULMONARY ARTERY W/ RECONS   Final Result         1. No CT evidence of pulmonary embolism.      2. Several bilateral pulmonary nodules in the lower lungs are more conspicuous than prior, likely worsening metastasis.      3. Grossly unchanged osseous metastases.      4. No airspace opacity. No pleural effusion or pneumothorax.      CT-HEAD W/O   Final Result      1.  No acute intracranial abnormality.   2.  Expansile osseous lesion is again noted in the odontoid process consistent with known metastatic disease.         DX-CHEST-PORTABLE (1 VIEW)   Final Result      No acute cardiopulmonary disease evident.          X-Ray:  I have personally reviewed the images and compared with prior images.    Assessment/Plan:  Justification for Admission Status  I anticipate this patient will require at least two midnights for appropriate medical management, necessitating inpatient admission because patient has septic " shock    Patient will need a Intermediate Care (Adult and Pediatrics) bed on MEDICAL service .  The need is secondary to septic shock.    * Septic shock (HCC)  Assessment & Plan  This is Sepsis Present on admission  SIRS criteria identified on my evaluation include: Fever, with temperature greater than 100.9 deg F and Tachycardia, with heart rate greater than 90 BPM  Clinical indicators of end organ dysfunction include Hypotension with systolic blood pressure less than 90 or MAP less than 65  Source is Unknown  Sepsis protocol initiated  Crystalloid Fluid Administration: Fluid resuscitation ordered per standard protocol - 30 mL/kg per current or ideal body weight  IV antibiotics as appropriate for source of sepsis  Reassessment: I have reassessed the patient's hemodynamic status    Patient with history of metastatic melanoma  presents with generalized weakness.  Found to be in septic shock.  Unknown source of infection at this moment.  Chest x-ray, UA, CT head, CT abdomen pelvis, CT chest negative for infection however does demonstrate widespread metastasis.  Continue Levophed  Vancomycin/cefepime  Tylenol  Fluids        C2 cervical fracture (HCC)  Assessment & Plan  Had a recent mechanical fall resulting in Acute pathologic type II dens fracture with mild leftward displacement of the dens fragment on March 5  Aspen collar in place  PT      ACP (advance care planning)  Assessment & Plan  Reviewed with wife and patient.  DNR/DNI    Cancer related pain- (present on admission)  Assessment & Plan  Continue home morphine  Dilaudid as needed    Pathologic fracture- (present on admission)  Assessment & Plan  From malignancy  Seen on Pan CT    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)- (present on admission)  Assessment & Plan  Hx of metastatic melanoma BRAF V6 00 E+ w/ metastases to bones/lymph nodes, on treatment  Following with oncology outpatient  Oncology consult in a.m. as to when patient can resume his  binimetinib and encorafenib      Patient is critically ill.   The patient continues to have: Septic shock, metastatic melanoma  The vital organ system that is affected is the: Cardiovascular system  If untreated there is a high chance of deterioration into: Multiorgan failure  And eventually death.   The critical care that I am providing today is: IV Levophed, IV vancomycin, IV cefepime, IV Dilaudid  The critical that has been undertaken is medically complex.   There has been no overlap in critical care time.   Critical Care Time not including procedures: 31      VTE prophylaxis: enoxaparin ppx

## 2024-03-22 NOTE — DISCHARGE PLANNING
Following for post acute services. Spoke with spouse Sherly about goals and expectation for IRF level of care. Discussed CMS guidelines for IRF level of care. Discuss the next step moving forward. We are pending PT/OT evaluations and physiatry consult.Will need to demonstrate that he can meet CMS guideline to submit to insurance for consideration. Sherly has my return number for any additional questions. Will follow.

## 2024-03-22 NOTE — ASSESSMENT & PLAN NOTE
Septic shock versus dehydration  Patient was clearly dry in presentation.  He has also had a fever however he is on an an oncologic medication which can cause fever as a side effect.  He has had an extensive workup for source of infection, and thus far this has been negative.     No clear source of infection antibiotics discontinued    Continue midodrine  Cortisol level is low but patient currently asymptomatic we will hold off on stress dose hydrocortisone

## 2024-03-22 NOTE — PROGRESS NOTES
[facility] Medicine Daily Progress Note    Date of Service  3/22/2024    Chief Complaint  Andrew Wall is a 58 y.o. male admitted 3/21/2024 with fever altered mental status    Hospital Course  59yo PMHx metastatic melanoma, asthma, chronic pain related to bony mets.  Presented with altered mental status, SBP of 59 at home.  In the ED hypotensive, febrile, tachy.  No clear source of infection found.  Started on Vanc, cefepime and levophed    Interval Problem Update  Patient states he is feeling better today.  Wife at the bedside notes that he is more alert and interactive and closer to his baseline.  He has no focal complaints beyond some neck pain, and weakness in his shoulders which have been ongoing for some time now.    Tmax 100.5  HR 80s  SBP 90-110s  On RA  UOP 850ml/12hrs    I have discussed this patient's plan of care and discharge plan at IDT rounds today with Case Management, Nursing, Nursing leadership, and other members of the IDT team.    Consultants/Specialty      Code Status  DNAR/DNI    Disposition  The patient is not medically cleared for discharge to home or a post-acute facility.      I have placed the appropriate orders for post-discharge needs.    Review of Systems  Review of Systems   Constitutional:  Negative for chills and fever.   HENT:  Negative for sore throat.    Respiratory:  Negative for cough and shortness of breath.    Cardiovascular:  Negative for chest pain, palpitations and leg swelling.   Gastrointestinal:  Negative for abdominal pain, diarrhea, nausea and vomiting.   Genitourinary:  Negative for dysuria and urgency.   Musculoskeletal:  Positive for neck pain. Negative for back pain.   Skin:  Negative for rash.   Neurological:  Positive for focal weakness. Negative for dizziness, loss of consciousness and headaches.        Physical Exam  Temp:  [37 °C (98.6 °F)-38.1 °C (100.5 °F)] 37.3 °C (99.1 °F)  Pulse:  [] 87  Resp:  [8-30] 8  BP: ()/(45-75) 98/63  SpO2:  [89  %-100 %] 98 %    Physical Exam  Constitutional:       General: He is not in acute distress.     Appearance: He is well-developed. He is not diaphoretic.   HENT:      Head: Normocephalic and atraumatic.   Eyes:      Conjunctiva/sclera: Conjunctivae normal.   Neck:      Vascular: No JVD.   Cardiovascular:      Rate and Rhythm: Normal rate.      Heart sounds: No murmur heard.     No gallop.   Pulmonary:      Effort: Pulmonary effort is normal. No respiratory distress.      Breath sounds: No stridor. No wheezing or rales.   Abdominal:      Palpations: Abdomen is soft.      Tenderness: There is no abdominal tenderness. There is no guarding or rebound.   Skin:     General: Skin is warm and dry.      Coloration: Skin is pale.      Findings: No rash.   Neurological:      Mental Status: He is oriented to person, place, and time.      Comments: 3-/5 B shoulder abd   Psychiatric:         Mood and Affect: Mood normal.         Behavior: Behavior normal.         Thought Content: Thought content normal.         Fluids    Intake/Output Summary (Last 24 hours) at 3/22/2024 0727  Last data filed at 3/22/2024 0400  Gross per 24 hour   Intake 2913.97 ml   Output 850 ml   Net 2063.97 ml       Laboratory  Recent Labs     03/21/24  1253 03/22/24  0210   WBC 3.5* 2.3*   RBC 3.29* 3.04*   HEMOGLOBIN 9.0* 7.8*   HEMATOCRIT 28.2* 26.1*   MCV 85.7 85.9   MCH 27.4 25.7*   MCHC 31.9* 29.9*   RDW 59.6* 58.5*   PLATELETCT 359 323   MPV 9.2 8.8*     Recent Labs     03/21/24  1253 03/22/24  0210   SODIUM 134* 136   POTASSIUM 4.4 3.9   CHLORIDE 102 106   CO2 22 22   GLUCOSE 113* 93   BUN 12 7*   CREATININE 0.32* 0.19*   CALCIUM 7.8* 7.4*                   Imaging  CT-LSPINE W/O PLUS RECONS   Final Result      1.  Stable pathologic fractures of the lumbosacral spine. No new acute osseous abnormality identified.   2.  Stable large lytic right sacral ala mass again noted.      CT-ABDOMEN-PELVIS WITH   Final Result      1.  Multiple lytic and sclerotic  osseous metastases are again noted with redemonstrated pathologic pelvic and lumbosacral spine fractures redemonstrated.   2.  Hepatic steatosis.      CT-CTA CHEST PULMONARY ARTERY W/ RECONS   Final Result         1. No CT evidence of pulmonary embolism.      2. Several bilateral pulmonary nodules in the lower lungs are more conspicuous than prior, likely worsening metastasis.      3. Grossly unchanged osseous metastases.      4. No airspace opacity. No pleural effusion or pneumothorax.      CT-HEAD W/O   Final Result      1.  No acute intracranial abnormality.   2.  Expansile osseous lesion is again noted in the odontoid process consistent with known metastatic disease.         DX-CHEST-PORTABLE (1 VIEW)   Final Result      No acute cardiopulmonary disease evident.           Assessment/Plan  * Septic shock (HCC)  Assessment & Plan  This is Sepsis Present on admission  SIRS criteria identified on my evaluation include: Fever, with temperature greater than 100.9 deg F and Tachycardia, with heart rate greater than 90 BPM  Clinical indicators of end organ dysfunction include Hypotension with systolic blood pressure less than 90 or MAP less than 65  Source is Unknown  Sepsis protocol initiated  Crystalloid Fluid Administration: Fluid resuscitation ordered per standard protocol - 30 mL/kg per current or ideal body weight  IV antibiotics as appropriate for source of sepsis  Reassessment: I have reassessed the patient's hemodynamic status    Patient with history of metastatic melanoma  presents with generalized weakness.  Found to be in septic shock.  Unknown source of infection at this moment.  Chest x-ray, UA, CT head, CT abdomen pelvis, CT chest negative for infection however does demonstrate widespread metastasis.  Continue Levophed  Vancomycin/cefepime  Tylenol  Fluids        C2 cervical fracture (HCC)  Assessment & Plan  Had a recent mechanical fall resulting in Acute pathologic type II dens fracture with mild  leftward displacement of the dens fragment on March 5  Aspen collar in place  PT      ACP (advance care planning)  Assessment & Plan  Reviewed with wife and patient.  DNR/DNI    Cancer related pain- (present on admission)  Assessment & Plan  Continue home morphine  Dilaudid as needed    Pathologic fracture- (present on admission)  Assessment & Plan  From malignancy  Seen on Pan CT    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)- (present on admission)  Assessment & Plan  Hx of metastatic melanoma BRAF V6 00 E+ w/ metastases to bones/lymph nodes, on treatment  Following with oncology outpatient  Oncology consult in a.m. as to when patient can resume his binimetinib and encorafenib         VTE prophylaxis: VTE Selection    I have performed a physical exam and reviewed and updated ROS and Plan today (3/22/2024). In review of yesterday's note (3/21/2024), there are no changes except as documented above.

## 2024-03-22 NOTE — ASSESSMENT & PLAN NOTE
Hx of metastatic melanoma BRAF V6 00 E+ w/ metastases to bones/lymph nodes, on treatment  Following with oncology outpatient  DW Oncology Dr Parra patient restarted on binimetinib and encorafenib  Previously received radiation therapy to metastatic lesions  Outpatient follow-up with oncology

## 2024-03-22 NOTE — ED NOTES
Bedside report received by JODY Fuchs    Oxygen:RA  Fall Risk status: bed in lowest position/locked, call light within reach  Patient Mentation:A+O x 4  Continuous cardiac monitoring: NSR  Pending: bed placement

## 2024-03-22 NOTE — PROGRESS NOTES
Pharmacy Vancomycin Kinetics Note for 3/21/2024     58 y.o. male on Vancomycin day # 1     Vancomycin Indication (AUC Dosing): Sepsis      Provider specified end date: 3/26/24    Active Antibiotics (From admission, onward)      Ordered     Ordering Provider       Thu Mar 21, 2024  6:49 PM    03/21/24 1849  cefepime (Maxipime) in D5W IV syringe 2 g  EVERY 12 HOURS         Nabil Stafford M.D.    03/21/24 1849  MD Alert...Vancomycin per Pharmacy  PHARMACY TO DOSE        Question:  Indication(s) for vancomycin?  Answer:  Unknown source of infection    Nabil Stafford M.D.       Thu Mar 21, 2024  3:57 PM    03/21/24 1557  vancomycin (Vancocin) 1,750 mg in  mL IVPB  (vancomycin (VANCOCIN) IV (LD + Maintenance))  ONCE         Vincenzo Correia M.D.            Dosing Weight: 68 kg (149 lb 14.6 oz)      Admission History: Admitted on 3/21/2024 for Septic shock (HCC) [A41.9, R65.21]  Pertinent history: Patient with a history of metastatic cancer presenting with hypotension requiring vasopressor support and fatigue as well as altered mental status. Of note, pt with sacral wound as well.    Allergies:     Augmentin     Pertinent cultures to date:     Results       Procedure Component Value Units Date/Time    Urinalysis [[id number]]  (Abnormal) Collected: 03/21/24 1525    Order Status: Completed Specimen: Urine Updated: 03/21/24 1539     Color DK Yellow     Character Clear     Specific Gravity >=1.045     Ph 7.0     Glucose Negative mg/dL      Ketones Negative mg/dL      Protein Negative mg/dL      Bilirubin Negative     Urobilinogen, Urine 1.0     Nitrite Negative     Leukocyte Esterase Negative     Occult Blood Negative     Micro Urine Req see below     Comment: Microscopic examination not performed when specimen is clear  and chemically negative for protein, blood, leukocyte esterase  and nitrite.         Narrative:      Indication for culture:->Emergency Room Patient    Urine Culture (New) [[id number]] Collected:  "24 1525    Order Status: Sent Specimen: Urine Updated: 24 1533    Narrative:      Indication for culture:->Emergency Room Patient    Blood Culture - Draw one from central line and one from peripheral site [[id number]] Collected: 24 1403    Order Status: Sent Specimen: Blood from Peripheral Updated: 24 1432    Narrative:      Blood Cultures X2. Draw one blood culture from central line  and one blood culture peripherally. If no line present, draw  blood cultures times two peripherally from different sites.    Blood Culture - Draw one from central line and one from peripheral site [[id number]] Collected: 24 1345    Order Status: Sent Specimen: Blood from Line Updated: 24 1402    Narrative:      Blood Cultures X2. Blood Cultures X2. Draw one blood culture  from central line and one blood culture peripherally. If no  line present, draw blood cultures times two peripherally from  different sites.            Labs:     Estimated Creatinine Clearance: 239.1 mL/min (A) (by C-G formula based on SCr of 0.32 mg/dL (L)).  Recent Labs     24  1253   WBC 3.5*   NEUTSPOLYS 73.10*     Recent Labs     24  1253   BUN 12   CREATININE 0.32*   ALBUMIN 2.2*       Intake/Output Summary (Last 24 hours) at 3/21/2024 2056  Last data filed at 3/21/2024 2012  Gross per 24 hour   Intake 2540 ml   Output --   Net 2540 ml      BP 95/54   Pulse 76   Temp 37.2 °C (98.9 °F) (Temporal)   Resp 13   Ht 1.803 m (5' 11\")   Wt 68 kg (150 lb)   SpO2 97%  Temp (24hrs), Av.6 °C (99.7 °F), Min:37.2 °C (98.9 °F), Max:38.1 °C (100.5 °F)      List concerns for Vancomycin clearance:     BUN/Scr ratio greater than 20:1;Pressors/Hypotension;Receipt of contrast dye    Pharmacokinetics: AUC Dosing    AUC kinetics:   Ke (hr ^-1): 0.094 hr^-1  Half life: 7.37 hr  Clearance: 4.155  Estimated TDD: .5  Estimated Dose: 814  Estimated interval: 9.4      A/P:     -  Vancomycin dose: 1000mg q12h     -  Next vancomycin " level(s): Not currently ordered, consider after 4th maintenance dose.       -  Predicted vancomycin AUC from initial AUC test calculator: 481 mg·hr/L      -  Comments: MRSA nares ordered. Please continue to monitor renal function, urine output and vancomycin levels for dosing adjustments. Please also follow cultures and signs of clinical cure for de-escalation of therapy.       Collette Sheffield, Jose GD

## 2024-03-22 NOTE — ASSESSMENT & PLAN NOTE
Reviewed with wife and patient.  DNR/DNI  Discussed with patient again on this admission.  He had questions regarding comfort care status and hospice, but states he is not ready for that right now.

## 2024-03-22 NOTE — DISCHARGE PLANNING
PM&R referral from Dr. Rosado. Sepsis debility. Therapy evaluations pending. Physiatry consult pended at this time.

## 2024-03-22 NOTE — PROGRESS NOTES
Critical Care Medicine   Brief Cross-Coverage Progress Note    Date of service: 3/21/2024  Time: 1655    Contacted by ERP for IMCU request.  In brief this is a 58-year-old male with past medical history of stage IV malignant melanoma with diffuse bony metastasis complicated by cervical fracture, pulmonary metastasis.  During his previous hospitalization he stated his goals of care were to not be resuscitated with intubation or CPR and has filed a POLST form demonstrating comfort measures only.  He arrived for confusion and hypotension.  Was found to have sepsis of unknown source requiring low-dose Levophed.  He has been started on broad-spectrum antimicrobials, whole-body CT imaging has been completed without any obvious source.  Per the ERP's discussion with the patient and his wife he is open to admission for antibiotics and vasopressors.    IMCU admission approved, the hospitalist service will be this patient's primary treating team.  [facility] critical care has not been formally consulted but will remain available if needed for any concerns or worsening.    Bob Sherman Jr. D.O.  [facility]

## 2024-03-22 NOTE — TELEPHONE ENCOUNTER
Received call from Home health, Joanne, Notified insurance denied Home health services from last week, Also since patient got readmitted to the hospital, he is not discharge form home health after so many admit to the hospital.      I made sure to thank Joanne for her effort and care and patience with this patient and tie spent now that care is ending with home health services only.

## 2024-03-22 NOTE — DIETARY
"Nutrition services:  Day 1 of admit.  Andrew Wall is a 58 y.o. male with admitting DX of Septic shock ([facility]) [A41.9, R65.21]    Consult received for MST 2: 2-13 lb unplanned wt loss x1 month, poor PO intake PTA.    Patient Active Problem List    Diagnosis Date Noted    C2 cervical fracture ([facility]) 03/21/2024    Septic shock ([facility]) 03/09/2024    Acute encephalopathy 03/06/2024    Fracture dislocation of cervical spine, initial encounter ([facility]) 03/05/2024    Ground-level fall 03/05/2024    Cancer related pain 02/05/2024    Nausea and vomiting 02/05/2024    ACP (advance care planning) 02/05/2024    Hypophosphatemia 01/24/2024    Hypocalcemia 01/20/2024    Pathologic fracture 01/18/2024    Elevated LFTs 01/18/2024    DNR (do not resuscitate) 01/17/2024    Metastasis to bone ([facility]) 01/16/2024    Leukocytosis 01/15/2024    Hyperglycemia 01/15/2024    Intractable low back pain 01/13/2024    Hypokalemia 01/13/2024    Metastatic melanoma, BRAF V600E POSITIVE  ([facility]) 01/12/2024    Elevated glucose 01/12/2024    Acute anemia 01/12/2024    Constipation 01/12/2024    Hypercalcemia 01/11/2024    Malignant melanoma metastatic to lymph node ([facility]) 11/16/2023    Moderate persistent asthma without complication 08/11/2021    Chronic pansinusitis 08/11/2021    Bilateral tinnitus 08/11/2021    Melanoma of buttock ([facility]) 10/20/2020    Mild intermittent asthma without complication 08/07/2020    Hypertrophy of nasal turbinates        Nutrition Assessment:  Height: 180.3 cm (5' 11\")  Weight: 62 kg (136 lb 11 oz)  Body mass index is 19.06 kg/m²., BMI classification: Normal  Wt hx per chart review:  03/21/24 62 kg (136 lb 11 oz)  03/13/24 63.5 kg (140 lb)  03/10/24 66 kg (145 lb 8.1 oz)  03/05/24 64.4 kg (142 lb)  02/02/24 70.3 kg (155 lb)  01/04/24 73.9 kg (163 lb)  12/09/23 77.1 kg (170 lb)  12/06/23 78 kg (172 lb)  11/16/23 77.9 kg (171 lb 11.8 oz)  05/24/23 79.4 kg (175 lb)    Current diet order/intake: Regular: PO intake not yet " "documented    Objective:  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)     Nasal polyp      Pertinent labs: BUN 7, crea 0.19, Ca 7.4  Pertinent meds: neurontin, morphine, levo at 0.04 mcg/kg/min, Kdur  Skin/wounds: no documented wounds or edema  Food Allergies: NKFA  Last bowel movement: PTA    Subjective:  Pt seen by [facility] RD Sondra ~2 weeks ago during admit; met criteria for severe chronic malnutrition at that time. Ongoing malnutrition AEB pt's report of poor PO intake and an additional 5-lb wt loss since then (3.7%). Per RD note from that visit, TF not within pt's GOC.  Dietary recall: Boost VHC BID (44g protein, 1060 kcal/day), ~3/4 slice toast some days  NFPE: severe muscle wasting: temporalis, deltoid    Nutrition Diagnosis:   Per RD 3/6/24: \"Pt meets ASPEN criteria for severe malnutrition in the context of chronic illness related to metastatic melanoma AEB 16% weight loss x 4 mo per family report and PO intake <50% of all meals x 4 mo per family report.\" Ongoing severe malnutrition per RD judgement given severe muscle wasting, ongoing wt loss, and ongoing inadequate oral intake.     Nutrition Interventions:  Provide BID Ensure Plus and QD TwoCal supplements with meals per collaboration w/ pt.  Patient verbalized understanding and has no further questions.   Provide preferred breakfast as reviewed w/ pt.  Educated patient on nutrition plan of care.    Nutrition Monitoring and Evaluation:  Document intake of all PO as % taken in ADL's to provide interdisciplinary communication across all shifts.   Monitor weight.  Nutrition services will continue to see patient for ongoing meal and snack preferences.  RD will follow-up per department " policy.    RD following

## 2024-03-22 NOTE — ASSESSMENT & PLAN NOTE
3/25/2024  MSContin decreased from 60mg to 45mg BID 3/23.  Pt seems to be tolerating decreased dose well.  Discussed further tapering would like to hold off as he is concerned about increased pain when he goes to rehab

## 2024-03-22 NOTE — ASSESSMENT & PLAN NOTE
Secondary to metastatic melanoma  Reviewed spine surgery notes from Dr. Camacho  Continue c-collar  Follow-up on MRI

## 2024-03-22 NOTE — DISCHARGE PLANNING
Care Transition Team Assessment    Patient is a readmit. Patient was previously discharged on 3/12 with Bernie DUVALL and PCP f/u. Readmitted on 3/21 with generalized weakness. Pt on daily chemotherapy for metastatic melanoma. Pt lives with his wife Sherly [phone] (who is 24/7 primary caregiver and is a good support) in [place], [place]. Children are also there to help. Pt has a hospital bed and wheelchair at home. The patient's PCP is Dr. Jose Luis Juarez. Patient's preferred pharmacy is [facility]. Pt was able to eat on his own but needs assistance with ADLs and IADLs. Pt has an advance directive and has POLST on file. Pt reports no tobacco use. Pt reports current alcohol use of about 0.6 oz of alcohol per week. He reports that he does not use drugs.     Escalations Completed: None    Medically Clear: No    Next Steps: RN CM to assist with d/c needs    Barriers to Discharge: Medical clearance    Information Source  Orientation Level: Oriented X4  Information Given By: Patient, Other (Comments) (chart review)  Informant's Name: Sebastian Mae  Who is responsible for making decisions for patient? : Patient    Readmission Evaluation  Is this a readmission?: Yes - unplanned readmission  Why do you think you were readmitted?: complex medical needs    Elopement Risk  Legal Hold: No  Ambulatory or Self Mobile in Wheelchair: No-Not an Elopement Risk  Elopement Risk: Not at Risk for Elopement    Interdisciplinary Discharge Planning  Patient or legal guardian wants to designate a caregiver: No    Discharge Preparedness  What is your plan after discharge?: Uncertain - pending medical team collaboration  What are your discharge supports?: Spouse, Child  Prior Functional Level: Needs Assist with Medication Management, Needs Assist with Activities of Daily Living    Functional Assesment  Prior Functional Level: Needs Assist with Medication Management, Needs Assist with Activities of Daily Living    Vision / Hearing Impairment  Vision  Impairment : No  Hearing Impairment : No    Advance Directive  Advance Directive?: POLST, DPOA for Health Care    Domestic Abuse  Have you ever been the victim of abuse or violence?: No  Physical Abuse or Sexual Abuse: No  Verbal Abuse or Emotional Abuse: No  Possible Abuse/Neglect Reported to:: Not Applicable    Psychological Assessment  History of Substance Abuse: None  History of Psychiatric Problems: No    Discharge Risks or Barriers  Patient risk factors: Complex medical needs

## 2024-03-22 NOTE — ASSESSMENT & PLAN NOTE
Patient has had 2 admissions in the last month with dehydration and low-grade fevers.  Infectious workup on the previous admission has been negative, he did respond to IV fluids, and getting rid of some of the sedating medications that he was on.  He is having difficulty maintaining his hydration at home.  Megace was effective for his appetite, but may have caused some mental status changes on the previous admission.  As he was on other medications at that time including muscle relaxants and narcotics, we will try him again with megalies on this occasion and see if absent muscle relaxants, it does not impact his mental status.  May need to consider home infusion of crystalloid if follows fails.        3/25/2024  Pt off IVFs now x 48hrs and conts to have good UOP, labs  Instructed him to drink 1.5-2 litres of fluid daily

## 2024-03-23 LAB
ANION GAP SERPL CALC-SCNC: 11 MMOL/L (ref 7–16)
BACTERIA UR CULT: NORMAL
BUN SERPL-MCNC: 7 MG/DL (ref 8–22)
CALCIUM SERPL-MCNC: 7.2 MG/DL (ref 8.5–10.5)
CHLORIDE SERPL-SCNC: 107 MMOL/L (ref 96–112)
CO2 SERPL-SCNC: 18 MMOL/L (ref 20–33)
CREAT SERPL-MCNC: 0.23 MG/DL (ref 0.5–1.4)
ERYTHROCYTE [DISTWIDTH] IN BLOOD BY AUTOMATED COUNT: 61.6 FL (ref 35.9–50)
GFR SERPLBLD CREATININE-BSD FMLA CKD-EPI: 148 ML/MIN/1.73 M 2
GLUCOSE SERPL-MCNC: 105 MG/DL (ref 65–99)
HCT VFR BLD AUTO: 28 % (ref 42–52)
HGB BLD-MCNC: 8 G/DL (ref 14–18)
MCH RBC QN AUTO: 25.7 PG (ref 27–33)
MCHC RBC AUTO-ENTMCNC: 28.6 G/DL (ref 32.3–36.5)
MCV RBC AUTO: 90 FL (ref 81.4–97.8)
PLATELET # BLD AUTO: 306 K/UL (ref 164–446)
PMV BLD AUTO: 8.8 FL (ref 9–12.9)
POTASSIUM SERPL-SCNC: 4.2 MMOL/L (ref 3.6–5.5)
RBC # BLD AUTO: 3.11 M/UL (ref 4.7–6.1)
SIGNIFICANT IND 70042: NORMAL
SITE SITE: NORMAL
SODIUM SERPL-SCNC: 136 MMOL/L (ref 135–145)
SOURCE SOURCE: NORMAL
WBC # BLD AUTO: 3.1 K/UL (ref 4.8–10.8)

## 2024-03-23 PROCEDURE — 770000 HCHG ROOM/CARE - INTERMEDIATE ICU *

## 2024-03-23 PROCEDURE — 700111 HCHG RX REV CODE 636 W/ 250 OVERRIDE (IP): Mod: JZ | Performed by: HOSPITALIST

## 2024-03-23 PROCEDURE — 700105 HCHG RX REV CODE 258: Performed by: HOSPITALIST

## 2024-03-23 PROCEDURE — A9270 NON-COVERED ITEM OR SERVICE: HCPCS | Performed by: STUDENT IN AN ORGANIZED HEALTH CARE EDUCATION/TRAINING PROGRAM

## 2024-03-23 PROCEDURE — 700102 HCHG RX REV CODE 250 W/ 637 OVERRIDE(OP): Performed by: HOSPITALIST

## 2024-03-23 PROCEDURE — 80048 BASIC METABOLIC PNL TOTAL CA: CPT

## 2024-03-23 PROCEDURE — 99291 CRITICAL CARE FIRST HOUR: CPT | Performed by: HOSPITALIST

## 2024-03-23 PROCEDURE — A9270 NON-COVERED ITEM OR SERVICE: HCPCS | Performed by: HOSPITALIST

## 2024-03-23 PROCEDURE — 700101 HCHG RX REV CODE 250: Performed by: HOSPITALIST

## 2024-03-23 PROCEDURE — 85027 COMPLETE CBC AUTOMATED: CPT

## 2024-03-23 PROCEDURE — 700111 HCHG RX REV CODE 636 W/ 250 OVERRIDE (IP): Mod: JZ | Performed by: STUDENT IN AN ORGANIZED HEALTH CARE EDUCATION/TRAINING PROGRAM

## 2024-03-23 PROCEDURE — 700102 HCHG RX REV CODE 250 W/ 637 OVERRIDE(OP): Performed by: STUDENT IN AN ORGANIZED HEALTH CARE EDUCATION/TRAINING PROGRAM

## 2024-03-23 RX ADMIN — CEFEPIME 2 G: 2 INJECTION, POWDER, FOR SOLUTION INTRAVENOUS at 21:44

## 2024-03-23 RX ADMIN — MORPHINE SULFATE 45 MG: 30 TABLET, FILM COATED, EXTENDED RELEASE ORAL at 10:36

## 2024-03-23 RX ADMIN — CEFEPIME 2 G: 2 INJECTION, POWDER, FOR SOLUTION INTRAVENOUS at 13:21

## 2024-03-23 RX ADMIN — GABAPENTIN 100 MG: 100 CAPSULE ORAL at 05:18

## 2024-03-23 RX ADMIN — MEGESTROL ACETATE 800 MG: 40 SUSPENSION ORAL at 05:56

## 2024-03-23 RX ADMIN — ENOXAPARIN SODIUM 40 MG: 100 INJECTION SUBCUTANEOUS at 17:24

## 2024-03-23 RX ADMIN — SODIUM CHLORIDE, POTASSIUM CHLORIDE, SODIUM LACTATE AND CALCIUM CHLORIDE: 600; 310; 30; 20 INJECTION, SOLUTION INTRAVENOUS at 05:55

## 2024-03-23 RX ADMIN — NOREPINEPHRINE BITARTRATE 0.06 MCG/KG/MIN: 1 INJECTION INTRAVENOUS at 03:35

## 2024-03-23 RX ADMIN — CEFEPIME 2 G: 2 INJECTION, POWDER, FOR SOLUTION INTRAVENOUS at 05:18

## 2024-03-23 RX ADMIN — MORPHINE SULFATE 45 MG: 30 TABLET, FILM COATED, EXTENDED RELEASE ORAL at 17:25

## 2024-03-23 ASSESSMENT — PAIN DESCRIPTION - PAIN TYPE
TYPE: ACUTE PAIN

## 2024-03-23 ASSESSMENT — ENCOUNTER SYMPTOMS
PALPITATIONS: 0
FEVER: 0
BACK PAIN: 0
LOSS OF CONSCIOUSNESS: 0
DIARRHEA: 0
SORE THROAT: 0
CHILLS: 0
SHORTNESS OF BREATH: 0
VOMITING: 0
ABDOMINAL PAIN: 0
FOCAL WEAKNESS: 1
NAUSEA: 0
COUGH: 0
NECK PAIN: 1
DIZZINESS: 0
HEADACHES: 0

## 2024-03-23 NOTE — PROGRESS NOTES
4 Eyes Skin Assessment Completed by JODY Tate and JODY English.    Head WDL  Ears WDL  Nose WDL  Mouth WDL  Neck WDL  Breast/Chest WDL  Shoulder Blades WDL  Spine WDL  (R) Arm/Elbow/Hand WDL  (L) Arm/Elbow/Hand WDL  Abdomen WDL  Groin WDL  Scrotum/Coccyx/Buttocks Redness, Blanching, and Excoriation  (R) Leg WDL  (L) Leg WDL  (R) Heel/Foot/Toe Redness, Blanching, and Boggy open blister, dark edges  (L) Heel/Foot/Toe Redness, Blanching, and Boggy          Devices In Places ECG, Blood Pressure Cuff, Pulse Ox, SCD's, and Cervical Collar      Interventions In Place Heel Mepilex, Heel Float Boots, Pillows, Q2 Turns, Low Air Loss Mattress, and Barrier Cream, mepilex dressings C collar,     Possible Skin Injury Yes    Pictures Uploaded Into Epic N/A  Wound Consult Placed N/A  RN Wound Prevention Protocol Ordered Yes

## 2024-03-23 NOTE — CARE PLAN
Problem: Pain - Standard  Goal: Alleviation of pain or a reduction in pain to the patient’s comfort goal  Outcome: Progressing   The patient is Watcher - Medium risk of patient condition declining or worsening    Shift Goals  Clinical Goals: wean levophed, mobilize  Patient Goals: rest  Family Goals: updates    Progress made toward(s) clinical / shift goals:  Levophed currently weaned to off and patient has been maintaining MAP of > 60. Pt got up to chair SBA and tolerated being up for two hours.     Patient is not progressing towards the following goals:    N/A

## 2024-03-23 NOTE — PROGRESS NOTES
4 Eyes Skin Assessment Completed by JODY Davidson and Antoinette RN.    Head WDL  Ears WDL  Nose WDL  Mouth WDL  Neck WDL   Breast/Chest WDL  Shoulder Blades Redness and Blanching  Spine WDL  (R) Arm/Elbow/Hand WDL  (L) Arm/Elbow/Hand Scab  Abdomen WDL  Groin WDL  Scrotum/Coccyx/Buttocks Redness, Blanching, and Excoriation, old surgical scar   (R) Leg Redness  (L) Leg Redness and Blanching, rash- pt states eczema   (R) Heel/Foot/Toe Redness, Blanching, and Boggy, open blister with dark edges   (L) Heel/Foot/Toe Redness, Blanching, and Boggy          Devices In Places ECG, Tele Box, Blood Pressure Cuff, Pulse Ox, SCD's, and Cervical Collar      Interventions In Place Sacral Mepilex, Pillows, Q2 Turns, Low Air Loss Mattress, Barrier Cream, and Heels Loaded W/Pillows    Possible Skin Injury Yes    Pictures Uploaded Into Epic N/A - Already completed   Wound Consult Placed N/A   RN Wound Prevention Protocol Ordered Yes

## 2024-03-23 NOTE — CARE PLAN
The patient is Watcher - Medium risk of patient condition declining or worsening    Shift Goals  Clinical Goals: wean levo gtt, MAP> 65, increase PO intake  Patient Goals: Rest  Family Goals: updates    Progress made toward(s) clinical / shift goals:    Problem: Knowledge Deficit - Standard  Goal: Patient and family/care givers will demonstrate understanding of plan of care, disease process/condition, diagnostic tests and medications  Outcome: Progressing     Problem: Hemodynamics  Goal: Patient's hemodynamics, fluid balance and neurologic status will be stable or improve  Outcome: Progressing     Problem: Fluid Volume  Goal: Fluid volume balance will be maintained  Outcome: Progressing     Problem: Urinary - Renal Perfusion  Goal: Ability to achieve and maintain adequate renal perfusion and functioning will improve  Outcome: Progressing     Problem: Respiratory  Goal: Patient will achieve/maintain optimum respiratory ventilation and gas exchange  Outcome: Progressing     Problem: Fall Risk  Goal: Patient will remain free from falls  Outcome: Progressing     Problem: Pain - Standard  Goal: Alleviation of pain or a reduction in pain to the patient’s comfort goal  Outcome: Progressing       Patient is not progressing towards the following goals: N/A

## 2024-03-23 NOTE — PROGRESS NOTES
[facility] Medicine Daily Progress Note    Date of Service  3/23/2024    Chief Complaint  Andrew Wall is a 58 y.o. male admitted 3/21/2024 with fever altered mental status    Hospital Course  59yo PMHx metastatic melanoma, asthma, chronic pain related to bony mets.  Presented with altered mental status, SBP of 59 at home.  In the ED hypotensive, febrile, tachy.  No clear source of infection found.  Started on Vanc, cefepime and levophed    Interval Problem Update  Feeling good this morning.  Wife thinks he is thinking clearly.  Patient reports some pain in his neck with movement, but generally no pain when he lays still.  Afebrile.    Discussed with patient's wife who is at the bedside.    AFebrile  HR 80s  SBP 90-110s  On RA  UOP 850ml/12hrs  Levophed running at 0.05 mcg/kg/min titrating to goal MAP of 65: Decrease MAP goal to 60, and were on was able to taper patient off by mid afternoon.    Critical care time 35 minutes  Patient is critically ill with undifferentiated shock.  He requires IMCU level of care due to his hemodynamic instability.  He is currently requiring IV Levophed which we are actively titrating to a goal MAP greater than 65.    I have discussed this patient's plan of care and discharge plan at IDT rounds today with Case Management, Nursing, Nursing leadership, and other members of the IDT team.    Consultants/Specialty      Code Status  DNAR/DNI    Disposition  The patient is not medically cleared for discharge to home or a post-acute facility.      I have placed the appropriate orders for post-discharge needs.    Review of Systems  Review of Systems   Constitutional:  Negative for chills and fever.   HENT:  Negative for sore throat.    Respiratory:  Negative for cough and shortness of breath.    Cardiovascular:  Negative for chest pain, palpitations and leg swelling.   Gastrointestinal:  Negative for abdominal pain, diarrhea, nausea and vomiting.   Genitourinary:  Negative for dysuria and  urgency.   Musculoskeletal:  Positive for neck pain. Negative for back pain.   Skin:  Negative for rash.   Neurological:  Positive for focal weakness. Negative for dizziness, loss of consciousness and headaches.        Physical Exam  Temp:  [36.4 °C (97.6 °F)-37 °C (98.6 °F)] 36.6 °C (97.9 °F)  Pulse:  [] 100  Resp:  [11-36] 24  BP: ()/(50-86) 97/68  SpO2:  [84 %-100 %] 98 %    Physical Exam  Constitutional:       General: He is not in acute distress.     Appearance: He is well-developed. He is not diaphoretic.   HENT:      Head: Normocephalic and atraumatic.   Eyes:      Conjunctiva/sclera: Conjunctivae normal.   Neck:      Vascular: No JVD.   Cardiovascular:      Rate and Rhythm: Normal rate.      Heart sounds: No murmur heard.     No gallop.   Pulmonary:      Effort: Pulmonary effort is normal. No respiratory distress.      Breath sounds: No stridor. No wheezing or rales.   Abdominal:      Palpations: Abdomen is soft.      Tenderness: There is no abdominal tenderness. There is no guarding or rebound.   Skin:     General: Skin is warm and dry.      Coloration: Skin is pale.      Findings: No rash.   Neurological:      Mental Status: He is oriented to person, place, and time.      Comments: 3-/5 B shoulder abd   Psychiatric:         Mood and Affect: Mood normal.         Behavior: Behavior normal.         Thought Content: Thought content normal.       Fluids    Intake/Output Summary (Last 24 hours) at 3/23/2024 0705  Last data filed at 3/23/2024 0600  Gross per 24 hour   Intake 2553.57 ml   Output 880 ml   Net 1673.57 ml       Laboratory  Recent Labs     03/21/24  1253 03/22/24  0210 03/23/24  0217   WBC 3.5* 2.3* 3.1*   RBC 3.29* 3.04* 3.11*   HEMOGLOBIN 9.0* 7.8* 8.0*   HEMATOCRIT 28.2* 26.1* 28.0*   MCV 85.7 85.9 90.0   MCH 27.4 25.7* 25.7*   MCHC 31.9* 29.9* 28.6*   RDW 59.6* 58.5* 61.6*   PLATELETCT 359 323 306   MPV 9.2 8.8* 8.8*     Recent Labs     03/21/24  1253 03/22/24  0210 03/23/24  0217    SODIUM 134* 136 136   POTASSIUM 4.4 3.9 4.2   CHLORIDE 102 106 107   CO2 22 22 18*   GLUCOSE 113* 93 105*   BUN 12 7* 7*   CREATININE 0.32* 0.19* 0.23*   CALCIUM 7.8* 7.4* 7.2*                   Imaging  CT-LSPINE W/O PLUS RECONS   Final Result      1.  Stable pathologic fractures of the lumbosacral spine. No new acute osseous abnormality identified.   2.  Stable large lytic right sacral ala mass again noted.      CT-ABDOMEN-PELVIS WITH   Final Result      1.  Multiple lytic and sclerotic osseous metastases are again noted with redemonstrated pathologic pelvic and lumbosacral spine fractures redemonstrated.   2.  Hepatic steatosis.      CT-CTA CHEST PULMONARY ARTERY W/ RECONS   Final Result         1. No CT evidence of pulmonary embolism.      2. Several bilateral pulmonary nodules in the lower lungs are more conspicuous than prior, likely worsening metastasis.      3. Grossly unchanged osseous metastases.      4. No airspace opacity. No pleural effusion or pneumothorax.      CT-HEAD W/O   Final Result      1.  No acute intracranial abnormality.   2.  Expansile osseous lesion is again noted in the odontoid process consistent with known metastatic disease.         DX-CHEST-PORTABLE (1 VIEW)   Final Result      No acute cardiopulmonary disease evident.           Assessment/Plan  * Septic shock (HCC)  Assessment & Plan  Septic shock versus dehydration  Patient was clearly dry in presentation.  He has also had a fever however he is on an an oncologic medication which can cause fever as a side effect.  He has had an extensive workup for source of infection, and thus far this has been negative.  Given that he is high risk, we will continue empiric coverage with antibiotics while we look forward to results from cultures, and follow the patient clinically.    3/23/2024  Exam remains totally benign with no focus of infection noted on review of systems or physical exam  Cultures remain negative  Patient afebrile  Continue  current antibiotic regimen for another 24 hours as we follow cultures and exam  Repeat labs    Dehydration  Assessment & Plan  Patient has had 2 admissions in the last month with dehydration and low-grade fevers.  Infectious workup on the previous admission has been negative, he did respond to IV fluids, and getting rid of some of the sedating medications that he was on.  He is having difficulty maintaining his hydration at home.  Megace was effective for his appetite, but may have caused some mental status changes on the previous admission.  As he was on other medications at that time including muscle relaxants and narcotics, we will try him again with megalies on this occasion and see if absent muscle relaxants, it does not impact his mental status.  May need to consider home infusion of crystalloid if follows fails.    3/23/2024  ..  Patient is now adequately rehydrated.  DC IV fluids and see if he can maintain it now that he is on Megace and eating a little better    C2 cervical fracture (HCC)  Assessment & Plan  Had a recent mechanical fall resulting in Acute pathologic type II dens fracture with mild leftward displacement of the dens fragment on March 5  Aspen collar in place  PT  Physiatry consulted  Patient's pain seems to have improved now that he is out from his initial injury.  Currently on 60 mg of MS Contin, will try him down on 45 mg twice daily and taper slowly assuming that his pain remains controlled      ACP (advance care planning)  Assessment & Plan  Reviewed with wife and patient.  DNR/DNI  Discussed with patient again on this admission.  He had questions regarding comfort care status and hospice, but states he is not ready for that right now.    Cancer related pain- (present on admission)  Assessment & Plan  On MS Contin 60 mg twice daily.  Overall it would appear his pain is diminished now that he is out from his acute injury.  Decreased dose to 45 mg twice daily and continue to taper if he  tolerates it  Dilaudid as needed      Pathologic fracture- (present on admission)  Assessment & Plan  From malignancy  Seen on Pan CT    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)- (present on admission)  Assessment & Plan  Hx of metastatic melanoma BRAF V6 00 E+ w/ metastases to bones/lymph nodes, on treatment  Following with oncology outpatient  Oncology consult in a.m. as to when patient can resume his binimetinib and encorafenib         VTE prophylaxis: VTE Selection    I have performed a physical exam and reviewed and updated ROS and Plan today (3/23/2024). In review of yesterday's note (3/22/2024), there are no changes except as documented above.

## 2024-03-23 NOTE — DISCHARGE PLANNING
Would appreciate TX evals once appropriate.  Levophed gtt is a barrier.  Onc consult pending as to when patient can resume his binimetinib and encorafenib

## 2024-03-24 PROCEDURE — 700102 HCHG RX REV CODE 250 W/ 637 OVERRIDE(OP): Performed by: STUDENT IN AN ORGANIZED HEALTH CARE EDUCATION/TRAINING PROGRAM

## 2024-03-24 PROCEDURE — A9270 NON-COVERED ITEM OR SERVICE: HCPCS | Performed by: HOSPITALIST

## 2024-03-24 PROCEDURE — 700111 HCHG RX REV CODE 636 W/ 250 OVERRIDE (IP): Mod: JZ | Performed by: STUDENT IN AN ORGANIZED HEALTH CARE EDUCATION/TRAINING PROGRAM

## 2024-03-24 PROCEDURE — 770000 HCHG ROOM/CARE - INTERMEDIATE ICU *

## 2024-03-24 PROCEDURE — 99291 CRITICAL CARE FIRST HOUR: CPT | Performed by: HOSPITALIST

## 2024-03-24 PROCEDURE — 700101 HCHG RX REV CODE 250: Performed by: HOSPITALIST

## 2024-03-24 PROCEDURE — 700105 HCHG RX REV CODE 258: Performed by: HOSPITALIST

## 2024-03-24 PROCEDURE — A9270 NON-COVERED ITEM OR SERVICE: HCPCS | Performed by: STUDENT IN AN ORGANIZED HEALTH CARE EDUCATION/TRAINING PROGRAM

## 2024-03-24 PROCEDURE — 700111 HCHG RX REV CODE 636 W/ 250 OVERRIDE (IP): Mod: JZ | Performed by: HOSPITALIST

## 2024-03-24 PROCEDURE — 700102 HCHG RX REV CODE 250 W/ 637 OVERRIDE(OP): Performed by: HOSPITALIST

## 2024-03-24 RX ORDER — AMOXICILLIN 250 MG
2 CAPSULE ORAL 2 TIMES DAILY
Status: DISCONTINUED | OUTPATIENT
Start: 2024-03-24 | End: 2024-03-28 | Stop reason: HOSPADM

## 2024-03-24 RX ORDER — MIDODRINE HYDROCHLORIDE 5 MG/1
5 TABLET ORAL
Status: DISCONTINUED | OUTPATIENT
Start: 2024-03-24 | End: 2024-03-24

## 2024-03-24 RX ORDER — POLYETHYLENE GLYCOL 3350 17 G/17G
1 POWDER, FOR SOLUTION ORAL
Status: DISCONTINUED | OUTPATIENT
Start: 2024-03-24 | End: 2024-03-27

## 2024-03-24 RX ORDER — MIDODRINE HYDROCHLORIDE 5 MG/1
5 TABLET ORAL
Status: DISCONTINUED | OUTPATIENT
Start: 2024-03-24 | End: 2024-03-28 | Stop reason: HOSPADM

## 2024-03-24 RX ORDER — BISACODYL 10 MG
10 SUPPOSITORY, RECTAL RECTAL DAILY
Status: DISCONTINUED | OUTPATIENT
Start: 2024-03-24 | End: 2024-03-27

## 2024-03-24 RX ADMIN — CEFEPIME 2 G: 2 INJECTION, POWDER, FOR SOLUTION INTRAVENOUS at 06:34

## 2024-03-24 RX ADMIN — MORPHINE SULFATE 45 MG: 30 TABLET, FILM COATED, EXTENDED RELEASE ORAL at 17:57

## 2024-03-24 RX ADMIN — MIDODRINE HYDROCHLORIDE 5 MG: 5 TABLET ORAL at 10:44

## 2024-03-24 RX ADMIN — DOCUSATE SODIUM 50 MG AND SENNOSIDES 8.6 MG 2 TABLET: 8.6; 5 TABLET, FILM COATED ORAL at 10:44

## 2024-03-24 RX ADMIN — MORPHINE SULFATE 45 MG: 30 TABLET, FILM COATED, EXTENDED RELEASE ORAL at 08:40

## 2024-03-24 RX ADMIN — CEFEPIME 2 G: 2 INJECTION, POWDER, FOR SOLUTION INTRAVENOUS at 13:02

## 2024-03-24 RX ADMIN — MEGESTROL ACETATE 800 MG: 40 SUSPENSION ORAL at 08:40

## 2024-03-24 RX ADMIN — MIDODRINE HYDROCHLORIDE 5 MG: 5 TABLET ORAL at 17:57

## 2024-03-24 RX ADMIN — GABAPENTIN 100 MG: 100 CAPSULE ORAL at 08:40

## 2024-03-24 RX ADMIN — MIDODRINE HYDROCHLORIDE 5 MG: 5 TABLET ORAL at 13:01

## 2024-03-24 RX ADMIN — DOCUSATE SODIUM 50 MG AND SENNOSIDES 8.6 MG 2 TABLET: 8.6; 5 TABLET, FILM COATED ORAL at 17:56

## 2024-03-24 RX ADMIN — ONDANSETRON 4 MG: 2 INJECTION INTRAMUSCULAR; INTRAVENOUS at 06:21

## 2024-03-24 RX ADMIN — ENOXAPARIN SODIUM 40 MG: 100 INJECTION SUBCUTANEOUS at 17:57

## 2024-03-24 ASSESSMENT — ENCOUNTER SYMPTOMS
ABDOMINAL PAIN: 0
BACK PAIN: 0
DIZZINESS: 0
NAUSEA: 0
SHORTNESS OF BREATH: 0
VOMITING: 0
SORE THROAT: 0
CHILLS: 0
FOCAL WEAKNESS: 1
LOSS OF CONSCIOUSNESS: 0
COUGH: 0
FEVER: 0
PALPITATIONS: 0
DIARRHEA: 0
HEADACHES: 0
NECK PAIN: 1

## 2024-03-24 ASSESSMENT — PAIN DESCRIPTION - PAIN TYPE
TYPE: ACUTE PAIN

## 2024-03-24 NOTE — PROGRESS NOTES
[facility] Medicine Daily Progress Note    Date of Service  3/24/2024    Chief Complaint  Andrew Wall is a 58 y.o. male admitted 3/21/2024 with fever altered mental status    Hospital Course  57yo PMHx metastatic melanoma, asthma, chronic pain related to bony mets.  Presented with altered mental status, SBP of 59 at home.  In the ED hypotensive, febrile, tachy.  No clear source of infection found.  Started on Vanc, cefepime and levophed    Interval Problem Update  Feeling good this morning.  Some neck soreness but tolerable after decreasing the dose of MSContin yesterday    Discussed with patient's wife who is at the bedside.    AFebrile  HR 80s  SBP 90-110s  On RA  UOP [id number]/24hrs  Levophed running at 0.01 mcg/kg/min titrating to goal MAP of 65: Decrease MAP goal to 60, and were on was able to taper patient off by mid afternoon.    Critical care time 34 minutes  Patient is critically ill with undifferentiated shock.  He requires IMCU level of care due to his hemodynamic instability.  He is currently requiring IV Levophed which we are actively titrating to a goal MAP greater than 65.    I have discussed this patient's plan of care and discharge plan at IDT rounds today with Case Management, Nursing, Nursing leadership, and other members of the IDT team.    Consultants/Specialty      Code Status  DNAR/DNI    Disposition  The patient is not medically cleared for discharge to home or a post-acute facility.      I have placed the appropriate orders for post-discharge needs.    Review of Systems  Review of Systems   Constitutional:  Negative for chills and fever.   HENT:  Negative for sore throat.    Respiratory:  Negative for cough and shortness of breath.    Cardiovascular:  Negative for chest pain, palpitations and leg swelling.   Gastrointestinal:  Negative for abdominal pain, diarrhea, nausea and vomiting.   Genitourinary:  Negative for dysuria and urgency.   Musculoskeletal:  Positive for neck pain. Negative  for back pain.   Skin:  Negative for rash.   Neurological:  Positive for focal weakness. Negative for dizziness, loss of consciousness and headaches.        Physical Exam  Temp:  [36.3 °C (97.4 °F)-36.6 °C (97.8 °F)] 36.4 °C (97.5 °F)  Pulse:  [] 116  Resp:  [9-45] 17  BP: ()/(52-75) 110/75  SpO2:  [92 %-100 %] 98 %    Physical Exam  Constitutional:       General: He is not in acute distress.     Appearance: He is well-developed. He is not diaphoretic.   HENT:      Head: Normocephalic and atraumatic.   Eyes:      Conjunctiva/sclera: Conjunctivae normal.   Neck:      Vascular: No JVD.   Cardiovascular:      Rate and Rhythm: Normal rate.      Heart sounds: No murmur heard.     No gallop.   Pulmonary:      Effort: Pulmonary effort is normal. No respiratory distress.      Breath sounds: No stridor. No wheezing or rales.   Abdominal:      Palpations: Abdomen is soft.      Tenderness: There is no abdominal tenderness. There is no guarding or rebound.   Musculoskeletal:      Right lower leg: No edema.      Left lower leg: No edema.   Skin:     General: Skin is warm and dry.      Coloration: Skin is pale.      Findings: No rash.   Neurological:      Mental Status: He is oriented to person, place, and time.      Comments: 3-/5 B shoulder abd   Psychiatric:         Mood and Affect: Mood normal.         Behavior: Behavior normal.         Thought Content: Thought content normal.         Fluids    Intake/Output Summary (Last 24 hours) at 3/24/2024 0731  Last data filed at 3/24/2024 0634  Gross per 24 hour   Intake 504.12 ml   Output 1575 ml   Net -1070.88 ml       Laboratory  Recent Labs     03/21/24  1253 03/22/24  0210 03/23/24  0217   WBC 3.5* 2.3* 3.1*   RBC 3.29* 3.04* 3.11*   HEMOGLOBIN 9.0* 7.8* 8.0*   HEMATOCRIT 28.2* 26.1* 28.0*   MCV 85.7 85.9 90.0   MCH 27.4 25.7* 25.7*   MCHC 31.9* 29.9* 28.6*   RDW 59.6* 58.5* 61.6*   PLATELETCT 359 323 306   MPV 9.2 8.8* 8.8*     Recent Labs     03/21/24  0091  03/22/24  0210 03/23/24  0217   SODIUM 134* 136 136   POTASSIUM 4.4 3.9 4.2   CHLORIDE 102 106 107   CO2 22 22 18*   GLUCOSE 113* 93 105*   BUN 12 7* 7*   CREATININE 0.32* 0.19* 0.23*   CALCIUM 7.8* 7.4* 7.2*                   Imaging  CT-LSPINE W/O PLUS RECONS   Final Result      1.  Stable pathologic fractures of the lumbosacral spine. No new acute osseous abnormality identified.   2.  Stable large lytic right sacral ala mass again noted.      CT-ABDOMEN-PELVIS WITH   Final Result      1.  Multiple lytic and sclerotic osseous metastases are again noted with redemonstrated pathologic pelvic and lumbosacral spine fractures redemonstrated.   2.  Hepatic steatosis.      CT-CTA CHEST PULMONARY ARTERY W/ RECONS   Final Result         1. No CT evidence of pulmonary embolism.      2. Several bilateral pulmonary nodules in the lower lungs are more conspicuous than prior, likely worsening metastasis.      3. Grossly unchanged osseous metastases.      4. No airspace opacity. No pleural effusion or pneumothorax.      CT-HEAD W/O   Final Result      1.  No acute intracranial abnormality.   2.  Expansile osseous lesion is again noted in the odontoid process consistent with known metastatic disease.         DX-CHEST-PORTABLE (1 VIEW)   Final Result      No acute cardiopulmonary disease evident.           Assessment/Plan  * Septic shock (HCC)  Assessment & Plan  Septic shock versus dehydration  Patient was clearly dry in presentation.  He has also had a fever however he is on an an oncologic medication which can cause fever as a side effect.  He has had an extensive workup for source of infection, and thus far this has been negative.  Given that he is high risk, we will continue empiric coverage with antibiotics while we look forward to results from cultures, and follow the patient clinically.    3/23/2024  Exam remains totally benign with no focus of infection noted on review of systems or physical exam  Cultures remain  negative  Patient afebrile  Continue current antibiotic regimen for another 24 hours as we follow cultures and exam  Repeat labs    3/24/2024  AFebrile>48hrs  Cultures neg  No indication of acute infection  DC Abx's and monitor  Start midodrine to facilitate titration off of levophed  Check cortisol    Dehydration  Assessment & Plan  Patient has had 2 admissions in the last month with dehydration and low-grade fevers.  Infectious workup on the previous admission has been negative, he did respond to IV fluids, and getting rid of some of the sedating medications that he was on.  He is having difficulty maintaining his hydration at home.  Megace was effective for his appetite, but may have caused some mental status changes on the previous admission.  As he was on other medications at that time including muscle relaxants and narcotics, we will try him again with megalies on this occasion and see if absent muscle relaxants, it does not impact his mental status.  May need to consider home infusion of crystalloid if follows fails.    3/23/2024  ..  Patient is now adequately rehydrated.  DC IV fluids and see if he can maintain it now that he is on Megace and eating a little better    3/24/2024  IVFs stopped yesterday and pt maintained good UOP overnight  Am labs and exam look good  Instructed him to drink 1.5-2 litres of fluid daily    C2 cervical fracture (HCC)  Assessment & Plan  Had a recent mechanical fall resulting in Acute pathologic type II dens fracture with mild leftward displacement of the dens fragment on March 5  Aspen collar in place  PT  Physiatry consulted    3/24/2024  MSContin decreased from 60mg to 45mg BID yesterday.  Pt seems to be tolerating decreased dose well.  Let him ride at current dose for a few days and consider further downward titration after that      ACP (advance care planning)  Assessment & Plan  Reviewed with wife and patient.  DNR/DNI  Discussed with patient again on this admission.  He  had questions regarding comfort care status and hospice, but states he is not ready for that right now.    Cancer related pain- (present on admission)  Assessment & Plan  3/24/2024  MSContin decreased from 60mg to 45mg BID yesterday.  Pt seems to be tolerating decreased dose well.  Let him ride at current dose for a few days and consider further downward titration after that  Dilaudid as needed      Pathologic fracture- (present on admission)  Assessment & Plan  From malignancy  Seen on Pan CT    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)- (present on admission)  Assessment & Plan  Hx of metastatic melanoma BRAF V6 00 E+ w/ metastases to bones/lymph nodes, on treatment  Following with oncology outpatient  Oncology consult in a.m. as to when patient can resume his binimetinib and encorafenib         VTE prophylaxis: VTE Selection    I have performed a physical exam and reviewed and updated ROS and Plan today (3/24/2024). In review of yesterday's note (3/23/2024), there are no changes except as documented above.

## 2024-03-24 NOTE — PROGRESS NOTES
4 Eyes Skin Assessment Completed by JODY Tate and JODY English.    Head Redness, posterior head abrasion          Ears WDL  Nose WDL  Mouth WDL  Neck WDL  Breast/Chest WDL  Shoulder Blades WDL  Spine WDL  (R) Arm/Elbow/Hand WDL  (L) Arm/Elbow/Hand WDL  Abdomen WDL  Groin WDL  Scrotum/Coccyx/Buttocks Redness, Blanching, and Excoriation  (R) Leg WDL  (L) Leg WDL  (R) Heel/Foot/Toe Redness, Blanching, and Boggy brown area of dried skin     (L) Heel/Foot/Toe Redness, Blanching, and Boggy          Devices In Places ECG, Blood Pressure Cuff, Pulse Ox, SCD's, and Cervical Collar      Interventions In Place Heel Mepilex, Pillows, Q2 Turns, Low Air Loss Mattress, and Barrier Cream, mepilex dressings C collar,     Possible Skin Injury Yes    Pictures Uploaded Into Epic N/A  Wound Consult Placed N/A  RN Wound Prevention Protocol Ordered Yes

## 2024-03-24 NOTE — CARE PLAN
The patient is Watcher - Medium risk of patient condition declining or worsening    Shift Goals  Clinical Goals: wean levophed, hemodynamic stability  Patient Goals: rest  Family Goals: Updates    Progress made toward(s) clinical / shift goals:    Problem: Knowledge Deficit - Standard  Goal: Patient and family/care givers will demonstrate understanding of plan of care, disease process/condition, diagnostic tests and medications  Outcome: Progressing     Problem: Fall Risk  Goal: Patient will remain free from falls  Outcome: Progressing     Problem: Pain - Standard  Goal: Alleviation of pain or a reduction in pain to the patient’s comfort goal  Outcome: Progressing

## 2024-03-24 NOTE — PROGRESS NOTES
4 Eyes Skin Assessment Completed by JODY Curry and JODY Ross.    Head WDL  Ears WDL  Nose WDL  Mouth WDL  Neck C-collar in place  Breast/Chest WDL  Shoulder Blades Redness and Blanching  Spine WDL  (R) Arm/Elbow/Hand WDL  (L) Arm/Elbow/Hand Scab  Abdomen WDL  Groin WDL  Scrotum/Coccyx/Buttocks Redness, Blanching, and Excoriation, old surgical scar   (R) Leg Redness  (L) Leg Redness, Blanching, and Rash- pt states eczema  (R) Heel/Foot/Toe Redness, Blanching, and Boggy, open blister w/ dark edges   (L) Heel/Foot/Toe Redness, Blanching, and Boggy          Devices In Places ECG, Tele Box, Blood Pressure Cuff, Pulse Ox, SCD's, and Cervical Collar      Interventions In Place Heel Mepilex, Sacral Mepilex, TAP System, Pillows, Low Air Loss Mattress, Barrier Cream, and Heels Loaded W/Pillows    Possible Skin Injury Yes    Pictures Uploaded Into Epic N/A- Already completed  Wound Consult Placed N/A  RN Wound Prevention Protocol Ordered Yes - already ordered

## 2024-03-25 LAB
ANION GAP SERPL CALC-SCNC: 9 MMOL/L (ref 7–16)
BUN SERPL-MCNC: 5 MG/DL (ref 8–22)
CALCIUM SERPL-MCNC: 7.9 MG/DL (ref 8.5–10.5)
CHLORIDE SERPL-SCNC: 105 MMOL/L (ref 96–112)
CO2 SERPL-SCNC: 21 MMOL/L (ref 20–33)
CORTIS SERPL-MCNC: 3.6 UG/DL (ref 0–23)
CREAT SERPL-MCNC: 0.19 MG/DL (ref 0.5–1.4)
ERYTHROCYTE [DISTWIDTH] IN BLOOD BY AUTOMATED COUNT: 59.5 FL (ref 35.9–50)
FERRITIN SERPL-MCNC: 1243 NG/ML (ref 22–322)
GFR SERPLBLD CREATININE-BSD FMLA CKD-EPI: 156 ML/MIN/1.73 M 2
GLUCOSE SERPL-MCNC: 94 MG/DL (ref 65–99)
HCT VFR BLD AUTO: 27.6 % (ref 42–52)
HGB BLD-MCNC: 8.2 G/DL (ref 14–18)
IRON SATN MFR SERPL: 51 % (ref 15–55)
IRON SERPL-MCNC: 37 UG/DL (ref 50–180)
MCH RBC QN AUTO: 25.5 PG (ref 27–33)
MCHC RBC AUTO-ENTMCNC: 29.7 G/DL (ref 32.3–36.5)
MCV RBC AUTO: 86 FL (ref 81.4–97.8)
PLATELET # BLD AUTO: 297 K/UL (ref 164–446)
PMV BLD AUTO: 8.8 FL (ref 9–12.9)
POTASSIUM SERPL-SCNC: 3.6 MMOL/L (ref 3.6–5.5)
RBC # BLD AUTO: 3.21 M/UL (ref 4.7–6.1)
SODIUM SERPL-SCNC: 135 MMOL/L (ref 135–145)
TIBC SERPL-MCNC: 72 UG/DL (ref 250–450)
UIBC SERPL-MCNC: 35 UG/DL (ref 110–370)
WBC # BLD AUTO: 2.8 K/UL (ref 4.8–10.8)

## 2024-03-25 PROCEDURE — 82533 TOTAL CORTISOL: CPT

## 2024-03-25 PROCEDURE — 99223 1ST HOSP IP/OBS HIGH 75: CPT | Performed by: PHYSICAL MEDICINE & REHABILITATION

## 2024-03-25 PROCEDURE — 85027 COMPLETE CBC AUTOMATED: CPT

## 2024-03-25 PROCEDURE — 97167 OT EVAL HIGH COMPLEX 60 MIN: CPT

## 2024-03-25 PROCEDURE — 700101 HCHG RX REV CODE 250: Performed by: HOSPITALIST

## 2024-03-25 PROCEDURE — 80048 BASIC METABOLIC PNL TOTAL CA: CPT

## 2024-03-25 PROCEDURE — 700111 HCHG RX REV CODE 636 W/ 250 OVERRIDE (IP): Mod: JZ | Performed by: STUDENT IN AN ORGANIZED HEALTH CARE EDUCATION/TRAINING PROGRAM

## 2024-03-25 PROCEDURE — A9270 NON-COVERED ITEM OR SERVICE: HCPCS | Performed by: STUDENT IN AN ORGANIZED HEALTH CARE EDUCATION/TRAINING PROGRAM

## 2024-03-25 PROCEDURE — 97602 WOUND(S) CARE NON-SELECTIVE: CPT

## 2024-03-25 PROCEDURE — A9270 NON-COVERED ITEM OR SERVICE: HCPCS | Performed by: HOSPITALIST

## 2024-03-25 PROCEDURE — 700102 HCHG RX REV CODE 250 W/ 637 OVERRIDE(OP): Performed by: INTERNAL MEDICINE

## 2024-03-25 PROCEDURE — 83550 IRON BINDING TEST: CPT

## 2024-03-25 PROCEDURE — 700102 HCHG RX REV CODE 250 W/ 637 OVERRIDE(OP): Performed by: HOSPITALIST

## 2024-03-25 PROCEDURE — 82728 ASSAY OF FERRITIN: CPT

## 2024-03-25 PROCEDURE — 99233 SBSQ HOSP IP/OBS HIGH 50: CPT | Performed by: HOSPITALIST

## 2024-03-25 PROCEDURE — 83540 ASSAY OF IRON: CPT

## 2024-03-25 PROCEDURE — 700102 HCHG RX REV CODE 250 W/ 637 OVERRIDE(OP): Performed by: STUDENT IN AN ORGANIZED HEALTH CARE EDUCATION/TRAINING PROGRAM

## 2024-03-25 PROCEDURE — 97163 PT EVAL HIGH COMPLEX 45 MIN: CPT

## 2024-03-25 PROCEDURE — 770004 HCHG ROOM/CARE - ONCOLOGY PRIVATE *

## 2024-03-25 RX ORDER — POTASSIUM CHLORIDE 20 MEQ/1
40 TABLET, EXTENDED RELEASE ORAL ONCE
Status: DISCONTINUED | OUTPATIENT
Start: 2024-03-25 | End: 2024-03-25

## 2024-03-25 RX ADMIN — MEGESTROL ACETATE 800 MG: 40 SUSPENSION ORAL at 05:43

## 2024-03-25 RX ADMIN — GABAPENTIN 100 MG: 100 CAPSULE ORAL at 05:42

## 2024-03-25 RX ADMIN — POTASSIUM BICARBONATE 50 MEQ: 978 TABLET, EFFERVESCENT ORAL at 11:56

## 2024-03-25 RX ADMIN — DOCUSATE SODIUM 50 MG AND SENNOSIDES 8.6 MG 2 TABLET: 8.6; 5 TABLET, FILM COATED ORAL at 05:42

## 2024-03-25 RX ADMIN — BINIMETINIB 30 MG: 15 TABLET, FILM COATED ORAL at 17:57

## 2024-03-25 RX ADMIN — MIDODRINE HYDROCHLORIDE 5 MG: 5 TABLET ORAL at 17:57

## 2024-03-25 RX ADMIN — ONDANSETRON 4 MG: 4 TABLET, ORALLY DISINTEGRATING ORAL at 14:59

## 2024-03-25 RX ADMIN — MIDODRINE HYDROCHLORIDE 5 MG: 5 TABLET ORAL at 11:56

## 2024-03-25 RX ADMIN — ENOXAPARIN SODIUM 40 MG: 100 INJECTION SUBCUTANEOUS at 17:56

## 2024-03-25 RX ADMIN — MIDODRINE HYDROCHLORIDE 5 MG: 5 TABLET ORAL at 07:53

## 2024-03-25 RX ADMIN — DOCUSATE SODIUM 50 MG AND SENNOSIDES 8.6 MG 2 TABLET: 8.6; 5 TABLET, FILM COATED ORAL at 17:57

## 2024-03-25 RX ADMIN — MAGNESIUM HYDROXIDE 30 ML: 1200 LIQUID ORAL at 15:05

## 2024-03-25 RX ADMIN — ONDANSETRON 4 MG: 4 TABLET, ORALLY DISINTEGRATING ORAL at 08:50

## 2024-03-25 RX ADMIN — MORPHINE SULFATE 45 MG: 30 TABLET, FILM COATED, EXTENDED RELEASE ORAL at 17:57

## 2024-03-25 RX ADMIN — MORPHINE SULFATE 45 MG: 30 TABLET, FILM COATED, EXTENDED RELEASE ORAL at 05:42

## 2024-03-25 RX ADMIN — ENCORAFENIB 300 MG: 75 CAPSULE ORAL at 17:56

## 2024-03-25 RX ADMIN — MOMETASONE FUROATE AND FORMOTEROL FUMARATE DIHYDRATE 2 PUFF: 200; 5 AEROSOL RESPIRATORY (INHALATION) at 15:43

## 2024-03-25 ASSESSMENT — ENCOUNTER SYMPTOMS
CHILLS: 0
DIZZINESS: 0
PALPITATIONS: 0
NAUSEA: 0
DIARRHEA: 0
SHORTNESS OF BREATH: 0
NECK PAIN: 1
ABDOMINAL PAIN: 0
COUGH: 0
VOMITING: 0
FEVER: 0
HEADACHES: 0
LOSS OF CONSCIOUSNESS: 0
FOCAL WEAKNESS: 1
BACK PAIN: 0
SORE THROAT: 0

## 2024-03-25 ASSESSMENT — COGNITIVE AND FUNCTIONAL STATUS - GENERAL
DAILY ACTIVITIY SCORE: 14
WALKING IN HOSPITAL ROOM: A LITTLE
EATING MEALS: A LITTLE
TOILETING: A LOT
CLIMB 3 TO 5 STEPS WITH RAILING: TOTAL
MOBILITY SCORE: 16
SUGGESTED CMS G CODE MODIFIER MOBILITY: CK
PERSONAL GROOMING: A LITTLE
DRESSING REGULAR LOWER BODY CLOTHING: A LOT
STANDING UP FROM CHAIR USING ARMS: A LITTLE
DRESSING REGULAR UPPER BODY CLOTHING: A LOT
MOVING FROM LYING ON BACK TO SITTING ON SIDE OF FLAT BED: A LITTLE
SUGGESTED CMS G CODE MODIFIER DAILY ACTIVITY: CK
HELP NEEDED FOR BATHING: A LOT
TURNING FROM BACK TO SIDE WHILE IN FLAT BAD: A LITTLE
MOVING TO AND FROM BED TO CHAIR: A LITTLE

## 2024-03-25 ASSESSMENT — GAIT ASSESSMENTS
DISTANCE (FEET): 5
GAIT LEVEL OF ASSIST: CONTACT GUARD ASSIST
ASSISTIVE DEVICE: FRONT WHEEL WALKER

## 2024-03-25 ASSESSMENT — PAIN DESCRIPTION - PAIN TYPE
TYPE: ACUTE PAIN

## 2024-03-25 ASSESSMENT — ACTIVITIES OF DAILY LIVING (ADL): TOILETING: INDEPENDENT

## 2024-03-25 NOTE — PROGRESS NOTES
4 Eyes Skin Assessment Completed by JODY Ledezma and JODY Carmichael.    Head WDL  Ears WDL  Nose WDL  Mouth WDL  Neck Redness and Blanching  Breast/Chest WDL  Shoulder Blades WDL  Spine WDL  (R) Arm/Elbow/Hand WDL  (L) Arm/Elbow/Hand WDL  Abdomen WDL  Groin WDL  Scrotum/Coccyx/Buttocks Redness, Blanching, and Excoriation  (R) Leg WDL  (L) Leg WDL  (R) Heel/Foot/Toe Redness and blister  (L) Heel/Foot/Toe Redness and blister          Devices In Places ECG, Blood Pressure Cuff, and Pulse Ox      Interventions In Place Sacral Mepilex and Pillows    Possible Skin Injury No    Pictures Uploaded Into Epic N/A  Wound Consult Placed N/A  RN Wound Prevention Protocol Ordered No

## 2024-03-25 NOTE — DISCHARGE PLANNING
[facility] Transitional Care Coordination    Physiatry consult complete.  Dr. Nova recommending -     Disposition recommendations:  -Good candidate for IPR.  Patient has good strength throughout, and should be able to have a better quality of life with aggressive physical and occupational therapy.  Patient spouse will also benefit from family training.  This is all very new for them, and patient has been hospitalized for falls, chemotherapy, and now with hypotension.  Patient is looking to gain more independence with mobility and ADLs.  Spouse is able to provide total care.  She works from home and can take off as needed.  They have a ramp to enter their house.  -TCC to submit to [employer] for prior authorization  -PMR to follow in the periphery for rehab appropriateness, please reach out with questions or request for medical management      Admission to [facility] will require prior auth from [employer].  TCC to submit auth request.

## 2024-03-25 NOTE — PROGRESS NOTES
4 Eyes Skin Assessment Completed by JODY Mishra and JODY Maciel.    Head WDL  Ears WDL  Nose Red  Mouth WDL  Neck Redness and Blanching  Breast/Chest WDL  Shoulder Blades WDL  Spine WDL  (R) Arm/Elbow/Hand Redness and Blanching  (L) Arm/Elbow/Hand Redness and Blanching, scab from previous skin tear  Abdomen WDL  Groin WDL  Scrotum/Coccyx/Buttocks Redness, Blanching, and Excoriation, discoloration            (R) Leg WDL  (L) Leg WDL  (R) Heel/Foot/Toe Redness, blanching, and discoloration        (L) Heel/Foot/Toe Redness, blanching, and discoloration              Devices In Places ECG, Blood Pressure Cuff, Pulse Ox, SCD's, and Cervical Collar      Interventions In Place Heel Mepilex, Sacral Mepilex, TAP System, Pillows, Low Air Loss Mattress, Heels Loaded W/Pillows, and Pressure Redistribution Mattress    Possible Skin Injury No    Pictures Uploaded Into Epic Yes  Wound Consult Placed Already in place  RN Wound Prevention Protocol Ordered No

## 2024-03-25 NOTE — CARE PLAN
The patient is Watcher - Medium risk of patient condition declining or worsening    Shift Goals  Clinical Goals: MAP goal 60, hemodynamic stability  Patient Goals: comfort  Family Goals: JAYLEEN    Progress made toward(s) clinical / shift goals:       Problem: Knowledge Deficit - Standard  Goal: Patient and family/care givers will demonstrate understanding of plan of care, disease process/condition, diagnostic tests and medications  Outcome: Progressing     Problem: Hemodynamics  Goal: Patient's hemodynamics, fluid balance and neurologic status will be stable or improve  Outcome: Progressing     Problem: Fall Risk  Goal: Patient will remain free from falls  Outcome: Progressing     Problem: Pain - Standard  Goal: Alleviation of pain or a reduction in pain to the patient’s comfort goal  Outcome: Progressing

## 2024-03-25 NOTE — THERAPY
"Physical Therapy   Initial Evaluation     Patient Name: Andrew Wall  Age:  58 y.o., Sex:  male  Medical Record #: [id number]  Today's Date: 3/25/2024     Precautions  Precautions: Fall Risk;Cervical Collar    Comments: SBP>90    Assessment  Patient is 58 y.o. male admitted with fever and AMS, dx'd with septic shock, no infection, dehydration, recent fall with C2 fx- c-collar, Ischium and pubis mets, femoral neck mets.  Hx of metastatic melanoma, asthma, chronic pain related to bony mets.  Pt lives with spouse, limited household ambulation, uses manual WC most of the time. Was having HH but spouse stated that HH Dc'd pt due to pt being \"too acute.\" Today pt required cues and assistance for proper log roll. CGA with transfer. Decreased BP and increase HR with activity. Assisted pt to chair. Spouse and pt would like to go to acute rehab to assist with progressing pt with mobility. Patient is currently functioning below their level of baseline. Pt would benefit from PM&R consult. Pt can tolerate up to 15 hours of therapies a week, is motivated and willing to participate and has a good and safe discharge plan. Pt will continue to benefit from acute physical therapy to assist towards established goals.         Plan    Physical Therapy Initial Treatment Plan   Treatment Plan : Bed Mobility, Equipment, Gait Training, Neuro Re-Education / Balance, Stair Training, Therapeutic Activities, Therapeutic Exercise  Treatment Frequency: 4 Times per Week  Duration: Until Therapy Goals Met    DC Equipment Recommendations: Unable to determine at this time  Discharge Recommendations: Other - (PM&R consult)       Subjective    Pt resting in bed, spouse at bedside. Pt agreed to PT.      Objective       03/25/24 0939   Charge Group   PT Evaluation PT Evaluation High   Total Time Spent   PT Evaluation Time Spent (Mins) 21   PT Total Time Spent (Calculated) 21   Initial Contact Note    Initial Contact Note Order Received and Verified, " "Physical Therapy Evaluation in Progress with Full Report to Follow.   Precautions   Precautions Fall Risk;Cervical Collar     Comments SBP>90   Vitals   Pulse (!) 135  (with standing transfer)   Patient BP Position Sitting   Blood Pressure 92/58   Vitals Comments BP supine 102/62, seated 93/62, after transfer in recliner 87/61, reclined 92/58   Pain 0 - 10 Group   Therapist Pain Assessment 0  (no c/o pain today. Pt stated that when he has pain it is at his neck and shoulders)   Prior Living Situation   Prior Services Skilled Home Health Services;Intermittent Physical Support for ADL Per Family   Housing / Facility 2 Story House   Equipment Owned Ramp;4-Wheel Walker;Wheelchair;Hand Held Shower;Bed Side Commode;Hospital Bed;Tub / Shower Seat   Lives with - Patient's Self Care Capacity Spouse   Comments pt was having HH, spouse stated that HH cancelled due to pt's \"too acute\"   Prior Level of Functional Mobility   Ambulation Required Assist   Ambulation Distance household \"150 ft\"   Assistive Devices Used 4-Wheel Walker   Wheelchair Independent   Stairs   (ramp)   Comments limted amb due to weakness   History of Falls   History of Falls Yes   Cognition    Cognition / Consciousness WDL   Level of Consciousness Alert   Comments pleasant and cooperative, motivated, min decrease insight into current impairments   Active ROM Lower Body    Active ROM Lower Body  WDL   Strength Lower Body   Lower Body Strength  X   Comments grossly 3+/5 nilat LE   Coordination Lower Body    Coordination Lower Body  WDL   Balance Assessment   Sitting Balance (Static) Fair   Sitting Balance (Dynamic) Fair   Standing Balance (Static) Fair -   Standing Balance (Dynamic) Fair -   Weight Shift Sitting Good   Weight Shift Standing Good   Comments standing with FWW   Bed Mobility    Supine to Sit Contact Guard Assist   Scooting Standby Assist   Rolling Minimal Assist to Rt.   Comments mulitple Vcs for log roll, bed flat, no bed rails   Gait Analysis "   Gait Level Of Assist Contact Guard Assist   Assistive Device Front Wheel Walker   Distance (Feet) 5   # of Times Distance was Traveled 1   Functional Mobility   Sit to Stand Contact Guard Assist   Bed, Chair, Wheelchair Transfer Contact Guard Assist   Transfer Method Stand Step   Mobility with FWW   6 Clicks Assessment - How much HELP from from another person do you currently need... (If the patient hasn't done an activity recently, how much help from another person do you think he/she would need if he/she tried?)   Turning from your back to your side while in a flat bed without using bedrails? 3   Moving from lying on your back to sitting on the side of a flat bed without using bedrails? 3   Moving to and from a bed to a chair (including a wheelchair)? 3   Standing up from a chair using your arms (e.g., wheelchair, or bedside chair)? 3   Walking in hospital room? 3   Climbing 3-5 steps with a railing? 1   6 clicks Mobility Score 16   Activity Tolerance   Sitting in Chair reclined in recliner at end of session   Sitting Edge of Bed 5 minutes   Standing 3 minutes   Comments limited by low BP and high HR with mobility   Patient / Family Goals    Patient / Family Goal #1 to go to [place]   Short Term Goals    Short Term Goal # 1 supine to/from sit flat bed, log roll supervised in 6 visits to progress with bed mobility   Short Term Goal # 2 sit to/from stand EOB with FWW supervised in 6 visits to progress with transfers   Short Term Goal # 3 amb 150ft with FWW supervised in 6 visits for functional distances   Education Group   Education Provided Role of Physical Therapist;Use of Assistive Device;Gait Training;Cervical Precautions   Cervical Precautions Patient Response Patient;Significant Other;Acceptance;Explanation;Verbal Demonstration;Teach Back  (pt able to state 2/3 c/s precautions- required VCs for lifting precautions)   Role of Physical Therapist Patient Response Patient;Significant  Other;Acceptance;Explanation;Verbal Demonstration   Gait Training Patient Response Patient;Significant Other;Acceptance;Explanation;Action Demonstration   Use of Assistive Device Patient Response Patient;Significant Other;Acceptance;Explanation;Action Demonstration   Physical Therapy Initial Treatment Plan    Treatment Plan  Bed Mobility;Equipment;Gait Training;Neuro Re-Education / Balance;Stair Training;Therapeutic Activities;Therapeutic Exercise   Treatment Frequency 4 Times per Week   Duration Until Therapy Goals Met   Problem List    Problems Impaired Bed Mobility;Impaired Transfers;Impaired Ambulation;Impaired Balance;Decreased Activity Tolerance;Functional Strength Deficit   Anticipated Discharge Equipment and Recommendations   DC Equipment Recommendations Unable to determine at this time   Discharge Recommendations Other -  (PM&R consult)   Interdisciplinary Plan of Care Collaboration   IDT Collaboration with  Nursing;Family / Caregiver   Patient Position at End of Therapy Seated;Call Light within Reach;Tray Table within Reach;Phone within Reach;Family / Friend in Room   Collaboration Comments RN updated   Session Information   Date / Session Number  3/25 1(1/4, 3/31)

## 2024-03-25 NOTE — DISCHARGE PLANNING
Case Management Discharge Planning    Admission Date: 3/21/2024  GMLOS: 5.1  ALOS: 4    6-Clicks ADL Score: 14  6-Clicks Mobility Score: 16  PT and/or OT Eval ordered: Yes  Post-acute Referrals Ordered: Yes  Post-acute Choice Obtained: NA  Has referral(s) been sent to post-acute provider:  Yes    Anticipated Discharge Dispo: Discharge Disposition: Disch to IP rehab facility or distinct part unit (62)    DME Needed: No    Action(s) Taken:     Pt discussed during IDT rounds. Pt is alert and oriented, on room air, and off levo.  Per physiatry, pt is good candidate for IPR  [facility] following, submitted insurance auth    Escalations Completed: None    Medically Clear: No    Next Steps:     CM to assist with discharge needs    Barriers to Discharge: Medical clearance

## 2024-03-25 NOTE — CONSULTS
Physical Medicine and Rehabilitation Consultation          Date of initial consultation: 3/25/2024  Consulting provider: Dae Rosado D.O.   Reason for consultation: assess for acute inpatient rehab appropriateness  LOS: 4 Day(s)    Chief complaint: Debility from melanoma    HPI: The patient is a 58 y.o. left hand dominant male with a past medical history of metastatic melanoma to lymph nodes and bones, on chemotherapy;  who presented on 3/21/2024 12:47 PM with generalized weakness, altered mental status, pale.  Wife checked blood pressure and found systolic pressure of 59.  Patient brought into the ED, confirmed to be hypotensive, febrile, tachycardic.  Patient started on Levophed, vancomycin, cefepime.  Patient admitted for septic shock with unknown source.  Blood cultures negative, antibiotics discontinued.  Started on midodrine, titrating off Levophed.  Treated with IV fluids.  Patient found to have C2 fracture from mechanical ground-level fall on March 5.  He was also found to have multiple pathologic fractures of the lumbar spine.  He has c-collar in place.  He is being treated with MS Contin for pain.  Pending oncology consult, patient is being treated with binimetinib and encorafenib     The patient currently reports weakness and disuse debility.  Patient states he was diagnosed with metastatic melanoma in January and has been in the hospital for various ailments much of the year.  Prior to this he was  at [facility], and on 2 search and rescue teams.  Now, he uses a wheelchair for mobility inside the house, can walk with a walker 1-2 rooms.  Spouse helps with dressing, bathing, grooming.  Patient is looking to increase his independence with ADLs and increase his mobility with respect to walking.  They do have a ramp built at their house already.    ROS  Pertinent positives are mentioned in the HPI, all others reviewed and are negative.    Social Hx:  2 SH  Ramped entry  With:  Spouse. Patient was being seen by home health but they canceled stating that he was too acute for home health services    THERAPY:  Restrictions: Fall risk, c-collar  PT: Functional mobility   3/25: Walking 5 feet with front wheel walker contact-guard assist    OT: ADLs  3/25: Mod assist upper and lower body dressing    SLP:   None    IMAGING:  CT abdomen pelvis 3/21/2024  1.  Multiple lytic and sclerotic osseous metastases are again noted with redemonstrated pathologic pelvic and lumbosacral spine fractures redemonstrated.  2.  Hepatic steatosis.    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)     Nasal polyp        PSH:  Past Surgical History:   Procedure Laterality Date    LYMPH NODE EXCISION Right 11/16/2023    Procedure: RIGHT INGUINAL NODE SUPERFICIAL DISSECTION;  Surgeon: Davon Valera M.D.;  Location: [facility];  Service: General    NODE BIOPSY SENTINEL Bilateral 10/20/2020    Procedure: BIOPSY, LYMPH NODE, SENTINEL- GROIN;  Surgeon: Davon Valera M.D.;  Location: SURGERY SAME DAY [facility];  Service: General    WIDE EXCISION Right 10/20/2020    Procedure: WIDE EXCISION, LESION- BUTTOCKS, RADICAL MALIGNANT MELANOMA;  Surgeon: Davon Valera M.D.;  Location: SURGERY SAME DAY [facility];  Service: General    ANTROSTOMY Bilateral 11/15/2019    Procedure: MAXILLARY ANTROSTOMY- REVISION ENDOSCOPICMOMETASONE INJECTION, PROPEL STENT PLACEMENT;  Surgeon: Felicitas Tenorio M.D.;  Location: [facility];  Service: Ent    SINUSCOPY Bilateral 11/15/2019    Procedure: ENDOSCOPY, PARANASAL SINUSES- FOR FRONTAL EXPLORATION;  Surgeon: Felicitas Tenorio M.D.;  Location: [facility];   Service: Ent    TURBINOPLASTY Bilateral 11/15/2019    Procedure: TURBINOPLASTY;  Surgeon: Felicitas Tenorio M.D.;  Location: SURGERY [facility];  Service: Ent    SPHENOIDECTOMY Bilateral 11/15/2019    Procedure: SPHENOIDECTOMY- FOR SPHENOIDOTOMY;  Surgeon: Felicitas Tenorio M.D.;  Location: SURGERY [facility];  Service: Ent    ETHMOIDECTOMY Bilateral 11/15/2019    Procedure: ETHMOIDECTOMY- ENDOSCOPIC;  Surgeon: Felicitas Tenorio M.D.;  Location: SURGERY [facility];  Service: Ent    NASAL POLYPECTOMY Bilateral 11/15/2019    Procedure: POLYPECTOMY, NASAL CAVITY;  Surgeon: Felicitas Tenorio M.D.;  Location: [facility];  Service: Ent    NASAL POLYPECTOMY  2015, 2017, 2019    x 3    OTHER  11/20    removed melanoma       FHX:  Non-pertinent to today's issues    Medications:  Current Facility-Administered Medications   Medication Dose    potassium bicarbonate (Klyte) effervescent tablet 50 mEq  50 mEq    bisacodyl (Dulcolax) suppository 10 mg  10 mg    senna-docusate (Pericolace Or Senokot S) 8.6-50 MG per tablet 2 Tablet  2 Tablet    polyethylene glycol/lytes (Miralax) Packet 1 Packet  1 Packet    magnesium hydroxide (Milk Of Magnesia) suspension 30 mL  30 mL    midodrine (Proamatine) tablet 5 mg  5 mg    morphine ER (Ms Contin) tablet 45 mg  45 mg    megestrol (Megace) 40 MG/ML suspension 800 mg  800 mg    gabapentin (Neurontin) capsule 100 mg  100 mg    ondansetron (Zofran ODT) dispertab 4 mg  4 mg    prochlorperazine (Compazine) tablet 10 mg  10 mg    enoxaparin (Lovenox) inj 40 mg  40 mg    ondansetron (Zofran) syringe/vial injection 4 mg  4 mg    promethazine (Phenergan) suppository 12.5-25 mg  12.5-25 mg    prochlorperazine (Compazine) injection 5-10 mg  5-10 mg    acetaminophen (Tylenol) tablet 650 mg  650 mg       Allergies:  Allergies   Allergen Reactions    Augmentin Vomiting and Nausea     Physical Exam:  Vitals: BP 92/58   Pulse (!) 135   Temp 36.4 °C (97.5 °F)  "(Temporal)   Resp 17   Ht 1.803 m (5' 11\")   Wt 62.2 kg (137 lb 2 oz)   SpO2 99%   Gen: NAD  Head: NC/AT  Eyes/ Nose/ Mouth: PERRLA, moist mucous membranes  Cardio: RRR, good distal perfusion, warm extremities  Pulm: normal respiratory effort, no cyanosis   Abd: Soft NTND, negative borborygmi   Ext: No peripheral edema. No calf tenderness. No clubbing.    Mental status:  A&Ox4 (person, place, date, situation) answers questions appropriately follows commands  Speech: fluent, no aphasia or dysarthria      Motor:      Upper Extremity  Myotome R L   Shoulder flexion C5 2/5 2/5   Elbow flexion C5 4/5 4/5   Wrist extension C6 4/5 4/5   Elbow extension C7 4/5 4/5   Finger flexion C8 4/5 4/5   Finger abduction T1 4/5 4/5     Lower Extremity Myotome R L   Hip flexion L2 4/5 4/5   Knee extension L3 4/5 5   Ankle dorsiflexion L4 4/5 5   Toe extension L5 4/5 5   Ankle plantarflexion S1 4/5 5     Sensory:   intact to light touch through out    Labs: Reviewed and significant for   Recent Labs     03/23/24 0217 03/25/24  0330 03/25/24  1015   RBC 3.11* 3.21*  --    HEMOGLOBIN 8.0* 8.2*  --    HEMATOCRIT 28.0* 27.6*  --    PLATELETCT 306 297  --    IRON  --   --  37*   FERRITIN  --   --  1243.0*   TOTIRONBC  --   --  72*     Recent Labs     03/23/24 0217 03/25/24  0330   SODIUM 136 135   POTASSIUM 4.2 3.6   CHLORIDE 107 105   CO2 18* 21   GLUCOSE 105* 94   BUN 7* 5*   CREATININE 0.23* 0.19*   CALCIUM 7.2* 7.9*     Recent Results (from the past 24 hour(s))   Basic Metabolic Panel    Collection Time: 03/25/24  3:30 AM   Result Value Ref Range    Sodium 135 135 - 145 mmol/L    Potassium 3.6 3.6 - 5.5 mmol/L    Chloride 105 96 - 112 mmol/L    Co2 21 20 - 33 mmol/L    Glucose 94 65 - 99 mg/dL    Bun 5 (L) 8 - 22 mg/dL    Creatinine 0.19 (L) 0.50 - 1.40 mg/dL    Calcium 7.9 (L) 8.5 - 10.5 mg/dL    Anion Gap 9.0 7.0 - 16.0   CBC WITHOUT DIFFERENTIAL    Collection Time: 03/25/24  3:30 AM   Result Value Ref Range    WBC 2.8 (L) 4.8 - " 10.8 K/uL    RBC 3.21 (L) 4.70 - 6.10 M/uL    Hemoglobin 8.2 (L) 14.0 - 18.0 g/dL    Hematocrit 27.6 (L) 42.0 - 52.0 %    MCV 86.0 81.4 - 97.8 fL    MCH 25.5 (L) 27.0 - 33.0 pg    MCHC 29.7 (L) 32.3 - 36.5 g/dL    RDW 59.5 (H) 35.9 - 50.0 fL    Platelet Count 297 164 - 446 K/uL    MPV 8.8 (L) 9.0 - 12.9 fL   CORTISOL    Collection Time: 03/25/24  3:30 AM   Result Value Ref Range    Cortisol 3.6 0.0 - 23.0 ug/dL   ESTIMATED GFR    Collection Time: 03/25/24  3:30 AM   Result Value Ref Range    GFR (CKD-EPI) 156 >60 mL/min/1.73 m 2   IRON/TOTAL IRON BIND    Collection Time: 03/25/24 10:15 AM   Result Value Ref Range    Iron 37 (L) 50 - 180 ug/dL    Total Iron Binding 72 (L) 250 - 450 ug/dL    Unsat Iron Binding 35 (L) 110 - 370 ug/dL    % Saturation 51 15 - 55 %   FERRITIN    Collection Time: 03/25/24 10:15 AM   Result Value Ref Range    Ferritin 1243.0 (H) 22.0 - 322.0 ng/mL         ASSESSMENT:  Patient is a 58 y.o. male admitted with hypotension, pathologic L-spine fractures, C2 fracture     Rehabilitation: Impaired ADLs and mobility  Barriers to transfer include: Insurance authorization, TCCs to verify disposition, medical clearance and bed availability. All cases are subject to administrative review.     [facility] Code / Diagnosis to Support: 0017.2 - Medically Complex: Neoplasms    Disposition recommendations:  -Good candidate for IPR.  Patient has good strength throughout, and should be able to have a better quality of life with aggressive physical and occupational therapy.  Patient spouse will also benefit from family training.  This is all very new for them, and patient has been hospitalized for falls, chemotherapy, and now with hypotension.  Patient is looking to gain more independence with mobility and ADLs.  Spouse is able to provide total care.  She works from home and can take off as needed.  They have a ramp to enter their house.  -TCC to submit to [employer] for prior authorization  -PMR to follow in the  periphery for rehab appropriateness, please reach out with questions or request for medical management    Medical Complexity:    Metastatic melanoma  -New diagnosis January 2024  -Awaiting oncology consult for update on treatment plan  - Patient is being treated with binimetinib and encorafenib   -Continue PT OT while in-house  -Plan for IPR    Severe hypotension  -Likely combination of chemotherapy medications, dehydration and poor nutritional intake.  -Patient responded to IV fluids, pressors  -Now on midodrine 5 mg 3 times daily with meals    C2 fracture  -Secondary to ground-level fall while transferring  -Nonoperative management.  -C-collar at all times    Pathologic lumbar spine fractures  -Stable  -Pain control    Pain control  -MS Contin 45 mg twice daily  -Gabapentin 100 mg daily  -Tylenol 650 mg every 6 hours as needed    Leukopenia  -WBC 2.8, last A1c 1.45 on 3/22  -Recheck CBC with differential    Anemia  -Hemoglobin 8.2  -Stable, monitor  -Ferritin extremely elevated, 1243  -No role for oral or IV iron supplementation  -Transfuse with PRBC if hemoglobin drops below 7    DVT PPX: Lovenox      Thank you for allowing us to participate in the care of this patient.     Patient was seen for >80 minutes on unit/floor of which > 50% of time was spent on counseling and coordination of care regarding the above, including prognosis, risk reduction, benefits of treatment, and options for next stage of care.    Pillo Nova, DO   Physical Medicine and Rehabilitation     Please note that this dictation was created using voice recognition software. I have made every reasonable attempt to correct obvious errors, but there may be errors of grammar and possibly content that I did not discover before finalizing the note.

## 2024-03-25 NOTE — THERAPY
"Occupational Therapy   Initial Evaluation     Patient Name: Andrew Wall  Age:  58 y.o., Sex:  male  Medical Record #: [id number]  Today's Date: 3/25/2024     Precautions  Precautions: (P) Fall Risk, Cervical Collar    Comments: (P) cervical collar AAT    Assessment    Patient is 58 y.o. male admitted with fever and AMS, dx'd with septic shock, no infection, dehydration, recent fall with C2 fx- c-collar, Ischium and pubis mets, femoral neck mets. Hx of metastatic melanoma, asthma, chronic pain related to bony mets. Pt normally independent with functional mobility and ADLs prior to fall living in a [facility] with spouse who has been assisting with ADLs and mobility post-fall due to unsteadiness and BUE weakness R>L. Pt able to complete functional mobility with CGA and ADLs with modA, limitations in BUE right more than left, gross and fine motors tasks impaired due to decreased strength, good sensation and good awareness of weakness. Will continue to see for skilled therapy while admitted as well as recommend post-acute placement, highly recommend PMR consult due to patients motivation, good support from spouse, and willingness to participate.     Plan    Occupational Therapy Initial Treatment Plan   Treatment Interventions: (P) Self Care / Activities of Daily Living, Adaptive Equipment, Community Re-Integration, Manual Therapy Techniques, Neuro Re-Education / Balance, Therapeutic Exercises, Therapeutic Activity  Treatment Frequency: (P) 4 Times per Week  Duration: (P) Until Therapy Goals Met    DC Equipment Recommendations: (P) Unable to determine at this time  Discharge Recommendations: (P) Recommend post-acute placement for additional occupational therapy services prior to discharge home     Subjective    \"I was  before all this I want to be able to do stuff around my house again\"     Objective       03/25/24 1104   Prior Living Situation   Prior Services Skilled Home Health Services;Intermittent Physical " Support for ADL Per Family   Housing / Facility 2 Story House   Bathroom Set up Walk In Shower;Shower Chair;Grab Bars   Equipment Owned Ramp;4-Wheel Walker;Wheelchair;Tub / Shower Seat;Hand Held Shower;Hospital Bed   Lives with - Patient's Self Care Capacity Spouse   Prior Level of ADL Function   Self Feeding Independent   Grooming / Hygiene Independent   Bathing Independent   Dressing Independent   Toileting Independent   Comments prior to previous admission has required assistance for all ADLs from spouse since   Prior Level of IADL Function   Medication Management Requires Assist   Laundry Requires Assist   Kitchen Mobility Requires Assist   Finances Requires Assist   Home Management Requires Assist   Shopping Requires Assist   History of Falls   History of Falls Yes   Date of Last Fall   (reason for previous admission, C2 fracture)   Precautions   Precautions Fall Risk;Cervical Collar     Comments cervical collar AAT   Pain 0 - 10 Group   Therapist Pain Assessment Post Activity Pain Same as Prior to Activity;Nurse Notified   Cognition    Cognition / Consciousness WDL   Level of Consciousness Alert   Comments Very pleasant, cooperative, motivated for rehab   Active ROM Upper Body   Active ROM Upper Body  X   Dominant Hand Left   Comments BUE limited due to weakness, R>L able   Strength Upper Body   Upper Body Strength  X   Rt Shoulder Flexion Strength 3- (F-)   Rt Shoulder Extension Strength 3- (F-)   Rt Elbow Flexion Strength 3- (F-)   Rt Elbow Extension Strength 3 (F)   Rt Forearm Supination Strength 3 (F)   Rt Forearm Pronation Strength 3 (F)   Right  Impaired   Lt Shoulder Flexion Strength 3 (F)   Lt Shoulder Extension Strength 3 (F)   Lt Elbow Flexion Strength 3+ (F+)   Lt Elbow Extension Strength 3+ (F+)   Lt Forearm Supination Strength 3+ (F+)   Lt Forearm Pronation Strength 3+ (F+)   Left  Impaired   Sensation Upper Body   Upper Extremity Sensation  WDL   Upper Body Muscle Tone   Upper Body Muscle  Tone  X   Rt Upper Extremity Muscle Tone Hypotonic   Lt Upper Extremity Muscle Tone Hypotonic   Neurological Concerns   Neurological Concerns Yes   Rt Upper Extremity Fine Motor Control Impaired   Right In Hand Manipulation Impaired   Lt Upper Extremity Fine Motor Control Impaired   Left In Hand Manipulation Impaired   Coordination Upper Body   Coordination X   Fine Motor Coordination BUE impaired   Balance Assessment   Sitting Balance (Static) Fair   Sitting Balance (Dynamic) Fair   Standing Balance (Static) Fair -   Standing Balance (Dynamic) Fair -   Weight Shift Sitting Good   Weight Shift Standing Good   Comments no AD, minor LOB able to step correct   Bed Mobility    Comments up in chair before/after   ADL Assessment   Grooming Contact Guard Assist;Seated   Upper Body Dressing Moderate Assist   Lower Body Dressing Moderate Assist   How much help from another person does the patient currently need...   Putting on and taking off regular lower body clothing? 2   Bathing (including washing, rinsing, and drying)? 2   Toileting, which includes using a toilet, bedpan, or urinal? 2   Putting on and taking off regular upper body clothing? 2   Taking care of personal grooming such as brushing teeth? 3   Eating meals? 3   6 Clicks Daily Activity Score 14   Functional Mobility   Sit to Stand Contact Guard Assist   Bed, Chair, Wheelchair Transfer Contact Guard Assist   Transfer Method Stand Step   Mobility seated ADLs, STS from chair, returned to chair   Comments w/ HHA   Activity Tolerance   Sitting in Chair left seated in chair   Standing 6 min   Short Term Goals   Short Term Goal # 1 Pt will complete ADL transfers with supervision   Short Term Goal # 2 Pt will complete LB dressing with supervision   Short Term Goal # 3 Pt will complete toileting with supervision   Short Term Goal # 4 Pt will complete UB dressing with supervision   Education Group   Education Provided Role of Occupational Therapist;Activities of Daily  Living   Role of Occupational Therapist Patient Response Patient;Significant Other;Acceptance;Explanation   ADL Patient Response Patient;Significant Other;Acceptance;Explanation   Occupational Therapy Initial Treatment Plan    Treatment Interventions Self Care / Activities of Daily Living;Adaptive Equipment;Community Re-Integration;Manual Therapy Techniques;Neuro Re-Education / Balance;Therapeutic Exercises;Therapeutic Activity   Treatment Frequency 4 Times per Week   Duration Until Therapy Goals Met   Problem List   Problem List Decreased Homemaking Skills;Decreased Active Daily Living Skills;Decreased Upper Extremity Strength Right;Decreased Upper Extremity Strength Left;Decreased Upper Extremity AROM Right;Decreased Upper Extremity AROM Left;Decreased Functional Mobility;Decreased Activity Tolerance;Impaired Coordination Right Upper Extremity;Impaired Coordination Left Upper Extremity;Impaired Postural Control / Balance;Impaired Upper Extremity Tone Left;Impaired Upper Extremity Tone Right   Anticipated Discharge Equipment and Recommendations   DC Equipment Recommendations Unable to determine at this time   Discharge Recommendations Recommend post-acute placement for additional occupational therapy services prior to discharge home   Interdisciplinary Plan of Care Collaboration   IDT Collaboration with  Nursing;Family / Caregiver   Patient Position at End of Therapy Seated;Call Light within Reach;Tray Table within Reach;Phone within Reach;Family / Friend in Room   Collaboration Comments RN updated

## 2024-03-25 NOTE — PROGRESS NOTES
4 Eyes Skin Assessment Completed by JODY Tovar and JODY Munguia.    Head WDL  Ears WDL  Nose WDL  Mouth WDL  Neck Scab. Picture already in chart   Breast/Chest WDL  Shoulder Blades WDL  Spine Redness and Blanching  (R) Arm/Elbow/Hand Redness and Blanching  (L) Arm/Elbow/Hand Redness and Blanching. Pt has scab on left arm  Abdomen WDL  Groin WDL  Scrotum/Coccyx/Buttocks Redness and Blanching  (R) Leg WDL  (L) Leg WDL  (R) Heel/Foot/Toe Scab on right heel. Heel red and blanching  (L) Heel/Foot/Toe Red and blanching          Devices In Places C-Collar      Interventions In Place Turns, Low Air loss bed, Mepliex on areas where C-Collar hits skin, heel mepliex, heel boats in place, and pillows used.    Possible Skin Injury Yes    Pictures Uploaded Into Epic Yes  Wound Consult Placed Yes  RN Wound Prevention Protocol Ordered Yes

## 2024-03-25 NOTE — PROGRESS NOTES
[facility] Medicine Daily Progress Note    Date of Service  3/25/2024    Chief Complaint  Andrew Wall is a 58 y.o. male admitted 3/21/2024 with fever altered mental status    Hospital Course  59yo PMHx metastatic melanoma, asthma, chronic pain related to bony mets.  Presented with altered mental status, SBP of 59 at home.  In the ED hypotensive, febrile, tachy.  No clear source of infection found.  Started on Vanc, cefepime and levophed    Interval Problem Update  Feeling good this morning.      Discussed with patient's wife who is at the bedside.    AFebrile  HR 80s  SBP 90-110s  On RA  UOP 1160ml/24hrs  Off levophed x 24hrs    I have discussed this patient's plan of care and discharge plan at IDT rounds today with Case Management, Nursing, Nursing leadership, and other members of the IDT team.    Consultants/Specialty      Code Status  DNAR/DNI    Disposition  Medically Cleared  I have placed the appropriate orders for post-discharge needs.    Review of Systems  Review of Systems   Constitutional:  Negative for chills and fever.   HENT:  Negative for sore throat.    Respiratory:  Negative for cough and shortness of breath.    Cardiovascular:  Negative for chest pain, palpitations and leg swelling.   Gastrointestinal:  Negative for abdominal pain, diarrhea, nausea and vomiting.   Genitourinary:  Negative for dysuria and urgency.   Musculoskeletal:  Positive for neck pain. Negative for back pain.   Skin:  Negative for rash.   Neurological:  Positive for focal weakness. Negative for dizziness, loss of consciousness and headaches.        Physical Exam  Temp:  [36.1 °C (96.9 °F)-36.5 °C (97.7 °F)] 36.4 °C (97.6 °F)  Pulse:  [] 89  Resp:  [13-24] 13  BP: ()/(57-86) 105/65  SpO2:  [96 %-100 %] 99 %    Physical Exam  Constitutional:       General: He is not in acute distress.     Appearance: He is well-developed. He is not diaphoretic.   HENT:      Head: Normocephalic and atraumatic.   Eyes:       Conjunctiva/sclera: Conjunctivae normal.   Neck:      Vascular: No JVD.   Cardiovascular:      Rate and Rhythm: Normal rate.      Heart sounds: No murmur heard.     No gallop.   Pulmonary:      Effort: Pulmonary effort is normal. No respiratory distress.      Breath sounds: No stridor. No wheezing or rales.   Abdominal:      Palpations: Abdomen is soft.      Tenderness: There is no abdominal tenderness. There is no guarding or rebound.   Musculoskeletal:      Right lower leg: No edema.      Left lower leg: No edema.   Skin:     General: Skin is warm and dry.      Coloration: Skin is pale.      Findings: No rash.   Neurological:      Mental Status: He is oriented to person, place, and time.      Comments: 3-/5 B shoulder abd   Psychiatric:         Mood and Affect: Mood normal.         Behavior: Behavior normal.         Thought Content: Thought content normal.       Fluids    Intake/Output Summary (Last 24 hours) at 3/25/2024 0653  Last data filed at 3/25/2024 0600  Gross per 24 hour   Intake 100 ml   Output 1160 ml   Net -1060 ml       Laboratory  Recent Labs     03/23/24  0217 03/25/24  0330   WBC 3.1* 2.8*   RBC 3.11* 3.21*   HEMOGLOBIN 8.0* 8.2*   HEMATOCRIT 28.0* 27.6*   MCV 90.0 86.0   MCH 25.7* 25.5*   MCHC 28.6* 29.7*   RDW 61.6* 59.5*   PLATELETCT 306 297   MPV 8.8* 8.8*     Recent Labs     03/23/24  0217 03/25/24  0330   SODIUM 136 135   POTASSIUM 4.2 3.6   CHLORIDE 107 105   CO2 18* 21   GLUCOSE 105* 94   BUN 7* 5*   CREATININE 0.23* 0.19*   CALCIUM 7.2* 7.9*                   Imaging  CT-LSPINE W/O PLUS RECONS   Final Result      1.  Stable pathologic fractures of the lumbosacral spine. No new acute osseous abnormality identified.   2.  Stable large lytic right sacral ala mass again noted.      CT-ABDOMEN-PELVIS WITH   Final Result      1.  Multiple lytic and sclerotic osseous metastases are again noted with redemonstrated pathologic pelvic and lumbosacral spine fractures redemonstrated.   2.  Hepatic  steatosis.      CT-CTA CHEST PULMONARY ARTERY W/ RECONS   Final Result         1. No CT evidence of pulmonary embolism.      2. Several bilateral pulmonary nodules in the lower lungs are more conspicuous than prior, likely worsening metastasis.      3. Grossly unchanged osseous metastases.      4. No airspace opacity. No pleural effusion or pneumothorax.      CT-HEAD W/O   Final Result      1.  No acute intracranial abnormality.   2.  Expansile osseous lesion is again noted in the odontoid process consistent with known metastatic disease.         DX-CHEST-PORTABLE (1 VIEW)   Final Result      No acute cardiopulmonary disease evident.           Assessment/Plan  * Septic shock (HCC)  Assessment & Plan  Septic shock versus dehydration  Patient was clearly dry in presentation.  He has also had a fever however he is on an an oncologic medication which can cause fever as a side effect.  He has had an extensive workup for source of infection, and thus far this has been negative.  Given that he is high risk, we will continue empiric coverage with antibiotics while we look forward to results from cultures, and follow the patient clinically.    3/23/2024  Exam remains totally benign with no focus of infection noted on review of systems or physical exam  Cultures remain negative  Patient afebrile  Continue current antibiotic regimen for another 24 hours as we follow cultures and exam  Repeat labs    3/24/2024  AFebrile>72hrs  Cultures neg  No indication of acute infection  Abx's stopped 3/24 am  Cortisol 3.6: as pressor requirement is decreasing will hold off on IV hydrocortisone however can revisit this option if his midodrine requirement cont's    Dehydration  Assessment & Plan  Patient has had 2 admissions in the last month with dehydration and low-grade fevers.  Infectious workup on the previous admission has been negative, he did respond to IV fluids, and getting rid of some of the sedating medications that he was on.  He  is having difficulty maintaining his hydration at home.  Megace was effective for his appetite, but may have caused some mental status changes on the previous admission.  As he was on other medications at that time including muscle relaxants and narcotics, we will try him again with megalies on this occasion and see if absent muscle relaxants, it does not impact his mental status.  May need to consider home infusion of crystalloid if follows fails.        3/25/2024  Pt off IVFs now x 48hrs and conts to have good UOP, labs  Instructed him to drink 1.5-2 litres of fluid daily    C2 cervical fracture (HCC)  Assessment & Plan  Had a recent mechanical fall resulting in Acute pathologic type II dens fracture with mild leftward displacement of the dens fragment on March 5  Aspen collar in place  PT  Physiatry consulted    3/25/2024  MSContin decreased from 60mg to 45mg BID 3/23.  Pt seems to be tolerating decreased dose well.  Consider further downward titration of his MSContin if his pain remains minimal  Check MRI of CSpine  Rehab referal      ACP (advance care planning)  Assessment & Plan  Reviewed with wife and patient.  DNR/DNI  Discussed with patient again on this admission.  He had questions regarding comfort care status and hospice, but states he is not ready for that right now.    Cancer related pain- (present on admission)  Assessment & Plan  3/25/2024  MSContin decreased from 60mg to 45mg BID 3/23.  Pt seems to be tolerating decreased dose well.  Let him ride at current dose for a few days and consider further downward titration after that  Dilaudid as needed      Pathologic fracture- (present on admission)  Assessment & Plan  From malignancy  Seen on Pan CT    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)- (present on admission)  Assessment & Plan  Hx of metastatic melanoma BRAF V6 00 E+ w/ metastases to bones/lymph nodes, on treatment  Following with oncology outpatient  DW Oncology Dr Parra (partner of  the pt's outpt Onc Dr Ross) she is in agreement with resuming his binimetinib and encorafenib         VTE prophylaxis: VTE Selection    I have performed a physical exam and reviewed and updated ROS and Plan today (3/25/2024). In review of yesterday's note (3/24/2024), there are no changes except as documented above.

## 2024-03-25 NOTE — CARE PLAN
Problem: Knowledge Deficit - Standard  Goal: Patient and family/care givers will demonstrate understanding of plan of care, disease process/condition, diagnostic tests and medications  Outcome: Progressing     Problem: Hemodynamics  Goal: Patient's hemodynamics, fluid balance and neurologic status will be stable or improve  Outcome: Progressing     Problem: Pain - Standard  Goal: Alleviation of pain or a reduction in pain to the patient’s comfort goal  Outcome: Progressing   The patient is Stable - Low risk of patient condition declining or worsening    Shift Goals  Clinical Goals: monitor vitals, rest, comfort  Patient Goals: rest  Family Goals: updates    Progress made toward(s) clinical / shift goals:  pt medicated per mar, transferred out of [facility]     Patient is not progressing towards the following goals:

## 2024-03-25 NOTE — CONSULTS
MEDICAL ONCOLOGY CONSULT NOTE    DATE OF SERVICE: 03/25/2024    REQUESTING MD: Dae Garcia DO    REASON FOR CONSULT: Andrew Wall is a 58 y.o. male who is being seen in consultation at the request of Dr. Garcia for an evaluation of metastatic melanoma    CHIEF COMPLAINT: weakness, fatigue, low blood pressure    HISTORY OF PRESENT ILLNESS:  58-year-old man with a known diagnosis of stage IV melanoma, BRAF positive presented to the hospital on 3/21/2024 with generalized weakness, fatigue, change in mentation, hypotension.  In the emergency department he was found to be hypotensive, febrile, and tachycardic.  He was started on broad-spectrum antibiotics, vasopressors.  Infectious workup has been negative.  He is was admitted to the intermediate care unit for ongoing treatment.  His Braftovi and Mektovi were stopped on admission.  He has been afebrile now for over 72 hours, cultures have been negative, no source of infection identified.  Antibiotics have been stopped.  He had admission similar to this earlier this month. Pt wife is at the bedside. He reports that overall he is doing better. He is working on his strength. He has no pain at present. He is thinking about doing more rehab. He reports that he cannot lift his right shoulder above his head.     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)     Nasal polyp         Past Surgical History:   Procedure Laterality Date    LYMPH NODE EXCISION Right 11/16/2023    Procedure: RIGHT INGUINAL NODE SUPERFICIAL DISSECTION;  Surgeon: Davon Valera M.D.;  Location: SURGERY [facility];  Service: General    NODE BIOPSY SENTINEL Bilateral 10/20/2020    Procedure: BIOPSY, LYMPH NODE,  SENTINEL- GROIN;  Surgeon: Davon Valera M.D.;  Location: SURGERY SAME DAY [facility];  Service: General    WIDE EXCISION Right 10/20/2020    Procedure: WIDE EXCISION, LESION- BUTTOCKS, RADICAL MALIGNANT MELANOMA;  Surgeon: Davon Valera M.D.;  Location: SURGERY SAME DAY [facility];  Service: General    ANTROSTOMY Bilateral 11/15/2019    Procedure: MAXILLARY ANTROSTOMY- REVISION ENDOSCOPICMOMETASONE INJECTION, PROPEL STENT PLACEMENT;  Surgeon: Felicitas Tenorio M.D.;  Location: [facility];  Service: Ent    SINUSCOPY Bilateral 11/15/2019    Procedure: ENDOSCOPY, PARANASAL SINUSES- FOR FRONTAL EXPLORATION;  Surgeon: Felicitas Tenorio M.D.;  Location: [facility];  Service: Ent    TURBINOPLASTY Bilateral 11/15/2019    Procedure: TURBINOPLASTY;  Surgeon: Felicitas Tenorio M.D.;  Location: [facility];  Service: Ent    SPHENOIDECTOMY Bilateral 11/15/2019    Procedure: SPHENOIDECTOMY- FOR SPHENOIDOTOMY;  Surgeon: Felicitas Tenorio M.D.;  Location: [facility];  Service: Ent    ETHMOIDECTOMY Bilateral 11/15/2019    Procedure: ETHMOIDECTOMY- ENDOSCOPIC;  Surgeon: Felicitas Tenorio M.D.;  Location: [facility];  Service: Ent    NASAL POLYPECTOMY Bilateral 11/15/2019    Procedure: POLYPECTOMY, NASAL CAVITY;  Surgeon: Felicitas Tenorio M.D.;  Location: [facility];  Service: Ent    NASAL POLYPECTOMY  2015, 2017, 2019    x 3    OTHER  11/20    removed melanoma        Current Facility-Administered Medications   Medication Dose Route Frequency Provider Last Rate Last Admin    Binimetinib TABS 45 mg  45 mg Oral BID JOSÉ McgowanOAlejo        Encorafenib CAPS 450 mg  450 mg Oral DAILY Dae Rosado D.O.        mometasone-formoterol (Dulera) 200-5 MCG/ACT inhaler 2 Puff  2 Puff Inhalation BID Dae Rosado D.O.        bisacodyl (Dulcolax) suppository 10 mg  10 mg Rectal DAILY AT 1800 Dae Rosado D.O.         senna-docusate (Pericolace Or Senokot S) 8.6-50 MG per tablet 2 Tablet  2 Tablet Oral BID Dae Rosado D.O.   2 Tablet at 03/25/24 0542    polyethylene glycol/lytes (Miralax) Packet 1 Packet  1 Packet Oral QDAY PRN Dae Rosado D.OAlejo        magnesium hydroxide (Milk Of Magnesia) suspension 30 mL  30 mL Oral QDAY PRN Dae Rosado D.O.   30 mL at 03/25/24 1505    midodrine (Proamatine) tablet 5 mg  5 mg Oral TID WITH MEALS Dae Rosado D.O.   5 mg at 03/25/24 1156    morphine ER (Ms Contin) tablet 45 mg  45 mg Oral Q12HRS Dae Rosado D.O.   45 mg at 03/25/24 0542    megestrol (Megace) 40 MG/ML suspension 800 mg  800 mg Oral DAILY Dae Rosado D.O.   800 mg at 03/25/24 0543    gabapentin (Neurontin) capsule 100 mg  100 mg Oral DAILY Nabil Stafford M.D.   100 mg at 03/25/24 0542    ondansetron (Zofran ODT) dispertab 4 mg  4 mg Oral Q6HRS PRN Nabil Stafford M.D.   4 mg at 03/25/24 1459    prochlorperazine (Compazine) tablet 10 mg  10 mg Oral QDAY PRN Nabil Stafford M.D.        enoxaparin (Lovenox) inj 40 mg  40 mg Subcutaneous DAILY AT 1800 Nabil Stafford M.D.   40 mg at 03/24/24 1757    ondansetron (Zofran) syringe/vial injection 4 mg  4 mg Intravenous Q4HRS PRTEREZA Stafford M.D.   4 mg at 03/24/24 0621    promethazine (Phenergan) suppository 12.5-25 mg  12.5-25 mg Rectal Q4HRS PRTEREZA Stafford M.D.        prochlorperazine (Compazine) injection 5-10 mg  5-10 mg Intravenous Q4HRS PRN Nabil Stafford M.D.   10 mg at 03/22/24 1548    acetaminophen (Tylenol) tablet 650 mg  650 mg Oral Q6HRS PRN Nabil Stafford M.D.         Allergies:  Augmentin    Social History     Tobacco Use    Smoking status: Never    Smokeless tobacco: Never   Vaping Use    Vaping Use: Never used   Substance Use Topics    Alcohol use: Yes     Alcohol/week: 0.6 oz     Types: 1 Cans of beer per week     Comment: reports 4/month    Drug use: No      Family History: No FH of cancer  History reviewed. No pertinent family history.    Review of Systems:  12 system review was completed and negative except as mentioned in HPI.      PHYSICAL EXAM:  Vitals:    03/25/24 0700 03/25/24 0800 03/25/24 0900 03/25/24 0939   BP: 100/66 95/69 90/56 92/58   Pulse: 93 100 94 (!) 135   Resp: 15 13 17    Temp: 36.4 °C (97.5 °F)      TempSrc: Temporal      SpO2: 99% 99% 99%    Weight:       Height:           Physical Exam  Constitutional:       General: He is not in acute distress.     Appearance: He is normal weight. He is not toxic-appearing.   HENT:      Head: Normocephalic and atraumatic.      Right Ear: External ear normal.      Left Ear: External ear normal.      Nose: Nose normal. No congestion.      Mouth/Throat:      Mouth: Mucous membranes are moist.      Pharynx: Oropharynx is clear. No oropharyngeal exudate.   Eyes:      General: No scleral icterus.     Conjunctiva/sclera: Conjunctivae normal.   Cardiovascular:      Rate and Rhythm: Normal rate and regular rhythm.      Heart sounds: No murmur heard.     No gallop.   Pulmonary:      Effort: Pulmonary effort is normal. No respiratory distress.      Breath sounds: Normal breath sounds. No wheezing.   Abdominal:      General: Abdomen is flat. Bowel sounds are normal. There is no distension.      Palpations: Abdomen is soft.      Tenderness: There is no abdominal tenderness.   Musculoskeletal:         General: No swelling, tenderness or signs of injury.      Cervical back: Neck supple. No rigidity.      Right lower leg: No edema.      Left lower leg: No edema.      Comments: Decreased ROM of the RUE   Lymphadenopathy:      Cervical: No cervical adenopathy.   Skin:     General: Skin is warm and dry.      Coloration: Skin is not jaundiced or pale.   Neurological:      General: No focal deficit present.      Mental Status: He is alert and oriented to person, place, and time.   Psychiatric:         Mood and Affect: Mood  normal.         Behavior: Behavior normal.         Thought Content: Thought content normal.         Judgment: Judgment normal.         LABORATORY:  Recent Labs     03/23/24 0217 03/25/24  0330   WBC 3.1* 2.8*   RBC 3.11* 3.21*   HEMOGLOBIN 8.0* 8.2*   HEMATOCRIT 28.0* 27.6*   MCV 90.0 86.0   MCH 25.7* 25.5*   RDW 61.6* 59.5*   PLATELETCT 306 297   MPV 8.8* 8.8*       Recent Labs     03/23/24 0217 03/25/24  0330   SODIUM 136 135   POTASSIUM 4.2 3.6   CHLORIDE 107 105   CO2 18* 21   GLUCOSE 105* 94   BUN 7* 5*             ASSESSMENT AND PLAN:    # Stage IV Melanoma, BRAF Positive -patient had multiple admissions now for similar symptoms.  On admission his Braftovi and Mektovi have been stopped.  He has had no evidence of a fever for over 72 hours and has no clear evidence of infection on an extensive workup.  Both Braftovi and Mektovi do come with the risk of pyrexia.  Given that he has had now another admission for similar symptoms without a clear infectious source identified, my concern would be that some of this could be related to his Braftovi and or Mektovi.  Therefore now that his symptoms have improved and he is back at his baseline, will restart therapy at a reduced dose.  I reviewed with him that we will have to monitor closely for recurrent pyrexia as this is still a potential risk even on lower doses of the medication.  He has had a very good response to these medications as outline on prior CT scans, but has been on and off therapy, so we will try to continue otherwise he has no other treatment options.  - Braftovi 300 mg po daily  - Mektovi 30 mg po BID    Patient is has a high medical complexity and is at high risk for complication, morbidity, and mortality.    Thank you for this consultation, will continue to follow the patient. If you have any questions or concerns, please contact me.    Clara Parra MD  [facility]  [phone]

## 2024-03-25 NOTE — PREADMISSION SCREENING NOTE
Updated Pre-Screen Assessment     Name: Andrew Wall  MRN: [id number]  : 1965    Medical Status/ Changes:     Rei Kc M.D.  Physician  Hospital Medicine     Progress Notes     Signed     Date of Service: 3/27/2024  9:17 AM    Hospital Medicine Daily Progress Note     Date of Service  3/27/2024     Chief Complaint  Andrew Wall is a 58 y.o. male admitted 3/21/2024 with fever altered mental status     Hospital Course  57yo PMHx metastatic melanoma, asthma, chronic pain related to bony mets.  Presented with altered mental status, SBP of 59 at home.  In the ED hypotensive, febrile, tachy.  No clear source of infection found.  Started on Vanc, cefepime and levophed     Interval Problem Update     MRI of C-spine reviewed with metastatic disease no significant canal stenosis or cord impingement discussed with oncology  Patient is afebrile on room air SBP in the 90s  Blood cultures are negative  Pain is well-controlled  Patient is constipated I ordered Fleet enema and MiraLAX     I have discussed this patient's plan of care and discharge plan at IDT rounds today with Case Management, Nursing, Nursing leadership, and other members of the IDT team.     Consultants/Specialty     Code Status  DNAR/DNI     Disposition  Medically Cleared  I have placed the appropriate orders for post-discharge needs.     Review of Systems  Review of Systems   Constitutional:  Negative for fever.   Respiratory:  Negative for shortness of breath.    Cardiovascular:  Negative for chest pain.   Musculoskeletal:  Positive for neck pain.   Neurological:  Positive for weakness.   All other systems reviewed and are negative.     Physical Exam  Temp:  [36.4 °C (97.5 °F)-36.8 °C (98.3 °F)] 36.4 °C (97.5 °F)  Pulse:  [] 156  Resp:  [16-18] 16  BP: ()/(53-70) 94/67  SpO2:  [92 %-100 %] 98 %     Physical Exam  Vitals and nursing note reviewed.   Constitutional:       Appearance: He is well-developed. He is not  diaphoretic.   HENT:      Head: Normocephalic and atraumatic.      Mouth/Throat:      Pharynx: No oropharyngeal exudate.   Eyes:      General:         Right eye: No discharge.         Left eye: No discharge.   Neck:      Vascular: No JVD.      Trachea: No tracheal deviation.   Cardiovascular:      Rate and Rhythm: Regular rhythm. Tachycardia present.   Pulmonary:      Effort: Pulmonary effort is normal. No respiratory distress.      Breath sounds: Normal breath sounds. No stridor. No wheezing.   Chest:      Chest wall: No tenderness.   Abdominal:      General: There is no distension.      Palpations: Abdomen is soft.      Tenderness: There is no abdominal tenderness. There is no rebound.   Musculoskeletal:         General: No tenderness.      Cervical back: Neck supple.   Skin:     General: Skin is warm and dry.      Nails: There is no clubbing.   Neurological:      Mental Status: He is alert and oriented to person, place, and time.      Cranial Nerves: No cranial nerve deficit.      Motor: Weakness (Right upper extremity proximal abduction) present. No abnormal muscle tone.   Psychiatric:         Behavior: Behavior normal.      Fluids     Intake/Output Summary (Last 24 hours) at 3/27/2024 1517  Last data filed at 3/27/2024 0755    Gross per 24 hour  Intake --  Output 400 ml  Net -400 ml     Laboratory    Recent Labs    03/25/24  0330 03/26/24  0032  WBC 2.8* 3.3*  RBC 3.21* 2.91*  HEMOGLOBIN 8.2* 7.8*  HEMATOCRIT 27.6* 24.9*  MCV 86.0 85.6  MCH 25.5* 26.8*  MCHC 29.7* 31.3*  RDW 59.5* 59.3*  PLATELETCT 297 299  MPV 8.8* 9.3     Recent Labs    03/25/24  0330 03/26/24  0032  SODIUM 135 134*  POTASSIUM 3.6 3.8  CHLORIDE 105 105  CO2 21 21  GLUCOSE 94 114*  BUN 5* 7*  CREATININE 0.19* 0.33*  CALCIUM 7.9* 7.7*     Imaging    MR-CERVICAL SPINE-WITH & W/O  Final Result        1.  Metastatic disease involving C2, C6, C7 with bone marrow infiltration. Previously seen epidural thickening and enhancement has resolved. Mild  pulmonary edema and abnormal enhancement also noted within the C1 anterior arch and lateral mass more   prominent to the left of midline.     2.  Compared to the previous study, there has interval development of a pathologic endplate compression fracture with 30% loss of height at C6.     3.  Prevertebral soft tissue thickening and enhancement suggesting tumor infiltration most pronounced between C5 and T1.  4.  No evidence of significant canal stenosis or cord impingement. Spinal cord signal is normal.     CT-LSPINE W/O PLUS RECONS  Final Result     1.  Stable pathologic fractures of the lumbosacral spine. No new acute osseous abnormality identified.  2.  Stable large lytic right sacral ala mass again noted.     CT-ABDOMEN-PELVIS WITH  Final Result     1.  Multiple lytic and sclerotic osseous metastases are again noted with redemonstrated pathologic pelvic and lumbosacral spine fractures redemonstrated.  2.  Hepatic steatosis.     CT-CTA CHEST PULMONARY ARTERY W/ RECONS  Final Result        1. No CT evidence of pulmonary embolism.     2. Several bilateral pulmonary nodules in the lower lungs are more conspicuous than prior, likely worsening metastasis.     3. Grossly unchanged osseous metastases.     4. No airspace opacity. No pleural effusion or pneumothorax.     CT-HEAD W/O  Final Result     1.  No acute intracranial abnormality.  2.  Expansile osseous lesion is again noted in the odontoid process consistent with known metastatic disease.     DX-CHEST-PORTABLE (1 VIEW)  Final Result     No acute cardiopulmonary disease evident.     Assessment/Plan  * Septic shock (HCC)  Assessment & Plan  Septic shock versus dehydration  Patient was clearly dry in presentation.  He has also had a fever however he is on an an oncologic medication which can cause fever as a side effect.  He has had an extensive workup for source of infection, and thus far this has been negative.      No clear source of infection antibiotics  discontinued     Continue midodrine  Cortisol level is low but patient currently asymptomatic we will hold off on stress dose hydrocortisone     C2 cervical fracture (HCC)  Assessment & Plan  Had a recent mechanical fall resulting in Acute pathologic type II dens fracture with mild leftward displacement of the dens fragment on March 5  Aspen collar in place  PT  Physiatry consulted     3/25/2024  MSContin decreased from 60mg to 45mg BID 3/23.  Pt seems to be tolerating decreased dose well.  Reviewed MRI of C-spine findings with patient.  Reviewed previous consultation notes from spine surgery Dr. Lawrence. On 3/27 I placed a call to his office and left a message with his medical assistantTo update him as patient had an outpatient follow-up with him later this week to update him on hospitalization and MRI findings     Cancer related pain- (present on admission)  Assessment & Plan  3/25/2024  MSContin decreased from 60mg to 45mg BID 3/23.  Pt seems to be tolerating decreased dose well.  Discussed further tapering would like to hold off as he is concerned about increased pain when he goes to rehab     Pathologic fracture- (present on admission)  Assessment & Plan  Secondary to metastatic melanoma  Reviewed spine surgery notes from Dr. 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 In review of  yesterday's note (3/26/2024), there are no changes except as documented above.           Clara Parra M.D.  Physician  Oncology     Progress Notes     Signed     Date of Service: 3/26/2024 12:21 PM    Oncology/Hematology Progress Note     Author: Clara Parra M.D.     Date & Time created: 3/26/2024  12:21 PM     CC: Metastatic Melanoma     Interval History:  No acute events overnight, pt on oncology floor.  Patient's wife is at the bedside.  He reports constipation today.  Restarted therapy has not noticed any adverse effects at this time.  No fevers, cough, or shortness of breath.  Planning on a 10 to 14 days stay at acute rehab.     Review of Systems:  Review of Systems   Constitutional:  Positive for malaise/fatigue.   HENT: Negative.     Eyes: Negative.    Respiratory: Negative.     Cardiovascular: Negative.    Gastrointestinal:  Positive for constipation.   Genitourinary: Negative.    Musculoskeletal:  Positive for back pain.   Skin: Negative.    Neurological: Negative.    Endo/Heme/Allergies: Negative.    Psychiatric/Behavioral: Negative.        Physical Exam:  Physical Exam  Constitutional:       General: He is not in acute distress.     Appearance: He is ill-appearing.   HENT:      Head: Normocephalic and atraumatic.   Eyes:      General: No scleral icterus.  Cardiovascular:      Rate and Rhythm: Normal rate.   Pulmonary:      Effort: Pulmonary effort is normal. No respiratory distress.   Musculoskeletal:         General: No swelling.   Skin:     Coloration: Skin is not jaundiced.   Neurological:      General: No focal deficit present.      Mental Status: He is alert and oriented to person, place, and time.   Psychiatric:         Mood and Affect: Mood normal.         Behavior: Behavior normal.         Thought Content: Thought content normal.         Judgment: Judgment normal.      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  On admission his Braftovi and Mektovi have been stopped.  He has had no evidence of a fever for over 72 hours and has no clear evidence of infection on an extensive workup.  Both Braftovi and Mektovi do come with the risk of pyrexia.  Given that he has had now another admission for similar symptoms without a clear infectious source identified, my concern would be that some of this could be related to his Braftovi and or Mektovi.  Therefore now that his symptoms have improved and he is back at his baseline,therapy was restarted at a reduced dose.  I reviewed with him that we will have to monitor closely for recurrent pyrexia as this is still a potential risk even on lower doses of the medication.  He has had a very good response to these medications as outline on prior CT scans, but has been on and off therapy, so we will try to continue otherwise he has no other treatment options.  - Braftovi 300 mg po daily  - Mektovi 30 mg po BID     # Anemia - likely from chronic disease, he has had a persistent chronic anemia for quite sometime. Iron slightly low, but iron saturation is normal   - monitor CBC, transfuse as needed  - start po iron     Patient is has a high medical complexity and is at high risk for complication, morbidity, and mortality.     Will continue to follow the patient. If you have any questions or concerns, please contact me.     Clara Parra MD    Functional Status/ Changes:        Annelise Chacon, PT  Physical Therapist  Specialty: Physical Therapy     Therapy     Signed     Date of Service: 3/27/2024 12:40 PM    Physical Therapy   Daily  Treatment     Patient Name: Andrew Wall  Age:  58 y.o., Sex:  male  Medical Record #: [id number]  Today's Date: 3/27/2024     Precautions  Precautions: Fall Risk;Spinal / Back Precautions ;Cervical Collar    Comments: c2 fx; ccollar at all times; multiple spinal mets, new c6 endplate fx per MRI 3/26/24; right ala fx, bilateral S1 mass, T1-2 paraspinal extrusion; per last admission 'NWB left UE but ok for FWW'; HR max per , 85% goal 142,     Assessment     Pt with highly motivation to achieve higher independence, specifically ADLs per wife/pt; today hypotensive with static standing but maintains with ambulation, no symptoms, did achieve 156 HR but reduced within 1 min of seated/supine rest to normal values; continued to encourage PO for long term energy; discussion of supine ROM with strong parameters for stopping if any sharp pain felt given multitude of spinal mets and pelvic, left clavicle involvement; will follow to progress, if does not qualify for acute rehab likely better to return home than SNF but wife reports home health had dc'd him due to being 'too acute' will follow.      Plan     Treatment Plan Status: Continue Current Treatment Plan  Type of Treatment: Bed Mobility, Equipment, Gait Training, Neuro Re-Education / Balance, Stair Training, Therapeutic Activities, Therapeutic Exercise  Treatment Frequency: 4 Times per Week  Treatment Duration: Until Therapy Goals Met     DC Equipment Recommendations: Unable to determine at this time (anticipate none has hospital bed, w/c, FWW)  Discharge Recommendations: Other - (pt/family requesting acute rehab to achieve optimal independence)        Abridged Subjective/Objective         03/27/24 1240  Cognition   Cognition / Consciousness X  Level of Consciousness Alert  Ability To Follow Commands 1 Step  Attention Impaired  Comments cooperative; wishes to gain as much independence as he can; wife at bedside, no indication of remembering prior conversations  but did remember OT  Balance  Sitting Balance (Static) Fair +  Sitting Balance (Dynamic) Fair  Standing Balance (Static) Fair -  Standing Balance (Dynamic) Fair -  Weight Shift Sitting Good  Weight Shift Standing Good  Skilled Intervention Postural Facilitation;Sequencing;Tactile Cuing;Verbal Cuing  Comments B UE support in sitting/standing; narrow base of support  Bed Mobility   Supine to Sit Contact Guard Assist  (with FWW)  Sit to Supine Contact Guard Assist  (raised HOB; hospital bed)  Gait Analysis  Gait Level Of Assist Contact Guard Assist  Assistive Device Front Wheel Walker  Distance (Feet) 75  # of Times Distance was Traveled 1  Deviation Bradykinetic;Decreased Heel Strike;Decreased Toe Off;Decreased Base Of Support  Weight Bearing Status full  Vision Deficits Impacting Mobility denies  Skilled Intervention Postural Facilitation;Sequencing;Tactile Cuing;Verbal Cuing  Comments narrow base of support;  Functional Mobility  Sit to Stand Contact Guard Assist  Bed, Chair, Wheelchair Transfer Contact Guard Assist  Transfer Method Stand Step  Short Term Goals   Short Term Goal # 1 supine to/from sit flat bed, log roll supervised in 6 visits to progress with bed mobility  Goal Outcome # 1 goal not met  Short Term Goal # 2 sit to/from stand EOB with FWW supervised in 6 visits to progress with transfers  Goal Outcome # 2 Goal not met  Short Term Goal # 3 amb 150ft with FWW supervised in 6 visits for functional distances  Goal Outcome # 3 Goal not met  Short Term Goal # 4 Pt will self propel w/c with bilateral LEs with supervision x 150ft within 6 vistis to ensure household mobility  Goal Outcome # 4 Goal not met                 Fan Plummer OT  Occupational Therapist     Therapy     Signed     Date of Service: 3/27/2024 11:43 AM    Occupational Therapy  Daily Treatment     Patient Name: Andrew Wall  Age:  58 y.o., Sex:  male  Medical Record #: [id number]  Today's Date: 3/27/2024     Precautions  Precautions:  Fall Risk, Spinal / Back Precautions , Cervical Collar    Comments: c2 fx; ccollar at all times; multiple spinal mets, new c6 endplate fx per MRI 3/26/24; right ala fx, bilateral S1 mass, T1-2 paraspinal extrusion; per last admission 'NWB left UE but ok for FWW'; HR max per , 85% goal 142,     Assessment      Pt currently limited by decreased functional mobility, activity tolerance, strength, AROM, coordination, balance, and pain which are affecting pt's ability to complete ADLs/IADLs at baseline. Pt would benefit from OT services in the acute care setting to maximize functional recovery.       Plan     Treatment Plan Status: (P) Continue Current Treatment Plan  Type of Treatment: Self Care / Activities of Daily Living, Adaptive Equipment, Community Re-Integration, Manual Therapy Techniques, Neuro Re-Education / Balance, Therapeutic Exercises, Therapeutic Activity  Treatment Frequency: 4 Times per Week  Treatment Duration: Until Therapy Goals Met     DC Equipment Recommendations: Unable to determine at this time  Discharge Recommendations: (P) Recommend post-acute placement for additional occupational therapy services prior to discharge home       03/27/24 1143  Activities of Daily Living  Grooming Minimal Assist  Upper Body Dressing Moderate Assist  Lower Body Dressing Moderate Assist  Functional Mobility  Sit to Stand Contact Guard Assist  Bed, Chair, Wheelchair Transfer Minimal Assist  Short Term Goals  Short Term Goal # 1 Pt will complete ADL transfers with supervision  Goal Outcome # 1 Progressing as expected  Short Term Goal # 2 Pt will complete LB dressing with supervision  Goal Outcome # 2 Progressing as expected  Short Term Goal # 3 Pt will complete toileting with supervision  Goal Outcome # 3 Progressing as expected  Short Term Goal # 4 Pt will complete UB dressing with supervision  Goal Outcome # 4 Progressing as expected  Occupational Therapy Treatment Plan   O.T. Treatment Plan Continue Current  Treatment Plan  Anticipated Discharge Equipment and Recommendations  Discharge Recommendations Recommend post-acute placement for additional occupational therapy services prior to discharge home  Session Information  Date / Session Number  3/27 2 (2/4 3/31)     Reviewer: Milagro Bergeron R.N.  Date: 3/28/2024  Time: 10:12 AM    Pre-Admission Screening Form    Patient Information:   Name: Andrew Wall     MRN: [id number]       : 1965      Age: 58 y.o.   Gender: male      Race: White [7]       Marital Status:  [2]  Family Contact: Sherly Wall        Relationship: Spouse [17]  Home Phone:            Cell Phone: [phone]  Advanced Directives: Copy in Chart  Code Status:  DNAR/DNI  Current Attending Provider: Dae Rosado D.O.  Referring Physician: Dr. Rosado      Physiatrist Consult: Dr. Pillo Nova       Referral Date: 3/25/2024  Primary Payor Source:  [facility]  Secondary Payor Source:      Medical Information:   Date of Admission to Acute Care Setting:3/21/2024  Room Number: NSE827/00  Rehabilitation Diagnosis: 0017.2 - Medically Complex: Neoplasms  Immunization History   Administered Date(s) Administered    Hepatitis B Vaccine (Adol/Adult) 2021, 2021, 2021    Influenza Vaccine Quad Inj (Pf) 10/16/2018, 2022, 2024    Influenza Vaccine Quad Recombinant 2020    MMR Vaccine 2021    MODERNA BIVALENT BOOSTER SARS-COV-2 VACCINE (6+) 2022    MODERNA SARS-COV-2 VACCINE (12+) 2021, 2021, 2021    Pneumococcal polysaccharide vaccine (PPSV-23) 2018    Tdap Vaccine 2015    Tuberculin Skin Test 2021    Zoster Vaccine Recombinant (RZV) (SHINGRIX) 2023, 2023     Allergies   Allergen Reactions    Augmentin Vomiting and Nausea     Past Medical History:   Diagnosis Date    Asthma     Cancer (HCC) 10/20    melanoma    Cancer related pain 2024    Constipation 2024    Malignant melanoma  metastatic to lymph node (HCC) 11/16/2023    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) 11/16/2023    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)     Nasal polyp      Past Surgical History:   Procedure Laterality Date    LYMPH NODE EXCISION Right 11/16/2023    Procedure: RIGHT INGUINAL NODE SUPERFICIAL DISSECTION;  Surgeon: Davon Valera M.D.;  Location: [facility];  Service: General    NODE BIOPSY SENTINEL Bilateral 10/20/2020    Procedure: BIOPSY, LYMPH NODE, SENTINEL- GROIN;  Surgeon: Davon Valera M.D.;  Location: SURGERY SAME DAY [facility];  Service: General    WIDE EXCISION Right 10/20/2020    Procedure: WIDE EXCISION, LESION- BUTTOCKS, RADICAL MALIGNANT MELANOMA;  Surgeon: Davon Valera M.D.;  Location: SURGERY SAME DAY [facility];  Service: General    ANTROSTOMY Bilateral 11/15/2019    Procedure: MAXILLARY ANTROSTOMY- REVISION ENDOSCOPICMOMETASONE INJECTION, PROPEL STENT PLACEMENT;  Surgeon: Felicitas Tenorio M.D.;  Location: [facility];  Service: Ent    SINUSCOPY Bilateral 11/15/2019    Procedure: ENDOSCOPY, PARANASAL SINUSES- FOR FRONTAL EXPLORATION;  Surgeon: Felicitas Tenorio M.D.;  Location: [facility];  Service: Ent    TURBINOPLASTY Bilateral 11/15/2019    Procedure: TURBINOPLASTY;  Surgeon: Felicitas Tenorio M.D.;  Location: [facility];  Service: Ent    SPHENOIDECTOMY Bilateral 11/15/2019    Procedure: SPHENOIDECTOMY- FOR SPHENOIDOTOMY;  Surgeon: Felicitas Tenorio M.D.;  Location: [facility];  Service: Ent    ETHMOIDECTOMY Bilateral 11/15/2019    Procedure: ETHMOIDECTOMY- ENDOSCOPIC;  Surgeon: Felicitas Tenorio M.D.;  Location: [facility];  Service: Ent    NASAL POLYPECTOMY Bilateral 11/15/2019    Procedure: POLYPECTOMY, NASAL CAVITY;  Surgeon: Felicitas Tenorio M.D.;  Location: [facility];  Service: Ent    NASAL POLYPECTOMY  2015, 2017, 2019    x 3    OTHER  11/20    removed melanoma        History Leading to Admission, Conditions that Caused the Need for Rehab (CMS):          Nabil Stafford M.D.  Physician  [facility] Medicine     H&P     Signed     Date of Service: 3/21/2024  6:51 PM    Hospital Medicine History & Physical Note     Date of Service  3/21/2024     Primary Care Physician  Jose Luis Juarez M.D.     Consultants  Intensivist     Code Status  DNAR/DNI     Chief Complaint    Chief Complaint  Patient presents with   Headache   Fatigue     History of Presenting Illness  Andrew Wall is a 58 y.o. male who presented 3/21/2024 with generalized weakness.  Patient has history of metastatic melanoma to lymph nodes and bones on daily chemotherapy.  He woke up this morning and was found to have altered mental status.  He was not responding to questions appropriately, and reported dizziness.  He was found to be pale.  Wife checked his blood pressure and found him to have systolic blood pressure of 59.  She checked it 3 times without any change.  She then decided to bring him in to ER for evaluation.     In ER, patient found to be hypotensive, febrile, tachycardic.  Started on Levophed, vancomycin, cefepime.  Pan CT, UA, chest x-ray does not show any clear source of infection.  Intensivist consulted, patient can be monitored under IMCU.     I discussed the plan of care with patient.     Review of Systems  Review of Systems   Constitutional:  Positive for malaise/fatigue.   HENT: Negative.     Eyes: Negative.    Respiratory: Negative.     Cardiovascular: Negative.    Gastrointestinal: Negative.    Genitourinary: Negative.    Musculoskeletal: Negative.    Skin: Negative.    Neurological:  Positive for weakness and headaches.   Endo/Heme/Allergies: Negative.    Psychiatric/Behavioral: Negative.        Past Medical History   has a past medical history of Asthma, Cancer (HCC) (10/20), Cancer related pain (2/5/2024), Constipation (1/12/2024), Malignant melanoma metastatic to lymph node  (HCC) (11/16/2023), 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) (11/16/2023), 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), and Nasal polyp.     Surgical History   has a past surgical history that includes nasal polypectomy (2015, 2017, 2019); antrostomy (Bilateral, 11/15/2019); sinuscopy (Bilateral, 11/15/2019); turbinoplasty (Bilateral, 11/15/2019); sphenoidectomy (Bilateral, 11/15/2019); ethmoidectomy (Bilateral, 11/15/2019); nasal polypectomy (Bilateral, 11/15/2019); node biopsy sentinel (Bilateral, 10/20/2020); wide excision (Right, 10/20/2020); other (11/20); and lymph node excision (Right, 11/16/2023).      Family History  family history is not on file.   Family history reviewed with patient. There is no family history that is pertinent to the chief complaint.      Social History   reports that he has never smoked. He has never used smokeless tobacco. He reports current alcohol use of about 0.6 oz of alcohol per week. He reports that he does not use drugs.     Allergies    Allergies  Allergen Reactions   Augmentin Vomiting and Nausea     Medications    Prior to Admission Medications  Prescriptions Last Dose Informant Patient Reported? Taking?  Binimetinib 15 MG Tab 3/20/2024 at am Patient, Family Member No Yes  Sig: Take 45 mg by mouth 2 times a day.  Patient taking differently: Take 45 mg by mouth 2 times a day. 3 tablets=45mg  CALCIUM CARBONATE ANTACID PO 3/20/2024 at am Patient, Family Member Yes Yes  Sig: Take 2 Tablets by mouth every day.  DULoxetine (CYMBALTA) 60 MG Cap DR Particles delayed-release capsule 3 weeks ago at stopped Patient, Family Member Yes Yes  Sig: Take 60 mg by mouth every day.  Encorafenib 75 MG Cap 3/19/2024 at am Patient, Family Member No No  Sig: Take 450 mg by mouth every day.  Patient taking differently: Take 450 mg by mouth every day. 6 capsules=450mg  Mometasone Furo-Formoterol Fum (DULERA INH) 3/19/2024 at pm Patient, Family Member Yes Yes  Sig: Inhale 2 Puffs 2 times a  day.  famotidine (PEPCID) 20 MG Tab 3/20/2024 at am Patient, Family Member Yes Yes  Sig: Take 20 mg by mouth every day.  gabapentin (NEURONTIN) 100 MG Cap 3/20/2024 at am Patient, Family Member Yes Yes  Sig: Take 100 mg by mouth every day.  morphine ER (MS CONTIN) 60 MG Tab CR tablet 3/21/2024 at 0800 Patient, Family Member No Yes  Sig: Take 1 Tablet by mouth every 12 hours for 14 days.  ondansetron (ZOFRAN ODT) 4 MG TABLET DISPERSIBLE 3/20/2024 at 2030 Patient, Family Member Yes Yes  Sig: Take 4 mg by mouth every 6 hours as needed for Nausea/Vomiting.  prochlorperazine (COMPAZINE) 10 MG Tab 3/21/2024 at 0800 Patient, Family Member Yes Yes  Sig: Take 10 mg by mouth 1 time a day as needed for Nausea/Vomiting.    Facility-Administered Medications: None     Physical Exam  Temp:  [37.2 °C (98.9 °F)-38.1 °C (100.5 °F)] 37.2 °C (98.9 °F)  Pulse:  [] 84  Resp:  [11-30] 25  BP: ()/(45-64) 91/55  SpO2:  [90 %-97 %] 96 %    Blood Pressure: 91/55  Temperature: 37.2 °C (98.9 °F)  Pulse: 84  Respiration: (!) 25  Pulse Oximetry: 96 %        Physical Exam  Constitutional:       Appearance: Normal appearance.      Comments: Overall generalized weakness   HENT:      Head: Normocephalic.      Nose: Nose normal.      Mouth/Throat:      Mouth: Mucous membranes are moist.   Eyes:      Pupils: Pupils are equal, round, and reactive to light.   Neck:      Comments: Aspen collar in place  Cardiovascular:      Rate and Rhythm: Normal rate and regular rhythm.      Pulses: Normal pulses.   Pulmonary:      Effort: Pulmonary effort is normal.      Breath sounds: Normal breath sounds.   Abdominal:      General: Abdomen is flat. Bowel sounds are normal.      Palpations: Abdomen is soft.   Musculoskeletal:         General: Normal range of motion.   Skin:     General: Skin is warm.   Neurological:      General: No focal deficit present.      Mental Status: He is alert and oriented to person, place, and time. Mental status is at baseline.  "  Psychiatric:         Mood and Affect: Mood normal.         Behavior: Behavior normal.         Thought Content: Thought content normal.         Judgment: Judgment normal.      Laboratory:    Recent Labs    03/21/24  1253  WBC 3.5*  RBC 3.29*  HEMOGLOBIN 9.0*  HEMATOCRIT 28.2*  MCV 85.7  MCH 27.4  MCHC 31.9*  RDW 59.6*  PLATELETCT 359  MPV 9.2     Recent Labs    03/21/24  1253  SODIUM 134*  POTASSIUM 4.4  CHLORIDE 102  CO2 22  GLUCOSE 113*  BUN 12  CREATININE 0.32*  CALCIUM 7.8*     Recent Labs    03/21/24  1253  ALTSGPT 15  ASTSGOT 33  ALKPHOSPHAT 207*  TBILIRUBIN 0.2  GLUCOSE 113*      No results for input(s): \"NTPROBNP\" in the last 72 hours.      Recent Labs    03/21/24  1403  TROPONINT 39*     Imaging:    CT-LSPINE W/O PLUS RECONS  Final Result     1.  Stable pathologic fractures of the lumbosacral spine. No new acute osseous abnormality identified.  2.  Stable large lytic right sacral ala mass again noted.     CT-ABDOMEN-PELVIS WITH  Final Result     1.  Multiple lytic and sclerotic osseous metastases are again noted with redemonstrated pathologic pelvic and lumbosacral spine fractures redemonstrated.  2.  Hepatic steatosis.     CT-CTA CHEST PULMONARY ARTERY W/ RECONS  Final Result     1. No CT evidence of pulmonary embolism.     2. Several bilateral pulmonary nodules in the lower lungs are more conspicuous than prior, likely worsening metastasis.     3. Grossly unchanged osseous metastases.     4. No airspace opacity. No pleural effusion or pneumothorax.     CT-HEAD W/O  Final Result     1.  No acute intracranial abnormality.  2.  Expansile osseous lesion is again noted in the odontoid process consistent with known metastatic disease.     DX-CHEST-PORTABLE (1 VIEW)  Final Result     No acute cardiopulmonary disease evident.     X-Ray:  I have personally reviewed the images and compared with prior images.     Assessment/Plan:  Justification for Admission Status  I anticipate this patient will require at least " two midnights for appropriate medical management, necessitating inpatient admission because patient has septic shock     Patient will need a Intermediate Care (Adult and Pediatrics) bed on MEDICAL service .  The need is secondary to septic shock.     * Septic shock (HCC)  Assessment & Plan  This is Sepsis Present on admission  SIRS criteria identified on my evaluation include: Fever, with temperature greater than 100.9 deg F and Tachycardia, with heart rate greater than 90 BPM  Clinical indicators of end organ dysfunction include Hypotension with systolic blood pressure less than 90 or MAP less than 65  Source is Unknown  Sepsis protocol initiated  Crystalloid Fluid Administration: Fluid resuscitation ordered per standard protocol - 30 mL/kg per current or ideal body weight  IV antibiotics as appropriate for source of sepsis  Reassessment: I have reassessed the patient's hemodynamic status     Patient with history of metastatic melanoma  presents with generalized weakness.  Found to be in septic shock.  Unknown source of infection at this moment.  Chest x-ray, UA, CT head, CT abdomen pelvis, CT chest negative for infection however does demonstrate widespread metastasis.  Continue Levophed  Vancomycin/cefepime  Tylenol  Fluids        C2 cervical fracture (HCC)  Assessment & Plan  Had a recent mechanical fall resulting in Acute pathologic type II dens fracture with mild leftward displacement of the dens fragment on March 5  Aspen collar in place  PT     ACP (advance care planning)  Assessment & Plan  Reviewed with wife and patient.  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 as to when patient can resume his binimetinib and encorafenib     Patient is critically ill.   The patient continues to have: Septic shock, metastatic melanoma  The vital organ system that is affected is the: Cardiovascular system  If untreated there is a high chance of deterioration into: Multiorgan failure  And eventually death.   The critical care that I am providing today is: IV Levophed, IV vancomycin, IV cefepime, IV Dilaudid  The critical that has been undertaken is medically complex.   There has been no overlap in critical care time.   Critical Care Time not including procedures: 31     VTE prophylaxis: enoxaparin ppx              Pillo Nova D.O.  Physician  Physical Medicine & Rehab     Consults     Signed     Date of Service: 3/25/2024 11:53 AM  Consult Orders  IP Consult For Physiatry [[id number]] ordered by Dae Rosado D.O. at 03/25/24 1123                                                    Physical Medicine and Rehabilitation Consultation                                                                            Date of initial consultation: 3/25/2024  Consulting provider: Dae Rosado D.O.   Reason for consultation: assess for acute inpatient rehab appropriateness  LOS: 4 Day(s)     Chief complaint: Debility from melanoma     HPI: The patient is a 58 y.o. left hand dominant male with a past medical history of metastatic melanoma to lymph nodes and bones, on chemotherapy;  who presented on 3/21/2024 12:47 PM with generalized weakness, altered mental status, pale.  Wife checked blood pressure and found systolic pressure of 59.  Patient brought into the ED, confirmed to be hypotensive, febrile, tachycardic.  Patient started on Levophed, vancomycin, cefepime.  Patient admitted for septic shock with unknown source.  Blood cultures negative, antibiotics discontinued.  Started on midodrine, titrating off  Levophed.  Treated with IV fluids.  Patient found to have C2 fracture from mechanical ground-level fall on March 5.  He was also found to have multiple pathologic fractures of the lumbar spine.  He has c-collar in place.  He is being treated with MS Contin for pain.  Pending oncology consult, patient is being treated with binimetinib and encorafenib      The patient currently reports weakness and disuse debility.  Patient states he was diagnosed with metastatic melanoma in January and has been in the hospital for various ailments much of the year.  Prior to this he was  at [facility], and on 2 search and rescue teams.  Now, he uses a wheelchair for mobility inside the house, can walk with a walker 1-2 rooms.  Spouse helps with dressing, bathing, grooming.  Patient is looking to increase his independence with ADLs and increase his mobility with respect to walking.  They do have a ramp built at their house already.     ROS  Pertinent positives are mentioned in the HPI, all others reviewed and are negative.     Social Hx:  2 SH  Ramped entry  With: Spouse. Patient was being seen by home health but they canceled stating that he was too acute for home health services     THERAPY:  Restrictions: Fall risk, c-collar  PT: Functional mobility   3/25: Walking 5 feet with front wheel walker contact-guard assist     OT: ADLs  3/25: Mod assist upper and lower body dressing     SLP:   None     IMAGING:  CT abdomen pelvis 3/21/2024  1.  Multiple lytic and sclerotic osseous metastases are again noted with redemonstrated pathologic pelvic and lumbosacral spine fractures redemonstrated.  2.  Hepatic steatosis.     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)     Nasal polyp      PSH:    Past Surgical History  Past Surgical History:  Procedure Laterality Date   LYMPH NODE EXCISION Right 11/16/2023    Procedure: RIGHT INGUINAL NODE SUPERFICIAL DISSECTION;  Surgeon: Davon Valera M.D.;  Location: [facility];  Service: General   NODE BIOPSY SENTINEL Bilateral 10/20/2020    Procedure: BIOPSY, LYMPH NODE, SENTINEL- GROIN;  Surgeon: Davon Valera M.D.;  Location: SURGERY SAME DAY [facility];  Service: General   WIDE EXCISION Right 10/20/2020    Procedure: WIDE EXCISION, LESION- BUTTOCKS, RADICAL MALIGNANT MELANOMA;  Surgeon: Davon Valera M.D.;  Location: SURGERY SAME DAY [facility];  Service: General   ANTROSTOMY Bilateral 11/15/2019    Procedure: MAXILLARY ANTROSTOMY- REVISION ENDOSCOPICMOMETASONE INJECTION, PROPEL STENT PLACEMENT;  Surgeon: Felicitas Tenorio M.D.;  Location: [facility];  Service: Ent   SINUSCOPY Bilateral 11/15/2019    Procedure: ENDOSCOPY, PARANASAL SINUSES- FOR FRONTAL EXPLORATION;  Surgeon: Felicitas Tenorio M.D.;  Location: [facility];  Service: Ent   TURBINOPLASTY Bilateral 11/15/2019    Procedure: TURBINOPLASTY;  Surgeon: Felicitas Tenorio M.D.;  Location: [facility];  Service: Ent   SPHENOIDECTOMY Bilateral 11/15/2019    Procedure: SPHENOIDECTOMY- FOR SPHENOIDOTOMY;  Surgeon: Felicitas Tenorio M.D.;  Location: [facility];  Service: Ent   ETHMOIDECTOMY Bilateral 11/15/2019    Procedure: ETHMOIDECTOMY- ENDOSCOPIC;  Surgeon: Felicitas Tenorio M.D.;  Location: [facility];  Service: Ent   NASAL POLYPECTOMY Bilateral 11/15/2019    Procedure: POLYPECTOMY, NASAL CAVITY;  Surgeon: Felicitas Tenorio M.D.;  Location: [facility];  Service: Ent   NASAL POLYPECTOMY   2015, 2017, 2019    x 3   OTHER   11/20    removed melanoma    FHX:  Non-pertinent to today's issues     Medications:    Current  "Facility-Administered Medications  Medication Dose   potassium bicarbonate (Klyte) effervescent tablet 50 mEq  50 mEq   bisacodyl (Dulcolax) suppository 10 mg  10 mg   senna-docusate (Pericolace Or Senokot S) 8.6-50 MG per tablet 2 Tablet  2 Tablet   polyethylene glycol/lytes (Miralax) Packet 1 Packet  1 Packet   magnesium hydroxide (Milk Of Magnesia) suspension 30 mL  30 mL   midodrine (Proamatine) tablet 5 mg  5 mg   morphine ER (Ms Contin) tablet 45 mg  45 mg   megestrol (Megace) 40 MG/ML suspension 800 mg  800 mg   gabapentin (Neurontin) capsule 100 mg  100 mg   ondansetron (Zofran ODT) dispertab 4 mg  4 mg   prochlorperazine (Compazine) tablet 10 mg  10 mg   enoxaparin (Lovenox) inj 40 mg  40 mg   ondansetron (Zofran) syringe/vial injection 4 mg  4 mg   promethazine (Phenergan) suppository 12.5-25 mg  12.5-25 mg   prochlorperazine (Compazine) injection 5-10 mg  5-10 mg   acetaminophen (Tylenol) tablet 650 mg  650 mg     Allergies:    Allergies  Allergen Reactions   Augmentin Vomiting and Nausea  Physical Exam:  Vitals: BP 92/58   Pulse (!) 135   Temp 36.4 °C (97.5 °F) (Temporal)   Resp 17   Ht 1.803 m (5' 11\")   Wt 62.2 kg (137 lb 2 oz)   SpO2 99%   Gen: NAD  Head: NC/AT  Eyes/ Nose/ Mouth: PERRLA, moist mucous membranes  Cardio: RRR, good distal perfusion, warm extremities  Pulm: normal respiratory effort, no cyanosis   Abd: Soft NTND, negative borborygmi   Ext: No peripheral edema. No calf tenderness. No clubbing.     Mental status:  A&Ox4 (person, place, date, situation) answers questions appropriately follows commands  Speech: fluent, no aphasia or dysarthria     Motor:                            Upper Extremity  Myotome R L  Shoulder flexion C5 2/5 2/5  Elbow flexion C5 4/5 4/5  Wrist extension C6 4/5 4/5  Elbow extension C7 4/5 4/5  Finger flexion C8 4/5 4/5  Finger abduction T1 4/5 4/5       Lower Extremity Myotome R L  Hip flexion L2 4/5 4/5  Knee extension L3 4/5 5  Ankle " dorsiflexion L4 4/5 5  Toe extension L5 4/5 5  Ankle plantarflexion S1 4/5 5     Sensory:   intact to light touch through out     Labs: Reviewed and significant for     Recent Labs    03/23/24 0217 03/25/24 0330 03/25/24  1015  RBC 3.11* 3.21*  --   HEMOGLOBIN 8.0* 8.2*  --   HEMATOCRIT 28.0* 27.6*  --   PLATELETCT 306 297  --   IRON  --   --  37*  FERRITIN  --   --  1243.0*  TOTIRONBC  --   --  72*       Recent Labs    03/23/24 0217 03/25/24  0330  SODIUM 136 135  POTASSIUM 4.2 3.6  CHLORIDE 107 105  CO2 18* 21  GLUCOSE 105* 94  BUN 7* 5*  CREATININE 0.23* 0.19*  CALCIUM 7.2* 7.9*       Recent Results  Recent Results (from the past 24 hour(s))  Basic Metabolic Panel    Collection Time: 03/25/24  3:30 AM  Result Value Ref Range    Sodium 135 135 - 145 mmol/L    Potassium 3.6 3.6 - 5.5 mmol/L    Chloride 105 96 - 112 mmol/L    Co2 21 20 - 33 mmol/L    Glucose 94 65 - 99 mg/dL    Bun 5 (L) 8 - 22 mg/dL    Creatinine 0.19 (L) 0.50 - 1.40 mg/dL    Calcium 7.9 (L) 8.5 - 10.5 mg/dL    Anion Gap 9.0 7.0 - 16.0  CBC WITHOUT DIFFERENTIAL    Collection Time: 03/25/24  3:30 AM  Result Value Ref Range    WBC 2.8 (L) 4.8 - 10.8 K/uL    RBC 3.21 (L) 4.70 - 6.10 M/uL    Hemoglobin 8.2 (L) 14.0 - 18.0 g/dL    Hematocrit 27.6 (L) 42.0 - 52.0 %    MCV 86.0 81.4 - 97.8 fL    MCH 25.5 (L) 27.0 - 33.0 pg    MCHC 29.7 (L) 32.3 - 36.5 g/dL    RDW 59.5 (H) 35.9 - 50.0 fL    Platelet Count 297 164 - 446 K/uL    MPV 8.8 (L) 9.0 - 12.9 fL  CORTISOL    Collection Time: 03/25/24  3:30 AM  Result Value Ref Range    Cortisol 3.6 0.0 - 23.0 ug/dL  ESTIMATED GFR    Collection Time: 03/25/24  3:30 AM  Result Value Ref Range    GFR (CKD-EPI) 156 >60 mL/min/1.73 m 2  IRON/TOTAL IRON BIND    Collection Time: 03/25/24 10:15 AM  Result Value Ref Range    Iron 37 (L) 50 - 180 ug/dL    Total Iron Binding 72 (L) 250 - 450 ug/dL    Unsat Iron Binding 35 (L) 110 - 370 ug/dL    % Saturation 51 15 - 55 %  FERRITIN    Collection Time: 03/25/24 10:15  AM  Result Value Ref Range    Ferritin 1243.0 (H) 22.0 - 322.0 ng/mL       ASSESSMENT:  Patient is a 58 y.o. male admitted with hypotension, pathologic L-spine fractures, C2 fracture      Rehabilitation: Impaired ADLs and mobility  Barriers to transfer include: Insurance authorization, TCCs to verify disposition, medical clearance and bed availability. All cases are subject to administrative review.      [facility] Code / Diagnosis to Support: 0017.2 - Medically Complex: Neoplasms     Disposition recommendations:  -Good candidate for IPR.  Patient has good strength throughout, and should be able to have a better quality of life with aggressive physical and occupational therapy.  Patient spouse will also benefit from family training.  This is all very new for them, and patient has been hospitalized for falls, chemotherapy, and now with hypotension.  Patient is looking to gain more independence with mobility and ADLs.  Spouse is able to provide total care.  She works from home and can take off as needed.  They have a ramp to enter their house.  -TCC to submit to [employer] for prior authorization  -PMR to follow in the periphery for rehab appropriateness, please reach out with questions or request for medical management     Medical Complexity:     Metastatic melanoma  -New diagnosis January 2024  -Awaiting oncology consult for update on treatment plan  - Patient is being treated with binimetinib and encorafenib   -Continue PT OT while in-house  -Plan for IPR     Severe hypotension  -Likely combination of chemotherapy medications, dehydration and poor nutritional intake.  -Patient responded to IV fluids, pressors  -Now on midodrine 5 mg 3 times daily with meals     C2 fracture  -Secondary to ground-level fall while transferring  -Nonoperative management.  -C-collar at all times     Pathologic lumbar spine fractures  -Stable  -Pain control     Pain control  -MS Contin 45 mg twice daily  -Gabapentin 100 mg daily  -Tylenol  650 mg every 6 hours as needed     Leukopenia  -WBC 2.8, last A1c 1.45 on 3/22  -Recheck CBC with differential     Anemia  -Hemoglobin 8.2  -Stable, monitor  -Ferritin extremely elevated, 1243  -No role for oral or IV iron supplementation  -Transfuse with PRBC if hemoglobin drops below 7     DVT PPX: Lovenox     Thank you for allowing us to participate in the care of this patient.      Patient was seen for >80 minutes on unit/floor of which > 50% of time was spent on counseling and coordination of care regarding the above, including prognosis, risk reduction, benefits of treatment, and options for next stage of care.     Pillo Nova,    Physical Medicine and Rehabilitation      Co-morbidities:  See above  Potential Risk - Complications: Contractures, Deep Vein Thrombosis, Malnutrition, Pain, Perceptual Impairment, Pneumonia, Pressure Ulcer, and Infection  Level of Risk: High    Ongoing Medical Management Needed (Medical/Nursing Needs):   Patient Active Problem List    Diagnosis Date Noted    Dehydration 03/22/2024    C2 cervical fracture ([facility]) 03/21/2024    Septic shock ([facility]) 03/09/2024    Acute encephalopathy 03/06/2024    Fracture dislocation of cervical spine, initial encounter ([facility]) 03/05/2024    Ground-level fall 03/05/2024    Cancer related pain 02/05/2024    Nausea and vomiting 02/05/2024    ACP (advance care planning) 02/05/2024    Hypophosphatemia 01/24/2024    Hypocalcemia 01/20/2024    Pathologic fracture 01/18/2024    Elevated LFTs 01/18/2024    DNR (do not resuscitate) 01/17/2024    Metastasis to bone ([facility]) 01/16/2024    Leukocytosis 01/15/2024    Hyperglycemia 01/15/2024    Intractable low back pain 01/13/2024    Hypokalemia 01/13/2024    Metastatic melanoma, BRAF V600E POSITIVE  ([facility]) 01/12/2024    Elevated glucose 01/12/2024    Acute anemia 01/12/2024    Constipation 01/12/2024    Hypercalcemia 01/11/2024    Malignant melanoma metastatic to lymph node ([facility]) 11/16/2023    Moderate persistent  "asthma without complication 08/11/2021    Chronic pansinusitis 08/11/2021    Bilateral tinnitus 08/11/2021    Melanoma of buttock (HCC) 10/20/2020    Mild intermittent asthma without complication 08/07/2020    Hypertrophy of nasal turbinates         Danica Ortiz R.N.  Registered Nurse     Progress Notes     Signed     Date of Service: 3/25/2024  6:15 AM  Monitor Summary:  SR: 70's-90's    Current Vital Signs:   Temperature: 36.4 °C (97.5 °F) Pulse: (!) 135 (with standing transfer) Respiration: 17 Blood Pressure: 92/58  Weight: 62.2 kg (137 lb 2 oz) Height: 180.3 cm (5' 11\")  Pulse Oximetry: 99 % O2 (LPM): 0      Completed Laboratory Reports:  Recent Labs     03/23/24  0217 03/25/24  0330   WBC 3.1* 2.8*   HEMOGLOBIN 8.0* 8.2*   HEMATOCRIT 28.0* 27.6*   PLATELETCT 306 297   SODIUM 136 135   POTASSIUM 4.2 3.6   BUN 7* 5*   CREATININE 0.23* 0.19*   GLUCOSE 105* 94     Additional Labs: Not Applicable    Prior Living Situation:   Housing / Facility: [address] [place] [address] with - Patient's Self Care Capacity: Spouse  Equipment Owned: Ramp, 4-Wheel Walker, Wheelchair, Tub / Shower Seat, Hand Held Shower, Hospital Bed    Prior Level of Function / Living Situation:   Physical Therapy: Prior Services: Skilled Home Health Services, Intermittent Physical Support for ADL Per Family  Housing / Facility: [address] [place] [address]  Bathroom Set up: Walk In Shower, Shower Chair, Grab Bars  Equipment Owned: Ramp, 4-Wheel Walker, Wheelchair, Tub / Shower Seat, Hand Held Shower, Hospital Bed  Lives with - Patient's Self Care Capacity: Spouse  Ambulation: Required Assist  Assistive Devices Used: 4-Wheel Walker  Wheelchair: Independent  Stairs:  (ramp)  Current Level of Function:   Gait Level Of Assist: Contact Guard Assist  Assistive Device: Front Wheel Walker  Distance (Feet): 5  Supine to Sit: Contact Guard Assist  Scooting: Standby Assist  Rolling: Minimal Assist to Rt.  Comments: up in chair before/after  Sit to Stand: Contact Guard " Assist  Bed, Chair, Wheelchair Transfer: Contact Guard Assist  Transfer Method: Stand Step  Sitting in Chair: left seated in chair  Sitting Edge of Bed: 5 minutes  Standin min  Occupational Therapy:   Self Feeding: Independent  Grooming / Hygiene: Independent  Bathing: Independent  Dressing: Independent  Toileting: Independent  Medication Management: Requires Assist  Laundry: Requires Assist  Kitchen Mobility: Requires Assist  Finances: Requires Assist  Home Management: Requires Assist  Shopping: Requires Assist  Prior Services: Skilled Home Health Services, Intermittent Physical Support for ADL Per Family  Housing / Facility: [address]  Current Level of Function:   Upper Body Dressing: Moderate Assist  Lower Body Dressing: Moderate Assist  Speech Language Pathology:      Rehabilitation Prognosis/Potential: Good  Estimated Length of Stay: 10 days    Nursing:      Continent    Scope/Intensity of Services Recommended:  Physical Therapy: 1.5 hr / day  5 days / week. Therapeutic Interventions Required: Maximize Endurance, Mobility, Strength, and Safety  Occupational Therapy: 1.5 hr / day 5 days / week. Therapeutic Interventions Required: Maximize Self Care, ADLs, IADLs, and Energy Conservation  Rehabilitation Nursin/7. Therapeutic Interventions Required: Monitor Pain, Skin, Wound(s), Vital Signs, Intake and Output, Labs, Safety, Family Training, and DVT Prophylaxis; Bowel and bladder regimen; Infection control; ADL's.  Rehabilitation Physician: 3 - 5 days / week. Therapeutic Interventions Required: Medical Management  Respiratory Care: Consult. Therapeutic Interventions Required: Pulmonary Toileting and Respiratory care per protocol.   Dietician: Consult. Therapeutic Interventions Required: Nutritional evaluation with recommendations to promote optimal health and healing.    He requires 24-hour rehabilitation nursing to manage bowel and bladder function, skin care, wound, nutrition and fluid intake,  pulmonary hygiene, pain control, safety, medication management, and patient/family goals. In addition, rehabilitation nursing will reiterate and reinforce therapy skills and equipment use, including ADLs, as well as provide education to the patient and family. Andrew Wall is willing to participate in and is able to tolerate the proposed plan of care.    Rehabilitation Goals and Plan (Expected frequency & duration of treatment in the IRF):   Return to the Community, Modified Independent Level of Care, Outpatient Support, and Family Able to Provide 24/7 Assistance  Anticipated Date of Rehabilitation Admission: 03/26/2024  Patient/Family oriented IRF level of care/facility/plan: Yes  Patient/Family willing to participate in IRF care/facility/plan: Yes  Patient able to tolerate IRF level of care proposed: Yes  Patient has potential to benefit IRF level of care proposed: Yes  Comments: Not Applicable    Special Needs or Precautions - Medical Necessity:  Safety Concerns/Precautions:  Fall Risk / High Risk for Falls, Balance, Cognition, and Bed / Chair Alarm  Pain Management    Diet:   DIET ORDERS (From admission to next 24h)       Start     Ordered    03/22/24 1805  Supplements  ALL MEALS        Question:  Which Supplement  Answer:  Per RD  Comment:  BID ENsure Plus chocolate, QD TwoCal    03/22/24 1805    03/21/24 1849  Diet Order Diet: Regular  ALL MEALS        Question:  Diet:  Answer:  Regular    03/21/24 1848                    Anticipated Discharge Destination / Patient/Family Goal:  Destination: Home with Assistance Support System: Spouse  Anticipated home health services: OT, PT, Nursing, Social Work, and Aide  Previously used  service/ provider:  [facility]  Anticipated DME Needs:  To be determined  Outpatient Services:  To be determined  Alternative resources to address additional identified needs:   Future Appointments   Date Time Provider Department Center   3/29/2024 12:30 PM Kevin Lawrence M.D.  [facility]       Pre-Screen Completed: 3/25/2024 1:24 PM Milagro Bergeron R.N.

## 2024-03-26 ENCOUNTER — APPOINTMENT (OUTPATIENT)
Dept: RADIOLOGY | Facility: MEDICAL CENTER | Age: 59
End: 2024-03-26
Attending: NURSE PRACTITIONER
Payer: COMMERCIAL

## 2024-03-26 ENCOUNTER — APPOINTMENT (OUTPATIENT)
Dept: RADIOLOGY | Facility: MEDICAL CENTER | Age: 59
End: 2024-03-26
Attending: HOSPITALIST
Payer: COMMERCIAL

## 2024-03-26 PROBLEM — E86.0 DEHYDRATION: Status: RESOLVED | Noted: 2024-03-22 | Resolved: 2024-03-26

## 2024-03-26 LAB
ANION GAP SERPL CALC-SCNC: 8 MMOL/L (ref 7–16)
BACTERIA BLD CULT: NORMAL
BACTERIA BLD CULT: NORMAL
BUN SERPL-MCNC: 7 MG/DL (ref 8–22)
CALCIUM SERPL-MCNC: 7.7 MG/DL (ref 8.5–10.5)
CHLORIDE SERPL-SCNC: 105 MMOL/L (ref 96–112)
CO2 SERPL-SCNC: 21 MMOL/L (ref 20–33)
CREAT SERPL-MCNC: 0.33 MG/DL (ref 0.5–1.4)
ERYTHROCYTE [DISTWIDTH] IN BLOOD BY AUTOMATED COUNT: 59.3 FL (ref 35.9–50)
GFR SERPLBLD CREATININE-BSD FMLA CKD-EPI: 132 ML/MIN/1.73 M 2
GLUCOSE SERPL-MCNC: 114 MG/DL (ref 65–99)
HCT VFR BLD AUTO: 24.9 % (ref 42–52)
HGB BLD-MCNC: 7.8 G/DL (ref 14–18)
MCH RBC QN AUTO: 26.8 PG (ref 27–33)
MCHC RBC AUTO-ENTMCNC: 31.3 G/DL (ref 32.3–36.5)
MCV RBC AUTO: 85.6 FL (ref 81.4–97.8)
PLATELET # BLD AUTO: 299 K/UL (ref 164–446)
PMV BLD AUTO: 9.3 FL (ref 9–12.9)
POTASSIUM SERPL-SCNC: 3.8 MMOL/L (ref 3.6–5.5)
RBC # BLD AUTO: 2.91 M/UL (ref 4.7–6.1)
SIGNIFICANT IND 70042: NORMAL
SIGNIFICANT IND 70042: NORMAL
SITE SITE: NORMAL
SITE SITE: NORMAL
SODIUM SERPL-SCNC: 134 MMOL/L (ref 135–145)
SOURCE SOURCE: NORMAL
SOURCE SOURCE: NORMAL
WBC # BLD AUTO: 3.3 K/UL (ref 4.8–10.8)

## 2024-03-26 PROCEDURE — 770004 HCHG ROOM/CARE - ONCOLOGY PRIVATE *

## 2024-03-26 PROCEDURE — 80048 BASIC METABOLIC PNL TOTAL CA: CPT

## 2024-03-26 PROCEDURE — 700102 HCHG RX REV CODE 250 W/ 637 OVERRIDE(OP): Performed by: HOSPITALIST

## 2024-03-26 PROCEDURE — 72156 MRI NECK SPINE W/O & W/DYE: CPT

## 2024-03-26 PROCEDURE — 99232 SBSQ HOSP IP/OBS MODERATE 35: CPT | Performed by: HOSPITALIST

## 2024-03-26 PROCEDURE — 700102 HCHG RX REV CODE 250 W/ 637 OVERRIDE(OP): Performed by: STUDENT IN AN ORGANIZED HEALTH CARE EDUCATION/TRAINING PROGRAM

## 2024-03-26 PROCEDURE — 85027 COMPLETE CBC AUTOMATED: CPT

## 2024-03-26 PROCEDURE — 700101 HCHG RX REV CODE 250: Performed by: HOSPITALIST

## 2024-03-26 PROCEDURE — 36415 COLL VENOUS BLD VENIPUNCTURE: CPT

## 2024-03-26 PROCEDURE — A9270 NON-COVERED ITEM OR SERVICE: HCPCS | Performed by: HOSPITALIST

## 2024-03-26 PROCEDURE — 700117 HCHG RX CONTRAST REV CODE 255: Performed by: HOSPITALIST

## 2024-03-26 PROCEDURE — A9579 GAD-BASE MR CONTRAST NOS,1ML: HCPCS | Performed by: HOSPITALIST

## 2024-03-26 PROCEDURE — 700102 HCHG RX REV CODE 250 W/ 637 OVERRIDE(OP): Performed by: INTERNAL MEDICINE

## 2024-03-26 PROCEDURE — A9270 NON-COVERED ITEM OR SERVICE: HCPCS | Performed by: STUDENT IN AN ORGANIZED HEALTH CARE EDUCATION/TRAINING PROGRAM

## 2024-03-26 PROCEDURE — 700111 HCHG RX REV CODE 636 W/ 250 OVERRIDE (IP): Mod: JZ | Performed by: STUDENT IN AN ORGANIZED HEALTH CARE EDUCATION/TRAINING PROGRAM

## 2024-03-26 RX ADMIN — MEGESTROL ACETATE 800 MG: 40 SUSPENSION ORAL at 07:32

## 2024-03-26 RX ADMIN — DOCUSATE SODIUM 50 MG AND SENNOSIDES 8.6 MG 2 TABLET: 8.6; 5 TABLET, FILM COATED ORAL at 06:05

## 2024-03-26 RX ADMIN — GADOTERIDOL 12 ML: 279.3 INJECTION, SOLUTION INTRAVENOUS at 20:45

## 2024-03-26 RX ADMIN — MOMETASONE FUROATE AND FORMOTEROL FUMARATE DIHYDRATE 2 PUFF: 200; 5 AEROSOL RESPIRATORY (INHALATION) at 18:29

## 2024-03-26 RX ADMIN — GABAPENTIN 100 MG: 100 CAPSULE ORAL at 06:05

## 2024-03-26 RX ADMIN — MIDODRINE HYDROCHLORIDE 5 MG: 5 TABLET ORAL at 13:24

## 2024-03-26 RX ADMIN — BINIMETINIB 30 MG: 15 TABLET, FILM COATED ORAL at 18:10

## 2024-03-26 RX ADMIN — MIDODRINE HYDROCHLORIDE 5 MG: 5 TABLET ORAL at 18:10

## 2024-03-26 RX ADMIN — MORPHINE SULFATE 45 MG: 30 TABLET, FILM COATED, EXTENDED RELEASE ORAL at 06:05

## 2024-03-26 RX ADMIN — ENCORAFENIB 300 MG: 75 CAPSULE ORAL at 18:09

## 2024-03-26 RX ADMIN — MOMETASONE FUROATE AND FORMOTEROL FUMARATE DIHYDRATE 2 PUFF: 200; 5 AEROSOL RESPIRATORY (INHALATION) at 06:06

## 2024-03-26 RX ADMIN — ENOXAPARIN SODIUM 40 MG: 100 INJECTION SUBCUTANEOUS at 18:29

## 2024-03-26 RX ADMIN — MAGNESIUM HYDROXIDE 30 ML: 1200 LIQUID ORAL at 11:45

## 2024-03-26 RX ADMIN — BINIMETINIB 30 MG: 15 TABLET, FILM COATED ORAL at 06:07

## 2024-03-26 RX ADMIN — MORPHINE SULFATE 45 MG: 30 TABLET, FILM COATED, EXTENDED RELEASE ORAL at 18:11

## 2024-03-26 RX ADMIN — MIDODRINE HYDROCHLORIDE 5 MG: 5 TABLET ORAL at 09:13

## 2024-03-26 RX ADMIN — DOCUSATE SODIUM 50 MG AND SENNOSIDES 8.6 MG 2 TABLET: 8.6; 5 TABLET, FILM COATED ORAL at 18:12

## 2024-03-26 ASSESSMENT — ENCOUNTER SYMPTOMS
NAUSEA: 0
BACK PAIN: 1
NEUROLOGICAL NEGATIVE: 1
VOMITING: 0
CARDIOVASCULAR NEGATIVE: 1
EYES NEGATIVE: 1
FEVER: 0
NECK PAIN: 1
RESPIRATORY NEGATIVE: 1
PSYCHIATRIC NEGATIVE: 1
SHORTNESS OF BREATH: 0
CONSTIPATION: 1
WEAKNESS: 1
ABDOMINAL PAIN: 0

## 2024-03-26 ASSESSMENT — PAIN DESCRIPTION - PAIN TYPE
TYPE: ACUTE PAIN

## 2024-03-26 NOTE — WOUND TEAM
[facility]  Inpatient Services  Initial Wound and Skin Care Evaluation    Admission Date: 3/21/2024     Last order of IP CONSULT TO WOUND CARE was found on 3/23/2024 from Hospital Encounter on 3/21/2024     HPI, PMH, SH: Reviewed    Past Surgical History:   Procedure Laterality Date    LYMPH NODE EXCISION Right 11/16/2023    Procedure: RIGHT INGUINAL NODE SUPERFICIAL DISSECTION;  Surgeon: Davon Valera M.D.;  Location: SURGERY [facility];  Service: General    NODE BIOPSY SENTINEL Bilateral 10/20/2020    Procedure: BIOPSY, LYMPH NODE, SENTINEL- GROIN;  Surgeon: Davon Valera M.D.;  Location: SURGERY SAME DAY [facility];  Service: General    WIDE EXCISION Right 10/20/2020    Procedure: WIDE EXCISION, LESION- BUTTOCKS, RADICAL MALIGNANT MELANOMA;  Surgeon: Davon Valera M.D.;  Location: SURGERY SAME DAY [facility];  Service: General    ANTROSTOMY Bilateral 11/15/2019    Procedure: MAXILLARY ANTROSTOMY- REVISION ENDOSCOPICMOMETASONE INJECTION, PROPEL STENT PLACEMENT;  Surgeon: Felicitas Tenorio M.D.;  Location: [facility];  Service: Ent    SINUSCOPY Bilateral 11/15/2019    Procedure: ENDOSCOPY, PARANASAL SINUSES- FOR FRONTAL EXPLORATION;  Surgeon: Felicitas Tenorio M.D.;  Location: [facility];  Service: Ent    TURBINOPLASTY Bilateral 11/15/2019    Procedure: TURBINOPLASTY;  Surgeon: Felicitas Tenorio M.D.;  Location: [facility];  Service: Ent    SPHENOIDECTOMY Bilateral 11/15/2019    Procedure: SPHENOIDECTOMY- FOR SPHENOIDOTOMY;  Surgeon: Felicitas Tenorio M.D.;  Location: [facility];  Service: Ent    ETHMOIDECTOMY Bilateral 11/15/2019    Procedure: ETHMOIDECTOMY- ENDOSCOPIC;  Surgeon: Felicitas Tenorio M.D.;  Location: [facility];  Service: Ent    NASAL POLYPECTOMY Bilateral 11/15/2019    Procedure: POLYPECTOMY, NASAL CAVITY;  Surgeon: Felicitas Tenorio M.D.;  Location: [facility];  Service: Ent    NASAL  POLYPECTOMY  2015, 2017, 2019    x 3    OTHER  11/20    removed melanoma     Social History     Tobacco Use    Smoking status: Never    Smokeless tobacco: Never   Substance Use Topics    Alcohol use: Yes     Alcohol/week: 0.6 oz     Types: 1 Cans of beer per week     Comment: reports 4/month     Chief Complaint   Patient presents with    Headache    Fatigue     Diagnosis: Septic shock (HCC) [A41.9, R65.21]    Unit where seen by Wound Team: R326/00     WOUND CONSULT RELATED TO:  heels, coccyx, neck    WOUND TEAM PLAN OF CARE - Frequency of Follow-up:   Nursing to follow dressing orders written for wound care. Contact wound team if area fails to progress, deteriorates or with any questions/concerns if something comes up before next scheduled follow up (See below as to whether wound is following and frequency of wound follow up)   Not following, consult as needed  - heels, sacrum, posterior L neck    WOUND HISTORY:   Pt was seen earlier this month for sacrum, heels and neck as well as back.        WOUND ASSESSMENT/LDA  Wound 03/09/24 Pressure Injury Coccyx POA sDTI (Active)   Date First Assessed/Time First Assessed: 03/09/24 2230   Present on Original Admission: Yes  Hand Hygiene Completed: Yes  Primary Wound Type: Pressure Injury  Location: Coccyx  Wound Description (Comments): POA sDTI      Assessments 3/25/2024  5:40 PM   Wound Image     Site Assessment Red;Pink   Periwound Assessment Red   Margins Attached edges   Closure None   Drainage Amount None   Treatments Cleansed;Nonselective debridement   Wound Cleansing Foam Cleanser/Washcloth   Dressing Status Intact   Dressing Changed New   Dressing Cleansing/Solutions Not Applicable   Dressing Options Offloading Dressing - Sacral   Dressing Change/Treatment Frequency Every 72 hrs, and As Needed   NEXT Dressing Change/Treatment Date 03/28/24   NEXT Weekly Photo (Inpatient Only) 04/01/24   Wound Team Following Not following   Wound Length (cm) 2 cm   Wound Width (cm) 2.2  cm   Wound Surface Area (cm^2) 4.4 cm^2   Shape circular       Wound 03/09/24 Pressure Injury Heel Left POA sDTI revealed as resolving St 2 3/21 admit (Active)   Date First Assessed/Time First Assessed: 03/09/24 2230   Present on Original Admission: Yes  Primary Wound Type: Pressure Injury  Location: Heel  Laterality: Left  Wound Description (Comments): POA sDTI revealed as resolving St 2 3/21 admit      Assessments 3/25/2024  5:40 PM   Wound Image     Site Assessment Pink   Periwound Assessment Intact   Margins Attached edges   Closure Open to air   Drainage Amount None   Treatments Cleansed;Nonselective debridement   Offloading/DME Heel float boot   Wound Cleansing Foam Cleanser/Washcloth   Periwound Protectant Not Applicable   Dressing Status Intact   Dressing Changed Changed   Dressing Cleansing/Solutions Not Applicable   Dressing Options Offloading Dressing - Heel   Dressing Change/Treatment Frequency Every 72 hrs, and As Needed   NEXT Dressing Change/Treatment Date 03/28/24   NEXT Weekly Photo (Inpatient Only) 04/01/24   Wound Team Following Not following   Wound Length (cm) 1.5 cm   Wound Width (cm) 1.2 cm   Wound Surface Area (cm^2) 1.8 cm^2   Shape circular       Wound 03/09/24 Pressure Injury Heel Right POA sDTI  revealed as unstageable 3/21 admit (Active)   Date First Assessed/Time First Assessed: 03/09/24 (c) 2230   Present on Original Admission: Yes  Primary Wound Type: Pressure Injury  Location: Heel  Laterality: Right  Wound Description (Comments): POA sDTI  revealed as unstageable 3/21 admit      Assessments 3/25/2024  5:40 PM   Wound Image     Site Assessment Red;Brown   Periwound Assessment Intact   Margins Defined edges;Attached edges   Closure None   Drainage Amount None   Treatments Cleansed;Nonselective debridement   Offloading/DME Heel float boot   Wound Cleansing Foam Cleanser/Washcloth   Periwound Protectant Not Applicable   Dressing Status Intact   Dressing Changed Changed   Dressing  Cleansing/Solutions Not Applicable   Dressing Options Offloading Dressing - Heel   Dressing Change/Treatment Frequency Every 72 hrs, and As Needed   NEXT Dressing Change/Treatment Date 03/28/24   NEXT Weekly Photo (Inpatient Only) 04/01/24   Wound Team Following Not following   Pressure Injury Stage Unstageable   Wound Length (cm) 1.2 cm   Wound Width (cm) 1 cm   Wound Surface Area (cm^2) 1.2 cm^2   Shape circular       Wound 03/09/24 Pressure Injury Neck Posterior Left POA sDTI (Active)   Date First Assessed/Time First Assessed: 03/09/24 2230   Primary Wound Type: Pressure Injury  Location: Neck  Wound Orientation: Posterior  Laterality: Left  Wound Description (Comments): POA sDTI      Assessments 3/25/2024  5:40 PM   Wound Image     Site Assessment Dry;Scabbed   Periwound Assessment Intact   Margins Attached edges   Closure Secondary intention   Drainage Amount None        Vascular:    NAYANA:   No results found.    Lab Values:    Lab Results   Component Value Date/Time    WBC 2.8 (L) 03/25/2024 03:30 AM    RBC 3.21 (L) 03/25/2024 03:30 AM    HEMOGLOBIN 8.2 (L) 03/25/2024 03:30 AM    HEMATOCRIT 27.6 (L) 03/25/2024 03:30 AM         Culture Results show:  No results found for this or any previous visit (from the past 720 hour(s)).    Pain Level/Medicated:  None, Tolerated without pain medication       INTERVENTIONS BY WOUND TEAM:  Chart and images reviewed. Discussed with bedside RN. All areas of concern (based on picture review, LDA review and discussion with bedside RN) have been thoroughly assessed. Documentation of areas based on significant findings. This RN in to assess patient. Performed standard wound care which includes appropriate positioning, dressing removal and non-selective debridement. Pictures and measurements obtained weekly if/when required.    Wound:  coccyx  Preparation for Dressing removal: Open to air  Cleansed/Non-selectively Debrided with:  Moist warm washcloth  Ana wound: Cleansed with Moist  "warm washcloth, Prepped with N/A  Primary Dressing:  sacral offloading drsg     Wound:  bilateral heels  Preparation for Dressing removal: Open to air  Cleansed/Non-selectively Debrided with:  Moist warm washcloth  Ana wound: Cleansed with Moist warm washcloth, Prepped with N/A  Primary Dressing:  heel off loading drsgs     Wound:  posterior neck  Preparation for Dressing removal: Removed without difficulty  Cleansed/Non-selectively Debrided with:  Moist warm washcloth  Primary Dressing:  Adhesive foam       Advanced Wound Care Discharge Planning  Number of Clinicians necessary to complete wound care: 1  Is patient requiring IV pain medications for dressing changes:  No   Length of time for dressing change 40 min. (This does not include chart review, pre-medication time, set up, clean up or time spent charting.)    Interdisciplinary consultation: Patient, Bedside RN (La)    EVALUATION / RATIONALE FOR TREATMENT:     Date:  03/25/24  Wound Status:  Initial evaluation    Pt's coccyx slowly blanching. Per pt he had \"a mole removed and then it was cancer and this is the reason he is going through all this.\"         Goals: Steady decrease in wound area and depth weekly.    NURSING PLAN OF CARE ORDERS:  RN Prevention Protocol    NUTRITION RECOMMENDATIONS   Wound Team Recommendations:  N/A    DIET ORDERS (From admission to next 24h)       Start     Ordered    03/22/24 1805  Supplements  ALL MEALS        Question:  Which Supplement  Answer:  Per RD  Comment:  BID ENsure Plus chocolate, QD TwoCal    03/22/24 1805    03/21/24 1849  Diet Order Diet: Regular  ALL MEALS        Question:  Diet:  Answer:  Regular    03/21/24 1848                    PREVENTATIVE INTERVENTIONS:    Q shift Sachin - performed per nursing policy  Q shift pressure point assessments - performed per nursing policy    Surface/Positioning  Low Airloss - Currently in Place  Reposition q 2 hours - Currently in Place    Offloading/Redistribution  Sacral " offloading dressing (Silicone dressing) - Applied this Visit  Heel offloading dressing (Silicone dressing) - Currently in Place  Heel float boots (Prevalon boot) - pt is taking a break from wearing        Containment/Moisture Prevention    Dri-rowena pad - Currently in Place  urinal - Currently in Place    Anticipated discharge plans:  TBD        Vac Discharge Needs:  Vac Discharge plan is purely a recommendation from wound team and not a requirement for discharge unless otherwise stated by physician.  Not Applicable Pt not on a wound vac

## 2024-03-26 NOTE — DOCUMENTATION QUERY
[facility]                                                                       Query Response Note      PATIENT:               CARLOS HEREDIA  ACCT #:                  [id number]  MRN:                     [id number]  :                      1965  ADMIT DATE:       3/21/2024 12:47 PM  DISCH DATE:          RESPONDING  PROVIDER #:        [id number]           QUERY TEXT:    Per RD evaluation on 3/22/2024, patient continues to meet ASPEN criteria for Severe Malnutrition AEB 16% weight loss x 4mo, PO intake <50% of all meals x 4mo and severe muscle wasting.    Based upon your judgment and the above clinical indicators, please select the most appropriate diagnosis for the findings.      The patient's clinical indicators include:  Clinical Findings:   American Society for Parenteral and Enteral Nutrition (ASPEN) criteria (presence of at least two)  Per dietary consult dated 3/06/2024 (as referenced in 3/22 RD consult):   - 16% weight loss x 4 mo  - PO intake <50% of all meals x 4 mo  - severe muscle wasting:  temporalis, deltoid     Risk Factors: Metastatic melanoma. poor PO intake, dehydration, shock    Treatment: RD consult; document oral intake; monitor wt.; nutrition rep;  BID Ensure Plus and QD TwoCal supplements with meals      Contact me with any questions.    Thank you for your time and attention,  Adriane Abdi RN, [email or web address]  Connect via email, Voalte or messenger.  Options provided:   -- Severe malnutrition   -- Moderate malnutrition   -- Mild malnutrition   -- Insignificant finding/no effect on patient stay and treatment   -- Other explanation, (please specify other explanation)      Query created by: Adriane Abdi on 3/25/2024 12:29 PM    RESPONSE TEXT:    Severe malnutrition          Electronically signed by:  HELIO CHENG MD 3/26/2024 12:30 PM

## 2024-03-26 NOTE — PROGRESS NOTES
Oncology/Hematology Progress Note               Author: Clara Parra M.D. Date & Time created: 3/26/2024  12:21 PM     CC: Metastatic Melanoma    Interval History:  No acute events overnight, pt on oncology floor.  Patient's wife is at the bedside.  He reports constipation today.  Restarted therapy has not noticed any adverse effects at this time.  No fevers, cough, or shortness of breath.  Planning on a 10 to 14 days stay at acute rehab.    Review of Systems:  Review of Systems   Constitutional:  Positive for malaise/fatigue.   HENT: Negative.     Eyes: Negative.    Respiratory: Negative.     Cardiovascular: Negative.    Gastrointestinal:  Positive for constipation.   Genitourinary: Negative.    Musculoskeletal:  Positive for back pain.   Skin: Negative.    Neurological: Negative.    Endo/Heme/Allergies: Negative.    Psychiatric/Behavioral: Negative.         Physical Exam:  Physical Exam  Constitutional:       General: He is not in acute distress.     Appearance: He is ill-appearing.   HENT:      Head: Normocephalic and atraumatic.   Eyes:      General: No scleral icterus.  Cardiovascular:      Rate and Rhythm: Normal rate.   Pulmonary:      Effort: Pulmonary effort is normal. No respiratory distress.   Musculoskeletal:         General: No swelling.   Skin:     Coloration: Skin is not jaundiced.   Neurological:      General: No focal deficit present.      Mental Status: He is alert and oriented to person, place, and time.   Psychiatric:         Mood and Affect: Mood normal.         Behavior: Behavior normal.         Thought Content: Thought content normal.         Judgment: Judgment normal.         Labs:          Recent Labs     03/25/24  0330 03/26/24  0032   SODIUM 135 134*   POTASSIUM 3.6 3.8   CHLORIDE 105 105   CO2 21 21   BUN 5* 7*   CREATININE 0.19* 0.33*   CALCIUM 7.9* 7.7*     Recent Labs     03/25/24  0330 03/26/24  0032   GLUCOSE 94 114*     Recent Labs     03/25/24  0330 03/25/24  1015  24  003   RBC 3.21*  --  2.91*   HEMOGLOBIN 8.2*  --  7.8*   HEMATOCRIT 27.6*  --  24.9*   PLATELETCT 297  --  299   IRON  --  37*  --    FERRITIN  --  1243.0*  --    TOTIRONBC  --  72*  --      Recent Labs     24  0330 24  003   WBC 2.8* 3.3*     Recent Labs     240 24  003   SODIUM 135 134*   POTASSIUM 3.6 3.8   CHLORIDE 105 105   CO2 21 21   GLUCOSE 94 114*   BUN 5* 7*   CREATININE 0.19* 0.33*   CALCIUM 7.9* 7.7*     Hemodynamics:  Temp (24hrs), Av.5 °C (97.7 °F), Min:35.8 °C (96.4 °F), Max:37.2 °C (98.9 °F)  Temperature: 37.2 °C (98.9 °F)  Pulse  Av.7  Min: 62  Max: 135   Blood Pressure: (!) 85/58 (rn notified)     Respiratory:    Respiration: 16, Pulse Oximetry: 100 %     Work Of Breathing / Effort: Within Normal Limits  RUL Breath Sounds: Clear, RML Breath Sounds: Clear, RLL Breath Sounds: Clear, ALKA Breath Sounds: Clear, LLL Breath Sounds: Clear  Fluids:    Intake/Output Summary (Last 24 hours) at 3/26/2024 1221  Last data filed at 3/26/2024 0051  Gross per 24 hour   Intake --   Output 450 ml   Net -450 ml        GI/Nutrition:  Orders Placed This Encounter   Procedures    Diet Order Diet: Regular     Standing Status:   Standing     Number of Occurrences:   1     Order Specific Question:   Diet:     Answer:   Regular [1]     Medical Decision Making, by Problem:  Active Hospital Problems    Diagnosis     *Septic shock (HCC) [A41.9, R65.21]     Dehydration [E86.0]     C2 cervical fracture (HCC) [S12.100A]     Cancer related pain [G89.3]     ACP (advance care planning) [Z71.89]     Pathologic fracture [M84.40XA]     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) [C77.9]        Assessment and Plan:    # Stage IV Melanoma, BRAF Positive -patient had multiple admissions now for similar symptoms.  On admission his Braftovi and Mektovi have been stopped.  He has had no evidence of a fever for over 72 hours and has no clear evidence of infection on an extensive workup.   Both Braftovi and Mektovi do come with the risk of pyrexia.  Given that he has had now another admission for similar symptoms without a clear infectious source identified, my concern would be that some of this could be related to his Braftovi and or Mektovi.  Therefore now that his symptoms have improved and he is back at his baseline,therapy was restarted at a reduced dose.  I reviewed with him that we will have to monitor closely for recurrent pyrexia as this is still a potential risk even on lower doses of the medication.  He has had a very good response to these medications as outline on prior CT scans, but has been on and off therapy, so we will try to continue otherwise he has no other treatment options.  - Braftovi 300 mg po daily  - Mektovi 30 mg po BID    # Anemia - likely from chronic disease, he has had a persistent chronic anemia for quite sometime. Iron slightly low, but iron saturation is normal   - monitor CBC, transfuse as needed  - start po iron     Patient is has a high medical complexity and is at high risk for complication, morbidity, and mortality.     Will continue to follow the patient. If you have any questions or concerns, please contact me.     Clara Parra MD  [facility]  [phone]     Quality-Core Measures

## 2024-03-26 NOTE — PROGRESS NOTES
Received report from day shift RN. Assumed pt care. On initial rounds and assessment patient in bed, awake, AAOX4, when asked declines pain. Patient's daughter at bedside. Patient uses bedside urinal, same emptied for 250 ml of clear yellow urine. Bed is locked and in low position, patient calls appropriately, call bell within reach. Will continue to monitor.

## 2024-03-26 NOTE — CARE PLAN
The patient is Stable - Low risk of patient condition declining or worsening    Shift Goals  Clinical Goals: Up to chair x 2  Patient Goals: Up in chair x 2  Family Goals: updates.    Progress made toward(s) clinical / shift goals:    Problem: Skin Integrity  Goal: Skin integrity is maintained or improved  Outcome: Progressing       Patient is not progressing towards the following goals:

## 2024-03-26 NOTE — DISCHARGE PLANNING
[facility] Transitional [facility] Coordination    Telephone call to spouse Sherly Wall to verify she has appropriate doses Braftovi and Meketov.  Sherly confirms she will supply home doses medications, tells me she received new monthly refill last week, confident she will have adequate supply for potential acute rehab admission.  Provided update insurance auth for IPR pending.  Provided [facility] contact information.      Will follow for auth determination.

## 2024-03-26 NOTE — DISCHARGE PLANNING
Case Management Discharge Planning    Admission Date: 3/21/2024  GMLOS: 5.1  ALOS: 5    6-Clicks ADL Score: 14  6-Clicks Mobility Score: 16  PT and/or OT Eval ordered: Yes  Post-acute Referrals Ordered: Yes  Post-acute Choice Obtained: Yes  Has referral(s) been sent to post-acute provider:  Yes      Anticipated Discharge Dispo: Discharge Disposition: Disch to  rehab facility or distinct part unit (62)    DME Needed: No    Action(s) Taken: Discussed in IDT rounds, pt is pending insurance authorization to [facility], insurance authorization submitted yesterday. Contacted [facility], insurance authorization remains pending.     Escalations Completed: None    Medically Clear: Yes    Next Steps: Pending insurance authorization     Barriers to Discharge: Pending Insurance Authorization    Is the patient up for discharge tomorrow: No

## 2024-03-26 NOTE — CARE PLAN
The patient is Stable - Low risk of patient condition declining or worsening    Shift Goals  Clinical Goals: comfort.  Patient Goals: sleep, rest.  Family Goals: updates.    Progress made toward(s) clinical / shift goals:  patient reports resting comfortable during the night. When asked declines pain.    Patient is not progressing towards the following goals:

## 2024-03-26 NOTE — PROGRESS NOTES
Hospital Medicine Daily Progress Note    Date of Service  3/26/2024    Chief Complaint  Andrew Wlal is a 58 y.o. male admitted 3/21/2024 with fever altered mental status    Hospital Course  57yo PMHx metastatic melanoma, asthma, chronic pain related to bony mets.  Presented with altered mental status, SBP of 59 at home.  In the ED hypotensive, febrile, tachy.  No clear source of infection found.  Started on Vanc, cefepime and levophed    Interval Problem Update    Patient is afebrile on room air  SBP  MAP >65  MRI C-spine pending  Blood cultures remain negative  I reviewed chemistry panel sodium 134 potassium 3.8 BUN 7 creatinine 0.33  Reviewed CBC WBC 3.3 hemoglobin 7.8 platelets 299  I discussed with oncology      I have discussed this patient's plan of care and discharge plan at IDT rounds today with Case Management, Nursing, Nursing leadership, and other members of the IDT team.    Consultants/Specialty      Code Status  DNAR/DNI    Disposition  Medically Cleared  I have placed the appropriate orders for post-discharge needs.    Review of Systems  Review of Systems   Constitutional:  Negative for fever.   Respiratory:  Negative for shortness of breath.    Cardiovascular:  Negative for chest pain.   Gastrointestinal:  Negative for abdominal pain, nausea and vomiting.   Musculoskeletal:  Positive for neck pain.   Neurological:  Positive for weakness.   All other systems reviewed and are negative.       Physical Exam  Temp:  [35.8 °C (96.4 °F)-37.2 °C (98.9 °F)] 36.9 °C (98.4 °F)  Pulse:  [] 93  Resp:  [16] 16  BP: ()/(58-68) 100/68  SpO2:  [96 %-100 %] 99 %    Physical Exam  Vitals and nursing note reviewed.   Constitutional:       Appearance: He is well-developed. He is not diaphoretic.   HENT:      Head: Normocephalic and atraumatic.      Mouth/Throat:      Pharynx: No oropharyngeal exudate.   Eyes:      General:         Right eye: No discharge.         Left eye: No discharge.       Pupils: Pupils are equal, round, and reactive to light.   Neck:      Vascular: No JVD.      Trachea: No tracheal deviation.   Cardiovascular:      Rate and Rhythm: Normal rate and regular rhythm.      Heart sounds: No murmur heard.     No friction rub. No gallop.   Pulmonary:      Effort: Pulmonary effort is normal. No respiratory distress.      Breath sounds: Normal breath sounds. No stridor. No wheezing.   Chest:      Chest wall: No tenderness.   Abdominal:      General: Bowel sounds are normal. There is no distension.      Palpations: Abdomen is soft.      Tenderness: There is no abdominal tenderness. There is no rebound.   Musculoskeletal:         General: No tenderness.      Cervical back: Neck supple.      Comments: C-collar in place   Skin:     General: Skin is warm and dry.      Nails: There is no clubbing.   Neurological:      Mental Status: He is alert and oriented to person, place, and time.      Cranial Nerves: No cranial nerve deficit.      Motor: Weakness present. No abnormal muscle tone.      Comments: Proximal right upper extremity weakness in abduction     Psychiatric:         Behavior: Behavior normal.         Fluids    Intake/Output Summary (Last 24 hours) at 3/26/2024 1614  Last data filed at 3/26/2024 0051  Gross per 24 hour   Intake --   Output 450 ml   Net -450 ml       Laboratory  Recent Labs     03/25/24  0330 03/26/24  0032   WBC 2.8* 3.3*   RBC 3.21* 2.91*   HEMOGLOBIN 8.2* 7.8*   HEMATOCRIT 27.6* 24.9*   MCV 86.0 85.6   MCH 25.5* 26.8*   MCHC 29.7* 31.3*   RDW 59.5* 59.3*   PLATELETCT 297 299   MPV 8.8* 9.3     Recent Labs     03/25/24  0330 03/26/24  0032   SODIUM 135 134*   POTASSIUM 3.6 3.8   CHLORIDE 105 105   CO2 21 21   GLUCOSE 94 114*   BUN 5* 7*   CREATININE 0.19* 0.33*   CALCIUM 7.9* 7.7*                   Imaging  CT-LSPINE W/O PLUS RECONS   Final Result      1.  Stable pathologic fractures of the lumbosacral spine. No new acute osseous abnormality identified.   2.  Stable large  lytic right sacral ala mass again noted.      CT-ABDOMEN-PELVIS WITH   Final Result      1.  Multiple lytic and sclerotic osseous metastases are again noted with redemonstrated pathologic pelvic and lumbosacral spine fractures redemonstrated.   2.  Hepatic steatosis.      CT-CTA CHEST PULMONARY ARTERY W/ RECONS   Final Result         1. No CT evidence of pulmonary embolism.      2. Several bilateral pulmonary nodules in the lower lungs are more conspicuous than prior, likely worsening metastasis.      3. Grossly unchanged osseous metastases.      4. No airspace opacity. No pleural effusion or pneumothorax.      CT-HEAD W/O   Final Result      1.  No acute intracranial abnormality.   2.  Expansile osseous lesion is again noted in the odontoid process consistent with known metastatic disease.         DX-CHEST-PORTABLE (1 VIEW)   Final Result      No acute cardiopulmonary disease evident.      MR-CERVICAL SPINE-WITH & W/O    (Results Pending)        Assessment/Plan  * Septic shock (HCC)  Assessment & Plan  Septic shock versus dehydration  Patient was clearly dry in presentation.  He has also had a fever however he is on an an oncologic medication which can cause fever as a side effect.  He has had an extensive workup for source of infection, and thus far this has been negative.     No clear source of infection antibiotics discontinued    Continue low-dose midodrine  Cortisol level is low but patient currently asymptomatic we will hold off on stress dose hydrocortisone    C2 cervical fracture (HCC)  Assessment & Plan  Had a recent mechanical fall resulting in Acute pathologic type II dens fracture with mild leftward displacement of the dens fragment on March 5  Aspen collar in place  PT  Physiatry consulted    3/25/2024  MSContin decreased from 60mg to 45mg BID 3/23.  Pt seems to be tolerating decreased dose well.  Follow-up on MRI of [facility]  Rehab referal      Cancer related pain- (present on admission)  Assessment &  Plan  3/25/2024  MSContin decreased from 60mg to 45mg BID 3/23.  Pt seems to be tolerating decreased dose well.        Pathologic fracture- (present on admission)  Assessment & Plan  Secondary to metastatic melanoma  Reviewed spine surgery notes from Dr. Camacho  Continue c-collar  Follow-up on MRI    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)- (present on admission)  Assessment & Plan  Hx of metastatic melanoma BRAF V6 00 E+ w/ metastases to bones/lymph nodes, on treatment  Following with oncology outpatient  [facility] Oncology Dr Parra patient restarted on binimetinib and encorafenib         VTE prophylaxis:    enoxaparin ppx      I have performed a physical exam and reviewed and updated ROS and Plan today (3/26/2024). In review of yesterday's note (3/25/2024), there are no changes except as documented above.

## 2024-03-27 PROCEDURE — 700101 HCHG RX REV CODE 250: Performed by: HOSPITALIST

## 2024-03-27 PROCEDURE — A9270 NON-COVERED ITEM OR SERVICE: HCPCS | Performed by: HOSPITALIST

## 2024-03-27 PROCEDURE — 700111 HCHG RX REV CODE 636 W/ 250 OVERRIDE (IP): Mod: JZ | Performed by: STUDENT IN AN ORGANIZED HEALTH CARE EDUCATION/TRAINING PROGRAM

## 2024-03-27 PROCEDURE — 97530 THERAPEUTIC ACTIVITIES: CPT

## 2024-03-27 PROCEDURE — 700102 HCHG RX REV CODE 250 W/ 637 OVERRIDE(OP): Performed by: STUDENT IN AN ORGANIZED HEALTH CARE EDUCATION/TRAINING PROGRAM

## 2024-03-27 PROCEDURE — 700102 HCHG RX REV CODE 250 W/ 637 OVERRIDE(OP): Performed by: INTERNAL MEDICINE

## 2024-03-27 PROCEDURE — A9270 NON-COVERED ITEM OR SERVICE: HCPCS | Performed by: STUDENT IN AN ORGANIZED HEALTH CARE EDUCATION/TRAINING PROGRAM

## 2024-03-27 PROCEDURE — 700102 HCHG RX REV CODE 250 W/ 637 OVERRIDE(OP): Performed by: HOSPITALIST

## 2024-03-27 PROCEDURE — 770004 HCHG ROOM/CARE - ONCOLOGY PRIVATE *

## 2024-03-27 PROCEDURE — 99232 SBSQ HOSP IP/OBS MODERATE 35: CPT | Performed by: HOSPITALIST

## 2024-03-27 RX ORDER — BISACODYL 10 MG
10 SUPPOSITORY, RECTAL RECTAL DAILY
Status: DISCONTINUED | OUTPATIENT
Start: 2024-03-28 | End: 2024-03-28 | Stop reason: HOSPADM

## 2024-03-27 RX ORDER — POLYETHYLENE GLYCOL 3350 17 G/17G
1 POWDER, FOR SOLUTION ORAL DAILY
Status: DISCONTINUED | OUTPATIENT
Start: 2024-03-27 | End: 2024-03-28 | Stop reason: HOSPADM

## 2024-03-27 RX ORDER — ENEMA 19; 7 G/133ML; G/133ML
1 ENEMA RECTAL ONCE
Status: COMPLETED | OUTPATIENT
Start: 2024-03-27 | End: 2024-03-27

## 2024-03-27 RX ADMIN — MIDODRINE HYDROCHLORIDE 5 MG: 5 TABLET ORAL at 16:30

## 2024-03-27 RX ADMIN — ENCORAFENIB 300 MG: 75 CAPSULE ORAL at 16:32

## 2024-03-27 RX ADMIN — BINIMETINIB 30 MG: 15 TABLET, FILM COATED ORAL at 16:42

## 2024-03-27 RX ADMIN — SODIUM PHOSPHATE 133 ML: 7; 19 ENEMA RECTAL at 12:35

## 2024-03-27 RX ADMIN — MOMETASONE FUROATE AND FORMOTEROL FUMARATE DIHYDRATE 2 PUFF: 200; 5 AEROSOL RESPIRATORY (INHALATION) at 16:49

## 2024-03-27 RX ADMIN — MIDODRINE HYDROCHLORIDE 5 MG: 5 TABLET ORAL at 08:15

## 2024-03-27 RX ADMIN — MOMETASONE FUROATE AND FORMOTEROL FUMARATE DIHYDRATE 2 PUFF: 200; 5 AEROSOL RESPIRATORY (INHALATION) at 06:44

## 2024-03-27 RX ADMIN — GABAPENTIN 100 MG: 100 CAPSULE ORAL at 05:54

## 2024-03-27 RX ADMIN — ENOXAPARIN SODIUM 40 MG: 100 INJECTION SUBCUTANEOUS at 16:29

## 2024-03-27 RX ADMIN — BISACODYL 10 MG: 10 SUPPOSITORY RECTAL at 08:18

## 2024-03-27 RX ADMIN — MEGESTROL ACETATE 800 MG: 40 SUSPENSION ORAL at 06:44

## 2024-03-27 RX ADMIN — POLYETHYLENE GLYCOL 3350 1 PACKET: 17 POWDER, FOR SOLUTION ORAL at 12:35

## 2024-03-27 RX ADMIN — DOCUSATE SODIUM 50 MG AND SENNOSIDES 8.6 MG 2 TABLET: 8.6; 5 TABLET, FILM COATED ORAL at 05:54

## 2024-03-27 RX ADMIN — MORPHINE SULFATE 45 MG: 30 TABLET, FILM COATED, EXTENDED RELEASE ORAL at 16:30

## 2024-03-27 RX ADMIN — BINIMETINIB 30 MG: 15 TABLET, FILM COATED ORAL at 05:55

## 2024-03-27 RX ADMIN — MIDODRINE HYDROCHLORIDE 5 MG: 5 TABLET ORAL at 12:35

## 2024-03-27 RX ADMIN — DOCUSATE SODIUM 50 MG AND SENNOSIDES 8.6 MG 2 TABLET: 8.6; 5 TABLET, FILM COATED ORAL at 16:30

## 2024-03-27 RX ADMIN — MORPHINE SULFATE 45 MG: 30 TABLET, FILM COATED, EXTENDED RELEASE ORAL at 05:54

## 2024-03-27 ASSESSMENT — COGNITIVE AND FUNCTIONAL STATUS - GENERAL
CLIMB 3 TO 5 STEPS WITH RAILING: A LOT
EATING MEALS: A LITTLE
DAILY ACTIVITIY SCORE: 14
HELP NEEDED FOR BATHING: A LOT
SUGGESTED CMS G CODE MODIFIER MOBILITY: CK
SUGGESTED CMS G CODE MODIFIER DAILY ACTIVITY: CK
DRESSING REGULAR UPPER BODY CLOTHING: A LOT
WALKING IN HOSPITAL ROOM: A LITTLE
MOVING FROM LYING ON BACK TO SITTING ON SIDE OF FLAT BED: A LITTLE
DRESSING REGULAR LOWER BODY CLOTHING: A LOT
STANDING UP FROM CHAIR USING ARMS: A LITTLE
MOVING TO AND FROM BED TO CHAIR: A LITTLE
MOBILITY SCORE: 18
TOILETING: A LOT
PERSONAL GROOMING: A LITTLE

## 2024-03-27 ASSESSMENT — ENCOUNTER SYMPTOMS
FEVER: 0
WEAKNESS: 1
SHORTNESS OF BREATH: 0
NECK PAIN: 1

## 2024-03-27 ASSESSMENT — GAIT ASSESSMENTS
GAIT LEVEL OF ASSIST: CONTACT GUARD ASSIST
ASSISTIVE DEVICE: FRONT WHEEL WALKER
DISTANCE (FEET): 75
DEVIATION: BRADYKINETIC;DECREASED HEEL STRIKE;DECREASED TOE OFF;DECREASED BASE OF SUPPORT

## 2024-03-27 ASSESSMENT — PAIN DESCRIPTION - PAIN TYPE
TYPE: ACUTE PAIN
TYPE: ACUTE PAIN

## 2024-03-27 NOTE — CARE PLAN
The patient is Stable - Low risk of patient condition declining or worsening    Shift Goals  Clinical Goals: Get MRI, monitor vitals/labs  Patient Goals: Rest  Family Goals: Not present    Progress made toward(s) clinical / shift goals:    Problem: Knowledge Deficit - Standard  Goal: Patient and family/care givers will demonstrate understanding of plan of care, disease process/condition, diagnostic tests and medications  Outcome: Progressing     Problem: Fall Risk  Goal: Patient will remain free from falls  Outcome: Progressing     Problem: Pain - Standard  Goal: Alleviation of pain or a reduction in pain to the patient’s comfort goal  Outcome: Progressing       Patient is not progressing towards the following goals:

## 2024-03-27 NOTE — DOCUMENTATION QUERY
[facility]                                                                       Query Response Note      PATIENT:               CARLOS HEREDIA  ACCT #:                  [id number]  MRN:                     [id number]  :                      1965  ADMIT DATE:       3/21/2024 12:47 PM  DISCH DATE:          RESPONDING  PROVIDER #:        [id number]           QUERY TEXT:    Sepsis with septic shock is documented in the medical record. Extensive infectious work up remains negative and antibiotics discontinued.    After study, can the status of Sepsis be clarified?    The patient's clinical indicators include:  Clinical Findings:     3/21:   Labs:  WBC 3.5; Lactic acid 1.4  Vital signs: ; T 100.5;   BP: 76/45 MAP 55  BP 78/54 MAP 62    Blood Cx, Urine Cx: NGTD  CXR: WNL    Risk Factors: Dehydration, Metastatic melanoma to lymph nodes and bones, on daily chemotherapy Braftovi and Mektovi (which can cause pyrexia),    Treatments: IV abx, infectious work up - blood cx, urine cx, labs, imaging, IV fluids, Levo gtt  Options provided:   -- SIRS of non-infectious etiology. Sepsis and septic shock ruled out after study.   -- Other explanation, (please specify the other explanation)   -- Unable to determine      Query created by: Adriane Abdi on 3/27/2024 7:55 AM    RESPONSE TEXT:    SIRS of non-infectious etiology. Sepsis and septic shock ruled out after study.          Electronically signed by:  HELIO CHENG MD 3/27/2024 8:06 AM

## 2024-03-27 NOTE — CARE PLAN
Problem: Fall Risk  Goal: Patient will remain free from falls  Outcome: Progressing     Problem: Skin Integrity  Goal: Skin integrity is maintained or improved  Outcome: Progressing     The patient is Watcher - Medium risk of patient condition declining or worsening    Shift Goals  Clinical Goals: Get MRI, monitor vitals/labs  Patient Goals: Rest  Family Goals: Not present    Progress made toward(s) clinical / shift goals:  Fall precautions are in place    Patient is not progressing towards the following goals:

## 2024-03-27 NOTE — PROGRESS NOTES
[facility] Medicine Daily Progress Note    Date of Service  3/27/2024    Chief Complaint  Andrew Wall is a 58 y.o. male admitted 3/21/2024 with fever altered mental status    Hospital Course  57yo PMHx metastatic melanoma, asthma, chronic pain related to bony mets.  Presented with altered mental status, SBP of 59 at home.  In the ED hypotensive, febrile, tachy.  No clear source of infection found.  Started on Vanc, cefepime and levophed    Interval Problem Update    MRI of C-spine reviewed with metastatic disease no significant canal stenosis or cord impingement discussed with oncology  Patient is afebrile on room air SBP in the 90s  Blood cultures are negative  Pain is well-controlled  Patient is constipated I ordered Fleet enema and MiraLAX        I have discussed this patient's plan of care and discharge plan at IDT rounds today with Case Management, Nursing, Nursing leadership, and other members of the IDT team.    Consultants/Specialty      Code Status  DNAR/DNI    Disposition  Medically Cleared  I have placed the appropriate orders for post-discharge needs.    Review of Systems  Review of Systems   Constitutional:  Negative for fever.   Respiratory:  Negative for shortness of breath.    Cardiovascular:  Negative for chest pain.   Musculoskeletal:  Positive for neck pain.   Neurological:  Positive for weakness.   All other systems reviewed and are negative.       Physical Exam  Temp:  [36.4 °C (97.5 °F)-36.8 °C (98.3 °F)] 36.4 °C (97.5 °F)  Pulse:  [] 156  Resp:  [16-18] 16  BP: ()/(53-70) 94/67  SpO2:  [92 %-100 %] 98 %    Physical Exam  Vitals and nursing note reviewed.   Constitutional:       Appearance: He is well-developed. He is not diaphoretic.   HENT:      Head: Normocephalic and atraumatic.      Mouth/Throat:      Pharynx: No oropharyngeal exudate.   Eyes:      General:         Right eye: No discharge.         Left eye: No discharge.   Neck:      Vascular: No JVD.      Trachea: No  tracheal deviation.   Cardiovascular:      Rate and Rhythm: Regular rhythm. Tachycardia present.   Pulmonary:      Effort: Pulmonary effort is normal. No respiratory distress.      Breath sounds: Normal breath sounds. No stridor. No wheezing.   Chest:      Chest wall: No tenderness.   Abdominal:      General: There is no distension.      Palpations: Abdomen is soft.      Tenderness: There is no abdominal tenderness. There is no rebound.   Musculoskeletal:         General: No tenderness.      Cervical back: Neck supple.   Skin:     General: Skin is warm and dry.      Nails: There is no clubbing.   Neurological:      Mental Status: He is alert and oriented to person, place, and time.      Cranial Nerves: No cranial nerve deficit.      Motor: Weakness (Right upper extremity proximal abduction) present. No abnormal muscle tone.   Psychiatric:         Behavior: Behavior normal.         Fluids    Intake/Output Summary (Last 24 hours) at 3/27/2024 1517  Last data filed at 3/27/2024 0755  Gross per 24 hour   Intake --   Output 400 ml   Net -400 ml       Laboratory  Recent Labs     03/25/24  0330 03/26/24  0032   WBC 2.8* 3.3*   RBC 3.21* 2.91*   HEMOGLOBIN 8.2* 7.8*   HEMATOCRIT 27.6* 24.9*   MCV 86.0 85.6   MCH 25.5* 26.8*   MCHC 29.7* 31.3*   RDW 59.5* 59.3*   PLATELETCT 297 299   MPV 8.8* 9.3     Recent Labs     03/25/24  0330 03/26/24  0032   SODIUM 135 134*   POTASSIUM 3.6 3.8   CHLORIDE 105 105   CO2 21 21   GLUCOSE 94 114*   BUN 5* 7*   CREATININE 0.19* 0.33*   CALCIUM 7.9* 7.7*                   Imaging  MR-CERVICAL SPINE-WITH & W/O   Final Result         1.  Metastatic disease involving C2, C6, C7 with bone marrow infiltration. Previously seen epidural thickening and enhancement has resolved. Mild pulmonary edema and abnormal enhancement also noted within the C1 anterior arch and lateral mass more    prominent to the left of midline.      2.  Compared to the previous study, there has interval development of a  pathologic endplate compression fracture with 30% loss of height at C6.      3.  Prevertebral soft tissue thickening and enhancement suggesting tumor infiltration most pronounced between C5 and T1.   4.  No evidence of significant canal stenosis or cord impingement. Spinal cord signal is normal.      CT-LSPINE W/O PLUS RECONS   Final Result      1.  Stable pathologic fractures of the lumbosacral spine. No new acute osseous abnormality identified.   2.  Stable large lytic right sacral ala mass again noted.      CT-ABDOMEN-PELVIS WITH   Final Result      1.  Multiple lytic and sclerotic osseous metastases are again noted with redemonstrated pathologic pelvic and lumbosacral spine fractures redemonstrated.   2.  Hepatic steatosis.      CT-CTA CHEST PULMONARY ARTERY W/ RECONS   Final Result         1. No CT evidence of pulmonary embolism.      2. Several bilateral pulmonary nodules in the lower lungs are more conspicuous than prior, likely worsening metastasis.      3. Grossly unchanged osseous metastases.      4. No airspace opacity. No pleural effusion or pneumothorax.      CT-HEAD W/O   Final Result      1.  No acute intracranial abnormality.   2.  Expansile osseous lesion is again noted in the odontoid process consistent with known metastatic disease.         DX-CHEST-PORTABLE (1 VIEW)   Final Result      No acute cardiopulmonary disease evident.           Assessment/Plan  * Septic shock (HCC)  Assessment & Plan  Septic shock versus dehydration  Patient was clearly dry in presentation.  He has also had a fever however he is on an an oncologic medication which can cause fever as a side effect.  He has had an extensive workup for source of infection, and thus far this has been negative.     No clear source of infection antibiotics discontinued    Continue midodrine  Cortisol level is low but patient currently asymptomatic we will hold off on stress dose hydrocortisone    C2 cervical fracture (HCC)  Assessment &  Plan  Had a recent mechanical fall resulting in Acute pathologic type II dens fracture with mild leftward displacement of the dens fragment on March 5  Aspen collar in place  PT  Physiatry consulted    3/25/2024  MSContin decreased from 60mg to 45mg BID 3/23.  Pt seems to be tolerating decreased dose well.  Reviewed MRI of C-spine findings with patient.  Reviewed previous consultation notes from spine surgery Dr. Lawrence. On 3/27 I placed a call to his office and left a message with his medical assistantTo update him as patient had an outpatient follow-up with him later this week to update him on hospitalization and MRI findings      Cancer related pain- (present on admission)  Assessment & Plan  3/25/2024  MSContin decreased from 60mg to 45mg BID 3/23.  Pt seems to be tolerating decreased dose well.  Discussed further tapering would like to hold off as he is concerned about increased pain when he goes to rehab        Pathologic fracture- (present on admission)  Assessment & Plan  Secondary to metastatic melanoma  Reviewed spine surgery notes from Dr. 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 In review of yesterday's note (3/26/2024), there are no changes except as documented above.

## 2024-03-27 NOTE — THERAPY
Occupational Therapy  Daily Treatment     Patient Name: Andrew Wall  Age:  58 y.o., Sex:  male  Medical Record #: [id number]  Today's Date: 3/27/2024     Precautions  Precautions: Fall Risk, Spinal / Back Precautions , Cervical Collar    Comments: c2 fx; ccollar at all times; multiple spinal mets, new c6 endplate fx per MRI 3/26/24; right ala fx, bilateral S1 mass, T1-2 paraspinal extrusion; per last admission 'NWB left UE but ok for FWW'; HR max per , 85% goal 142,    Assessment     Pt currently limited by decreased functional mobility, activity tolerance, strength, AROM, coordination, balance, and pain which are affecting pt's ability to complete ADLs/IADLs at baseline. Pt would benefit from OT services in the acute care setting to maximize functional recovery.      Plan    Treatment Plan Status: (P) Continue Current Treatment Plan  Type of Treatment: Self Care / Activities of Daily Living, Adaptive Equipment, Community Re-Integration, Manual Therapy Techniques, Neuro Re-Education / Balance, Therapeutic Exercises, Therapeutic Activity  Treatment Frequency: 4 Times per Week  Treatment Duration: Until Therapy Goals Met    DC Equipment Recommendations: Unable to determine at this time  Discharge Recommendations: (P) Recommend post-acute placement for additional occupational therapy services prior to discharge home         03/27/24 1143   Activities of Daily Living   Grooming Minimal Assist   Upper Body Dressing Moderate Assist   Lower Body Dressing Moderate Assist   Functional Mobility   Sit to Stand Contact Guard Assist   Bed, Chair, Wheelchair Transfer Minimal Assist   Short Term Goals   Short Term Goal # 1 Pt will complete ADL transfers with supervision   Goal Outcome # 1 Progressing as expected   Short Term Goal # 2 Pt will complete LB dressing with supervision   Goal Outcome # 2 Progressing as expected   Short Term Goal # 3 Pt will complete toileting with supervision   Goal Outcome # 3 Progressing as  expected   Short Term Goal # 4 Pt will complete UB dressing with supervision   Goal Outcome # 4 Progressing as expected   Occupational Therapy Treatment Plan    O.T. Treatment Plan Continue Current Treatment Plan   Anticipated Discharge Equipment and Recommendations   Discharge Recommendations Recommend post-acute placement for additional occupational therapy services prior to discharge home   Session Information   Date / Session Number  3/27 2 (2/4 3/31)

## 2024-03-27 NOTE — DISCHARGE PLANNING
[facility] Transitional Care Coordination    Rehab following for post acute services.  Will need PT/OT updates s/p restarting Braftovi and Mektovi to demonstrate tolerance for IRF level theray regimen.  Voalte update to DEE Lopez regarding therapy need.

## 2024-03-27 NOTE — THERAPY
Physical Therapy   Daily Treatment     Patient Name: Andrew Wall  Age:  58 y.o., Sex:  male  Medical Record #: [id number]  Today's Date: 3/27/2024     Precautions  Precautions: Fall Risk;Spinal / Back Precautions ;Cervical Collar    Comments: c2 fx; ccollar at all times; multiple spinal mets, new c6 endplate fx per MRI 3/26/24; right ala fx, bilateral S1 mass, T1-2 paraspinal extrusion; per last admission 'NWB left UE but ok for FWW'; HR max per , 85% goal 142,    Assessment    Pt with highly motivation to achieve higher independence, specifically ADLs per wife/pt; today hypotensive with static standing but maintains with ambulation, no symptoms, did achieve 156 HR but reduced within 1 min of seated/supine rest to normal values; continued to encourage PO for long term energy; discussion of supine ROM with strong parameters for stopping if any sharp pain felt given multitude of spinal mets and pelvic, left clavicle involvement; will follow to progress, if does not qualify for acute rehab likely better to return home than SNF but wife reports home health had dc'd him due to being 'too acute' will follow.     Plan    Treatment Plan Status: Continue Current Treatment Plan  Type of Treatment: Bed Mobility, Equipment, Gait Training, Neuro Re-Education / Balance, Stair Training, Therapeutic Activities, Therapeutic Exercise  Treatment Frequency: 4 Times per Week  Treatment Duration: Until Therapy Goals Met    DC Equipment Recommendations: Unable to determine at this time (anticipate none has hospital bed, w/c, FWW)  Discharge Recommendations: Other - (pt/family requesting acute rehab to achieve optimal independence)      Abridged Subjective/Objective     03/27/24 1240   Cognition    Cognition / Consciousness X   Level of Consciousness Alert   Ability To Follow Commands 1 Step   Attention Impaired   Comments cooperative; wishes to gain as much independence as he can; wife at bedside, no indication of remembering  prior conversations but did remember OT   Balance   Sitting Balance (Static) Fair +   Sitting Balance (Dynamic) Fair   Standing Balance (Static) Fair -   Standing Balance (Dynamic) Fair -   Weight Shift Sitting Good   Weight Shift Standing Good   Skilled Intervention Postural Facilitation;Sequencing;Tactile Cuing;Verbal Cuing   Comments B UE support in sitting/standing; narrow base of support   Bed Mobility    Supine to Sit Contact Guard Assist  (with FWW)   Sit to Supine Contact Guard Assist  (raised HOB; hospital bed)   Gait Analysis   Gait Level Of Assist Contact Guard Assist   Assistive Device Front Wheel Walker   Distance (Feet) 75   # of Times Distance was Traveled 1   Deviation Bradykinetic;Decreased Heel Strike;Decreased Toe Off;Decreased Base Of Support   Weight Bearing Status full   Vision Deficits Impacting Mobility denies   Skilled Intervention Postural Facilitation;Sequencing;Tactile Cuing;Verbal Cuing   Comments narrow base of support;   Functional Mobility   Sit to Stand Contact Guard Assist   Bed, Chair, Wheelchair Transfer Contact Guard Assist   Transfer Method Stand Step   Short Term Goals    Short Term Goal # 1 supine to/from sit flat bed, log roll supervised in 6 visits to progress with bed mobility   Goal Outcome # 1 goal not met   Short Term Goal # 2 sit to/from stand EOB with FWW supervised in 6 visits to progress with transfers   Goal Outcome # 2 Goal not met   Short Term Goal # 3 amb 150ft with FWW supervised in 6 visits for functional distances   Goal Outcome # 3 Goal not met   Short Term Goal # 4 Pt will self propel w/c with bilateral LEs with supervision x 150ft within 6 vistis to ensure household mobility   Goal Outcome # 4 Goal not met

## 2024-03-27 NOTE — DISCHARGE PLANNING
Case Management Discharge Planning    Admission Date: 3/21/2024  GMLOS: 5.1  ALOS: 6    6-Clicks ADL Score: 14  6-Clicks Mobility Score: 16  PT and/or OT Eval ordered: Yes  Post-acute Referrals Ordered: Yes  Post-acute Choice Obtained: Yes  Has referral(s) been sent to post-acute provider:  Yes      Anticipated Discharge Dispo: Discharge Disposition: Disch to IP rehab facility or distinct part unit (62)    DME Needed: No    Action(s) Taken: Discussed in IDT rounds, IPR needing updated PT/OT notes, RN to reach out to PT/OT for re eval. Pt remains pending insurance authorization for IPR.     Escalations Completed: None    Medically Clear: Yes    Next Steps: Pending insurance authorization.     Barriers to Discharge: Pending Insurance Authorization    Is the patient up for discharge tomorrow: No

## 2024-03-28 ENCOUNTER — HOSPITAL ENCOUNTER (INPATIENT)
Facility: REHABILITATION | Age: 59
DRG: 070 | End: 2024-03-28
Attending: PHYSICAL MEDICINE & REHABILITATION | Admitting: PHYSICAL MEDICINE & REHABILITATION
Payer: COMMERCIAL

## 2024-03-28 VITALS
HEART RATE: 88 BPM | WEIGHT: 137.13 LBS | BODY MASS INDEX: 19.2 KG/M2 | SYSTOLIC BLOOD PRESSURE: 94 MMHG | OXYGEN SATURATION: 94 % | RESPIRATION RATE: 16 BRPM | HEIGHT: 71 IN | TEMPERATURE: 98.1 F | DIASTOLIC BLOOD PRESSURE: 63 MMHG

## 2024-03-28 DIAGNOSIS — G93.40 ACUTE ENCEPHALOPATHY: ICD-10-CM

## 2024-03-28 DIAGNOSIS — C43.9 METASTATIC MELANOMA (HCC): ICD-10-CM

## 2024-03-28 DIAGNOSIS — S12.112D CLOSED NONDISPLACED ODONTOID FRACTURE WITH TYPE II MORPHOLOGY AND ROUTINE HEALING, SUBSEQUENT ENCOUNTER: ICD-10-CM

## 2024-03-28 DIAGNOSIS — C79.51 METASTASIS TO BONE (HCC): ICD-10-CM

## 2024-03-28 PROBLEM — R57.9 SHOCK (HCC): Status: RESOLVED | Noted: 2024-03-09 | Resolved: 2024-03-28

## 2024-03-28 PROBLEM — R57.9 SHOCK (HCC): Status: ACTIVE | Noted: 2024-03-09

## 2024-03-28 PROCEDURE — 99239 HOSP IP/OBS DSCHRG MGMT >30: CPT | Performed by: HOSPITALIST

## 2024-03-28 PROCEDURE — 770010 HCHG ROOM/CARE - REHAB SEMI PRIVAT*

## 2024-03-28 PROCEDURE — 700102 HCHG RX REV CODE 250 W/ 637 OVERRIDE(OP): Performed by: HOSPITALIST

## 2024-03-28 PROCEDURE — A9270 NON-COVERED ITEM OR SERVICE: HCPCS | Performed by: HOSPITALIST

## 2024-03-28 PROCEDURE — A9270 NON-COVERED ITEM OR SERVICE: HCPCS | Performed by: STUDENT IN AN ORGANIZED HEALTH CARE EDUCATION/TRAINING PROGRAM

## 2024-03-28 PROCEDURE — 99223 1ST HOSP IP/OBS HIGH 75: CPT | Performed by: PHYSICAL MEDICINE & REHABILITATION

## 2024-03-28 PROCEDURE — 700101 HCHG RX REV CODE 250: Performed by: HOSPITALIST

## 2024-03-28 PROCEDURE — 700102 HCHG RX REV CODE 250 W/ 637 OVERRIDE(OP): Performed by: INTERNAL MEDICINE

## 2024-03-28 PROCEDURE — 700102 HCHG RX REV CODE 250 W/ 637 OVERRIDE(OP): Performed by: STUDENT IN AN ORGANIZED HEALTH CARE EDUCATION/TRAINING PROGRAM

## 2024-03-28 PROCEDURE — 700111 HCHG RX REV CODE 636 W/ 250 OVERRIDE (IP): Mod: JZ | Performed by: PHYSICAL MEDICINE & REHABILITATION

## 2024-03-28 PROCEDURE — A9270 NON-COVERED ITEM OR SERVICE: HCPCS | Performed by: PHYSICAL MEDICINE & REHABILITATION

## 2024-03-28 PROCEDURE — 700102 HCHG RX REV CODE 250 W/ 637 OVERRIDE(OP): Performed by: PHYSICAL MEDICINE & REHABILITATION

## 2024-03-28 PROCEDURE — 94760 N-INVAS EAR/PLS OXIMETRY 1: CPT

## 2024-03-28 RX ORDER — ECHINACEA PURPUREA EXTRACT 125 MG
2 TABLET ORAL PRN
Status: DISCONTINUED | OUTPATIENT
Start: 2024-03-28 | End: 2024-04-08 | Stop reason: HOSPADM

## 2024-03-28 RX ORDER — MIDODRINE HYDROCHLORIDE 5 MG/1
5 TABLET ORAL
Status: CANCELLED | OUTPATIENT
Start: 2024-03-28

## 2024-03-28 RX ORDER — OXYCODONE HYDROCHLORIDE 10 MG/1
10 TABLET ORAL
Status: DISCONTINUED | OUTPATIENT
Start: 2024-03-28 | End: 2024-04-08 | Stop reason: HOSPADM

## 2024-03-28 RX ORDER — POLYETHYLENE GLYCOL 3350 17 G/17G
17 POWDER, FOR SOLUTION ORAL DAILY
Status: ON HOLD
Start: 2024-03-28 | End: 2024-04-08

## 2024-03-28 RX ORDER — ENOXAPARIN SODIUM 100 MG/ML
40 INJECTION SUBCUTANEOUS DAILY
Status: ON HOLD | DISCHARGE
Start: 2024-03-28 | End: 2024-04-08

## 2024-03-28 RX ORDER — ACETAMINOPHEN 325 MG/1
650 TABLET ORAL EVERY 4 HOURS PRN
Status: DISCONTINUED | OUTPATIENT
Start: 2024-03-28 | End: 2024-04-08 | Stop reason: HOSPADM

## 2024-03-28 RX ORDER — MIDAZOLAM HYDROCHLORIDE 5 MG/ML
5 INJECTION INTRAMUSCULAR; INTRAVENOUS PRN
Status: DISCONTINUED | OUTPATIENT
Start: 2024-03-28 | End: 2024-04-08 | Stop reason: HOSPADM

## 2024-03-28 RX ORDER — ENOXAPARIN SODIUM 100 MG/ML
40 INJECTION SUBCUTANEOUS DAILY
Status: CANCELLED | OUTPATIENT
Start: 2024-03-28

## 2024-03-28 RX ORDER — MIDODRINE HYDROCHLORIDE 2.5 MG/1
5 TABLET ORAL
Status: DISCONTINUED | OUTPATIENT
Start: 2024-03-28 | End: 2024-03-29

## 2024-03-28 RX ORDER — ENCORAFENIB 75 MG/1
300 CAPSULE ORAL DAILY
Status: SHIPPED
Start: 2024-03-28

## 2024-03-28 RX ORDER — ACETAMINOPHEN 325 MG/1
650 TABLET ORAL EVERY 6 HOURS PRN
Status: SHIPPED
Start: 2024-03-28

## 2024-03-28 RX ORDER — HYDRALAZINE HYDROCHLORIDE 25 MG/1
25 TABLET, FILM COATED ORAL EVERY 8 HOURS PRN
Status: DISCONTINUED | OUTPATIENT
Start: 2024-03-28 | End: 2024-04-08 | Stop reason: HOSPADM

## 2024-03-28 RX ORDER — GABAPENTIN 100 MG/1
100 CAPSULE ORAL DAILY
Status: DISCONTINUED | OUTPATIENT
Start: 2024-03-29 | End: 2024-04-02

## 2024-03-28 RX ORDER — MIDODRINE HYDROCHLORIDE 5 MG/1
5 TABLET ORAL
Status: ON HOLD
Start: 2024-03-28 | End: 2024-04-08

## 2024-03-28 RX ORDER — POLYETHYLENE GLYCOL 3350 17 G/17G
1 POWDER, FOR SOLUTION ORAL
Status: DISCONTINUED | OUTPATIENT
Start: 2024-03-28 | End: 2024-04-08 | Stop reason: HOSPADM

## 2024-03-28 RX ORDER — TRAZODONE HYDROCHLORIDE 50 MG/1
50 TABLET ORAL
Status: DISCONTINUED | OUTPATIENT
Start: 2024-03-28 | End: 2024-04-08 | Stop reason: HOSPADM

## 2024-03-28 RX ORDER — ONDANSETRON 4 MG/1
4 TABLET, ORALLY DISINTEGRATING ORAL 4 TIMES DAILY PRN
Status: DISCONTINUED | OUTPATIENT
Start: 2024-03-28 | End: 2024-04-08 | Stop reason: HOSPADM

## 2024-03-28 RX ORDER — BINIMETINIB 15 MG/1
30 TABLET, FILM COATED ORAL 2 TIMES DAILY
Status: SHIPPED
Start: 2024-03-28

## 2024-03-28 RX ORDER — MORPHINE SULFATE 15 MG/1
45 TABLET, FILM COATED, EXTENDED RELEASE ORAL EVERY 12 HOURS
Status: ON HOLD
Start: 2024-03-28 | End: 2024-04-08

## 2024-03-28 RX ORDER — MEGESTROL ACETATE 40 MG/ML
800 SUSPENSION ORAL DAILY
Status: DISCONTINUED | OUTPATIENT
Start: 2024-03-29 | End: 2024-04-08 | Stop reason: HOSPADM

## 2024-03-28 RX ORDER — OXYCODONE HYDROCHLORIDE 5 MG/1
5 TABLET ORAL
Status: DISCONTINUED | OUTPATIENT
Start: 2024-03-28 | End: 2024-04-08 | Stop reason: HOSPADM

## 2024-03-28 RX ORDER — AMOXICILLIN 250 MG
2 CAPSULE ORAL 2 TIMES DAILY
Status: DISCONTINUED | OUTPATIENT
Start: 2024-03-28 | End: 2024-04-08 | Stop reason: HOSPADM

## 2024-03-28 RX ORDER — ALUMINA, MAGNESIA, AND SIMETHICONE 2400; 2400; 240 MG/30ML; MG/30ML; MG/30ML
20 SUSPENSION ORAL
Status: DISCONTINUED | OUTPATIENT
Start: 2024-03-28 | End: 2024-04-08 | Stop reason: HOSPADM

## 2024-03-28 RX ORDER — ONDANSETRON 2 MG/ML
4 INJECTION INTRAMUSCULAR; INTRAVENOUS 4 TIMES DAILY PRN
Status: DISCONTINUED | OUTPATIENT
Start: 2024-03-28 | End: 2024-04-08 | Stop reason: HOSPADM

## 2024-03-28 RX ORDER — AMOXICILLIN 250 MG
2 CAPSULE ORAL 2 TIMES DAILY
Status: ON HOLD
Start: 2024-03-28 | End: 2024-04-08

## 2024-03-28 RX ORDER — ENOXAPARIN SODIUM 100 MG/ML
40 INJECTION SUBCUTANEOUS DAILY
Status: DISCONTINUED | OUTPATIENT
Start: 2024-03-28 | End: 2024-04-03

## 2024-03-28 RX ORDER — OMEPRAZOLE 20 MG/1
20 CAPSULE, DELAYED RELEASE ORAL DAILY
Status: DISCONTINUED | OUTPATIENT
Start: 2024-03-29 | End: 2024-04-08 | Stop reason: HOSPADM

## 2024-03-28 RX ORDER — MEGESTROL ACETATE 40 MG/ML
800 SUSPENSION ORAL DAILY
Status: CANCELLED | OUTPATIENT
Start: 2024-03-29

## 2024-03-28 RX ORDER — GABAPENTIN 100 MG/1
100 CAPSULE ORAL DAILY
Status: CANCELLED | OUTPATIENT
Start: 2024-03-29

## 2024-03-28 RX ADMIN — MEGESTROL ACETATE 800 MG: 40 SUSPENSION ORAL at 05:26

## 2024-03-28 RX ADMIN — SENNOSIDES AND DOCUSATE SODIUM 2 TABLET: 8.6; 5 TABLET ORAL at 21:22

## 2024-03-28 RX ADMIN — MOMETASONE FUROATE AND FORMOTEROL FUMARATE DIHYDRATE 2 PUFF: 200; 5 AEROSOL RESPIRATORY (INHALATION) at 05:24

## 2024-03-28 RX ADMIN — ENOXAPARIN SODIUM 40 MG: 100 INJECTION SUBCUTANEOUS at 17:27

## 2024-03-28 RX ADMIN — POLYETHYLENE GLYCOL 3350 1 PACKET: 17 POWDER, FOR SOLUTION ORAL at 11:42

## 2024-03-28 RX ADMIN — MIDODRINE HYDROCHLORIDE 5 MG: 2.5 TABLET ORAL at 17:27

## 2024-03-28 RX ADMIN — MOMETASONE FUROATE AND FORMOTEROL FUMARATE DIHYDRATE 2 PUFF: 200; 5 AEROSOL RESPIRATORY (INHALATION) at 21:31

## 2024-03-28 RX ADMIN — GABAPENTIN 100 MG: 100 CAPSULE ORAL at 05:24

## 2024-03-28 RX ADMIN — MORPHINE SULFATE 45 MG: 30 TABLET, FILM COATED, EXTENDED RELEASE ORAL at 21:22

## 2024-03-28 RX ADMIN — MORPHINE SULFATE 45 MG: 30 TABLET, FILM COATED, EXTENDED RELEASE ORAL at 05:24

## 2024-03-28 RX ADMIN — MIDODRINE HYDROCHLORIDE 5 MG: 5 TABLET ORAL at 11:42

## 2024-03-28 RX ADMIN — BINIMETINIB 30 MG: 15 TABLET, FILM COATED ORAL at 05:25

## 2024-03-28 RX ADMIN — MIDODRINE HYDROCHLORIDE 5 MG: 5 TABLET ORAL at 08:13

## 2024-03-28 ASSESSMENT — LIFESTYLE VARIABLES
TOTAL SCORE: 0
CONSUMPTION TOTAL: NEGATIVE
ALCOHOL_USE: NO
HOW MANY TIMES IN THE PAST YEAR HAVE YOU HAD 5 OR MORE DRINKS IN A DAY: 0
DOES PATIENT WANT TO STOP DRINKING: NO
HAVE PEOPLE ANNOYED YOU BY CRITICIZING YOUR DRINKING: NO
AVERAGE NUMBER OF DAYS PER WEEK YOU HAVE A DRINK CONTAINING ALCOHOL: 0
EVER_SMOKED: NEVER
EVER HAD A DRINK FIRST THING IN THE MORNING TO STEADY YOUR NERVES TO GET RID OF A HANGOVER: NO
TOTAL SCORE: 0
HAVE YOU EVER FELT YOU SHOULD CUT DOWN ON YOUR DRINKING: NO
ON A TYPICAL DAY WHEN YOU DRINK ALCOHOL HOW MANY DRINKS DO YOU HAVE: 0
TOTAL SCORE: 0
EVER FELT BAD OR GUILTY ABOUT YOUR DRINKING: NO

## 2024-03-28 ASSESSMENT — PATIENT HEALTH QUESTIONNAIRE - PHQ9
9. THOUGHTS THAT YOU WOULD BE BETTER OFF DEAD, OR OF HURTING YOURSELF: NOT AT ALL
3. TROUBLE FALLING OR STAYING ASLEEP OR SLEEPING TOO MUCH: SEVERAL DAYS
2. FEELING DOWN, DEPRESSED, IRRITABLE, OR HOPELESS: SEVERAL DAYS
8. MOVING OR SPEAKING SO SLOWLY THAT OTHER PEOPLE COULD HAVE NOTICED. OR THE OPPOSITE, BEING SO FIGETY OR RESTLESS THAT YOU HAVE BEEN MOVING AROUND A LOT MORE THAN USUAL: SEVERAL DAYS
2. FEELING DOWN, DEPRESSED, IRRITABLE, OR HOPELESS: NOT AT ALL
SUM OF ALL RESPONSES TO PHQ9 QUESTIONS 1 AND 2: 0
5. POOR APPETITE OR OVEREATING: NOT AT ALL
6. FEELING BAD ABOUT YOURSELF - OR THAT YOU ARE A FAILURE OR HAVE LET YOURSELF OR YOUR FAMILY DOWN: NOT AL ALL
4. FEELING TIRED OR HAVING LITTLE ENERGY: SEVERAL DAYS
1. LITTLE INTEREST OR PLEASURE IN DOING THINGS: NOT AT ALL
1. LITTLE INTEREST OR PLEASURE IN DOING THINGS: SEVERAL DAYS
SUM OF ALL RESPONSES TO PHQ QUESTIONS 1-9: 7
SUM OF ALL RESPONSES TO PHQ9 QUESTIONS 1 AND 2: 2
7. TROUBLE CONCENTRATING ON THINGS, SUCH AS READING THE NEWSPAPER OR WATCHING TELEVISION: MORE THAN HALF THE DAYS

## 2024-03-28 ASSESSMENT — ENCOUNTER SYMPTOMS
BACK PAIN: 0
EYES NEGATIVE: 1
PSYCHIATRIC NEGATIVE: 1
CARDIOVASCULAR NEGATIVE: 1
NEUROLOGICAL NEGATIVE: 1
CONSTIPATION: 1
RESPIRATORY NEGATIVE: 1

## 2024-03-28 ASSESSMENT — FIBROSIS 4 INDEX: FIB4 SCORE: 1.68

## 2024-03-28 ASSESSMENT — PAIN DESCRIPTION - PAIN TYPE: TYPE: ACUTE PAIN

## 2024-03-28 NOTE — FLOWSHEET NOTE
03/28/24 1345   Events/Summary/Plan   Events/Summary/Plan RT Consult   Vital Signs   Pulse 85   Respiration 16   Pulse Oximetry 98 %   $ Pulse Oximetry (Spot Check) Yes   Respiratory Assessment   Level of Consciousness Alert   Chest Exam   Work Of Breathing / Effort Within Normal Limits   Breath Sounds   RUL Breath Sounds Clear   RML Breath Sounds Clear   RLL Breath Sounds Clear   ALKA Breath Sounds Clear   LLL Breath Sounds Clear   Oxygen   O2 Delivery Device Room air w/o2 available   Smoking History   Have you ever smoked Never

## 2024-03-28 NOTE — DISCHARGE PLANNING
Case Management Discharge Planning    Admission Date: 3/21/2024  GMLOS: 3.6  ALOS: 7    6-Clicks ADL Score: 14  6-Clicks Mobility Score: 18  PT and/or OT Eval ordered: Yes  Post-acute Referrals Ordered: Yes  Post-acute Choice Obtained: Yes  Has referral(s) been sent to post-acute provider:  Yes      Anticipated Discharge Dispo: Discharge Disposition: Disch to IP rehab facility or distinct part unit (62)    DME Needed: No    Action(s) Taken: Discussed in IDT rounds, pt has been accepted to [facility] and transport time has been arranged for 1300. COBRA packet complete and given to RN. No other CM needs at this time.     Escalations Completed: None    Medically Clear: Yes

## 2024-03-28 NOTE — CARE PLAN
The patient is Watcher - Medium risk of patient condition declining or worsening    Shift Goals  Clinical Goals: Safety, monitor labs/vitals  Patient Goals: Rest  Family Goals: Not present    Progress made toward(s) clinical / shift goals:    Problem: Knowledge Deficit - Standard  Goal: Patient and family/care givers will demonstrate understanding of plan of care, disease process/condition, diagnostic tests and medications  Outcome: Progressing     Problem: Fall Risk  Goal: Patient will remain free from falls  Outcome: Progressing     Problem: Pain - Standard  Goal: Alleviation of pain or a reduction in pain to the patient’s comfort goal  Outcome: Progressing     Problem: Skin Integrity  Goal: Skin integrity is maintained or improved  Outcome: Progressing       Patient is not progressing towards the following goals:

## 2024-03-28 NOTE — DISCHARGE SUMMARY
Discharge Summary    CHIEF COMPLAINT ON ADMISSION  Chief Complaint   Patient presents with    Headache    Fatigue       Reason for Admission  ems    Admission Date  3/21/2024     CODE STATUS  DNAR/DNI    HPI & HOSPITAL COURSE  59yo PMHx metastatic melanoma, asthma, chronic pain related to bony mets. Presented with altered mental status, SBP of 59 at home. In the ED hypotensive, febrile, tachy. No clear source of infection found. Started on Vanc, cefepime and levophed .    The patient was initially admitted to IMCU was on pressors and broad-spectrum antibiotics.  He was started on IV fluids for hydration and subsequently weaned off pressors and transition to midodrine.  His narcotic dose was also decreased.  He has a history of C2 fracture secondary to metastatic melanoma for which she had been evaluated by Dr. Camacho from spine surgery during previous admission with recommendation for none operative management.    The patient was evaluated by oncology with recommendation to decrease his dosage as his fever could have been related to his treatment.  The patient tolerated Braftovi and mektovi at lower dose.  MRI of C-spine revealed known metastatic disease but no spinal cord impingement or canal stenosis.    Patient remains on low-dose midodrine and has been tolerating mobilization.  He is tolerating p.o. intake and had bowel movement yesterday.    He is otherwise clinically stable for transfer to inpatient rehab.      Therefore, he is discharged in good and stable condition to an inpatient rehabilitation hospital.    The patient met 2-midnight criteria for an inpatient stay at the time of discharge.      FOLLOW UP ITEMS POST DISCHARGE  Monitor blood pressure and wean off midodrine as tolerated  Of note his cortisol level was low but he was not started on stress dose hydrocortisone as his blood pressure has stabilized  Follow-up with oncology after discharge from rehab  Follow-up with spine surgery Dr. Lawrence  Taper off  narcotics as tolerated as this is likely also contributing to his hypotension  Monitor CBC and chemistry panel      DISCHARGE DIAGNOSES  Principal Problem (Resolved):    Shock (HCC) (POA: Unknown)  Active Problems:    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) (POA: Yes)    Constipation (POA: Yes)    Pathologic fracture (POA: Yes)    Cancer related pain (POA: Yes)    ACP (advance care planning) (POA: Yes)    C2 cervical fracture (HCC) (POA: Unknown)  Resolved Problems:    Dehydration (POA: Unknown)      FOLLOW UP  Future Appointments   Date Time Provider Department Center   3/29/2024 12:30 PM Kevin Lawrence M.D. [facility]     No follow-up provider specified.    MEDICATIONS ON DISCHARGE     Medication List        START taking these medications        Instructions   acetaminophen 325 MG Tabs  Commonly known as: Tylenol   Take 2 Tablets by mouth every 6 hours as needed for Mild Pain or Moderate Pain.  Dose: 650 mg     enoxaparin 40 MG/0.4ML Sosy inj  Commonly known as: Lovenox   Inject 40 mg under the skin every day at 6 PM.  Dose: 40 mg     midodrine 5 MG Tabs  Commonly known as: Proamatine   Take 1 Tablet by mouth 3 times a day with meals.  Dose: 5 mg     polyethylene glycol/lytes Pack  Commonly known as: Miralax   Take 1 Packet by mouth every day.  Dose: 17 g     senna-docusate 8.6-50 MG Tabs  Commonly known as: Pericolace Or Senokot S   Take 2 Tablets by mouth 2 times a day.  Dose: 2 Tablet            CHANGE how you take these medications        Instructions   Braftovi 75 MG Caps  What changed:   how much to take  when to take this  Generic drug: Encorafenib   Take 300 mg by mouth every day at 6 PM.  Dose: 300 mg     Mektovi 15 MG Tabs  What changed: how much to take  Generic drug: Binimetinib   Take 30 mg by mouth 2 times a day.  Dose: 30 mg     morphine ER 15 MG Tbcr tablet  What changed:   medication strength  how much to take  Commonly known as: Ms Contin   Take 3 Tablets by mouth every 12 hours  for 5 days.  Dose: 45 mg            CONTINUE taking these medications        Instructions   CALCIUM CARBONATE ANTACID PO   Take 2 Tablets by mouth every day.  Dose: 2 Tablet     DULERA INH   Inhale 2 Puffs 2 times a day.  Dose: 2 Puff     DULoxetine 60 MG Cpep delayed-release capsule  Commonly known as: Cymbalta   Take 60 mg by mouth every day.  Dose: 60 mg     famotidine 20 MG Tabs  Commonly known as: Pepcid   Take 20 mg by mouth every day.  Dose: 20 mg     gabapentin 100 MG Caps  Commonly known as: Neurontin   Take 100 mg by mouth every day.  Dose: 100 mg     ondansetron 4 MG Tbdp  Commonly known as: Zofran ODT   Take 4 mg by mouth every 6 hours as needed for Nausea/Vomiting.  Dose: 4 mg     prochlorperazine 10 MG Tabs  Commonly known as: Compazine   Take 10 mg by mouth 1 time a day as needed for Nausea/Vomiting.  Dose: 10 mg              Allergies  Allergies   Allergen Reactions    Augmentin Vomiting and Nausea       DIET  Orders Placed This Encounter   Procedures    Diet Order Diet: Regular     Standing Status:   Standing     Number of Occurrences:   1     Order Specific Question:   Diet:     Answer:   Regular [1]       ACTIVITY  As tolerated. C-collar at all times  Weight bearing as tolerated    LINES, DRAINS, AND WOUNDS  This is an automated list. Peripheral IVs will be removed prior to discharge.  Peripheral IV 03/21/24 20 G Anterior;Left Forearm (Active)   Site Assessment Clean;Dry;Intact 03/27/24 1930   Dressing Type Transparent 03/27/24 1930   Line Status Scrubbed the hub prior to access;Flushed 03/27/24 1930   Dressing Status Clean;Dry;Intact 03/27/24 1930   Dressing Intervention N/A 03/27/24 0800   Dressing Change Due 03/30/24 03/27/24 1930   Date IV Connector(s) Changed 03/21/24 03/27/24 1930   Infiltration Grading ([facility], [facility] [place]) 0 03/27/24 1930   Phlebitis Scale ([facility] Only) 0 03/27/24 1930       Peripheral IV 03/23/24 Anterior;Right Forearm (Active)   Site Assessment Clean;Dry;Intact 03/27/24 1930    Dressing Type Transparent 03/27/24 1930   Line Status Scrubbed the hub prior to access;Flushed 03/27/24 1930   Dressing Status Clean;Dry;Intact 03/27/24 1930   Dressing Intervention N/A 03/27/24 1930   Dressing Change Due 03/30/24 03/27/24 1930   Date IV Connector(s) Changed 03/23/24 03/27/24 1930   Infiltration Grading ([facility], [facility] [place]) 0 03/27/24 1930   Phlebitis Scale ([facility] Only) 0 03/27/24 1930       Wound 03/09/24 Pressure Injury Coccyx POA sDTI (Active)   Wound Image    03/25/24 1807   Site Assessment JAYLEEN 03/25/24 2200   Periwound Assessment JAYLEEN 03/25/24 2200   Margins Attached edges 03/25/24 1740   Closure None 03/25/24 1807   Drainage Amount None 03/25/24 2200   Drainage Description Sanguineous 03/09/24 2300   Treatments Offloading 03/26/24 1930   Offloading/DME Heel float boot 03/10/24 1500   Wound Cleansing Foam Cleanser/Washcloth 03/25/24 2200   Periwound Protectant Barrier Paste 03/25/24 2200   Dressing Status Clean;Dry;Intact 03/25/24 2200   Dressing Changed Observed 03/25/24 2200   Dressing Cleansing/Solutions Not Applicable 03/25/24 1740   Dressing Options Offloading Dressing - Heel 03/25/24 2200   Dressing Change/Treatment Frequency Every 72 hrs, and As Needed 03/25/24 2200   NEXT Dressing Change/Treatment Date 03/28/24 03/25/24 2200   NEXT Weekly Photo (Inpatient Only) 03/27/24 03/25/24 2200   Wound Team Following Not following 03/25/24 1740   WOUND NURSE ONLY - Pressure Injury Stage DTPI 03/10/24 1500   Wound Length (cm) 2 cm 03/25/24 1740   Wound Width (cm) 2.2 cm 03/25/24 1740   Wound Surface Area (cm^2) 4.4 cm^2 03/25/24 1740   Shape circular 03/25/24 1740       Wound 03/09/24 Pressure Injury Heel Left POA sDTI revealed as resolving St 2 3/21 admit (Active)   Wound Image   03/25/24 1807   Site Assessment JAYLEEN 03/25/24 2200   Periwound Assessment JAYLEEN 03/25/24 2200   Margins JAYLEEN 03/25/24 2200   Closure Open to air 03/25/24 1740   Drainage Amount None 03/25/24 2200   Treatments Offloading 03/25/24  2200   Offloading/DME Heel float boot 03/25/24 2200   Wound Cleansing Foam Cleanser/Washcloth 03/25/24 2200   Periwound Protectant Not Applicable 03/25/24 1740   Dressing Status Intact 03/25/24 2200   Dressing Changed Observed 03/25/24 2200   Dressing Cleansing/Solutions Not Applicable 03/25/24 2200   Dressing Options Offloading Dressing - Heel 03/25/24 2200   Dressing Change/Treatment Frequency Every 72 hrs, and As Needed 03/25/24 1740   NEXT Dressing Change/Treatment Date 03/28/24 03/25/24 2200   NEXT Weekly Photo (Inpatient Only) 04/01/24 03/25/24 2200   Wound Team Following Not following 03/25/24 1740   WOUND NURSE ONLY - Pressure Injury Stage DTPI 03/10/24 1500   Wound Length (cm) 1.5 cm 03/25/24 1740   Wound Width (cm) 1.2 cm 03/25/24 1740   Wound Surface Area (cm^2) 1.8 cm^2 03/25/24 1740   Shape circular 03/25/24 1740       Wound 03/09/24 Pressure Injury Heel Right POA sDTI  revealed as unstageable 3/21 admit (Active)   Wound Image   03/25/24 1807   Site Assessment JAYLEEN 03/25/24 2200   Periwound Assessment JAYLEEN 03/25/24 2200   Margins Defined edges;Attached edges 03/25/24 1740   Closure None 03/25/24 1740   Drainage Amount None 03/25/24 2200   Treatments Offloading 03/25/24 2200   Offloading/DME Heel float boot 03/25/24 2200   Wound Cleansing Foam Cleanser/Washcloth 03/25/24 2200   Periwound Protectant Not Applicable 03/25/24 2200   Dressing Status Clean;Dry;Intact 03/25/24 2200   Dressing Changed Observed 03/25/24 2200   Dressing Cleansing/Solutions Not Applicable 03/25/24 2200   Dressing Options Offloading Dressing - Heel 03/25/24 2200   Dressing Change/Treatment Frequency Every 72 hrs, and As Needed 03/25/24 2200   NEXT Dressing Change/Treatment Date 03/28/24 03/25/24 2200   NEXT Weekly Photo (Inpatient Only) 04/01/24 03/25/24 2200   Wound Team Following Not following 03/25/24 1740   WOUND NURSE ONLY - Pressure Injury Stage U 03/25/24 1740   Wound Length (cm) 1.2 cm 03/25/24 1740   Wound Width (cm) 1 cm  03/25/24 1740   Wound Surface Area (cm^2) 1.2 cm^2 03/25/24 1740   Shape circular 03/25/24 1740       Wound 03/09/24 Pressure Injury Neck Posterior Left POA sDTI (Active)   Wound Image   03/25/24 1807   Site Assessment Scabbed 03/25/24 2200   Periwound Assessment Intact 03/25/24 2200   Margins Attached edges 03/25/24 2200   Closure Secondary intention 03/25/24 1740   Drainage Amount None 03/25/24 2200   Treatments Offloading 03/25/24 1807   Offloading/DME Heel float boot 03/10/24 1500   Dressing Status Open to Air 03/25/24 2200   Dressing Changed Observed 03/11/24 2030   Dressing Cleansing/Solutions Not Applicable 03/25/24 2200   Dressing Options Silicone Adhesive Foam 03/25/24 1807   Dressing Change/Treatment Frequency Every 72 hrs, and As Needed 03/12/24 0829   NEXT Dressing Change/Treatment Date 03/13/24 03/10/24 1500   NEXT Weekly Photo (Inpatient Only) 03/17/24 03/10/24 1500   Wound Team Following Not following 03/10/24 1500   WOUND NURSE ONLY - Pressure Injury Stage DTPI 03/10/24 1500       Wound 03/09/24 Pressure Injury Back;Flank Left POA sDTI (Active)   Wound Image   03/25/24 1803   Site Assessment Red;Dry 03/11/24 2030   Periwound Assessment Intact 03/11/24 2030   Margins Attached edges 03/10/24 1500   Closure None 03/10/24 1500   Drainage Amount None 03/10/24 1500   Treatments Offloading 03/10/24 1500   Dressing Status Open to Air 03/11/24 2030   NEXT Weekly Photo (Inpatient Only) 03/17/24 03/10/24 1500   Wound Team Following Not following 03/10/24 1500   WOUND NURSE ONLY - Pressure Injury Stage DTPI 03/10/24 1500       Wound 03/09/24 Pressure Injury Back Mid;Upper POA sDTI (Active)   Wound Image   03/25/24 1803   Site Assessment JAYLEEN 03/11/24 2030   Periwound Assessment Intact 03/10/24 1500   Margins Attached edges 03/10/24 1500   Closure None 03/10/24 1500   Drainage Amount None 03/10/24 1500   Treatments Cleansed;Site care;Offloading 03/10/24 1500   Offloading/DME Heel float boot 03/10/24 1500   Wound  Cleansing Foam Cleanser/Washcloth 03/10/24 1500   Dressing Status Clean;Dry;Intact 03/11/24 2030   Dressing Changed Observed 03/11/24 2030   Dressing Cleansing/Solutions Not Applicable 03/10/24 1500   Dressing Options Offloading Dressing - Heel 03/11/24 2030   Dressing Change/Treatment Frequency Every 72 hrs, and As Needed 03/12/24 0829   NEXT Dressing Change/Treatment Date 03/13/24 03/10/24 1500   NEXT Weekly Photo (Inpatient Only) 03/17/24 03/10/24 1500   Wound Team Following Not following 03/10/24 1500   WOUND NURSE ONLY - Pressure Injury Stage DTPI 03/10/24 1500       Peripheral IV 03/21/24 20 G Anterior;Left Forearm (Active)   Site Assessment Clean;Dry;Intact 03/27/24 1930   Dressing Type Transparent 03/27/24 1930   Line Status Scrubbed the hub prior to access;Flushed 03/27/24 1930   Dressing Status Clean;Dry;Intact 03/27/24 1930   Dressing Intervention N/A 03/27/24 0800   Dressing Change Due 03/30/24 03/27/24 1930   Date IV Connector(s) Changed 03/21/24 03/27/24 1930   Infiltration Grading ([facility], [facility] [place] 0 03/27/24 1930   Phlebitis Scale ([facility] Only) 0 03/27/24 1930       Peripheral IV 03/23/24 Anterior;Right Forearm (Active)   Site Assessment Clean;Dry;Intact 03/27/24 1930   Dressing Type Transparent 03/27/24 1930   Line Status Scrubbed the hub prior to access;Flushed 03/27/24 1930   Dressing Status Clean;Dry;Intact 03/27/24 1930   Dressing Intervention N/A 03/27/24 1930   Dressing Change Due 03/30/24 03/27/24 1930   Date IV Connector(s) Changed 03/23/24 03/27/24 1930   Infiltration Grading ([facility] [place] 0 03/27/24 1930   Phlebitis Scale ([facility] Only) 0 03/27/24 1930               MENTAL STATUS ON TRANSFER             CONSULTATIONS  Oncology        LABORATORY  Lab Results   Component Value Date    SODIUM 134 (L) 03/26/2024    POTASSIUM 3.8 03/26/2024    CHLORIDE 105 03/26/2024    CO2 21 03/26/2024    GLUCOSE 114 (H) 03/26/2024    BUN 7 (L) 03/26/2024    CREATININE 0.33 (L) 03/26/2024        Lab Results    Component Value Date    WBC 3.3 (L) 03/26/2024    HEMOGLOBIN 7.8 (L) 03/26/2024    HEMATOCRIT 24.9 (L) 03/26/2024    PLATELETCT 299 03/26/2024        Total time of the discharge process exceeds 40 minutes.

## 2024-03-28 NOTE — CARE PLAN
The patient is Watcher - Medium risk of patient condition declining or worsening    Shift Goals  Clinical Goals: monitor labs and vitals, possible d/c today to rehab  Patient Goals: ambulate, sit in chair  Family Goals: updates    Progress made toward(s) clinical / shift goals:        Problem: Knowledge Deficit - Standard  Goal: Patient and family/care givers will demonstrate understanding of plan of care, disease process/condition, diagnostic tests and medications  Outcome: Progressing  Note: Discussed POC with pt and spouse, plan to DC this afternoon to inpatient rehab, pt understands and agrees.      Problem: Fall Risk  Goal: Patient will remain free from falls  Outcome: Progressing  Note: Pt is A&Ox4, x1 assist with fww, calls appropriately for assistance.

## 2024-03-28 NOTE — DISCHARGE PLANNING
[facility] Transitional Care Coordination    Dr. Angelo Cortez will accept Sebastian to inpatient rehab.  GMT wheelchair transport 1/1:30p today.  Volate update to Dr. Koko Kc.  Nursing to call report to [phone].  Bedside RN Yoanna batista      TCC will follow to assist as needed with transition to [facility].     [id number] - Telephone call to spouse Sherly, provided update on pending transfer.  Reviewed clothing requirements/visitation RR.  Sherly gutierres understanding.  Provided [facility] Nurses station contact information.

## 2024-03-28 NOTE — PROGRESS NOTES
Discharge instructions discussed with pt and spouse, questions answered. PIV's removed. Home medications returned to pt. [facility] Pt transported by T and discharged to  Rehab.

## 2024-03-28 NOTE — CARE PLAN
The patient is Stable - Low risk of patient condition declining or worsening    Problem: Knowledge Deficit - Standard  Goal: Patient and family/care givers will demonstrate understanding of plan of care, disease process/condition, diagnostic tests and medications  Outcome: Progressing. Reviewed POC, all questions answered.        Problem: Fall Risk - Rehab  Goal: Patient will remain free from falls  Outcome: Progressing. Call light within reach, pt educated to use for assistance for safe transferring.      Shift Goals  Clinical Goals: Safety  Patient Goals: Education

## 2024-03-28 NOTE — PROGRESS NOTES
Oncology/Hematology Progress Note               Author: Clara Parra M.D. Date & Time created: 3/28/2024  1:56 PM     CC: Metastatic Melanoma    Interval History:  No acute events overnight, planning to go to rehab later today. He is excited about this. No fevers, cough, SOB. Pain is controlled, he is tolerating therapy.     Review of Systems:  Review of Systems   Constitutional:  Positive for malaise/fatigue.   HENT: Negative.     Eyes: Negative.    Respiratory: Negative.     Cardiovascular: Negative.    Gastrointestinal:  Positive for constipation.   Genitourinary: Negative.    Musculoskeletal:  Negative for back pain.   Skin: Negative.    Neurological: Negative.    Endo/Heme/Allergies: Negative.    Psychiatric/Behavioral: Negative.         Physical Exam:  Physical Exam  Constitutional:       General: He is not in acute distress.     Appearance: He is ill-appearing.   HENT:      Head: Normocephalic and atraumatic.   Eyes:      General: No scleral icterus.  Cardiovascular:      Rate and Rhythm: Normal rate.   Pulmonary:      Effort: Pulmonary effort is normal. No respiratory distress.   Musculoskeletal:         General: No swelling.   Skin:     Coloration: Skin is not jaundiced.   Neurological:      General: No focal deficit present.      Mental Status: He is alert and oriented to person, place, and time.   Psychiatric:         Mood and Affect: Mood normal.         Behavior: Behavior normal.         Thought Content: Thought content normal.         Judgment: Judgment normal.         Labs:          Recent Labs     03/26/24 0032   SODIUM 134*   POTASSIUM 3.8   CHLORIDE 105   CO2 21   BUN 7*   CREATININE 0.33*   CALCIUM 7.7*     Recent Labs     03/26/24 0032   GLUCOSE 114*     Recent Labs     03/26/24 0032   RBC 2.91*   HEMOGLOBIN 7.8*   HEMATOCRIT 24.9*   PLATELETCT 299     Recent Labs     03/26/24 0032   WBC 3.3*     Recent Labs     03/26/24 0032   SODIUM 134*   POTASSIUM 3.8   CHLORIDE 105   CO2 21    GLUCOSE 114*   BUN 7*   CREATININE 0.33*   CALCIUM 7.7*     Hemodynamics:  Temp (24hrs), Av.6 °C (97.8 °F), Min:36.4 °C (97.5 °F), Max:36.7 °C (98.1 °F)  Temperature: 36.7 °C (98.1 °F)  Pulse  Av.7  Min: 62  Max: 156   Blood Pressure: 94/63     Respiratory:    Respiration: 16, Pulse Oximetry: 94 %     Work Of Breathing / Effort: Within Normal Limits  RUL Breath Sounds: Clear, RML Breath Sounds: Clear, RLL Breath Sounds: Clear, ALKA Breath Sounds: Clear, LLL Breath Sounds: Clear  Fluids:    Intake/Output Summary (Last 24 hours) at 3/26/2024 1221  Last data filed at 3/26/2024 0051  Gross per 24 hour   Intake --   Output 450 ml   Net -450 ml        GI/Nutrition:  No orders of the defined types were placed in this encounter.    Medical Decision Making, by Problem:  Active Hospital Problems    Diagnosis     *Septic shock (HCC) [A41.9, R65.21]     Dehydration [E86.0]     C2 cervical fracture (HCC) [S12.100A]     Cancer related pain [G89.3]     ACP (advance care planning) [Z71.89]     Pathologic fracture [M84.40XA]     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) [C77.9]        Assessment and Plan:    # Stage IV Melanoma, BRAF Positive -patient had multiple admissions now for similar symptoms.  On admission his Braftovi and Mektovi have been stopped.  He has had no evidence of a fever for over 72 hours and has no clear evidence of infection on an extensive workup.  Both Braftovi and Mektovi do come with the risk of pyrexia.  Given that he has had now another admission for similar symptoms without a clear infectious source identified, my concern would be that some of this could be related to his Braftovi and or Mektovi.  Therefore now that his symptoms have improved and he is back at his baseline,therapy was restarted at a reduced dose.  I reviewed with him that we will have to monitor closely for recurrent pyrexia as this is still a potential risk even on lower doses of the medication.  He has had a very  good response to these medications as outline on prior CT scans, but has been on and off therapy, so we will try to continue otherwise he has no other treatment options. He re-started therapy and is tolerating at a reduced dose  - Braftovi 300 mg po daily  - Mektovi 30 mg po BID  - Follow up with Dr. Ross after discharge from acute rehab.    # Anemia - likely from chronic disease, he has had a persistent chronic anemia for quite sometime. Iron slightly low, but iron saturation is normal   - monitor CBC, transfuse as needed     Patient is has a high medical complexity and is at high risk for complication, morbidity, and mortality.     Will Sign Off. If you have any questions or concerns, please contact me.     Clara Parra MD  [facility]  [phone]     Quality-Core Measures

## 2024-03-28 NOTE — H&P
Physical Medicine & Rehabilitation  History and Physical (H&P)  &     Post Admission Physician Evaluation (PAMELLA)       Date of Admission: 3/28/2024  Date of Service: 3/28/2024   Andrew Chavira Mae    [facility] Code to Support Admission: 0002.1 - Brain Dysfunction: Non-Traumatic  Etiologic Diagnosis: Acute encephalopathy    _______________________________________________    Chief Complaint: Decreased mobility, weakness    History of Present Illness:  Adapted from the PM&R Consult by Dr. Nova:   The patient is a 58 y.o. left hand dominant male with a past medical history of metastatic melanoma to lymph nodes and bones, on chemotherapy;  who presented on 3/21/2024 12:47 PM with generalized weakness and altered mental status.  Wife checked blood pressure and found systolic pressure of 59.  Patient brought into the ED, confirmed to be hypotensive, febrile, tachycardic. He was thought to have acute toxic vs metabolic encephalopathy. Patient started on Levophed, vancomycin, cefepime.  Patient admitted for septic shock with unknown source.  Blood cultures negative, antibiotics discontinued.  Started on midodrine, titrating off Levophed.  Treated with IV fluids.  Patient found to have C2 fracture from mechanical ground-level fall on March 5.  He was also found to have multiple pathologic fractures of the lumbar spine.  He has c-collar in place.  He is being treated with MS Contin for pain. Oncology was consulted and felt it may be due to his medications so they were reduced to Braftovi 300 mg daily and Mektovi 30 mg BID.     Patient tolerated transfer to [facility]. Patient reports pain is controlled. He denies NVD. Denies SOB. He reports his cognition is near baseline.     Review of Systems:     Comprehensive 14 point ROS was reviewed and all were negative except as noted elsewhere in this document.     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)     Nasal polyp        Past Surgical History:  Past Surgical History:   Procedure Laterality Date    LYMPH NODE EXCISION Right 11/16/2023    Procedure: RIGHT INGUINAL NODE SUPERFICIAL DISSECTION;  Surgeon: Davon Valera M.D.;  Location: [facility];  Service: General    NODE BIOPSY SENTINEL Bilateral 10/20/2020    Procedure: BIOPSY, LYMPH NODE, SENTINEL- GROIN;  Surgeon: Davon Valera M.D.;  Location: SURGERY SAME DAY [facility];  Service: General    WIDE EXCISION Right 10/20/2020    Procedure: WIDE EXCISION, LESION- BUTTOCKS, RADICAL MALIGNANT MELANOMA;  Surgeon: Davon Valera M.D.;  Location: SURGERY SAME DAY [facility];  Service: General    ANTROSTOMY Bilateral 11/15/2019    Procedure: MAXILLARY ANTROSTOMY- REVISION ENDOSCOPICMOMETASONE INJECTION, PROPEL STENT PLACEMENT;  Surgeon: Felicitas Tenorio M.D.;  Location: [facility];  Service: Ent    SINUSCOPY Bilateral 11/15/2019    Procedure: ENDOSCOPY, PARANASAL SINUSES- FOR FRONTAL EXPLORATION;  Surgeon: Felicitas Tenorio M.D.;  Location: [facility];  Service: Ent    TURBINOPLASTY Bilateral 11/15/2019    Procedure: TURBINOPLASTY;  Surgeon: Felicitas Tenorio M.D.;  Location: [facility];  Service: Ent    SPHENOIDECTOMY Bilateral 11/15/2019    Procedure: SPHENOIDECTOMY- FOR SPHENOIDOTOMY;  Surgeon: Felicitas Tenorio M.D.;  Location: [facility];  Service: Ent    ETHMOIDECTOMY Bilateral 11/15/2019    Procedure: ETHMOIDECTOMY- ENDOSCOPIC;  Surgeon: Felicitas Tenorio M.D.;  Location: [facility];  Service: Ent    NASAL POLYPECTOMY Bilateral 11/15/2019    Procedure: POLYPECTOMY, NASAL CAVITY;  Surgeon: Felicitas Tenorio M.D.;  Location: [facility];  Service: Ent     NASAL POLYPECTOMY  2015, 2017, 2019    x 3    OTHER  11/20    removed melanoma       Family History:  No family history on file.    Medications:  Current Facility-Administered Medications   Medication Dose    Respiratory Therapy Consult      Pharmacy Consult Request ...Pain Management Review 1 Each  1 Each    hydrALAZINE (Apresoline) tablet 25 mg  25 mg    acetaminophen (Tylenol) tablet 650 mg  650 mg    senna-docusate (Pericolace Or Senokot S) 8.6-50 MG per tablet 2 Tablet  2 Tablet    And    polyethylene glycol/lytes (Miralax) Packet 1 Packet  1 Packet    docusate sodium (Enemeez) enema 283 mg  283 mg    magnesium hydroxide (Milk Of Magnesia) suspension 30 mL  30 mL    omeprazole (PriLOSEC) capsule 20 mg  20 mg    mag hydrox-al hydrox-simeth (Maalox Plus Es Or Mylanta Ds) suspension 20 mL  20 mL    ondansetron (Zofran ODT) dispertab 4 mg  4 mg    Or    ondansetron (Zofran) syringe/vial injection 4 mg  4 mg    traZODone (Desyrel) tablet 50 mg  50 mg    sodium chloride (Ocean) 0.65 % nasal spray 2 Spray  2 Spray    oxyCODONE immediate-release (Roxicodone) tablet 5 mg  5 mg    Or    oxyCODONE immediate release (Roxicodone) tablet 10 mg  10 mg    midazolam (VERSED) 5 mg/mL (1 mL vial)  5 mg    Encorafenib CAPS 300 mg  300 mg    And    Binimetinib TABS 30 mg  30 mg    enoxaparin (Lovenox) inj 40 mg  40 mg    [START ON 3/29/2024] gabapentin (Neurontin) capsule 100 mg  100 mg    [START ON 3/29/2024] megestrol (Megace) 40 MG/ML suspension 800 mg  800 mg    midodrine (Proamatine) tablet 5 mg  5 mg    mometasone-formoterol (Dulera) 200-5 MCG/ACT inhaler 2 Puff  2 Puff    morphine ER (Ms Contin) tablet 45 mg  45 mg       Allergies:  Augmentin    Psychosocial History:  Housing / Facility: 2 Story House  Lives with - Patient's Self Care Capacity: Spouse  Equipment Owned: Ramp, 4-Wheel Walker, Wheelchair, Tub / Shower Seat, Hand Held Shower, Hospital Bed     Prior Level of Function / Living Situation:   Physical Therapy: Prior  Services: Skilled Home Health Services, Intermittent Physical Support for ADL Per Family  Housing / Facility: [address] [facility]  Bathroom Set up: Walk In Shower, Shower Chair, Grab Bars  Equipment Owned: Ramp, 4-Wheel Walker, Wheelchair, Tub / Shower Seat, Hand Held Shower, Hospital Bed  Lives with - Patient's Self Care Capacity: Spouse  Ambulation: Required Assist  Assistive Devices Used: 4-Wheel Walker  Wheelchair: Independent  Stairs:  (ramp)  Current Level of Function:   Gait Level Of Assist: Contact Guard Assist  Assistive Device: Front Wheel Walker  Distance (Feet): 5  Supine to Sit: Contact Guard Assist  Scooting: Standby Assist  Rolling: Minimal Assist to Rt.  Comments: up in chair before/after  Sit to Stand: Contact Guard Assist  Bed, Chair, Wheelchair Transfer: Contact Guard Assist  Transfer Method: Stand Step  Sitting in Chair: left seated in chair  Sitting Edge of Bed: 5 minutes  Standin min  Occupational Therapy:   Self Feeding: Independent  Grooming / Hygiene: Independent  Bathing: Independent  Dressing: Independent  Toileting: Independent  Medication Management: Requires Assist  Laundry: Requires Assist  Kitchen Mobility: Requires Assist  Finances: Requires Assist  Home Management: Requires Assist  Shopping: Requires Assist  Prior Services: Skilled Home Health Services, Intermittent Physical Support for ADL Per Family  Housing / Facility: [address] [place] [address]  Current Level of Function:   Upper Body Dressing: Moderate Assist  Lower Body Dressing: Moderate Assist    CURRENT LEVEL OF FUNCTION:   Same as level of function prior to admission to [facility]    Physical Examination:     VITAL SIGNS:   weight is 65.8 kg (145 lb). His oral temperature is 36.7 °C (98 °F). His blood pressure is 107/71 and his pulse is 90. His respiration is 16 and oxygen saturation is 95%.    GENERAL: No apparent distress  HEENT: Normocephalic/atraumatic, EOMI, and PERRL  CARDIAC: Regular rate and rhythm, normal S1,  S2   LUNGS: Clear to auscultation   ABDOMINAL: bowel sounds present, soft, and nontender    EXTREMITIES: no contractures, spasticity, or edema.    NEURO:  Mental status: answers questions appropriately follows commands  Speech: fluent, no aphasia or dysarthria  CRANIAL NERVES: Face symmetric    Motor:    3/5 B SF otherwise 4/5 BUE  4/5 RLE 5/5 LLE  Sensory: intact to light touch through out  DTRs: 2+ in bilateral biceps and patellar tendons    Radiology:    Results for orders placed during the hospital encounter of 03/09/24    MR-BRAIN-WITH & W/O    Impression  1.  Decreased T1 signal intensity throughout the C2 vertebral body and dens consistent with patient's known osseous metastatic disease.    2.  No evidence of metastatic disease to the brain parenchyma.        Results for orders placed during the hospital encounter of 10/27/21    MR-ABDOMEN-WITH & W/O    Impression  1.  Nonspecific T2 hyperintense lesion in segment 8 of the liver measuring 6 mm, corresponding to CT findings.  Lesion likely represents cyst or hemangioma although is too small to definitively characterize.  Metastasis is difficult to entirely exclude.  Follow-up recommended.  2.  No other evidence for metastatic disease in the abdomen.           Results for orders placed during the hospital encounter of 03/21/24    MR-CERVICAL SPINE-WITH & W/O    Impression  1.  Metastatic disease involving C2, C6, C7 with bone marrow infiltration. Previously seen epidural thickening and enhancement has resolved. Mild pulmonary edema and abnormal enhancement also noted within the C1 anterior arch and lateral mass more  prominent to the left of midline.    2.  Compared to the previous study, there has interval development of a pathologic endplate compression fracture with 30% loss of height at C6.    3.  Prevertebral soft tissue thickening and enhancement suggesting tumor infiltration most pronounced between C5 and T1.  4.  No evidence of significant canal  stenosis or cord impingement. Spinal cord signal is normal.      Results for orders placed during the hospital encounter of 01/08/24    MR-LUMBAR SPINE-WITH & W/O    Impression  1. Extensive enhancing osseous masses throughout the visualized lumbar spine and sacrum, most in the right sacral alar, in keeping with metastasis.    2. Multiple metastasis seen in the paraspinous muscles, psoas muscle, iliacus muscle, gluteal muscles. The largest is in the right posterior chest wall at the level of the right kidney.    3. No enhancement seen in the cord. There is nerve root enhancement in the bilateral lumbar foramina diffusely of unclear etiology    4. Bilateral S1 nerve roots and right S2 nerve root in the sacral foramen are surrounded by the mass and probably invaded/impinged.       Results for orders placed during the hospital encounter of 01/15/24    MR-THORACIC SPINE-WITH & W/O    Impression  Multiple metastatic lesions noted throughout the thoracic spine as described above.    Large spinous process lesion noted at T3, T9.    Large right paraspinal metastatic lesion involvement of the right-sided pedicle and lamina noted at T3.    Ventral epidural extension of metastatic disease noted at the T5, T5-T6 level without significant cord impingement.    Large left paraspinous soft tissue metastatic lesion and a small right subcutaneous lesion noted at the T1-T2 level.    No significant spinal canal stenosis.                                      Laboratory Values:  Recent Labs     03/26/24  0032   SODIUM 134*   POTASSIUM 3.8   CHLORIDE 105   CO2 21   GLUCOSE 114*   BUN 7*   CREATININE 0.33*   CALCIUM 7.7*     Recent Labs     03/26/24  0032   WBC 3.3*   RBC 2.91*   HEMOGLOBIN 7.8*   HEMATOCRIT 24.9*   MCV 85.6   MCH 26.8*   MCHC 31.3*   RDW 59.3*   PLATELETCT 299   MPV 9.3           Primary Rehabilitation Diagnosis:    This patient is a 58 y.o. male admitted for acute inpatient rehabilitation with Acute  encephalopathy.    Impairments:   Cognitive  ADLs/IADLs  Mobility    Secondary Diagnosis/Medical Co-morbidities Affecting Function  C2 fracture  Multiple L spine fractures  Metastatic melanoma  Anemia  Leukopenia  Hyponatremia    Relevant Changes Since Preadmission Evaluation:    Status unchanged    The patient's rehabilitation potential is Good  The patient's medical prognosis is fair    Rehabilitation Plan:   Discussion and Recommendations:   1. The patient requires an acute inpatient rehabilitation program with a coordinated program of care at an intensity and frequency not available at a lower level of care. This recommendation is substantiated by the patient's medical physicians who recommend that the patient's intervention and assessment of medical issues needs to be done at an acute level of care for patient's safety and maximum outcome.   2. A coordinated program of care will be supplied by an interdisciplinary team of physical therapy, occupational therapy, rehab physician, rehab nursing, and, if needed, speech therapy and rehab psychology. Rehab team presents a patient-specific rehabilitation and education program concentrating on prevention of future problems related to accessibility, mobility, skin, bowel, bladder, sexuality, and psychosocial and medical/surgical problems.   3. Need for Rehabilitation Physician: The rehab physician will be evaluating the patient on a multi-weekly basis to help coordinate the program of care. The rehab physician communicates between medical physicians, therapists, and nurses to maximize the patient's potential outcome. Specific areas in which the rehab physician will be providing daily assessment include the following:   A. Assessing the patient's heart rate and blood pressure response (vitals monitoring) to activity and making adjustments in medications or conservative measures as needed.   B. The rehab physician will be assessing the frequency at which the program can  be increased to allow the patient to reach optimal functional outcome.   C. The rehab physician will also provide assessments in daily skin care, especially in light of patient's impairments in mobility.   D. The rehab physician will provide special expertise in understanding how to work with functional impairment and recommend appropriate interventions, compensatory techniques, and education that will facilitate the patient's outcome.   4. Rehab R.N.   The rehab RN will be working with patient to carry over in room mobility and activities of daily living when the patient is not in 3 hours of skilled therapy. Rehab nursing will be working in conjunction with rehab physician to address all the medical issues above and continue to assess laboratory work and discuss abnormalities with the treating physicians, assess vitals, and response to activity, and discuss and report abnormalities with the rehab physician. Rehab RN will also continue daily skin care, supervise bladder/bowel program, instruct in medication administration, and ensure patient safety.   5. Rehab Therapy: Therapies to treat at intensity and frequency of (may change after completion of evaluation by all therapeutic disciplines):       PT:  Physical therapy to address mobility, transfer, gait training and evaluation for adaptive equipment needs 1 hour/day at least 5 days/week for the duration of the ELOS (see below)       OT:  Occupational therapy to address ADLs, self-care, home management training, functional mobility/transfers and assistive device evaluation, and community re-integration 1  hour/day at least 5 days/week for the duration of the ELOS (see below).        ST/Dysphagia:  Speech therapy to address speech, language, and cognitive deficits as well as swallowing difficulties with retraining/dysphagia management and community re-integration with comprehension, expression, cognitive training 1  hour/day at least 5 days/week for the duration of  the ELOS (see below).     Medical management / Rehabilitation Issues/ Adverse Potential as part of rehabilitation plan     Rehabilitation Issues/Adverse Potential  1.  Acute metabolic encephalopathy - Patient brought to ER in shock with negative infectious work-up thought to be 2/2 chemotherapy agents. Oncology consulted and reduced medications. Patient demonstrates functional deficits in strength, balance, coordination, and ADL's. Patient is admitted to [facility] for comprehensive rehabilitation therapy as described below.   Rehabilitation nursing monitors bowel and bladder control, educates on medication administration, co-morbidities and monitors patient safety.    2.  Neurostimulants: None at this time but continue to assess daily for need to initiate should status change.    3.  DVT prophylaxis:  Patient is on Lovenox for anticoagulation upon transfer. Encourage OOB. Monitor daily for signs and symptoms of DVT including but not limited to swelling and pain to prevent the development of DVT that may interfere with therapies.    4.  GI prophylaxis:  On prilosec to prevent gastritis/dyspepsia which may interfere with therapies.    5.  Pain: No issues with pain currently / Controlled with APAP/Morphine    6.  Nutrition/Dysphagia: Dietician monitors nutrient intake, recommend supplements prn and provide nutrition education to pt/family to promote optimal nutrition for wound healing/recovery.     7.  Bladder/bowel:  Start bowel and bladder program as described below, to prevent constipation, urinary retention (which may lead to UTI), and urinary incontinence (which will impact upon pt's functional independence).   - Post void bladder scans, I&O cath for PVRs >400  - up to commode after meal     8.  Skin/dermal ulcer prophylaxis: Monitor for new skin conditions with q.2 h. turns as required to prevent the development of skin breakdown.     9.  Cognition/Behavior: As needed psychologist provides  adjustment counseling to illness and psychosocial barriers that may be potential barriers to rehabilitation.     10. Respiratory therapy: RT performs O2 management prn, breathing retraining, pulmonary hygiene and bronchospasm management prn to optimize participation in therapies.     Medical Co-Morbidities/Adverse Potential Affecting Function:   Acute metabolic encephalopathy - Patient brought to ER in shock with negative infectious work-up thought to be 2/2 chemotherapy agents. Oncology consulted and reduced medications.  -PT and OT for mobility and ADLs. Per guidelines, 15 hours per week between PT, OT and/or SLP.  -Follow-up Oncology.     Hypotension - He was started on Midodrine 5 mg TID    C2 fracture - Patient in C collar. Follow-up NSG    Multiple L spine fractures - Managed non-operatively as significant metastatic disease in L and S spine.     Metastatic melanoma - Patient on Braftovi 300 mg daily and Mektovi 30 mg BID per Oncology    Respiratory failure - Patient on Dulera    Anemia - Check AM CBC    Leukopenia - Check AM CBC, on chemotherapy    Hyponatremia - Check AM CMP    Pain - Patient on Morphine 45 mg ER BID, Gabapentin 100 mg daily and PRN Oxycodone/Tylenol    Skin - Patient at risk for skin breakdown due to debility in areas including sacrum, achilles, elbows and head in addition to other sites. Nursing to assess skin daily.     GI Ppx - Patient on Prilosec for GERD prophylaxis. Patient on Senna-docusate for constipation prophylaxis.        DVT Ppx - Patient Lovenox on transfer.    I personally performed a complete drug regimen review and no potential clinically significant medication issues were identified.     Goals/Expected Level of Function Based on Current Medical and Functional Status:  (may change based on patient's medical status and rate of impairment recovery)  Transfers:   Modified Independent  Mobility/Gait:   Modified Independent  ADL's:   Modified Independent  Cognition:   Jessica    DISPOSITION: Discharge to pre-morbid independent living setting with the supportive care of patient's spouse.    ELOS: 10-17 days  ____________________________________    T. Angelo Cortez MD/PhD  [facility] - Physical Medicine & Rehabilitation   [facility] - Brain Injury Medicine   ____________________________________    Pt was seen today for 75 min, and entire time spent in face-to-face contact was >50% in counseling and coordination of care as detailed in A/P above.

## 2024-03-28 NOTE — PROGRESS NOTES
Patient admitted to facility via [facility]; accompanied by hospital transport.  Patient assisted to room and positioned in bed for comfort and safety; call light within reach.      Low air loss mattress in place - family at bedside.Wound consult initiated.

## 2024-03-28 NOTE — PROGRESS NOTES
4 Eyes Skin Assessment Completed by JODY Davidson and JODY Rodriguez.    Head WDL  Ears WDL  Nose WDL  Mouth WDL  Neck WDL  Breast/Chest WDL  Shoulder Blades Redness and Blanching  Spine Redness and Blanching  (R) Arm/Elbow/Hand Bruising  (L) Arm/Elbow/Hand Bruising  Abdomen WDL  Groin WDL  Scrotum/Coccyx/Buttocks WDL  (R) Leg WDL  (L) Leg WDL  (R) Heel/Foot/Toe Boggy and Ulcer(s) redness  (L) Heel/Foot/Toe Redness, Non-Blanching, Boggy, and Ulcer(s)          Devices In Places cervical collar      Interventions In Place Heel Float Boots, Q2 Turns, and Low Air Loss Mattress    Possible Skin Injury Yes    Pictures Uploaded Into Epic Yes  Wound Consult Placed Yes  RN Wound Prevention Protocol Ordered Yes

## 2024-03-29 ENCOUNTER — APPOINTMENT (OUTPATIENT)
Dept: PHYSICAL THERAPY | Facility: REHABILITATION | Age: 59
DRG: 070 | End: 2024-03-29
Attending: PHYSICAL MEDICINE & REHABILITATION
Payer: COMMERCIAL

## 2024-03-29 ENCOUNTER — APPOINTMENT (OUTPATIENT)
Dept: SPEECH THERAPY | Facility: REHABILITATION | Age: 59
DRG: 070 | End: 2024-03-29
Attending: PHYSICAL MEDICINE & REHABILITATION
Payer: COMMERCIAL

## 2024-03-29 ENCOUNTER — APPOINTMENT (OUTPATIENT)
Dept: OCCUPATIONAL THERAPY | Facility: REHABILITATION | Age: 59
DRG: 070 | End: 2024-03-29
Attending: PHYSICAL MEDICINE & REHABILITATION
Payer: COMMERCIAL

## 2024-03-29 LAB
25(OH)D3 SERPL-MCNC: 20 NG/ML (ref 30–100)
ALBUMIN SERPL BCP-MCNC: 2.4 G/DL (ref 3.2–4.9)
ALBUMIN/GLOB SERPL: 1.1 G/DL
ALP SERPL-CCNC: 186 U/L (ref 30–99)
ALT SERPL-CCNC: 7 U/L (ref 2–50)
ANION GAP SERPL CALC-SCNC: 10 MMOL/L (ref 7–16)
AST SERPL-CCNC: 13 U/L (ref 12–45)
BASOPHILS # BLD AUTO: 1.7 % (ref 0–1.8)
BASOPHILS # BLD: 0.06 K/UL (ref 0–0.12)
BILIRUB SERPL-MCNC: 0.2 MG/DL (ref 0.1–1.5)
BUN SERPL-MCNC: 8 MG/DL (ref 8–22)
CALCIUM ALBUM COR SERPL-MCNC: 8.9 MG/DL (ref 8.5–10.5)
CALCIUM SERPL-MCNC: 7.6 MG/DL (ref 8.5–10.5)
CHLORIDE SERPL-SCNC: 105 MMOL/L (ref 96–112)
CO2 SERPL-SCNC: 20 MMOL/L (ref 20–33)
CREAT SERPL-MCNC: 0.22 MG/DL (ref 0.5–1.4)
EOSINOPHIL # BLD AUTO: 0.12 K/UL (ref 0–0.51)
EOSINOPHIL NFR BLD: 3.4 % (ref 0–6.9)
ERYTHROCYTE [DISTWIDTH] IN BLOOD BY AUTOMATED COUNT: 62.7 FL (ref 35.9–50)
EST. AVERAGE GLUCOSE BLD GHB EST-MCNC: 77 MG/DL
GFR SERPLBLD CREATININE-BSD FMLA CKD-EPI: 150 ML/MIN/1.73 M 2
GLOBULIN SER CALC-MCNC: 2.2 G/DL (ref 1.9–3.5)
GLUCOSE SERPL-MCNC: 87 MG/DL (ref 65–99)
HBA1C MFR BLD: 4.3 % (ref 4–5.6)
HCT VFR BLD AUTO: 25.9 % (ref 42–52)
HGB BLD-MCNC: 7.9 G/DL (ref 14–18)
IMM GRANULOCYTES # BLD AUTO: 0.08 K/UL (ref 0–0.11)
IMM GRANULOCYTES NFR BLD AUTO: 2.2 % (ref 0–0.9)
LYMPHOCYTES # BLD AUTO: 0.56 K/UL (ref 1–4.8)
LYMPHOCYTES NFR BLD: 15.7 % (ref 22–41)
MCH RBC QN AUTO: 26.2 PG (ref 27–33)
MCHC RBC AUTO-ENTMCNC: 30.5 G/DL (ref 32.3–36.5)
MCV RBC AUTO: 86 FL (ref 81.4–97.8)
MONOCYTES # BLD AUTO: 0.27 K/UL (ref 0–0.85)
MONOCYTES NFR BLD AUTO: 7.6 % (ref 0–13.4)
NEUTROPHILS # BLD AUTO: 2.47 K/UL (ref 1.82–7.42)
NEUTROPHILS NFR BLD: 69.4 % (ref 44–72)
NRBC # BLD AUTO: 0 K/UL
NRBC BLD-RTO: 0 /100 WBC (ref 0–0.2)
PLATELET # BLD AUTO: 328 K/UL (ref 164–446)
PMV BLD AUTO: 8.9 FL (ref 9–12.9)
POTASSIUM SERPL-SCNC: 4 MMOL/L (ref 3.6–5.5)
PROT SERPL-MCNC: 4.6 G/DL (ref 6–8.2)
RBC # BLD AUTO: 3.01 M/UL (ref 4.7–6.1)
SODIUM SERPL-SCNC: 135 MMOL/L (ref 135–145)
T4 FREE SERPL-MCNC: 0.96 NG/DL (ref 0.93–1.7)
TSH SERPL DL<=0.005 MIU/L-ACNC: 9.77 UIU/ML (ref 0.38–5.33)
WBC # BLD AUTO: 3.6 K/UL (ref 4.8–10.8)

## 2024-03-29 PROCEDURE — 97602 WOUND(S) CARE NON-SELECTIVE: CPT

## 2024-03-29 PROCEDURE — A9270 NON-COVERED ITEM OR SERVICE: HCPCS | Performed by: PHYSICAL MEDICINE & REHABILITATION

## 2024-03-29 PROCEDURE — 80053 COMPREHEN METABOLIC PANEL: CPT

## 2024-03-29 PROCEDURE — 94760 N-INVAS EAR/PLS OXIMETRY 1: CPT

## 2024-03-29 PROCEDURE — 84443 ASSAY THYROID STIM HORMONE: CPT

## 2024-03-29 PROCEDURE — 92523 SPEECH SOUND LANG COMPREHEN: CPT

## 2024-03-29 PROCEDURE — 770010 HCHG ROOM/CARE - REHAB SEMI PRIVAT*

## 2024-03-29 PROCEDURE — 36415 COLL VENOUS BLD VENIPUNCTURE: CPT

## 2024-03-29 PROCEDURE — 700101 HCHG RX REV CODE 250: Performed by: PHYSICAL MEDICINE & REHABILITATION

## 2024-03-29 PROCEDURE — 94640 AIRWAY INHALATION TREATMENT: CPT

## 2024-03-29 PROCEDURE — 97162 PT EVAL MOD COMPLEX 30 MIN: CPT

## 2024-03-29 PROCEDURE — 700102 HCHG RX REV CODE 250 W/ 637 OVERRIDE(OP): Performed by: PHYSICAL MEDICINE & REHABILITATION

## 2024-03-29 PROCEDURE — 97535 SELF CARE MNGMENT TRAINING: CPT

## 2024-03-29 PROCEDURE — 83036 HEMOGLOBIN GLYCOSYLATED A1C: CPT

## 2024-03-29 PROCEDURE — 700111 HCHG RX REV CODE 636 W/ 250 OVERRIDE (IP): Mod: JZ | Performed by: PHYSICAL MEDICINE & REHABILITATION

## 2024-03-29 PROCEDURE — 82306 VITAMIN D 25 HYDROXY: CPT

## 2024-03-29 PROCEDURE — 85025 COMPLETE CBC W/AUTO DIFF WBC: CPT

## 2024-03-29 PROCEDURE — 97116 GAIT TRAINING THERAPY: CPT

## 2024-03-29 PROCEDURE — 97165 OT EVAL LOW COMPLEX 30 MIN: CPT

## 2024-03-29 PROCEDURE — 84439 ASSAY OF FREE THYROXINE: CPT

## 2024-03-29 RX ORDER — MIDODRINE HYDROCHLORIDE 2.5 MG/1
7.5 TABLET ORAL
Status: DISCONTINUED | OUTPATIENT
Start: 2024-03-29 | End: 2024-04-01

## 2024-03-29 RX ORDER — VITAMIN B COMPLEX
1000 TABLET ORAL DAILY
Status: DISCONTINUED | OUTPATIENT
Start: 2024-03-30 | End: 2024-04-08 | Stop reason: HOSPADM

## 2024-03-29 RX ADMIN — MIDODRINE HYDROCHLORIDE 5 MG: 2.5 TABLET ORAL at 11:36

## 2024-03-29 RX ADMIN — OMEPRAZOLE 20 MG: 20 CAPSULE, DELAYED RELEASE ORAL at 09:31

## 2024-03-29 RX ADMIN — SENNOSIDES AND DOCUSATE SODIUM 2 TABLET: 8.6; 5 TABLET ORAL at 20:57

## 2024-03-29 RX ADMIN — GABAPENTIN 100 MG: 100 CAPSULE ORAL at 09:31

## 2024-03-29 RX ADMIN — MORPHINE SULFATE 45 MG: 30 TABLET, FILM COATED, EXTENDED RELEASE ORAL at 20:57

## 2024-03-29 RX ADMIN — MIDODRINE HYDROCHLORIDE 7.5 MG: 2.5 TABLET ORAL at 16:57

## 2024-03-29 RX ADMIN — MOMETASONE FUROATE AND FORMOTEROL FUMARATE DIHYDRATE 2 PUFF: 200; 5 AEROSOL RESPIRATORY (INHALATION) at 21:00

## 2024-03-29 RX ADMIN — MORPHINE SULFATE 45 MG: 30 TABLET, FILM COATED, EXTENDED RELEASE ORAL at 09:32

## 2024-03-29 RX ADMIN — ENOXAPARIN SODIUM 40 MG: 100 INJECTION SUBCUTANEOUS at 16:56

## 2024-03-29 RX ADMIN — MOMETASONE FUROATE AND FORMOTEROL FUMARATE DIHYDRATE 2 PUFF: 200; 5 AEROSOL RESPIRATORY (INHALATION) at 06:46

## 2024-03-29 RX ADMIN — MEGESTROL ACETATE 800 MG: 40 SUSPENSION ORAL at 09:31

## 2024-03-29 RX ADMIN — MIDODRINE HYDROCHLORIDE 5 MG: 2.5 TABLET ORAL at 09:31

## 2024-03-29 SDOH — ECONOMIC STABILITY: TRANSPORTATION INSECURITY
IN THE PAST 12 MONTHS, HAS LACK OF RELIABLE TRANSPORTATION KEPT YOU FROM MEDICAL APPOINTMENTS, MEETINGS, WORK OR FROM GETTING THINGS NEEDED FOR DAILY LIVING?: NO

## 2024-03-29 SDOH — ECONOMIC STABILITY: TRANSPORTATION INSECURITY
IN THE PAST 12 MONTHS, HAS THE LACK OF TRANSPORTATION KEPT YOU FROM MEDICAL APPOINTMENTS OR FROM GETTING MEDICATIONS?: NO

## 2024-03-29 ASSESSMENT — BRIEF INTERVIEW FOR MENTAL STATUS (BIMS)
INITIAL REPETITION OF BED BLUE SOCK - FIRST ATTEMPT: 3
ASKED TO RECALL SOCK: YES, NO CUE REQUIRED
ASKED TO RECALL BLUE: YES, NO CUE REQUIRED
WHAT YEAR IS IT: CORRECT
WHAT DAY OF THE WEEK IS IT: CORRECT
ASKED TO RECALL SOCK: NO, COULD NOT RECALL
ASKED TO RECALL BLUE: YES, NO CUE REQUIRED
WHAT YEAR IS IT: CORRECT
WHAT MONTH IS IT: ACCURATE WITHIN 5 DAYS
ASKED TO RECALL BED: NO, COULD NOT RECALL
WHAT MONTH IS IT: ACCURATE WITHIN 5 DAYS
INITIAL REPETITION OF BED BLUE SOCK - FIRST ATTEMPT: 3
WHAT DAY OF THE WEEK IS IT: CORRECT
ASKED TO RECALL BED: YES, NO CUE REQUIRED
BIMS SUMMARY SCORE: 13
BIMS SUMMARY SCORE: 13

## 2024-03-29 ASSESSMENT — ACTIVITIES OF DAILY LIVING (ADL)
TUB_SHOWER_TRANSFER_DESCRIPTION: GRAB BAR;SHOWER BENCH;SET-UP OF EQUIPMENT;SUPERVISION FOR SAFETY;VERBAL CUEING
TOILETING_LEVEL_OF_ASSIST_DESCRIPTION: ASSIST FOR STANDING BALANCE;GRAB BAR;INCREASED TIME;VERBAL CUEING;SUPERVISION FOR SAFETY
BED_CHAIR_WHEELCHAIR_TRANSFER_DESCRIPTION: ADAPTIVE EQUIPMENT;INCREASED TIME;INITIAL PREPARATION FOR TASK;SET-UP OF EQUIPMENT;SUPERVISION FOR SAFETY;VERBAL CUEING
TOILETING: REQUIRES ASSIST
TOILET_TRANSFER_DESCRIPTION: GRAB BAR;SUPERVISION FOR SAFETY;VERBAL CUEING

## 2024-03-29 ASSESSMENT — GAIT ASSESSMENTS
DEVIATION: DECREASED BASE OF SUPPORT;BRADYKINETIC;SHUFFLED GAIT
DISTANCE (FEET): 140
GAIT LEVEL OF ASSIST: CONTACT GUARD ASSIST
ASSISTIVE DEVICE: 4 WHEEL WALKER

## 2024-03-29 ASSESSMENT — PAIN DESCRIPTION - PAIN TYPE: TYPE: ACUTE PAIN

## 2024-03-29 NOTE — PROGRESS NOTES
Physical Medicine & Rehabilitation Progress Note    Encounter Date: 3/29/2024    Chief Complaint: Decreased mobility, weakness    Interval Events (Subjective):  Patient sitting up in room. He reports he is doing OK. He reports occasional neck and leg pain. He denies NVD. Denies SOB. Reviewed AM labs and with ongoing severe anemia and low Vitamin D. Discussed starting Vitamin D supplement.     Objective:  VITAL SIGNS: BP 93/60   Pulse 72   Temp 36.8 °C (98.3 °F) (Oral)   Resp 18   Wt 65.8 kg (145 lb)   SpO2 97%   BMI 20.22 kg/m²   Gen: NAD  Psych: Mood and affect appropriate  CV: RRR, 0 edema  Resp: CTAB, no upper airway sounds  Abd: NTND  Neuro: AOx4, following commands    Laboratory Values:  Recent Results (from the past 72 hour(s))   CBC with Differential    Collection Time: 03/29/24  6:10 AM   Result Value Ref Range    WBC 3.6 (L) 4.8 - 10.8 K/uL    RBC 3.01 (L) 4.70 - 6.10 M/uL    Hemoglobin 7.9 (L) 14.0 - 18.0 g/dL    Hematocrit 25.9 (L) 42.0 - 52.0 %    MCV 86.0 81.4 - 97.8 fL    MCH 26.2 (L) 27.0 - 33.0 pg    MCHC 30.5 (L) 32.3 - 36.5 g/dL    RDW 62.7 (H) 35.9 - 50.0 fL    Platelet Count 328 164 - 446 K/uL    MPV 8.9 (L) 9.0 - 12.9 fL    Neutrophils-Polys 69.40 44.00 - 72.00 %    Lymphocytes 15.70 (L) 22.00 - 41.00 %    Monocytes 7.60 0.00 - 13.40 %    Eosinophils 3.40 0.00 - 6.90 %    Basophils 1.70 0.00 - 1.80 %    Immature Granulocytes 2.20 (H) 0.00 - 0.90 %    Nucleated RBC 0.00 0.00 - 0.20 /100 WBC    Neutrophils (Absolute) 2.47 1.82 - 7.42 K/uL    Lymphs (Absolute) 0.56 (L) 1.00 - 4.80 K/uL    Monos (Absolute) 0.27 0.00 - 0.85 K/uL    Eos (Absolute) 0.12 0.00 - 0.51 K/uL    Baso (Absolute) 0.06 0.00 - 0.12 K/uL    Immature Granulocytes (abs) 0.08 0.00 - 0.11 K/uL    NRBC (Absolute) 0.00 K/uL   Comp Metabolic Panel (CMP)    Collection Time: 03/29/24  6:10 AM   Result Value Ref Range    Sodium 135 135 - 145 mmol/L    Potassium 4.0 3.6 - 5.5 mmol/L    Chloride 105 96 - 112 mmol/L    Co2 20 20 - 33  mmol/L    Anion Gap 10.0 7.0 - 16.0    Glucose 87 65 - 99 mg/dL    Bun 8 8 - 22 mg/dL    Creatinine 0.22 (L) 0.50 - 1.40 mg/dL    Calcium 7.6 (L) 8.5 - 10.5 mg/dL    Correct Calcium 8.9 8.5 - 10.5 mg/dL    AST(SGOT) 13 12 - 45 U/L    ALT(SGPT) 7 2 - 50 U/L    Alkaline Phosphatase 186 (H) 30 - 99 U/L    Total Bilirubin 0.2 0.1 - 1.5 mg/dL    Albumin 2.4 (L) 3.2 - 4.9 g/dL    Total Protein 4.6 (L) 6.0 - 8.2 g/dL    Globulin 2.2 1.9 - 3.5 g/dL    A-G Ratio 1.1 g/dL   HEMOGLOBIN A1C    Collection Time: 03/29/24  6:10 AM   Result Value Ref Range    Glycohemoglobin 4.3 4.0 - 5.6 %    Est Avg Glucose 77 mg/dL   TSH with Reflex to FT4    Collection Time: 03/29/24  6:10 AM   Result Value Ref Range    TSH 9.770 (H) 0.380 - 5.330 uIU/mL   Vitamin D, 25-hydroxy (blood)    Collection Time: 03/29/24  6:10 AM   Result Value Ref Range    25-Hydroxy   Vitamin D 25 20 (L) 30 - 100 ng/mL   ESTIMATED GFR    Collection Time: 03/29/24  6:10 AM   Result Value Ref Range    GFR (CKD-EPI) 150 >60 mL/min/1.73 m 2   FREE THYROXINE    Collection Time: 03/29/24  6:10 AM   Result Value Ref Range    Free T-4 0.96 0.93 - 1.70 ng/dL       Medications:  Scheduled Medications   Medication Dose Frequency    [START ON 3/30/2024] vitamin D3  1,000 Units DAILY    Pharmacy Consult Request  1 Each PHARMACY TO DOSE    senna-docusate  2 Tablet BID    omeprazole  20 mg DAILY    Encorafenib  300 mg DAILY AT 1800    And    Binimetinib  30 mg BID    enoxaparin (LOVENOX) injection  40 mg DAILY AT 1800    gabapentin  100 mg DAILY    megestrol  800 mg DAILY    midodrine  5 mg TID WITH MEALS    mometasone-formoterol  2 Puff BID    morphine ER  45 mg Q12HRS     PRN medications: Respiratory Therapy Consult, hydrALAZINE, acetaminophen, senna-docusate **AND** polyethylene glycol/lytes, docusate sodium, magnesium hydroxide, mag hydrox-al hydrox-simeth, ondansetron **OR** ondansetron, traZODone, sodium chloride, oxyCODONE immediate-release **OR** oxyCODONE  immediate-release, midazolam    Diet:  Current Diet Order   Procedures    Diet Order Diet: Regular       Medical Decision Making and Plan:  Acute metabolic encephalopathy - Patient brought to ER in shock with negative infectious work-up thought to be 2/2 chemotherapy agents. Oncology consulted and reduced medications.  -PT and OT for mobility and ADLs. Per guidelines, 15 hours per week between PT, OT and/or SLP.  -Follow-up Oncology.      Hypotension - He was started on Midodrine 5 mg TID. SBP into 90s, increase to 7.5      C2 fracture - Patient in C collar. Follow-up NSG     Multiple L spine fractures - Managed non-operatively as significant metastatic disease in L and S spine.      Metastatic melanoma - Patient on Braftovi 300 mg daily and Mektovi 30 mg BID per Oncology     Respiratory failure - Patient on Dulera     Anemia - Check AM CBC - 7.9, will monitor     Leukopenia - Check AM CBC, on chemotherapy - 3.6 on admission, ANC 2.4, will monitor     Hyponatremia - Check AM CMP - 135, will recheck 4/1/24     Vitamin D deficiency - 20 on admission, start 1000 U     Pain - Patient on Morphine 45 mg ER BID, Gabapentin 100 mg daily and PRN Oxycodone/Tylenol     Skin - Patient at risk for skin breakdown due to debility in areas including sacrum, achilles, elbows and head in addition to other sites. Nursing to assess skin daily.      GI Ppx - Patient on Prilosec for GERD prophylaxis. Patient on Senna-docusate for constipation prophylaxis.      DVT Ppx - Patient Lovenox on transfer.  ____________________________________    T. Angelo Cortez MD/PhD  [facility] - Physical Medicine & Rehabilitation   [facility] - Brain Injury Medicine   ____________________________________    Total time:  50 minutes. Time spent included pre-rounding review of vitals and tests, unit/floor time, face-to-face time with the patient including physical examination, care coordination, counseling of patient and/or family, ordering  medications/procedures/tests, discussion with CM, PT, OT, SLP and/or other healthcare providers, and documentation in the electronic medical record. Topics discussed included admission labs, ongoing anemia, low vitamin D, and low SBP so will increase midodrine.

## 2024-03-29 NOTE — WOUND TEAM
[facility]  Inpatient Services  Initial Wound and Skin Care Evaluation    Admission Date: 3/28/2024     Last order of IP CONSULT TO WOUND CARE was found on 3/28/2024 from Hospital Encounter on 3/25/2024     HPI, PMH, SH: Reviewed    Past Surgical History:   Procedure Laterality Date    LYMPH NODE EXCISION Right 11/16/2023    Procedure: RIGHT INGUINAL NODE SUPERFICIAL DISSECTION;  Surgeon: Davon Valera M.D.;  Location: SURGERY [facility];  Service: General    NODE BIOPSY SENTINEL Bilateral 10/20/2020    Procedure: BIOPSY, LYMPH NODE, SENTINEL- GROIN;  Surgeon: Davon Valera M.D.;  Location: SURGERY SAME DAY [facility];  Service: General    WIDE EXCISION Right 10/20/2020    Procedure: WIDE EXCISION, LESION- BUTTOCKS, RADICAL MALIGNANT MELANOMA;  Surgeon: Davon Valera M.D.;  Location: SURGERY SAME DAY [facility];  Service: General    ANTROSTOMY Bilateral 11/15/2019    Procedure: MAXILLARY ANTROSTOMY- REVISION ENDOSCOPICMOMETASONE INJECTION, PROPEL STENT PLACEMENT;  Surgeon: Felicitas Tenorio M.D.;  Location: [facility];  Service: Ent    SINUSCOPY Bilateral 11/15/2019    Procedure: ENDOSCOPY, PARANASAL SINUSES- FOR FRONTAL EXPLORATION;  Surgeon: Felicitas Tenorio M.D.;  Location: [facility];  Service: Ent    TURBINOPLASTY Bilateral 11/15/2019    Procedure: TURBINOPLASTY;  Surgeon: Felicitas Tenorio M.D.;  Location: [facility];  Service: Ent    SPHENOIDECTOMY Bilateral 11/15/2019    Procedure: SPHENOIDECTOMY- FOR SPHENOIDOTOMY;  Surgeon: Felicitas Tenorio M.D.;  Location: [facility];  Service: Ent    ETHMOIDECTOMY Bilateral 11/15/2019    Procedure: ETHMOIDECTOMY- ENDOSCOPIC;  Surgeon: Felicitas Tenorio M.D.;  Location: [facility];  Service: Ent    NASAL POLYPECTOMY Bilateral 11/15/2019    Procedure: POLYPECTOMY, NASAL CAVITY;  Surgeon: Felicitas Tenorio M.D.;  Location: [facility];  Service: Ent    NASAL  POLYPECTOMY  2015, 2017, 2019    x 3    OTHER  11/20    removed melanoma     Social History     Tobacco Use    Smoking status: Never    Smokeless tobacco: Never   Substance Use Topics    Alcohol use: Yes     Alcohol/week: 0.6 oz     Types: 1 Cans of beer per week     Comment: reports 4/month     No chief complaint on file.    Diagnosis: Acute encephalopathy [G93.40]    Unit where seen by Wound Team: 24/01     WOUND CONSULT RELATED TO:  Back, neck, coccyx, bilateral heels    WOUND TEAM PLAN OF CARE - Frequency of Follow-up:   Nursing to follow dressing orders written for wound care. Contact wound team if area fails to progress, deteriorates or with any questions/concerns if something comes up before next scheduled follow up (See below as to whether wound is following and frequency of wound follow up)   Bi-weekly - Wednesday and Monday    WOUND HISTORY:   Patient admitted 3/28 with diagnosis of acute encephalopathy, He has history of melanoma with mets to multiple areas. Pt arrived with multiple areas of Pressure injuries including bi-lateral heels, coccyx, back, and neck. Back has resolved still injuries present on the coccyx, neck and bilateral heels.        WOUND ASSESSMENT/LDA  Wound 03/09/24 Pressure Injury Coccyx POA PI (Active)   Date First Assessed/Time First Assessed: 03/09/24 2230   Present on Original Admission: Yes  Hand Hygiene Completed: Yes  Primary Wound Type: Pressure Injury  Location: Coccyx  Wound Description (Comments): POA PI      Assessments 3/29/2024 12:00 PM   Wound Image     Wound Team Following Bi-Weekly   Wound Length (cm) 1.8 cm   Wound Width (cm) 1.9 cm   Wound Depth (cm) 0.01 cm   Wound Surface Area (cm^2) 3.42 cm^2   Wound Volume (cm^3) 0.0342 cm^3   Wound Bed Slough (%) 100 %   WOUND NURSE ONLY - Time Spent with Patient (mins) 30       Wound 03/09/24 Pressure Injury Heel Left POA Unstagable (Active)   Date First Assessed/Time First Assessed: 03/09/24 2230   Present on Original  Admission: Yes  Primary Wound Type: Pressure Injury  Location: Heel  Laterality: Left  Wound Description (Comments): POA Unstagable      Assessments 3/29/2024 12:25 PM   Wound Image     Site Assessment Pink;Red   Periwound Assessment Red   Margins Attached edges   Closure Open to air   Drainage Amount None   Wound Team Following Bi-Weekly   Wound Length (cm) 1.9 cm   Wound Width (cm) 1.3 cm   Wound Surface Area (cm^2) 2.47 cm^2   Pulses 2+   WOUND NURSE ONLY - Time Spent with Patient (mins) 30       Wound 03/09/24 Pressure Injury Heel Right POA DTI (Active)   Date First Assessed/Time First Assessed: 03/09/24 (c) 2230   Present on Original Admission: Yes  Primary Wound Type: Pressure Injury  Location: Heel  Laterality: Right  Wound Description (Comments): POA DTI      Assessments 3/29/2024 12:25 PM   Wound Image     Site Assessment Clean;Dry;Intact;Purple   Periwound Assessment Red   Margins Attached edges   Closure Open to air   Drainage Amount None   Wound Team Following Bi-Weekly   Pressure Injury Stage Deep Tissue   Wound Length (cm) 1.5 cm   Wound Width (cm) 1.7 cm   Wound Surface Area (cm^2) 2.55 cm^2   Pulses 2+   WOUND NURSE ONLY - Time Spent with Patient (mins) 30       Wound 03/09/24 Pressure Injury Back Mid;Upper POA sDTI (Active)   Date First Assessed/Time First Assessed: 03/09/24 1225   Present on Original Admission: Yes  Primary Wound Type: Pressure Injury  Location: Back  Wound Orientation: Mid;Upper  Wound Description (Comments): POA sDTI      Assessments 3/29/2024 12:25 PM   Site Assessment Red   Periwound Assessment Blanchable erythema        Vascular:    NAYANA:   No results found.    Lab Values:    Lab Results   Component Value Date/Time    WBC 3.6 (L) 03/29/2024 06:10 AM    RBC 3.01 (L) 03/29/2024 06:10 AM    HEMOGLOBIN 7.9 (L) 03/29/2024 06:10 AM    HEMATOCRIT 25.9 (L) 03/29/2024 06:10 AM    HBA1C 4.3 03/29/2024 06:10 AM         Culture Results show:  No results found for this or any previous visit  (from the past 720 hour(s)).    Pain Level/Medicated:  None, Tolerated without pain medication       INTERVENTIONS BY WOUND TEAM:  Chart and images reviewed. Discussed with bedside RN. All areas of concern (based on picture review, LDA review and discussion with bedside RN) have been thoroughly assessed. Documentation of areas based on significant findings. This RN in to assess patient. Performed standard wound care which includes appropriate positioning, dressing removal and non-selective debridement. Pictures and measurements obtained weekly if/when required.    Wound:  coccyx  Cleansed/Non-selectively Debrided with:  Wound cleanser and Gauze  Primary Dressing:  hydrafera suzette  Secondary (Outer) Dressing: sacral offloading     Wound:  right heel  Wound open to air pt on BATISTA heels floated     Wound:  left heel   Wound open to air pt on BATISTA heels floated     Wound:  Posterior cervical spine  Primary Dressing:  Offloading dressing    Advanced Wound Care Discharge Planning  Number of Clinicians necessary to complete wound care: 2  Is patient requiring IV pain medications for dressing changes:  No   Length of time for dressing change 30 min. (This does not include chart review, pre-medication time, set up, clean up or time spent charting.)    Interdisciplinary consultation: Patient, Bedside RN (Lizzeth Sheriff .  Pressure injury and staging reviewed with Provider Dr Cortez .    EVALUATION / RATIONALE FOR TREATMENT:     Date:  03/29/24  Wound Status:  Initial evaluation    Unstagble PI to coccyx found on admit, pt wife reports that patient has previous flap there from a removal of a melanoma. When the skin is lifted on the coccyx there is and area of unblancing redness and two open areas covered with slough. Hydrafera suzette put on sough to help with debridement and covered with an offloading dressing.   Area of redness to neck that is blanching, use of off loading dressing in place to protect from orthopedic device.    Left heel has a resolving DTI the skin is now translucent with area of blister still present, intact, and blanching.  Right heel has DTI dark purple intact blister redness to periwound.          Goals: Steady decrease in wound area and depth weekly.    NURSING PLAN OF CARE ORDERS:  Dressing changes: See Dressing Care orders  Skin care: See Skin Care orders  RN Prevention Protocol    NUTRITION RECOMMENDATIONS   Wound Team Recommendations:  Dietary consult    DIET ORDERS (From admission to next 24h)       Start     Ordered    03/28/24 1351  Diet Order Diet: Regular  ALL MEALS        Question:  Diet:  Answer:  Regular    03/28/24 1350                    PREVENTATIVE INTERVENTIONS:    Q shift Sachin - performed per nursing policy  Q shift pressure point assessments - performed per nursing policy    Surface/Positioning  Low Airloss - Currently in Place    Offloading/Redistribution  Heel float boots (Prevalon boot) - Ordered    Anticipated discharge plans:  Self/Family Care        Vac Discharge Needs:  Vac Discharge plan is purely a recommendation from wound team and not a requirement for discharge unless otherwise stated by physician.  Not Applicable Pt not on a wound vac

## 2024-03-29 NOTE — CARE PLAN
The patient is Watcher - Medium risk of patient condition declining or worsening    Shift Goals  Clinical Goals: Safety  Patient Goals: Safety    Problem: Knowledge Deficit - Standard  Goal: Patient and family/care givers will demonstrate understanding of plan of care, disease process/condition, diagnostic tests and medications  Outcome: Progressing    Patient educated and states understanding of POC and disease process.  All questions answered at this time.     Problem: Mobility  Goal: Patient's capacity to carry out activities will improve  Outcome: Progressing    Patient transfers with min assist and uses the wheelchair.

## 2024-03-29 NOTE — DIETARY
Nutrition services: Day 1 of admit.  Andrew Wall is a 58 y.o. male with admitting DX of Acute encephalopathy     Consult received for presence of PI st 2-4 or non-healing wound.       Assessment:  Weight: 65.8 kg (145 lb)  Body mass index is 20.22 kg/m²., BMI classification: WNL  Diet/Intake: regular    Pt ate % of dinner last night.     Evaluation:   History of metastatic melanoma to lymph nodes and bones, on chemotherapy.    Skin: PI to coccyx, unstageable PI to left heel, DTI to right heel. Sachin score=17  Meds: Flavio SOTELO visited pt in his room. He reports that his appetite is improving . He drank 2-3 supplements at meals. He agreed to chocolate Ensure TID w/ meals for additional kcals and protein.     Malnutrition Risk: ASPEN criteria not met for malnutrition. MST of 1 due to poor appetite but no report of wt loss.     Recommendations/Plan:  Add Ensure Plus to meals TID   Encourage intake of > 50%  Document intake of all meals and supplements  as % taken in ADL's to provide interdisciplinary communication across all shifts.   Monitor weight.  Nutrition rep will continue to see patient for ongoing meal and snack preferences.       RD will follow.

## 2024-03-29 NOTE — THERAPY
"Speech Language Pathology   Initial Assessment     Patient Name: Andrew Wall  AGE:  58 y.o., SEX:  male  Medical Record #: [id number]  Today's Date: 3/29/2024     Subjective    Per Dr. Cortez H&P: \"Adapted from the PM&R Consult by Dr. Nova:   The patient is a 58 y.o. left hand dominant male with a past medical history of metastatic melanoma to lymph nodes and bones, on chemotherapy;  who presented on 3/21/2024 12:47 PM with generalized weakness and altered mental status.  Wife checked blood pressure and found systolic pressure of 59.  Patient brought into the ED, confirmed to be hypotensive, febrile, tachycardic. He was thought to have acute toxic vs metabolic encephalopathy. Patient started on Levophed, vancomycin, cefepime.  Patient admitted for septic shock with unknown source.  Blood cultures negative, antibiotics discontinued.  Started on midodrine, titrating off Levophed.  Treated with IV fluids.  Patient found to have C2 fracture from mechanical ground-level fall on March 5.  He was also found to have multiple pathologic fractures of the lumbar spine.  He has c-collar in place.  He is being treated with MS Contin for pain. Oncology was consulted and felt it may be due to his medications so they were reduced to Braftovi 300 mg daily and Mektovi 30 mg BID.      Patient tolerated transfer to [facility]. Patient reports pain is controlled. He denies NVD. Denies SOB. He reports his cognition is near baseline.\"    Patient reports receiving assistance with ADLs and IADLs prior to admission to [facility], including medication and financial management. He holds a bachelors degree in environmental toxicology. 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 He is  to and lifes with his wife of 33 years, Sherly.    The patient reports left hand dominance, hearing that is WFL, vision that is WFL and natural dentition.    He reports that his long term " "goals for rehab are ADL related; he'd like to be able to help more around the house.     Objective       03/29/24 1001   Evaluation Charges   Charges Yes   SLP Speech Language Evaluation Speech Sound Language Comprehension   SLP Total Time Spent   SLP Individual Total Time Spent (Mins) 60   Precautions   Precautions Fall Risk   Prior Living Situation   Prior Services Skilled Home Health Services;Intermittent Physical Support for ADL Per Family   Housing / Facility 2 Story House  (Split level. Able to live on first floor.)   Lives with - Patient's Self Care Capacity Spouse   Prior Level Of Function   Communication Within Functional Limits   Swallow Within Functional Limits   Dentition Intact   Dentures None   Hearing Within Functional Limits for Evaluation   Hearing Aid None   Vision Within Functional Limits for Evaluation   Patient's Primary Language English   Education Completed College   Occupation (Pre-Hospital Vocational) Retired Due To Age;Other (Comments)  (Pt recently was on seasonal  for [employer].)   Prior Functioning: Everyday Activities   Self Care Needed some help   Indoor Mobility (Ambulation) Needed some help   Stairs Dependent   Functional Cognition Independent   Prior Device Use Walker;Manual wheelchair   Cognitive Pattern Assessment   Cognitive Pattern Assessment Used BIMS   Brief Interview for Mental Status (BIMS)   Repetition of Three Words (First Attempt) 3   Temporal Orientation: Year Correct   Temporal Orientation: Month Accurate within 5 days   Temporal Orientation: Day Correct   Recall: \"Sock\" No, could not recall   Recall: \"Blue\" Yes, no cue required   Recall: \"Bed\" Yes, no cue required   BIMS Summary Score 13   Confusion Assessment Method (CAM)   Is there evidence of an acute change in mental status from the patient's baseline? No   Inattention Behavior not present   Disorganized thinking Behavior not present   Altered level of consciousness Behavior not present "   Barriers To Oral Feeding   Barriers To Oral Feeding None   Swallowing/Nutritional Status   Swallowing/Nutritional Status Regular food   Eating   Assistance Needed Set-up / clean-up   Physical Assistance Level No physical assistance   CARE Score - Eating 5   Eating Discharge Goal   Discharge Goal 6   Oral Hygiene   Assistance Needed Set-up / clean-up   Physical Assistance Level No physical assistance   CARE Score - Oral Hygiene 5   Oral Hygiene Discharge Goal   Discharge Goal 6   Functional Level of Assist   Eating Modified Independent   Eating Description Increased time   Comprehension Independent   Expression Independent   Social Interaction Modified Independent   Social Interaction Description Schedule;Increased time   Problem Solving Modified Independent   Problem Solving Description Bed/chair alarm;Increased time;Therapy schedule   Memory Minimal Assist   Memory Description Bed/chair alarm;Increased time;Seat belt;Therapy schedule;Verbal cueing   Outcome Measures   Outcome Measures Utilized SCCAN   SCCAN (Scales of Cognitive and Communicative Ability for Neurorehabilitation)   Oral Expression - Raw Score 19   Oral Expression - Scale Performance Score 100   Orientation - Raw Score 12   Orientation - Scale Performance Score 100   Memory - Raw Score 15   Memory - Scale Performance Score 79   Speech Comprehension - Raw Score 13   Speech Comprehension - Scale Performance Score 100   Reading Comprehension - Raw Score 12   Reading Comprehension - Scale Performance Score 100   Writing - Raw Score 7   Writing - Scale Performance Score 100   Attention - Raw Score 16   Attention - Scale Performance Score 100   Problem Solving - Raw Score 23   Problem Solving - Scale Performance Score 100   SCCAN Total Raw Score 89   SCCAN Degree of Severity Typical Functioning   Current Discharge Plan   Current Discharge Plan Return to Prior Living Situation   Benefit   Therapy Benefit Patient would not benefit from Inpatient Rehab  Speech-Language Pathology.  No further Speech Therapy recommended at this time.   Interdisciplinary Plan of Care Collaboration   IDT Collaboration with  Physician   Patient Position at End of Therapy In Bed;Bed Alarm On;Call Light within Reach;Tray Table within Reach;Phone within Reach;Other (Comments)  (MD in room upon departure)   Collaboration Comments IVETT MENDES (d/c SLP svcs)   Strengths & Barriers   Strengths Able to follow instructions;Adequate strength;Alert and oriented;Effective communication skills;Good insight into deficits/needs;Making steady progress towards goals;Motivated for self care and independence;Pleasant and cooperative;Supportive family;Willingly participates in therapeutic activities     Assessment    Patient is 58 y.o. male with a diagnosis of Acute encephalopathy.  Additional factors influencing patient status/progress (ie: cognitive factors, co-morbidities, social support, etc): PMHx metastatic melanoma to lymph nodes and bones, on chemotherapy.    The Scales of Cognitive and Communicative Ability for Neurorehabilitation (SCCAN) assesses cognitive-communicative deficits and functional ability in patients in rehabilitation hospitals, clinics, and skilled nursing facilities. The SCCAN is appropriate for a broad range of neurological patients, provides a measure of both impairment and functional ability.     SCCAN (Scales of Cognitive and Communicative Ability for Neurorehabilitation)  Oral Expression - Raw Score: 19  Oral Expression - Scale Performance Score: 100  Orientation - Raw Score: 12  Orientation - Scale Performance Score: 100  Memory - Raw Score: 15  Memory - Scale Performance Score: 79  Speech Comprehension - Raw Score: 13  Speech Comprehension - Scale Performance Score: 100  Reading Comprehension - Raw Score: 12  Reading Comprehension - Scale Performance Score: 100  Writing - Raw Score: 7  Writing - Scale Performance Score: 100  Attention - Raw Score: 16  Attention - Scale  Performance Score: 100  Problem Solving - Raw Score: 23  Problem Solving - Scale Performance Score: 100  SCCAN Total Raw Score: 89  SCCAN Degree of Severity: Typical Functioning    Patient presents with cognitive-linguistic functioning that is within ranges of typical functioning. His scores were decreased in the area of memory, however, this score was borderline and may have been affected by a number of factors related to the patients care, including be not limited to chemotherapy.    Plan    No further rehabilitative SLP svcs are indicated at this time. Recommend the patient be evaluated by SLP at lower level of care if concerns arise.      Speech Therapy Problems (Active)       There are no active problems.

## 2024-03-29 NOTE — PROGRESS NOTES
NURSING DAILY NOTE    Name: Andrew Wall   Date of Admission: 3/28/2024   Admitting Diagnosis: Acute encephalopathy  Attending Physician: Antonio Cortez M.d.  Allergies: Augmentin    Safety  Patient Assist     Patient Precautions     Precaution Comments     Bed Transfer Status     Toilet Transfer Status      Assistive Devices     Oxygen  None - Room Air  Diet/Therapeutic Dining  Current Diet Order   Procedures    Diet Order Diet: Regular     Pill Administration  whole  Agitated Behavioral Scale     ABS Level of Severity       Fall Risk  Has the patient had a fall this admission?   No  Jodi Leigh Fall Risk Scoring  9, LOW RISK  Fall Risk Safety Measures  bed alarm and poor balance    Vitals  Temperature: 36.6 °C (97.9 °F)  Temp src: Oral  Pulse: 76  Respiration: 18  Blood Pressure: 116/60  Blood Pressure MAP (Calculated): 79 MM HG  BP Location: Left, Upper Arm  Patient BP Position: Supine     Oxygen  Pulse Oximetry: 98 %  O2 (LPM): 0  O2 Delivery Device: None - Room Air    Bowel and Bladder  Last Bowel Movement  03/28/24 (per report)  Stool Type     Bowel Device     Continent  Bladder: Continent void   Bowel: Continent movement  Bladder Function  Urine Void (mL): 200 ml  Number of Times Voided: 1  Urine Color: Janae  Genitourinary Assessment   Urine Color: Janae    Skin  Sachin Score   17  Sensory Interventions   Bed Types: Low Airloss  Skin Preventative Measures: Heel Float Boots in Use , Pillows in Use for Support / Positioning  Moisture Interventions         Pain  Pain Rating Scale  0 - No Pain  Pain Location     Pain Location Orientation     Pain Interventions   Declines    ADLs    Bathing      Linen Change      Personal Hygiene     Chlorhexidine Bath      Oral Care     Teeth/Dentures     Shave     Nutrition Percentage Eaten  *  * Meal *  *, Between % Consumed  Environmental Precautions  Treaded Slipper Socks on Patient, Personal  Belongings, Wastebasket, Call Bell etc. in Easy Reach, Transferred to Stronger Side, Bed in Low Position  Patient Turns/Positioning  Patient Turns Self from Side to Side  Patient Turns Assistance/Tolerance     Bed Positions  Bed Controls On, Bed Locked  Head of Bed Elevated  Self regulated      Psychosocial/Neurologic Assessment  Psychosocial Assessment  Psychosocial (WDL):  Within Defined Limits  Neurologic Assessment  Neuro (WDL): Exceptions to WDL  Level of Consciousness: Alert  Orientation Level: Oriented X4  Cognition: Appropriate judgement, Appropriate safety awareness, Appropriate attention/concentration, Appropriate for developmental age, Follows commands  Speech: Clear  EENT (WDL):  Within Defined Limits    Cardio/Pulmonary Assessment  Edema   RLE Edema: 1+, 2+, Dependent  Respiratory Breath Sounds  RUL Breath Sounds: Clear  RML Breath Sounds: Clear  RLL Breath Sounds: Clear  ALKA Breath Sounds: Clear  LLL Breath Sounds: Clear  Cardiac Assessment   Cardiac (WDL):  Within Defined Limits

## 2024-03-29 NOTE — FLOWSHEET NOTE
03/29/24 0642   Events/Summary/Plan   Events/Summary/Plan mdi   Vital Signs   Pulse 91   Respiration 16   Pulse Oximetry 95 %   $ Pulse Oximetry (Spot Check) Yes   Respiratory Assessment   Level of Consciousness Alert   Chest Exam   Work Of Breathing / Effort Within Normal Limits   Breath Sounds   RUL Breath Sounds Clear   RML Breath Sounds Clear   RLL Breath Sounds Clear   ALKA Breath Sounds Clear   LLL Breath Sounds Clear   Oxygen   O2 Delivery Device Room air w/o2 available

## 2024-03-29 NOTE — CARE PLAN
The patient is Watcher - Medium risk of patient condition declining or worsening    Shift Goals  Clinical Goals: Safety  Patient Goals: Education    Progress made toward(s) clinical / shift goals:    Problem: Infection  Goal: Patient will remain free from infection  Outcome: Progressing  Note: Pt is able to verbalize s/s of infection: redness of area, fever, pain.  POC ongoing, no furthr concerns as of present. Pt expresses no other needs at this time, call light within reach

## 2024-03-29 NOTE — DISCHARGE PLANNING
CASE MANAGEMENT INITIAL ASSESSMENT    Admit Date:  3/28/2024     I met with patient to discuss role of case management / discharge planning / team conference.   Patient is a  58 y.o. male transferred from [facility].    Diagnosis: Acute encephalopathy [G93.40]    Co-morbidities:   Patient Active Problem List    Diagnosis Date Noted    Dehydration 03/22/2024    C2 cervical fracture ([facility]) 03/21/2024    Acute encephalopathy 03/06/2024    Fracture dislocation of cervical spine, initial encounter ([facility]) 03/05/2024    Ground-level fall 03/05/2024    Cancer related pain 02/05/2024    Nausea and vomiting 02/05/2024    ACP (advance care planning) 02/05/2024    Hypophosphatemia 01/24/2024    Hypocalcemia 01/20/2024    Pathologic fracture 01/18/2024    Elevated LFTs 01/18/2024    DNR (do not resuscitate) 01/17/2024    Metastasis to bone ([facility]) 01/16/2024    Leukocytosis 01/15/2024    Hyperglycemia 01/15/2024    Intractable low back pain 01/13/2024    Hypokalemia 01/13/2024    Metastatic melanoma, BRAF V600E POSITIVE  ([facility]) 01/12/2024    Elevated glucose 01/12/2024    Acute anemia 01/12/2024    Constipation 01/12/2024    Hypercalcemia 01/11/2024    Malignant melanoma metastatic to lymph node ([facility]) 11/16/2023    Moderate persistent asthma without complication 08/11/2021    Chronic pansinusitis 08/11/2021    Bilateral tinnitus 08/11/2021    Melanoma of buttock ([facility]) 10/20/2020    Mild intermittent asthma without complication 08/07/2020    Hypertrophy of nasal turbinates      Prior Living Situation:  Housing / Facility: 2 Story House (Split level. Able to live on first floor.)  Lives with - Patient's Self Care Capacity: Spouse    Prior Level of Function:  Medication Management: Requires Assist  Finances: Requires Assist  Home Management: Requires Assist  Shopping: Dependent  Prior Level Of Mobility: Unable to Negotiate Steps in Home, Unable to Negotiate Steps in Community, Supervision With Device in Community, Supervision With  Device in Home  Driving / Transportation: Relatives / Others Provide Transportation    Support Systems:  Primary : Sherly-spouse: [phone].         Previous Services Utilized:   Equipment Owned: 4-Wheel Walker, Wheelchair, Single Point Cane, Tub / Shower Seat, Grab Bar(s) In Tub / Shower, Ramp, Hospital Bed  Prior Services: Skilled Home Health Services, Intermittent Physical Support for ADL Per Family    Other Information:  Occupation (Pre-Hospital Vocational): Other (Comments) (Pt recently was on  for [email or web address] resort.)     Primary Payor Source: Other (Comments) ([place])  Primary Care Practitioner : Jose Luis Juarez    Patient / Family Goal:  Patient / Family Goal: Return home    Plan:  1. Continue to follow patient through hospitalization and provide discharge planning in collaboration with patient, family, physicians and ancillary services.     2. Utilize community resources to ensure a safe discharge.

## 2024-03-29 NOTE — DISCHARGE PLANNING
CM met with patient and his wife to introduce self.  Explained Sondra would be his CM but that this CM is filling in for Sondra until Tuesday.  Answered questions.  CM available as needs arise.

## 2024-03-29 NOTE — THERAPY
Occupational Therapy   Initial Evaluation     Patient Name: Andrew Wall  Age:  58 y.o., Sex:  male  Medical Record #: [id number]  Today's Date: 3/29/2024     Subjective    Pt supine in bed, alert, and agreeable to OT eval and tx.     Objective       03/29/24 0701   OT Charge Group   Charges Yes   OT Self Care / ADL (Units) 1   OT Evaluation OT Evaluation Low   OT Total Time Spent   OT Individual Total Time Spent (Mins) 60   Prior Living Situation   Prior Services Skilled Home Health Services;Intermittent Physical Support for ADL Per Family   Housing / Facility 2 Story House  (Split level. Able to live on first floor.)   Steps Into Home 0  (Ramp)   Steps In Home 10   Bathroom Set up Walk In Shower;Bathtub / Shower Combination;Shower Chair  (Uses walk in shower with built in shower chair in corner. Has ordered tub transfer bench for tub.)   Equipment Owned 4-Wheel Walker;Wheelchair;Single Point Cane;Tub / Shower Seat;Grab Bar(s) In Tub / Shower;Ramp;Hospital Bed   Lives with - Patient's Self Care Capacity Spouse   Comments [facility] and was recieving  PT and RN for the past 3 weeks.   Prior Level of ADL Function   Self Feeding Independent   Grooming / Hygiene Independent   Bathing Requires Assist   Dressing Requires Assist   Toileting Requires Assist   Comments within the last 3 weeks due to weakness.   Prior Level of IADL Function   Medication Management Requires Assist   Laundry Requires Assist   Kitchen Mobility Requires Assist   Finances Requires Assist   Home Management Requires Assist   Shopping Dependent   Prior Level Of Mobility Unable to Negotiate Steps in Home;Unable to Negotiate Steps in Community;Supervision With Device in Community;Supervision With Device in Home   Driving / Transportation Relatives / Others Provide Transportation   Occupation (Pre-Hospital Vocational) Other (Comments)  (Pt recently was on  for [employer]   Leisure Interests Pets   Prior  "Functioning: Everyday Activities   Self Care Needed some help   Indoor Mobility (Ambulation) Needed some help   Stairs Dependent   Functional Cognition Independent   Prior Device Use Walker;Manual wheelchair   Pain   Intervention Declines   Cognition    Cognition / Consciousness X   Level of Consciousness Alert   Comments very mild delayed responses, likely due to fatigue. Motivated and plesant.   ABS (Agitated Behavior Scale)   Agitated Behavior Scale Performed No   Cognitive Pattern Assessment   Cognitive Pattern Assessment Used BIMS   Brief Interview for Mental Status (BIMS)   Repetition of Three Words (First Attempt) 3   Temporal Orientation: Year Correct   Temporal Orientation: Month Accurate within 5 days   Temporal Orientation: Day Correct   Recall: \"Sock\" Yes, no cue required   Recall: \"Blue\" Yes, no cue required   Recall: \"Bed\" No, could not recall   BIMS Summary Score 13   Confusion Assessment Method (CAM)   Is there evidence of an acute change in mental status from the patient's baseline? No   Inattention Behavior not present   Disorganized thinking Behavior not present   Altered level of consciousness Behavior not present   Vision Screen   Vision Not tested  (Pt reported no changes in vision and does not wear corrective lens.)   Passive ROM Upper Body   Passive ROM Upper Body X   Comments bilateral shoulder limited by pain in SF and AB to less than 1/4 ROM, all remaining planes, WFL.   Active ROM Upper Body   Active ROM Upper Body  X   Dominant Hand Right   Comments bilateral shoulder limited by pain in SF and AB to less than 1/4 ROM, all remaining planes, WFL.   Strength Upper Body   Upper Body Strength  X   Comments bilateral shoulder limited by pain in SF and AB to less than 1/4 ROM, all remaining planes grossly 4/5   Sensation Upper Body   Upper Extremity Sensation  WDL   Comments Pt reported no sensory changes. no deficits noted in function.   Upper Body Muscle Tone   Upper Body Muscle Tone  WDL "   Bed Mobility    Supine to Sit Standby Assist   Sit to Stand Minimal Assist   Scooting Standby Assist   Coordination Upper Body   Coordination WDL   Eating   Assistance Needed Set-up / clean-up   CARE Score - Eating 5   Eating Discharge Goal   Discharge Goal 6   Oral Hygiene   Assistance Needed Set-up / clean-up;Supervision   CARE Score - Oral Hygiene 4   Oral Hygiene Discharge Goal   Discharge Goal 6   Shower/Bathe Self   Assistance Needed Physical assistance   Physical Assistance Level 25% or less   CARE Score - Shower/Bathe Self 3   Shower/Bathe Self Discharge Goal   Discharge Goal 6   Upper Body Dressing   Assistance Needed Physical assistance   Physical Assistance Level 51%-75%   CARE Score - Upper Body Dressing 2   Upper Body Dressing Discharge Goal   Discharge Goal 4   Lower Body Dressing   Assistance Needed Physical assistance   Physical Assistance Level 25% or less   CARE Score - Lower Body Dressing 3   Lower Body Dressing Discharge Goal   Discharge Goal 6   Putting On/Taking Off Footwear   Assistance Needed Incidental touching   CARE Score - Putting On/Taking Off Footwear 4   Putting On/Taking Off Footwear Discharge Goal   Discharge Goal 6   Toileting Hygiene   Assistance Needed Incidental touching   CARE Score - Toileting Hygiene 4   Toileting Hygiene Discharge Goal   Discharge Goal 4   Toilet Transfer   Assistance Needed Physical assistance   Physical Assistance Level 25% or less   CARE Score - Toilet Transfer 3   Toilet Transfer Discharge Goal   Discharge Goal 6   Hearing, Speech, and Vision   Ability to Hear Adequate   Ability to See in Adequate Light Adequate   Expression of Ideas and Wants Without difficulty   Understanding Verbal and Non-Verbal Content Understands   Functional Level of Assist   Eating Supervision   Eating Description Increased time;Set-up of equipment or meal/tube feeding   Grooming Seated;Supervision   Grooming Description Increased time;Supervision for safety;Seated in wheelchair  at sink   Bathing Minimal Assist   Bathing Description Hand held shower;Grab bar;Tub bench;Assit wtih lower extremities;Increased time;Supervision for safety;Verbal cueing   Upper Body Dressing Moderate Assist   Upper Body Dressing Description Application of orthotic or brace;Assist with pulling shirt over head;Increased time;Initial preparation for task;Set-up of equipment;Supervision for safety;Verbal cueing   Lower Body Dressing Minimal Assist   Lower Body Dressing Description Grab bar;Increased time;Cues for spinal precautions;Set-up of equipment;Supervision for safety;Verbal cueing   Toileting Contact Guard Assist   Toileting Description Assist for standing balance;Grab bar;Increased time;Verbal cueing;Supervision for safety   Bed, Chair, Wheelchair Transfer Minimal Assist   Bed Chair Wheelchair Transfer Description Increased time;Set-up of equipment;Supervision for safety;Squat pivot transfer to wheelchair;Verbal cueing   Toilet Transfers Minimal Assist   Toilet Transfer Description Grab bar;Supervision for safety;Verbal cueing   Tub / Shower Transfers Minimal Assist   Tub Shower Transfer Description Grab bar;Shower bench;Set-up of equipment;Supervision for safety;Verbal cueing   Comprehension Independent   Expression Independent   Problem Solving Supervision   Problem Solving Description Bed/chair alarm;Supervision;Verbal cueing   Memory Minimal Assist   Memory Description Increased time;Bed/chair alarm;Therapy schedule;Supervision;Verbal cueing   Problem List   Problem List Decreased Active Daily Living Skills;Decreased Homemaking Skills;Decreased Upper Extremity Strength Right;Decreased Upper Extremity Strength Left;Decreased Upper Extremity AROM Left;Decreased Upper Extremity AROM Right;Decreased Upper Extremity PROM Left;Decreased Upper Extremity PROM Right;Decreased Functional Mobility;Decreased Activity Tolerance;Impaired Postural Control / Balance;Limited Knowledge of Post Op Precautions   Precautions    Precautions Fall Risk;Spinal / Back Precautions ;Cervical Collar     Comments C2 fx, CHEMO Prec   Current Discharge Plan   Current Discharge Plan Return to Prior Living Situation   Benefit    Therapy Benefit Patient Would Benefit from Inpatient Rehab Occupational Therapy to Maximize [place] with ADLs, IADLs and Functional Mobility.   Interdisciplinary Plan of Care Collaboration   IDT Collaboration with  Nursing;Physician;Physical Therapist;Speech Therapist   Patient Position at End of Therapy Seated;Chair Alarm On;Self Releasing Lap Belt Applied;Tray Table within Reach;Call Light within Reach;Phone within Reach   Collaboration Comments CLOF, POC   Strengths & Barriers   Strengths Able to follow instructions;Alert and oriented;Motivated for self care and independence;Pleasant and cooperative;Supportive family;Willingly participates in therapeutic activities   Barriers Aspiration risk;Decreased endurance;Fatigue;Generalized weakness;Home accessibility;Impaired activity tolerance;Impaired balance;Limited mobility;Pain   Occupational Therapist Assigned   Assigned OT / Treatment Time / Comments JOY, 30/60       Assessment  Patient is 58 y.o. male with a diagnosis of Acute encephalopathy .  Additional factors influencing patient status / progress (ie: cognitive factors, co-morbidities, social support, etc): with a past medical history of metastatic melanoma to lymph nodes and bones, on chemotherapy;  who presented on 3/21/2024 12:47 PM with generalized weakness and altered mental status.  Wife checked blood pressure and found systolic pressure of 59.  Patient brought into the ED, confirmed to be hypotensive, febrile, tachycardic. He was thought to have acute toxic vs metabolic encephalopathy. Patient started on Levophed, vancomycin, cefepime.  Patient admitted for septic shock with unknown source.  Blood cultures negative, antibiotics discontinued.  Started on midodrine, titrating off Levophed.  Treated with IV  fluids.  Patient found to have C2 fracture from mechanical ground-level fall on March 5.  He was also found to have multiple pathologic fractures of the lumbar spine.  He has c-collar in place.  He is being treated with MS Contin for pain. Oncology was consulted and felt it may be due to his medications so they were reduced to Braftovi 300 mg daily and Mektovi 30 mg BID.       Plan  Recommend Occupational Therapy  minutes per day 5-7 days per week for 7-10 days for the following treatments:  OT Group Therapy, OT Self Care/ADL, OT Cognitive Skill Dev, OT Community Reintegration, OT Manual Ther Technique, OT Neuro Re-Ed/Balance, OT Therapeutic Activity, OT Evaluation, and OT Therapeutic Exercise.    Passport items to be completed:  Perform bathroom transfers, complete dressing, complete feeding, get ready for the day, prepare a simple meal, participate in household tasks, adapt home for safety needs, demonstrate home exercise program, complete caregiver training     Goals:  Long term and short term goals have been discussed with patient and they are in agreement.    Occupational Therapy Goals (Active)       Problem: Dressing       Dates: Start:  03/29/24         Goal: STG-Within one week, patient will dress UB at Min A level including C-collar management.       Dates: Start:  03/29/24            Goal: STG-Within one week, patient will dress LB at CGA level using DME/AE PRN.       Dates: Start:  03/29/24               Problem: Functional Transfers       Dates: Start:  03/29/24         Goal: STG-Within one week, patient will transfer to toilet at SBA level using DME PRN.       Dates: Start:  03/29/24            Goal: STG-Within one week, patient will transfer to tub/shower at SBA level using DME PRN.       Dates: Start:  03/29/24               Problem: OT Long Term Goals       Dates: Start:  03/29/24         Goal: LTG-By discharge, patient will complete basic self care tasks at SBA-Mod I level using DME/AE PRN.        Dates: Start:  03/29/24            Goal: LTG-By discharge, patient will perform bathroom transfers at SBA-Mod I level using DME/AE PRN.       Dates: Start:  03/29/24               Problem: Toileting       Dates: Start:  03/29/24         Goal: STG-Within one week, patient will complete toileting tasks at SBA level.       Dates: Start:  03/29/24

## 2024-03-29 NOTE — THERAPY
"Physical Therapy   Initial Evaluation     Patient Name: Andrew Wall  Age:  58 y.o., Sex:  male  Medical Record #: [id number]  Today's Date: 3/29/2024     Subjective    Pt received resting in bed, agreeable to participate. Wife Liz present and supportive throughout session.     Objective       03/29/24 1301   PT Charge Group   Charges Yes   PT Gait Training (Units) 1   PT Evaluation PT Evaluation Mod   PT Total Time Spent   PT Individual Total Time Spent (Mins) 60   Prior Living Situation   Prior Services Skilled Home Health Services;Intermittent Physical Support for ADL Per Family   Housing / Facility 2 Story House   Steps Into Home   (ramped)   Steps In Home   (FOS, however pt lives exclusively downstairs. Pt also has 2 stairs down into living room that he does not normally access)   Rail Both Rail (Steps in Home)  (to access living room, however rails are too wide to reach both simultaneously)   Equipment Owned 4-Wheel Walker;Wheelchair;Reacher;Ramp;Hospital Bed   Lives with - Patient's Self Care Capacity Spouse   Comments Lives in [place] with wife and 2 large dogs. Wife does IADLs at baseline. Pt is \"mostly retired\" and works  at [employer]. Wife works from home full-time. Pt's goals include being able to help wife with IADLs, navigate the 2 stairs into their living room, and walk uneven surfaces with 4WW to go on hikes with his wife and dogs   Prior Level of Functional Mobility   Bed Mobility Independent   Transfer Status Required Assist   Ambulation Required Assist   Distance Ambulation (Feet) 150   Assistive Devices Used 4-Wheel Walker   Wheelchair Required Assist   Stairs Other (Comments)  (was not performing at baseline)   Comments pt reported requiring assist from wife with mobility when he became fatigued but otherwise could mobilize on his own. Pt was walking 100-150 ft with 4WW at home and using wc in the community   Prior Functioning: Everyday Activities   Self Care Needed some help   Indoor " Mobility (Ambulation) Needed some help   Stairs Dependent   Functional Cognition Independent   Prior Device Use Walker;Manual wheelchair   Vitals   O2 Delivery Device None - Room Air   Pain 0 - 10 Group   Therapist Pain Assessment During Activity;Post Activity Pain Same as Prior to Activity;Nurse Notified  (c/o neck stiffness with mobility)   Cognition    Cognition / Consciousness WDL   Level of Consciousness Alert   Comments pleasant and cooperative, good insight into safety and functional deficits, mildly delayed responses   Passive ROM Lower Body   Passive ROM Lower Body WDL   Active ROM Lower Body    Active ROM Lower Body  WDL   Strength Lower Body   Lower Body Strength  X   Rt Hip Flexion Strength 3+ (F+)   Rt Knee Extension Strength 4- (G-)   Rt Ankle Dorsiflexion Strength 4- (G-)   Comments LLE grossly 4-/5 to 4/5   Sensation Lower Body   Lower Extremity Sensation   WDL   Comments intact light touch sensation and simultaneous extinction BLE   Lower Body Muscle Tone   Lower Body Muscle Tone  WDL   Balance Assessment   Sitting Balance (Static) Fair +   Sitting Balance (Dynamic) Fair +   Standing Balance (Static) Fair   Standing Balance (Dynamic) Fair -   Weight Shift Sitting Fair   Weight Shift Standing Fair   Comments 4WW   Bed Mobility    Supine to Sit Standby Assist   Sit to Supine Standby Assist   Sit to Stand Contact Guard Assist   Scooting Standby Assist   Rolling Standby Assist  (to the left)   Comments HOBE, log roll   Coordination Lower Body    Coordination Lower Body  WDL   Comments normal alternating toe taps   Roll Left and Right   Assistance Needed Supervision   CARE Score - Roll Left and Right 4   Roll Left and Right Discharge Goal   Discharge Goal 6   Sit to Lying   Assistance Needed Supervision   CARE Score - Sit to Lying 4   Sit to Lying Discharge Goal   Discharge Goal 6   Lying to Sitting on Side of Bed   Assistance Needed Supervision   CARE Score - Lying to Sitting on Side of Bed 4   Lying to  Sitting on Side of Bed Discharge Goal   Discharge Goal 6   Sit to Stand   Assistance Needed Incidental touching;Adaptive equipment   CARE Score - Sit to Stand 4   Sit to Stand Discharge Goal   Discharge Goal 6   Chair/Bed-to-Chair Transfer   Assistance Needed Physical assistance   Physical Assistance Level 25% or less   CARE Score - Chair/Bed-to-Chair Transfer 3   Chair/Bed-to-Chair Transfer Discharge Goal   Discharge Goal 6   Car Transfer   Reason if not Attempted Safety concerns   CARE Score - Car Transfer 88   Car Transfer Discharge Goal   Discharge Goal 5   Walk 10 Feet   Assistance Needed Incidental touching;Adaptive equipment   CARE Score - Walk 10 Feet 4   Walk 10 Feet Discharge Goal   Discharge Goal 6   Walk 50 Feet with Two Turns   Assistance Needed Incidental touching;Adaptive equipment   CARE Score - Walk 50 Feet with Two Turns 4   Walk 50 Feet with Two Turns Discharge Goal   Discharge Goal 6   Walk 150 Feet   Reason if not Attempted Safety concerns   CARE Score - Walk 150 Feet 88   Walk 150 Feet Discharge Goal   Discharge Goal 6   Walking 10 Feet on Uneven Surfaces   Reason if not Attempted Safety concerns   CARE Score - Walking 10 Feet on Uneven Surfaces 88   Walking 10 Feet on Uneven Surfaces Discharge Goal   Discharge Goal 6   1 Step (Curb)   Assistance Needed Physical assistance   Physical Assistance Level 25% or less   CARE Score - 1 Step (Curb) 3   1 Step (Curb) Discharge Goal   Discharge Goal 6   4 Steps   Assistance Needed Physical assistance   Physical Assistance Level 25% or less   CARE Score - 4 Steps 3   4 Steps Discharge Goal   Discharge Goal 5   12 Steps   Reason if not Attempted Activity not applicable   CARE Score - 12 Steps 9   12 Steps Discharge Goal   Discharge Goal 9   Picking Up Object   Assistance Needed Incidental touching;Adaptive equipment   Comment reacher and 4WW   CARE Score - Picking Up Object 4   Picking Up Object Discharge Goal   Discharge Goal 6   Wheel 50 Feet with Two  "Turns   Reason if not Attempted Activity not applicable   CARE Score - Wheel 50 Feet with Two Turns 9   Wheel 50 Feet with Two Turns Discharge Goal   Discharge Goal 9   Wheel 150 Feet   Reason if not Attempted Activity not applicable   CARE Score - Wheel 150 Feet 9   Wheel 150 Feet Discharge Goal   Discharge Goal 9   Gait Functional Level of Assist    Gait Level Of Assist Contact Guard Assist   Assistive Device 4 Wheel Walker   Distance (Feet) 140   # of Times Distance was Traveled 1  (plus 120 ft)   Deviation Decreased Base Of Support;Bradykinetic;Shuffled Gait  (forw flexed posture)   Stairs Functional Level of Assist   Level of Assist with Stairs Minimal Assist   # of Stairs Climbed 6  (on 4\" stairs BHR)   Stairs Description Extra time;Hand rails;Limited by fatigue;Requires incidental assist;Safety concerns;Supervision for safety;Verbal cueing   Transfer Functional Level of Assist   Bed, Chair, Wheelchair Transfer Minimal Assist   Bed Chair Wheelchair Transfer Description Adaptive equipment;Increased time;Initial preparation for task;Set-up of equipment;Supervision for safety;Verbal cueing  (stand step 4WW vs squat pivot wc<>bed)   Problem List    Problems Pain;Impaired Transfers;Impaired Ambulation;Functional Strength Deficit;Impaired Balance;Decreased Activity Tolerance   Precautions   Precautions Fall Risk;Spinal / Back Precautions ;Cervical Collar   Comments C2 fx   Current Discharge Plan   Current Discharge Plan Return to Prior Living Situation   Interdisciplinary Plan of Care Collaboration   IDT Collaboration with  Family / Caregiver;Occupational Therapist   Patient Position at End of Therapy In Bed;Call Light within Reach;Tray Table within Reach;Phone within Reach;Family / Friend in Room   Collaboration Comments wife present and supportive, discussed POC with OT   PT DME Recommendations   Wheelchair   (pt owns wc)   Assistive Device   (pt owns 4WW)   Additional Equipment   (pt owns hospital bed)   Benefit "   Therapy Benefit Patient Would Benefit from Inpatient Rehabilitation Physical Therapy to Maximize Functional Booker with ADLs, IADLs and Mobility.   Strengths & Barriers   Strengths Able to follow instructions;Alert and oriented;Effective communication skills;Good carryover of learning;Good insight into deficits/needs;Manages pain appropriately;Motivated for self care and independence;Pleasant and cooperative;Supportive family;Willingly participates in therapeutic activities   Barriers Decreased endurance;Fatigue;Generalized weakness;Home accessibility;Impaired activity tolerance;Impaired balance;Limited mobility;Pain     Pt was oriented to role of PT, expectations of POC, and tour of facility.  Educated pt and wife about importance of energy conservation/pacing in setting of cancer dx and chemo tx.    Assessment  Patient is 58 y.o. male with a diagnosis of septic shock in setting of metastatic melanoma to lymph nodes and bones on chemotherapy. Pt initially presented to the ED with generalized weakness and AMS. Pt was found to have C2 fx from mechanical GLF on 3/5 and multiple pathologic fractures of the lumbar spine. Additional factors influencing patient status / progress (ie: cognitive factors, co-morbidities, social support, etc): pleasant and motivated demeanor, supportive spouse, good insight into deficits, baseline cog, and home that has been modified to be more accessible. Pt uses 4WW at home and wc in the community. Pt and wife denied any falls other than the fall on 3/5. Pt's goals include being able to help wife with IADLs, navigate the 2 stairs into their living room, and walk uneven surfaces with 4WW to go on hikes with his wife and dogs.    Pt presents with neck soreness, generalized weakness R>L LE, impaired balance, and decreased endurance. Pt required overall CGA-min A for OOB mobility including walking up to 140 ft with 4WW. Pt was also able to navigate 6 adaptive height stairs with min A in  preparation for training for 2 stairs into living room at home. Demo'ed good log roll during bed mobility. Pt is performing below their baseline level of function and should benefit from inpatient rehabilitation PT services to address the above listed deficits and maximize functional independence.     Plan  Recommend Physical Therapy  minutes per day 5-7 days per week for 10-14 days for the following treatments:  PT Gait Training, PT Self Care/Home Eval, PT Therapeutic Exercises, PT Neuro Re-Ed/Balance, PT Therapeutic Activity, PT Manual Therapy, and PT Evaluation.    Passport items to be completed:  Get in/out of bed safely, in/out of a vehicle, safely use mobility device, walk or wheel around home/community, navigate up and down stairs, show how to get up/down from the ground, ensure home is accessible, demonstrate HEP, complete caregiver training    Goals:  Long term and short term goals have been discussed with patient and spouse and they are in agreement.    Physical Therapy Problems (Active)       Problem: Mobility       Dates: Start:  03/29/24         Goal: STG-Within one week, patient will propel wheelchair household distances 150 ft with SPV to facilitate functional independence.       Dates: Start:  03/29/24            Goal: STG-Within one week, patient will ambulate household distance 150 ft x2 with 4WW and SBA to access home.       Dates: Start:  03/29/24            Goal: STG-Within one week, patient will ascend and descend 2 stairs with 1 HR and CGA to access living room.       Dates: Start:  03/29/24               Problem: Mobility Transfers       Dates: Start:  03/29/24         Goal: STG-Within one week, patient will transfer bed to chair with 4WW and SBA to facilitate functional independence.       Dates: Start:  03/29/24               Problem: PT-Long Term Goals       Dates: Start:  03/29/24         Goal: LTG-By discharge, patient will ambulate 250 ft x2 over even and uneven surfaces with 4WW  and SPV to access community.       Dates: Start:  03/29/24            Goal: LTG-By discharge, patient will transfer one surface to another with 4WW and SPV to facilitate functional independence.       Dates: Start:  03/29/24            Goal: LTG-By discharge, patient will ambulate up/down 2 stairs with 1 HR and SPV to access living room.       Dates: Start:  03/29/24            Goal: LTG-By discharge, patient will transfer in/out of a car with 4WW and SBA after set up to facilitate functional independence.       Dates: Start:  03/29/24

## 2024-03-29 NOTE — DISCHARGE PLANNING
CAROLYN spoke with patients wife Sherly.  Discussed CAROLYN, IDT.  She would like [facility] at [place].  CM available as needs arise.

## 2024-03-30 ENCOUNTER — APPOINTMENT (OUTPATIENT)
Dept: INPATIENT REHAB | Facility: REHABILITATION | Age: 59
DRG: 070 | End: 2024-03-30
Payer: COMMERCIAL

## 2024-03-30 ENCOUNTER — APPOINTMENT (OUTPATIENT)
Dept: OCCUPATIONAL THERAPY | Facility: REHABILITATION | Age: 59
DRG: 070 | End: 2024-03-30
Attending: PHYSICAL MEDICINE & REHABILITATION
Payer: COMMERCIAL

## 2024-03-30 PROCEDURE — 700111 HCHG RX REV CODE 636 W/ 250 OVERRIDE (IP): Mod: JZ | Performed by: PHYSICAL MEDICINE & REHABILITATION

## 2024-03-30 PROCEDURE — 770010 HCHG ROOM/CARE - REHAB SEMI PRIVAT*

## 2024-03-30 PROCEDURE — 94760 N-INVAS EAR/PLS OXIMETRY 1: CPT

## 2024-03-30 PROCEDURE — 97530 THERAPEUTIC ACTIVITIES: CPT

## 2024-03-30 PROCEDURE — 94640 AIRWAY INHALATION TREATMENT: CPT

## 2024-03-30 PROCEDURE — A9270 NON-COVERED ITEM OR SERVICE: HCPCS | Performed by: PHYSICAL MEDICINE & REHABILITATION

## 2024-03-30 PROCEDURE — 700101 HCHG RX REV CODE 250: Performed by: PHYSICAL MEDICINE & REHABILITATION

## 2024-03-30 PROCEDURE — 700102 HCHG RX REV CODE 250 W/ 637 OVERRIDE(OP): Performed by: PHYSICAL MEDICINE & REHABILITATION

## 2024-03-30 RX ADMIN — GABAPENTIN 100 MG: 100 CAPSULE ORAL at 08:28

## 2024-03-30 RX ADMIN — MEGESTROL ACETATE 800 MG: 40 SUSPENSION ORAL at 08:29

## 2024-03-30 RX ADMIN — MORPHINE SULFATE 45 MG: 30 TABLET, FILM COATED, EXTENDED RELEASE ORAL at 08:28

## 2024-03-30 RX ADMIN — SENNOSIDES AND DOCUSATE SODIUM 2 TABLET: 8.6; 5 TABLET ORAL at 08:29

## 2024-03-30 RX ADMIN — SENNOSIDES AND DOCUSATE SODIUM 2 TABLET: 8.6; 5 TABLET ORAL at 20:43

## 2024-03-30 RX ADMIN — MIDODRINE HYDROCHLORIDE 7.5 MG: 2.5 TABLET ORAL at 08:28

## 2024-03-30 RX ADMIN — Medication 1000 UNITS: at 08:28

## 2024-03-30 RX ADMIN — MOMETASONE FUROATE AND FORMOTEROL FUMARATE DIHYDRATE 2 PUFF: 200; 5 AEROSOL RESPIRATORY (INHALATION) at 20:43

## 2024-03-30 RX ADMIN — MIDODRINE HYDROCHLORIDE 7.5 MG: 2.5 TABLET ORAL at 17:03

## 2024-03-30 RX ADMIN — MOMETASONE FUROATE AND FORMOTEROL FUMARATE DIHYDRATE 2 PUFF: 200; 5 AEROSOL RESPIRATORY (INHALATION) at 06:49

## 2024-03-30 RX ADMIN — ENOXAPARIN SODIUM 40 MG: 100 INJECTION SUBCUTANEOUS at 17:03

## 2024-03-30 RX ADMIN — MIDODRINE HYDROCHLORIDE 7.5 MG: 2.5 TABLET ORAL at 11:19

## 2024-03-30 RX ADMIN — MORPHINE SULFATE 45 MG: 30 TABLET, FILM COATED, EXTENDED RELEASE ORAL at 20:42

## 2024-03-30 RX ADMIN — OMEPRAZOLE 20 MG: 20 CAPSULE, DELAYED RELEASE ORAL at 08:29

## 2024-03-30 ASSESSMENT — ACTIVITIES OF DAILY LIVING (ADL): BED_CHAIR_WHEELCHAIR_TRANSFER_DESCRIPTION: ADAPTIVE EQUIPMENT

## 2024-03-30 ASSESSMENT — PAIN DESCRIPTION - PAIN TYPE: TYPE: ACUTE PAIN

## 2024-03-30 NOTE — PROGRESS NOTES
NURSING DAILY NOTE    Name: Andrew Wall   Date of Admission: 3/28/2024   Admitting Diagnosis: Acute encephalopathy  Attending Physician: Antonio Cortez M.d.  Allergies: Augmentin    Safety  Patient Assist     Patient Precautions  Fall Risk, Spinal / Back Precautions , Cervical Collar    Precaution Comments  C2 fx  Bed Transfer Status  Minimal Assist  Toilet Transfer Status   Minimal Assist  Assistive Devices  Wheelchair  Oxygen  None - Room Air  Diet/Therapeutic Dining  Current Diet Order   Procedures    Diet Order Diet: Regular     Pill Administration  whole  Agitated Behavioral Scale     ABS Level of Severity       Fall Risk  Has the patient had a fall this admission?   No  Jodi Leigh Fall Risk Scoring  9, LOW RISK  Fall Risk Safety Measures  bed alarm, chair alarm, and poor balance    Vitals  Temperature: 36.6 °C (97.9 °F)  Temp src: Oral  Pulse: 62  Respiration: 16  Blood Pressure: 93/63  Blood Pressure MAP (Calculated): 73 MM HG  BP Location: Right, Upper Arm  Patient BP Position: Supine     Oxygen  Pulse Oximetry: 92 %  O2 (LPM): 0  O2 Delivery Device: None - Room Air    Bowel and Bladder  Last Bowel Movement  03/29/24  Stool Type  Type 4: Like a sausage or snake, smooth and soft  Bowel Device     Continent  Bladder: Continent void   Bowel: Continent movement  Bladder Function  Urine Void (mL): 400 ml  Number of Times Voided: 2  Urine Color: Janae  Genitourinary Assessment   Urine Color: Janae  Bladder Scan: Post Void  $ Bladder Scan Results (mL): 124    Skin  Sachin Score   17  Sensory Interventions   Bed Types: Low Airloss  Skin Preventative Measures: Heel Float Boots in Use , Pillows in Use for Support / Positioning  Moisture Interventions         Pain  Pain Rating Scale  0 - No Pain  Pain Location  Neck  Pain Location Orientation  Anterior, Posterior  Pain Interventions   Medication (see MAR) (scheduled pain  meds)    ADLs    Bathing   Shower, * * With Assistance from  Linen Change      Personal Hygiene     Chlorhexidine Bath      Oral Care     Teeth/Dentures     Shave     Nutrition Percentage Eaten  *  * Meal *  *, Breakfast, Between % Consumed  Environmental Precautions  Treaded Slipper Socks on Patient, Personal Belongings, Wastebasket, Call Bell etc. in Easy Reach, Transferred to Stronger Side, Bed in Low Position  Patient Turns/Positioning  Patient Turns Self from Side to Side  Patient Turns Assistance/Tolerance     Bed Positions  Bed Controls On, Bed Locked  Head of Bed Elevated  Self regulated      Psychosocial/Neurologic Assessment  Psychosocial Assessment  Psychosocial (WDL):  Within Defined Limits  Neurologic Assessment  Neuro (WDL): Exceptions to WDL  Level of Consciousness: Alert  Orientation Level: Oriented X4  Cognition: Appropriate judgement, Appropriate safety awareness, Appropriate attention/concentration, Appropriate for developmental age, Follows commands  Speech: Clear  Motor Function/Sensation Assessment: Sensation  RUE Sensation: Full sensation  LUE Sensation: Full sensation  RLE Sensation: Full sensation  LLE Sensation: Full sensation  EENT (WDL):  Within Defined Limits    Cardio/Pulmonary Assessment  Edema   RLE Edema: 1+, 2+, Dependent  Respiratory Breath Sounds  RUL Breath Sounds: Clear  RML Breath Sounds: Clear  RLL Breath Sounds: Clear  ALKA Breath Sounds: Clear  LLL Breath Sounds: Clear  Cardiac Assessment   Cardiac (WDL):  Within Defined Limits

## 2024-03-30 NOTE — CARE PLAN
The patient is Watcher - Medium risk of patient condition declining or worsening    Shift Goals  Clinical Goals: safety, sleep  Patient Goals: sleep  Family Goals: no one present    Progress made toward(s) clinical / shift goals:   Problem: Fall Risk - Rehab  Goal: Patient will remain free from falls  Outcome: Progressing  Note: Pt is A&O x 4   bed & WC alarms in place  Pt remains free from falls.   Reinforce use of call light when needs to get out of bed/WC.  POC ongoing, call light within reach

## 2024-03-30 NOTE — CARE PLAN
Problem: Mobility  Goal: Patient's capacity to carry out activities will improve  Outcome: Progressing - patient continues with therapy and is assistance of one. Generalized weakness.     Problem: Fall Risk - Rehab  Goal: Patient will remain free from falls  Outcome: Progressing - Patient is able to verbalize understanding of fall safety precautions.

## 2024-03-30 NOTE — FLOWSHEET NOTE
03/30/24 0648   Events/Summary/Plan   Events/Summary/Plan mdi   Vital Signs   Pulse 78   Respiration 16   Pulse Oximetry 98 %   $ Pulse Oximetry (Spot Check) Yes   Respiratory Assessment   Level of Consciousness Alert   Chest Exam   Work Of Breathing / Effort Within Normal Limits   Breath Sounds   RUL Breath Sounds Clear   RML Breath Sounds Clear   RLL Breath Sounds Clear   ALKA Breath Sounds Clear   LLL Breath Sounds Clear   Oxygen   O2 Delivery Device Room air w/o2 available

## 2024-03-30 NOTE — THERAPY
Occupational Therapy  Daily Treatment     Patient Name: Andrew Wall  Age:  58 y.o., Sex:  male  Medical Record #: [id number]  Today's Date: 3/30/2024     Precautions  Precautions: (P) Fall Risk, Cervical Collar  , Spinal / Back Precautions   Comments: (P)  (Multiple pathologic fractures)    Safety   ADL Safety : (P) Requires Physical Assist for Safety  Bathroom Safety: (P) Requires Physical Assist for Safety    Subjective    Patient received semi-supine in bed, alert and agreeable to OT treatment, medically stable and cleared for tx by RN.     Objective       03/30/24 0800   OT Charge Group   Charges Yes   OT Therapy Activity (Units) 4   OT Total Time Spent   OT Individual Total Time Spent (Mins) 60   Precautions   Precautions Fall Risk;Cervical Collar  ;Spinal / Back Precautions    Comments   (Multiple pathologic fractures)   Safety    ADL Safety  Requires Physical Assist for Safety   Bathroom Safety Requires Physical Assist for Safety   Pain 0 - 10 Group   Location Neck   Location Orientation Anterior;Posterior   Pain Rating Scale (NPRS) 5   Description Sore;Aching   Comfort Goal Comfort with Movement;Perform Activity   Functional Level of Assist   Lower Body Dressing Standby Assist   Lower Body Dressing Description   (Donning shoes/sock management via figure-4 method at EOB prior to initiating activity)   Bed, Chair, Wheelchair Transfer Contact Guard Assist   Bed Chair Wheelchair Transfer Description Adaptive equipment  (with use of 4WW. Demos good safety with walker management. Practiced multiple sit-stand transfers from elevated mat surface with min tactile cues for body mechanics. Quick BLE fatigue.)   Sitting Upper Body Exercises   Sitting Upper Body Exercises Yes   Upper Extremity Bike Level 2 Resistance   Comments NuStep Level 2 resistance x 15 minutes with multiple patient initiated rest breaks 2/2 quick fatigue   Standing Upper Body Exercises   Standing Upper Body Exercises Yes   Comments Simulated  ski poles with trekking poles gently pushing forward/backward. Able to tolerate static standing with dynamic use of BUE x 1 minute prior to needing a rest break.   Bed Mobility    Supine to Sit Minimal Assist   Rolling Standby Assist   Interdisciplinary Plan of Care Collaboration   IDT Collaboration with  Nursing   Patient Position at End of Therapy Seated;Chair Alarm On;Tray Table within Reach;Phone within Reach;Call Light within Reach  (Up to eat breakfast in the room)   Occupational Therapist Assigned   Assigned OT / Treatment Time / Comments Please give patient at least an hour of rest breaks between each session         Assessment    Patient seen for skilled OT treatment focused on ADL re-training, endurance/activity tolerance, functional transfers/mobility, and therapeutic activity targeting dynamic standing balance with use of BUE . Patient with fair tolerance to treatment at this time, limited by impaired activity tolerance/endurance necessitating frequent rest breaks throughout OT tx, generalized weakness/deconditioning, and impaired dynamic balance. Patient generally performing ADLs/functional transfers at MIN A level with min verbal cues for safe initiation/sequencing. Patient able to self initiate rest breaks well with education of use of Modified PAULETTE Scale to pace activity. Patient able to tolerate functional mobility of household distances with use of 4WW. OT provided education on pacing/energy conservation, body mechanics, and therapy schedule. Patient will benefit from ongoing OT treatment to address limitations noted above.    Strengths: Able to follow instructions, Alert and oriented, Motivated for self care and independence, Pleasant and cooperative, Supportive family, Willingly participates in therapeutic activities  Barriers: Aspiration risk, Decreased endurance, Fatigue, Generalized weakness, Home accessibility, Impaired activity tolerance, Impaired balance, Limited mobility,  Pain    Plan    Continue to progress overall activity tolerance/endurance  IADL re-training (patient loves to ski)   Energy conservation education    DME  OT DME Recommendations  Additional Equipment:  (pt owns hospital bed)    Passport items to be completed:  Perform bathroom transfers, complete dressing, complete feeding, get ready for the day, prepare a simple meal, participate in household tasks, adapt home for safety needs, demonstrate home exercise program, complete caregiver training     Occupational Therapy Goals (Active)       Problem: Dressing       Dates: Start:  03/29/24         Goal: STG-Within one week, patient will dress UB at Min A level including C-collar management.       Dates: Start:  03/29/24            Goal: STG-Within one week, patient will dress LB at CGA level using DME/AE PRN.       Dates: Start:  03/29/24               Problem: Functional Transfers       Dates: Start:  03/29/24         Goal: STG-Within one week, patient will transfer to toilet at SBA level using DME PRN.       Dates: Start:  03/29/24            Goal: STG-Within one week, patient will transfer to tub/shower at SBA level using DME PRN.       Dates: Start:  03/29/24               Problem: OT Long Term Goals       Dates: Start:  03/29/24         Goal: LTG-By discharge, patient will complete basic self care tasks at SBA-Mod I level using DME/AE PRN.       Dates: Start:  03/29/24            Goal: LTG-By discharge, patient will perform bathroom transfers at SBA-Mod I level using DME/AE PRN.       Dates: Start:  03/29/24               Problem: Toileting       Dates: Start:  03/29/24         Goal: STG-Within one week, patient will complete toileting tasks at SBA level.       Dates: Start:  03/29/24

## 2024-03-30 NOTE — PROGRESS NOTES
NURSING DAILY NOTE    Name: Andrew Wall   Date of Admission: 3/28/2024   Admitting Diagnosis: Acute encephalopathy  Attending Physician: Antonio Cortez M.d.  Allergies: Augmentin    Safety  Patient Assist     Patient Precautions  Fall Risk, Spinal / Back Precautions , Cervical Collar    Precaution Comments  C2 fx  Bed Transfer Status  Minimal Assist  Toilet Transfer Status   Minimal Assist  Assistive Devices  Wheelchair  Oxygen  None - Room Air  Diet/Therapeutic Dining  Current Diet Order   Procedures    Diet Order Diet: Regular     Pill Administration  whole  Agitated Behavioral Scale     ABS Level of Severity       Fall Risk  Has the patient had a fall this admission?   No  Jodi Leigh Fall Risk Scoring  9, LOW RISK  Fall Risk Safety Measures  bed alarm, chair alarm, and poor balance    Vitals  Temperature: 36.6 °C (97.9 °F)  Temp src: Oral  Pulse: 62  Respiration: 16  Blood Pressure: 93/63  Blood Pressure MAP (Calculated): 73 MM HG  BP Location: Right, Upper Arm  Patient BP Position: Supine     Oxygen  Pulse Oximetry: 92 %  O2 (LPM): 0  O2 Delivery Device: None - Room Air    Bowel and Bladder  Last Bowel Movement  03/29/24  Stool Type  Type 4: Like a sausage or snake, smooth and soft  Bowel Device     Continent  Bladder: Continent void   Bowel: Continent movement  Bladder Function  Urine Void (mL): 600 ml  Number of Times Voided: 2  Urine Color: Janae  Genitourinary Assessment   Urine Color: Janae  Bladder Scan: Post Void  $ Bladder Scan Results (mL): 124    Skin  Sachin Score   17  Sensory Interventions   Bed Types: Low Airloss  Skin Preventative Measures: Heel Float Boots in Use , Pillows in Use for Support / Positioning  Moisture Interventions         Pain  Pain Rating Scale  4 - Distracts me, can do usual activities  Pain Location  Neck  Pain Location Orientation  Anterior, Posterior  Pain Interventions   Medication (see  MAR)    ADLs    Bathing   Shower, * * With Assistance from  Linen Change      Personal Hygiene     Chlorhexidine Bath      Oral Care     Teeth/Dentures     Shave     Nutrition Percentage Eaten  *  * Meal *  *, Breakfast, Between % Consumed  Environmental Precautions  Treaded Slipper Socks on Patient, Personal Belongings, Wastebasket, Call Bell etc. in Easy Reach, Transferred to Stronger Side, Bed in Low Position  Patient Turns/Positioning  Patient Turns Self from Side to Side  Patient Turns Assistance/Tolerance     Bed Positions  Bed Controls On, Bed Locked  Head of Bed Elevated  Self regulated      Psychosocial/Neurologic Assessment  Psychosocial Assessment  Psychosocial (WDL):  Within Defined Limits  Neurologic Assessment  Neuro (WDL): Exceptions to WDL  Level of Consciousness: Alert  Orientation Level: Oriented X4  Cognition: Appropriate judgement, Appropriate safety awareness, Appropriate attention/concentration, Appropriate for developmental age, Follows commands  Speech: Clear  EENT (WDL):  Within Defined Limits    Cardio/Pulmonary Assessment  Edema   RLE Edema: 1+, 2+, Dependent  Respiratory Breath Sounds  RUL Breath Sounds: Clear  RML Breath Sounds: Clear  RLL Breath Sounds: Clear  ALKA Breath Sounds: Clear  LLL Breath Sounds: Clear  Cardiac Assessment   Cardiac (WDL):  Within Defined Limits

## 2024-03-30 NOTE — PROGRESS NOTES
NURSING DAILY NOTE    Name: Andrew Wall   Date of Admission: 3/28/2024   Admitting Diagnosis: Acute encephalopathy  Attending Physician: Antonio Cortez M.d.  Allergies: Augmentin    Safety  Patient Assist     Patient Precautions  Fall Risk, Cervical Collar  , Spinal / Back Precautions   Precaution Comments   (Multiple pathologic fractures)  Bed Transfer Status  Contact Guard Assist  Toilet Transfer Status   Minimal Assist  Assistive Devices  Wheelchair  Oxygen  None - Room Air  Diet/Therapeutic Dining  Current Diet Order   Procedures    Diet Order Diet: Regular     Pill Administration  whole  Agitated Behavioral Scale     ABS Level of Severity       Fall Risk  Has the patient had a fall this admission?   No  Jodi Leigh Fall Risk Scoring  10, LOW RISK  Fall Risk Safety Measures  bed alarm and poor balance    Vitals  Temperature: 36.6 °C (97.9 °F)  Temp src: Oral  Pulse: 85  Respiration: 18  Blood Pressure: 98/55  Blood Pressure MAP (Calculated): 69 MM HG  BP Location: Left, Upper Arm  Patient BP Position: Supine     Oxygen  Pulse Oximetry: 99 %  O2 (LPM): 0  O2 Delivery Device: None - Room Air    Bowel and Bladder  Last Bowel Movement  03/29/24 (per pt report)  Stool Type  Type 4: Like a sausage or snake, smooth and soft  Bowel Device     Continent  Bladder: Continent void   Bowel: Continent movement  Bladder Function  Urine Void (mL): 250 ml  Number of Times Voided: 1  Urine Color: Janae  Genitourinary Assessment   Urine Color: Janae  Bladder Scan: Post Void  $ Bladder Scan Results (mL): 124    Skin  Sachin Score   15  Sensory Interventions   Bed Types: Low Airloss  Skin Preventative Measures: Pillows in Use for Support / Positioning, Heel Float Boots in Use   Moisture Interventions         Pain  Pain Rating Scale  5 - Interrupts some activities  Pain Location  Neck  Pain Location Orientation  Anterior, Posterior  Pain Interventions    Medication (see MAR)    ADLs    Bathing   Shower, * * With Assistance from  Linen Change      Personal Hygiene     Chlorhexidine Bath      Oral Care     Teeth/Dentures     Shave     Nutrition Percentage Eaten  *  * Meal *  *, Breakfast, Between % Consumed  Environmental Precautions  Treaded Slipper Socks on Patient, Personal Belongings, Wastebasket, Call Bell etc. in Easy Reach, Report Given to Other Health Care Providers Regarding Fall Risk, Bed in Low Position, Communication Sign for Patients & Families, Mobility Assessed & Appropriate Sign Placed  Patient Turns/Positioning  Patient Turns Self from Side to Side  Patient Turns Assistance/Tolerance     Bed Positions  Bed Controls On, Bed Locked  Head of Bed Elevated  Self regulated      Psychosocial/Neurologic Assessment  Psychosocial Assessment  Psychosocial (WDL):  Within Defined Limits  Neurologic Assessment  Neuro (WDL): Exceptions to WDL  Level of Consciousness: Alert  Orientation Level: Oriented X4  Cognition: Appropriate judgement, Appropriate safety awareness, Appropriate attention/concentration, Appropriate for developmental age, Follows commands  Speech: Clear  Motor Function/Sensation Assessment: Sensation  RUE Sensation: Full sensation  LUE Sensation: Full sensation  RLE Sensation: Full sensation  LLE Sensation: Full sensation  EENT (WDL):  Within Defined Limits    Cardio/Pulmonary Assessment  Edema   RLE Edema: 1+  LLE Edema: 1+  Respiratory Breath Sounds  RUL Breath Sounds: Clear  RML Breath Sounds: Clear  RLL Breath Sounds: Clear  ALKA Breath Sounds: Clear  LLL Breath Sounds: Clear  Cardiac Assessment   Cardiac (WDL):  Within Defined Limits

## 2024-03-31 ENCOUNTER — APPOINTMENT (OUTPATIENT)
Dept: INPATIENT REHAB | Facility: REHABILITATION | Age: 59
DRG: 070 | End: 2024-03-31
Payer: COMMERCIAL

## 2024-03-31 ENCOUNTER — APPOINTMENT (OUTPATIENT)
Dept: OCCUPATIONAL THERAPY | Facility: REHABILITATION | Age: 59
DRG: 070 | End: 2024-03-31
Attending: PHYSICAL MEDICINE & REHABILITATION
Payer: COMMERCIAL

## 2024-03-31 ENCOUNTER — APPOINTMENT (OUTPATIENT)
Dept: PHYSICAL THERAPY | Facility: REHABILITATION | Age: 59
DRG: 070 | End: 2024-03-31
Attending: PHYSICAL MEDICINE & REHABILITATION
Payer: COMMERCIAL

## 2024-03-31 VITALS
SYSTOLIC BLOOD PRESSURE: 99 MMHG | OXYGEN SATURATION: 98 % | DIASTOLIC BLOOD PRESSURE: 56 MMHG | WEIGHT: 145.28 LBS | BODY MASS INDEX: 20.26 KG/M2 | TEMPERATURE: 98.7 F | HEART RATE: 81 BPM | RESPIRATION RATE: 18 BRPM

## 2024-03-31 PROCEDURE — 700101 HCHG RX REV CODE 250: Performed by: PHYSICAL MEDICINE & REHABILITATION

## 2024-03-31 PROCEDURE — 94640 AIRWAY INHALATION TREATMENT: CPT

## 2024-03-31 PROCEDURE — 97535 SELF CARE MNGMENT TRAINING: CPT

## 2024-03-31 PROCEDURE — 94760 N-INVAS EAR/PLS OXIMETRY 1: CPT

## 2024-03-31 PROCEDURE — 97110 THERAPEUTIC EXERCISES: CPT

## 2024-03-31 PROCEDURE — A9270 NON-COVERED ITEM OR SERVICE: HCPCS | Performed by: PHYSICAL MEDICINE & REHABILITATION

## 2024-03-31 PROCEDURE — 97116 GAIT TRAINING THERAPY: CPT

## 2024-03-31 PROCEDURE — 700102 HCHG RX REV CODE 250 W/ 637 OVERRIDE(OP): Performed by: PHYSICAL MEDICINE & REHABILITATION

## 2024-03-31 PROCEDURE — 770010 HCHG ROOM/CARE - REHAB SEMI PRIVAT*

## 2024-03-31 PROCEDURE — 700111 HCHG RX REV CODE 636 W/ 250 OVERRIDE (IP): Mod: JZ | Performed by: PHYSICAL MEDICINE & REHABILITATION

## 2024-03-31 PROCEDURE — 97112 NEUROMUSCULAR REEDUCATION: CPT

## 2024-03-31 RX ADMIN — MIDODRINE HYDROCHLORIDE 7.5 MG: 2.5 TABLET ORAL at 18:08

## 2024-03-31 RX ADMIN — GABAPENTIN 100 MG: 100 CAPSULE ORAL at 08:21

## 2024-03-31 RX ADMIN — Medication 1000 UNITS: at 08:21

## 2024-03-31 RX ADMIN — MORPHINE SULFATE 45 MG: 30 TABLET, FILM COATED, EXTENDED RELEASE ORAL at 08:20

## 2024-03-31 RX ADMIN — MOMETASONE FUROATE AND FORMOTEROL FUMARATE DIHYDRATE 2 PUFF: 200; 5 AEROSOL RESPIRATORY (INHALATION) at 08:29

## 2024-03-31 RX ADMIN — SENNOSIDES AND DOCUSATE SODIUM 2 TABLET: 8.6; 5 TABLET ORAL at 21:23

## 2024-03-31 RX ADMIN — OXYCODONE 5 MG: 5 TABLET ORAL at 13:37

## 2024-03-31 RX ADMIN — SENNOSIDES AND DOCUSATE SODIUM 2 TABLET: 8.6; 5 TABLET ORAL at 08:20

## 2024-03-31 RX ADMIN — MIDODRINE HYDROCHLORIDE 7.5 MG: 2.5 TABLET ORAL at 10:59

## 2024-03-31 RX ADMIN — MORPHINE SULFATE 45 MG: 30 TABLET, FILM COATED, EXTENDED RELEASE ORAL at 21:23

## 2024-03-31 RX ADMIN — MIDODRINE HYDROCHLORIDE 7.5 MG: 2.5 TABLET ORAL at 08:20

## 2024-03-31 RX ADMIN — MOMETASONE FUROATE AND FORMOTEROL FUMARATE DIHYDRATE 2 PUFF: 200; 5 AEROSOL RESPIRATORY (INHALATION) at 17:21

## 2024-03-31 RX ADMIN — MEGESTROL ACETATE 800 MG: 40 SUSPENSION ORAL at 08:20

## 2024-03-31 RX ADMIN — OMEPRAZOLE 20 MG: 20 CAPSULE, DELAYED RELEASE ORAL at 08:20

## 2024-03-31 RX ADMIN — ENOXAPARIN SODIUM 40 MG: 100 INJECTION SUBCUTANEOUS at 18:11

## 2024-03-31 ASSESSMENT — GAIT ASSESSMENTS
DISTANCE (FEET): 140
GAIT LEVEL OF ASSIST: STANDBY ASSIST
DEVIATION: DECREASED BASE OF SUPPORT;SHUFFLED GAIT;DECREASED HEEL STRIKE;DECREASED TOE OFF
ASSISTIVE DEVICE: 4 WHEEL WALKER

## 2024-03-31 ASSESSMENT — ACTIVITIES OF DAILY LIVING (ADL): BED_CHAIR_WHEELCHAIR_TRANSFER_DESCRIPTION: INCREASED TIME;SUPERVISION FOR SAFETY

## 2024-03-31 ASSESSMENT — FIBROSIS 4 INDEX: FIB4 SCORE: 0.88

## 2024-03-31 ASSESSMENT — PAIN DESCRIPTION - PAIN TYPE
TYPE: ACUTE PAIN

## 2024-03-31 NOTE — CARE PLAN
Problem: Fall Risk - Rehab  Goal: Patient will remain free from falls  Outcome: Progressing  Note: Jodi Leigh Fall risk Assessment Score: 10    Low fall risk interventions   - Call light within reach   - Yellow  socks   - Belongings within reach   - Bed in the lowest position       Problem: Pain - Standard  Goal: Alleviation of pain or a reduction in pain to the patient’s comfort goal  Outcome: Progressing  Note: Assessed for pain and discomfort , pain under control, needs anticipated and attended.   The patient is Stable - Low risk of patient condition declining or worsening    Shift Goals  Clinical Goals: safety, sleep  Patient Goals: sleep  Family Goals: no one present    Progress made toward(s) clinical / shift goals:  Pt free from fall and injury.

## 2024-03-31 NOTE — THERAPY
Occupational Therapy  Daily Treatment     Patient Name: Andrew Wall  Age:  58 y.o., Sex:  male  Medical Record #: [id number]  Today's Date: 3/31/2024     Precautions  Precautions: Fall Risk, Cervical Collar  , Spinal / Back Precautions   Comments: multiple pathologic fx    Safety   ADL Safety : Requires Physical Assist for Safety  Bathroom Safety: Requires Physical Assist for Safety    Subjective    Pt seated in w/c upon arrival, agreeable to participate in OT.      Objective     03/31/24 0831   OT Charge Group   OT Therapeutic Exercise (Units) 2   OT Total Time Spent   OT Individual Total Time Spent (Mins) 30   Precautions   Precautions Fall Risk;Cervical Collar  ;Spinal / Back Precautions    Comments multiple pathologic fx   Vitals   O2 Delivery Device None - Room Air   Sitting Upper Body Exercises   Sitting Upper Body Exercises Yes   Chest Press 1 set of 10;Bilateral;Weight (See Comments for lbs)  (w/ 2# weighted ball, EOM)   External Shoulder Rotation 1 set of 10;Right ;Left;Weight (See Comments for lbs)  (w/ 2# dumbbell, EOM)   Bicep Curls 1 set of 10;Right ;Left;Weight (See Comments for lbs)  (w/ 2# dumbbell, EOM)   Tricep Press 3 sets of 10;Bilateral;Weight (See Comments for lbs)  (15# (Rickshaw))   Interdisciplinary Plan of Care Collaboration   Patient Position at End of Therapy Seated;Self Releasing Lap Belt Applied;Tray Table within Reach       Assessment    Pt tolerated OT session fair w/ focus on UB strengthening. Pt w/ limited shoulder ROM (Bilaterally) w/ RUE weakness > LUE. Benefits from intermittent rest breaks due to fatigue/decreased endurance.   Strengths: Able to follow instructions, Alert and oriented, Motivated for self care and independence, Pleasant and cooperative, Supportive family, Willingly participates in therapeutic activities  Barriers: Aspiration risk, Decreased endurance, Fatigue, Generalized weakness, Home accessibility, Impaired activity tolerance, Impaired balance, Limited  mobility, Pain    Plan    Continue to progress overall activity tolerance/endurance  IADL re-training (patient loves to ski)   Energy conservation education    DME  OT DME Recommendations  Additional Equipment:  (pt owns hospital bed)    Passport items to be completed:  Perform bathroom transfers, complete dressing, complete feeding, get ready for the day, prepare a simple meal, participate in household tasks, adapt home for safety needs, demonstrate home exercise program, complete caregiver training     Occupational Therapy Goals (Active)       Problem: Dressing       Dates: Start:  03/29/24         Goal: STG-Within one week, patient will dress UB at Min A level including C-collar management.       Dates: Start:  03/29/24            Goal: STG-Within one week, patient will dress LB at CGA level using DME/AE PRN.       Dates: Start:  03/29/24               Problem: Functional Transfers       Dates: Start:  03/29/24         Goal: STG-Within one week, patient will transfer to toilet at SBA level using DME PRN.       Dates: Start:  03/29/24            Goal: STG-Within one week, patient will transfer to tub/shower at SBA level using DME PRN.       Dates: Start:  03/29/24               Problem: OT Long Term Goals       Dates: Start:  03/29/24         Goal: LTG-By discharge, patient will complete basic self care tasks at SBA-Mod I level using DME/AE PRN.       Dates: Start:  03/29/24            Goal: LTG-By discharge, patient will perform bathroom transfers at SBA-Mod I level using DME/AE PRN.       Dates: Start:  03/29/24               Problem: Toileting       Dates: Start:  03/29/24         Goal: STG-Within one week, patient will complete toileting tasks at SBA level.       Dates: Start:  03/29/24

## 2024-03-31 NOTE — PROGRESS NOTES
NURSING DAILY NOTE    Name: Andrew Wall   Date of Admission: 3/28/2024   Admitting Diagnosis: Acute encephalopathy  Attending Physician: Antonio Cortez M.d.  Allergies: Augmentin    Safety  Patient Assist     Patient Precautions  Fall Risk, Cervical Collar  , Spinal / Back Precautions   Precaution Comments  C2 fx, CHEMO Prec  Bed Transfer Status  Standby Assist  Toilet Transfer Status   Minimal Assist  Assistive Devices  Wheelchair  Oxygen  None - Room Air  Diet/Therapeutic Dining  Current Diet Order   Procedures    Diet Order Diet: Regular     Pill Administration  whole  Agitated Behavioral Scale     ABS Level of Severity       Fall Risk  Has the patient had a fall this admission?   No  Jodi Leigh Fall Risk Scoring  10, LOW RISK  Fall Risk Safety Measures  chair alarm, poor balance, and low vision/ hearing    Vitals  Temperature: 37.1 °C (98.7 °F)  Temp src: Oral  Pulse: 84  Respiration: 16  Blood Pressure: (!) 84/55  Blood Pressure MAP (Calculated): 65 MM HG  BP Location: Left, Upper Arm  Patient BP Position: Supine     Oxygen  Pulse Oximetry: 99 %  O2 (LPM): 0  O2 Delivery Device: None - Room Air    Bowel and Bladder  Last Bowel Movement  03/29/24  Stool Type  Type 4: Like a sausage or snake, smooth and soft  Bowel Device     Continent  Bladder: Continent void   Bowel: Continent movement  Bladder Function  Urine Void (mL): 300 ml  Number of Times Voided: 1  Urine Color: Janae  Genitourinary Assessment   Bladder Assessment (WDL):  Within Defined Limits  Santiago Catheter: Not Applicable  Urine Color: Janae  Bladder Device: Urinal  Bladder Scan: Post Void  $ Bladder Scan Results (mL): 124    Skin  Sachin Score   15  Sensory Interventions   Bed Types: Low Airloss  Skin Preventative Measures: Pillows in Use for Support / Positioning  Moisture Interventions  Moisturizers/Barriers: Barrier Wipes      Pain  Pain Rating Scale  3 - Sometimes  distracts me  Pain Location  Neck  Pain Location Orientation  Anterior, Posterior  Pain Interventions   Repositioned, Rest    ADLs    Bathing   Shower, * * With Assistance from  Linen Change      Personal Hygiene  Moist Ana Wipes  Chlorhexidine Bath      Oral Care     Teeth/Dentures     Shave     Nutrition Percentage Eaten  *  * Meal *  *, Lunch, Between 50-75% Consumed  Environmental Precautions  Treaded Slipper Socks on Patient, Personal Belongings, Wastebasket, Call Bell etc. in Easy Reach, Bed in Low Position  Patient Turns/Positioning  Patient Turns Self from Side to Side  Patient Turns Assistance/Tolerance     Bed Positions  Bed Controls On, Bed Locked  Head of Bed Elevated  Self regulated      Psychosocial/Neurologic Assessment  Psychosocial Assessment  Psychosocial (WDL):  Within Defined Limits  Neurologic Assessment  Neuro (WDL): Exceptions to WDL  Level of Consciousness: Alert  Orientation Level: Oriented X4  Cognition: Appropriate judgement, Appropriate safety awareness, Appropriate attention/concentration, Appropriate for developmental age, Follows commands  Speech: Clear  Motor Function/Sensation Assessment: Sensation  RUE Sensation: Full sensation  LUE Sensation: Full sensation  RLE Sensation: Full sensation  LLE Sensation: Full sensation  EENT (WDL):  Within Defined Limits    Cardio/Pulmonary Assessment  Edema   RLE Edema: 1+  LLE Edema: 1+  Respiratory Breath Sounds  RUL Breath Sounds: Clear  RML Breath Sounds: Clear  RLL Breath Sounds: Clear  ALKA Breath Sounds: Clear  LLL Breath Sounds: Clear  Cardiac Assessment   Cardiac (WDL):  Within Defined Limits

## 2024-03-31 NOTE — CARE PLAN
Problem: Knowledge Deficit - Standard  Goal: Patient and family/care givers will demonstrate understanding of plan of care, disease process/condition, diagnostic tests and medications  Outcome: Progressing     Problem: Skin Integrity  Goal: Skin integrity is maintained or improved  Note: Patient's skin is maintained and free from new or accidental injury this shift.  Will continue to monitor.   The patient is Watcher - Medium risk of patient condition declining or worsening    Shift Goals  Clinical Goals: Safety, sleep well  Patient Goals: sleep well  Family Goals: no one present

## 2024-03-31 NOTE — PROGRESS NOTES
NURSING DAILY NOTE    Name: Andrew Wall   Date of Admission: 3/28/2024   Admitting Diagnosis: Acute encephalopathy  Attending Physician: Antonio Cortez M.d.  Allergies: Augmentin    Safety  Patient Assist     Patient Precautions  Fall Risk, Cervical Collar  , Spinal / Back Precautions   Precaution Comments   (Multiple pathologic fractures)  Bed Transfer Status  Contact Guard Assist  Toilet Transfer Status   Minimal Assist  Assistive Devices  Rails, Wheelchair  Oxygen  None - Room Air  Diet/Therapeutic Dining  Current Diet Order   Procedures    Diet Order Diet: Regular     Pill Administration  whole  Agitated Behavioral Scale     ABS Level of Severity       Fall Risk  Has the patient had a fall this admission?   No  Jodi Leigh Fall Risk Scoring  10, LOW RISK  Fall Risk Safety Measures  bed alarm, chair alarm, and poor balance    Vitals  Temperature: 36.7 °C (98 °F)  Temp src: Oral  Pulse: 83  Respiration: 16  Blood Pressure: 91/56  Blood Pressure MAP (Calculated): 68 MM HG  BP Location: Right, Upper Arm  Patient BP Position: Supine     Oxygen  Pulse Oximetry: 98 %  O2 (LPM): 0  O2 Delivery Device: None - Room Air    Bowel and Bladder  Last Bowel Movement  03/29/24 (per pt report)  Stool Type  Type 4: Like a sausage or snake, smooth and soft  Bowel Device     Continent  Bladder: Continent void   Bowel: Continent movement  Bladder Function  Urine Void (mL): 100 ml  Number of Times Voided: 1  Urine Color: Janae  Genitourinary Assessment   Urine Color: Janae  Bladder Scan: Post Void  $ Bladder Scan Results (mL): 124    Skin  Sachin Score   15  Sensory Interventions   Bed Types: Low Airloss  Skin Preventative Measures: Pillows in Use for Support / Positioning  Moisture Interventions         Pain  Pain Rating Scale  0 - No Pain  Pain Location  Neck  Pain Location Orientation  Anterior, Posterior  Pain Interventions   [place]    ADLs    Bathing    Shower, * * With Assistance from  Linen Escobar      Personal Hygiene  Moist Ana Wipes  Chlorhexidine Bath      Oral Care     Teeth/Dentures     Shave     Nutrition Percentage Eaten  *  * Meal *  *, Lunch, Between % Consumed  Environmental Precautions  Treaded Slipper Socks on Patient  Patient Turns/Positioning  Patient Turns Self from Side to Side  Patient Turns Assistance/Tolerance     Bed Positions  Bed Controls On, Bed Locked  Head of Bed Elevated  Self regulated      Psychosocial/Neurologic Assessment  Psychosocial Assessment  Psychosocial (WDL):  Within Defined Limits  Neurologic Assessment  Neuro (WDL): Exceptions to WDL  Level of Consciousness: Alert  Orientation Level: Oriented X4  Cognition: Appropriate judgement, Appropriate safety awareness, Appropriate attention/concentration, Appropriate for developmental age, Follows commands  Speech: Clear  Motor Function/Sensation Assessment: Sensation  RUE Sensation: Full sensation  LUE Sensation: Full sensation  RLE Sensation: Full sensation  LLE Sensation: Full sensation  EENT (WDL):  Within Defined Limits    Cardio/Pulmonary Assessment  Edema   RLE Edema: 1+  LLE Edema: 1+  Respiratory Breath Sounds  RUL Breath Sounds: Clear  RML Breath Sounds: Clear  RLL Breath Sounds: Clear  ALKA Breath Sounds: Clear  LLL Breath Sounds: Clear  Cardiac Assessment   Cardiac (WDL):  Within Defined Limits

## 2024-03-31 NOTE — FLOWSHEET NOTE
03/31/24 0829   Events/Summary/Plan   Events/Summary/Plan MDI  given pt on RA O2 check pt shanika well   Vital Signs   Pulse (!) 110   Respiration 17   Pulse Oximetry 99 %   $ Pulse Oximetry (Spot Check) Yes   Respiratory Assessment   Respiratory Pattern Within Normal Limits   Level of Consciousness Alert   Chest Exam   Work Of Breathing / Effort Within Normal Limits   Breath Sounds   RUL Breath Sounds Clear   RML Breath Sounds Clear   RLL Breath Sounds Clear   ALKA Breath Sounds Clear   LLL Breath Sounds Clear   Secretions   Cough Non Productive   Oxygen   O2 (LPM) 0   O2 Delivery Device None - Room Air

## 2024-03-31 NOTE — THERAPY
"Physical Therapy   Daily Treatment     Patient Name: Andrew Wall  Age:  58 y.o., Sex:  male  Medical Record #: [id number]  Today's Date: 3/31/2024     Precautions  Precautions: Fall Risk, Cervical Collar  , Spinal / Back Precautions   Comments: C2 fx, CHEMO Prec    Subjective    \"I want to work on my balance without the walker.\" Pt in bed at arrival c/ wife present in room, agreeable and motivated for PT tx.      Objective       03/31/24 1401   PT Charge Group   PT Gait Training (Units) 1   PT Neuromuscular Re-Education / Balance (Units) 1   PT Total Time Spent   PT Individual Total Time Spent (Mins) 30   Gait Functional Level of Assist    Gait Level Of Assist Standby Assist   Assistive Device 4 Wheel Walker   Distance (Feet) 140   # of Times Distance was Traveled 1  (plus multiple bouts of 30-60' in // bars c/ decreasing UE support, CGA<>SBA)   Deviation Decreased Base Of Support;Shuffled Gait;Decreased Heel Strike;Decreased Toe Off   Transfer Functional Level of Assist   Bed, Chair, Wheelchair Transfer Standby Assist   Bed Chair Wheelchair Transfer Description Increased time;Supervision for safety  (stand step around without UE support)   Bed Mobility    Supine to Sit Standby Assist   Sit to Supine Standby Assist   Sit to Stand Standby Assist   Scooting Standby Assist   Rolling Standby Assist   Neuro-Muscular Treatments   Neuro-Muscular Treatments Anterior weight shift;Co-Contraction;Joint Approximation;Postural Changes;Postural Facilitation;Sequencing;Tactile Cuing;Verbal Cuing;Weight Shift Right;Weight Shift Left   Comments see note   Interdisciplinary Plan of Care Collaboration   IDT Collaboration with  Family / Caregiver   Patient Position at End of Therapy In Bed;Bed Alarm On;Call Light within Reach;Tray Table within Reach;Phone within Reach;Family / Friend in Room   Collaboration Comments spouse present during session, assisting c/ w/c follow   PT DME Recommendations   Wheelchair   (N/A, pt has one) " "  Assistive Device 4-Wheel Walker  (pt owns this)   Additional Equipment Hospital Bed (See other comments)  (pt owns)   Physical Therapist Assigned   Assigned PT / Treatment Time / Comments Shara/Monica; please keep with a consistent therapist until they return (Casey?); 30-60 ok, no BTB over 60 min total.     NMRE/Gait: in // bars for weaning of UE support   Forward stepping 1 x 30' c/ 1 UE support  Forward/back alt stepping c/ 1 UE support 3 x 10' each direction  Stepping c/ cues to inc step amplitude by setting step count goal btw fixed points: for/back 2 x 3 x 5 steps dec to 3 x 4 steps, laterals 3 x 7 steps, decreasing to 3 x 4-5 steps each dir  Lateral step overs @ 1\" H obstacle, weaning to no UE support 1 x 6, 1 x 10  Foam balance: no UE support  Lateral WS + reaches 1 x 8 each side  Skier reaches (forward to back, BUE) 1 x 8  Standing holds, soft knees c/ alt perturbations 1 x 8  Seated rests btw therapy activities.   Education: progress to date and rationale for activities to improve balance confidence, coordination, postural endurance.    Assessment    Sebastian c/ excellent progress in step amplitude and postural control with progressive weaning of UE support. Improved from CGA for step around transfers c/ 4WW to SBA<>[place] with standing at EOB and reaching/weightshifting by end of session. Limited activity tolerance but good prognosis given prior active lifestyle and motivation to improve.     Strengths: Able to follow instructions, Alert and oriented, Effective communication skills, Good carryover of learning, Good insight into deficits/needs, Manages pain appropriately, Motivated for self care and independence, Pleasant and cooperative, Supportive family, Willingly participates in therapeutic activities  Barriers: Decreased endurance, Fatigue, Generalized weakness, Home accessibility, Impaired activity tolerance, Impaired balance, Limited mobility, Pain    Plan    Progressive gait and balance c/ decreased UE " support  Hip strengthening (ABD, ext)  Continue foam and trial incline foam balance, step up/downs, static holds to tolerance   Pacing and energy conservation strategies in consideration of active chemo tx.     DME  PT DME Recommendations  Wheelchair:  (N/A, pt has one)  Assistive Device: 4-Wheel Walker (pt owns this)  Additional Equipment: Hospital Bed (See other comments) (pt owns)    Passport items to be completed:  Get in/out of bed safely, in/out of a vehicle, safely use mobility device, walk or wheel around home/community, navigate up and down stairs, show how to get up/down from the ground, ensure home is accessible, demonstrate HEP, complete caregiver training    Physical Therapy Problems (Active)       Problem: Mobility       Dates: Start:  03/29/24         Goal: STG-Within one week, patient will propel wheelchair household distances 150 ft with SPV to facilitate functional independence.       Dates: Start:  03/29/24            Goal: STG-Within one week, patient will ambulate household distance 150 ft x2 with 4WW and SBA to access home.       Dates: Start:  03/29/24            Goal: STG-Within one week, patient will ascend and descend 2 stairs with 1 HR and CGA to access living room.       Dates: Start:  03/29/24               Problem: Mobility Transfers       Dates: Start:  03/29/24         Goal: STG-Within one week, patient will transfer bed to chair with 4WW and SBA to facilitate functional independence.       Dates: Start:  03/29/24               Problem: PT-Long Term Goals       Dates: Start:  03/29/24         Goal: LTG-By discharge, patient will ambulate 250 ft x2 over even and uneven surfaces with 4WW and SPV to access community.       Dates: Start:  03/29/24            Goal: LTG-By discharge, patient will transfer one surface to another with 4WW and SPV to facilitate functional independence.       Dates: Start:  03/29/24            Goal: LTG-By discharge, patient will ambulate up/down 2 stairs with  1 HR and SPV to access living room.       Dates: Start:  03/29/24            Goal: LTG-By discharge, patient will transfer in/out of a car with 4WW and SBA after set up to facilitate functional independence.       Dates: Start:  03/29/24

## 2024-03-31 NOTE — THERAPY
Occupational Therapy  Daily Treatment     Patient Name: Andrew Wall  Age:  58 y.o., Sex:  male  Medical Record #: [id number]  Today's Date: 3/31/2024     Precautions  Precautions: (P) Fall Risk, Cervical Collar  , Spinal / Back Precautions   Comments: (P) C2 fx, CHEMO Prec    Safety   ADL Safety : Requires Physical Assist for Safety  Bathroom Safety: Requires Physical Assist for Safety    Subjective    Pt in bed upon arrival, agreeable to participate in OT. Spouse, Sherly, present for caregiver training.      Objective       03/31/24 1301   OT Charge Group   OT Self Care / ADL (Units) 4   OT Total Time Spent   OT Individual Total Time Spent (Mins) 60   Precautions   Precautions Fall Risk;Cervical Collar  ;Spinal / Back Precautions    Comments C2 fx, CHEMO Prec   Vitals   O2 Delivery Device None - Room Air   Functional Level of Assist   Bathing Minimal Assist  (Min-CGA, fold down shower bench level, used LH sponge to maximize LB bathing independence)   Upper Body Dressing Moderate Assist  (Min A to don/doff t-shirt, max A for c-collar)   Lower Body Dressing Contact Guard Assist  (CGA to SBA to don underwear, elastics waist pants and non skid socks (w/ AE))   Bed, Chair, Wheelchair Transfer Contact Guard Assist  (4WW level)   Tub / Shower Transfers Contact Guard Assist  (4WW level)   Interdisciplinary Plan of Care Collaboration   IDT Collaboration with  Family / Caregiver   Patient Position at End of Therapy In Bed;Call Light within Reach;Tray Table within Reach;Family / Friend in Room   Collaboration Comments spouse Sherly present during OT session     Initiated caregiver training w/ spouse (Sherly) w/ focus on ADL routine and 4WW mobility. Reviewed safety considerations and adaptive techniques including c-collar mgmt and DME/ AE recommendations. Successful return demo w/ AE to facilitate LB dressing independence. Spouse verbalized understanding re: all recommendations.     Assessment    Pt tolerated OT session fair  w/ focus on progress w/ ADLs and caregiver training. Spouse receptive to all recommendations to maximize pt's functional safety/independence. Primarily limited by fatigue and 5/10 neck pain (RN administered pain meds during OT session).   Strengths: Able to follow instructions, Alert and oriented, Motivated for self care and independence, Pleasant and cooperative, Supportive family, Willingly participates in therapeutic activities  Barriers: Aspiration risk, Decreased endurance, Fatigue, Generalized weakness, Home accessibility, Impaired activity tolerance, Impaired balance, Limited mobility, Pain    Plan    Continue to progress overall activity tolerance/endurance  IADL re-training (patient loves to ski)   Energy conservation education    DME  OT DME Recommendations  Additional Equipment:  (pt owns hospital bed), hip kit (educated pt/spouse on 3/31, directed to [employer] to purchase), spouse also plans to purchase tub txfr bench     Passport items to be completed:  Perform bathroom transfers, complete dressing, complete feeding, get ready for the day, prepare a simple meal, participate in household tasks, adapt home for safety needs, demonstrate home exercise program, complete caregiver training     Occupational Therapy Goals (Active)       Problem: Dressing       Dates: Start:  03/29/24         Goal: STG-Within one week, patient will dress UB at Min A level including C-collar management.       Dates: Start:  03/29/24            Goal: STG-Within one week, patient will dress LB at CGA level using DME/AE PRN.       Dates: Start:  03/29/24               Problem: Functional Transfers       Dates: Start:  03/29/24         Goal: STG-Within one week, patient will transfer to toilet at SBA level using DME PRN.       Dates: Start:  03/29/24            Goal: STG-Within one week, patient will transfer to tub/shower at SBA level using DME PRN.       Dates: Start:  03/29/24               Problem: OT Long Term Goals       Dates:  Start:  03/29/24         Goal: LTG-By discharge, patient will complete basic self care tasks at SBA-Mod I level using DME/AE PRN.       Dates: Start:  03/29/24            Goal: LTG-By discharge, patient will perform bathroom transfers at SBA-Mod I level using DME/AE PRN.       Dates: Start:  03/29/24               Problem: Toileting       Dates: Start:  03/29/24         Goal: STG-Within one week, patient will complete toileting tasks at SBA level.       Dates: Start:  03/29/24

## 2024-04-01 ENCOUNTER — APPOINTMENT (OUTPATIENT)
Dept: PHYSICAL THERAPY | Facility: REHABILITATION | Age: 59
DRG: 070 | End: 2024-04-01
Attending: PHYSICAL MEDICINE & REHABILITATION
Payer: COMMERCIAL

## 2024-04-01 ENCOUNTER — APPOINTMENT (OUTPATIENT)
Dept: OCCUPATIONAL THERAPY | Facility: REHABILITATION | Age: 59
DRG: 070 | End: 2024-04-01
Attending: PHYSICAL MEDICINE & REHABILITATION
Payer: COMMERCIAL

## 2024-04-01 ENCOUNTER — APPOINTMENT (OUTPATIENT)
Dept: INPATIENT REHAB | Facility: REHABILITATION | Age: 59
DRG: 070 | End: 2024-04-01
Payer: COMMERCIAL

## 2024-04-01 LAB
ANION GAP SERPL CALC-SCNC: 8 MMOL/L (ref 7–16)
BASOPHILS # BLD AUTO: 1.7 % (ref 0–1.8)
BASOPHILS # BLD: 0.05 K/UL (ref 0–0.12)
BUN SERPL-MCNC: 9 MG/DL (ref 8–22)
CALCIUM SERPL-MCNC: 8 MG/DL (ref 8.5–10.5)
CHLORIDE SERPL-SCNC: 107 MMOL/L (ref 96–112)
CO2 SERPL-SCNC: 19 MMOL/L (ref 20–33)
CREAT SERPL-MCNC: 0.37 MG/DL (ref 0.5–1.4)
EOSINOPHIL # BLD AUTO: 0.12 K/UL (ref 0–0.51)
EOSINOPHIL NFR BLD: 4 % (ref 0–6.9)
ERYTHROCYTE [DISTWIDTH] IN BLOOD BY AUTOMATED COUNT: 63.5 FL (ref 35.9–50)
GFR SERPLBLD CREATININE-BSD FMLA CKD-EPI: 128 ML/MIN/1.73 M 2
GLUCOSE SERPL-MCNC: 110 MG/DL (ref 65–99)
HCT VFR BLD AUTO: 27.3 % (ref 42–52)
HGB BLD-MCNC: 8.3 G/DL (ref 14–18)
IMM GRANULOCYTES # BLD AUTO: 0.1 K/UL (ref 0–0.11)
IMM GRANULOCYTES NFR BLD AUTO: 3.3 % (ref 0–0.9)
LYMPHOCYTES # BLD AUTO: 0.59 K/UL (ref 1–4.8)
LYMPHOCYTES NFR BLD: 19.5 % (ref 22–41)
MAGNESIUM SERPL-MCNC: 1.8 MG/DL (ref 1.5–2.5)
MCH RBC QN AUTO: 26.6 PG (ref 27–33)
MCHC RBC AUTO-ENTMCNC: 30.4 G/DL (ref 32.3–36.5)
MCV RBC AUTO: 87.5 FL (ref 81.4–97.8)
MONOCYTES # BLD AUTO: 0.31 K/UL (ref 0–0.85)
MONOCYTES NFR BLD AUTO: 10.2 % (ref 0–13.4)
NEUTROPHILS # BLD AUTO: 1.86 K/UL (ref 1.82–7.42)
NEUTROPHILS NFR BLD: 61.3 % (ref 44–72)
NRBC # BLD AUTO: 0 K/UL
NRBC BLD-RTO: 0 /100 WBC (ref 0–0.2)
PLATELET # BLD AUTO: 330 K/UL (ref 164–446)
PMV BLD AUTO: 9.1 FL (ref 9–12.9)
POTASSIUM SERPL-SCNC: 4 MMOL/L (ref 3.6–5.5)
RBC # BLD AUTO: 3.12 M/UL (ref 4.7–6.1)
SODIUM SERPL-SCNC: 134 MMOL/L (ref 135–145)
WBC # BLD AUTO: 3 K/UL (ref 4.8–10.8)

## 2024-04-01 PROCEDURE — 85025 COMPLETE CBC W/AUTO DIFF WBC: CPT

## 2024-04-01 PROCEDURE — 97110 THERAPEUTIC EXERCISES: CPT | Mod: CO

## 2024-04-01 PROCEDURE — 97602 WOUND(S) CARE NON-SELECTIVE: CPT

## 2024-04-01 PROCEDURE — 97116 GAIT TRAINING THERAPY: CPT

## 2024-04-01 PROCEDURE — 83735 ASSAY OF MAGNESIUM: CPT

## 2024-04-01 PROCEDURE — 700111 HCHG RX REV CODE 636 W/ 250 OVERRIDE (IP): Mod: JZ | Performed by: PHYSICAL MEDICINE & REHABILITATION

## 2024-04-01 PROCEDURE — 770010 HCHG ROOM/CARE - REHAB SEMI PRIVAT*

## 2024-04-01 PROCEDURE — 700102 HCHG RX REV CODE 250 W/ 637 OVERRIDE(OP): Performed by: PHYSICAL MEDICINE & REHABILITATION

## 2024-04-01 PROCEDURE — 94640 AIRWAY INHALATION TREATMENT: CPT

## 2024-04-01 PROCEDURE — 99232 SBSQ HOSP IP/OBS MODERATE 35: CPT | Performed by: PHYSICAL MEDICINE & REHABILITATION

## 2024-04-01 PROCEDURE — A9270 NON-COVERED ITEM OR SERVICE: HCPCS | Performed by: PHYSICAL MEDICINE & REHABILITATION

## 2024-04-01 PROCEDURE — 97110 THERAPEUTIC EXERCISES: CPT

## 2024-04-01 PROCEDURE — 97530 THERAPEUTIC ACTIVITIES: CPT

## 2024-04-01 PROCEDURE — 94760 N-INVAS EAR/PLS OXIMETRY 1: CPT

## 2024-04-01 PROCEDURE — 36415 COLL VENOUS BLD VENIPUNCTURE: CPT

## 2024-04-01 PROCEDURE — 97112 NEUROMUSCULAR REEDUCATION: CPT

## 2024-04-01 PROCEDURE — 80048 BASIC METABOLIC PNL TOTAL CA: CPT

## 2024-04-01 PROCEDURE — 700101 HCHG RX REV CODE 250: Performed by: PHYSICAL MEDICINE & REHABILITATION

## 2024-04-01 RX ORDER — MIDODRINE HYDROCHLORIDE 10 MG/1
10 TABLET ORAL
Status: DISCONTINUED | OUTPATIENT
Start: 2024-04-01 | End: 2024-04-08 | Stop reason: HOSPADM

## 2024-04-01 RX ADMIN — GABAPENTIN 100 MG: 100 CAPSULE ORAL at 08:33

## 2024-04-01 RX ADMIN — SENNOSIDES AND DOCUSATE SODIUM 2 TABLET: 8.6; 5 TABLET ORAL at 21:46

## 2024-04-01 RX ADMIN — OXYCODONE 5 MG: 5 TABLET ORAL at 21:47

## 2024-04-01 RX ADMIN — OMEPRAZOLE 20 MG: 20 CAPSULE, DELAYED RELEASE ORAL at 08:33

## 2024-04-01 RX ADMIN — MIDODRINE HYDROCHLORIDE 10 MG: 10 TABLET ORAL at 17:57

## 2024-04-01 RX ADMIN — Medication 1000 UNITS: at 08:33

## 2024-04-01 RX ADMIN — SENNOSIDES AND DOCUSATE SODIUM 2 TABLET: 8.6; 5 TABLET ORAL at 08:33

## 2024-04-01 RX ADMIN — MOMETASONE FUROATE AND FORMOTEROL FUMARATE DIHYDRATE 2 PUFF: 200; 5 AEROSOL RESPIRATORY (INHALATION) at 21:49

## 2024-04-01 RX ADMIN — MEGESTROL ACETATE 800 MG: 40 SUSPENSION ORAL at 08:32

## 2024-04-01 RX ADMIN — MIDODRINE HYDROCHLORIDE 7.5 MG: 2.5 TABLET ORAL at 11:10

## 2024-04-01 RX ADMIN — MIDODRINE HYDROCHLORIDE 7.5 MG: 2.5 TABLET ORAL at 08:33

## 2024-04-01 RX ADMIN — ACETAMINOPHEN 650 MG: 325 TABLET ORAL at 13:54

## 2024-04-01 RX ADMIN — MORPHINE SULFATE 45 MG: 30 TABLET, FILM COATED, EXTENDED RELEASE ORAL at 21:46

## 2024-04-01 RX ADMIN — MORPHINE SULFATE 45 MG: 30 TABLET, FILM COATED, EXTENDED RELEASE ORAL at 08:32

## 2024-04-01 RX ADMIN — ENOXAPARIN SODIUM 40 MG: 100 INJECTION SUBCUTANEOUS at 17:57

## 2024-04-01 RX ADMIN — MOMETASONE FUROATE AND FORMOTEROL FUMARATE DIHYDRATE 2 PUFF: 200; 5 AEROSOL RESPIRATORY (INHALATION) at 06:48

## 2024-04-01 ASSESSMENT — GAIT ASSESSMENTS
DEVIATION: DECREASED BASE OF SUPPORT;BRADYKINETIC;SHUFFLED GAIT
ASSISTIVE DEVICE: 4 WHEEL WALKER
GAIT LEVEL OF ASSIST: STANDBY ASSIST
DISTANCE (FEET): 85
GAIT LEVEL OF ASSIST: STANDBY ASSIST
DEVIATION: DECREASED BASE OF SUPPORT;BRADYKINETIC;SHUFFLED GAIT
ASSISTIVE DEVICE: 4 WHEEL WALKER
DISTANCE (FEET): 80

## 2024-04-01 ASSESSMENT — PAIN DESCRIPTION - PAIN TYPE
TYPE: ACUTE PAIN

## 2024-04-01 ASSESSMENT — ACTIVITIES OF DAILY LIVING (ADL)
BED_CHAIR_WHEELCHAIR_TRANSFER_DESCRIPTION: SUPERVISION FOR SAFETY;INCREASED TIME
BED_CHAIR_WHEELCHAIR_TRANSFER_DESCRIPTION: INITIAL PREPARATION FOR TASK;SET-UP OF EQUIPMENT;SUPERVISION FOR SAFETY

## 2024-04-01 NOTE — THERAPY
Occupational Therapy  Daily Treatment     Patient Name: Andrew Wall  Age:  58 y.o., Sex:  male  Medical Record #: [id number]  Today's Date: 4/1/2024     Precautions  Precautions: Fall Risk, Cervical Collar  , Spinal / Back Precautions   Comments: C2 fx, CHEMO Prec    Safety   ADL Safety : Requires Physical Assist for Safety  Bathroom Safety: Requires Physical Assist for Safety    Subjective    Patient received semi-supine in bed, alert and agreeable to OT treatment, medically stable and cleared for tx by RN.        Objective       04/01/24 1500   OT Charge Group   Charges Yes   OT Therapy Activity (Units) 4   OT Total Time Spent   OT Individual Total Time Spent (Mins) 60   Vitals   Blood Pressure 111/62  (After activity)   Vitals Comments   (BP monitored during activity and remains stable)   Pain   Pain Scales 0 to 10 Scale    Intervention Medication (see MAR);Nurse Notified  (RN provided patient with pain medication)   Pain 0 - 10 Group   Location Head;Neck   Pain Rating Scale (NPRS) 4   Comfort Goal Comfort with Movement;Perform Activity;Sleep Comfortably   Functional Level of Assist   Upper Body Dressing Minimal Assist   Upper Body Dressing Description   (To manage sweatshirt with assist to pull off overhead (increased difficulty 2/2 cervical collar))   Lower Body Dressing Standby Assist   Lower Body Dressing Description   (To don shoes seated at EOB)   Bed, Chair, Wheelchair Transfer Standby Assist   Bed Chair Wheelchair Transfer Description   (from wheelchair with FWW. Able to self propel wheelchair community distances.)   Interdisciplinary Plan of Care Collaboration   IDT Collaboration with  Nursing   Patient Position at End of Therapy In Bed;Call Light within Reach;Tray Table within Reach;Phone within Reach         Assessment    Patient seen for skilled OT treatment focused on endurance/activity tolerance, functional transfers/mobility, and community reintegration. Patient with good tolerance to treatment  at this time, limited by impaired activity tolerance/endurance, generalized weakness/deconditioning, impaired dynamic balance, and pain. Patient generally performing ADLs/functional transfers at SBA-MIN A level with min verbal cues primarily for pacing activity and taking appropriate rest breaks. Patient with improved ability to self pace activity by end of session (targeting no more than a 6/10 fatigue via Modified PAULETTE Scale). Patient able to tolerate functional mobility of community distances with use of 4WW and manual wheelchair with frequent seated rest breaks. OT provided education on pacing/energy conservation, ADL re-training, and OT POC. Patient will benefit from ongoing OT treatment to address limitations noted above.    Strengths: Able to follow instructions, Alert and oriented, Motivated for self care and independence, Pleasant and cooperative, Supportive family, Willingly participates in therapeutic activities  Barriers: Aspiration risk, Decreased endurance, Fatigue, Generalized weakness, Home accessibility, Impaired activity tolerance, Impaired balance, Limited mobility, Pain    Plan    Continue to progress endurance and overall strength (patient loves being outdoors - requesting that he be able to complete exercise outdoors as able)   ADLs (patient reporting that showers are the most fatiguing thing that he does)     DME  OT DME Recommendations  Bathroom Equipment:  ((pt owns hospital bed), hip kit (educated pt/spouse on 3/31, directed to [employer] to purchase), spouse also plans to purchase tub txfr bench)  Additional Equipment: Hospital Bed (See other comments) (pt owns)    Passport items to be completed:  Perform bathroom transfers, complete dressing, complete feeding, get ready for the day, prepare a simple meal, participate in household tasks, adapt home for safety needs, demonstrate home exercise program, complete caregiver training     Occupational Therapy Goals (Active)       Problem: Dressing        Dates: Start:  03/29/24         Goal: STG-Within one week, patient will dress UB at Min A level including C-collar management.       Dates: Start:  03/29/24            Goal: STG-Within one week, patient will dress LB at CGA level using DME/AE PRN.       Dates: Start:  03/29/24               Problem: Functional Transfers       Dates: Start:  03/29/24         Goal: STG-Within one week, patient will transfer to toilet at SBA level using DME PRN.       Dates: Start:  03/29/24            Goal: STG-Within one week, patient will transfer to tub/shower at SBA level using DME PRN.       Dates: Start:  03/29/24               Problem: OT Long Term Goals       Dates: Start:  03/29/24         Goal: LTG-By discharge, patient will complete basic self care tasks at SBA-Mod I level using DME/AE PRN.       Dates: Start:  03/29/24            Goal: LTG-By discharge, patient will perform bathroom transfers at SBA-Mod I level using DME/AE PRN.       Dates: Start:  03/29/24               Problem: Toileting       Dates: Start:  03/29/24         Goal: STG-Within one week, patient will complete toileting tasks at SBA level.       Dates: Start:  03/29/24

## 2024-04-01 NOTE — WOUND TEAM
[facility]  Inpatient Services  Wound and Skin Care Follow-up    Admission Date: 3/28/2024     Last order of IP CONSULT TO WOUND CARE was found on 3/28/2024 from Hospital Encounter on 3/25/2024     HPI, PMH, SH: Reviewed    Past Surgical History:   Procedure Laterality Date    LYMPH NODE EXCISION Right 11/16/2023    Procedure: RIGHT INGUINAL NODE SUPERFICIAL DISSECTION;  Surgeon: Davon Valera M.D.;  Location: SURGERY [facility];  Service: General    NODE BIOPSY SENTINEL Bilateral 10/20/2020    Procedure: BIOPSY, LYMPH NODE, SENTINEL- GROIN;  Surgeon: Davon Valera M.D.;  Location: SURGERY SAME DAY [facility];  Service: General    WIDE EXCISION Right 10/20/2020    Procedure: WIDE EXCISION, LESION- BUTTOCKS, RADICAL MALIGNANT MELANOMA;  Surgeon: Davon Valera M.D.;  Location: SURGERY SAME DAY [facility];  Service: General    ANTROSTOMY Bilateral 11/15/2019    Procedure: MAXILLARY ANTROSTOMY- REVISION ENDOSCOPICMOMETASONE INJECTION, PROPEL STENT PLACEMENT;  Surgeon: Felicitas Tenorio M.D.;  Location: [facility];  Service: Ent    SINUSCOPY Bilateral 11/15/2019    Procedure: ENDOSCOPY, PARANASAL SINUSES- FOR FRONTAL EXPLORATION;  Surgeon: Felicitas Tenorio M.D.;  Location: [facility];  Service: Ent    TURBINOPLASTY Bilateral 11/15/2019    Procedure: TURBINOPLASTY;  Surgeon: Felicitas Tenorio M.D.;  Location: [facility];  Service: Ent    SPHENOIDECTOMY Bilateral 11/15/2019    Procedure: SPHENOIDECTOMY- FOR SPHENOIDOTOMY;  Surgeon: Felicitas Tenorio M.D.;  Location: [facility];  Service: Ent    ETHMOIDECTOMY Bilateral 11/15/2019    Procedure: ETHMOIDECTOMY- ENDOSCOPIC;  Surgeon: Felicitas Tenorio M.D.;  Location: [facility];  Service: Ent    NASAL POLYPECTOMY Bilateral 11/15/2019    Procedure: POLYPECTOMY, NASAL CAVITY;  Surgeon: Felicitas Tenorio M.D.;  Location: [facility];  Service: Ent    NASAL  POLYPECTOMY  2015, 2017, 2019    x 3    OTHER  11/20    removed melanoma     Social History     Tobacco Use    Smoking status: Never    Smokeless tobacco: Never   Substance Use Topics    Alcohol use: Yes     Alcohol/week: 0.6 oz     Types: 1 Cans of beer per week     Comment: reports 4/month     No chief complaint on file.    Diagnosis: Acute encephalopathy [G93.40]    Unit where seen by Wound Team: 24/01     WOUND FOLLOW UP RELATED TO:  Coccyx, bi-lateral heels       WOUND TEAM PLAN OF CARE - Frequency of Follow-up:   Nursing to follow dressing orders written for wound care. Contact wound team if area fails to progress, deteriorates or with any questions/concerns if something comes up before next scheduled follow up (See below as to whether wound is following and frequency of wound follow up)  Dressing changes by wound team:                   Weekly - wednesdays    WOUND HISTORY:       Patient admitted 3/28 with diagnosis of acute encephalopathy, He has history of melanoma with mets to multiple areas. Pt arrived with multiple areas of Pressure injuries including bi-lateral heels, coccyx, back, and neck. Back has resolved still injuries present on the coccyx, neck and bilateral heels.        WOUND ASSESSMENT/LDA  Wound 03/09/24 Pressure Injury Coccyx POA PI (Active)   Date First Assessed/Time First Assessed: 03/09/24 2230   Present on Original Admission: Yes  Hand Hygiene Completed: Yes  Primary Wound Type: Pressure Injury  Location: Coccyx  Wound Description (Comments): POA PI      Assessments 4/1/2024  4:00 PM   Wound Image     Treatments Site care;Offloading   Wound Cleansing Approved Wound Cleanser   Dressing Status Clean;Dry;Intact   Dressing Changed Changed   Dressing Options Hydrofera Blue Classic   Dressing Change/Treatment Frequency Every 72 hrs, and As Needed   NEXT Dressing Change/Treatment Date 04/04/24   Wound Length (cm) 0.8 cm   Wound Width (cm) 0.5 cm   Wound Surface Area (cm^2) 0.4 cm^2   Wound  Bed Slough (%) 100 %   WOUND NURSE ONLY - Time Spent with Patient (mins) 30       Wound 03/09/24 Pressure Injury Heel Left POA stage 2 (Active)   Date First Assessed/Time First Assessed: 03/09/24 2230   Present on Original Admission: Yes  Primary Wound Type: Pressure Injury  Location: Heel  Laterality: Left  Wound Description (Comments): POA stage 2      Assessments 4/1/2024  4:00 PM   Wound Image         Wound 03/09/24 Pressure Injury Heel Right POA DTI (Active)   Date First Assessed/Time First Assessed: 03/09/24 (c) 2230   Present on Original Admission: Yes  Primary Wound Type: Pressure Injury  Location: Heel  Laterality: Right  Wound Description (Comments): POA DTI      Assessments 4/1/2024  4:00 PM   Wound Image         Wound 03/09/24 Pressure Injury Back Mid;Upper POA sDTI (Active)   Date First Assessed/Time First Assessed: 03/09/24 1225   Present on Original Admission: Yes  Primary Wound Type: Pressure Injury  Location: Back  Wound Orientation: Mid;Upper  Wound Description (Comments): POA sDTI      Assessments 3/29/2024 12:25 PM   Site Assessment Red   Periwound Assessment Blanchable erythema        Vascular:    NAYANA:   No results found.    Lab Values:    Lab Results   Component Value Date/Time    WBC 3.0 (L) 04/01/2024 06:19 AM    RBC 3.12 (L) 04/01/2024 06:19 AM    HEMOGLOBIN 8.3 (L) 04/01/2024 06:19 AM    HEMATOCRIT 27.3 (L) 04/01/2024 06:19 AM    HBA1C 4.3 03/29/2024 06:10 AM         Culture Results show:  No results found for this or any previous visit (from the past 720 hour(s)).    Pain Level/Medicated:  Patient denies pain       INTERVENTIONS BY WOUND TEAM:  Chart and images reviewed. Discussed with bedside RN. All areas of concern (based on picture review, LDA review and discussion with bedside RN) have been thoroughly assessed. Documentation of areas based on significant findings. This RN in to assess patient. Performed standard wound care which includes appropriate positioning, dressing removal and  non-selective debridement. Pictures and measurements obtained weekly if/when required.    Wound:  coccyx  Cleansed/Non-selectively Debrided with:  Wound cleanser and Gauze  Primary Dressing:  hydrafera blue  Secondary (Outer) Dressing: offloading    Advanced Wound Care Discharge Planning  Number of Clinicians necessary to complete wound care: 1  Is patient requiring IV pain medications for dressing changes:  No   Length of time for dressing change 20 min. (This does not include chart review, pre-medication time, set up, clean up or time spent charting.)    Interdisciplinary consultation: Patient, Bedside RN (leanne Guerrero .  Pressure injury and staging reviewed with  nick .    EVALUATION / RATIONALE FOR TREATMENT:     Date:  04/01/24  Wound Status:  Wound improving    Unstagable to coccyx shrinking in size , still covered with 100% slough. Continue with current treatment.  Date:  03/29/24  Wound Status:  Initial evaluation    Unstagble PI to coccyx found on admit, pt wife reports that patient has previous flap there from a removal of a melanoma. When the skin is lifted on the coccyx there is and area of unblancing redness and two open areas covered with slough. Hydrafera suzette put on sough to help with debridement and covered with an offloading dressing.   Area of redness to neck that is blanching, use of off loading dressing in place to protect from orthopedic device.   Left heel has a resolving DTI the skin is now translucent with area of blister still present, intact, and blanching, staged as a stage2.  Right heel has DTI dark purple intact blister redness to periwound.          Goals: Steady decrease in wound area and depth weekly.    NURSING PLAN OF CARE ORDERS:  Skin care: See Skin Care orders    NUTRITION RECOMMENDATIONS   Wound Team Recommendations:  N/A     DIET ORDERS (From admission to next 24h)       Start     Ordered    03/29/24 2083  Supplements  ALL MEALS        Question Answer Comment   Which  Supplement Ensure    Ensure: Ensure Plus Carton        03/29/24 1555    03/28/24 1351  Diet Order Diet: Regular  ALL MEALS        Question:  Diet:  Answer:  Regular    03/28/24 1350                    PREVENTATIVE INTERVENTIONS:   Q shift Sachin - performed per nursing policy  Q shift pressure point assessments - performed per nursing policy    Surface/Positioning  Low Airloss - Currently in Place    Anticipated discharge plans:  Self/Family Care        Vac Discharge Needs:  Vac Discharge plan is purely a recommendation from wound team and not a requirement for discharge unless otherwise stated by physician.  Not Applicable Pt not on a wound vac

## 2024-04-01 NOTE — THERAPY
Physical Therapy   Daily Treatment     Patient Name: Andrew Wall  Age:  58 y.o., Sex:  male  Medical Record #: [id number]  Today's Date: 4/1/2024     Precautions  Precautions: Fall Risk, Cervical Collar  , Spinal / Back Precautions   Comments: C2 fx, CHEMO Prec    Subjective    Patient pleasant and motivated to participate. Reports significant fatigue today but still willing to participate fully.     Objective       04/01/24 0931   PT Charge Group   PT Gait Training (Units) 1   PT Therapeutic Exercise (Units) 2   PT Neuromuscular Re-Education / Balance (Units) 1   PT Total Time Spent   PT Individual Total Time Spent (Mins) 60   Gait Functional Level of Assist    Gait Level Of Assist Standby Assist   Assistive Device 4 Wheel Walker   Distance (Feet) 85   # of Times Distance was Traveled 3  (seated rest break between bouts)   Deviation Decreased Base Of Support;Bradykinetic;Shuffled Gait   Transfer Functional Level of Assist   Bed, Chair, Wheelchair Transfer Standby Assist   Bed Chair Wheelchair Transfer Description Initial preparation for task;Set-up of equipment;Supervision for safety  (stand step, no AD)   Bed Mobility    Sit to Supine Standby Assist   Sit to Stand Standby Assist   Neuro-Muscular Treatments   Comments   Forward and retro gait without UE support (in // bars for safety) 3 sets of 2 x 10' each (CGA)    Lateral stepping 3 sets of 2 x 10' each direction (B UE support on // bars; CGA)    Mini-squats 2 x 5  -touch down to elevated mat table    Heel/toe raises to encourage ant/post weight-shifting and challenge balance reactions 3 x 6 each     Interdisciplinary Plan of Care Collaboration   IDT Collaboration with  Physical Therapist   Patient Position at End of Therapy In Bed;Call Light within Reach;Tray Table within Reach;Phone within Reach   Collaboration Comments Collaborated with PT re: increased fatigue today     BP (in sitting): 98/69 (asymptomatic other than fatigue)       04/01/24 1531   PT  Charge Group   PT Gait Training (Units) 1   PT Therapeutic Exercise (Units) 1   PT Total Time Spent   PT Individual Total Time Spent (Mins) 30   Gait Functional Level of Assist    Gait Level Of Assist Standby Assist   Assistive Device 4 Wheel Walker  (outside on sidewalk, over sidewalk cut-outs and up/down small ramp)   Distance (Feet) 80   # of Times Distance was Traveled 3   Deviation Decreased Base Of Support;Bradykinetic;Shuffled Gait   Transfer Functional Level of Assist   Bed, Chair, Wheelchair Transfer Standby Assist   Bed Chair Wheelchair Transfer Description Supervision for safety;Increased time  (Stand step no AD)   Bed Mobility    Sit to Supine Standby Assist   Sit to Stand Standby Assist   Interdisciplinary Plan of Care Collaboration   IDT Collaboration with  Family / Caregiver   Patient Position at End of Therapy In Bed;Call Light within Reach;Tray Table within Reach;Phone within Reach;Family / Friend in Room   Collaboration Comments Patient's spouse present and very supportive       Assessment    Patient with significant increase in fatigue today, requiring several minutes rest between all activities. Educated on importance of pacing activity and utilizing rest periods between therapy sessions to recover. Patient remains very motivated to participate.     Strengths: Able to follow instructions, Alert and oriented, Effective communication skills, Good carryover of learning, Good insight into deficits/needs, Manages pain appropriately, Motivated for self care and independence, Pleasant and cooperative, Supportive family, Willingly participates in therapeutic activities  Barriers: Decreased endurance, Fatigue, Generalized weakness, Home accessibility, Impaired activity tolerance, Impaired balance, Limited mobility, Pain    Plan    Progressive gait and balance c/ decreased UE support  Hip strengthening (ABD, ext)  Continue foam and trial incline foam balance, step up/downs, static holds to tolerance    Pacing and energy conservation strategies in consideration of active chemo tx.     DME  PT DME Recommendations  Wheelchair:  (N/A, pt has one)  Assistive Device: 4-Wheel Walker (pt owns this)  Additional Equipment: Hospital Bed (See other comments) (pt owns)    Passport items to be completed:  Get in/out of bed safely, in/out of a vehicle, safely use mobility device, walk or wheel around home/community, navigate up and down stairs, show how to get up/down from the ground, ensure home is accessible, demonstrate HEP, complete caregiver training    Physical Therapy Problems (Active)       Problem: Mobility       Dates: Start:  03/29/24         Goal: STG-Within one week, patient will propel wheelchair household distances 150 ft with SPV to facilitate functional independence.       Dates: Start:  03/29/24            Goal: STG-Within one week, patient will ambulate household distance 150 ft x2 with 4WW and SBA to access home.       Dates: Start:  03/29/24            Goal: STG-Within one week, patient will ascend and descend 2 stairs with 1 HR and CGA to access living room.       Dates: Start:  03/29/24               Problem: Mobility Transfers       Dates: Start:  03/29/24         Goal: STG-Within one week, patient will transfer bed to chair with 4WW and SBA to facilitate functional independence.       Dates: Start:  03/29/24               Problem: PT-Long Term Goals       Dates: Start:  03/29/24         Goal: LTG-By discharge, patient will ambulate 250 ft x2 over even and uneven surfaces with 4WW and SPV to access community.       Dates: Start:  03/29/24            Goal: LTG-By discharge, patient will transfer one surface to another with 4WW and SPV to facilitate functional independence.       Dates: Start:  03/29/24            Goal: LTG-By discharge, patient will ambulate up/down 2 stairs with 1 HR and SPV to access living room.       Dates: Start:  03/29/24            Goal: LTG-By discharge, patient will transfer  in/out of a car with 4WW and SBA after set up to facilitate functional independence.       Dates: Start:  03/29/24

## 2024-04-01 NOTE — PROGRESS NOTES
Physical Medicine & Rehabilitation Progress Note    Encounter Date: 4/1/2024    Chief Complaint: Decreased mobility, weakness    Interval Events (Subjective):  Patient sitting up in room. He reports therapy is going well. He reports pain is an issue at times but OK at rest. Reviewed AM labs including hyponatremia, anemia, and leukopenia.    Objective:  VITAL SIGNS: BP 98/66   Pulse 69   Temp 36.6 °C (97.9 °F) (Oral)   Resp 17   Wt 65.9 kg (145 lb 4.5 oz)   SpO2 94%   BMI 20.26 kg/m²   Gen: NAD  Psych: Mood and affect appropriate  CV: RRR, 0 edema  Resp: CTAB, no upper airway sounds  Abd: NTND  Neuro: AOx4, following commands, in  C collar    Laboratory Values:  Recent Results (from the past 72 hour(s))   CBC WITH DIFFERENTIAL    Collection Time: 04/01/24  6:19 AM   Result Value Ref Range    WBC 3.0 (L) 4.8 - 10.8 K/uL    RBC 3.12 (L) 4.70 - 6.10 M/uL    Hemoglobin 8.3 (L) 14.0 - 18.0 g/dL    Hematocrit 27.3 (L) 42.0 - 52.0 %    MCV 87.5 81.4 - 97.8 fL    MCH 26.6 (L) 27.0 - 33.0 pg    MCHC 30.4 (L) 32.3 - 36.5 g/dL    RDW 63.5 (H) 35.9 - 50.0 fL    Platelet Count 330 164 - 446 K/uL    MPV 9.1 9.0 - 12.9 fL    Neutrophils-Polys 61.30 44.00 - 72.00 %    Lymphocytes 19.50 (L) 22.00 - 41.00 %    Monocytes 10.20 0.00 - 13.40 %    Eosinophils 4.00 0.00 - 6.90 %    Basophils 1.70 0.00 - 1.80 %    Immature Granulocytes 3.30 (H) 0.00 - 0.90 %    Nucleated RBC 0.00 0.00 - 0.20 /100 WBC    Neutrophils (Absolute) 1.86 1.82 - 7.42 K/uL    Lymphs (Absolute) 0.59 (L) 1.00 - 4.80 K/uL    Monos (Absolute) 0.31 0.00 - 0.85 K/uL    Eos (Absolute) 0.12 0.00 - 0.51 K/uL    Baso (Absolute) 0.05 0.00 - 0.12 K/uL    Immature Granulocytes (abs) 0.10 0.00 - 0.11 K/uL    NRBC (Absolute) 0.00 K/uL   Basic Metabolic Panel    Collection Time: 04/01/24  6:19 AM   Result Value Ref Range    Sodium 134 (L) 135 - 145 mmol/L    Potassium 4.0 3.6 - 5.5 mmol/L    Chloride 107 96 - 112 mmol/L    Co2 19 (L) 20 - 33 mmol/L    Glucose 110 (H) 65 -  99 mg/dL    Bun 9 8 - 22 mg/dL    Creatinine 0.37 (L) 0.50 - 1.40 mg/dL    Calcium 8.0 (L) 8.5 - 10.5 mg/dL    Anion Gap 8.0 7.0 - 16.0   MAGNESIUM    Collection Time: 04/01/24  6:19 AM   Result Value Ref Range    Magnesium 1.8 1.5 - 2.5 mg/dL   ESTIMATED GFR    Collection Time: 04/01/24  6:19 AM   Result Value Ref Range    GFR (CKD-EPI) 128 >60 mL/min/1.73 m 2       Medications:  Scheduled Medications   Medication Dose Frequency    vitamin D3  1,000 Units DAILY    midodrine  7.5 mg TID WITH MEALS    senna-docusate  2 Tablet BID    omeprazole  20 mg DAILY    Encorafenib  300 mg DAILY AT 1800    And    Binimetinib  30 mg BID    enoxaparin (LOVENOX) injection  40 mg DAILY AT 1800    gabapentin  100 mg DAILY    megestrol  800 mg DAILY    mometasone-formoterol  2 Puff BID    morphine ER  45 mg Q12HRS     PRN medications: Respiratory Therapy Consult, hydrALAZINE, acetaminophen, senna-docusate **AND** polyethylene glycol/lytes, docusate sodium, magnesium hydroxide, mag hydrox-al hydrox-simeth, ondansetron **OR** ondansetron, traZODone, sodium chloride, oxyCODONE immediate-release **OR** oxyCODONE immediate-release, midazolam    Diet:  Current Diet Order   Procedures    Diet Order Diet: Regular       Medical Decision Making and Plan:  Acute metabolic encephalopathy - Patient brought to ER in shock with negative infectious work-up thought to be 2/2 chemotherapy agents. Oncology consulted and reduced medications.  -PT and OT for mobility and ADLs. Per guidelines, 15 hours per week between PT, OT and/or SLP.  -Follow-up Oncology.      Hypotension - He was started on Midodrine 5 mg TID. SBP into 90s, increase to 7.5. SBP into 80s, increase to 10     C2 fracture - Patient in C collar. Follow-up NSG     Multiple L spine fractures - Managed non-operatively as significant metastatic disease in L and S spine.      Metastatic melanoma - Patient on Braftovi 300 mg daily and Mektovi 30 mg BID per Oncology. On Megace for appetite  stimulant     Respiratory failure - Patient on Dulera     Anemia - Check AM CBC - 7.9, will monitor. Repeat 8.3 on 4/1, will monitor     Leukopenia - Check AM CBC, on chemotherapy - 3.6 on admission, ANC 2.4, will monitor. Repeat WBC 3.0, will monitor     Hyponatremia - Check AM CMP - 135, will recheck 4/1/24 - 134, will monitor     Vitamin D deficiency - 20 on admission, start 1000 U     Pain - Patient on Morphine 45 mg ER BID, Gabapentin 100 mg daily and PRN Oxycodone/Tylenol. Continue Morphine 45 mg ER BID and Gabapentin 100 mg daily, may be contributing to low SBP     Skin - Patient at risk for skin breakdown due to debility in areas including sacrum, achilles, elbows and head in addition to other sites. Nursing to assess skin daily.      GI Ppx - Patient on Prilosec for GERD prophylaxis. Patient on Senna-docusate for constipation prophylaxis.      DVT Ppx - Patient Lovenox on transfer. Limited ambulation, continue Lovenox  ____________________________________    T. Angelo Cortez MD/PhD  [facility] - Physical Medicine & Rehabilitation   [facility] - Brain Injury Medicine   ____________________________________

## 2024-04-01 NOTE — PROGRESS NOTES
NURSING DAILY NOTE    Name: Andrew Wall   Date of Admission: 3/28/2024   Admitting Diagnosis: Acute encephalopathy  Attending Physician: Antonio Cortez M.d.  Allergies: Augmentin    Safety  Patient Assist     Patient Precautions  Fall Risk, Cervical Collar  , Spinal / Back Precautions   Precaution Comments  C2 fx, CHEMO Prec  Bed Transfer Status  Standby Assist  Toilet Transfer Status   Minimal Assist  Assistive Devices  Wheelchair  Oxygen  None - Room Air  Diet/Therapeutic Dining  Current Diet Order   Procedures    Diet Order Diet: Regular     Pill Administration  whole  Agitated Behavioral Scale     ABS Level of Severity       Fall Risk  Has the patient had a fall this admission?   No  Jodi Leigh Fall Risk Scoring  10, LOW RISK  Fall Risk Safety Measures  chair alarm, poor balance, and low vision/ hearing    Vitals  Temperature: 36.6 °C (97.9 °F)  Temp src: Oral  Pulse: 82  Respiration: 16  Blood Pressure: 92/58  Blood Pressure MAP (Calculated): 69 MM HG  BP Location: Left, Upper Arm  Patient BP Position: Supine     Oxygen  Pulse Oximetry: 100 %  O2 (LPM): 0  O2 Delivery Device: None - Room Air    Bowel and Bladder  Last Bowel Movement  03/29/24  Stool Type  Type 4: Like a sausage or snake, smooth and soft  Bowel Device  Bathroom  Continent  Bladder: Continent void   Bowel: Continent movement  Bladder Function  Urine Void (mL): 200 ml  Number of Times Voided: 1  Urinary Options: Yes  Urine Color: Yellow  Genitourinary Assessment   Bladder Assessment (WDL):  Within Defined Limits  Santiago Catheter: Not Applicable  Urine Color: Yellow  Bladder Device: Urinal  Bladder Scan: Post Void  $ Bladder Scan Results (mL): 124    Skin  Sachin Score   15  Sensory Interventions   Bed Types: Low Airloss  Skin Preventative Measures: Pillows in Use for Support / Positioning  Moisture Interventions  Moisturizers/Barriers: Barrier Wipes      Pain  Pain Rating  Scale  4 - Distracts me, can do usual activities  Pain Location  Head, Neck  Pain Location Orientation  Right  Pain Interventions   Medication (see MAR), Nurse Notified (RN provided patient with pain medication)    ADLs    Bathing   Shower, * * With Assistance from  Linen Change      Personal Hygiene  Moist Ana Wipes  Chlorhexidine Bath      Oral Care     Teeth/Dentures     Shave     Nutrition Percentage Eaten  *  * Meal *  *, Lunch, Between 50-75% Consumed (50%)  Environmental Precautions  Treaded Slipper Socks on Patient, Bed in Low Position  Patient Turns/Positioning  Patient Turns Self from Side to Side  Patient Turns Assistance/Tolerance     Bed Positions  Bed Controls On, Bed Locked  Head of Bed Elevated  Self regulated      Psychosocial/Neurologic Assessment  Psychosocial Assessment  Psychosocial (WDL):  Within Defined Limits  Neurologic Assessment  Neuro (WDL): Exceptions to WDL  Level of Consciousness: Alert  Orientation Level: Oriented X4  Cognition: Appropriate judgement, Appropriate safety awareness, Appropriate attention/concentration, Appropriate for developmental age, Follows commands  Speech: Clear  Motor Function/Sensation Assessment: Sensation  RUE Sensation: Full sensation  LUE Sensation: Full sensation  RLE Sensation: Full sensation  LLE Sensation: Full sensation  EENT (WDL):  Within Defined Limits    Cardio/Pulmonary Assessment  Edema   RLE Edema: 1+  LLE Edema: 1+  Respiratory Breath Sounds  RUL Breath Sounds: Clear  RML Breath Sounds: Clear  RLL Breath Sounds: Diminished  ALKA Breath Sounds: Clear  LLL Breath Sounds: Diminished  Cardiac Assessment   Cardiac (WDL):  Within Defined Limits

## 2024-04-01 NOTE — THERAPY
Occupational Therapy  Daily Treatment     Patient Name: Andrew Wall  Age:  58 y.o., Sex:  male  Medical Record #: [id number]  Today's Date: 4/1/2024     Precautions  Precautions: (P) Fall Risk, Cervical Collar  , Spinal / Back Precautions   Comments: (P) C2 fx, CHEMO Prec    Safety   ADL Safety : Requires Physical Assist for Safety  Bathroom Safety: Requires Physical Assist for Safety    Subjective    Agreeable to tx activity presented this session        Objective       04/01/24 0901   OT Charge Group   OT Therapeutic Exercise (Units) 2   OT Total Time Spent   OT Individual Total Time Spent (Mins) 30   Precautions   Precautions Fall Risk;Cervical Collar  ;Spinal / Back Precautions    Comments C2 fx, CHEMO Prec   Functional Level of Assist   Bed, Chair, Wheelchair Transfer Standby Assist   Sitting Upper Body Exercises   Chest Press 1 set of 10;Right ;Left  (left pink T band  right active assist from therapist)   Bilateral Row 1 set of 10;Bilateral  (pink  Tband)   Upper Extremity Bike Level 2 Resistance  (x 5 minutes   motomed)   Interdisciplinary Plan of Care Collaboration   Patient Position at End of Therapy Seated;Self Releasing Lap Belt Applied  (hand off to Pt for  tx)     Educated and introduce passport to  independence   Sebastian verbalized understanding    Assessment     Limited by  decreased strength/endurance right UE   weaker than the left     Strengths: Able to follow instructions, Alert and oriented, Motivated for self care and independence, Pleasant and cooperative, Supportive family, Willingly participates in therapeutic activities  Barriers: Aspiration risk, Decreased endurance, Fatigue, Generalized weakness, Home accessibility, Impaired activity tolerance, Impaired balance, Limited mobility, Pain    Plan     ADL IADL , related mobility  strength/endurance building  standing tolerance and balance activity  incorporating   C spine precautions.     DME  OT DME Recommendations  Bathroom Equipment:  ((pt  owns hospital bed), hip kit (educated pt/spouse on 3/31, directed to [employer] to purchase), spouse also plans to purchase tub txfr bench)  Additional Equipment: Hospital Bed (See other comments) (pt owns)        Occupational Therapy Goals (Active)       Problem: Dressing       Dates: Start:  03/29/24         Goal: STG-Within one week, patient will dress UB at Min A level including C-collar management.       Dates: Start:  03/29/24            Goal: STG-Within one week, patient will dress LB at CGA level using DME/AE PRN.       Dates: Start:  03/29/24               Problem: Functional Transfers       Dates: Start:  03/29/24         Goal: STG-Within one week, patient will transfer to toilet at SBA level using DME PRN.       Dates: Start:  03/29/24            Goal: STG-Within one week, patient will transfer to tub/shower at SBA level using DME PRN.       Dates: Start:  03/29/24               Problem: OT Long Term Goals       Dates: Start:  03/29/24         Goal: LTG-By discharge, patient will complete basic self care tasks at SBA-Mod I level using DME/AE PRN.       Dates: Start:  03/29/24            Goal: LTG-By discharge, patient will perform bathroom transfers at SBA-Mod I level using DME/AE PRN.       Dates: Start:  03/29/24               Problem: Toileting       Dates: Start:  03/29/24         Goal: STG-Within one week, patient will complete toileting tasks at SBA level.       Dates: Start:  03/29/24

## 2024-04-01 NOTE — PROGRESS NOTES
Received bedside shift report from Orlando LOUISE RN regarding patient and assumed care. Patient awake, calm and stable, currently positioned in bed for comfort and safety; call light within reach. Denies pain or discomfort at this time. Will continue to monitor.

## 2024-04-01 NOTE — FLOWSHEET NOTE
04/01/24 0648   Events/Summary/Plan   Events/Summary/Plan mdi   Vital Signs   Pulse 85   Respiration 16   Pulse Oximetry 99 %   $ Pulse Oximetry (Spot Check) Yes   Respiratory Assessment   Level of Consciousness Alert   Chest Exam   Work Of Breathing / Effort Within Normal Limits   Breath Sounds   RUL Breath Sounds Clear   RML Breath Sounds Clear   RLL Breath Sounds Clear   ALKA Breath Sounds Clear   LLL Breath Sounds Clear   Oxygen   O2 Delivery Device Room air w/o2 available

## 2024-04-01 NOTE — PROGRESS NOTES
NURSING DAILY NOTE    Name: Andrew Wall   Date of Admission: 3/28/2024   Admitting Diagnosis: Acute encephalopathy  Attending Physician: Antonio Cortez M.d.  Allergies: Augmentin    Safety  Patient Assist     Patient Precautions  Fall Risk, Cervical Collar  , Spinal / Back Precautions   Precaution Comments  C2 fx, CHEMO Prec  Bed Transfer Status  Standby Assist  Toilet Transfer Status   Minimal Assist  Assistive Devices  Wheelchair  Oxygen  None - Room Air  Diet/Therapeutic Dining  Current Diet Order   Procedures    Diet Order Diet: Regular     Pill Administration  whole  Agitated Behavioral Scale     ABS Level of Severity       Fall Risk  Has the patient had a fall this admission?   No  Jodi Leigh Fall Risk Scoring  10, LOW RISK  Fall Risk Safety Measures  bed alarm    Vitals  Temperature: 37.1 °C (98.7 °F)  Temp src: Oral  Pulse: 81  Respiration: 18  Blood Pressure: 99/56  Blood Pressure MAP (Calculated): 70 MM HG  BP Location: Left, Upper Arm  Patient BP Position: Supine     Oxygen  Pulse Oximetry: 98 %  O2 (LPM): 0  O2 Delivery Device: None - Room Air    Bowel and Bladder  Last Bowel Movement  03/29/24  Stool Type  Type 4: Like a sausage or snake, smooth and soft  Bowel Device  Bathroom, Other (Comment) (Bowel Meds)  Continent  Bladder: Continent void   Bowel: Continent movement  Bladder Function  Urine Void (mL): 200 ml  Number of Times Voided: 1  Urinary Options: Yes  Urine Color: Yellow  Genitourinary Assessment   Bladder Assessment (WDL):  Within Defined Limits  Santiago Catheter: Not Applicable  Urine Color: Yellow  Bladder Device: Urinal  Bladder Scan: Post Void  $ Bladder Scan Results (mL): 124    Skin  Sachin Score   15  Sensory Interventions   Bed Types: Low Airloss  Skin Preventative Measures: Pillows in Use for Support / Positioning  Moisture Interventions  Moisturizers/Barriers: Barrier Paste, Barrier Wipes      Pain  Pain  Rating Scale  3 - Sometimes distracts me  Pain Location  Neck  Pain Location Orientation  Anterior, Posterior  Pain Interventions   Rest    ADLs    Bathing   Shower, * * With Assistance from  Linen Change      Personal Hygiene  Moist Ana Wipes  Chlorhexidine Bath      Oral Care     Teeth/Dentures     Shave     Nutrition Percentage Eaten  *  * Meal *  *, Lunch, Between 50-75% Consumed  Environmental Precautions  Treaded Slipper Socks on Patient, Bed in Low Position  Patient Turns/Positioning  Patient Turns Self from Side to Side  Patient Turns Assistance/Tolerance     Bed Positions  Bed Controls On, Bed Locked  Head of Bed Elevated  Self regulated      Psychosocial/Neurologic Assessment  Psychosocial Assessment  Psychosocial (WDL):  Within Defined Limits  Neurologic Assessment  Neuro (WDL): Exceptions to WDL  Level of Consciousness: Alert  Orientation Level: Oriented X4  Cognition: Appropriate judgement, Appropriate safety awareness, Appropriate attention/concentration, Follows commands  Speech: Clear  Motor Function/Sensation Assessment: Sensation  RUE Sensation: Full sensation  LUE Sensation: Full sensation  RLE Sensation: Full sensation  LLE Sensation: Full sensation  EENT (WDL):  Within Defined Limits    Cardio/Pulmonary Assessment  Edema   RLE Edema: 1+  LLE Edema: 1+  Respiratory Breath Sounds  RUL Breath Sounds: Clear  RML Breath Sounds: Clear  RLL Breath Sounds: Clear  ALKA Breath Sounds: Clear  LLL Breath Sounds: Clear  Cardiac Assessment   Cardiac (WDL):  WDL Except (H/O HTN.)

## 2024-04-01 NOTE — CARE PLAN
Problem: Knowledge Deficit - Standard  Goal: Patient and family/care givers will demonstrate understanding of plan of care, disease process/condition, diagnostic tests and medications  Outcome: Progressing  Note: Pt agrees with plan of care tonight regarding medications and safety.  Will continue to monitor patient.     Problem: Pain - Standard  Goal: Alleviation of pain or a reduction in pain to the patient’s comfort goal  Outcome: Progressing  Note: C/o pain to neck, medicated with scheduled Ms Contin ER.  Has + relief.  See MAR and doc flow sheet.  Will continue to monitor patient.     The patient is Stable - Low risk of patient condition declining or worsening    Shift Goals  Clinical Goals: Safety  Patient Goals: Sleep well  Family Goals: no one present    Progress made toward(s) clinical / shift goals:  progressing

## 2024-04-01 NOTE — FLOWSHEET NOTE
03/31/24 1721   Events/Summary/Plan   Events/Summary/Plan MDI given pt on RA shanika well   Vital Signs   Pulse 96   Respiration 18   Pulse Oximetry 98 %   $ Pulse Oximetry (Spot Check) Yes   Respiratory Assessment   Respiratory Pattern Within Normal Limits   Level of Consciousness Alert   Chest Exam   Work Of Breathing / Effort Within Normal Limits   Secretions   Cough Non Productive   Oxygen   O2 Delivery Device None - Room Air

## 2024-04-01 NOTE — CARE PLAN
Problem: Knowledge Deficit - Standard  Goal: Patient and family/care givers will demonstrate understanding of plan of care, disease process/condition, diagnostic tests and medications  Outcome: Progressing     Problem: Skin Integrity  Goal: Patient's skin integrity will be maintained or improve  Note: Patient's skin is maintained and free from new or accidental injury this shift.  Will continue to monitor.   The patient is Watcher - Medium risk of patient condition declining or worsening    Shift Goals  Clinical Goals: Safety  Patient Goals: sleep well  Family Goals: no one present

## 2024-04-02 ENCOUNTER — APPOINTMENT (OUTPATIENT)
Dept: PHYSICAL THERAPY | Facility: REHABILITATION | Age: 59
DRG: 070 | End: 2024-04-02
Attending: PHYSICAL MEDICINE & REHABILITATION
Payer: COMMERCIAL

## 2024-04-02 ENCOUNTER — APPOINTMENT (OUTPATIENT)
Dept: OCCUPATIONAL THERAPY | Facility: REHABILITATION | Age: 59
DRG: 070 | End: 2024-04-02
Attending: PHYSICAL MEDICINE & REHABILITATION
Payer: COMMERCIAL

## 2024-04-02 ENCOUNTER — APPOINTMENT (OUTPATIENT)
Dept: INPATIENT REHAB | Facility: REHABILITATION | Age: 59
DRG: 070 | End: 2024-04-02
Payer: COMMERCIAL

## 2024-04-02 PROCEDURE — 97110 THERAPEUTIC EXERCISES: CPT

## 2024-04-02 PROCEDURE — 700102 HCHG RX REV CODE 250 W/ 637 OVERRIDE(OP): Performed by: PHYSICAL MEDICINE & REHABILITATION

## 2024-04-02 PROCEDURE — 700101 HCHG RX REV CODE 250: Performed by: PHYSICAL MEDICINE & REHABILITATION

## 2024-04-02 PROCEDURE — 97530 THERAPEUTIC ACTIVITIES: CPT | Mod: CO

## 2024-04-02 PROCEDURE — 97116 GAIT TRAINING THERAPY: CPT

## 2024-04-02 PROCEDURE — 97112 NEUROMUSCULAR REEDUCATION: CPT

## 2024-04-02 PROCEDURE — 700111 HCHG RX REV CODE 636 W/ 250 OVERRIDE (IP): Mod: JZ | Performed by: PHYSICAL MEDICINE & REHABILITATION

## 2024-04-02 PROCEDURE — A9270 NON-COVERED ITEM OR SERVICE: HCPCS | Performed by: PHYSICAL MEDICINE & REHABILITATION

## 2024-04-02 PROCEDURE — 770010 HCHG ROOM/CARE - REHAB SEMI PRIVAT*

## 2024-04-02 PROCEDURE — 94640 AIRWAY INHALATION TREATMENT: CPT

## 2024-04-02 PROCEDURE — 97530 THERAPEUTIC ACTIVITIES: CPT

## 2024-04-02 PROCEDURE — 94760 N-INVAS EAR/PLS OXIMETRY 1: CPT

## 2024-04-02 RX ADMIN — MIDODRINE HYDROCHLORIDE 10 MG: 10 TABLET ORAL at 18:11

## 2024-04-02 RX ADMIN — SENNOSIDES AND DOCUSATE SODIUM 2 TABLET: 8.6; 5 TABLET ORAL at 21:10

## 2024-04-02 RX ADMIN — ACETAMINOPHEN 650 MG: 325 TABLET ORAL at 13:48

## 2024-04-02 RX ADMIN — OMEPRAZOLE 20 MG: 20 CAPSULE, DELAYED RELEASE ORAL at 08:36

## 2024-04-02 RX ADMIN — Medication 1000 UNITS: at 08:37

## 2024-04-02 RX ADMIN — POLYETHYLENE GLYCOL 3350 1 PACKET: 17 POWDER, FOR SOLUTION ORAL at 08:42

## 2024-04-02 RX ADMIN — SENNOSIDES AND DOCUSATE SODIUM 2 TABLET: 8.6; 5 TABLET ORAL at 08:37

## 2024-04-02 RX ADMIN — MORPHINE SULFATE 45 MG: 30 TABLET, FILM COATED, EXTENDED RELEASE ORAL at 21:10

## 2024-04-02 RX ADMIN — MIDODRINE HYDROCHLORIDE 10 MG: 10 TABLET ORAL at 12:15

## 2024-04-02 RX ADMIN — GABAPENTIN 100 MG: 100 CAPSULE ORAL at 08:36

## 2024-04-02 RX ADMIN — MORPHINE SULFATE 45 MG: 30 TABLET, FILM COATED, EXTENDED RELEASE ORAL at 08:37

## 2024-04-02 RX ADMIN — MOMETASONE FUROATE AND FORMOTEROL FUMARATE DIHYDRATE 2 PUFF: 200; 5 AEROSOL RESPIRATORY (INHALATION) at 21:14

## 2024-04-02 RX ADMIN — MEGESTROL ACETATE 800 MG: 40 SUSPENSION ORAL at 08:36

## 2024-04-02 RX ADMIN — MIDODRINE HYDROCHLORIDE 10 MG: 10 TABLET ORAL at 08:37

## 2024-04-02 RX ADMIN — ENOXAPARIN SODIUM 40 MG: 100 INJECTION SUBCUTANEOUS at 18:11

## 2024-04-02 RX ADMIN — MOMETASONE FUROATE AND FORMOTEROL FUMARATE DIHYDRATE 2 PUFF: 200; 5 AEROSOL RESPIRATORY (INHALATION) at 06:51

## 2024-04-02 ASSESSMENT — GAIT ASSESSMENTS
ASSISTIVE DEVICE: 4 WHEEL WALKER
DISTANCE (FEET): 100
GAIT LEVEL OF ASSIST: STANDBY ASSIST

## 2024-04-02 NOTE — PROGRESS NOTES
NURSING DAILY NOTE    Name: Andrew Wall   Date of Admission: 3/28/2024   Admitting Diagnosis: Acute encephalopathy  Attending Physician: Antonio Cortez M.d.  Allergies: Augmentin    Safety  Patient Assist     Patient Precautions  Fall Risk, Cervical Collar  , Spinal / Back Precautions   Precaution Comments  fatigues easily, C2 Fx  Bed Transfer Status  Standby Assist  Toilet Transfer Status   Minimal Assist  Assistive Devices  Wheelchair  Oxygen  None - Room Air  Diet/Therapeutic Dining  Current Diet Order   Procedures    Diet Order Diet: Regular     Pill Administration  whole  Agitated Behavioral Scale     ABS Level of Severity       Fall Risk  Has the patient had a fall this admission?   No  Jodi Leigh Fall Risk Scoring  10, LOW RISK  Fall Risk Safety Measures  chair alarm, poor balance, and low vision/ hearing    Vitals  Temperature: 36.6 °C (97.8 °F)  Temp src: Oral  Pulse: 74  Respiration: 20  Blood Pressure: 98/60  Blood Pressure MAP (Calculated): 73 MM HG  BP Location: Right, Upper Arm  Patient BP Position: Supine     Oxygen  Pulse Oximetry: 98 %  O2 (LPM): 0  O2 Delivery Device: None - Room Air    Bowel and Bladder  Last Bowel Movement  03/29/24  Stool Type  Type 4: Like a sausage or snake, smooth and soft  Bowel Device  Bathroom  Continent  Bladder: Continent void   Bowel: Continent movement  Bladder Function  Urine Void (mL): 150 ml  Number of Times Voided: 1  Urinary Options: Yes  Urine Color: Unable To Evaluate  Genitourinary Assessment   Bladder Assessment (WDL):  Within Defined Limits  Santiago Catheter: Not Applicable  Urine Color: Unable To Evaluate  Bladder Device: Urinal  Bladder Scan: Post Void  $ Bladder Scan Results (mL): 102    Skin  Sachin Score   15  Sensory Interventions   Bed Types: Low Airloss  Skin Preventative Measures: Pillows in Use for Support / Positioning  Moisture Interventions  Moisturizers/Barriers: Barrier  Wipes      Pain  Pain Rating Scale  4 - Distracts me, can do usual activities  Pain Location  Head, Neck  Pain Location Orientation  Right  Pain Interventions   Rest    ADLs    Bathing   Shower, * * With Assistance from  Linen Change      Personal Hygiene  Moist Ana Wipes  Chlorhexidine Bath      Oral Care     Teeth/Dentures     Shave     Nutrition Percentage Eaten  Between 25-50% Consumed  Environmental Precautions  Treaded Slipper Socks on Patient, Personal Belongings, Wastebasket, Call Bell etc. in Easy Reach, Bed in Low Position  Patient Turns/Positioning  Patient Turns Self from Side to Side  Patient Turns Assistance/Tolerance     Bed Positions  Bed Controls On, Bed Locked  Head of Bed Elevated  Self regulated      Psychosocial/Neurologic Assessment  Psychosocial Assessment  Psychosocial (WDL):  Within Defined Limits  Neurologic Assessment  Neuro (WDL): Exceptions to WDL  Level of Consciousness: Alert  Orientation Level: Oriented X4  Cognition: Appropriate judgement, Appropriate safety awareness, Appropriate attention/concentration, Appropriate for developmental age, Follows commands  Speech: Clear  Motor Function/Sensation Assessment: Sensation  RUE Sensation: Full sensation  LUE Sensation: Full sensation  RLE Sensation: Full sensation  LLE Sensation: Full sensation  EENT (WDL):  Within Defined Limits    Cardio/Pulmonary Assessment  Edema   RLE Edema: 1+  LLE Edema: 1+  Respiratory Breath Sounds  RUL Breath Sounds: Clear  RML Breath Sounds: Clear  RLL Breath Sounds: Diminished  ALKA Breath Sounds: Clear  LLL Breath Sounds: Diminished  Cardiac Assessment   Cardiac (WDL):  Within Defined Limits

## 2024-04-02 NOTE — THERAPY
Physical Therapy   Daily Treatment     Patient Name: Andrew Wall  Age:  58 y.o., Sex:  male  Medical Record #: [id number]  Today's Date: 4/2/2024     Precautions  Precautions: Fall Risk, Cervical Collar  , Spinal / Back Precautions   Comments: fatigues easily, C2 Fx    Subjective    Patient is fatigued but feeling less shoulder pain after OT session      Objective       04/02/24 1403   PT Charge Group   PT Gait Training (Units) 2   PT Neuromuscular Re-Education / Balance (Units) 1   PT Therapeutic Activities (Units) 1   PT Total Time Spent   PT Individual Total Time Spent (Mins) 60   Precautions   Precautions Fall Risk;Cervical Collar  ;Spinal / Back Precautions    Comments fatigues easily, C2 Fx   Gait Functional Level of Assist    Gait Level Of Assist Standby Assist   Assistive Device 4 Wheel Walker   Distance (Feet) 100  (variety of  surfaces, 1st bout outdoors on ramp, 2nd bout on dirt outdoors, 3rd bout indoors)   # of Times Distance was Traveled 3   Wheelchair Functional Level of Assist   Wheelchair Assist Stand by Assist   Distance Wheelchair (Feet or Distance) 150   Stairs Functional Level of Assist   Level of Assist with Stairs Minimal Assist   # of Stairs Climbed 4   Stairs Description   (6 inch steps - required minimal assist on descent due to LE fatigue)   Bed Mobility    Supine to Sit Standby Assist   Sit to Supine Standby Assist   Sit to Stand Standby Assist   Scooting Standby Assist   Rolling Standby Assist   Neuro-Muscular Treatments   Comments forward walking over chloe steps, lateral walking in bars, backwards walking in bars   Interdisciplinary Plan of Care Collaboration   IDT Collaboration with  Occupational Therapist   Patient Position at End of Therapy In Bed;Bed Alarm On;Call Light within Reach   Collaboration Comments discussed treatment plan and DC planning   Strengths & Barriers   Strengths Pleasant and cooperative;Willingly participates in therapeutic activities;Motivated for self  care and independence   Barriers Generalized weakness;Decreased endurance;Impaired activity tolerance     Assessment    Patient requires multiple seated rest breaks due to fatigue. Especially fatigued by more dynamic balance activities such as walking on uneven surfaces or stepping over objects with limited UE support   Strengths: Pleasant and cooperative, Willingly participates in therapeutic activities, Motivated for self care and independence  Barriers: Generalized weakness, Decreased endurance, Impaired activity tolerance    Plan    Continue with activities that challenge dynamic standing balance/uneven terrain     DME  PT DME Recommendations  Wheelchair:  (N/A, pt has one)  Assistive Device: 4-Wheel Walker (pt owns this)  Additional Equipment: Hospital Bed (See other comments) (pt owns)    Passport items to be completed:  Get in/out of bed safely, in/out of a vehicle, safely use mobility device, walk or wheel around home/community, navigate up and down stairs, show how to get up/down from the ground, ensure home is accessible, demonstrate HEP, complete caregiver training    Physical Therapy Problems (Active)       Problem: Mobility       Dates: Start:  03/29/24         Goal: STG-Within one week, patient will propel wheelchair household distances 150 ft with SPV to facilitate functional independence.       Dates: Start:  03/29/24            Goal: STG-Within one week, patient will ambulate household distance 150 ft x2 with 4WW and SBA to access home.       Dates: Start:  03/29/24            Goal: STG-Within one week, patient will ascend and descend 2 stairs with 1 HR and CGA to access living room.       Dates: Start:  03/29/24               Problem: PT-Long Term Goals       Dates: Start:  03/29/24         Goal: LTG-By discharge, patient will ambulate 250 ft x2 over even and uneven surfaces with 4WW and SPV to access community.       Dates: Start:  03/29/24            Goal: LTG-By discharge, patient will transfer  one surface to another with 4WW and SPV to facilitate functional independence.       Dates: Start:  03/29/24            Goal: LTG-By discharge, patient will ambulate up/down 2 stairs with 1 HR and SPV to access living room.       Dates: Start:  03/29/24            Goal: LTG-By discharge, patient will transfer in/out of a car with 4WW and SBA after set up to facilitate functional independence.       Dates: Start:  03/29/24

## 2024-04-02 NOTE — THERAPY
"Occupational Therapy  Daily Treatment     Patient Name: Andrew Wall  Age:  58 y.o., Sex:  male  Medical Record #: [id number]  Today's Date: 4/2/2024     Precautions  Precautions: (P) Fall Risk, Cervical Collar  , Spinal / Back Precautions   Comments: (P) C2 fx, CHEMO Prec    Safety   ADL Safety : Requires Physical Assist for Safety  Bathroom Safety: Requires Physical Assist for Safety    Subjective    \" I'm tired .\"     Objective/Assessment        FWW short distance    ADL kitchen  <->  ADL bathroom   with SBA       Though reporting being tired  tolerated  tx no complaints     Strengths: Able to follow instructions, Alert and oriented, Motivated for self care and independence, Pleasant and cooperative, Supportive family, Willingly participates in therapeutic activities  Barriers: Aspiration risk, Decreased endurance, Fatigue, Generalized weakness, Home accessibility, Impaired activity tolerance, Impaired balance, Limited mobility, Pain    Plan     ADL IADL , related mobility  strength/endurance building  standing tolerance and balance activity  incorporating   C spine precautions.         DME  OT DME Recommendations  Bathroom Equipment:  ((pt owns hospital bed), hip kit (educated pt/spouse on 3/31, directed to [employer] to purchase), spouse also plans to purchase tub txfr bench)  Additional Equipment: Hospital Bed (See other comments) (pt owns)    Passport items to be completed:      Occupational Therapy Goals (Active)       Problem: Functional Transfers       Dates: Start:  03/29/24         Goal: STG-Within one week, patient will transfer to toilet at SBA level using DME PRN.       Dates: Start:  03/29/24         Goal Note filed on 04/02/24 0828 by Leonid Medina C.O.T.A.        CGA               Goal: STG-Within one week, patient will transfer to tub/shower at SBA level using DME PRN.       Dates: Start:  03/29/24         Goal Note filed on 04/02/24 0828 by Leonid Medina C.O.T.A.        CGA                  " Problem: OT Long Term Goals       Dates: Start:  03/29/24         Goal: LTG-By discharge, patient will complete basic self care tasks at SBA-Mod I level using DME/AE PRN.       Dates: Start:  03/29/24            Goal: LTG-By discharge, patient will perform bathroom transfers at SBA-Mod I level using DME/AE PRN.       Dates: Start:  03/29/24

## 2024-04-02 NOTE — THERAPY
Occupational Therapy  Daily Treatment     Patient Name: Andrew Wall  Age:  58 y.o., Sex:  male  Medical Record #: [id number]  Today's Date: 4/2/2024     Precautions  Precautions: Fall Risk, Cervical Collar  , Spinal / Back Precautions   Comments: C2 fx, CHEMO Prec    Safety   ADL Safety : Requires Physical Assist for Safety  Bathroom Safety: Requires Physical Assist for Safety    Subjective    Patient received semi-supine in bed, alert and agreeable to OT treatment, medically stable and cleared for tx by RN.        Objective       04/02/24 1331   OT Charge Group   Charges Yes   OT Therapeutic Exercise (Units) 2   OT Total Time Spent   OT Individual Total Time Spent (Mins) 30   Functional Level of Assist   Bed, Chair, Wheelchair Transfer Standby Assist   Bed Chair Wheelchair Transfer Description   (Pivots between bed, wheelchair, and mat in gym)   Supine Upper Body Exercises   Supine Upper Body Exercises Yes   Other Exercise   (Side-lying gravity eliminated shoulder flexion with assist for scapular mobility. PROM of scapula prior to initiating exercise to assist with scapulo-humeral rhythm mechanics. Discussed integration of isometric and side-lying shoulder flexion to HEP.)   Sitting Upper Body Exercises   Sitting Upper Body Exercises Yes   Comments   (Seated scapular mobilization and trigger point release)   Interdisciplinary Plan of Care Collaboration   IDT Collaboration with  Occupational Therapist  (Discussion with primary OT)   Patient Position at End of Therapy Seated;Self Releasing Lap Belt Applied  (up in gym with PT to continue with therapy)         Assessment    Patient seen for skilled OT treatment focused on neuromuscular re-education and therapeutic exercise targeting scapular mobility and shoulder flexion for functional reaching tasks . Patient with fair tolerance to treatment at this time, limited by pain, generalized weakness/deconditioning and impaired joint mobility/AROM. Patient generally  performing ADLs/functional transfers at SBA-MIN A level and he demo's good safety awareness and sequencing. Patient able to tolerate functional mobility of household distances with use of FWW and manual wheelchair (alternating depending on level of fatigue). OT provided education on TP release and additions to HEP including isometric and gravity eliminated shoulder flexion. Patient will benefit from ongoing OT treatment to address limitations noted above.    Strengths: Able to follow instructions, Alert and oriented, Motivated for self care and independence, Pleasant and cooperative, Supportive family, Willingly participates in therapeutic activities  Barriers: Aspiration risk, Decreased endurance, Fatigue, Generalized weakness, Home accessibility, Impaired activity tolerance, Impaired balance, Limited mobility, Pain    Plan    Continue with scapular mobilization and BUE strengthening in gravity eliminated to assist with progression to overhead reaching (patient may try to coordinate having his wife present for a session to learn stretches/massage that she can assist with)  Get outside as able   Exercises to simulate walking his dog   Overall endurance/activity tolerance    DME  OT DME Recommendations  Bathroom Equipment:  ((pt owns hospital bed), hip kit (educated pt/spouse on 3/31, directed to [employer] to purchase), spouse also plans to purchase tub txfr bench)  Additional Equipment: Hospital Bed (See other comments) (pt owns)    Passport items to be completed:  Perform bathroom transfers, complete dressing, complete feeding, get ready for the day, prepare a simple meal, participate in household tasks, adapt home for safety needs, demonstrate home exercise program, complete caregiver training     Occupational Therapy Goals (Active)       Problem: Functional Transfers       Dates: Start:  03/29/24         Goal: STG-Within one week, patient will transfer to toilet at SBA level using DME PRN.       Dates: Start:   03/29/24         Goal Note filed on 04/02/24 0828 by VERONICA KoenigTRATNA        CGA               Goal: STG-Within one week, patient will transfer to tub/shower at SBA level using DME PRN.       Dates: Start:  03/29/24         Goal Note filed on 04/02/24 0828 by VERONICA KoenigTRATNA        CGA                  Problem: OT Long Term Goals       Dates: Start:  03/29/24         Goal: LTG-By discharge, patient will complete basic self care tasks at SBA-Mod I level using DME/AE PRN.       Dates: Start:  03/29/24            Goal: LTG-By discharge, patient will perform bathroom transfers at SBA-Mod I level using DME/AE PRN.       Dates: Start:  03/29/24

## 2024-04-02 NOTE — CARE PLAN
Problem: Functional Transfers  Goal: STG-Within one week, patient will transfer to toilet at SBA level using DME PRN.  Outcome: Not Met  Note:  CGA   Goal: STG-Within one week, patient will transfer to tub/shower at SBA level using DME PRN.  Outcome: Not Met  Note:  CGA

## 2024-04-02 NOTE — CARE PLAN
Problem: Knowledge Deficit - Standard  Goal: Patient and family/care givers will demonstrate understanding of plan of care, disease process/condition, diagnostic tests and medications  Outcome: Progressing     Problem: Infection  Goal: Patient will remain free from infection  Note: Patient remains free from s/s infection; afebrile.  Will continue to monitor.   The patient is Stable - Low risk of patient condition declining or worsening    Shift Goals  Clinical Goals: Safety  Patient Goals: sleep well, pain control  Family Goals: no one present

## 2024-04-02 NOTE — THERAPY
"Occupational Therapy  Daily Treatment     Patient Name: Andrew Wall  Age:  58 y.o., Sex:  male  Medical Record #: [id number]  Today's Date: 4/2/2024     Precautions  Precautions: (P) Fall Risk, Cervical Collar  , Spinal / Back Precautions   Comments: (P) C2 fx, CHEMO Prec    Safety   ADL Safety : Requires Physical Assist for Safety  Bathroom Safety: Requires Physical Assist for Safety    Subjective    \" That feels better.\" Referring to  scapular ROM  at end of session      Objective/Assessment       04/02/24 1101   OT Charge Group   OT Therapy Activity (Units) 2   OT Total Time Spent   OT Individual Total Time Spent (Mins) 30   Precautions   Precautions Fall Risk;Cervical Collar  ;Spinal / Back Precautions    Comments C2 fx, CHEMO Prec   Functional Level of Assist   Eating Modified Independent   Balance   Standing Balance (Dynamic) Good   Comments in // bars  SBA  reciprocal step and toss x  12 reps  x 4  with one seated rest break    ring toss activity  intermittent placement of  one hand bar for steady assist.   no LOB noted   Interdisciplinary Plan of Care Collaboration   Patient Position at End of Therapy Seated;Call Light within Reach;Tray Table within Reach;Self Releasing Lap Belt Applied  (eating lunch)      Right shoulder ROM  flexion  to approximately  90 degrees    tight and lightly painful.  Assessed scapular mobility   resulted in need for    left UE  scapular mobilization   performed   ROM and stretch for   elevation and upward rotation  with improved mobility noted   Assessment      Strengths: Able to follow instructions, Alert and oriented, Motivated for self care and independence, Pleasant and cooperative, Supportive family, Willingly participates in therapeutic activities  Barriers: Aspiration risk, Decreased endurance, Fatigue, Generalized weakness, Home accessibility, Impaired activity tolerance, Impaired balance, Limited mobility, Pain    Plan    ADL IADL , related mobility strength/endurance " building standing tolerance and balance activity incorporating C spine precautions.     DME  OT DME Recommendations  Bathroom Equipment:  ((pt owns hospital bed), hip kit (educated pt/spouse on 3/31, directed to [employer] to purchase), spouse also plans to purchase tub txfr bench)  Additional Equipment: Hospital Bed (See other comments) (pt owns)        Occupational Therapy Goals (Active)       Problem: Functional Transfers       Dates: Start:  03/29/24         Goal: STG-Within one week, patient will transfer to toilet at SBA level using DME PRN.       Dates: Start:  03/29/24         Goal Note filed on 04/02/24 0828 by Leonid Medina, C.O.T.A.        CGA               Goal: STG-Within one week, patient will transfer to tub/shower at SBA level using DME PRN.       Dates: Start:  03/29/24         Goal Note filed on 04/02/24 0828 by Leonid Medina, C.O.T.A.        CGA                  Problem: OT Long Term Goals       Dates: Start:  03/29/24         Goal: LTG-By discharge, patient will complete basic self care tasks at SBA-Mod I level using DME/AE PRN.       Dates: Start:  03/29/24            Goal: LTG-By discharge, patient will perform bathroom transfers at SBA-Mod I level using DME/AE PRN.       Dates: Start:  03/29/24

## 2024-04-02 NOTE — DISCHARGE PLANNING
Case Management/IDT follow up.   IDT continues to recommend IRF level of care as patient continue to make progress with all therapies.   Projected dc date set for 4/8/24.      DC needs:  Recommendations made for home health for PT/OT  Follow up with: PCP oncology    Will meet with patient and family to provide update from IDT and discuss plan of care.    Plan:  Continue to follow

## 2024-04-02 NOTE — FLOWSHEET NOTE
04/02/24 0651   Events/Summary/Plan   Events/Summary/Plan mdi   Vital Signs   Pulse 73   Respiration 16   Pulse Oximetry 98 %   $ Pulse Oximetry (Spot Check) Yes   Respiratory Assessment   Level of Consciousness Alert   Chest Exam   Work Of Breathing / Effort Within Normal Limits   Breath Sounds   RUL Breath Sounds Clear   RML Breath Sounds Clear   RLL Breath Sounds Clear   ALKA Breath Sounds Clear   LLL Breath Sounds Clear   Oxygen   O2 Delivery Device Room air w/o2 available

## 2024-04-02 NOTE — PROGRESS NOTES
Physical Medicine & Rehabilitation Progress Note    Encounter Date: 4/2/2024    Chief Complaint: Decreased mobility, weakness    Interval Events (Subjective):  Patient sitting up in room. He reports he is doing better. He reports the therapists are pushing him in a good way. Discussed would have IDT later today to discuss discharge planning.     _____________________________________  Interdisciplinary Team Conference   Most recent IDT on 4/2/2024    IAntonio M.D./Ph.D., was present and led the interdisciplinary team conference on 4/2/2024.  I led the IDT conference and agree with the IDT conference documentation and plan of care as noted below.     Nursing:  Diet Current Diet Order   Procedures    Diet Order Diet: Regular       Eating ADL Modified Independent  Increased time   % of Last Meal  Oral Nutrition: Between 50-75% Consumed   Sleep    Bowel Last BM: 03/29/24   Bladder    Barriers to Discharge Home:  Wound care for coccyx and ankle      Physical Therapy:  Bed Mobility    Transfers Standby Assist (bed <-> w/c  and w/c <->  supine on mat)  Supervision for safety, Increased time (Stand step no AD)   Mobility Standby Assist   Stairs    Barriers to Discharge Home:  Fatigue limited    Occupational Therapy:  Grooming Seated, Supervision   Bathing Minimal Assist (Min-CGA, fold down shower bench level, used LH sponge to maximize LB bathing independence)   UB Dressing Minimal Assist   LB Dressing Supervision (setup to don slip on shoes)   Toileting Contact Guard Assist   Shower & Transfer Christine   Barriers to Discharge Home:  Working on transfers  Decreased endurance  Decreased ROM at bilateral shoulders    Case Management:  Continues to work on disposition and DME needs.      Discharge Date/Disposition:  4/8/24  _____________________________________    Objective:  VITAL SIGNS: /68   Pulse 73   Temp 36.6 °C (97.9 °F) (Oral)   Resp 16   Wt 65.9 kg (145 lb 4.5 oz)   SpO2 98%   BMI 20.26  kg/m²   Gen: NAD  Psych: Mood and affect appropriate  CV: RRR, 0 edema  Resp: CTAB, no upper airway sounds  Abd: NTND  Neuro: AOx4, following commands, in  C collar  Unchanged from 4/1/24    Laboratory Values:  Recent Results (from the past 72 hour(s))   CBC WITH DIFFERENTIAL    Collection Time: 04/01/24  6:19 AM   Result Value Ref Range    WBC 3.0 (L) 4.8 - 10.8 K/uL    RBC 3.12 (L) 4.70 - 6.10 M/uL    Hemoglobin 8.3 (L) 14.0 - 18.0 g/dL    Hematocrit 27.3 (L) 42.0 - 52.0 %    MCV 87.5 81.4 - 97.8 fL    MCH 26.6 (L) 27.0 - 33.0 pg    MCHC 30.4 (L) 32.3 - 36.5 g/dL    RDW 63.5 (H) 35.9 - 50.0 fL    Platelet Count 330 164 - 446 K/uL    MPV 9.1 9.0 - 12.9 fL    Neutrophils-Polys 61.30 44.00 - 72.00 %    Lymphocytes 19.50 (L) 22.00 - 41.00 %    Monocytes 10.20 0.00 - 13.40 %    Eosinophils 4.00 0.00 - 6.90 %    Basophils 1.70 0.00 - 1.80 %    Immature Granulocytes 3.30 (H) 0.00 - 0.90 %    Nucleated RBC 0.00 0.00 - 0.20 /100 WBC    Neutrophils (Absolute) 1.86 1.82 - 7.42 K/uL    Lymphs (Absolute) 0.59 (L) 1.00 - 4.80 K/uL    Monos (Absolute) 0.31 0.00 - 0.85 K/uL    Eos (Absolute) 0.12 0.00 - 0.51 K/uL    Baso (Absolute) 0.05 0.00 - 0.12 K/uL    Immature Granulocytes (abs) 0.10 0.00 - 0.11 K/uL    NRBC (Absolute) 0.00 K/uL   Basic Metabolic Panel    Collection Time: 04/01/24  6:19 AM   Result Value Ref Range    Sodium 134 (L) 135 - 145 mmol/L    Potassium 4.0 3.6 - 5.5 mmol/L    Chloride 107 96 - 112 mmol/L    Co2 19 (L) 20 - 33 mmol/L    Glucose 110 (H) 65 - 99 mg/dL    Bun 9 8 - 22 mg/dL    Creatinine 0.37 (L) 0.50 - 1.40 mg/dL    Calcium 8.0 (L) 8.5 - 10.5 mg/dL    Anion Gap 8.0 7.0 - 16.0   MAGNESIUM    Collection Time: 04/01/24  6:19 AM   Result Value Ref Range    Magnesium 1.8 1.5 - 2.5 mg/dL   ESTIMATED GFR    Collection Time: 04/01/24  6:19 AM   Result Value Ref Range    GFR (CKD-EPI) 128 >60 mL/min/1.73 m 2       Medications:  Scheduled Medications   Medication Dose Frequency    midodrine  10 mg TID WITH MEALS     vitamin D3  1,000 Units DAILY    senna-docusate  2 Tablet BID    omeprazole  20 mg DAILY    Encorafenib  300 mg DAILY AT 1800    And    Binimetinib  30 mg BID    enoxaparin (LOVENOX) injection  40 mg DAILY AT 1800    gabapentin  100 mg DAILY    megestrol  800 mg DAILY    mometasone-formoterol  2 Puff BID    morphine ER  45 mg Q12HRS     PRN medications: Respiratory Therapy Consult, hydrALAZINE, acetaminophen, senna-docusate **AND** polyethylene glycol/lytes, docusate sodium, magnesium hydroxide, mag hydrox-al hydrox-simeth, ondansetron **OR** ondansetron, traZODone, sodium chloride, oxyCODONE immediate-release **OR** oxyCODONE immediate-release, midazolam    Diet:  Current Diet Order   Procedures    Diet Order Diet: Regular       Medical Decision Making and Plan:  Acute metabolic encephalopathy - Patient brought to ER in shock with negative infectious work-up thought to be 2/2 chemotherapy agents. Oncology consulted and reduced medications.  -PT and OT for mobility and ADLs. Per guidelines, 15 hours per week between PT, OT and/or SLP.  -Follow-up Oncology.      Hypotension - He was started on Midodrine 5 mg TID. SBP into 90s, increase to 7.5. SBP into 80s, increase to 10. Still into 90s at times, continue Midodrine 10 mg TID     C2 fracture - Patient in C collar. Follow-up NSG     Multiple L spine fractures - Managed non-operatively as significant metastatic disease in L and S spine.      Metastatic melanoma - Patient on Braftovi 300 mg daily and Mektovi 30 mg BID per Oncology. On Megace for appetite stimulant     Respiratory failure - Patient on Dulera. Improving IS, continue Dulera     Anemia - Check AM CBC - 7.9, will monitor. Repeat 8.3 on 4/1, will monitor     Leukopenia - Check AM CBC, on chemotherapy - 3.6 on admission, ANC 2.4, will monitor. Repeat WBC 3.0, will monitor     Hyponatremia - Check AM CMP - 135, will recheck 4/1/24 - 134, will monitor     Vitamin D deficiency - 20 on admission, start 1000 U      Pain - Patient on Morphine 45 mg ER BID, Gabapentin 100 mg daily and PRN Oxycodone/Tylenol. Continue Morphine 45 mg ER BID. Discontinue Gabapentin as may contribute to low SBP     Skin - Patient at risk for skin breakdown due to debility in areas including sacrum, achilles, elbows and head in addition to other sites. Nursing to assess skin daily.   DTI to right heel on admission  Coccyx breakdown on admission, wound care to consult      GI Ppx - Patient on Prilosec for GERD prophylaxis. Patient on Senna-docusate for constipation prophylaxis.      DVT Ppx - Patient Lovenox on transfer. Limited ambulation, continue Lovenox  ____________________________________    T. Angelo Cortez MD/PhD  [facility] - Physical Medicine & Rehabilitation   [facility] - Brain Injury Medicine   ____________________________________    Total time:  51 minutes. Time spent included pre-rounding review of vitals and tests, unit/floor time, face-to-face time with the patient including physical examination, care coordination, counseling of patient and/or family, ordering medications/procedures/tests, discussion with CM, PT, OT, SLP and/or other healthcare providers, and documentation in the electronic medical record. Topics discussed included discharge planning, ongoing low SBP, continue midodrine, discontinue Gabapentin and continue morphine. Patient was discussed separately in IDT today; please see details above.

## 2024-04-02 NOTE — THERAPY
"Occupational Therapy  Daily Treatment     Patient Name: Andrew Wall  Age:  58 y.o., Sex:  male  Medical Record #: [id number]  Today's Date: 4/2/2024     Precautions  Precautions: Fall Risk, Cervical Collar  , Spinal / Back Precautions   Comments: C2 fx, CHEMO Prec    Safety   ADL Safety : Requires Physical Assist for Safety  Bathroom Safety: Requires Physical Assist for Safety    Subjective    \" I'm tired .\"     Objective/Assessment       04/02/24 0831   OT Charge Group   OT Therapy Activity (Units) 1   OT Therapeutic Exercise (Units) 3   OT Total Time Spent   OT Individual Total Time Spent (Mins) 60   Precautions   Precautions Fall Risk;Cervical Collar  ;Spinal / Back Precautions    Comments C2 fx, CHEMO Prec   Functional Level of Assist   Lower Body Dressing Supervision  (setup to don slip on shoes)   Bed, Chair, Wheelchair Transfer Standby Assist  (bed <-> w/c  and w/c <->  supine on mat)   Tub / Shower Transfers Standby Assist  (at 4WW level 2 transfers. 1     tub/bench transfer   2  step in shower transfer  to and from shower chair     using grab bar)   Supine Upper Body Exercises   Chest Press 1 set of 10  (unweighted hands clasped)   Front Arm Raise Right;Left  (2 sets of 5    1lb weight left UE  no weight and active assist with the right)   Bicep Curl 1 set of 10;Right;Left  (1 lb weight)   Other Exercise 1 set of 10 internal/ external rotation    with 1lb weight   right /  left.     ROM and gentle stretch  right shoulder flexion       ches press followed by shoulder flexion   x  reps   with unweighted dowel.   right with greater ROM than left  most likely due to  both shoulders needing  ROM and stretch  and it was only applied to the right   Sitting Upper Body Exercises   Upper Extremity Bike Level 2 Resistance  (x 5 minutes    motomed)   Interdisciplinary Plan of Care Collaboration   Patient Position at End of Therapy In Bed;Call Light within Reach;Tray Table within Reach               Strengths: Able " to follow instructions, Alert and oriented, Motivated for self care and independence, Pleasant and cooperative, Supportive family, Willingly participates in therapeutic activities  Barriers: Aspiration risk, Decreased endurance, Fatigue, Generalized weakness, Home accessibility, Impaired activity tolerance, Impaired balance, Limited mobility, Pain    Plan    ADL IADL , related mobility strength/endurance building standing tolerance and balance activity incorporating C spine precautions.     DME  OT DME Recommendations  Bathroom Equipment:  ((pt owns hospital bed), hip kit (educated pt/spouse on 3/31, directed to [employer] to purchase), spouse also plans to purchase tub txfr bench)  Additional Equipment: Hospital Bed (See other comments) (pt owns)        Occupational Therapy Goals (Active)       Problem: Functional Transfers       Dates: Start:  03/29/24         Goal: STG-Within one week, patient will transfer to toilet at SBA level using DME PRN.       Dates: Start:  03/29/24         Goal Note filed on 04/02/24 0828 by Leonid Medina, C.O.T.A.        CGA               Goal: STG-Within one week, patient will transfer to tub/shower at SBA level using DME PRN.       Dates: Start:  03/29/24         Goal Note filed on 04/02/24 0828 by Leonid Medina, C.O.T.A.        CGA                  Problem: OT Long Term Goals       Dates: Start:  03/29/24         Goal: LTG-By discharge, patient will complete basic self care tasks at SBA-Mod I level using DME/AE PRN.       Dates: Start:  03/29/24            Goal: LTG-By discharge, patient will perform bathroom transfers at SBA-Mod I level using DME/AE PRN.       Dates: Start:  03/29/24

## 2024-04-02 NOTE — PROGRESS NOTES
NURSING DAILY NOTE    Name: Andrew Wall   Date of Admission: 3/28/2024   Admitting Diagnosis: Acute encephalopathy  Attending Physician: Antonio Cortez M.d.  Allergies: Augmentin    Safety  Patient Assist     Patient Precautions  Fall Risk, Cervical Collar  , Spinal / Back Precautions   Precaution Comments  C2 fx, CHEMO Prec  Bed Transfer Status  Standby Assist  Toilet Transfer Status   Minimal Assist  Assistive Devices  Wheelchair  Oxygen  None - Room Air  Diet/Therapeutic Dining  Current Diet Order   Procedures    Diet Order Diet: Regular     Pill Administration  whole  Agitated Behavioral Scale     ABS Level of Severity       Fall Risk  Has the patient had a fall this admission?   No  Jodi Leigh Fall Risk Scoring  10, LOW RISK  Fall Risk Safety Measures  bed alarm    Vitals  Temperature: 36.6 °C (97.9 °F)  Temp src: Oral  Pulse: 75  Respiration: 16  Blood Pressure: 102/68  Blood Pressure MAP (Calculated): 79 MM HG  BP Location: Right, Upper Arm  Patient BP Position: Supine     Oxygen  Pulse Oximetry: 98 %  O2 (LPM): 0  O2 Delivery Device: None - Room Air    Bowel and Bladder  Last Bowel Movement  03/29/24  Stool Type  Type 4: Like a sausage or snake, smooth and soft  Bowel Device  Bathroom, Other (Comment) (Bowel Meds)  Continent  Bladder: Continent void   Bowel: Continent movement  Bladder Function  Urine Void (mL): 144 ml  Number of Times Voided: 1  Urinary Options: Yes  Urine Color: Yellow  Genitourinary Assessment   Bladder Assessment (WDL):  Within Defined Limits  Santiago Catheter: Not Applicable  Urine Color: Yellow  Bladder Device: Urinal  Bladder Scan: Post Void  $ Bladder Scan Results (mL): 124    Skin  Sachin Score   15  Sensory Interventions   Bed Types: Low Airloss  Skin Preventative Measures: Pillows in Use for Support / Positioning  Moisture Interventions  Moisturizers/Barriers: Barrier Wipes      Pain  Pain Rating Scale  4 -  Distracts me, can do usual activities  Pain Location  Head, Neck  Pain Location Orientation  Right  Pain Interventions   Rest    ADLs    Bathing   Shower, * * With Assistance from  Linen Change      Personal Hygiene  Moist Ana Wipes  Chlorhexidine Bath      Oral Care     Teeth/Dentures     Shave     Nutrition Percentage Eaten  *  * Meal *  *, Dinner, Between 25-50% Consumed  Environmental Precautions  Treaded Slipper Socks on Patient, Bed in Low Position  Patient Turns/Positioning  Patient Turns Self from Side to Side  Patient Turns Assistance/Tolerance     Bed Positions  Bed Controls On, Bed Locked  Head of Bed Elevated  Self regulated      Psychosocial/Neurologic Assessment  Psychosocial Assessment  Psychosocial (WDL):  Within Defined Limits  Neurologic Assessment  Neuro (WDL): Exceptions to WDL  Level of Consciousness: Alert  Orientation Level: Oriented X4  Cognition: Appropriate judgement, Appropriate safety awareness, Appropriate attention/concentration, Follows commands  Speech: Clear  Motor Function/Sensation Assessment: Sensation  RUE Sensation: Full sensation  LUE Sensation: Full sensation  RLE Sensation: Full sensation  LLE Sensation: Full sensation  EENT (WDL):  Within Defined Limits    Cardio/Pulmonary Assessment  Edema   RLE Edema: 1+  LLE Edema: 1+  Respiratory Breath Sounds  RUL Breath Sounds: Clear  RML Breath Sounds: Clear  RLL Breath Sounds: Clear  ALKA Breath Sounds: Clear  LLL Breath Sounds: Clear  Cardiac Assessment   Cardiac (WDL):  WDL Except (H/O HTN.)

## 2024-04-03 ENCOUNTER — APPOINTMENT (OUTPATIENT)
Dept: OCCUPATIONAL THERAPY | Facility: REHABILITATION | Age: 59
DRG: 070 | End: 2024-04-03
Attending: PHYSICAL MEDICINE & REHABILITATION
Payer: COMMERCIAL

## 2024-04-03 ENCOUNTER — APPOINTMENT (OUTPATIENT)
Dept: PHYSICAL THERAPY | Facility: REHABILITATION | Age: 59
DRG: 070 | End: 2024-04-03
Attending: PHYSICAL MEDICINE & REHABILITATION
Payer: COMMERCIAL

## 2024-04-03 ENCOUNTER — APPOINTMENT (OUTPATIENT)
Dept: INPATIENT REHAB | Facility: REHABILITATION | Age: 59
DRG: 070 | End: 2024-04-03
Payer: COMMERCIAL

## 2024-04-03 PROCEDURE — 97535 SELF CARE MNGMENT TRAINING: CPT | Mod: CO

## 2024-04-03 PROCEDURE — A9270 NON-COVERED ITEM OR SERVICE: HCPCS | Performed by: PHYSICAL MEDICINE & REHABILITATION

## 2024-04-03 PROCEDURE — 97530 THERAPEUTIC ACTIVITIES: CPT

## 2024-04-03 PROCEDURE — 97110 THERAPEUTIC EXERCISES: CPT

## 2024-04-03 PROCEDURE — 700102 HCHG RX REV CODE 250 W/ 637 OVERRIDE(OP): Performed by: PHYSICAL MEDICINE & REHABILITATION

## 2024-04-03 PROCEDURE — 97116 GAIT TRAINING THERAPY: CPT

## 2024-04-03 PROCEDURE — 97112 NEUROMUSCULAR REEDUCATION: CPT

## 2024-04-03 PROCEDURE — 11042 DBRDMT SUBQ TIS 1ST 20SQCM/<: CPT

## 2024-04-03 PROCEDURE — 700101 HCHG RX REV CODE 250: Performed by: PHYSICAL MEDICINE & REHABILITATION

## 2024-04-03 PROCEDURE — 94760 N-INVAS EAR/PLS OXIMETRY 1: CPT

## 2024-04-03 PROCEDURE — 770010 HCHG ROOM/CARE - REHAB SEMI PRIVAT*

## 2024-04-03 PROCEDURE — 94640 AIRWAY INHALATION TREATMENT: CPT

## 2024-04-03 PROCEDURE — 97110 THERAPEUTIC EXERCISES: CPT | Mod: CO

## 2024-04-03 RX ADMIN — MEGESTROL ACETATE 800 MG: 40 SUSPENSION ORAL at 08:16

## 2024-04-03 RX ADMIN — MOMETASONE FUROATE AND FORMOTEROL FUMARATE DIHYDRATE 2 PUFF: 200; 5 AEROSOL RESPIRATORY (INHALATION) at 20:49

## 2024-04-03 RX ADMIN — MORPHINE SULFATE 45 MG: 30 TABLET, FILM COATED, EXTENDED RELEASE ORAL at 08:16

## 2024-04-03 RX ADMIN — MIDODRINE HYDROCHLORIDE 10 MG: 10 TABLET ORAL at 08:16

## 2024-04-03 RX ADMIN — MOMETASONE FUROATE AND FORMOTEROL FUMARATE DIHYDRATE 2 PUFF: 200; 5 AEROSOL RESPIRATORY (INHALATION) at 07:56

## 2024-04-03 RX ADMIN — MIDODRINE HYDROCHLORIDE 10 MG: 10 TABLET ORAL at 11:35

## 2024-04-03 RX ADMIN — SENNOSIDES AND DOCUSATE SODIUM 2 TABLET: 8.6; 5 TABLET ORAL at 08:16

## 2024-04-03 RX ADMIN — MAGNESIUM HYDROXIDE 30 ML: 1200 LIQUID ORAL at 05:42

## 2024-04-03 RX ADMIN — MORPHINE SULFATE 45 MG: 30 TABLET, FILM COATED, EXTENDED RELEASE ORAL at 20:48

## 2024-04-03 RX ADMIN — SENNOSIDES AND DOCUSATE SODIUM 2 TABLET: 8.6; 5 TABLET ORAL at 20:48

## 2024-04-03 RX ADMIN — MIDODRINE HYDROCHLORIDE 10 MG: 10 TABLET ORAL at 17:39

## 2024-04-03 RX ADMIN — OMEPRAZOLE 20 MG: 20 CAPSULE, DELAYED RELEASE ORAL at 08:16

## 2024-04-03 RX ADMIN — Medication 1000 UNITS: at 08:16

## 2024-04-03 ASSESSMENT — GAIT ASSESSMENTS
GAIT LEVEL OF ASSIST: STANDBY ASSIST
DISTANCE (FEET): 150
GAIT LEVEL OF ASSIST: STANDBY ASSIST
DISTANCE (FEET): 150
ASSISTIVE DEVICE: 4 WHEEL WALKER
ASSISTIVE DEVICE: 4 WHEEL WALKER

## 2024-04-03 ASSESSMENT — PAIN DESCRIPTION - PAIN TYPE: TYPE: ACUTE PAIN

## 2024-04-03 NOTE — CARE PLAN
Problem: Knowledge Deficit - Standard  Goal: Patient and family/care givers will demonstrate understanding of plan of care, disease process/condition, diagnostic tests and medications  Outcome: Progressing     Problem: Fall Risk - Rehab  Goal: Patient will remain free from falls  Note: Pt uses call light consistently and appropriately. Waits for assistance does not attempt self transfer this shift. Able to verbalize needs.   The patient is Stable - Low risk of patient condition declining or worsening    Shift Goals  Clinical Goals: Safety  Patient Goals: sleep well, pain control  Family Goals: no one present

## 2024-04-03 NOTE — DIETARY
Nutrition Update:    Day 5 of admit.  Andrew Wall is a 58 y.o. male with admitting DX of Acute encephalopathy [G93.40].  Patient being followed to optimize nutrition.    Current Diet: Regular; Ensure Plus chocolate TID w/ meals. PO intake per ADLs has been variable, recently ~50%; was eating % up until 4/1. Last BM documented 3/29, pt confirmed; +pericolace and miralax given per MAR. Pt declined prune juice, states is doesn't work for him. Reports he has been drinking at least 2 Ensures/day. Continue w/ current interventions per discussion w/ pt.    Problem: Nutritional:  Goal: Achieve adequate nutritional intake  Description: Patient will consume >50% of meals and supplements  Outcome: Progressing    RD continues to follow.

## 2024-04-03 NOTE — FLOWSHEET NOTE
04/03/24 0756   Events/Summary/Plan   Events/Summary/Plan SpO2 check, MDI   Vital Signs   Pulse 72   Respiration 16   Pulse Oximetry 98 %   $ Pulse Oximetry (Spot Check) Yes   Oxygen   O2 Delivery Device None - Room Air

## 2024-04-03 NOTE — THERAPY
Recreational Therapy   Initial Evaluation     Patient Name: Andrew Wall  Age:  58 y.o., Sex:  male  Medical Record #: [id number]  Today's Date: 4/3/2024     Subjective    Patient was alert and willing to participate in assessment interview.     Objective       04/03/24 1501   Procedural Tracking   Procedural Tracking Leisure Skills Awareness   Treatment Time   Total Time Spent (mins) 30   Leisure History   Leisure Interests   (Skiing hiking with dogs and family, puzzles, knife building, fishing, hunting, working around the house, cooking, watching TV., handing out with friends.)   Pre-Morbid Leisure Lifestyle Individual;Group;Active   Prior Living Arrangements   Lives with - Patient's Self Care Capacity Spouse   Steps Into Home 0   Steps In Home 10   Ambulation Required Assist   Assistive Devices Used Front-Wheel Walker   Driving / Transportation Relatives / Others Provide Transportation   Functional Ability Status - Physical   Endurance Low   Upper Extremity Gross Motor Uses Both Arms / Hands   Lower Extremity Gross Motor Uses Both Legs   Functional Ability Status - Cognitive   Attention Span Remains on Task   Judgment Able to Make Independent Decisions   Functional Ability Status - Emotional    Affect Appropriate   Mood Appropriate   Behavior Appropriate;Cooperative   Leisure Competence Measure   Leisure Awareness Independent   Leisure Attitude Independent   Leisure Skills Independent   Cultural / Social Behaviors Independent   Interpersonal Skills Independent   Community Integration Skills Modified Independent   Social Contact Independent   Community Participation Modified Independent   Current Discharge Plan   Current Discharge Plan Return to Prior Living Situation   Interdisciplinary Plan of Care Collaboration   Patient Position at End of Therapy Seated;In Bed   Strengths & Barriers   Strengths Able to follow instructions;Alert and oriented;Effective communication skills;Pleasant and cooperative;Supportive  family;Willingly participates in therapeutic activities   Barriers Decreased endurance;Limited mobility         Assessment  Patient is 58 y.o. male with a diagnosis of Acute encephalopathy .  Additional factors influencing patient status / progress (ie: cognitive factors, co-morbidities, social support, etc):  Per records, patient is a left hand dominant male with a past medical history of metastatic melanoma to lymph nodes and bones, on chemotherapy;  who presented on 3/21/2024 12:47 PM with generalized weakness and altered mental status.  Wife checked blood pressure and found systolic pressure of 59.  Patient brought into the ED, confirmed to be hypotensive, febrile, tachycardic. He was thought to have acute toxic vs metabolic encephalopathy. Patient started on Levophed, vancomycin, cefepime.  Patient admitted for septic shock with unknown source.  Blood cultures negative, antibiotics discontinued.  Started on midodrine, titrating off Levophed.  Treated with IV fluids.  Patient found to have C2 fracture from mechanical ground-level fall on March 5.  He was also found to have multiple pathologic fractures of the lumbar spine.  He has c-collar in place.  He is being treated with MS Contin for pain. Oncology was consulted and felt it may be due to his medications so they were reduced to Braftovi 300 mg daily and Mektovi 30 mg BID. .      Patient reported many healthy physical, cognitive, creative activities. He reports a good support system and was able to articulate a plan for activities he is going to do when he returns home, such as cooking, light house maintenance and down sizing and light hiking building up his endurance. Patient did not want any community resources. We discussed his return to skiing and possibly trying adaptive skiing but he said he will ski again on his own or he will do other things, as he was not interested in adaptive skiing.     Plan  Certified Therapeutic Recreation Specialist has  screened this patient and determined that no skilled Recreation Therapy services are indicated at this time due to patient is discharging on Monday and is returning to healthy leisure activities and supportive family and friends.  Thank you for your referral. If a change in patient's function should occur, Recreation Therapy can reevaluate for appropriateness at that time.

## 2024-04-03 NOTE — PROGRESS NOTES
NURSING DAILY NOTE    Name: Andrew Wall   Date of Admission: 3/28/2024   Admitting Diagnosis: Acute encephalopathy  Attending Physician: Antonio Cortez M.d.  Allergies: Augmentin    Safety  Patient Assist     Patient Precautions  Fall Risk, Cervical Collar    Precaution Comments  C2 Fx, multiple sites of metastatic disease  Bed Transfer Status  Standby Assist  Toilet Transfer Status   Minimal Assist  Assistive Devices  Wheelchair  Oxygen  None - Room Air  Diet/Therapeutic Dining  Current Diet Order   Procedures    Diet Order Diet: Regular     Pill Administration  whole  Agitated Behavioral Scale     ABS Level of Severity       Fall Risk  Has the patient had a fall this admission?   No  Jodi Leigh Fall Risk Scoring  10, LOW RISK  Fall Risk Safety Measures  bed alarm, chair alarm, poor balance, and low vision/ hearing    Vitals  Temperature: 36.4 °C (97.6 °F)  Temp src: Oral  Pulse: 72  Respiration: 20  Blood Pressure: 106/68  Blood Pressure MAP (Calculated): 81 MM HG  BP Location: Left, Upper Arm  Patient BP Position: Supine     Oxygen  Pulse Oximetry: 100 %  O2 (LPM): 0  O2 Delivery Device: None - Room Air    Bowel and Bladder  Last Bowel Movement  03/29/24  Stool Type  Type 4: Like a sausage or snake, smooth and soft  Bowel Device  Bathroom  Continent  Bladder: Continent void   Bowel: Continent movement  Bladder Function  Urine Void (mL): 150 ml  Number of Times Voided: 1  Urinary Options: Yes  Urine Color: Unable To Evaluate  Genitourinary Assessment   Bladder Assessment (WDL):  Within Defined Limits  Santiago Catheter: Not Applicable  Urine Color: Unable To Evaluate  Bladder Device: Urinal, Bathroom  Bladder Scan: Post Void  $ Bladder Scan Results (mL): 104    Skin  Sachin Score   15  Sensory Interventions   Bed Types: Low Airloss  Skin Preventative Measures: Pillows in Use for Support / Positioning, Pillows in Use to Float Heels  Moisture  Interventions  Moisturizers/Barriers: Barrier Wipes      Pain  Pain Rating Scale  0 - No Pain  Pain Location  Neck, Generalized  Pain Location Orientation  Anterior, Posterior  Pain Interventions   Declines    ADLs    Bathing   Patient Refused Bathing  Linen Change      Personal Hygiene  Moist Ana Wipes  Chlorhexidine Bath      Oral Care     Teeth/Dentures     Shave     Nutrition Percentage Eaten  Between 25-50% Consumed  Environmental Precautions  Treaded Slipper Socks on Patient, Personal Belongings, Wastebasket, Call Bell etc. in Easy Reach, Bed in Low Position  Patient Turns/Positioning  Patient Turns Self from Side to Side  Patient Turns Assistance/Tolerance     Bed Positions  Bed Controls On, Bed Locked  Head of Bed Elevated  Self regulated      Psychosocial/Neurologic Assessment  Psychosocial Assessment  Psychosocial (WDL):  Within Defined Limits  Neurologic Assessment  Neuro (WDL): Exceptions to WDL  Level of Consciousness: Alert  Orientation Level: Oriented X4  Cognition: Appropriate judgement, Appropriate safety awareness, Appropriate attention/concentration, Appropriate for developmental age, Follows commands  Speech: Clear  Motor Function/Sensation Assessment: Sensation  RUE Sensation: Full sensation  LUE Sensation: Full sensation  RLE Sensation: Full sensation  LLE Sensation: Full sensation  EENT (WDL):  Within Defined Limits    Cardio/Pulmonary Assessment  Edema   RLE Edema: 1+  LLE Edema: 1+  Respiratory Breath Sounds  RUL Breath Sounds: Clear  RML Breath Sounds: Clear  RLL Breath Sounds: Diminished  ALKA Breath Sounds: Clear  LLL Breath Sounds: Diminished  Cardiac Assessment   Cardiac (WDL):  Within Defined Limits

## 2024-04-03 NOTE — DISCHARGE PLANNING
CM rec'd call from Linnea RN, CM at [facility] stating auth'd patient until 4/10/24.  This CM explained patient DC'ing on the 10th.  RN, CAROLYN requested H&P and IDT note from yesterday.  This CM faxed to her and updated patients CAROLYN Amaro.

## 2024-04-03 NOTE — PROGRESS NOTES
NURSING DAILY NOTE    Name: Andrew Wall   Date of Admission: 3/28/2024   Admitting Diagnosis: Acute encephalopathy  Attending Physician: Antonio Cortez M.d.  Allergies: Augmentin    Safety  Patient Assist     Patient Precautions  Fall Risk, Cervical Collar  , Spinal / Back Precautions   Precaution Comments  fatigues easily, C2 Fx  Bed Transfer Status  Standby Assist  Toilet Transfer Status   Minimal Assist  Assistive Devices  Wheelchair  Oxygen  None - Room Air  Diet/Therapeutic Dining  Current Diet Order   Procedures    Diet Order Diet: Regular     Pill Administration  whole  Agitated Behavioral Scale     ABS Level of Severity       Fall Risk  Has the patient had a fall this admission?   No  Jodi Leigh Fall Risk Scoring  10, LOW RISK  Fall Risk Safety Measures  chair alarm, poor balance, and low vision/ hearing    Vitals  Temperature: 36.6 °C (97.8 °F)  Temp src: Oral  Pulse: 74  Respiration: 20  Blood Pressure: 98/60  Blood Pressure MAP (Calculated): 73 MM HG  BP Location: Right, Upper Arm  Patient BP Position: Supine     Oxygen  Pulse Oximetry: 98 %  O2 (LPM): 0  O2 Delivery Device: None - Room Air    Bowel and Bladder  Last Bowel Movement  03/29/24  Stool Type  Type 4: Like a sausage or snake, smooth and soft  Bowel Device  Bathroom  Continent  Bladder: Continent void   Bowel: Continent movement  Bladder Function  Urine Void (mL): 200 ml  Number of Times Voided: 1  Urinary Options: Yes  Urine Color: Janae  Genitourinary Assessment   Bladder Assessment (WDL):  Within Defined Limits  Santiago Catheter: Not Applicable  Urine Color: Janae  Bladder Device: Urinal  Bladder Scan: Post Void  $ Bladder Scan Results (mL): 102    Skin  Sachin Score   15  Sensory Interventions   Bed Types: Low Airloss  Skin Preventative Measures: Pillows in Use for Support / Positioning  Moisture Interventions  Moisturizers/Barriers: Barrier Wipes      Pain  Pain  Rating Scale  0 - No Pain  Pain Location  Head, Neck  Pain Location Orientation  Right  Pain Interventions   Rest    ADLs    Bathing   Patient Refused Bathing  Linen Change      Personal Hygiene  Moist Ana Wipes  Chlorhexidine Bath      Oral Care     Teeth/Dentures     Shave     Nutrition Percentage Eaten  Between 25-50% Consumed  Environmental Precautions  Treaded Slipper Socks on Patient, Personal Belongings, Wastebasket, Call Bell etc. in Easy Reach, Bed in Low Position  Patient Turns/Positioning  Patient Turns Self from Side to Side  Patient Turns Assistance/Tolerance     Bed Positions  Bed Controls On, Bed Locked  Head of Bed Elevated  Self regulated      Psychosocial/Neurologic Assessment  Psychosocial Assessment  Psychosocial (WDL):  Within Defined Limits  Neurologic Assessment  Neuro (WDL): Exceptions to WDL  Level of Consciousness: Alert  Orientation Level: Oriented X4  Cognition: Appropriate judgement, Appropriate safety awareness, Appropriate attention/concentration, Appropriate for developmental age, Follows commands  Speech: Clear  Motor Function/Sensation Assessment: Sensation  RUE Sensation: Full sensation  LUE Sensation: Full sensation  RLE Sensation: Full sensation  LLE Sensation: Full sensation  EENT (WDL):  Within Defined Limits    Cardio/Pulmonary Assessment  Edema   RLE Edema: 1+  LLE Edema: 1+  Respiratory Breath Sounds  RUL Breath Sounds: Clear  RML Breath Sounds: Clear  RLL Breath Sounds: Diminished  ALKA Breath Sounds: Clear  LLL Breath Sounds: Diminished  Cardiac Assessment   Cardiac (WDL):  Within Defined Limits

## 2024-04-03 NOTE — PROGRESS NOTES
Physical Medicine & Rehabilitation Progress Note    Encounter Date: 4/3/2024    Chief Complaint: Decreased mobility, weakness    Interval Events (Subjective):  Patient sitting up in room. He reports he is doing OK. He is somewhat fatigued. Discussed about discharge next week. Denies NVD.     _____________________________________  Interdisciplinary Team Conference   Most recent IDT on 4/2/2024    Discharge Date/Disposition:  4/8/24  _____________________________________    Objective:  VITAL SIGNS: /62   Pulse 78   Temp 36.5 °C (97.7 °F) (Oral)   Resp 18   Wt 65.9 kg (145 lb 4.5 oz)   SpO2 97%   BMI 20.26 kg/m²   Gen: NAD  Psych: Mood and affect appropriate  CV: RRR, 0 edema  Resp: CTAB, no upper airway sounds  Abd: NTND  Neuro: AOx4, following commands    Laboratory Values:  Recent Results (from the past 72 hour(s))   CBC WITH DIFFERENTIAL    Collection Time: 04/01/24  6:19 AM   Result Value Ref Range    WBC 3.0 (L) 4.8 - 10.8 K/uL    RBC 3.12 (L) 4.70 - 6.10 M/uL    Hemoglobin 8.3 (L) 14.0 - 18.0 g/dL    Hematocrit 27.3 (L) 42.0 - 52.0 %    MCV 87.5 81.4 - 97.8 fL    MCH 26.6 (L) 27.0 - 33.0 pg    MCHC 30.4 (L) 32.3 - 36.5 g/dL    RDW 63.5 (H) 35.9 - 50.0 fL    Platelet Count 330 164 - 446 K/uL    MPV 9.1 9.0 - 12.9 fL    Neutrophils-Polys 61.30 44.00 - 72.00 %    Lymphocytes 19.50 (L) 22.00 - 41.00 %    Monocytes 10.20 0.00 - 13.40 %    Eosinophils 4.00 0.00 - 6.90 %    Basophils 1.70 0.00 - 1.80 %    Immature Granulocytes 3.30 (H) 0.00 - 0.90 %    Nucleated RBC 0.00 0.00 - 0.20 /100 WBC    Neutrophils (Absolute) 1.86 1.82 - 7.42 K/uL    Lymphs (Absolute) 0.59 (L) 1.00 - 4.80 K/uL    Monos (Absolute) 0.31 0.00 - 0.85 K/uL    Eos (Absolute) 0.12 0.00 - 0.51 K/uL    Baso (Absolute) 0.05 0.00 - 0.12 K/uL    Immature Granulocytes (abs) 0.10 0.00 - 0.11 K/uL    NRBC (Absolute) 0.00 K/uL   Basic Metabolic Panel    Collection Time: 04/01/24  6:19 AM   Result Value Ref Range    Sodium 134 (L) 135 - 145 mmol/L     Potassium 4.0 3.6 - 5.5 mmol/L    Chloride 107 96 - 112 mmol/L    Co2 19 (L) 20 - 33 mmol/L    Glucose 110 (H) 65 - 99 mg/dL    Bun 9 8 - 22 mg/dL    Creatinine 0.37 (L) 0.50 - 1.40 mg/dL    Calcium 8.0 (L) 8.5 - 10.5 mg/dL    Anion Gap 8.0 7.0 - 16.0   MAGNESIUM    Collection Time: 04/01/24  6:19 AM   Result Value Ref Range    Magnesium 1.8 1.5 - 2.5 mg/dL   ESTIMATED GFR    Collection Time: 04/01/24  6:19 AM   Result Value Ref Range    GFR (CKD-EPI) 128 >60 mL/min/1.73 m 2       Medications:  Scheduled Medications   Medication Dose Frequency    midodrine  10 mg TID WITH MEALS    vitamin D3  1,000 Units DAILY    senna-docusate  2 Tablet BID    omeprazole  20 mg DAILY    Encorafenib  300 mg DAILY AT 1800    And    Binimetinib  30 mg BID    enoxaparin (LOVENOX) injection  40 mg DAILY AT 1800    megestrol  800 mg DAILY    mometasone-formoterol  2 Puff BID    morphine ER  45 mg Q12HRS     PRN medications: Respiratory Therapy Consult, hydrALAZINE, acetaminophen, senna-docusate **AND** polyethylene glycol/lytes, docusate sodium, magnesium hydroxide, mag hydrox-al hydrox-simeth, ondansetron **OR** ondansetron, traZODone, sodium chloride, oxyCODONE immediate-release **OR** oxyCODONE immediate-release, midazolam    Diet:  Current Diet Order   Procedures    Diet Order Diet: Regular       Medical Decision Making and Plan:  Acute metabolic encephalopathy - Patient brought to ER in shock with negative infectious work-up thought to be 2/2 chemotherapy agents. Oncology consulted and reduced medications.  -PT and OT for mobility and ADLs. Per guidelines, 15 hours per week between PT, OT and/or SLP.  -Follow-up Oncology.      Hypotension - He was started on Midodrine 5 mg TID. SBP into 90s, increase to 7.5. SBP into 80s, increase to 10. Into low 100s, continue Midodrine 10 mg TID with meals     C2 fracture - Patient in C collar. Follow-up NSG     Multiple L spine fractures - Managed non-operatively as significant metastatic  disease in L and S spine.      Metastatic melanoma - Patient on Braftovi 300 mg daily and Mektovi 30 mg BID per Oncology. Continue megace for appetite stimulation, previously with confusion on it, but not present at [facility] with Megace and narcotics     Respiratory failure - Patient on Dulera. Improving IS, continue Dulera     Anemia - Check AM CBC - 7.9, will monitor. Repeat 8.3 on 4/1, will monitor     Leukopenia - Check AM CBC, on chemotherapy - 3.6 on admission, ANC 2.4, will monitor. Repeat WBC 3.0, will monitor     Hyponatremia - Check AM CMP - 135, will recheck 4/1/24 - 134, will monitor     Vitamin D deficiency - 20 on admission, start 1000 U     Pain - Patient on Morphine 45 mg ER BID, Gabapentin 100 mg daily and PRN Oxycodone/Tylenol. Discontinue Gabapentin as may contribute to low SBP. Continue Morphine 45 mg ER BID     Skin - Patient at risk for skin breakdown due to debility in areas including sacrum, achilles, elbows and head in addition to other sites. Nursing to assess skin daily.   DTI to right heel on admission  Coccyx breakdown on admission, wound care to consult      GI Ppx - Patient on Prilosec for GERD prophylaxis. Patient on Senna-docusate for constipation prophylaxis.      DVT Ppx - Patient Lovenox on transfer. Ambulating > 125 feet, discontinue Lovenox  ____________________________________    T. Angelo Cortez MD/PhD  [facility] - Physical Medicine & Rehabilitation   [facility] - Brain Injury Medicine   ____________________________________

## 2024-04-03 NOTE — THERAPY
Physical Therapy   Daily Treatment     Patient Name: Andrew Wall  Age:  58 y.o., Sex:  male  Medical Record #: [id number]  Today's Date: 4/3/2024     Precautions  Precautions: (P) Fall Risk, Cervical Collar    Comments: (P) C2 Fx, multiple sites of metastatic disease    Subjective    Doing well, improved energy levels today, less shoulder pain/better mobility since doing OT sessions pertaining to shoulder/scapular mobility      Objective       04/03/24 1031 04/03/24 1301   PT Charge Group   PT Gait Training (Units) 1 1   PT Therapeutic Exercise (Units) 2  --    PT Neuromuscular Re-Education / Balance (Units) 1  --    PT Therapeutic Activities (Units)  --  1   Supervising Physical Therapist Casey Crook   PT Total Time Spent   PT Individual Total Time Spent (Mins) 60 30   Precautions   Precautions Fall Risk;Cervical Collar  ;Spinal / Back Precautions  Fall Risk;Cervical Collar     Comments C2 fx, multiple sites of metastatic disease C2 Fx, multiple sites of metastatic disease   Gait Functional Level of Assist    Gait Level Of Assist Standby Assist Standby Assist   Assistive Device 4 Wheel Walker 4 Wheel Walker   Distance (Feet) 150 150  (plus 2 bouts of 90 feet on uneven surfaces of dirt and then outdoor ramp)   # of Times Distance was Traveled 1 3   Wheelchair Functional Level of Assist   Wheelchair Assist Stand by Assist Stand by Assist   Distance Wheelchair (Feet or Distance) 150 50   Transfer Functional Level of Assist   Bed, Chair, Wheelchair Transfer Standby Assist Standby Assist   Supine Lower Body Exercise   Other Exercises supine ball squeeze for adductor isometrics, lower trunk rotation with ball, ham curls with ball, SAQ, SLR, HS stretch with belt  --    Sitting Lower Body Exercises   Nustep Resistance Level 1;Time (See Comments)  (6)  --    Bed Mobility    Supine to Sit Standby Assist Standby Assist   Sit to Supine Standby Assist Standby Assist   Sit to Stand Standby Assist Standby Assist    Scooting Standby Assist Standby Assist   Rolling Standby Assist Standby Assist   Neuro-Muscular Treatments   Comments forward walking over chloe steps, lateral walking in bars, backwards walking in bars, lateral and AP wt shift while standing on foam pad  --    Interdisciplinary Plan of Care Collaboration   Patient Position at End of Therapy Seated;Chair Alarm On;Phone within Reach;Call Light within Reach;Tray Table within Reach  --    Strengths & Barriers   Strengths Willingly participates in therapeutic activities;Supportive family;Pleasant and cooperative;Motivated for self care and independence;Able to follow instructions Willingly participates in therapeutic activities;Pleasant and cooperative;Motivated for self care and independence;Alert and oriented;Able to follow instructions   Barriers Fatigue;Decreased endurance;Generalized weakness;Pain;Limited mobility Fatigue;Decreased endurance;Impaired activity tolerance     Practiced floor transfer. Required moderate assist from half kneeling to standing due to LE weakness, Practiced car transfers with SBA    Assessment    Patient is safe with basic functional mobility and short distance ambulation as long as he does not over exert himself and try mobility once already fatigued. Is not strong enough to safely perform floor transfer  Strengths: (P) Willingly participates in therapeutic activities, Pleasant and cooperative, Motivated for self care and independence, Alert and oriented, Able to follow instructions  Barriers: (P) Fatigue, Decreased endurance, Impaired activity tolerance    Plan    Continue with functional LE and trunk strengthening, gait and balance training    DME  PT DME Recommendations  Wheelchair:  (N/A, pt has one)  Assistive Device: 4-Wheel Walker (pt owns this)  Additional Equipment: Hospital Bed (See other comments) (pt owns)    Passport items to be completed:  Get in/out of bed safely, in/out of a vehicle, safely use mobility device, walk or  wheel around home/community, navigate up and down stairs, show how to get up/down from the ground, ensure home is accessible, demonstrate HEP, complete caregiver training    Physical Therapy Problems (Active)       Problem: Mobility       Dates: Start:  03/29/24         Goal: STG-Within one week, patient will propel wheelchair household distances 150 ft with SPV to facilitate functional independence.       Dates: Start:  03/29/24            Goal: STG-Within one week, patient will ambulate household distance 150 ft x2 with 4WW and SBA to access home.       Dates: Start:  03/29/24            Goal: STG-Within one week, patient will ascend and descend 2 stairs with 1 HR and CGA to access living room.       Dates: Start:  03/29/24               Problem: PT-Long Term Goals       Dates: Start:  03/29/24         Goal: LTG-By discharge, patient will ambulate 250 ft x2 over even and uneven surfaces with 4WW and SPV to access community.       Dates: Start:  03/29/24            Goal: LTG-By discharge, patient will transfer one surface to another with 4WW and SPV to facilitate functional independence.       Dates: Start:  03/29/24            Goal: LTG-By discharge, patient will ambulate up/down 2 stairs with 1 HR and SPV to access living room.       Dates: Start:  03/29/24            Goal: LTG-By discharge, patient will transfer in/out of a car with 4WW and SBA after set up to facilitate functional independence.       Dates: Start:  03/29/24

## 2024-04-03 NOTE — WOUND TEAM
[facility]  Inpatient Services  Wound and Skin Care Follow-up    Admission Date: 3/28/2024     Last order of IP CONSULT TO WOUND CARE was found on 3/28/2024 from Hospital Encounter on 3/25/2024     HPI, PMH, SH: Reviewed    Past Surgical History:   Procedure Laterality Date    LYMPH NODE EXCISION Right 11/16/2023    Procedure: RIGHT INGUINAL NODE SUPERFICIAL DISSECTION;  Surgeon: Davon Valera M.D.;  Location: SURGERY [facility];  Service: General    NODE BIOPSY SENTINEL Bilateral 10/20/2020    Procedure: BIOPSY, LYMPH NODE, SENTINEL- GROIN;  Surgeon: Davon Valera M.D.;  Location: SURGERY SAME DAY [facility];  Service: General    WIDE EXCISION Right 10/20/2020    Procedure: WIDE EXCISION, LESION- BUTTOCKS, RADICAL MALIGNANT MELANOMA;  Surgeon: Davon Valera M.D.;  Location: SURGERY SAME DAY [facility];  Service: General    ANTROSTOMY Bilateral 11/15/2019    Procedure: MAXILLARY ANTROSTOMY- REVISION ENDOSCOPICMOMETASONE INJECTION, PROPEL STENT PLACEMENT;  Surgeon: Felicitas Tenorio M.D.;  Location: [facility];  Service: Ent    SINUSCOPY Bilateral 11/15/2019    Procedure: ENDOSCOPY, PARANASAL SINUSES- FOR FRONTAL EXPLORATION;  Surgeon: Felicitas Tenorio M.D.;  Location: [facility];  Service: Ent    TURBINOPLASTY Bilateral 11/15/2019    Procedure: TURBINOPLASTY;  Surgeon: Felicitas Tenorio M.D.;  Location: [facility];  Service: Ent    SPHENOIDECTOMY Bilateral 11/15/2019    Procedure: SPHENOIDECTOMY- FOR SPHENOIDOTOMY;  Surgeon: Felicitas Tenorio M.D.;  Location: [facility];  Service: Ent    ETHMOIDECTOMY Bilateral 11/15/2019    Procedure: ETHMOIDECTOMY- ENDOSCOPIC;  Surgeon: Felicitas Tenorio M.D.;  Location: [facility];  Service: Ent    NASAL POLYPECTOMY Bilateral 11/15/2019    Procedure: POLYPECTOMY, NASAL CAVITY;  Surgeon: Felicitas Tenorio M.D.;  Location: [facility];  Service: Ent    NASAL  POLYPECTOMY  2015, 2017, 2019    x 3    OTHER  11/20    removed melanoma     Social History     Tobacco Use    Smoking status: Never    Smokeless tobacco: Never   Substance Use Topics    Alcohol use: Yes     Alcohol/week: 0.6 oz     Types: 1 Cans of beer per week     Comment: reports 4/month     No chief complaint on file.    Diagnosis: Acute encephalopathy [G93.40]    Unit where seen by Wound Team: 24/01     WOUND FOLLOW UP RELATED TO:  Coccyx, bilateral heels       WOUND TEAM PLAN OF CARE - Frequency of Follow-up:   Nursing to follow dressing orders written for wound care. Contact wound team if area fails to progress, deteriorates or with any questions/concerns if something comes up before next scheduled follow up (See below as to whether wound is following and frequency of wound follow up)  Dressing changes by wound team:                   Bi-weekly - Coccyx    WOUND HISTORY:       Patient admitted 3/28 with diagnosis of acute encephalopathy, He has history of melanoma with mets to multiple areas. Pt arrived with multiple areas of Pressure injuries including bi-lateral heels, coccyx, back, and neck. Back has resolved still injuries present on the coccyx, neck and bilateral heels.        WOUND ASSESSMENT/LDA  Wound 03/09/24 Pressure Injury Coccyx POA PI (Active)   Date First Assessed/Time First Assessed: 03/09/24 2230   Present on Original Admission: Yes  Hand Hygiene Completed: Yes  Primary Wound Type: Pressure Injury  Location: Coccyx  Wound Description (Comments): POA PI      Assessments 4/3/2024  3:00 PM   Wound Image     Site Assessment Pink;Slough   Periwound Assessment Blanchable erythema   Margins Defined edges   Closure Adhesive bandage   Drainage Amount Scant   Drainage Description Serosanguineous   Treatments Site care;Cleansed   Offloading/DME Other (comment)   Wound Cleansing Approved Wound Cleanser   Dressing Status Clean;Dry;Intact   Dressing Changed Changed   Dressing Cleansing/Solutions Normal  Saline   Dressing Options Honey Colloid;Silicone Adhesive Foam   Dressing Change/Treatment Frequency Daily, and As Needed   NEXT Dressing Change/Treatment Date 04/04/24   NEXT Weekly Photo (Inpatient Only) 04/01/24   Wound Team Following Bi-Weekly   Wound Bed Slough (%) 100 %   WOUND NURSE ONLY - Time Spent with Patient (mins) 45       Wound 03/09/24 Pressure Injury Heel Left POA stage 2 (Active)   Date First Assessed/Time First Assessed: 03/09/24 2230   Present on Original Admission: Yes  Primary Wound Type: Pressure Injury  Location: Heel  Laterality: Left  Wound Description (Comments): POA stage 2      Assessments 4/3/2024  3:00 PM   Site Assessment Clean;Dry;Intact   Periwound Assessment Clean;Dry;Intact;Callused   Margins Attached edges   Closure Open to air   Drainage Amount None   Treatments Offloading   Wound Cleansing Not Applicable   Periwound Protectant Not Applicable   Dressing Status Open to Air   Dressing Options Open to Air   NEXT Weekly Photo (Inpatient Only) 04/07/24   Wound Team Following Not following   WOUND NURSE ONLY - Time Spent with Patient (mins) 45       Wound 03/09/24 Pressure Injury Heel Right POA DTI (Active)   Date First Assessed/Time First Assessed: 03/09/24 (c) 2230   Present on Original Admission: Yes  Primary Wound Type: Pressure Injury  Location: Heel  Laterality: Right  Wound Description (Comments): POA DTI      Assessments 4/3/2024  3:00 PM   Wound Image     Site Assessment Clean;Dry;Intact   Periwound Assessment Callused   Margins Attached edges   Closure Open to air   Drainage Amount None   Treatments Offloading   Offloading/DME Other (comment)   Wound Cleansing Approved Wound Cleanser   Periwound Protectant Not Applicable   Dressing Status Open to Air   Dressing Options Open to Air   NEXT Weekly Photo (Inpatient Only) 04/07/24   Wound Team Following Not following   WOUND NURSE ONLY - Time Spent with Patient (mins) 45        Vascular:    NAYANA:   No results found.    Lab  Values:    Lab Results   Component Value Date/Time    WBC 3.0 (L) 04/01/2024 06:19 AM    RBC 3.12 (L) 04/01/2024 06:19 AM    HEMOGLOBIN 8.3 (L) 04/01/2024 06:19 AM    HEMATOCRIT 27.3 (L) 04/01/2024 06:19 AM    HBA1C 4.3 03/29/2024 06:10 AM         Culture Results show:  No results found for this or any previous visit (from the past 720 hour(s)).    Pain Level/Medicated:  None, Tolerated without pain medication       INTERVENTIONS BY WOUND TEAM:  Chart and images reviewed. Discussed with bedside RN. All areas of concern (based on picture review, LDA review and discussion with bedside RN) have been thoroughly assessed. Documentation of areas based on significant findings. This RN in to assess patient. Performed standard wound care which includes appropriate positioning, dressing removal and non-selective debridement. Pictures and measurements obtained weekly if/when required.    Wound:  Coccyx  Preparation for Dressing removal: Removed without difficulty  Cleansed/Non-selectively Debrided with:  Normal Saline and Gauze  Non-Excisional Conservative Sharp debridement: Slough debrided away using curette < 20cm2 debrided down to viable tissue.  Small amount of bleeding noted, controlled with manual pressure.  Ana wound: Cleansed with Normal Saline and Gauze, Prepped with N/A  Primary Dressing:  Honey colloid thin  Secondary (Outer) Dressing: Silicone adhesive foam    Advanced Wound Care Discharge Planning  Number of Clinicians necessary to complete wound care: 3  Is patient requiring IV pain medications for dressing changes:  No   Length of time for dressing change 30 min. (This does not include chart review, pre-medication time, set up, clean up or time spent charting.)    Interdisciplinary consultation: Patient, Bedside RN ([place]),  Lizzeth Kowalski, RN, and JODY Rubio .     EVALUATION / RATIONALE FOR TREATMENT:     Date:  04/03/24  Wound Status:  Wound progressing as expected    Dr. Bauer performed sharps  debridement to unstageable coccyx PI. Honeycolloid thin used Honey colloid applied to cleanse and autolytically debride due to its high osmolarity. As well as help lower overall wound pH, while promoting a moisture-balanced environment conducive to wound healing. Silicone adhesive placed for further offloading measures.       Date:  04/01/24  Wound Status:  Wound improving    Unstagable to coccyx shrinking in size , still covered with 100% slough. Continue with current treatment.  Date:  03/29/24  Wound Status:  Initial evaluation    Unstagble PI to coccyx found on admit, pt wife reports that patient has previous flap there from a removal of a melanoma. When the skin is lifted on the coccyx there is and area of unblancing redness and two open areas covered with slough. Hydrafera suzette put on sough to help with debridement and covered with an offloading dressing.   Area of redness to neck that is blanching, use of off loading dressing in place to protect from orthopedic device.   Left heel has a resolving DTI the skin is now translucent with area of blister still present, intact, and blanching, staged as a stage2.  Right heel has DTI dark purple intact blister redness to periwound.          Goals: Steady decrease in wound area and depth weekly.    NURSING PLAN OF CARE ORDERS:  Dressing changes: See Dressing Care orders    NUTRITION RECOMMENDATIONS   Wound Team Recommendations:  N/A     DIET ORDERS (From admission to next 24h)       Start     Ordered    03/29/24 1555  Supplements  ALL MEALS        Question Answer Comment   Which Supplement Ensure    Ensure: Ensure Plus Carton        03/29/24 1555    03/28/24 1351  Diet Order Diet: Regular  ALL MEALS        Question:  Diet:  Answer:  Regular    03/28/24 1350                    PREVENTATIVE INTERVENTIONS:   Q shift Sachin - performed per nursing policy  Q shift pressure point assessments - performed per nursing policy    Surface/Positioning  Low Airloss - Currently in  Place    Offloading/Redistribution  Float Heels off Bed with Pillows - Currently in Place           Mobilization      Working with therapies      Anticipated discharge plans:  TBD        Vac Discharge Needs:  Vac Discharge plan is purely a recommendation from wound team and not a requirement for discharge unless otherwise stated by physician.  Not Applicable Pt not on a wound vac

## 2024-04-03 NOTE — CARE PLAN
The patient is Watcher - Medium risk of patient condition declining or worsening    Shift Goals  Clinical Goals: Safety  Patient Goals: sleep well, pain control  Family Goals: no one present    Progress made toward(s) clinical / shift goals:    Problem: Knowledge Deficit - Standard  Goal: Patient and family/care givers will demonstrate understanding of plan of care, disease process/condition, diagnostic tests and medications  Outcome: Progressing     Problem: Skin Integrity  Goal: Skin integrity is maintained or improved  Note: Patient's skin remains free from new or accidental injury this shift.   Pt on low air loss mattress . Will continue to monitor.     Problem: Skin Integrity  Goal: Skin integrity is maintained or improved  Note: Patient's skin remains free from new or accidental injury this shift.   Pt on low air loss mattress . Will continue to monitor.     Problem: Infection  Goal: Patient will remain free from infection  Outcome: Progressing  Note: Pt is able to verbalize s/s of infection: redness of area, fever, pain.

## 2024-04-03 NOTE — THERAPY
"Occupational Therapy  Daily Treatment     Patient Name: Andrew Wall  Age:  58 y.o., Sex:  male  Medical Record #: [id number]  Today's Date: 4/3/2024     Precautions  Precautions: Fall Risk, Cervical Collar  , Spinal / Back Precautions   Comments: C2 fx, CHEMO Prec    Safety   ADL Safety : Requires Physical Assist for Safety  Bathroom Safety: Requires Physical Assist for Safety    Subjective    \"  You know  at the hospital they had me doing some dowel exercises and it just popped several times and then moved .  \" Referring to limited left UE  shoulder flexion      Objective       04/03/24 0901   OT Charge Group   OT Self Care / ADL (Units) 1   OT Therapeutic Exercise (Units) 3   OT Total Time Spent   OT Individual Total Time Spent (Mins) 60   Precautions   Precautions Fall Risk;Cervical Collar  ;Spinal / Back Precautions    Comments C2 fx, CHEMO Prec   Functional Level of Assist   Grooming Independent  (seated at sink  oral care)   Bathing   (reports spouse assisted with shower  last night)   Lower Body Dressing Supervision  (setup don slip on shoes at edge of bed)   Bed, Chair, Wheelchair Transfer Standby Assist  (bed to w/c   w/c <-> supine on mat   squat pivot)   Supine Upper Body Exercises   Chest Press 1 set of 10;Bilateral  (unweighted dowel)   Front Arm Raise 1 set of 10;Bilateral  (undweighted dowel  note did not come to full shoulder extension   came down to his lap   supine on mat with knees flexed)   Bicep Curl 1 set of 10;Right   Comments prior to supine exercise   supine  PROM and gentle stretch   both UEs  shoulder flexion  to 90 degrees  reports increased pain in  left  at end range  approximately  long biceps head and and transverse humearl ligament   Sitting Upper Body Exercises   Upper Extremity Bike Level 3 Resistance  (x 5 and  3 minutes)   Interdisciplinary Plan of Care Collaboration   Patient Position at End of Therapy Seated;Call Light within Reach;Tray Table within Reach     Seated edge of " "mat   bilateral pectoral thoracic stretching      Bilateral  scapular mobilization ( retraction  elevation and upward rotation)     Disposable hot packs to both shoulders  x 10 minutes while performing  UE bike  and prior to PROM and stretching     Assessment      Continues to make gains      Discussed  d/c date and plan for continued family training with spouse  prior to d/c         Demonstrated  a \" salute \" with right UE   I showed my family what I could do.     Strengths: Able to follow instructions, Alert and oriented, Motivated for self care and independence, Pleasant and cooperative, Supportive family, Willingly participates in therapeutic activities  Barriers: Aspiration risk, Decreased endurance, Fatigue, Generalized weakness, Home accessibility, Impaired activity tolerance, Impaired balance, Limited mobility, Pain    Plan  ADL IADL , related mobility strength/endurance building standing tolerance and balance activity incorporating C spine precautions.       DME  OT DME Recommendations  Bathroom Equipment:  ((pt owns hospital bed), hip kit (educated pt/spouse on 3/31, directed to [facility] to purchase), spouse also plans to purchase tub txfr bench)  Additional Equipment: Hospital Bed (See other comments) (pt owns)      Occupational Therapy Goals (Active)       Problem: Functional Transfers       Dates: Start:  03/29/24         Goal: STG-Within one week, patient will transfer to toilet at SBA level using DME PRN.       Dates: Start:  03/29/24         Goal Note filed on 04/02/24 0828 by Leonid Medina, C.O.T.A.        CGA               Goal: STG-Within one week, patient will transfer to tub/shower at SBA level using DME PRN.       Dates: Start:  03/29/24         Goal Note filed on 04/02/24 0828 by Leonid Medina, C.O.T.A.        CGA                  Problem: OT Long Term Goals       Dates: Start:  03/29/24         Goal: LTG-By discharge, patient will complete basic self care tasks at SBA-Mod I level using DME/AE " PRN.       Dates: Start:  03/29/24            Goal: LTG-By discharge, patient will perform bathroom transfers at SBA-Mod I level using DME/AE PRN.       Dates: Start:  03/29/24

## 2024-04-04 ENCOUNTER — APPOINTMENT (OUTPATIENT)
Dept: PHYSICAL THERAPY | Facility: REHABILITATION | Age: 59
DRG: 070 | End: 2024-04-04
Attending: PHYSICAL MEDICINE & REHABILITATION
Payer: COMMERCIAL

## 2024-04-04 ENCOUNTER — APPOINTMENT (OUTPATIENT)
Dept: INPATIENT REHAB | Facility: REHABILITATION | Age: 59
DRG: 070 | End: 2024-04-04
Payer: COMMERCIAL

## 2024-04-04 ENCOUNTER — APPOINTMENT (OUTPATIENT)
Dept: OCCUPATIONAL THERAPY | Facility: REHABILITATION | Age: 59
DRG: 070 | End: 2024-04-04
Attending: PHYSICAL MEDICINE & REHABILITATION
Payer: COMMERCIAL

## 2024-04-04 PROCEDURE — 700112 HCHG RX REV CODE 229: Mod: JZ | Performed by: PHYSICAL MEDICINE & REHABILITATION

## 2024-04-04 PROCEDURE — 700101 HCHG RX REV CODE 250: Performed by: PHYSICAL MEDICINE & REHABILITATION

## 2024-04-04 PROCEDURE — 97110 THERAPEUTIC EXERCISES: CPT | Mod: CO

## 2024-04-04 PROCEDURE — 700102 HCHG RX REV CODE 250 W/ 637 OVERRIDE(OP): Performed by: PHYSICAL MEDICINE & REHABILITATION

## 2024-04-04 PROCEDURE — 97110 THERAPEUTIC EXERCISES: CPT

## 2024-04-04 PROCEDURE — 770010 HCHG ROOM/CARE - REHAB SEMI PRIVAT*

## 2024-04-04 PROCEDURE — 97530 THERAPEUTIC ACTIVITIES: CPT

## 2024-04-04 PROCEDURE — 97116 GAIT TRAINING THERAPY: CPT

## 2024-04-04 PROCEDURE — 97112 NEUROMUSCULAR REEDUCATION: CPT

## 2024-04-04 PROCEDURE — A9270 NON-COVERED ITEM OR SERVICE: HCPCS | Performed by: PHYSICAL MEDICINE & REHABILITATION

## 2024-04-04 PROCEDURE — 99232 SBSQ HOSP IP/OBS MODERATE 35: CPT | Performed by: PHYSICAL MEDICINE & REHABILITATION

## 2024-04-04 PROCEDURE — 94640 AIRWAY INHALATION TREATMENT: CPT

## 2024-04-04 PROCEDURE — A9270 NON-COVERED ITEM OR SERVICE: HCPCS | Mod: JZ | Performed by: PHYSICAL MEDICINE & REHABILITATION

## 2024-04-04 RX ADMIN — MIDODRINE HYDROCHLORIDE 10 MG: 10 TABLET ORAL at 17:44

## 2024-04-04 RX ADMIN — MEGESTROL ACETATE 800 MG: 40 SUSPENSION ORAL at 08:58

## 2024-04-04 RX ADMIN — MORPHINE SULFATE 45 MG: 30 TABLET, FILM COATED, EXTENDED RELEASE ORAL at 08:57

## 2024-04-04 RX ADMIN — DOCUSATE SODIUM 283 MG: 283 LIQUID RECTAL at 05:51

## 2024-04-04 RX ADMIN — OMEPRAZOLE 20 MG: 20 CAPSULE, DELAYED RELEASE ORAL at 08:58

## 2024-04-04 RX ADMIN — MOMETASONE FUROATE AND FORMOTEROL FUMARATE DIHYDRATE 2 PUFF: 200; 5 AEROSOL RESPIRATORY (INHALATION) at 07:46

## 2024-04-04 RX ADMIN — MIDODRINE HYDROCHLORIDE 10 MG: 10 TABLET ORAL at 08:58

## 2024-04-04 RX ADMIN — SENNOSIDES AND DOCUSATE SODIUM 2 TABLET: 8.6; 5 TABLET ORAL at 20:57

## 2024-04-04 RX ADMIN — MOMETASONE FUROATE AND FORMOTEROL FUMARATE DIHYDRATE 2 PUFF: 200; 5 AEROSOL RESPIRATORY (INHALATION) at 21:00

## 2024-04-04 RX ADMIN — MIDODRINE HYDROCHLORIDE 10 MG: 10 TABLET ORAL at 12:15

## 2024-04-04 RX ADMIN — Medication 1000 UNITS: at 08:57

## 2024-04-04 RX ADMIN — MORPHINE SULFATE 45 MG: 30 TABLET, FILM COATED, EXTENDED RELEASE ORAL at 20:57

## 2024-04-04 RX ADMIN — SENNOSIDES AND DOCUSATE SODIUM 2 TABLET: 8.6; 5 TABLET ORAL at 08:58

## 2024-04-04 RX ADMIN — ACETAMINOPHEN 650 MG: 325 TABLET ORAL at 15:44

## 2024-04-04 ASSESSMENT — ACTIVITIES OF DAILY LIVING (ADL)
BED_CHAIR_WHEELCHAIR_TRANSFER_DESCRIPTION: SET-UP OF EQUIPMENT;SUPERVISION FOR SAFETY
BED_CHAIR_WHEELCHAIR_TRANSFER_DESCRIPTION: SUPERVISION FOR SAFETY;VERBAL CUEING
BED_CHAIR_WHEELCHAIR_TRANSFER_DESCRIPTION: SUPERVISION FOR SAFETY;SET-UP OF EQUIPMENT

## 2024-04-04 ASSESSMENT — GAIT ASSESSMENTS
GAIT LEVEL OF ASSIST: STANDBY ASSIST
ASSISTIVE DEVICE: 4 WHEEL WALKER
GAIT LEVEL OF ASSIST: STANDBY ASSIST
DEVIATION: DECREASED BASE OF SUPPORT;BRADYKINETIC;DECREASED HEEL STRIKE;DECREASED TOE OFF;SHUFFLED GAIT
DISTANCE (FEET): 250
ASSISTIVE DEVICE: 4 WHEEL WALKER
DISTANCE (FEET): 125
DEVIATION: DECREASED BASE OF SUPPORT;BRADYKINETIC;SHUFFLED GAIT

## 2024-04-04 NOTE — THERAPY
Physical Therapy   Daily Treatment     Patient Name: Andrew Wall  Age:  58 y.o., Sex:  male  Medical Record #: [id number]  Today's Date: 4/4/2024     Precautions  Precautions: Fall Risk, Cervical Collar    Comments: C2 Fx, multiple sites of metastatic disease    Subjective    Pt was supine in bed upon arrival and agreeable to treatment.   Pt reported increased neck pain from prior therapy sessions.     Objective       04/04/24 1301   PT Charge Group   PT Gait Training (Units) 1   PT Therapeutic Activities (Units) 1   PT Total Time Spent   PT Individual Total Time Spent (Mins) 30   Precautions   Precautions Fall Risk;Cervical Collar     Gait Functional Level of Assist    Gait Level Of Assist Standby Assist   Assistive Device 4 Wheel Walker   Distance (Feet) 250   # of Times Distance was Traveled 1   Deviation Decreased Base Of Support;Bradykinetic;Decreased Heel Strike;Decreased Toe Off;Shuffled Gait   Transfer Functional Level of Assist   Bed, Chair, Wheelchair Transfer Standby Assist   Bed Chair Wheelchair Transfer Description Supervision for safety;Verbal cueing   Bed Mobility    Supine to Sit Standby Assist   Sit to Supine Standby Assist   Sit to Stand Standby Assist   Scooting Standby Assist   Interdisciplinary Plan of Care Collaboration   Patient Position at End of Therapy In Bed;Call Light within Reach;Phone within Reach;Tray Table within Reach     Completed static balance training in // bars with focus on 4 point foot and increased body awareness: FA, FT, tandem, all x 15-30 sec with no UE support, SLS x 10 sec bilaterally.    Ambulation without UE support in // bars x 40-60 feet then with QC x 40 feet and SPC x 40 feet.  Education provided on balance systems    Assessment    Pt demonstrated improving activity tolerance with gait training.  Pt continues to demonstrate decreased Sterling with gait with and without AD likely d/t decreased balance and hip strength.    Strengths: Willingly participates in  therapeutic activities, Pleasant and cooperative, Motivated for self care and independence, Alert and oriented, Able to follow instructions  Barriers: Fatigue, Decreased endurance, Impaired activity tolerance    Plan    Continue with functional LE and trunk strengthening, gait and balance training.  Continue gait training with SPC     DME  PT DME Recommendations  Wheelchair:  (N/A, pt has one)  Assistive Device: 4-Wheel Walker (pt owns this)  Additional Equipment: Hospital Bed (See other comments) (pt owns)     Passport items to be completed:  Get in/out of bed safely, in/out of a vehicle, safely use mobility device, walk or wheel around home/community, navigate up and down stairs, show how to get up/down from the ground, ensure home is accessible, demonstrate HEP, complete caregiver training    Physical Therapy Problems (Active)       Problem: Mobility       Dates: Start:  03/29/24         Goal: STG-Within one week, patient will propel wheelchair household distances 150 ft with SPV to facilitate functional independence.       Dates: Start:  03/29/24            Goal: STG-Within one week, patient will ambulate household distance 150 ft x2 with 4WW and SBA to access home.       Dates: Start:  03/29/24            Goal: STG-Within one week, patient will ascend and descend 2 stairs with 1 HR and CGA to access living room.       Dates: Start:  03/29/24               Problem: PT-Long Term Goals       Dates: Start:  03/29/24         Goal: LTG-By discharge, patient will ambulate 250 ft x2 over even and uneven surfaces with 4WW and SPV to access community.       Dates: Start:  03/29/24            Goal: LTG-By discharge, patient will transfer one surface to another with 4WW and SPV to facilitate functional independence.       Dates: Start:  03/29/24            Goal: LTG-By discharge, patient will ambulate up/down 2 stairs with 1 HR and SPV to access living room.       Dates: Start:  03/29/24            Goal: LTG-By discharge,  patient will transfer in/out of a car with 4WW and SBA after set up to facilitate functional independence.       Dates: Start:  03/29/24

## 2024-04-04 NOTE — THERAPY
Occupational Therapy  Daily Treatment     Patient Name: Andrew Wall  Age:  58 y.o., Sex:  male  Medical Record #: [id number]  Today's Date: 4/4/2024     Precautions  Precautions: Fall Risk, Cervical Collar    Comments: C2 Fx, multiple sites of metastatic disease    Safety   ADL Safety : Requires Physical Assist for Safety  Bathroom Safety: Requires Physical Assist for Safety    Subjective    Patient received semi-supine in bed, alert and agreeable to OT treatment, medically stable and cleared for tx by RN.        Objective       04/04/24 1001   OT Charge Group   Charges Yes   OT Therapy Activity (Units) 2   OT Total Time Spent   OT Individual Total Time Spent (Mins) 30   Functional Level of Assist   Bed, Chair, Wheelchair Transfer Standby Assist   Bed Chair Wheelchair Transfer Description Supervision for safety;Set-up of equipment   Standing Upper Body Exercises   Standing Upper Body Exercises Yes   Comments   1) 2 x 10 sets bilaterally, row with emphasis on scapular retraction simulating walking dog with pink theraband. Good static standing balance throughout.  2) Standing peg board task focusing on functional BUE reach and FM coordination. Patient able to tolerate standing x 3-4 minutes before needing a seated rest break   Balance   Standing Balance (Static) Good   Interdisciplinary Plan of Care Collaboration   IDT Collaboration with  Physical Therapist   Patient Position at End of Therapy Seated;Chair Alarm On;Self Releasing Lap Belt Applied  (Warm hand off to PT for next treatment)         Assessment    Patient seen for skilled OT treatment focused on endurance/activity tolerance, neuromuscular re-education, and therapeutic exercise targeting BUE strength and coordination . Patient with significant improvements in tolerance to treatment at this time, limited by pain, impaired activity tolerance/endurance, generalized weakness/deconditioning, and impaired dynamic balance. Patient generally performing  ADLs/functional transfers at SPV-CGA level with good safety awareness and ability to self pace. OT provided education on BUE HEP. Patient will benefit from ongoing OT treatment to address limitations noted above.    Strengths: Able to follow instructions, Alert and oriented, Motivated for self care and independence, Pleasant and cooperative, Supportive family, Willingly participates in therapeutic activities  Barriers: Aspiration risk, Decreased endurance, Fatigue, Generalized weakness, Home accessibility, Impaired activity tolerance, Impaired balance, Limited mobility, Pain    Plan    Continue to progress endurance/activity tolerance overall  BUE HEP with plan to progress at home   Per primary OT POC     DME  OT DME Recommendations  Bathroom Equipment:  ((pt owns hospital bed), hip kit (educated pt/spouse on 3/31, directed to [employer] to purchase), spouse also plans to purchase tub txfr bench)  Additional Equipment: Hospital Bed (See other comments) (pt owns)    Passport items to be completed:  Perform bathroom transfers, complete dressing, complete feeding, get ready for the day, prepare a simple meal, participate in household tasks, adapt home for safety needs, demonstrate home exercise program, complete caregiver training     Occupational Therapy Goals (Active)       Problem: Functional Transfers       Dates: Start:  03/29/24         Goal: STG-Within one week, patient will transfer to toilet at SBA level using DME PRN.       Dates: Start:  03/29/24         Goal Note filed on 04/02/24 0828 by Leonid Medina, C.O.T.A.        CGA               Goal: STG-Within one week, patient will transfer to tub/shower at SBA level using DME PRN.       Dates: Start:  03/29/24         Goal Note filed on 04/02/24 0828 by Leonid Medina, C.O.T.A.        CGA                  Problem: OT Long Term Goals       Dates: Start:  03/29/24         Goal: LTG-By discharge, patient will complete basic self care tasks at SBA-Mod I level using  DME/AE PRN.       Dates: Start:  03/29/24            Goal: LTG-By discharge, patient will perform bathroom transfers at SBA-Mod I level using DME/AE PRN.       Dates: Start:  03/29/24

## 2024-04-04 NOTE — CARE PLAN
The patient is Watcher - Medium risk of patient condition declining or worsening    Shift Goals  Clinical Goals: Safety  Patient Goals: sleep well, pain control  Family Goals: no one present    Progress made toward(s) clinical / shift goals:    Problem: Knowledge Deficit - Standard  Goal: Patient and family/care givers will demonstrate understanding of plan of care, disease process/condition, diagnostic tests and medications  Note: Educated patient regarding medication Midodrine PO, patient verbalized understanding.     Problem: Fall Risk - Rehab  Goal: Patient will remain free from falls  Note: Zapata Reese Fall risk Assessment Score: 10  Low fall risk interventions   - Call light within reach   - Yellow  socks   - Belongings within reach   - Bed in the lowest position           Problem: Pain - Standard  Goal: Alleviation of pain or a reduction in pain to the patient’s comfort goal  Note: Patient able to verbalize pain level and verbalize an acceptable level of pain.

## 2024-04-04 NOTE — PROGRESS NOTES
NURSING DAILY NOTE    Name: Andrew Wall   Date of Admission: 3/28/2024   Admitting Diagnosis: Acute encephalopathy  Attending Physician: Antonio Cortez M.d.  Allergies: Augmentin    Safety  Patient Assist     Patient Precautions  Fall Risk, Cervical Collar    Precaution Comments  C2 Fx, multiple sites of metastatic disease  Bed Transfer Status  Standby Assist  Toilet Transfer Status   Minimal Assist  Assistive Devices  Wheelchair  Oxygen  None - Room Air  Diet/Therapeutic Dining  Current Diet Order   Procedures    Diet Order Diet: Regular     Pill Administration  whole  Agitated Behavioral Scale     ABS Level of Severity       Fall Risk  Has the patient had a fall this admission?   No  Jodi Legih Fall Risk Scoring  10, LOW RISK  Fall Risk Safety Measures  bed alarm, chair alarm, poor balance, and low vision/ hearing    Vitals  Temperature: 36.4 °C (97.6 °F)  Temp src: Oral  Pulse: 72  Respiration: 20  Blood Pressure: 106/68  Blood Pressure MAP (Calculated): 81 MM HG  BP Location: Left, Upper Arm  Patient BP Position: Supine     Oxygen  Pulse Oximetry: 100 %  O2 (LPM): 0  O2 Delivery Device: None - Room Air    Bowel and Bladder  Last Bowel Movement  03/29/24  Stool Type  Type 4: Like a sausage or snake, smooth and soft  Bowel Device  Bathroom  Continent  Bladder: Continent void   Bowel: Continent movement  Bladder Function  Urine Void (mL): 400 ml  Number of Times Voided: 2  Urinary Options: Yes  Urine Color: Janae  Genitourinary Assessment   Bladder Assessment (WDL):  Within Defined Limits  Santiago Catheter: Not Applicable  Urine Color: Janae  Bladder Device: Urinal, Bathroom  Bladder Scan: Post Void  $ Bladder Scan Results (mL): 230    Skin  Sachin Score   15  Sensory Interventions   Bed Types: Low Airloss  Skin Preventative Measures: Pillows in Use for Support / Positioning  Moisture Interventions  Moisturizers/Barriers: Barrier Wipes,  Barrier Paste      Pain  Pain Rating Scale  0 - No Pain  Pain Location  Neck, Generalized  Pain Location Orientation  Anterior, Posterior  Pain Interventions   Declines    ADLs    Bathing   Patient Refused Bathing  Linen Change      Personal Hygiene  Moist Ana Wipes  Chlorhexidine Bath      Oral Care     Teeth/Dentures     Shave     Nutrition Percentage Eaten  Between 25-50% Consumed  Environmental Precautions  Treaded Slipper Socks on Patient, Personal Belongings, Wastebasket, Call Bell etc. in Easy Reach, Bed in Low Position  Patient Turns/Positioning  Patient Turns Self from Side to Side  Patient Turns Assistance/Tolerance     Bed Positions  Bed Controls On, Bed Locked  Head of Bed Elevated  Self regulated      Psychosocial/Neurologic Assessment  Psychosocial Assessment  Psychosocial (WDL):  Within Defined Limits  Neurologic Assessment  Neuro (WDL): Exceptions to WDL  Level of Consciousness: Alert  Orientation Level: Oriented X4  Cognition: Appropriate judgement, Appropriate safety awareness, Appropriate attention/concentration, Appropriate for developmental age, Follows commands  Speech: Clear  Motor Function/Sensation Assessment: Sensation  RUE Sensation: Full sensation  LUE Sensation: Full sensation  RLE Sensation: Full sensation  LLE Sensation: Full sensation  EENT (WDL):  Within Defined Limits    Cardio/Pulmonary Assessment  Edema   RLE Edema: 1+  LLE Edema: 1+  Respiratory Breath Sounds  RUL Breath Sounds: Clear  RML Breath Sounds: Clear  RLL Breath Sounds: Diminished  ALKA Breath Sounds: Clear  LLL Breath Sounds: Diminished  Cardiac Assessment   Cardiac (WDL):  Within Defined Limits

## 2024-04-04 NOTE — PROGRESS NOTES
Physical Medicine & Rehabilitation Progress Note    Encounter Date: 4/4/2024    Chief Complaint: Decreased mobility, weakness    Interval Events (Subjective):  Patient sitting up in therapy gym. He reports he is doing well. He continues to have wounds. Discussed about offloading sites of pressure. Discussed about avoiding putting pressure on his sacrum. Denies pain unless palpated.     _____________________________________  Interdisciplinary Team Conference   Most recent IDT on 4/2/2024    Discharge Date/Disposition:  4/8/24  _____________________________________    Objective:  VITAL SIGNS: /68   Pulse 82   Temp 36.4 °C (97.5 °F) (Oral)   Resp 18   Wt 65.9 kg (145 lb 4.5 oz)   SpO2 100%   BMI 20.26 kg/m²   Gen: NAD  Psych: Mood and affect appropriate  CV: RRR, 0 edema  Resp: CTAB, no upper airway sounds  Abd: NTND  Neuro: AOx4, following commands  Unchanged from 4/3/24    Laboratory Values:  No results found for this or any previous visit (from the past 72 hour(s)).      Medications:  Scheduled Medications   Medication Dose Frequency    midodrine  10 mg TID WITH MEALS    vitamin D3  1,000 Units DAILY    senna-docusate  2 Tablet BID    omeprazole  20 mg DAILY    Encorafenib  300 mg DAILY AT 1800    And    Binimetinib  30 mg BID    megestrol  800 mg DAILY    mometasone-formoterol  2 Puff BID    morphine ER  45 mg Q12HRS     PRN medications: Respiratory Therapy Consult, hydrALAZINE, acetaminophen, senna-docusate **AND** polyethylene glycol/lytes, docusate sodium, magnesium hydroxide, mag hydrox-al hydrox-simeth, ondansetron **OR** ondansetron, traZODone, sodium chloride, oxyCODONE immediate-release **OR** oxyCODONE immediate-release, midazolam    Diet:  Current Diet Order   Procedures    Diet Order Diet: Regular       Medical Decision Making and Plan:  Acute metabolic encephalopathy - Patient brought to ER in shock with negative infectious work-up thought to be 2/2 chemotherapy agents. Oncology consulted  and reduced medications.  -PT and OT for mobility and ADLs. Per guidelines, 15 hours per week between PT, OT and/or SLP.  -Follow-up Oncology.      Hypotension - He was started on Midodrine 5 mg TID. SBP into 90s, increase to 7.5. SBP into 80s, increase to 10. SBP into low 100s, continue Midodrine 10 mg TID     C2 fracture - Patient in C collar. Follow-up NSG     Multiple L spine fractures - Managed non-operatively as significant metastatic disease in L and S spine.      Metastatic melanoma - Patient on Braftovi 300 mg daily and Mektovi 30 mg BID per Oncology. Continue Megace for appetite stimulation      Respiratory failure - Patient on Dulera. Ongoing cough at times, continue Dulera     Anemia - Check AM CBC - 7.9, will monitor. Repeat 8.3 on 4/1, will monitor     Leukopenia - Check AM CBC, on chemotherapy - 3.6 on admission, ANC 2.4, will monitor. Repeat WBC 3.0, will monitor     Hyponatremia - Check AM CMP - 135, will recheck 4/1/24 - 134, will monitor     Vitamin D deficiency - 20 on admission, start 1000 U     Pain - Patient on Morphine 45 mg ER BID, Gabapentin 100 mg daily and PRN Oxycodone/Tylenol. Discontinue Gabapentin as may contribute to low SBP. Continue Morphine 45 mg ER BID     Skin - Patient at risk for skin breakdown due to debility in areas including sacrum, achilles, elbows and head in addition to other sites. Nursing to assess skin daily.   DTI to right heel on admission  Coccyx breakdown on admission, wound care to consult. Medihoney to sacrum/coccyx      GI Ppx - Patient on Prilosec for GERD prophylaxis. Patient on Senna-docusate for constipation prophylaxis.      DVT Ppx - Patient Lovenox on transfer. Ambulating > 125 feet, discontinue Lovenox  ____________________________________    T. Angelo Cortez MD/PhD  [facility] - Physical Medicine & Rehabilitation   [facility] - Brain Injury Medicine   ____________________________________

## 2024-04-04 NOTE — THERAPY
"Occupational Therapy  Daily Treatment     Patient Name: Andrew Wall  Age:  58 y.o., Sex:  male  Medical Record #: [id number]  Today's Date: 4/4/2024     Precautions  Precautions: (P) Fall Risk, Cervical Collar    Comments: (P) C2 Fx, multiple sites of metastatic disease    Safety   ADL Safety : Requires Physical Assist for Safety  Bathroom Safety: Requires Physical Assist for Safety    Subjective    \" I am starting to see a little mass building in my arms.\"     Objective/Assessment       04/04/24 0833   OT Charge Group   OT Therapy Activity (Units) 1   OT Therapeutic Exercise (Units) 3   OT Total Time Spent   OT Individual Total Time Spent (Mins) 60   Precautions   Precautions Fall Risk;Cervical Collar     Comments C2 Fx, multiple sites of metastatic disease   Functional Level of Assist   Lower Body Dressing Supervision  (setup don shoes)   Bed, Chair, Wheelchair Transfer Standby Assist   Sitting Upper Body Exercises   Upper Extremity Bike Level 3 Resistance  (x 4  minuts x 2  motomed)   Other Exercise unweighted overhead pulleys   x 10 reps x 2         rickshaw   x 10 reps x 3  facing and backed in with 20lbs   Interdisciplinary Plan of Care Collaboration   Patient Position at End of Therapy Seated;Call Light within Reach;Tray Table within Reach       Min assist to don C collar change pads for cleaning   MHP x 10 minutes  both shoulders while performing UE bike. Continues with improving endurance       Strengths: Able to follow instructions, Alert and oriented, Motivated for self care and independence, Pleasant and cooperative, Supportive family, Willingly participates in therapeutic activities  Barriers: Aspiration risk, Decreased endurance, Fatigue, Generalized weakness, Home accessibility, Impaired activity tolerance, Impaired balance, Limited mobility, Pain    Plan    ADL IADL , related mobility strength/endurance building standing tolerance and balance activity incorporating C spine precautions.    "     DME  OT DME Recommendations  Bathroom Equipment:  ((pt owns hospital bed), hip kit (educated pt/spouse on 3/31, directed to [employer] to purchase), spouse also plans to purchase tub txfr bench)  Additional Equipment: Hospital Bed (See other comments) (pt owns)      Occupational Therapy Goals (Active)       Problem: Functional Transfers       Dates: Start:  03/29/24         Goal: STG-Within one week, patient will transfer to toilet at SBA level using DME PRN.       Dates: Start:  03/29/24         Goal Note filed on 04/02/24 0828 by Leonid Medina, C.O.T.A.        CGA               Goal: STG-Within one week, patient will transfer to tub/shower at SBA level using DME PRN.       Dates: Start:  03/29/24         Goal Note filed on 04/02/24 0828 by Leonid Medina, C.O.T.A.        CGA                  Problem: OT Long Term Goals       Dates: Start:  03/29/24         Goal: LTG-By discharge, patient will complete basic self care tasks at SBA-Mod I level using DME/AE PRN.       Dates: Start:  03/29/24            Goal: LTG-By discharge, patient will perform bathroom transfers at SBA-Mod I level using DME/AE PRN.       Dates: Start:  03/29/24

## 2024-04-04 NOTE — THERAPY
Physical Therapy   Daily Treatment     Patient Name: nAdrew Wall  Age:  58 y.o., Sex:  male  Medical Record #: [id number]  Today's Date: 4/4/2024     Precautions  Precautions: Fall Risk, Cervical Collar    Comments: C2 Fx, multiple sites of metastatic disease    Subjective    Patient very pleasant and motivated to participate. Reports increased posterior neck soreness, which he attributes to arm exercises performed earlier in the day. Assisted patient in adjusting cervical collar for appropriate fit.      Objective       04/04/24 1031   PT Charge Group   PT Therapeutic Exercise (Units) 2   PT Neuromuscular Re-Education / Balance (Units) 1   PT Therapeutic Activities (Units) 1   PT Total Time Spent   PT Individual Total Time Spent (Mins) 60   Gait Functional Level of Assist    Gait Level Of Assist Standby Assist   Assistive Device 4 Wheel Walker   Distance (Feet) 125   # of Times Distance was Traveled 3   Deviation Decreased Base Of Support;Bradykinetic;Shuffled Gait   Transfer Functional Level of Assist   Bed, Chair, Wheelchair Transfer Standby Assist   Bed Chair Wheelchair Transfer Description Set-up of equipment;Supervision for safety  (stand step, no AD)   Supine Lower Body Exercise   Hip Abduction Hook Lying;3 sets of 15;Bilateral;Medium Resistance Theraband   Hip Adduction  3 sets of 15;Bilateral  (hooklying, isometric pillow squeeze)   Short Arc Quad Bilateral;3 sets of 15   Sitting Lower Body Exercises   Nustep Resistance Level 3  (B LE only per patient preference; x10 minutes)   Standing Lower Body Exercises  -performed as a circuit, rest break between sets   Marching 3 sets of 15  (B UE support on locked 4WW)   Heel Rise 3 sets of 10;Bilateral  (UE support on locked 4WW)   Mini Squat   (3 x 5 , touch down to elevated mat table)   Bed Mobility    Supine to Sit Standby Assist   Sit to Supine Standby Assist   Sit to Stand Standby Assist   Rolling Standby Assist   Interdisciplinary Plan of Care  Collaboration   IDT Collaboration with  Occupational Therapist   Patient Position at End of Therapy In Bed;Call Light within Reach;Tray Table within Reach;Phone within Reach   Collaboration Comments Handoff from OT in therapy gym         Assessment    Patient with increased neck pain during session, relief with supine positioning. He continues to have impaired activity tolerance and dynamic balance, but is steadily improving. He required cuing for spinal precautions during supine <> sit transition, receptive to cuing. Patient remains well-motivated to participate.     Strengths: Willingly participates in therapeutic activities, Pleasant and cooperative, Motivated for self care and independence, Alert and oriented, Able to follow instructions  Barriers: Fatigue, Decreased endurance, Impaired activity tolerance    Plan    Continue with functional LE and trunk strengthening, gait and balance training     DME  PT DME Recommendations  Wheelchair:  (N/A, pt has one)  Assistive Device: 4-Wheel Walker (pt owns this)  Additional Equipment: Hospital Bed (See other comments) (pt owns)    Passport items to be completed:  Get in/out of bed safely, in/out of a vehicle, safely use mobility device, walk or wheel around home/community, navigate up and down stairs, show how to get up/down from the ground, ensure home is accessible, demonstrate HEP, complete caregiver training    Physical Therapy Problems (Active)       Problem: Mobility       Dates: Start:  03/29/24         Goal: STG-Within one week, patient will propel wheelchair household distances 150 ft with SPV to facilitate functional independence.       Dates: Start:  03/29/24            Goal: STG-Within one week, patient will ambulate household distance 150 ft x2 with 4WW and SBA to access home.       Dates: Start:  03/29/24            Goal: STG-Within one week, patient will ascend and descend 2 stairs with 1 HR and CGA to access living room.       Dates: Start:  03/29/24                Problem: PT-Long Term Goals       Dates: Start:  03/29/24         Goal: LTG-By discharge, patient will ambulate 250 ft x2 over even and uneven surfaces with 4WW and SPV to access community.       Dates: Start:  03/29/24            Goal: LTG-By discharge, patient will transfer one surface to another with 4WW and SPV to facilitate functional independence.       Dates: Start:  03/29/24            Goal: LTG-By discharge, patient will ambulate up/down 2 stairs with 1 HR and SPV to access living room.       Dates: Start:  03/29/24            Goal: LTG-By discharge, patient will transfer in/out of a car with 4WW and SBA after set up to facilitate functional independence.       Dates: Start:  03/29/24

## 2024-04-05 ENCOUNTER — APPOINTMENT (OUTPATIENT)
Dept: INPATIENT REHAB | Facility: REHABILITATION | Age: 59
DRG: 070 | End: 2024-04-05
Payer: COMMERCIAL

## 2024-04-05 ENCOUNTER — APPOINTMENT (OUTPATIENT)
Dept: PHYSICAL THERAPY | Facility: REHABILITATION | Age: 59
DRG: 070 | End: 2024-04-05
Attending: PHYSICAL MEDICINE & REHABILITATION
Payer: COMMERCIAL

## 2024-04-05 ENCOUNTER — APPOINTMENT (OUTPATIENT)
Dept: OCCUPATIONAL THERAPY | Facility: REHABILITATION | Age: 59
DRG: 070 | End: 2024-04-05
Attending: PHYSICAL MEDICINE & REHABILITATION
Payer: COMMERCIAL

## 2024-04-05 PROCEDURE — 700101 HCHG RX REV CODE 250: Performed by: PHYSICAL MEDICINE & REHABILITATION

## 2024-04-05 PROCEDURE — 97535 SELF CARE MNGMENT TRAINING: CPT | Mod: CO

## 2024-04-05 PROCEDURE — 770010 HCHG ROOM/CARE - REHAB SEMI PRIVAT*

## 2024-04-05 PROCEDURE — 700102 HCHG RX REV CODE 250 W/ 637 OVERRIDE(OP): Performed by: PHYSICAL MEDICINE & REHABILITATION

## 2024-04-05 PROCEDURE — A9270 NON-COVERED ITEM OR SERVICE: HCPCS | Performed by: PHYSICAL MEDICINE & REHABILITATION

## 2024-04-05 PROCEDURE — 700111 HCHG RX REV CODE 636 W/ 250 OVERRIDE (IP): Performed by: PHYSICAL MEDICINE & REHABILITATION

## 2024-04-05 PROCEDURE — 97110 THERAPEUTIC EXERCISES: CPT

## 2024-04-05 PROCEDURE — 99232 SBSQ HOSP IP/OBS MODERATE 35: CPT | Performed by: PHYSICAL MEDICINE & REHABILITATION

## 2024-04-05 PROCEDURE — 97530 THERAPEUTIC ACTIVITIES: CPT

## 2024-04-05 PROCEDURE — 97116 GAIT TRAINING THERAPY: CPT

## 2024-04-05 PROCEDURE — 94640 AIRWAY INHALATION TREATMENT: CPT

## 2024-04-05 PROCEDURE — 97530 THERAPEUTIC ACTIVITIES: CPT | Mod: CO

## 2024-04-05 PROCEDURE — 94760 N-INVAS EAR/PLS OXIMETRY 1: CPT

## 2024-04-05 RX ADMIN — MORPHINE SULFATE 45 MG: 30 TABLET, FILM COATED, EXTENDED RELEASE ORAL at 09:02

## 2024-04-05 RX ADMIN — SENNOSIDES AND DOCUSATE SODIUM 2 TABLET: 8.6; 5 TABLET ORAL at 09:02

## 2024-04-05 RX ADMIN — MIDODRINE HYDROCHLORIDE 10 MG: 10 TABLET ORAL at 12:02

## 2024-04-05 RX ADMIN — MOMETASONE FUROATE AND FORMOTEROL FUMARATE DIHYDRATE 2 PUFF: 200; 5 AEROSOL RESPIRATORY (INHALATION) at 21:11

## 2024-04-05 RX ADMIN — MIDODRINE HYDROCHLORIDE 10 MG: 10 TABLET ORAL at 09:02

## 2024-04-05 RX ADMIN — OMEPRAZOLE 20 MG: 20 CAPSULE, DELAYED RELEASE ORAL at 09:02

## 2024-04-05 RX ADMIN — ACETAMINOPHEN 650 MG: 325 TABLET ORAL at 10:17

## 2024-04-05 RX ADMIN — ONDANSETRON 4 MG: 4 TABLET, ORALLY DISINTEGRATING ORAL at 20:09

## 2024-04-05 RX ADMIN — MORPHINE SULFATE 45 MG: 30 TABLET, FILM COATED, EXTENDED RELEASE ORAL at 21:09

## 2024-04-05 RX ADMIN — MOMETASONE FUROATE AND FORMOTEROL FUMARATE DIHYDRATE 2 PUFF: 200; 5 AEROSOL RESPIRATORY (INHALATION) at 06:49

## 2024-04-05 RX ADMIN — MIDODRINE HYDROCHLORIDE 10 MG: 10 TABLET ORAL at 17:49

## 2024-04-05 RX ADMIN — MEGESTROL ACETATE 800 MG: 40 SUSPENSION ORAL at 09:03

## 2024-04-05 RX ADMIN — ACETAMINOPHEN 650 MG: 325 TABLET ORAL at 15:28

## 2024-04-05 RX ADMIN — SENNOSIDES AND DOCUSATE SODIUM 2 TABLET: 8.6; 5 TABLET ORAL at 21:09

## 2024-04-05 RX ADMIN — Medication 1000 UNITS: at 09:02

## 2024-04-05 ASSESSMENT — GAIT ASSESSMENTS
DISTANCE (FEET): 100
DISTANCE (FEET): 100
GAIT LEVEL OF ASSIST: CONTACT GUARD ASSIST
ASSISTIVE DEVICE: 4 WHEEL WALKER
ASSISTIVE DEVICE: SINGLE POINT CANE
DEVIATION: DECREASED BASE OF SUPPORT;BRADYKINETIC;DECREASED HEEL STRIKE;DECREASED TOE OFF
GAIT LEVEL OF ASSIST: CONTACT GUARD ASSIST
DISTANCE (FEET): 120
GAIT LEVEL OF ASSIST: STANDBY ASSIST
DEVIATION: DECREASED BASE OF SUPPORT;BRADYKINETIC;DECREASED HEEL STRIKE;DECREASED TOE OFF
DEVIATION: DECREASED BASE OF SUPPORT;BRADYKINETIC;SHUFFLED GAIT;DECREASED TOE OFF;DECREASED HEEL STRIKE
ASSISTIVE DEVICE: SINGLE POINT CANE;4 WHEEL WALKER

## 2024-04-05 ASSESSMENT — ACTIVITIES OF DAILY LIVING (ADL)
BED_CHAIR_WHEELCHAIR_TRANSFER_DESCRIPTION: SUPERVISION FOR SAFETY;SET-UP OF EQUIPMENT
BED_CHAIR_WHEELCHAIR_TRANSFER_DESCRIPTION: SUPERVISION FOR SAFETY
BED_CHAIR_WHEELCHAIR_TRANSFER_DESCRIPTION: SUPERVISION FOR SAFETY;SET-UP OF EQUIPMENT

## 2024-04-05 NOTE — THERAPY
"Physical Therapy   Daily Treatment     Patient Name: Andrew Wall  Age:  59 y.o., Sex:  male  Medical Record #: [id number]  Today's Date: 4/5/2024     Precautions  Precautions: Fall Risk, Cervical Collar  , Spinal / Back Precautions   Comments: C2 Fx; multiple sites of metastatic disease    Subjective    Pt was seated in EOM upon arrival and agreeable to treatment.  Pt's spouse present for family training.      Objective       04/05/24 1431   PT Charge Group   PT Therapeutic Activities (Units) 2   PT Total Time Spent   PT Individual Total Time Spent (Mins) 30   Precautions   Precautions Fall Risk;Cervical Collar  ;Spinal / Back Precautions    Gait Functional Level of Assist    Gait Level Of Assist Standby Assist   Assistive Device 4 Wheel Walker   Distance (Feet) 120   # of Times Distance was Traveled 1   Deviation Decreased Base Of Support;Bradykinetic;Decreased Heel Strike;Decreased Toe Off   Transfer Functional Level of Assist   Bed, Chair, Wheelchair Transfer Supervised   Bed Chair Wheelchair Transfer Description Supervision for safety   Bed Mobility    Supine to Sit Supervised   Sit to Supine Supervised   Sit to Stand Supervised   Scooting Supervised   Rolling Supervised   Interdisciplinary Plan of Care Collaboration   Patient Position at End of Therapy In Bed;Call Light within Reach;Tray Table within Reach;Phone within Reach;Family / Friend in Room     Discussion with pt and pt's spouse on D/C planning, home set up and safety, pacing, goals.  Completed family training on gait training with 4WW and demonstration of use of 4WW to go up/down 6\" step. Set up further family training for Sunday.      Assessment    Pt continues to make steady gains in mobility, but is limited intermittently d/t pain.  Pt's spouse verbalized all education.    Strengths: Willingly participates in therapeutic activities, Pleasant and cooperative, Motivated for self care and independence, Alert and oriented, Able to follow " instructions  Barriers: Fatigue, Decreased endurance, Impaired activity tolerance    Plan    Complete D/C IRF THUY items on Sunday in preparation for D/C on Monday.  Complete family training with pt's spouse on car transfer in pt's personal car, stair training and platform step training.     DME  PT DME Recommendations  Wheelchair:  (N/A, pt has one)  Assistive Device: 4-Wheel Walker (pt owns this)  Additional Equipment: Hospital Bed (See other comments) (pt owns)    Passport items to be completed:  Get in/out of bed safely, in/out of a vehicle, safely use mobility device, walk or wheel around home/community, navigate up and down stairs, show how to get up/down from the ground, ensure home is accessible, demonstrate HEP, complete caregiver training    Physical Therapy Problems (Active)       Problem: Mobility       Dates: Start:  03/29/24         Goal: STG-Within one week, patient will propel wheelchair household distances 150 ft with SPV to facilitate functional independence.       Dates: Start:  03/29/24            Goal: STG-Within one week, patient will ambulate household distance 150 ft x2 with 4WW and SBA to access home.       Dates: Start:  03/29/24            Goal: STG-Within one week, patient will ascend and descend 2 stairs with 1 HR and CGA to access living room.       Dates: Start:  03/29/24               Problem: PT-Long Term Goals       Dates: Start:  03/29/24         Goal: LTG-By discharge, patient will ambulate 250 ft x2 over even and uneven surfaces with 4WW and SPV to access community.       Dates: Start:  03/29/24            Goal: LTG-By discharge, patient will transfer one surface to another with 4WW and SPV to facilitate functional independence.       Dates: Start:  03/29/24            Goal: LTG-By discharge, patient will ambulate up/down 2 stairs with 1 HR and SPV to access living room.       Dates: Start:  03/29/24            Goal: LTG-By discharge, patient will transfer in/out of a car with 4WW  and SBA after set up to facilitate functional independence.       Dates: Start:  03/29/24

## 2024-04-05 NOTE — PROGRESS NOTES
Physical Medicine & Rehabilitation Progress Note    Encounter Date: 4/5/2024    Chief Complaint: Decreased mobility, weakness    Interval Events (Subjective):  Patient sitting up in room. He reports he is doing OK. He is looking forward to going home Monday. Discussed about specialty cushion but he thinks he is fine with following up with wound care.     _____________________________________  Interdisciplinary Team Conference   Most recent IDT on 4/2/2024    Discharge Date/Disposition:  4/8/24  _____________________________________    Objective:  VITAL SIGNS: /68   Pulse 81   Temp 36.6 °C (97.9 °F) (Oral)   Resp 17   Wt 65.9 kg (145 lb 4.5 oz)   SpO2 91%   BMI 20.26 kg/m²   Gen: NAD  Psych: Mood and affect appropriate  CV: RRR, 0 edema  Resp: CTAB, no upper airway sounds  Abd: NTND  Neuro: AOx4, following commands    Laboratory Values:  No results found for this or any previous visit (from the past 72 hour(s)).      Medications:  Scheduled Medications   Medication Dose Frequency    midodrine  10 mg TID WITH MEALS    vitamin D3  1,000 Units DAILY    senna-docusate  2 Tablet BID    omeprazole  20 mg DAILY    Encorafenib  300 mg DAILY AT 1800    And    Binimetinib  30 mg BID    megestrol  800 mg DAILY    mometasone-formoterol  2 Puff BID    morphine ER  45 mg Q12HRS     PRN medications: Respiratory Therapy Consult, hydrALAZINE, acetaminophen, senna-docusate **AND** polyethylene glycol/lytes, docusate sodium, magnesium hydroxide, mag hydrox-al hydrox-simeth, ondansetron **OR** ondansetron, traZODone, sodium chloride, oxyCODONE immediate-release **OR** oxyCODONE immediate-release, midazolam    Diet:  Current Diet Order   Procedures    Diet Order Diet: Regular       Medical Decision Making and Plan:  Acute metabolic encephalopathy - Patient brought to ER in shock with negative infectious work-up thought to be 2/2 chemotherapy agents. Oncology consulted and reduced medications.  -PT and OT for mobility and  ADLs. Per guidelines, 15 hours per week between PT, OT and/or SLP.  -Follow-up Oncology.      Hypotension - He was started on Midodrine 5 mg TID. SBP into 90s, increase to 7.5. SBP into 80s, increase to 10. SBP remains in 90s/100s, continue Midodrine 10 mg TID     C2 fracture - Patient in C collar. Follow-up NSG     Multiple L spine fractures - Managed non-operatively as significant metastatic disease in L and S spine.      Metastatic melanoma - Patient on Braftovi 300 mg daily and Mektovi 30 mg BID per Oncology. Continue Megace for appetite stimulation      Respiratory failure - Patient on Dulera. Improving cough, continue Dulera     Anemia - Check AM CBC - 7.9, will monitor. Repeat 8.3 on 4/1, will monitor     Leukopenia - Check AM CBC, on chemotherapy - 3.6 on admission, ANC 2.4, will monitor. Repeat WBC 3.0, will monitor     Hyponatremia - Check AM CMP - 135, will recheck 4/1/24 - 134, will monitor     Vitamin D deficiency - 20 on admission, start 1000 U     Pain - Patient on Morphine 45 mg ER BID, Gabapentin 100 mg daily and PRN Oxycodone/Tylenol. Discontinue Gabapentin as may contribute to low SBP. Continue Morphine 45 mg BID     Skin - Patient at risk for skin breakdown due to debility in areas including sacrum, achilles, elbows and head in addition to other sites. Nursing to assess skin daily.   DTI to right heel on admission  Coccyx breakdown on admission, wound care to consult. Medihoney to sacrum/coccyx. Continue Medihoney      GI Ppx - Patient on Prilosec for GERD prophylaxis. Patient on Senna-docusate for constipation prophylaxis.      DVT Ppx - Patient Lovenox on transfer. Ambulating > 125 feet, discontinue Lovenox  ____________________________________    T. Angelo Cortez MD/PhD  [facility] - Physical Medicine & Rehabilitation   [facility] - Brain Injury Medicine   ____________________________________

## 2024-04-05 NOTE — PROGRESS NOTES
NURSING DAILY NOTE    Name: Andrew Wall   Date of Admission: 3/28/2024   Admitting Diagnosis: Acute encephalopathy  Attending Physician: Antonio Cortez M.d.  Allergies: Augmentin    Safety  Patient Assist     Patient Precautions  Fall Risk, Cervical Collar    Precaution Comments  C2 Fx, multiple sites of metastatic disease  Bed Transfer Status  Standby Assist  Toilet Transfer Status   Minimal Assist  Assistive Devices  Wheelchair  Oxygen  None - Room Air  Diet/Therapeutic Dining  Current Diet Order   Procedures    Diet Order Diet: Regular     Pill Administration  whole  Agitated Behavioral Scale     ABS Level of Severity       Fall Risk  Has the patient had a fall this admission?   No  Jodi Leigh Fall Risk Scoring  10, LOW RISK  Fall Risk Safety Measures  bed alarm, chair alarm, and poor balance    Vitals  Temperature: 36.6 °C (97.9 °F)  Temp src: Oral  Pulse: 83  Respiration: 18  Blood Pressure: 97/64  Blood Pressure MAP (Calculated): 75 MM HG  BP Location: Left, Upper Arm  Patient BP Position: Supine     Oxygen  Pulse Oximetry: 96 %  O2 (LPM): 0  O2 Delivery Device: None - Room Air    Bowel and Bladder  Last Bowel Movement  04/04/24  Stool Type  Type 1: Separate hard lumps (hard to pass)  Bowel Device  Bathroom  Continent  Bladder: Continent void   Bowel: Continent movement  Bladder Function  Urine Void (mL): 400 ml  Number of Times Voided: 1  Urinary Options: Yes  Urine Color: Janae  Genitourinary Assessment   Bladder Assessment (WDL):  Within Defined Limits  Santiago Catheter: Not Applicable  Urine Color: Janae  Bladder Device: Urinal, Bathroom  Bladder Scan: Post Void  $ Bladder Scan Results (mL): 128    Skin  Sachin Score   15  Sensory Interventions   Bed Types: Low Airloss  Skin Preventative Measures: Pillows in Use for Support / Positioning  Moisture Interventions  Moisturizers/Barriers: Barrier Wipes, Barrier Paste      Pain  Pain  Rating Scale  1 - Hardly Notice Pain  Pain Location  Head  Pain Location Orientation  Anterior, Posterior  Pain Interventions   Declines    ADLs    Bathing   Patient Refused Bathing  Linen Change      Personal Hygiene  Moist Ana Wipes  Chlorhexidine Bath      Oral Care     Teeth/Dentures     Shave     Nutrition Percentage Eaten  Lunch, Between 25-50% Consumed  Environmental Precautions  Treaded Slipper Socks on Patient, Personal Belongings, Wastebasket, Call Bell etc. in Easy Reach, Bed in Low Position  Patient Turns/Positioning  Patient Turns Self from Side to Side  Patient Turns Assistance/Tolerance     Bed Positions  Bed Controls On, Bed Locked  Head of Bed Elevated  Self regulated      Psychosocial/Neurologic Assessment  Psychosocial Assessment  Psychosocial (WDL):  Within Defined Limits  Neurologic Assessment  Neuro (WDL): Exceptions to WDL  Level of Consciousness: Alert  Orientation Level: Oriented X4  Cognition: Appropriate judgement, Appropriate safety awareness, Appropriate attention/concentration, Appropriate for developmental age, Follows commands  Speech: Clear  Motor Function/Sensation Assessment: Sensation  RUE Sensation: Full sensation  LUE Sensation: Full sensation  RLE Sensation: Full sensation  LLE Sensation: Full sensation  EENT (WDL):  Within Defined Limits    Cardio/Pulmonary Assessment  Edema   RLE Edema: 1+  LLE Edema: 1+  Respiratory Breath Sounds  RUL Breath Sounds: Clear  RML Breath Sounds: Clear  RLL Breath Sounds: Diminished  ALKA Breath Sounds: Clear  LLL Breath Sounds: Diminished  Cardiac Assessment   Cardiac (WDL):  Within Defined Limits

## 2024-04-05 NOTE — PROGRESS NOTES
NURSING DAILY NOTE    Name: Andrew Wall   Date of Admission: 3/28/2024   Admitting Diagnosis: Acute encephalopathy  Attending Physician: Antonio Cortez M.d.  Allergies: Augmentin    Safety  Patient Assist     Patient Precautions  Fall Risk, Cervical Collar    Precaution Comments  C2 Fx, multiple sites of metastatic disease  Bed Transfer Status  Standby Assist  Toilet Transfer Status   Minimal Assist  Assistive Devices  Wheelchair  Oxygen  None - Room Air  Diet/Therapeutic Dining  Current Diet Order   Procedures    Diet Order Diet: Regular     Pill Administration  whole  Agitated Behavioral Scale     ABS Level of Severity       Fall Risk  Has the patient had a fall this admission?   No  Jodi Leigh Fall Risk Scoring  10, LOW RISK  Fall Risk Safety Measures  bed alarm, chair alarm    Vitals  Temperature: 36.8 °C (98.2 °F)  Temp src: Oral  Pulse: 75  Respiration: 18  Blood Pressure: 97/62 (notified RN Subha)  Blood Pressure MAP (Calculated): 74 MM HG  BP Location: Left, Upper Arm  Patient BP Position: Supine     Oxygen  Pulse Oximetry: 96 %  O2 (LPM): 0  O2 Delivery Device: None - Room Air    Bowel and Bladder  Last Bowel Movement  04/04/24  Stool Type  Type 1: Separate hard lumps (hard to pass)  Bowel Device  Bathroom  Continent  Bladder: Continent void   Bowel: Continent movement  Bladder Function  Urine Void (mL): 150 ml (urinal)  Number of Times Voided: 1  Urinary Options: Yes  Urine Color: Janae  Genitourinary Assessment   Bladder Assessment (WDL):  Within Defined Limits  Santiago Catheter: Not Applicable  Urine Color: Janae  Bladder Device: Urinal, Bathroom  Bladder Scan: Post Void  $ Bladder Scan Results (mL): 128    Skin  Sachin Score   15  Sensory Interventions   Bed Types: Low Airloss  Skin Preventative Measures: Pillows in Use for Support / Positioning  Moisture Interventions  Moisturizers/Barriers: Barrier Wipes, Barrier  Paste      Pain  Pain Rating Scale  0 - No Pain  Pain Location  Head  Pain Location Orientation  Anterior, Posterior  Pain Interventions   Declines    ADLs    Bathing   Patient Refused Bathing (notified RN Subha)  Linen Change      Personal Hygiene  Moist Ana Wipes  Chlorhexidine Bath      Oral Care     Teeth/Dentures     Shave     Nutrition Percentage Eaten  Lunch, Between 25-50% Consumed  Environmental Precautions  Treaded Slipper Socks on Patient, Personal Belongings, Wastebasket, Call Bell etc. in Easy Reach, Bed in Low Position  Patient Turns/Positioning  Patient Turns Self from Side to Side  Patient Turns Assistance/Tolerance     Bed Positions  Bed Controls On, Bed Locked  Head of Bed Elevated  Self regulated      Psychosocial/Neurologic Assessment  Psychosocial Assessment  Psychosocial (WDL):  Within Defined Limits  Neurologic Assessment  Neuro (WDL): Exceptions to WDL  Level of Consciousness: Alert  Orientation Level: Oriented X4  Cognition: Appropriate judgement, Appropriate safety awareness, Appropriate attention/concentration, Appropriate for developmental age, Follows commands  Speech: Clear  Motor Function/Sensation Assessment: Sensation  RUE Sensation: Full sensation  LUE Sensation: Full sensation  RLE Sensation: Full sensation  LLE Sensation: Full sensation  EENT (WDL):  Within Defined Limits    Cardio/Pulmonary Assessment  Edema   RLE Edema: 1+  LLE Edema: 1+  Respiratory Breath Sounds  RUL Breath Sounds: Clear  RML Breath Sounds: Clear  RLL Breath Sounds: Clear  ALKA Breath Sounds: Clear  LLL Breath Sounds: Diminished  Cardiac Assessment   Cardiac (WDL):  Within Defined Limits

## 2024-04-05 NOTE — CARE PLAN
The patient is Stable - Low risk of patient condition declining or worsening    Problem: Fall Risk - Rehab  Goal: Patient will remain free from falls  Outcome: Progressing   Jodi Leigh Fall risk Assessment Score: 10    Low fall risk interventions   - Call light within reach   - Yellow  socks   - Belongings within reach   - Bed in the lowest position           Problem: Pain - Standard  Goal: Alleviation of pain or a reduction in pain to the patient’s comfort goal  Outcome: Progressing   Note: Patient able to verbalize pain level and verbalize an acceptable level of pain.

## 2024-04-05 NOTE — THERAPY
"Occupational Therapy  Daily Treatment     Patient Name: Andrew Wall  Age:  59 y.o., Sex:  male  Medical Record #: [id number]  Today's Date: 4/5/2024     Precautions  Precautions: Fall Risk, Cervical Collar  , Spinal / Back Precautions   Comments: (P) C2 Fx; multiple sites of metastatic disease    Safety   ADL Safety : Requires Physical Assist for Safety  Bathroom Safety: Requires Physical Assist for Safety    Subjective    \" I think I got it. \" Referring to   kitchen mobility at FWW level      Objective       04/05/24 1331   OT Charge Group   OT Self Care / ADL (Units) 2   OT Therapy Activity (Units) 2   OT Total Time Spent   OT Individual Total Time Spent (Mins) 60   Precautions   Precautions Fall Risk;Cervical Collar  ;Spinal / Back Precautions    Comments C2 Fx; multiple sites of metastatic disease   Functional Level of Assist   Lower Body Dressing Supervision   Lower Body Dressing Description Reacher;Grab bar;Sock aid;Dressing stick   Bed, Chair, Wheelchair Transfer Supervised  (indirect supervision  bed to w/c)   Tub / Shower Transfers Supervised  (step in shower transfer  to and from shower chair  using grab bars        tub shower  transfer to and from tub bench   FWW level)   IADL Treatments   Kitchen Mobility Education initiated kitchen  mobility at 4WW level.   Reviewed use of solid surface support  for item retrieval / placement    counter top cruising  for short distances .   following instruction  reqruied  supervision with occasional cues for safety / technique.   Interdisciplinary Plan of Care Collaboration   IDT Collaboration with  Family / Caregiver   Patient Position at End of Therapy Seated  (in gym hand off to PT for tx)   Collaboration Comments Spouse Sherly present for  training    demonstrated ability to provide assist as needed  with self care and transfers.   educatd regarding  supine dowel exercises and  ROM for shoulder flexion    educated  to have home health continue to work on " functional tasks  in the kitchen   and  can ask to train  for hands on practice for  ROM exercises  etc.     Both verbalized understanding         Assessment     Spouse demonstrates ability to provide supervision and cues as needed with ADLs related mobility and transfers    Strengths: Able to follow instructions, Alert and oriented, Motivated for self care and independence, Pleasant and cooperative, Supportive family, Willingly participates in therapeutic activities  Barriers: Aspiration risk, Decreased endurance, Fatigue, Generalized weakness, Home accessibility, Impaired activity tolerance, Impaired balance, Limited mobility, Pain    Plan      ADL IADL , related mobility strength/endurance building standing tolerance and balance activity incorporating C spine precautions.     d/c assessment 4-7-24  DME  OT DME Recommendations  Bathroom Equipment:  ((pt owns hospital bed), hip kit (educated pt/spouse on 3/31, directed to [employer] to purchase), spouse also plans to purchase tub txfr bench)  Additional Equipment: Hospital Bed (See other comments) (pt owns)        Occupational Therapy Goals (Active)       Problem: Functional Transfers       Dates: Start:  03/29/24         Goal: STG-Within one week, patient will transfer to toilet at SBA level using DME PRN.       Dates: Start:  03/29/24         Goal Note filed on 04/02/24 0828 by Leonid Medina, C.O.T.A.        CGA               Goal: STG-Within one week, patient will transfer to tub/shower at SBA level using DME PRN.       Dates: Start:  03/29/24         Goal Note filed on 04/02/24 0828 by Leonid Medina, C.O.T.A.        CGA                  Problem: OT Long Term Goals       Dates: Start:  03/29/24         Goal: LTG-By discharge, patient will complete basic self care tasks at SBA-Mod I level using DME/AE PRN.       Dates: Start:  03/29/24            Goal: LTG-By discharge, patient will perform bathroom transfers at SBA-Mod I level using DME/AE PRN.       Dates: Start:   03/29/24

## 2024-04-05 NOTE — THERAPY
Physical Therapy   Daily Treatment     Patient Name: Andrew Wall  Age:  59 y.o., Sex:  male  Medical Record #: [id number]  Today's Date: 4/5/2024     Precautions  Precautions: Fall Risk, Cervical Collar  , Spinal / Back Precautions   Comments: C2 Fx; multiple sites of metastatic disease    Subjective    Pt was supine in bed, collar donned, and agreeable to treatment. He admitted to minor neck pain, no c/o hip pain.     Objective       04/05/24 0931   PT Charge Group   PT Gait Training (Units) 1   PT Therapeutic Exercise (Units) 2   PT Therapeutic Activities (Units) 1   PT Total Time Spent   PT Individual Total Time Spent (Mins) 60   Cosignature   Documentation Review Approved with modifications made by preceptor in flowsheet   Precautions   Precautions Fall Risk;Cervical Collar  ;Spinal / Back Precautions    Comments C2 Fx; multiple sites of metastatic disease   Pain 0 - 10 Group   Location Neck   Location Orientation Anterior;Posterior   Pain Rating Scale (NPRS) 5   Description Aching;Sore   Comfort Goal Perform Activity;Comfort with Movement;Sleep Comfortably   Therapist Pain Assessment During Activity;Nurse Notified;5   Non Verbal Descriptors   Non Verbal Scale  Grimacing   Cognition    Level of Consciousness Alert   Gait Functional Level of Assist    Gait Level Of Assist Contact Guard Assist   Assistive Device Single Point Cane;4 Wheel Walker  (4WW 100 ft + SPC 60 ft)   Distance (Feet) 100   # of Times Distance was Traveled 1   Deviation Decreased Base Of Support;Bradykinetic;Shuffled Gait;Decreased Toe Off;Decreased Heel Strike   Transfer Functional Level of Assist   Bed, Chair, Wheelchair Transfer Supervised   Bed Chair Wheelchair Transfer Description Supervision for safety;Set-up of equipment  (no VC needed)   Supine Lower Body Exercise   Bridges 1 set of 15;Two Legged   Straight Leg Raises Right;Left  (1 set of 5)   Short Arc Quad 1 set of 10;Right ;Left   Heel Slide 1 set of 15;Right;Left   Ankle Pumps  "1 set of 15;Right;Left   Gluteal Isometrics 1 set of 10   Quadriceps Isometrics 1 set of 10   Standing Lower Body Exercises   Hip Extension 2 sets of 10;Right ;Left   Hip Abduction 2 sets of 10;Right ;Left   Bed Mobility    Supine to Sit Standby Assist   Sit to Stand Standby Assist   Scooting Supervised   Rolling Contact Guard Assist   Interdisciplinary Plan of Care Collaboration   Patient Position at End of Therapy In Bed;Call Light within Reach;Tray Table within Reach;Phone within Reach;Bed Alarm On     Completed gait training with 4WW and SPC, with focus on endurance and monitoring body awareness  Performed balance training without UE support and using EO/EC for 10 sec bouts in positions of NBOS and tandem stance (increased difficulty with R LE posteriorly placed)  Completed standing therex, pt reporting increasing neck pain and RN notified  Able to tolerate supine HEP with good pain management d/t pillow propping, some R hip pain with logroll technique     Assessment    Pt demonstrated improved stepping pattern with improved pelvic control using SPC; fair maintenance of c/s precautions except when transferring BTB. Pt was able to tolerate supine HEP fairly well, with assist for positioning to maintain comfort. He was more pain-limited today in his c/s, pt reported no pain meds prior to session and RN concurred pt wanted to \"wean off\" before sessions.   Strengths: Willingly participates in therapeutic activities, Pleasant and cooperative, Motivated for self care and independence, Alert and oriented, Able to follow instructions  Barriers: Fatigue, Decreased endurance, Impaired activity tolerance    Plan    Continue with pain management, LE and trunk strengthening, transfer training with spinal precautions, balance and gait training, including SPC    DME  PT DME Recommendations  Wheelchair:  (N/A, pt has one)  Assistive Device: 4-Wheel Walker (pt owns this)  Additional Equipment: Hospital Bed (See other comments) " (pt owns)    Passport items to be completed:  Get in/out of bed safely, in/out of a vehicle, safely use mobility device, walk or wheel around home/community, navigate up and down stairs, show how to get up/down from the ground, ensure home is accessible, demonstrate HEP, complete caregiver training    Physical Therapy Problems (Active)       Problem: Mobility       Dates: Start:  03/29/24         Goal: STG-Within one week, patient will propel wheelchair household distances 150 ft with SPV to facilitate functional independence.       Dates: Start:  03/29/24            Goal: STG-Within one week, patient will ambulate household distance 150 ft x2 with 4WW and SBA to access home.       Dates: Start:  03/29/24            Goal: STG-Within one week, patient will ascend and descend 2 stairs with 1 HR and CGA to access living room.       Dates: Start:  03/29/24               Problem: PT-Long Term Goals       Dates: Start:  03/29/24         Goal: LTG-By discharge, patient will ambulate 250 ft x2 over even and uneven surfaces with 4WW and SPV to access community.       Dates: Start:  03/29/24            Goal: LTG-By discharge, patient will transfer one surface to another with 4WW and SPV to facilitate functional independence.       Dates: Start:  03/29/24            Goal: LTG-By discharge, patient will ambulate up/down 2 stairs with 1 HR and SPV to access living room.       Dates: Start:  03/29/24            Goal: LTG-By discharge, patient will transfer in/out of a car with 4WW and SBA after set up to facilitate functional independence.       Dates: Start:  03/29/24

## 2024-04-05 NOTE — FLOWSHEET NOTE
04/05/24 0648   Events/Summary/Plan   Events/Summary/Plan mdi   Vital Signs   Pulse 73   Respiration 16   Pulse Oximetry 98 %   $ Pulse Oximetry (Spot Check) Yes   Respiratory Assessment   Level of Consciousness Alert   Chest Exam   Work Of Breathing / Effort Within Normal Limits   Breath Sounds   RUL Breath Sounds Clear   RML Breath Sounds Clear   RLL Breath Sounds Clear   ALKA Breath Sounds Clear   LLL Breath Sounds Diminished   Oxygen   O2 Delivery Device Room air w/o2 available

## 2024-04-05 NOTE — CARE PLAN
Problem: Pain - Standard  Goal: Alleviation of pain or a reduction in pain to the patient’s comfort goal  Outcome: Progressing

## 2024-04-05 NOTE — PROGRESS NOTES
NURSING DAILY NOTE    Name: Andrew Wall   Date of Admission: 3/28/2024   Admitting Diagnosis: Acute encephalopathy  Attending Physician: Antonio Cortez M.d.  Allergies: Augmentin    Safety  Patient Assist     Patient Precautions  Fall Risk, Cervical Collar  , Spinal / Back Precautions   Precaution Comments  C2 Fx; multiple sites of metastatic disease  Bed Transfer Status  Supervised  Toilet Transfer Status   Minimal Assist  Assistive Devices  Wheelchair  Oxygen  Room air w/o2 available  Diet/Therapeutic Dining  Current Diet Order   Procedures    Diet Order Diet: Regular     Pill Administration  whole  Agitated Behavioral Scale     ABS Level of Severity       Fall Risk  Has the patient had a fall this admission?   No  Jodi Leigh Fall Risk Scoring  10, LOW RISK  Fall Risk Safety Measures  bed alarm, chair alarm, and poor balance    Vitals  Temperature: 37.1 °C (98.8 °F)  Temp src: Oral  Pulse: 69  Respiration: 17  Blood Pressure: 101/68  Blood Pressure MAP (Calculated): 79 MM HG  BP Location: Left, Upper Arm  Patient BP Position: Supine     Oxygen  Pulse Oximetry: 98 %  O2 (LPM): 0  O2 Delivery Device: Room air w/o2 available    Bowel and Bladder  Last Bowel Movement  04/04/24  Stool Type  Type 1: Separate hard lumps (hard to pass)  Bowel Device  Bathroom  Continent  Bladder: Continent void   Bowel: Continent movement  Bladder Function  Urine Void (mL): 150 ml (urinal)  Number of Times Voided: 1  Urinary Options: Yes  Urine Color: Janae  Genitourinary Assessment   Bladder Assessment (WDL):  Within Defined Limits  Santiago Catheter: Not Applicable  Urine Color: Janae  Bladder Device: Urinal, Bathroom  Bladder Scan: Post Void  $ Bladder Scan Results (mL):  (Patient refused bladder scan, notified RN Subha)    Skin  Sachin Score   15  Sensory Interventions   Bed Types: Low Airloss  Skin Preventative Measures: Pillows in Use for Support /  Positioning  Moisture Interventions  Moisturizers/Barriers: Barrier Wipes, Barrier Paste      Pain  Pain Rating Scale  3 - Sometimes distracts me  Pain Location  Neck  Pain Location Orientation  Anterior, Posterior  Pain Interventions   Declines    ADLs    Bathing   Patient Refused Bathing (notified RN Subha)  Linen Change      Personal Hygiene  Moist Ana Wipes  Chlorhexidine Bath      Oral Care     Teeth/Dentures     Shave     Nutrition Percentage Eaten  Lunch, Between 25-50% Consumed  Environmental Precautions  Treaded Slipper Socks on Patient, Personal Belongings, Wastebasket, Call Bell etc. in Easy Reach, Bed in Low Position  Patient Turns/Positioning  Patient Turns Self from Side to Side  Patient Turns Assistance/Tolerance     Bed Positions  Bed Controls On, Bed Locked  Head of Bed Elevated  Self regulated      Psychosocial/Neurologic Assessment  Psychosocial Assessment  Psychosocial (WDL):  Within Defined Limits  Neurologic Assessment  Neuro (WDL): Exceptions to WDL  Level of Consciousness: Alert  Orientation Level: Oriented X4  Cognition: Appropriate judgement, Appropriate safety awareness, Appropriate attention/concentration, Appropriate for developmental age, Follows commands  Speech: Clear  Motor Function/Sensation Assessment: Sensation  RUE Sensation: Full sensation  LUE Sensation: Full sensation  RLE Sensation: Full sensation  LLE Sensation: Full sensation  EENT (WDL):  Within Defined Limits    Cardio/Pulmonary Assessment  Edema   RLE Edema: 1+  LLE Edema: 1+  Respiratory Breath Sounds  RUL Breath Sounds: Clear  RML Breath Sounds: Clear  RLL Breath Sounds: Clear  ALKA Breath Sounds: Clear  LLL Breath Sounds: Diminished  Cardiac Assessment   Cardiac (WDL):  Within Defined Limits

## 2024-04-05 NOTE — DIETARY
Nutrition Update:    Day 8 of admit.  Andrew Wall is a 59 y.o. male with admitting DX of Acute encephalopathy.  Patient being followed to optimize nutrition.    Current Diet: Regular with Ensure supplements. Recorded PO intake varies 25-50% to 50-75%.     Spoke with pt at bedside. Observed he ate >50% of breakfast. He states his appetite is good. He is drinking the Ensure, but only 1-2 per day. Noted a couple Ensure at bedside. Pt agreed to sending Ensure with breakfast daily. He does state he prefers the chocolate flavor.    Problem: Nutritional:  Goal: Achieve adequate nutritional intake  Description: Patient will consume >50% of meals  Outcome: Progressing    Recommendations/Plan:  Chocolate Ensure Plus with breakfast daily per pt preference.   Encourage intake of meals and supplements >50%.  Document intake of all meals and supplements as % taken in ADL's to provide interdisciplinary communication across all shifts.   Monitor weight.  Nutrition rep will continue to see patient for ongoing meal and snack preferences.    RD following

## 2024-04-05 NOTE — THERAPY
"Physical Therapy   Daily Treatment     Patient Name: Andrew Wall  Age:  59 y.o., Sex:  male  Medical Record #: [id number]  Today's Date: 4/5/2024     Precautions  Precautions: Fall Risk, Cervical Collar  , Spinal / Back Precautions   Comments: C2 Fx; multiple sites of metastatic disease    Subjective    Pt reporting improvement in pain, \"next to zero\" during session     Objective       04/05/24 1545   PT Charge Group   PT Gait Training (Units) 1   PT Therapeutic Activities (Units) 1   PT Total Time Spent   PT Individual Total Time Spent (Mins) 30   Gait Functional Level of Assist    Gait Level Of Assist Contact Guard Assist   Assistive Device Single Point Cane   Distance (Feet) 100   # of Times Distance was Traveled 1  (as well as 60ft x2, spouse guarding during second bout)   Deviation Decreased Base Of Support;Bradykinetic;Decreased Heel Strike;Decreased Toe Off   Stairs Functional Level of Assist   Level of Assist with Stairs Contact Guard Assist   # of Stairs Climbed 3  (x2 sidestepping 6inch steps, 6inch curb step 4WW x2)   Stairs Description Verbal cueing;Supervision for safety;Walker;Hand rails;Extra time  (step to leading with L LE ascending)   Transfer Functional Level of Assist   Bed, Chair, Wheelchair Transfer Supervised   Bed Chair Wheelchair Transfer Description Supervision for safety;Set-up of equipment   Bed Mobility    Supine to Sit Independent   Sit to Supine Independent   Sit to Stand Supervised   Interdisciplinary Plan of Care Collaboration   IDT Collaboration with  Family / Caregiver;Physical Therapist   Patient Position at End of Therapy In Bed;Family / Friend in Room;Call Light within Reach;Tray Table within Reach   Collaboration Comments POC; FT with spouse Sherly for stair negotiation and gait SPC     Completed family training with Sherly for providing CGA with consecutive stair negotiation sidestepping R HR, curb step 4WW, and gait level ground SPC    Assessment    Sherly demonstrated " competency in assisting pt with stairs and short distance ambulation SPC. Pt is making steady progress toward his goals and eager to work with therapy. Additional NFC therapy has been scheduled for 4/6 to progress LE strengthening and endurance on Nustep with techs  Strengths: Willingly participates in therapeutic activities, Pleasant and cooperative, Motivated for self care and independence, Alert and oriented, Able to follow instructions  Barriers: Fatigue, Decreased endurance, Impaired activity tolerance    Plan    FT with spouse, DC IRF Patricia, HEP, NFC Nustep B LE only with techs    DME  PT DME Recommendations  Wheelchair:  (N/A, pt has one)  Assistive Device: 4-Wheel Walker (pt owns this)  Additional Equipment: Hospital Bed (See other comments) (pt owns)    Passport items to be completed:  Get in/out of bed safely, in/out of a vehicle, safely use mobility device, walk or wheel around home/community, navigate up and down stairs, show how to get up/down from the ground, ensure home is accessible, demonstrate HEP, complete caregiver training    Physical Therapy Problems (Active)       Problem: Mobility       Dates: Start:  03/29/24         Goal: STG-Within one week, patient will propel wheelchair household distances 150 ft with SPV to facilitate functional independence.       Dates: Start:  03/29/24            Goal: STG-Within one week, patient will ambulate household distance 150 ft x2 with 4WW and SBA to access home.       Dates: Start:  03/29/24            Goal: STG-Within one week, patient will ascend and descend 2 stairs with 1 HR and CGA to access living room.       Dates: Start:  03/29/24               Problem: PT-Long Term Goals       Dates: Start:  03/29/24         Goal: LTG-By discharge, patient will ambulate 250 ft x2 over even and uneven surfaces with 4WW and SPV to access community.       Dates: Start:  03/29/24            Goal: LTG-By discharge, patient will transfer one surface to another with 4WW  and SPV to facilitate functional independence.       Dates: Start:  03/29/24            Goal: LTG-By discharge, patient will ambulate up/down 2 stairs with 1 HR and SPV to access living room.       Dates: Start:  03/29/24            Goal: LTG-By discharge, patient will transfer in/out of a car with 4WW and SBA after set up to facilitate functional independence.       Dates: Start:  03/29/24

## 2024-04-06 ENCOUNTER — APPOINTMENT (OUTPATIENT)
Dept: INPATIENT REHAB | Facility: REHABILITATION | Age: 59
DRG: 070 | End: 2024-04-06
Payer: COMMERCIAL

## 2024-04-06 ENCOUNTER — APPOINTMENT (OUTPATIENT)
Dept: PHYSICAL THERAPY | Facility: REHABILITATION | Age: 59
DRG: 070 | End: 2024-04-06
Attending: PHYSICAL MEDICINE & REHABILITATION
Payer: COMMERCIAL

## 2024-04-06 PROCEDURE — 770010 HCHG ROOM/CARE - REHAB SEMI PRIVAT*

## 2024-04-06 PROCEDURE — 700112 HCHG RX REV CODE 229: Mod: JZ | Performed by: PHYSICAL MEDICINE & REHABILITATION

## 2024-04-06 PROCEDURE — A9270 NON-COVERED ITEM OR SERVICE: HCPCS | Mod: JZ | Performed by: PHYSICAL MEDICINE & REHABILITATION

## 2024-04-06 PROCEDURE — 700101 HCHG RX REV CODE 250: Performed by: PHYSICAL MEDICINE & REHABILITATION

## 2024-04-06 PROCEDURE — 94640 AIRWAY INHALATION TREATMENT: CPT

## 2024-04-06 PROCEDURE — 94760 N-INVAS EAR/PLS OXIMETRY 1: CPT

## 2024-04-06 PROCEDURE — 700102 HCHG RX REV CODE 250 W/ 637 OVERRIDE(OP): Performed by: PHYSICAL MEDICINE & REHABILITATION

## 2024-04-06 PROCEDURE — A9270 NON-COVERED ITEM OR SERVICE: HCPCS | Performed by: PHYSICAL MEDICINE & REHABILITATION

## 2024-04-06 RX ADMIN — MEGESTROL ACETATE 800 MG: 40 SUSPENSION ORAL at 08:23

## 2024-04-06 RX ADMIN — MIDODRINE HYDROCHLORIDE 10 MG: 10 TABLET ORAL at 12:02

## 2024-04-06 RX ADMIN — DOCUSATE SODIUM 283 MG: 283 LIQUID RECTAL at 13:30

## 2024-04-06 RX ADMIN — MORPHINE SULFATE 45 MG: 30 TABLET, FILM COATED, EXTENDED RELEASE ORAL at 20:49

## 2024-04-06 RX ADMIN — Medication 1000 UNITS: at 08:23

## 2024-04-06 RX ADMIN — MOMETASONE FUROATE AND FORMOTEROL FUMARATE DIHYDRATE 2 PUFF: 200; 5 AEROSOL RESPIRATORY (INHALATION) at 06:53

## 2024-04-06 RX ADMIN — MIDODRINE HYDROCHLORIDE 10 MG: 10 TABLET ORAL at 17:28

## 2024-04-06 RX ADMIN — SENNOSIDES AND DOCUSATE SODIUM 2 TABLET: 8.6; 5 TABLET ORAL at 08:23

## 2024-04-06 RX ADMIN — MORPHINE SULFATE 45 MG: 30 TABLET, FILM COATED, EXTENDED RELEASE ORAL at 08:23

## 2024-04-06 RX ADMIN — MIDODRINE HYDROCHLORIDE 10 MG: 10 TABLET ORAL at 08:22

## 2024-04-06 RX ADMIN — OMEPRAZOLE 20 MG: 20 CAPSULE, DELAYED RELEASE ORAL at 08:23

## 2024-04-06 RX ADMIN — SENNOSIDES AND DOCUSATE SODIUM 2 TABLET: 8.6; 5 TABLET ORAL at 20:49

## 2024-04-06 NOTE — FLOWSHEET NOTE
04/06/24 0652   Events/Summary/Plan   Events/Summary/Plan mdi   Vital Signs   Pulse 73   Respiration 16   Pulse Oximetry 96 %   $ Pulse Oximetry (Spot Check) Yes   Respiratory Assessment   Level of Consciousness Alert   Chest Exam   Work Of Breathing / Effort Within Normal Limits   Breath Sounds   RUL Breath Sounds Clear   RML Breath Sounds Clear   RLL Breath Sounds Clear   ALKA Breath Sounds Clear   LLL Breath Sounds Clear   Oxygen   O2 Delivery Device Room air w/o2 available

## 2024-04-06 NOTE — CARE PLAN
The patient is Watcher - Medium risk of patient condition declining or worsening    Shift Goals  Clinical Goals: Safety  Patient Goals: sleep well, pain control  Family Goals: no one present    Progress made toward(s) clinical / shift goals:    Problem: Skin Integrity  Goal: Patient's skin integrity will be maintained or improve  Outcome: Progressing  Note: Sacral wound covered with dressing CDI. Encouraged to turn and repositioned every 2 hours.      Problem: Infection  Goal: Patient will remain free from infection  Outcome: Progressing  Note: Patient remains free from s/s infection; afebrile.  Will continue to monitor.

## 2024-04-06 NOTE — PROGRESS NOTES
NURSING DAILY NOTE    Name: Andrew Wall   Date of Admission: 3/28/2024   Admitting Diagnosis: Acute encephalopathy  Attending Physician: Antonio Cortez M.d.  Allergies: Augmentin    Safety  Patient Assist     Patient Precautions  Fall Risk, Cervical Collar  , Spinal / Back Precautions   Precaution Comments  C2 Fx; multiple sites of metastatic disease  Bed Transfer Status  Supervised  Toilet Transfer Status   Minimal Assist  Assistive Devices  Wheelchair  Oxygen  Room air w/o2 available  Diet/Therapeutic Dining  Current Diet Order   Procedures    Diet Order Diet: Regular     Pill Administration  whole  Agitated Behavioral Scale     ABS Level of Severity       Fall Risk  Has the patient had a fall this admission?   No  Jodi Leigh Fall Risk Scoring  10, LOW RISK  Fall Risk Safety Measures  bed alarm, chair alarm, and poor balance    Vitals  Temperature: 37.1 °C (98.8 °F)  Temp src: Oral  Pulse: 69  Respiration: 17  Blood Pressure: 101/68  Blood Pressure MAP (Calculated): 79 MM HG  BP Location: Left, Upper Arm  Patient BP Position: Supine     Oxygen  Pulse Oximetry: 98 %  O2 (LPM): 0  O2 Delivery Device: Room air w/o2 available    Bowel and Bladder  Last Bowel Movement  04/04/24  Stool Type  Type 1: Separate hard lumps (hard to pass)  Bowel Device  Bathroom  Continent  Bladder: Continent void   Bowel: Continent movement  Bladder Function  Urine Void (mL): 150 ml (urinal)  Number of Times Voided: 1  Urinary Options: Yes  Urine Color: Janae  Genitourinary Assessment   Bladder Assessment (WDL):  Within Defined Limits  Santiago Catheter: Not Applicable  Urine Color: Janae  Bladder Device: Urinal, Bathroom  Bladder Scan: Post Void  $ Bladder Scan Results (mL):  (Patient refused bladder scan, notified RN Subha)    Skin  Sachin Score   15  Sensory Interventions   Bed Types: Low Airloss  Skin Preventative Measures: Pillows in Use for Support /  Positioning  Moisture Interventions  Moisturizers/Barriers: Barrier Wipes      Pain  Pain Rating Scale  0 - No Pain  Pain Location  Neck  Pain Location Orientation  Anterior, Posterior  Pain Interventions   Declines    ADLs    Bathing   Patient Refused Bathing (notified RN Subha)  Linen Change      Personal Hygiene  Moist Ana Wipes  Chlorhexidine Bath      Oral Care     Teeth/Dentures     Shave     Nutrition Percentage Eaten  *  * Meal *  *, Between 50-75% Consumed  Environmental Precautions  Treaded Slipper Socks on Patient, Personal Belongings, Wastebasket, Call Bell etc. in Easy Reach, Bed in Low Position  Patient Turns/Positioning  Patient Turns Self from Side to Side  Patient Turns Assistance/Tolerance     Bed Positions  Bed Controls On, Bed Locked  Head of Bed Elevated  Self regulated      Psychosocial/Neurologic Assessment  Psychosocial Assessment  Psychosocial (WDL):  Within Defined Limits  Neurologic Assessment  Neuro (WDL): Exceptions to WDL  Level of Consciousness: Alert  Orientation Level: Oriented X4  Cognition: Appropriate judgement, Appropriate safety awareness, Appropriate attention/concentration, Appropriate for developmental age, Follows commands  Speech: Clear  Motor Function/Sensation Assessment: Sensation  RUE Sensation: Full sensation  LUE Sensation: Full sensation  RLE Sensation: Full sensation  LLE Sensation: Full sensation  EENT (WDL):  Within Defined Limits    Cardio/Pulmonary Assessment  Edema   RLE Edema: 1+  LLE Edema: 1+  Respiratory Breath Sounds  RUL Breath Sounds: Clear  RML Breath Sounds: Clear  RLL Breath Sounds: Clear  ALKA Breath Sounds: Clear  LLL Breath Sounds: Diminished  Cardiac Assessment   Cardiac (WDL):  Within Defined Limits

## 2024-04-07 ENCOUNTER — APPOINTMENT (OUTPATIENT)
Dept: INPATIENT REHAB | Facility: REHABILITATION | Age: 59
DRG: 070 | End: 2024-04-07
Payer: COMMERCIAL

## 2024-04-07 ENCOUNTER — APPOINTMENT (OUTPATIENT)
Dept: PHYSICAL THERAPY | Facility: REHABILITATION | Age: 59
DRG: 070 | End: 2024-04-07
Attending: PHYSICAL MEDICINE & REHABILITATION
Payer: COMMERCIAL

## 2024-04-07 ENCOUNTER — APPOINTMENT (OUTPATIENT)
Dept: OCCUPATIONAL THERAPY | Facility: REHABILITATION | Age: 59
DRG: 070 | End: 2024-04-07
Attending: PHYSICAL MEDICINE & REHABILITATION
Payer: COMMERCIAL

## 2024-04-07 PROCEDURE — 700102 HCHG RX REV CODE 250 W/ 637 OVERRIDE(OP): Performed by: PHYSICAL MEDICINE & REHABILITATION

## 2024-04-07 PROCEDURE — A9270 NON-COVERED ITEM OR SERVICE: HCPCS | Performed by: PHYSICAL MEDICINE & REHABILITATION

## 2024-04-07 PROCEDURE — 94640 AIRWAY INHALATION TREATMENT: CPT

## 2024-04-07 PROCEDURE — 700101 HCHG RX REV CODE 250: Performed by: PHYSICAL MEDICINE & REHABILITATION

## 2024-04-07 PROCEDURE — 97110 THERAPEUTIC EXERCISES: CPT

## 2024-04-07 PROCEDURE — 97530 THERAPEUTIC ACTIVITIES: CPT

## 2024-04-07 PROCEDURE — 97535 SELF CARE MNGMENT TRAINING: CPT

## 2024-04-07 PROCEDURE — 97116 GAIT TRAINING THERAPY: CPT

## 2024-04-07 PROCEDURE — 770010 HCHG ROOM/CARE - REHAB SEMI PRIVAT*

## 2024-04-07 RX ADMIN — MOMETASONE FUROATE AND FORMOTEROL FUMARATE DIHYDRATE 2 PUFF: 200; 5 AEROSOL RESPIRATORY (INHALATION) at 09:31

## 2024-04-07 RX ADMIN — ACETAMINOPHEN 650 MG: 325 TABLET ORAL at 11:22

## 2024-04-07 RX ADMIN — SENNOSIDES AND DOCUSATE SODIUM 2 TABLET: 8.6; 5 TABLET ORAL at 08:41

## 2024-04-07 RX ADMIN — MORPHINE SULFATE 45 MG: 30 TABLET, FILM COATED, EXTENDED RELEASE ORAL at 20:19

## 2024-04-07 RX ADMIN — OMEPRAZOLE 20 MG: 20 CAPSULE, DELAYED RELEASE ORAL at 08:41

## 2024-04-07 RX ADMIN — MORPHINE SULFATE 45 MG: 30 TABLET, FILM COATED, EXTENDED RELEASE ORAL at 08:34

## 2024-04-07 RX ADMIN — Medication 1000 UNITS: at 08:34

## 2024-04-07 RX ADMIN — MEGESTROL ACETATE 800 MG: 40 SUSPENSION ORAL at 08:32

## 2024-04-07 RX ADMIN — SENNOSIDES AND DOCUSATE SODIUM 2 TABLET: 8.6; 5 TABLET ORAL at 20:19

## 2024-04-07 RX ADMIN — MIDODRINE HYDROCHLORIDE 10 MG: 10 TABLET ORAL at 08:43

## 2024-04-07 RX ADMIN — MIDODRINE HYDROCHLORIDE 10 MG: 10 TABLET ORAL at 17:32

## 2024-04-07 RX ADMIN — MIDODRINE HYDROCHLORIDE 10 MG: 10 TABLET ORAL at 11:22

## 2024-04-07 ASSESSMENT — PATIENT HEALTH QUESTIONNAIRE - PHQ9
5. POOR APPETITE OR OVEREATING: NOT AT ALL
3. TROUBLE FALLING OR STAYING ASLEEP OR SLEEPING TOO MUCH: SEVERAL DAYS
8. MOVING OR SPEAKING SO SLOWLY THAT OTHER PEOPLE COULD HAVE NOTICED. OR THE OPPOSITE, BEING SO FIGETY OR RESTLESS THAT YOU HAVE BEEN MOVING AROUND A LOT MORE THAN USUAL: SEVERAL DAYS
SUM OF ALL RESPONSES TO PHQ QUESTIONS 1-9: 7
4. FEELING TIRED OR HAVING LITTLE ENERGY: SEVERAL DAYS
2. FEELING DOWN, DEPRESSED, IRRITABLE, OR HOPELESS: SEVERAL DAYS
7. TROUBLE CONCENTRATING ON THINGS, SUCH AS READING THE NEWSPAPER OR WATCHING TELEVISION: MORE THAN HALF THE DAYS
6. FEELING BAD ABOUT YOURSELF - OR THAT YOU ARE A FAILURE OR HAVE LET YOURSELF OR YOUR FAMILY DOWN: NOT AL ALL
9. THOUGHTS THAT YOU WOULD BE BETTER OFF DEAD, OR OF HURTING YOURSELF: NOT AT ALL
1. LITTLE INTEREST OR PLEASURE IN DOING THINGS: SEVERAL DAYS
SUM OF ALL RESPONSES TO PHQ9 QUESTIONS 1 AND 2: 2

## 2024-04-07 ASSESSMENT — BRIEF INTERVIEW FOR MENTAL STATUS (BIMS)
ASKED TO RECALL BED: YES, NO CUE REQUIRED
BIMS SUMMARY SCORE: 15
ASKED TO RECALL BLUE: YES, NO CUE REQUIRED
ASKED TO RECALL SOCK: YES, NO CUE REQUIRED
WHAT MONTH IS IT: ACCURATE WITHIN 5 DAYS
INITIAL REPETITION OF BED BLUE SOCK - FIRST ATTEMPT: 3
WHAT YEAR IS IT: CORRECT
WHAT DAY OF THE WEEK IS IT: CORRECT

## 2024-04-07 ASSESSMENT — GAIT ASSESSMENTS
GAIT LEVEL OF ASSIST: SUPERVISED
DEVIATION: DECREASED BASE OF SUPPORT;BRADYKINETIC;DECREASED HEEL STRIKE;DECREASED TOE OFF
ASSISTIVE DEVICE: 4 WHEEL WALKER
DISTANCE (FEET): 300

## 2024-04-07 ASSESSMENT — ACTIVITIES OF DAILY LIVING (ADL)
BED_CHAIR_WHEELCHAIR_TRANSFER_DESCRIPTION: ADAPTIVE EQUIPMENT
BED_CHAIR_WHEELCHAIR_TRANSFER_DESCRIPTION: ADAPTIVE EQUIPMENT

## 2024-04-07 ASSESSMENT — PAIN DESCRIPTION - PAIN TYPE: TYPE: ACUTE PAIN

## 2024-04-07 NOTE — PROGRESS NOTES
NURSING DAILY NOTE    Name: Andrew Wall   Date of Admission: 3/28/2024   Admitting Diagnosis: Acute encephalopathy  Attending Physician: Antonio Cortez M.d.  Allergies: Augmentin    Safety  Patient Assist     Patient Precautions  Fall Risk, Cervical Collar  , Spinal / Back Precautions   Precaution Comments  C2 Fx; multiple sites of metastatic disease  Bed Transfer Status  Supervised  Toilet Transfer Status   Minimal Assist  Assistive Devices  Wheelchair  Oxygen  Room air w/o2 available  Diet/Therapeutic Dining  Current Diet Order   Procedures    Diet Order Diet: Regular     Pill Administration  whole  Agitated Behavioral Scale     ABS Level of Severity       Fall Risk  Has the patient had a fall this admission?   No  Jodi Leigh Fall Risk Scoring  10, LOW RISK  Fall Risk Safety Measures  poor balance and ok to leave pt in bathroom    Vitals  Temperature: 37.1 °C (98.7 °F)  Temp src: Oral  Pulse: 77  Respiration: 17  Blood Pressure: 99/64  Blood Pressure MAP (Calculated): 76 MM HG  BP Location: Left, Upper Arm  Patient BP Position: Supine     Oxygen  Pulse Oximetry: 95 %  O2 (LPM): 0  O2 Delivery Device: Room air w/o2 available    Bowel and Bladder  Last Bowel Movement  04/04/24  Stool Type  Type 1: Separate hard lumps (hard to pass)  Bowel Device  Bathroom  Continent  Bladder: Continent void   Bowel: Continent movement  Bladder Function  Urine Void (mL): 150 ml (urinal)  Number of Times Voided: 1  Urinary Options: Yes  Urine Color: Janae  Genitourinary Assessment   Bladder Assessment (WDL):  Within Defined Limits  Santiago Catheter: Not Applicable  Urine Color: Janae  Bladder Device: Urinal, Bathroom  Bladder Scan: Post Void  $ Bladder Scan Results (mL):  (Patient refused bladder scan, notified RN Subha)    Skin  Sachin Score   15  Sensory Interventions   Bed Types: Low Airloss  Skin Preventative Measures: Pillows in Use for Support /  Positioning  Moisture Interventions  Moisturizers/Barriers: Barrier Wipes      Pain  Pain Rating Scale  4 - Distracts me, can do usual activities  Pain Location  Neck  Pain Location Orientation  Anterior, Posterior  Pain Interventions   Declines    ADLs    Bathing   Patient Refused Bathing (notified RN Subha)  Linen Change      Personal Hygiene  Moist Ana Wipes  Chlorhexidine Bath      Oral Care     Teeth/Dentures     Shave     Nutrition Percentage Eaten  *  * Meal *  *, Between 50-75% Consumed  Environmental Precautions  Treaded Slipper Socks on Patient, Personal Belongings, Wastebasket, Call Bell etc. in Easy Reach, Bed in Low Position  Patient Turns/Positioning  Patient Turns Self from Side to Side  Patient Turns Assistance/Tolerance     Bed Positions  Bed Controls On, Bed Locked  Head of Bed Elevated  Self regulated      Psychosocial/Neurologic Assessment  Psychosocial Assessment  Psychosocial (WDL):  Within Defined Limits  Neurologic Assessment  Neuro (WDL): Exceptions to WDL  Level of Consciousness: Alert  Orientation Level: Oriented X4  Cognition: Appropriate judgement, Appropriate safety awareness, Appropriate attention/concentration, Appropriate for developmental age, Follows commands  Speech: Clear  Motor Function/Sensation Assessment: Sensation  RUE Sensation: Full sensation  LUE Sensation: Full sensation  RLE Sensation: Full sensation  LLE Sensation: Full sensation  EENT (WDL):  Within Defined Limits    Cardio/Pulmonary Assessment  Edema   RLE Edema: 1+  LLE Edema: 1+  Respiratory Breath Sounds  RUL Breath Sounds: Clear  RML Breath Sounds: Clear  RLL Breath Sounds: Clear  ALKA Breath Sounds: Clear  LLL Breath Sounds: Clear  Cardiac Assessment   Cardiac (WDL):  Within Defined Limits

## 2024-04-07 NOTE — CARE PLAN
The patient is Stable - Low risk of patient condition declining or worsening    Shift Goals  Clinical Goals: Safety  Patient Goals: sleep well, pain control  Family Goals: no one present    Progress made toward(s) clinical / shift goals:    Problem: Fall Risk - Rehab  Goal: Patient will remain free from falls  Outcome: Progressing     Jodi Leigh Fall risk Assessment : 10    Low fall risk interventions   - Call light within reach   - Yellow  socks   - Belongings within reach     Pt uses call light consistently and appropriately. Waits for assistance does not attempt self transfer this shift. Able to verbalize needs.

## 2024-04-07 NOTE — CARE PLAN
The patient is Stable - Low risk of patient condition declining or worsening    Shift Goals  Clinical Goals: Safety  Patient Goals: sleep well, pain control  Family Goals: no one present    Progress made toward(s) clinical / shift goals:      Problem: Fall Risk - Rehab  Goal: Patient will remain free from falls  Outcome: Progressing  Note: Jodi Leigh Fall risk Assessment Score: 10      Low fall risk interventions   - Call light within reach   - Yellow  socks   - Belongings within reach   - Bed in the lowest position      Pt calls appropriately when in need of assistance.     Problem: Pain - Standard  Goal: Alleviation of pain or a reduction in pain to the patient’s comfort goal  Outcome: Progressing  Note: Pain meds given as scheduled per MAR for c/o neck pain with adequate relief. Pt sleeps good. Will continue to monitor.       Patient is not progressing towards the following goals:

## 2024-04-07 NOTE — THERAPY
Physical Therapy   Daily Treatment     Patient Name: Andrew Wall  Age:  59 y.o., Sex:  male  Medical Record #: [id number]  Today's Date: 4/7/2024     Precautions  Precautions: Fall Risk, Cervical Collar  , Spinal / Back Precautions   Comments: C2 Fx; multiple sites of metastatic disease    Subjective    Pt was supine in bed upon arrival and agreeable to treatment.   Pt's spouse present for family training.      Objective       04/07/24 1101   PT Charge Group   PT Therapeutic Activities (Units) 2   PT Total Time Spent   PT Individual Total Time Spent (Mins) 30   Precautions   Precautions Fall Risk;Cervical Collar  ;Spinal / Back Precautions    Transfer Functional Level of Assist   Bed, Chair, Wheelchair Transfer Modified Independent   Bed Chair Wheelchair Transfer Description Adaptive equipment   Bed Mobility    Supine to Sit Independent   Sit to Supine Independent   Sit to Stand Modified Independent   Scooting Independent   Rolling Modified Independent   Interdisciplinary Plan of Care Collaboration   Patient Position at End of Therapy Seated;Call Light within Reach;Tray Table within Reach;Phone within Reach;Family / Friend in Room   Roll Left and Right   Assistance Needed Independent   CARE Score - Roll Left and Right 6   Sit to Lying   Assistance Needed Independent   CARE Score - Sit to Lying 6   Lying to Sitting on Side of Bed   Assistance Needed Independent   CARE Score - Lying to Sitting on Side of Bed 6   Sit to Stand   Assistance Needed Independent;Adaptive equipment   CARE Score - Sit to Stand 6   Chair/Bed-to-Chair Transfer   Assistance Needed Independent;Adaptive equipment   CARE Score - Chair/Bed-to-Chair Transfer 6   Car Transfer   Assistance Needed Independent;Adaptive equipment   CARE Score - Car Transfer 6     Completed family training for car transfers in pt's personal vehicle.  Assisted pt in donning coat.     Assessment    Pt's spouse demonstrated good safety and guarding with pt with car  transfers.  Pt and pt's spouse verbalized all education.   Strengths: Willingly participates in therapeutic activities, Pleasant and cooperative, Motivated for self care and independence, Alert and oriented, Able to follow instructions  Barriers: Fatigue, Decreased endurance, Impaired activity tolerance    Plan    Complete remaining D/C IRF THUY items, fall prevention and recovery, review HEP    DME  PT DME Recommendations  Wheelchair:  (N/A, pt has one)  Assistive Device: 4-Wheel Walker (pt owns this)  Additional Equipment: Hospital Bed (See other comments) (pt owns)    Passport items to be completed:  Get in/out of bed safely, in/out of a vehicle, safely use mobility device, walk or wheel around home/community, navigate up and down stairs, show how to get up/down from the ground, ensure home is accessible, demonstrate HEP, complete caregiver training    Physical Therapy Problems (Active)       Problem: Mobility       Dates: Start:  03/29/24         Goal: STG-Within one week, patient will propel wheelchair household distances 150 ft with SPV to facilitate functional independence.       Dates: Start:  03/29/24            Goal: STG-Within one week, patient will ambulate household distance 150 ft x2 with 4WW and SBA to access home.       Dates: Start:  03/29/24            Goal: STG-Within one week, patient will ascend and descend 2 stairs with 1 HR and CGA to access living room.       Dates: Start:  03/29/24               Problem: PT-Long Term Goals       Dates: Start:  03/29/24         Goal: LTG-By discharge, patient will ambulate 250 ft x2 over even and uneven surfaces with 4WW and SPV to access community.       Dates: Start:  03/29/24            Goal: LTG-By discharge, patient will transfer one surface to another with 4WW and SPV to facilitate functional independence.       Dates: Start:  03/29/24            Goal: LTG-By discharge, patient will ambulate up/down 2 stairs with 1 HR and SPV to access living room.        Dates: Start:  03/29/24            Goal: LTG-By discharge, patient will transfer in/out of a car with 4WW and SBA after set up to facilitate functional independence.       Dates: Start:  03/29/24

## 2024-04-07 NOTE — DISCHARGE INSTRUCTIONS
[facility] NURSING DISCHARGE INSTRUCTIONS    Blood Pressure: 103/57  Weight: 65.9 kg (145 lb 4.5 oz)  Nursing recommendations for Sebastian Lamasn at time of discharge are as follows:  Client and Family Member verbalized understanding of all discharge instructions and prescriptions.     Review all your home medications and newly ordered medications with your doctor and/or pharmacist. Follow medication instructions as directed by your doctor and/or pharmacist.    Pain Management:   Discharge Pain Medication Instructions:  Comfort Goal: Comfort with Movement, Sleep Comfortably  Notify your primary care provider if pain is unrelieved with these measures, if the pain is new, or increased in intensity.    Discharge Skin Characteristics: Warm, Dry  Discharge Skin Exam: Other (Comments) (buttocks-stage 2 wound)    Skin / Wound Care Instructions: Please contact your primary care physician for any change in skin integrity.     If You Have Surgical Incisions / Wounds:  Monitor surgical site(s) for signs of increased swelling, redness or symptoms of drainage from the site or fever as this could indicate signs and symptoms of infection. If these symptoms are noted, notifiy your primary care provider.      Discharge Safety Instructions: Should Not Be Left Alone In The House     Discharge Safety Concerns: Weakness, Balance Problems (Dizziness, Light Headedness)  The interdisciplinary team has made recommendation that you should not be left alone  in the house due to balance problem and weakness  Anti-embolic stockings are required during the day and off at night to increase circulation to the lower extremities.    Discharge Diet: Regular     Discharge Liquids: Thin  Discharge Bowel Function: Continent  Please contact your primary care physician for any changes in bowel habits.  Discharge Bowel Program:    Discharge Bladder Function: Continent  Discharge Urinary Devices: None      Nursing Discharge Plan:    Influenza Vaccine Indication: Not indicated: Previously immunized this influenza season and > 8 years of age    Case Management Discharge Instructions:   Discharge Location:    Agency Name/Address/Phone:    Home Health:    Outpatient Services:    DME Provider/Phone:    Medical Equipment Ordered:    Prescription Faxed to:        Discharge Medication Instructions:  Below are the medications your physician expects you to take upon discharge:    Toxic Metabolic Encephalopathy  Toxic metabolic encephalopathy (TME) is a type of brain disorder caused by a change in brain chemistry. This condition may result from illnesses or conditions that cause an imbalance of fluid, minerals (electrolytes), and other substances in the body. This imbalance can affect the way the brain functions. The condition is not caused by brain damage or brain disease.  TME can cause confusion and other mental disturbances, which are generally referred to as delirium. Untreated delirium may lead to permanent mental changes or worsening medical conditions. Untreated delirium is a life-threatening condition that may need to be treated in the hospital.  What are the causes?  Possible causes of TME that can lead to delirium include:  High or low levels of any of the following substances in the blood:  Calcium.  Salt (sodium).  Sugar (glucose).  Magnesium.  Phosphate.  Not having enough oxygen in the blood.  Changes in the acid level (pH) of the blood.  Certain medicines, such as steroids, sedatives, sleeping aids, or pain medicines.  High body temperature or certain infections.  Dehydration.  Short-term (acute) or long-term (chronic) disease of the kidney or liver.  Low levels of B vitamins. This can result from alcohol abuse.  What increases the risk?  You are more likely to develop this condition if you:  Have chronic medical problems, such as diabetes, liver disease, kidney disease, heart disease, or lung disease.  Had recent surgery.  Are in the  hospital, especially in intensive care.  Are elderly, have dementia, or live in a nursing home.  Are not getting enough fluids.  Have poor nutrition.  Abuse alcohol or have alcohol withdrawal.  What are the signs or symptoms?  Symptoms of TME may include:  Not being able to stay awake (drowsiness) or even loss of consciousness (coma).  Shaking that you cannot control (tremors) or muscle twitching.  Clumsiness or weakness.  Seizures.  Symptoms of delirium caused by TME include:  Confusion, not knowing where you are (disorientation), poor judgment, memory loss, or poor attention and concentration.  Decreased alertness.  Changes in speech, such as saying things that do not make sense.  Psychiatric symptoms, such as seeing or hearing things that are not real (hallucinations), false beliefs (delusions), or general mistrust of others (paranoia).  Mood changes like depression, anxiety, irritability, or hyperactivity.  Avoiding other people.  Changes in eating and sleeping patterns.  Delirium may come and go. Symptoms of delirium may start suddenly or gradually, and they often get worse at night.  How is this diagnosed?  This condition is diagnosed based on:  Your symptoms and behavior.  An exam to check how you are thinking, feeling, and behaving (mental status exam). To diagnose delirium, the mental status exam must rule out other possible causes of TME, and it must show:  Changes in attention and awareness.  Changes that develop over a short period of time and tend to come and go.  Changes in memory, language, and thinking that were not present before.  A physical exam and medical history.  Imaging tests, such as an MRI or a CT scan.  Blood tests to:  Measure liver and kidney function.  Check B vitamin levels.  Check for changes in acid levels (pH) and changes in calcium, sodium, or magnesium levels in the blood.  Measure your blood glucose.  Measure your blood oxygen level.  How is this treated?  Treatment for TME  depends on the cause, and it may include:  Getting fluids through an IV.  Regulating calcium, sodium, glucose, or magnesium levels in the body.  Getting oxygen.  Improving nutrition.  Treating liver or kidney disease.  Adjusting certain medicines.  Treating infections.  If the cause is found and treated, delirium usually improves. Managing delirium may include:  Keeping the room well-lit and quiet.  Using calendars, pictures, and clocks to prevent disorientation.  Having frequent checks from nursing staff and visits from caregivers.  Wearing eyeglasses or a hearing aid, if needed.  Physical therapy.  Medicine to treat agitation, anxiety, hallucinations, or delusions.  Follow these instructions at home:  Medicines  Take over-the-counter and prescription medicines only as told by your health care provider.  Do not start taking any new medicines, including over-the-counter medicines, without first checking with your health care provider.  Eating and drinking    Drink enough fluid to keep your urine pale yellow.  Follow a healthy diet. Do not skip meals.  Do not drink alcohol.  General instructions  Go to bed at the same time every night.  Return to your normal activities as told by your health care provider. Ask your health care provider what activities are safe for you.  Keep all follow-up visits. This is important.  Contact a health care provider if:  You have a fever.  You are unable to feed yourself or hydrate yourself.  You start to feel clumsy.  You start to have tremors or weakness.  Get help right away if:  You have a seizure.  You lose consciousness.  You have trouble breathing.  You feel unable to care for yourself at home.  You become disoriented at home.  These symptoms may represent a serious problem that is an emergency. Do not wait to see if the symptoms will go away. Get medical help right away. Call your local emergency services (911 in the [place] Do not drive yourself to the hospital.  Summary  Toxic  "metabolic encephalopathy (TME) is a brain disorder that may result from illnesses or conditions that cause an imbalance of fluid, minerals (electrolytes), and other substances in the body that affect the way the brain functions.  Your health care provider will diagnose TME based on your symptoms, behavior, physical exam and medical history, imaging, and blood tests.  Your health care provider will also do an exam to check how you are thinking, feeling, and behaving (mental status exam).  TME is treated by identifying and correcting the underlying cause.  This information is not intended to replace advice given to you by your health care provider. Make sure you discuss any questions you have with your health care provider.  Document Revised: 10/05/2021 Document Reviewed: 10/05/2021  [employer] Patient Education © 2023 [employer]    Prevent Falls in Your Home    \"Falling once doubles your chance of falling again\"        -Center for Disease Control and Prevention    Falls in the home can lead to serious injury (fractures, brain injuries), hospitalizations, increased medical costs, and could even be fatal.  The good news is, there are many precautions you can take to avoid falls in your home and help keep you safe:     If prescribed an assistive device (walker, crutches), use as instructed by the healthcare provider\"   Remove any tripping hazards from your home, including loose cords, throw rugs and clutter  Keep a nightlight on in dark (hallways, bathrooms, etc)   Get up slowly, to make sure you feel okay before getting up  Be aware of any side effects of your medications: some medications may make you dizzy  Place a non-skid rubber mat in your shower or tub-consider a shower bench or chair if unsteady on your feet  Wear supportive shoes or non-skid socks when moving around  Start an exercise program once approved by your provider.  If you are feeling weak following a hospital stay, talk to your doctor about home " health or outpatient therapy programs designed to help rebuild your strength and endurance      Depression, Adult  Depression refers to feeling sad, low, down in the dumps, blue, gloomy, or empty. In general, there are two kinds of depression:  Normal sadness or normal grief. This kind of depression is one that we all feel from time to time after upsetting life experiences, such as the loss of a job or the ending of a relationship. This kind of depression is considered normal, is short lived, and resolves within a few days to 2 weeks. Depression experienced after the loss of a loved one (bereavement) often lasts longer than 2 weeks but normally gets better with time.  Clinical depression. This kind of depression lasts longer than normal sadness or normal grief or interferes with your ability to function at home, at work, and in school. It also interferes with your personal relationships. It affects almost every aspect of your life. Clinical depression is an illness.  Symptoms of depression can also be caused by conditions other than those mentioned above, such as:  Physical illness. Some physical illnesses, including underactive thyroid gland (hypothyroidism), severe anemia, specific types of cancer, diabetes, uncontrolled seizures, heart and lung problems, strokes, and chronic pain are commonly associated with symptoms of depression.  Side effects of some prescription medicine. In some people, certain types of medicine can cause symptoms of depression.  Substance abuse. Abuse of alcohol and illicit drugs can cause symptoms of depression.  SYMPTOMS  Symptoms of normal sadness and normal grief include the following:  Feeling sad or crying for short periods of time.  Not caring about anything (apathy).  Difficulty sleeping or sleeping too much.  No longer able to enjoy the things you used to enjoy.  Desire to be by oneself all the time (social isolation).  Lack of energy or motivation.  Difficulty concentrating or  remembering.  Change in appetite or weight.  Restlessness or agitation.  Symptoms of clinical depression include the same symptoms of normal sadness or normal grief and also the following symptoms:  Feeling sad or crying all the time.  Feelings of guilt or worthlessness.  Feelings of hopelessness or helplessness.  Thoughts of suicide or the desire to harm yourself (suicidal ideation).  Loss of touch with reality (psychotic symptoms). Seeing or hearing things that are not real (hallucinations) or having false beliefs about your life or the people around you (delusions and paranoia).  DIAGNOSIS   The diagnosis of clinical depression is usually based on how bad the symptoms are and how long they have lasted. Your health care provider will also ask you questions about your medical history and substance use to find out if physical illness, use of prescription medicine, or substance abuse is causing your depression. Your health care provider may also order blood tests.  TREATMENT   Often, normal sadness and normal grief do not require treatment. However, sometimes antidepressant medicine is given for bereavement to ease the depressive symptoms until they resolve.  The treatment for clinical depression depends on how bad the symptoms are but often includes antidepressant medicine, counseling with a mental health professional, or both. Your health care provider will help to determine what treatment is best for you.  Depression caused by physical illness usually goes away with appropriate medical treatment of the illness. If prescription medicine is causing depression, talk with your health care provider about stopping the medicine, decreasing the dose, or changing to another medicine.  Depression caused by the abuse of alcohol or illicit drugs goes away when you stop using these substances. Some adults need professional help in order to stop drinking or using drugs.  SEEK IMMEDIATE MEDICAL CARE IF:  You have thoughts about  hurting yourself or others.  You lose touch with reality (have psychotic symptoms).  You are taking medicine for depression and have a serious side effect.  FOR MORE INFORMATION  National [place] on Mental Illness: www.rigo.org   National [place] of Mental Health: www.nimh.nih.gov      This information is not intended to replace advice given to you by your health care provider. Make sure you discuss any questions you have with your health care provider.     Document Released: 12/15/2001 Document Revised: 01/08/2016 Document Reviewed: 03/18/2013  [employer] Patient Education ©2016 [employer]      Physical Therapy Discharge Instructions for Sebastian Chavira Mae    4/7/2024    Level of Assist Required for Ambulation: Supervision on Flat Surfaces, Supervision on Curbs, Supervision on Stairs  Distance Patient May Ambulate: Up to 250 feet or more as tolerated  Device Recommended for Ambulation: 4-Wheeled Walker  Level of Assist Required for Transfers: Requires No Assist  Device Recommended for Transfers: 4-Wheeled Walker  Home Exercise Program: Refer to Home Exercise Program Handout for Details    It was great working with you Sebastian!  Take care and best wishes with your continued recovery!  Monica Partida PT DPT     Occupational Therapy Discharge Instructions for Andrew Chavira Mae    4/8/2024    Level of Assist Required for Eating: Able to Complete Eating without Assist  Level of Assist Required for Grooming: Able to Complete Grooming without Assist  Level of Assist Required for Dressing: Able to Complete Dressing without Assist  Equipment for Dressing: Sock Aid, Reacher, Long Handled Shoe Horn, Dressing Stick  Level of Assist Required for Toileting: Able to Complete Toileting without Assist  Level of Assist Required for Toilet Transfer: Able to Complete Toilet Transfer without Assist  Level of Assist Required for Bathing: Requires Supervision with Bathing  Equipment for Bathing: Shower Chair, Grab Bars in Tub /  Shower, Hand Held Shower Head, Long Handled Sponge  Level of Assist Required for Shower Transfer: Requires Supervision with Shower Transfer  Level of Assist Required for Home Mgmt: Requires Supervision with Home Management  Level of Assist Required for Meal Prep: Requires Supervision with Meal Preparation     Best of karissa Cortes, I enjoyed working with you!  Leonid NEAL

## 2024-04-07 NOTE — THERAPY
"Occupational Therapy  Daily Treatment     Patient Name: Andrew Wall  Age:  59 y.o., Sex:  male  Medical Record #: [id number]  Today's Date: 4/7/2024     Precautions  Precautions: Fall Risk, Cervical Collar  , Spinal / Back Precautions   Comments: C2 fx; multiple sites of metastatic diseas    Safety   ADL Safety : Requires Physical Assist for Safety  Bathroom Safety: Requires Physical Assist for Safety    Subjective    \"I'd love to work on my shoulder mobility like the other day.\"     Objective       04/07/24 1301   OT Charge Group   OT Therapy Activity (Units) 2   OT Total Time Spent   OT Individual Total Time Spent (Mins) 30   Precautions   Precautions Fall Risk;Cervical Collar  ;Spinal / Back Precautions    Comments C2 fx; multiple sites of metastatic diseas   Functional Level of Assist   Lower Body Dressing Supervision   Lower Body Dressing Description   (Eamon slip on shoes at EOB)   Bed, Chair, Wheelchair Transfer Supervised   Bed Chair Wheelchair Transfer Description   (4WW)   Bed Mobility    Supine to Sit Independent   Sit to Supine Independent   Interdisciplinary Plan of Care Collaboration   IDT Collaboration with  Family / Caregiver   Patient Position at End of Therapy In Bed;Family / Friend in Room;Call Light within Reach;Tray Table within Reach   Collaboration Comments Spouse present and engaged in BUE PROM/AROM training     Pt found resting in bed with wife present in room. Transferred to EOB and ambulated with 4WW to rehab gym with wife present to participate in pt/family education on PROM/AROM techniques for shoulder mobility per pt/family request. Transferred to supine on the mat. Tolerated 3x10 reps of scapular protraction/retraction and 3x10 reps scapular elevation/depression for joint mobilization. Spouse engaged in hands on practice facilitating these movements with coaching from OT and pt and good response noted.     Pt then completed 2x10 eamon arm raises to eye level and 2x10 chest press in " supine with 1# dowel bar with min verbal cues for R-elbow extension improving over time. Pt and spouse endorsed improved R-shoulder and R-elbow AROM compared to prior sessions. Received education on benefits of incorporated scapular/shoulder movements into daily routine (e.g., in the mornings before getting out of bed) to facilitation BUE AROM and comfort; verbally agreed to rec and confirmed understanding.    Assessment    Overall, demonstrated excellent response to OT intervention with good carryover of skills and techniques. Improved safety with transfers and 4WW management noted this session compared to AM session.     Strengths: Able to follow instructions, Alert and oriented, Motivated for self care and independence, Pleasant and cooperative, Supportive family, Willingly participates in therapeutic activities  Barriers: Aspiration risk, Decreased endurance, Fatigue, Generalized weakness, Home accessibility, Impaired activity tolerance, Impaired balance, Limited mobility, Pain    Plan    Pt to DC home with HH OT services and support from family; please see OT DC note from today.    DME  OT DME Recommendations  Bathroom Equipment:  ((pt owns hospital bed), hip kit (educated pt/spouse on 3/31, directed to [facility] to purchase), spouse also plans to purchase tub txfr bench)  Additional Equipment: Hospital Bed (See other comments) (pt owns)    Occupational Therapy Goals (Active)       There are no active problems.

## 2024-04-07 NOTE — PROGRESS NOTES
NURSING DAILY NOTE    Name: Andrew Wall   Date of Admission: 3/28/2024   Admitting Diagnosis: Acute encephalopathy  Attending Physician: Antonio Cortez M.d.  Allergies: Augmentin    Safety  Patient Assist  sba  Patient Precautions  Fall Risk, Cervical Collar  , Spinal / Back Precautions   Precaution Comments  C2 Fx; multiple sites of metastatic disease  Bed Transfer Status  Supervised  Toilet Transfer Status   Minimal Assist  Assistive Devices  Rails, Wheelchair  Oxygen  Room air w/o2 available  Diet/Therapeutic Dining  Current Diet Order   Procedures    Diet Order Diet: Regular     Pill Administration  whole  Agitated Behavioral Scale     ABS Level of Severity       Fall Risk  Has the patient had a fall this admission?   No  Jodi Leigh Fall Risk Scoring  10, LOW RISK  Fall Risk Safety Measures  bed alarm, chair alarm, poor balance, and low vision/ hearing    Vitals  Temperature: 37.1 °C (98.7 °F)  Temp src: Oral  Pulse: 77  Respiration: 17  Blood Pressure: 99/64  Blood Pressure MAP (Calculated): 76 MM HG  BP Location: Left, Upper Arm  Patient BP Position: Supine     Oxygen  Pulse Oximetry: 95 %  O2 (LPM): 0  O2 Delivery Device: Room air w/o2 available    Bowel and Bladder  Last Bowel Movement  04/06/24  Stool Type  Type 1: Separate hard lumps (hard to pass)  Bowel Device  Bathroom  Continent  Bladder: Continent void   Bowel: Continent movement  Bladder Function  Urine Void (mL): 300 ml (urinals)  Number of Times Voided: 1  Urinary Options: Yes  Urine Color: Yellow  Genitourinary Assessment   Bladder Assessment (WDL):  Within Defined Limits  Santiago Catheter: Not Applicable  Urine Color: Yellow  Bladder Device: Urinal  Bladder Scan: Post Void  $ Bladder Scan Results (mL):  (Patient refused bladder scan, notified RN Subha)    Skin  Sachin Score   16  Sensory Interventions   Bed Types: Low Airloss  Skin Preventative Measures: Pillows in Use for  Support / Positioning  Moisture Interventions  Moisturizers/Barriers: Barrier Paste      Pain  Pain Rating Scale  1 - Hardly Notice Pain  Pain Location  Neck  Pain Location Orientation  Anterior, Posterior  Pain Interventions   Rest    ADLs    Bathing   Patient Refused Bathing (notified RN Subha)  Linen Change      Personal Hygiene  Moist Ana Wipes  Chlorhexidine Bath      Oral Care     Teeth/Dentures     Shave     Nutrition Percentage Eaten  *  * Meal *  *, Between 50-75% Consumed  Environmental Precautions  Bed in Low Position, Treaded Slipper Socks on Patient  Patient Turns/Positioning  Patient Turns Self from Side to Side  Patient Turns Assistance/Tolerance     Bed Positions  Bed Controls On  Head of Bed Elevated  Self regulated      Psychosocial/Neurologic Assessment  Psychosocial Assessment  Psychosocial (WDL):  Within Defined Limits  Neurologic Assessment  Neuro (WDL): Exceptions to WDL  Level of Consciousness: Alert  Orientation Level: Oriented X4  Cognition: Appropriate judgement, Appropriate safety awareness, Appropriate attention/concentration, Appropriate for developmental age, Follows commands  Speech: Clear  Motor Function/Sensation Assessment: Sensation  RUE Sensation: Full sensation  LUE Sensation: Full sensation  RLE Sensation: Full sensation  LLE Sensation: Full sensation  EENT (WDL):  Within Defined Limits    Cardio/Pulmonary Assessment  Edema   RLE Edema: 1+  LLE Edema: 1+  Respiratory Breath Sounds  RUL Breath Sounds: Clear  RML Breath Sounds: Clear  RLL Breath Sounds: Clear  ALKA Breath Sounds: Clear  LLL Breath Sounds: Clear  Cardiac Assessment   Cardiac (WDL):  Within Defined Limits

## 2024-04-07 NOTE — THERAPY
Physical Therapy   Daily Treatment     Patient Name: Andrew Wall  Age:  59 y.o., Sex:  male  Medical Record #: [id number]  Today's Date: 4/7/2024     Precautions  Precautions: Fall Risk, Cervical Collar  , Spinal / Back Precautions   Comments: C2 fx; multiple sites of metastatic diseas    Subjective    Pt was supine in bed upon arrival and agreeable to treatment.        Objective       04/07/24 1401   PT Charge Group   PT Gait Training (Units) 1   PT Therapeutic Exercise (Units) 1   PT Therapeutic Activities (Units) 2   PT Total Time Spent   PT Individual Total Time Spent (Mins) 60   Precautions   Precautions Fall Risk;Cervical Collar  ;Spinal / Back Precautions    Gait Functional Level of Assist    Gait Level Of Assist Supervised   Assistive Device 4 Wheel Walker   Distance (Feet) 300   # of Times Distance was Traveled 1   Deviation Decreased Base Of Support;Bradykinetic;Decreased Heel Strike;Decreased Toe Off   Stairs Functional Level of Assist   Level of Assist with Stairs Contact Guard Assist   # of Stairs Climbed 12   Stairs Description Extra time;Verbal cueing;Supervision for safety;Safety concerns   Transfer Functional Level of Assist   Bed, Chair, Wheelchair Transfer Modified Independent   Bed Chair Wheelchair Transfer Description Adaptive equipment   Supine Lower Body Exercise   Bridges Two Legged;1 set of 15   Hip Abduction 1 set of 15;Bilateral   Short Arc Quad 1 set of 15;Bilateral   Other Exercises HS curls with ball, gastroc and HS stretching with belt   Bed Mobility    Supine to Sit Independent   Sit to Supine Independent   Sit to Stand Modified Independent   Scooting Independent   Interdisciplinary Plan of Care Collaboration   Patient Position at End of Therapy In Bed;Call Light within Reach;Tray Table within Reach;Phone within Reach   Walk 10 Feet   Assistance Needed Supervision;Adaptive equipment   CARE Score - Walk 10 Feet 4   Walk 50 Feet with Two Turns   Assistance Needed  Supervision;Adaptive equipment   CARE Score - Walk 50 Feet with Two Turns 4   Walk 150 Feet   Assistance Needed Supervision;Adaptive equipment   CARE Score - Walk 150 Feet 4   Walking 10 Feet on Uneven Surfaces   Assistance Needed Supervision;Adaptive equipment   CARE Score - Walking 10 Feet on Uneven Surfaces 4   1 Step (Curb)   Assistance Needed Incidental touching;Supervision;Adaptive equipment   CARE Score - 1 Step (Curb) 4   4 Steps   Assistance Needed Incidental touching;Supervision;Adaptive equipment   CARE Score - 4 Steps 4   12 Steps   Assistance Needed Incidental touching;Supervision;Adaptive equipment   CARE Score - 12 Steps 4   Picking Up Object   Assistance Needed Adaptive equipment;Independent   CARE Score - Picking Up Object 6   Wheel 50 Feet with Two Turns   Reason if not Attempted Activity not applicable   CARE Score - Wheel 50 Feet with Two Turns 9   Wheel 150 Feet   Reason if not Attempted Activity not applicable   CARE Score - Wheel 150 Feet 9   P.T. Discharge Summary   Discharge Location Home   Patient Discharging with Assist of Spouse / Significant Other   Level of Supervision Required Upon Discharge Intermittent Supervision   Recommended Equipment for Discharge 4-Wheeled Walker;Single Point Cane   Recommeded Services Upon Discharge Home Health Physical Therapy;Outpatient Physical Therapy   Long Term Goals Met 3   Long Term Goals Not Met 1   Reason(s) for Goals Not Met Pt requiring CGA to SBA with stair training for safety, pt's family has been trained to provide this assistance   Criteria for Termination of Services Maximum Function Achieved for Inpatient Rehabilitation   Discharge Instructions to Patient   Level of Assist Required for Ambulation Supervision on Flat Surfaces;Supervision on Curbs;Supervision on Stairs   Distance Patient May Ambulate Up to 250 feet or more as tolerated   Device Recommended for Ambulation 4-Wheeled Walker   Level of Assist Required for Transfers Requires No  Assist   Device Recommended for Transfers 4-Wheeled Walker   Home Exercise Program Refer to Home Exercise Program Handout for Details     Completed remaining D/C IRF THUY items in preparation for D/C tomorrow.  Pt given HEP with review of exercises.     Assessment    Pt has made steady progress in mobility throughout his stay within the facility.  Pt ready to D/C.   Strengths: Willingly participates in therapeutic activities, Pleasant and cooperative, Motivated for self care and independence, Alert and oriented, Able to follow instructions  Barriers: Fatigue, Decreased endurance, Impaired activity tolerance    Plan    Pt to D/C tomorrow     DME  PT DME Recommendations  Wheelchair:  (N/A, pt has one)  Assistive Device: 4-Wheel Walker (pt owns this)  Additional Equipment: Hospital Bed (See other comments) (pt owns)    Passport items to be completed:  Completed     Physical Therapy Problems (Active)       Problem: Mobility       Dates: Start:  03/29/24         Goal: STG-Within one week, patient will propel wheelchair household distances 150 ft with SPV to facilitate functional independence.       Dates: Start:  03/29/24            Goal: STG-Within one week, patient will ambulate household distance 150 ft x2 with 4WW and SBA to access home.       Dates: Start:  03/29/24            Goal: STG-Within one week, patient will ascend and descend 2 stairs with 1 HR and CGA to access living room.       Dates: Start:  03/29/24               Problem: PT-Long Term Goals       Dates: Start:  03/29/24         Goal: LTG-By discharge, patient will ambulate 250 ft x2 over even and uneven surfaces with 4WW and SPV to access community.       Dates: Start:  03/29/24            Goal: LTG-By discharge, patient will transfer one surface to another with 4WW and SPV to facilitate functional independence.       Dates: Start:  03/29/24            Goal: LTG-By discharge, patient will ambulate up/down 2 stairs with 1 HR and SPV to access living  room.       Dates: Start:  03/29/24            Goal: LTG-By discharge, patient will transfer in/out of a car with 4WW and SBA after set up to facilitate functional independence.       Dates: Start:  03/29/24

## 2024-04-07 NOTE — CARE PLAN
Problem: Functional Transfers  Goal: STG-Within one week, patient will transfer to toilet at SBA level using DME PRN.  Outcome: Discharged - Not Met  Goal: STG-Within one week, patient will transfer to tub/shower at SBA level using DME PRN.  Outcome: Discharged - Not Met     Problem: OT Long Term Goals  Goal: LTG-By discharge, patient will complete basic self care tasks at SBA-Mod I level using DME/AE PRN.  Outcome: Discharged - Not Met  Goal: LTG-By discharge, patient will perform bathroom transfers at SBA-Mod I level using DME/AE PRN.  Outcome: Discharged - Not Met

## 2024-04-07 NOTE — CARE PLAN
Problem: PT-Long Term Goals  Goal: LTG-By discharge, patient will ambulate 250 ft x2 over even and uneven surfaces with 4WW and SPV to access community.  Outcome: Met  Goal: LTG-By discharge, patient will transfer one surface to another with 4WW and SPV to facilitate functional independence.  Outcome: Met  Goal: LTG-By discharge, patient will transfer in/out of a car with 4WW and SBA after set up to facilitate functional independence.  Outcome: Met     Problem: PT-Long Term Goals  Goal: LTG-By discharge, patient will ambulate up/down 2 stairs with 1 HR and SPV to access living room.  Outcome: Discharged - Not Met

## 2024-04-07 NOTE — THERAPY
Occupational Therapy   Discharge Summary     Patient Name: Andrew Wall  Age:  59 y.o., Sex:  male  Medical Record #: [id number]  Today's Date: 4/7/2024     Precautions  Precautions: Fall Risk, Cervical Collar  , Spinal / Back Precautions   Comments: C2 fx; multiple sites of metastatic diseas    Safety   ADL Safety : Requires Physical Assist for Safety  Bathroom Safety: Requires Physical Assist for Safety    Subjective    Pt found resting in bed with wife present in room; agreeable to OT session and IRF-THUY shower/dressing.     Objective       04/07/24 0931   OT Charge Group   OT Self Care / ADL (Units) 2   OT Therapy Activity (Units) 1   OT Therapeutic Exercise (Units) 1   OT Total Time Spent   OT Individual Total Time Spent (Mins) 60   Precautions   Precautions Fall Risk;Cervical Collar  ;Spinal / Back Precautions    Comments C2 fx; multiple sites of metastatic diseas   Pain   Pain Scales 0 to 10 Scale    Intervention Rest;Distraction   Pain 0 - 10 Group   Location Neck   Pain Rating Scale (NPRS) 3   Comfort Goal Comfort with Movement   Sitting Upper Body Exercises   External Shoulder Rotation 1 set of 10;Left  (Pt reported neck discomfort and fatigue following shower; single side completed for demo purposes only; light resistance band.)   Other Exercise Internal Shoulder Rotation; 1 set of 8  (Pt reported neck discomfort and fatigue following shower; single side completed for demo purposes only; light resistance band.)   Standing Upper Body Exercises   Standing Upper Body Exercises Yes   Shoulder Extension 1 set of 10;R/LUE  (light resistance band.)   Other Exercises Rows; 1 set of 8; R/LUE  (Light resistance band.)   Balance   Sitting Balance (Static) Good   Sitting Balance (Dynamic) Good   Standing Balance (Static) Fair   Standing Balance (Dynamic) Fair -   Skilled Intervention Verbal Cuing;Tactile Cuing  (During bathing, required verbal cues for pacing, use of DME/GB, and occassional CGA to maintain balance  "during dynamic standing tasks.)   Eating   Assistance Needed Independent   CARE Score - Eating 6   Oral Hygiene   Assistance Needed Independent   CARE Score - Oral Hygiene 6   Toileting Hygiene   Assistance Needed Supervision   CARE Score - Toileting Hygiene 4   Shower/Bathe Self   Assistance Needed Supervision   CARE Score - Shower/Bathe Self 4   Upper Body Dressing   Assistance Needed Incidental touching   Physical Assistance Level 25% or less   CARE Score - Upper Body Dressing 3   Lower Body Dressing   Assistance Needed Supervision   CARE Score - Lower Body Dressing 4   Putting On/Taking Off Footwear   Assistance Needed Set-up / clean-up   CARE Score - Putting On/Taking Off Footwear 5   Toilet Transfer   Assistance Needed Incidental touching   Physical Assistance Level No physical assistance   CARE Score - Toilet Transfer 4   Cognitive Pattern Assessment   Cognitive Pattern Assessment Used BIMS   Brief Interview for Mental Status (BIMS)   Repetition of Three Words (First Attempt) 3   Temporal Orientation: Year Correct   Temporal Orientation: Month Accurate within 5 days   Temporal Orientation: Day Correct   Recall: \"Sock\" Yes, no cue required   Recall: \"Blue\" Yes, no cue required   Recall: \"Bed\" Yes, no cue required   BIMS Summary Score 15   Confusion Assessment Method (CAM)   Is there evidence of an acute change in mental status from the patient's baseline? No   Inattention Behavior not present   Disorganized thinking Behavior not present   Altered level of consciousness Behavior not present   Discharge Summary    Discharge Location  Home   Patient Discharging with Assist of Family    Level of Supervision Required Intermittent Supervision   Recommended Equipment for Discharge   (Pt and spouse report owning 4WW, TTB, BSC, and hip kit and are considering adding GB to shower. No additional equipment indicated at this time.)   Long Term Goals Met 0   Long Term Goals Not Met 2   Reason(s) for Goals Not Met   (Pt " demonstrated emerging SBA/sup skills but continues to experience intermittent fatigue and safety concerns requiring CGA. Verbal cues for pacing and safe use of DME during transfers required at times.)   Criteria for Termination of Services Maximum Function Achieved for Inpatient Rehabilitation     Participated in bathing at built-in bench in shower area; intermittent vc's needed to support safety during stand pivot and sit to stand transfers in shower as well as maintenance of spinal precautions. Reviewed donning/doffing C-collar with patient and spouse demo'ing good understanding of replacing pads and positioning of collar as needed. Discussed recommendations for home ADLs including shower set-up, BSC over toilet, and having wife present for showers initially to support safety and carryover of strategies to home. After bathing/dressing, collaborated with RN for wound care/replacing bandages. Reviewed UE HEP with special spinal precaution considerations. Completed exercises described above with demonstration from OT and orientation to handout; demo'rene good technique with min verbal cues.     1 set x 10 R/LUE tricep press w/ light resistance theraband (seated)    Assessment    Good response to OT intervention with good social support from spouse noted. Overall performs ADLs and functional transfers at min-Mod I level. intermittent verbal and tactile cues needed for safety during functional transfers due to varying level of fatigue and cervical discomfort.   Strengths: Able to follow instructions, Alert and oriented, Motivated for self care and independence, Pleasant and cooperative, Supportive family, Willingly participates in therapeutic activities  Barriers: Aspiration risk, Decreased endurance, Fatigue, Generalized weakness, Home accessibility, Impaired activity tolerance, Impaired balance, Limited mobility, Pain    Plan    Discharge to home with family support and continued HH OT services.    Occupational Therapy  Goals (Active)       There are no active problems.

## 2024-04-08 VITALS
TEMPERATURE: 97.5 F | OXYGEN SATURATION: 97 % | BODY MASS INDEX: 20.26 KG/M2 | HEART RATE: 70 BPM | RESPIRATION RATE: 18 BRPM | WEIGHT: 145.28 LBS | SYSTOLIC BLOOD PRESSURE: 109 MMHG | DIASTOLIC BLOOD PRESSURE: 63 MMHG

## 2024-04-08 PROCEDURE — 700101 HCHG RX REV CODE 250: Performed by: PHYSICAL MEDICINE & REHABILITATION

## 2024-04-08 PROCEDURE — A9270 NON-COVERED ITEM OR SERVICE: HCPCS | Performed by: PHYSICAL MEDICINE & REHABILITATION

## 2024-04-08 PROCEDURE — 99239 HOSP IP/OBS DSCHRG MGMT >30: CPT | Performed by: PHYSICAL MEDICINE & REHABILITATION

## 2024-04-08 PROCEDURE — 700102 HCHG RX REV CODE 250 W/ 637 OVERRIDE(OP): Performed by: PHYSICAL MEDICINE & REHABILITATION

## 2024-04-08 PROCEDURE — 94640 AIRWAY INHALATION TREATMENT: CPT

## 2024-04-08 RX ORDER — MIDODRINE HYDROCHLORIDE 10 MG/1
10 TABLET ORAL
Qty: 90 TABLET | Refills: 2 | Status: SHIPPED | OUTPATIENT
Start: 2024-04-08 | End: 2024-04-08 | Stop reason: SDUPTHER

## 2024-04-08 RX ORDER — MEGESTROL ACETATE 40 MG/ML
800 SUSPENSION ORAL DAILY
Qty: 600 ML | Refills: 2 | Status: SHIPPED | OUTPATIENT
Start: 2024-04-09

## 2024-04-08 RX ORDER — OMEPRAZOLE 20 MG/1
20 CAPSULE, DELAYED RELEASE ORAL DAILY
Qty: 30 CAPSULE | Refills: 2 | Status: SHIPPED | OUTPATIENT
Start: 2024-04-09

## 2024-04-08 RX ORDER — MORPHINE SULFATE 15 MG/1
45 TABLET, FILM COATED, EXTENDED RELEASE ORAL EVERY 12 HOURS
Qty: 168 TABLET | Refills: 0 | Status: SHIPPED | OUTPATIENT
Start: 2024-04-08 | End: 2024-05-06

## 2024-04-08 RX ADMIN — OMEPRAZOLE 20 MG: 20 CAPSULE, DELAYED RELEASE ORAL at 08:10

## 2024-04-08 RX ADMIN — MORPHINE SULFATE 45 MG: 30 TABLET, FILM COATED, EXTENDED RELEASE ORAL at 08:10

## 2024-04-08 RX ADMIN — Medication 1000 UNITS: at 08:10

## 2024-04-08 RX ADMIN — MEGESTROL ACETATE 800 MG: 40 SUSPENSION ORAL at 08:10

## 2024-04-08 RX ADMIN — SENNOSIDES AND DOCUSATE SODIUM 2 TABLET: 8.6; 5 TABLET ORAL at 08:10

## 2024-04-08 RX ADMIN — MOMETASONE FUROATE AND FORMOTEROL FUMARATE DIHYDRATE 2 PUFF: 200; 5 AEROSOL RESPIRATORY (INHALATION) at 09:40

## 2024-04-08 RX ADMIN — MIDODRINE HYDROCHLORIDE 10 MG: 10 TABLET ORAL at 08:11

## 2024-04-08 ASSESSMENT — PATIENT HEALTH QUESTIONNAIRE - PHQ9
2. FEELING DOWN, DEPRESSED, IRRITABLE, OR HOPELESS: NOT AT ALL
1. LITTLE INTEREST OR PLEASURE IN DOING THINGS: NOT AT ALL
3. TROUBLE FALLING OR STAYING ASLEEP OR SLEEPING TOO MUCH: SEVERAL DAYS
4. FEELING TIRED OR HAVING LITTLE ENERGY: SEVERAL DAYS
5. POOR APPETITE OR OVEREATING: NOT AT ALL
8. MOVING OR SPEAKING SO SLOWLY THAT OTHER PEOPLE COULD HAVE NOTICED. OR THE OPPOSITE, BEING SO FIGETY OR RESTLESS THAT YOU HAVE BEEN MOVING AROUND A LOT MORE THAN USUAL: NOT AT ALL
SUM OF ALL RESPONSES TO PHQ QUESTIONS 1-9: 3
7. TROUBLE CONCENTRATING ON THINGS, SUCH AS READING THE NEWSPAPER OR WATCHING TELEVISION: SEVERAL DAYS
6. FEELING BAD ABOUT YOURSELF - OR THAT YOU ARE A FAILURE OR HAVE LET YOURSELF OR YOUR FAMILY DOWN: NOT AL ALL
SUM OF ALL RESPONSES TO PHQ9 QUESTIONS 1 AND 2: 0
9. THOUGHTS THAT YOU WOULD BE BETTER OFF DEAD, OR OF HURTING YOURSELF: NOT AT ALL

## 2024-04-08 ASSESSMENT — ACTIVITIES OF DAILY LIVING (ADL)
TOILETING_LEVEL_OF_ASSIST: ABLE TO COMPLETE TOILETING WITHOUT ASSIST
SHOWER_TRANSFER_LEVEL_OF_ASSIST: REQUIRES SUPERVISION WITH SHOWER TRANSFER
TOILET_TRANSFER_LEVEL_OF_ASSIST: ABLE TO COMPLETE TOILET TRANSFER WITHOUT ASSIST

## 2024-04-08 ASSESSMENT — PAIN DESCRIPTION - PAIN TYPE: TYPE: ACUTE PAIN

## 2024-04-08 NOTE — CARE PLAN
The patient is Stable - Low risk of patient condition declining or worsening    Shift Goals  Clinical Goals: safety  Patient Goals: rest; pain control  Family Goals: rest    Problem: Knowledge Deficit - Standard  Goal: Patient and family/care givers will demonstrate understanding of plan of care, disease process/condition, diagnostic tests and medications  Outcome: Met     Problem: Skin Integrity  Goal: Skin integrity is maintained or improved  Outcome: Met     Problem: Skin Integrity  Goal: Patient's skin integrity will be maintained or improve  Outcome: Met     Problem: Self Care  Goal: Patient will have the ability to perform ADLs independently or with assistance  Outcome: Met     Problem: Mobility  Goal: Patient's capacity to carry out activities will improve  Outcome: Met     Problem: Infection  Goal: Patient will remain free from infection  Outcome: Met     Problem: VTE Prevention  Goal: Patient will remain free from venous thromboembolism (VTE)  Outcome: Met     Problem: Fall Risk - Rehab  Goal: Patient will remain free from falls  Outcome: Met     Problem: Pain - Standard  Goal: Alleviation of pain or a reduction in pain to the patient’s comfort goal  Outcome: Met     Problem: Bladder / Voiding  Goal: Patient will establish and maintain regular urinary output  Outcome: Met

## 2024-04-08 NOTE — PROGRESS NOTES
Patient discharged home today accompanied by his Spouse around 1022 AM . Discharge instructions provided and patient verbalized understanding. He left facility in well condition via private car.

## 2024-04-08 NOTE — DISCHARGE SUMMARY
Physical Medicine & Rehabilitation Discharge Summary    Admission Date: 3/28/2024    Discharge Date: 4/8/2024    Attending Provider: Antonio Cortez MD/PhD    Admission Diagnosis:   Active Hospital Problems    Diagnosis     *Acute encephalopathy     Dehydration     C2 cervical fracture (HCC)     Constipation     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)        Discharge Diagnosis:  Active Hospital Problems    Diagnosis     *Acute encephalopathy     Dehydration     C2 cervical fracture (HCC)     Constipation     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)        HPI per Admission History & Physical:  The patient is a 58 y.o. left hand dominant male with a past medical history of metastatic melanoma to lymph nodes and bones, on chemotherapy;  who presented on 3/21/2024 12:47 PM with generalized weakness and altered mental status.  Wife checked blood pressure and found systolic pressure of 59.  Patient brought into the ED, confirmed to be hypotensive, febrile, tachycardic. He was thought to have acute toxic vs metabolic encephalopathy. Patient started on Levophed, vancomycin, cefepime.  Patient admitted for septic shock with unknown source.  Blood cultures negative, antibiotics discontinued.  Started on midodrine, titrating off Levophed.  Treated with IV fluids.  Patient found to have C2 fracture from mechanical ground-level fall on March 5.  He was also found to have multiple pathologic fractures of the lumbar spine.  He has c-collar in place.  He is being treated with MS Contin for pain. Oncology was consulted and felt it may be due to his medications so they were reduced to Braftovi 300 mg daily and Mektovi 30 mg BID.     Patient was admitted to [facility] on 3/28/2024.     Hospital Course by Problem List:  Acute metabolic encephalopathy - Patient brought to ER in shock with negative infectious work-up thought to be 2/2 chemotherapy agents. Oncology consulted and reduced  medications.  -PT and OT for mobility and ADLs. Per guidelines, 15 hours per week between PT, OT and/or SLP.  -Follow-up Oncology.      Hypotension - He was started on Midodrine 5 mg TID. SBP into 90s, increase to 7.5. SBP into 80s, increase to 10. SBP remains in 90s/100s, continue Midodrine 10 mg TID     C2 fracture - Patient in C collar. Follow-up NS     Multiple L spine fractures - Managed non-operatively as significant metastatic disease in L and S spine.      Metastatic melanoma - Patient on Braftovi 300 mg daily and Mektovi 30 mg BID per Oncology. Continue Megace for appetite stimulation      Respiratory failure - Patient on Dulera. Improving cough, continue Dulera     Anemia - Check AM CBC - 7.9, will monitor. Repeat 8.3 on 4/1, will monitor     Leukopenia - Check AM CBC, on chemotherapy - 3.6 on admission, ANC 2.4, will monitor. Repeat WBC 3.0, will monitor     Hyponatremia - Check AM CMP - 135, will recheck 4/1/24 - 134, will monitor     Vitamin D deficiency - 20 on admission, start 1000 U      Pain - Patient on Morphine 45 mg ER BID, Gabapentin 100 mg daily and PRN Oxycodone/Tylenol. Discontinue Gabapentin as may contribute to low SBP. Continue Morphine 45 mg BID     Skin - Patient at risk for skin breakdown due to debility in areas including sacrum, achilles, elbows and head in addition to other sites. Nursing to assess skin daily.   DTI to right heel on admission  Coccyx breakdown on admission, wound care to consult. Medihoney to sacrum/coccyx. Continue Medihoney      GI Ppx - Patient on Prilosec for GERD prophylaxis. Patient on Senna-docusate for constipation prophylaxis.      DVT Ppx - Patient Lovenox on transfer. Ambulating > 125 feet, discontinue Lovenox    Functional Status at Discharge  Eating:  Modified Independent  Eating Description:  Increased time  Grooming:  Independent (seated at sink  oral care)  Grooming Description:  Increased time, Supervision for safety, Seated in wheelchair at  sink  Bathing:   (reports spouse assisted with shower  last night)  Bathing Description:  Hand held shower, Grab bar, Tub bench, Assit wtih lower extremities, Increased time, Supervision for safety, Verbal cueing  Upper Body Dressing:  Minimal Assist  Upper Body Dressing Description:   (To manage sweatshirt with assist to pull off overhead (increased difficulty 2/2 cervical collar))  Lower Body Dressing:  Supervision  Lower Body Dressing Description:   (Eamon slip on shoes at EOB)  Discharge Location : Home  Patient Discharging with Assist of: Family   Level of Supervision Required: Intermittent Supervision  Recommended Equipment for Discharge:  (Pt and spouse report owning 4WW, TTB, BSC, and hip kit and are considering adding GB to shower. No additional equipment indicated at this time.)  Long Term Goals Met: 0  Long Term Goals Not Met: 2  Reason(s) for Goals Not Met:  (Pt demonstrated emerging SBA/sup skills but continues to experience intermittent fatigue and safety concerns requiring CGA. Verbal cues for pacing and safe use of DME during transfers required at times.)  Criteria for Termination of Services: Maximum Function Achieved for Inpatient Rehabilitation  Walk:  Supervised  Distance Walked:  300  Number of Times Distance Was Traveled:  1  Assistive Device:  4 Wheel Walker  Gait Deviation:  Decreased Base Of Support, Bradykinetic, Decreased Heel Strike, Decreased Toe Off  Wheelchair:  Stand by Assist  Distance Propelled:  50   Wheelchair Description:     Stairs Contact Guard Assist  Stairs Description Extra time, Verbal cueing, Supervision for safety, Safety concerns  Discharge Location: Home  Patient Discharging with Assist of: Spouse / Significant Other  Level of Supervision Required Upon Discharge: Intermittent Supervision  Recommended Equipment for Discharge: 4-Wheeled Walker;Single Point Cane  Recommeded Services Upon Discharge: Home Health Physical Therapy;Outpatient Physical Therapy  Long Term Goals  Met: 3  Long Term Goals Not Met: 1  Reason(s) for Goals Not Met: Pt requiring CGA to SBA with stair training for safety, pt's family has been trained to provide this assistance  Criteria for Termination of Services: Maximum Function Achieved for Inpatient Rehabilitation  Comprehension:  Independent  Comprehension Description:     Expression:  Independent  Expression Description:     Social Interaction:  Modified Independent  Social Interaction Description:  Schedule, Increased time  Problem Solving:  Modified Independent  Problem Solving Description:  Bed/chair alarm, Increased time, Therapy schedule  Memory:  Minimal Assist  Memory Description:  Bed/chair alarm, Increased time, Seat belt, Therapy schedule, Verbal cueing       Antonio TOLEDO M.D., personally performed a complete drug regimen review and no potential clinically significant medication issues were identified.   Discharge Medication:     Medication List        START taking these medications        Instructions   megestrol 400 MG/10ML Susp  Start taking on: April 9, 2024  Commonly known as: Megace   Take 20 mL by mouth every day.  Dose: 800 mg     omeprazole 20 MG delayed-release capsule  Start taking on: April 9, 2024  Commonly known as: PriLOSEC   Take 1 Capsule by mouth every day.  Dose: 20 mg     vitamin D 1000 UNIT Tabs  Start taking on: April 9, 2024  Commonly known as: Cholecalciferol   Take 1 Tablet by mouth every day.  Dose: 1,000 Units            CHANGE how you take these medications        Instructions   midodrine 10 MG tablet  What changed:   medication strength  how much to take  Commonly known as: Proamatine   Take 1 Tablet by mouth 3 times a day with meals.  Dose: 10 mg     mometasone-formoterol 100-5 MCG/ACT Aero  What changed: medication strength  Commonly known as: Dulera   Inhale 2 Puffs 2 times a day.  Dose: 2 Puff            CONTINUE taking these medications        Instructions   acetaminophen 325 MG Tabs  Commonly known  as: Tylenol   Take 2 Tablets by mouth every 6 hours as needed for Mild Pain or Moderate Pain.  Dose: 650 mg     Braftovi 75 MG Caps  Generic drug: Encorafenib   Take 300 mg by mouth every day at 6 PM.  Dose: 300 mg     CALCIUM CARBONATE ANTACID PO   Take 2 Tablets by mouth every day.  Dose: 2 Tablet     Mektovi 15 MG Tabs  Generic drug: Binimetinib   Take 30 mg by mouth 2 times a day.  Dose: 30 mg     morphine ER 15 MG Tbcr tablet  Commonly known as: Ms Contin   Take 3 Tablets by mouth every 12 hours for 28 days.  Dose: 45 mg            STOP taking these medications      DULoxetine 60 MG Cpep delayed-release capsule  Commonly known as: Cymbalta     enoxaparin 40 MG/0.4ML Sosy inj  Commonly known as: Lovenox     famotidine 20 MG Tabs  Commonly known as: Pepcid     gabapentin 100 MG Caps  Commonly known as: Neurontin     ondansetron 4 MG Tbdp  Commonly known as: Zofran ODT     polyethylene glycol/lytes Pack  Commonly known as: Miralax     prochlorperazine 10 MG Tabs  Commonly known as: Compazine     senna-docusate 8.6-50 MG Tabs  Commonly known as: Pericolace Or Senokot S              Discharge Diet:  Current Diet Order   Procedures    Diet Order Diet: Regular       Discharge Activity:  As tolerated     Disposition:  Patient to discharge home with family support and community resources.    Equipment:  FWW    Follow-up & Discharge Instructions:  Follow up with your primary care provider (PCP) within 7-10 days of discharge to review your medications and take over your care.     If you develop chest pain, fever, chills, change in neurologic function (weakness, sensation changes, vision changes), or other concerning sxs, seek immediate medical attention or call 911.      Future Appointments   Date Time Provider Department Center   4/9/2024  7:00 AM REHAB NON-THERAPY PROVIDER 1 None   4[facility]2024 12:30 [facility]   4/10/2024  7:00 AM REHAB NON-THERAPY PROVIDER [place] None   4/11/2024  7:00 AM REHAB  NON-THERAPY PROVIDER [facility]1 None   4/12/2024  7:00 AM REHAB NON-THERAPY PROVIDER [facility]1 None   4/13/2024  7:00 AM REHAB NON-THERAPY PROVIDER [facility]1 None   4/14/2024  7:00 AM REHAB NON-THERAPY PROVIDER [facility]1 None       Condition on Discharge:  Good    More than 31 minutes was spent on discharging this patient, including face-to-face time, prescription management, and the dictation of this note.    Antonio oCrtez M.D.    Date of Service: 4/8/2024

## 2024-04-08 NOTE — PROGRESS NOTES
NURSING DAILY NOTE    Name: Andrew Wall   Date of Admission: 3/28/2024   Admitting Diagnosis: Acute encephalopathy  Attending Physician: Antonio Cortez M.d.  Allergies: Augmentin    Safety  Patient Assist  sba  Patient Precautions  Fall Risk, Cervical Collar  , Spinal / Back Precautions   Precaution Comments  C2 fx; multiple sites of metastatic diseas  Bed Transfer Status  Modified Independent  Toilet Transfer Status   Minimal Assist  Assistive Devices  Wheelchair  Oxygen  None - Room Air  Diet/Therapeutic Dining  Current Diet Order   Procedures    Diet Order Diet: Regular     Pill Administration  whole  Agitated Behavioral Scale     ABS Level of Severity       Fall Risk  Has the patient had a fall this admission?   No  Jodi Leigh Fall Risk Scoring  10, LOW RISK  Fall Risk Safety Measures  bed alarm, chair alarm, poor balance, and low vision/ hearing    Vitals  Temperature: 36.4 °C (97.5 °F)  Temp src: Temporal  Pulse: 70  Respiration: 18  Blood Pressure: 109/63  Blood Pressure MAP (Calculated): 78 MM HG  BP Location: Right, Upper Arm  Patient BP Position: Supine     Oxygen  Pulse Oximetry: 97 %  O2 (LPM): 0  O2 Delivery Device: None - Room Air    Bowel and Bladder  Last Bowel Movement  04/06/24  Stool Type  Type 1: Separate hard lumps (hard to pass)  Bowel Device  Bathroom  Continent  Bladder: Continent void   Bowel: Continent movement  Bladder Function  Urine Void (mL): 300 ml (urinals)  Number of Times Voided: 1  Urinary Options: Yes  Urine Color: Janae  Genitourinary Assessment   Bladder Assessment (WDL):  Within Defined Limits  Santiago Catheter: Not Applicable  Urine Color: Janae  Bladder Device: Urinal, Bathroom  Bladder Scan: Post Void  $ Bladder Scan Results (mL):  (Patient refused bladder scan, notified RN Subha)    Skin  Sachin Score   17  Sensory Interventions   Bed Types: Low Airloss  Skin Preventative Measures: Pillows in Use for  Support / Positioning  Moisture Interventions  Moisturizers/Barriers: Barrier Paste      Pain  Pain Rating Scale  1 - Hardly Notice Pain  Pain Location  Neck  Pain Location Orientation  Anterior, Posterior  Pain Interventions   Rest    ADLs    Bathing   Patient Refused Bathing (notified RN Subha)  Linen Change      Personal Hygiene  Moist Ana Wipes  Chlorhexidine Bath      Oral Care     Teeth/Dentures     Shave     Nutrition Percentage Eaten  *  * Meal *  *, Between 50-75% Consumed  Environmental Precautions  Bed in Low Position, Treaded Slipper Socks on Patient  Patient Turns/Positioning  Patient Turns Self from Side to Side  Patient Turns Assistance/Tolerance     Bed Positions  Bed Controls On  Head of Bed Elevated  Self regulated      Psychosocial/Neurologic Assessment  Psychosocial Assessment  Psychosocial (WDL):  Within Defined Limits  Neurologic Assessment  Neuro (WDL): Exceptions to WDL  Level of Consciousness: Alert  Orientation Level: Oriented X4  Cognition: Appropriate judgement, Appropriate safety awareness, Appropriate attention/concentration, Appropriate for developmental age, Follows commands  Speech: Clear  Pupil Assesment: No  Motor Function/Sensation Assessment: Sensation  RUE Sensation: Full sensation  LUE Sensation: Full sensation  RLE Sensation: Full sensation  LLE Sensation: Full sensation  EENT (WDL):  Within Defined Limits    Cardio/Pulmonary Assessment  Edema   RLE Edema: 1+  LLE Edema: 1+  Respiratory Breath Sounds  RUL Breath Sounds: Clear  RML Breath Sounds: Clear  RLL Breath Sounds: Clear  ALKA Breath Sounds: Clear  LLL Breath Sounds: Clear  Cardiac Assessment   Cardiac (WDL):  Within Defined Limits

## 2024-04-08 NOTE — PROGRESS NOTES
NURSING DAILY NOTE    Name: Andrew Wall   Date of Admission: 3/28/2024   Admitting Diagnosis: Acute encephalopathy  Attending Physician: Antonio Cortez M.d.  Allergies: Augmentin    Safety  Patient Assist  SBA  Patient Precautions  Fall Risk, Cervical Collar  , Spinal / Back Precautions   Precaution Comments  C2 fx; multiple sites of metastatic diseas  Bed Transfer Status  Modified Independent  Toilet Transfer Status   Minimal Assist  Assistive Devices  Wheelchair  Oxygen  None - Room Air  Diet/Therapeutic Dining  Current Diet Order   Procedures    Diet Order Diet: Regular     Pill Administration  whole  Agitated Behavioral Scale     ABS Level of Severity       Fall Risk  Has the patient had a fall this admission?   No  Jodi Leigh Fall Risk Scoring  10, LOW RISK  Fall Risk Safety Measures  ok to leave pt in bathroom    Vitals  Temperature: 36.5 °C (97.7 °F)  Temp src: Oral  Pulse: 68  Respiration: 16  Blood Pressure: 103/57  Blood Pressure MAP (Calculated): 72 MM HG  BP Location: Left, Upper Arm  Patient BP Position: Supine     Oxygen  Pulse Oximetry: 99 %  O2 (LPM): 0  O2 Delivery Device: None - Room Air    Bowel and Bladder  Last Bowel Movement  04/06/24  Stool Type  Type 1: Separate hard lumps (hard to pass)  Bowel Device  Bathroom  Continent  Bladder: Continent void   Bowel: Continent movement  Bladder Function  Urine Void (mL): 300 ml (urinals)  Number of Times Voided: 1  Urinary Options: Yes  Urine Color: Yellow  Genitourinary Assessment   Bladder Assessment (WDL):  Within Defined Limits  Santiago Catheter: Not Applicable  Urine Color: Yellow  Bladder Device: Urinal, Bathroom  Bladder Scan: Post Void  $ Bladder Scan Results (mL):  (Patient refused bladder scan, notified RN Subha)    Skin  Sachin Score   16  Sensory Interventions   Bed Types: Low Airloss  Skin Preventative Measures: Pillows in Use for Support / Positioning  Moisture  Interventions  Moisturizers/Barriers: Barrier Paste      Pain  Pain Rating Scale  3 - Sometimes distracts me  Pain Location  Neck  Pain Location Orientation  Anterior, Posterior  Pain Interventions   Rest, Distraction    ADLs    Bathing   Patient Refused Bathing (notified RN Subha)  Linen Change      Personal Hygiene  Moist Ana Wipes  Chlorhexidine Bath      Oral Care     Teeth/Dentures     Shave     Nutrition Percentage Eaten  *  * Meal *  *, Between 50-75% Consumed  Environmental Precautions  Treaded Slipper Socks on Patient, Bed in Low Position  Patient Turns/Positioning  Patient Turns Self from Side to Side  Patient Turns Assistance/Tolerance     Bed Positions  Bed Controls On  Head of Bed Elevated  Self regulated      Psychosocial/Neurologic Assessment  Psychosocial Assessment  Psychosocial (WDL):  Within Defined Limits  Neurologic Assessment  Neuro (WDL): Exceptions to WDL  Level of Consciousness: Alert  Orientation Level: Oriented X4  Cognition: Appropriate judgement, Appropriate safety awareness, Appropriate attention/concentration, Appropriate for developmental age, Follows commands  Speech: Clear  Pupil Assesment: No  Motor Function/Sensation Assessment: Sensation  RUE Sensation: Full sensation  LUE Sensation: Full sensation  RLE Sensation: Full sensation  LLE Sensation: Full sensation  EENT (WDL):  Within Defined Limits    Cardio/Pulmonary Assessment  Edema   RLE Edema: 1+  LLE Edema: 1+  Respiratory Breath Sounds  RUL Breath Sounds: Clear  RML Breath Sounds: Clear  RLL Breath Sounds: Clear  ALKA Breath Sounds: Clear  LLL Breath Sounds: Clear  Cardiac Assessment   Cardiac (WDL):  Within Defined Limits

## 2024-04-08 NOTE — CARE PLAN
The patient is Stable - Low risk of patient condition declining or worsening    Shift Goals  Clinical Goals: Safety  Patient Goals: sleep well, pain control  Family Goals: no one present    Progress made toward(s) clinical / shift goals:      Problem: Bladder / Voiding  Goal: Patient will establish and maintain regular urinary output  Outcome: Progressing  Note: Pt continent of bladder. Voiding adequately using urinal. He denies dysuria.     Problem: Pain - Standard  Goal: Alleviation of pain or a reduction in pain to the patient’s comfort goal  Outcome: Progressing  Note: Pt on scheduled MS contin tabs for c/o mild neck pain with adequate relief. Pt sleeps good. Will continue to monitor.       Patient is not progressing towards the following goals:

## 2024-04-09 ENCOUNTER — PHARMACY VISIT (OUTPATIENT)
Dept: PHARMACY | Facility: MEDICAL CENTER | Age: 59
End: 2024-04-09
Payer: COMMERCIAL

## 2024-04-09 DIAGNOSIS — C43.9 METASTATIC MELANOMA (HCC): ICD-10-CM

## 2024-04-09 PROCEDURE — RXMED WILLOW AMBULATORY MEDICATION CHARGE: Performed by: FAMILY MEDICINE

## 2024-04-09 RX ORDER — MIDODRINE HYDROCHLORIDE 10 MG/1
10 TABLET ORAL
Qty: 90 TABLET | Refills: 0 | Status: SHIPPED | OUTPATIENT
Start: 2024-04-09

## 2024-04-19 ENCOUNTER — APPOINTMENT (OUTPATIENT)
Dept: MEDICAL GROUP | Facility: LAB | Age: 59
End: 2024-04-19
Payer: COMMERCIAL

## 2024-04-30 ENCOUNTER — HOSPITAL ENCOUNTER (OUTPATIENT)
Dept: RADIOLOGY | Facility: MEDICAL CENTER | Age: 59
End: 2024-04-30
Payer: COMMERCIAL

## 2024-05-02 ENCOUNTER — APPOINTMENT (OUTPATIENT)
Dept: RADIOLOGY | Facility: IMAGING CENTER | Age: 59
End: 2024-05-02
Attending: ORTHOPAEDIC SURGERY
Payer: COMMERCIAL

## 2024-05-02 ENCOUNTER — OFFICE VISIT (OUTPATIENT)
Dept: SURGICAL ONCOLOGY | Facility: MEDICAL CENTER | Age: 59
End: 2024-05-02
Payer: COMMERCIAL

## 2024-05-02 VITALS
WEIGHT: 140 LBS | HEIGHT: 71 IN | BODY MASS INDEX: 19.6 KG/M2 | OXYGEN SATURATION: 98 % | SYSTOLIC BLOOD PRESSURE: 92 MMHG | TEMPERATURE: 98 F | DIASTOLIC BLOOD PRESSURE: 64 MMHG | HEART RATE: 86 BPM

## 2024-05-02 DIAGNOSIS — C79.51 MELANOMA METASTATIC TO BONE (HCC): ICD-10-CM

## 2024-05-02 DIAGNOSIS — M84.50XA PATHOLOGICAL FRACTURE DUE TO METASTATIC BONE DISEASE: ICD-10-CM

## 2024-05-02 DIAGNOSIS — C79.51 METASTASIS TO BONE (HCC): ICD-10-CM

## 2024-05-02 DIAGNOSIS — C77.9 MALIGNANT MELANOMA METASTATIC TO LYMPH NODE (HCC): ICD-10-CM

## 2024-05-02 PROCEDURE — 73030 X-RAY EXAM OF SHOULDER: CPT | Mod: TC,LT,59 | Performed by: ORTHOPAEDIC SURGERY

## 2024-05-02 PROCEDURE — 3074F SYST BP LT 130 MM HG: CPT | Performed by: ORTHOPAEDIC SURGERY

## 2024-05-02 PROCEDURE — 73521 X-RAY EXAM HIPS BI 2 VIEWS: CPT | Mod: TC,59 | Performed by: ORTHOPAEDIC SURGERY

## 2024-05-02 PROCEDURE — 3078F DIAST BP <80 MM HG: CPT | Performed by: ORTHOPAEDIC SURGERY

## 2024-05-02 PROCEDURE — 99204 OFFICE O/P NEW MOD 45 MIN: CPT | Performed by: ORTHOPAEDIC SURGERY

## 2024-05-02 PROCEDURE — 73030 X-RAY EXAM OF SHOULDER: CPT | Mod: TC,RT | Performed by: ORTHOPAEDIC SURGERY

## 2024-05-02 ASSESSMENT — ENCOUNTER SYMPTOMS
WEAKNESS: 0
BACK PAIN: 1
NECK PAIN: 1
WEIGHT LOSS: 1
SENSORY CHANGE: 0
TINGLING: 0

## 2024-05-02 ASSESSMENT — FIBROSIS 4 INDEX: FIB4 SCORE: 0.89

## 2024-05-02 NOTE — PROGRESS NOTES
Subjective:   5/2/2024  2:56 PM  Primary care physician:Jose Luis Juarez M.D.    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    History of presenting illness:  Andrew Wall  is a pleasant 59 y.o. male who presents for hospital follow-up of the above. He is known to have pathologic fractures of the right superior ramus, left ischium and left glenoid neck as well as cervical spine fractures.  Dr. Lawrence is treating him for the spine fractures.  Since he was seen in the hospital he has been on Mektovi and Braftovi.  He was noted to have a response on initial scans post starting therapy.  He did have some medication reactions and the dose has been lowered.  He seems to be tolerating it well now.  He states the pain in his pelvis and shoulders is relatively stable.  He has some pain in the right pelvis with weightbearing, but it is mild in degree and seems to be improving.  He states that shoulder pain is improving, left is worse than right.    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)     Nasal polyp      Past Surgical History:   Procedure Laterality Date    LYMPH NODE EXCISION Right 11/16/2023    Procedure: RIGHT INGUINAL NODE SUPERFICIAL DISSECTION;  Surgeon: Davon Valera M.D.;  Location: [facility];  Service: General    NODE BIOPSY SENTINEL Bilateral 10/20/2020    Procedure: BIOPSY, LYMPH NODE, SENTINEL- GROIN;  Surgeon: Davon Valera M.D.;  Location: SURGERY SAME DAY [facility];  Service: General    WIDE EXCISION Right 10/20/2020    Procedure: WIDE EXCISION, LESION- BUTTOCKS, RADICAL MALIGNANT MELANOMA;  Surgeon: Davon Valera M.D.;  Location: SURGERY SAME DAY [facility];  Service:  General    ANTROSTOMY Bilateral 11/15/2019    Procedure: MAXILLARY ANTROSTOMY- REVISION ENDOSCOPICMOMETASONE INJECTION, PROPEL STENT PLACEMENT;  Surgeon: Felicitas Tenorio M.D.;  Location: [facility];  Service: Ent    SINUSCOPY Bilateral 11/15/2019    Procedure: ENDOSCOPY, PARANASAL SINUSES- FOR FRONTAL EXPLORATION;  Surgeon: Felicitas Tenorio M.D.;  Location: [facility];  Service: Ent    TURBINOPLASTY Bilateral 11/15/2019    Procedure: TURBINOPLASTY;  Surgeon: Felicitas Tenorio M.D.;  Location: [facility];  Service: Ent    SPHENOIDECTOMY Bilateral 11/15/2019    Procedure: SPHENOIDECTOMY- FOR SPHENOIDOTOMY;  Surgeon: Felicitas Tenorio M.D.;  Location: [facility];  Service: Ent    ETHMOIDECTOMY Bilateral 11/15/2019    Procedure: ETHMOIDECTOMY- ENDOSCOPIC;  Surgeon: Felicitas Tenorio M.D.;  Location: [facility];  Service: Ent    NASAL POLYPECTOMY Bilateral 11/15/2019    Procedure: POLYPECTOMY, NASAL CAVITY;  Surgeon: Felicitas Tenorio M.D.;  Location: [facility];  Service: Ent    NASAL POLYPECTOMY  2015, 2017, 2019    x 3    OTHER  11/20    removed melanoma     Allergies   Allergen Reactions    Augmentin Vomiting and Nausea     Outpatient Encounter Medications as of 5/2/2024   Medication Sig Dispense Refill    midodrine (PROAMATINE) 10 MG tablet Take 1 Tablet by mouth 3 times a day with meals. 90 Tablet 0    megestrol (MEGACE) 400 MG/10ML Suspension Take 20 mL by mouth every day. 600 mL 2    mometasone-formoterol (DULERA) 100-5 MCG/ACT Aerosol Inhale 2 Puffs 2 times a day. 13 g 2    morphine ER (MS CONTIN) 15 MG Tab CR tablet Take 3 Tablets by mouth every 12 hours for 28 days. 168 Tablet 0    omeprazole (PRILOSEC) 20 MG delayed-release capsule Take 1 Capsule by mouth every day. 30 Capsule 2    vitamin D3 (CHOLECALCIFEROL) 1000 UNIT Tab Take 1 Tablet by mouth every day.      acetaminophen (TYLENOL) 325 MG Tab Take 2 Tablets  by mouth every 6 hours as needed for Mild Pain or Moderate Pain.      Encorafenib (BRAFTOVI) 75 MG Cap Take 300 mg by mouth every day at 6 PM.      Binimetinib (MEKTOVI) 15 MG Tab Take 30 mg by mouth 2 times a day.      CALCIUM CARBONATE ANTACID PO Take 2 Tablets by mouth every day.       No facility-administered encounter medications on file as of 5/2/2024.     Social History     Socioeconomic History    Marital status:      Spouse name: Not on file    Number of children: Not on file    Years of education: Not on file    Highest education level: Not on file   Occupational History    Not on file   Tobacco Use    Smoking status: Never    Smokeless tobacco: Never   Vaping Use    Vaping Use: Never used   Substance and Sexual Activity    Alcohol use: Yes     Alcohol/week: 0.6 oz     Types: 1 Cans of beer per week     Comment: reports 4/month    Drug use: No    Sexual activity: Yes     Partners: Female     Comment: used to manage semiconductor factory   Other Topics Concern    Not on file   Social History Narrative    Not on file     Social Determinants of Health     Financial Resource Strain: Not on file   Food Insecurity: Not on file   Transportation Needs: No Transportation Needs (4/8/2024)    PRAPARE - Transportation     Lack of Transportation (Medical): No     Lack of Transportation (Non-Medical): No   Physical Activity: Not on file   Stress: Not on file   Social Connections: Not on file   Intimate Partner Violence: Not on file   Housing Stability: Not on file      Social History     Tobacco Use   Smoking Status Never   Smokeless Tobacco Never     Social History     Substance and Sexual Activity   Alcohol Use Yes    Alcohol/week: 0.6 oz    Types: 1 Cans of beer per week    Comment: reports 4/month     Social History     Substance and Sexual Activity   Drug Use No        No family history on file.    Review of Systems   Constitutional:  Positive for weight loss.   Musculoskeletal:  Positive for back pain,  "joint pain and neck pain.   Neurological:  Negative for tingling, sensory change and weakness.        Objective:   BP 92/64 (BP Location: Left arm, Patient Position: Sitting)   Pulse 86   Temp 36.7 °C (98 °F) (Temporal)   Ht 1.803 m (5' 11\")   Wt 63.5 kg (140 lb)   SpO2 98%   BMI 19.53 kg/m²     Physical Exam  Constitutional:       General: He is not in acute distress.     Appearance: Normal appearance.   HENT:      Head: Normocephalic and atraumatic.      Right Ear: External ear normal.      Left Ear: External ear normal.      Nose: Nose normal.      Mouth/Throat:      Mouth: Mucous membranes are moist.   Eyes:      Extraocular Movements: Extraocular movements intact.      Conjunctiva/sclera: Conjunctivae normal.   Cardiovascular:      Rate and Rhythm: Normal rate.      Pulses: Normal pulses.   Pulmonary:      Effort: Pulmonary effort is normal. No respiratory distress.      Breath sounds: No wheezing.   Abdominal:      General: Abdomen is flat. There is no distension.   Musculoskeletal:      Cervical back: Normal range of motion and neck supple.      Comments: bilateral upper extremity:  Overlying skin demonstrates no wounds, ecchymoses or erythema. shoulder ROM is limited bilaterally with flexion and abduction.  SILT axillary, radial, median and ulnar nerves. Motor intact to axillary, AIN, PIN, median and ulnar nerves. Radial pulse 2+.  He has some tenderness with palpation of the shoulder joints bilaterally left greater than right.    Pelvis: Mild tenderness with palpation of the right hemipelvis.  Otherwise no significant tenderness.  Sensation intact light touch to the bilateral lower extremities SPN, DPN, plantar and sural nerves.  Motor intact with hip and knee flexion/extension, ankle plantarflexion and dorsiflexion.  Ambulates unassisted and denies pain.   Skin:     General: Skin is warm and dry.      Capillary Refill: Capillary refill takes less than 2 seconds.   Neurological:      Mental Status: He " "is alert and oriented to person, place, and time.   Psychiatric:         Mood and Affect: Mood normal.         Behavior: Behavior normal.           Imaging:  Per my read, multiple views of the pelvis and bilateral hips today demonstrate stable appearance of metastatic lesions in the right periacetabular and superior ramus with a nondisplaced pathologic fracture as well as additional lytic metastatic lesions in the left ischium with minimally displaced pathologic fracture.  No new fractures or destructive osseous lesions compared to priors.    Multiple views of the right shoulder demonstrate stable appearance of the right shoulder with no new fracture or dislocation.  There is some lucency and sclerosis within the glenoid neck and coracoid process without any displaced fractures.    Multiple views of the left shoulder demonstrate collapse of the left coronary neck and retroversion with mixed sclerotic and lytic disease in this area consistent with pathologic fracture.  The joint is located however.          Diagnosis:     1. Metastasis to bone (HCC)        2. 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)        3. Melanoma metastatic to bone (HCC)        4. Pathological fracture due to metastatic bone disease                Assessment/Plan:     I reviewed the patient's radiographs with both he and his wife today.  He seems to have responded with systemic therapy and I recommended he stay the course.  He also states that his medical oncologist is planning to start \"bone strengthening medicine.\"  I imagine this is most likely denosumab or a bisphosphonate which I am in agreement with.  I again counseled the patient on signs/symptoms of impending pathologic fracture.  He was also previously noted to have a lytic lesion in the right femoral neck seen on CT scan.  We discussed pathologic hip and femur fractures can be devastating.  He will let me know if he develops any signs/symptoms of impending fracture, but " specifically weightbearing pain.  I will plan to follow-up with him in 2 months for repeat evaluation.  In the meantime I recommend continued therapy for gait training and strengthening.    Referrals: none  Restrictions: no heavy lifting or impact exercise  New medications/refills: none  Imaging studies: 3 views bilateral shoulders, AP pelvis and frog lateral bilateral hips  Labs: none  Follow-up: 2 months      Sofia Burleson DO  Orthopedic Oncologist

## 2024-05-03 ENCOUNTER — APPOINTMENT (OUTPATIENT)
Dept: MEDICAL GROUP | Facility: LAB | Age: 59
End: 2024-05-03
Payer: COMMERCIAL

## 2024-05-03 ENCOUNTER — HOSPITAL ENCOUNTER (OUTPATIENT)
Dept: LAB | Facility: MEDICAL CENTER | Age: 59
End: 2024-05-03
Attending: INTERNAL MEDICINE
Payer: COMMERCIAL

## 2024-05-03 ENCOUNTER — PATIENT MESSAGE (OUTPATIENT)
Dept: MEDICAL GROUP | Facility: LAB | Age: 59
End: 2024-05-03

## 2024-05-03 DIAGNOSIS — C43.9 METASTATIC MELANOMA (HCC): ICD-10-CM

## 2024-05-03 LAB
BASOPHILS # BLD AUTO: 1.3 % (ref 0–1.8)
BASOPHILS # BLD: 0.04 K/UL (ref 0–0.12)
EOSINOPHIL # BLD AUTO: 0.16 K/UL (ref 0–0.51)
EOSINOPHIL NFR BLD: 5 % (ref 0–6.9)
ERYTHROCYTE [DISTWIDTH] IN BLOOD BY AUTOMATED COUNT: 60.4 FL (ref 35.9–50)
HCT VFR BLD AUTO: 33.2 % (ref 42–52)
HGB BLD-MCNC: 10.2 G/DL (ref 14–18)
IMM GRANULOCYTES # BLD AUTO: 0.01 K/UL (ref 0–0.11)
IMM GRANULOCYTES NFR BLD AUTO: 0.3 % (ref 0–0.9)
LYMPHOCYTES # BLD AUTO: 0.65 K/UL (ref 1–4.8)
LYMPHOCYTES NFR BLD: 20.5 % (ref 22–41)
MCH RBC QN AUTO: 28.6 PG (ref 27–33)
MCHC RBC AUTO-ENTMCNC: 30.7 G/DL (ref 32.3–36.5)
MCV RBC AUTO: 93 FL (ref 81.4–97.8)
MONOCYTES # BLD AUTO: 0.23 K/UL (ref 0–0.85)
MONOCYTES NFR BLD AUTO: 7.3 % (ref 0–13.4)
NEUTROPHILS # BLD AUTO: 2.08 K/UL (ref 1.82–7.42)
NEUTROPHILS NFR BLD: 65.6 % (ref 44–72)
NRBC # BLD AUTO: 0 K/UL
NRBC BLD-RTO: 0 /100 WBC (ref 0–0.2)
PLATELET # BLD AUTO: 354 K/UL (ref 164–446)
PMV BLD AUTO: 9.1 FL (ref 9–12.9)
RBC # BLD AUTO: 3.57 M/UL (ref 4.7–6.1)
WBC # BLD AUTO: 3.2 K/UL (ref 4.8–10.8)

## 2024-05-03 RX ORDER — MORPHINE SULFATE 15 MG/1
45 TABLET, FILM COATED, EXTENDED RELEASE ORAL EVERY 12 HOURS
Qty: 168 TABLET | Refills: 0 | Status: SHIPPED | OUTPATIENT
Start: 2024-05-03 | End: 2024-05-31

## 2024-05-03 NOTE — TELEPHONE ENCOUNTER
Myrtle. I have filled Jorge Wall's script for morphine this month. Let me know if you have any questions.   Salomon Bee

## 2024-05-04 LAB
ALBUMIN SERPL BCP-MCNC: 3.6 G/DL (ref 3.2–4.9)
ALBUMIN/GLOB SERPL: 1.4 G/DL
ALP SERPL-CCNC: 89 U/L (ref 30–99)
ALT SERPL-CCNC: 10 U/L (ref 2–50)
ANION GAP SERPL CALC-SCNC: 11 MMOL/L (ref 7–16)
AST SERPL-CCNC: 16 U/L (ref 12–45)
BILIRUB SERPL-MCNC: 0.2 MG/DL (ref 0.1–1.5)
BUN SERPL-MCNC: 21 MG/DL (ref 8–22)
CALCIUM ALBUM COR SERPL-MCNC: 9.4 MG/DL (ref 8.5–10.5)
CALCIUM SERPL-MCNC: 9.1 MG/DL (ref 8.5–10.5)
CHLORIDE SERPL-SCNC: 105 MMOL/L (ref 96–112)
CO2 SERPL-SCNC: 22 MMOL/L (ref 20–33)
CREAT SERPL-MCNC: 0.46 MG/DL (ref 0.5–1.4)
GFR SERPLBLD CREATININE-BSD FMLA CKD-EPI: 120 ML/MIN/1.73 M 2
GLOBULIN SER CALC-MCNC: 2.5 G/DL (ref 1.9–3.5)
GLUCOSE SERPL-MCNC: 86 MG/DL (ref 65–99)
MAGNESIUM SERPL-MCNC: 2 MG/DL (ref 1.5–2.5)
PHOSPHATE SERPL-MCNC: 5 MG/DL (ref 2.5–4.5)
POTASSIUM SERPL-SCNC: 4 MMOL/L (ref 3.6–5.5)
PROT SERPL-MCNC: 6.1 G/DL (ref 6–8.2)
SODIUM SERPL-SCNC: 138 MMOL/L (ref 135–145)

## 2024-05-06 ENCOUNTER — APPOINTMENT (OUTPATIENT)
Dept: MEDICAL GROUP | Facility: LAB | Age: 59
End: 2024-05-06
Payer: COMMERCIAL

## 2024-05-06 VITALS
WEIGHT: 140 LBS | BODY MASS INDEX: 20.04 KG/M2 | RESPIRATION RATE: 18 BRPM | HEIGHT: 70 IN | OXYGEN SATURATION: 99 % | SYSTOLIC BLOOD PRESSURE: 96 MMHG | TEMPERATURE: 97.4 F | HEART RATE: 107 BPM | DIASTOLIC BLOOD PRESSURE: 70 MMHG

## 2024-05-06 DIAGNOSIS — C43.9 METASTATIC MELANOMA (HCC): ICD-10-CM

## 2024-05-06 PROCEDURE — 3078F DIAST BP <80 MM HG: CPT | Performed by: FAMILY MEDICINE

## 2024-05-06 PROCEDURE — 3074F SYST BP LT 130 MM HG: CPT | Performed by: FAMILY MEDICINE

## 2024-05-06 PROCEDURE — 99213 OFFICE O/P EST LOW 20 MIN: CPT | Performed by: FAMILY MEDICINE

## 2024-05-06 RX ORDER — MIDODRINE HYDROCHLORIDE 10 MG/1
10 TABLET ORAL
Qty: 90 TABLET | Refills: 3 | Status: SHIPPED | OUTPATIENT
Start: 2024-05-06

## 2024-05-06 ASSESSMENT — FIBROSIS 4 INDEX: FIB4 SCORE: 0.86

## 2024-05-18 NOTE — OR NURSING
"Preadmit appointment: \" Preparing for your Procedure information\" sheet given to patient with verbal and written instructions. Patient instructed to continue prescribed medications through the day before surgery, instructed to take the following medications the day of surgery per anesthesia protocol: Advair if needed.  Pt denies having a rescue inhaler.  Pt denies having anesthesia issues  " (2) more than 100 beats/min

## 2024-05-28 ENCOUNTER — PATIENT MESSAGE (OUTPATIENT)
Dept: MEDICAL GROUP | Facility: LAB | Age: 59
End: 2024-05-28
Payer: COMMERCIAL

## 2024-05-28 DIAGNOSIS — S12.100A CLOSED ODONTOID FRACTURE, INITIAL ENCOUNTER (HCC): ICD-10-CM

## 2024-05-28 DIAGNOSIS — M54.41 CHRONIC BILATERAL LOW BACK PAIN WITH BILATERAL SCIATICA: ICD-10-CM

## 2024-05-28 DIAGNOSIS — R53.81 PHYSICAL DECONDITIONING: ICD-10-CM

## 2024-05-28 DIAGNOSIS — G89.29 CHRONIC BILATERAL LOW BACK PAIN WITH BILATERAL SCIATICA: ICD-10-CM

## 2024-05-28 DIAGNOSIS — M54.42 CHRONIC BILATERAL LOW BACK PAIN WITH BILATERAL SCIATICA: ICD-10-CM

## 2024-06-02 ENCOUNTER — PATIENT MESSAGE (OUTPATIENT)
Dept: MEDICAL GROUP | Facility: LAB | Age: 59
End: 2024-06-02
Payer: COMMERCIAL

## 2024-06-02 DIAGNOSIS — C43.9 METASTATIC MELANOMA (HCC): ICD-10-CM

## 2024-06-03 NOTE — PATIENT COMMUNICATION
Received request via: Patient    Was the patient seen in the last year in this department? Yes    Does the patient have an active prescription (recently filled or refills available) for medication(s) requested? No    [facility]     Does the patient have nursing home Plus and need 100 day supply (blood pressure, diabetes and cholesterol meds only)? Patient does not have SCP

## 2024-06-04 RX ORDER — MORPHINE SULFATE 15 MG/1
45 TABLET, FILM COATED, EXTENDED RELEASE ORAL EVERY 12 HOURS
Qty: 168 TABLET | Refills: 0 | Status: SHIPPED | OUTPATIENT
Start: 2024-06-04 | End: 2024-07-02

## 2024-06-19 NOTE — PROGRESS NOTES
Verbal consent was acquired by the patient to use [employer] ambient listening note generation during this visit Yes    Subjective:   Andrew Wall is a 59 y.o. male here today for   No chief complaint on file.    History of Present Illness  The patient is a 59-year-old male with history of metastatic melanoma with metastases noted to the bones. He has been followed closely by oncology and surgical oncology team. He is currently on Graftovi, also on Megace. The patient does have chronic longstanding pain due to the metastases and spinal fractures that have been caused from the metastases. He is currently treating with morphine daily. The patient recently been seen and hospitalized with rehab, was discharged on 04/08/2025. The patient is here today for follow up. He is accompanied by an adult female.    The patient reports experiencing fatigue over the past few days, but overall, he is in good health. His hospital stay and rehabilitation were successful. There have been no significant alterations to his medication regimen or treatment. He is under the care of oncology and palliative care who will continue to monitor him. His pain remains manageable, and he adheres to a regimen of morphine twice daily, although on certain days, he does not exceed this dosage. His pain is well-managed with Tylenol, which he takes for breakthrough pain. He maintains good hydration and nutrition. He discontinued Megace for a week, but plans to resume it due to decreased appetite. He is ambulatory with a cane, but experiences stiffness upon standing, which subsides as the day progresses. He finds walking more comfortable as it alleviates pressure on his hip and wrist. He utilizes a walker for mobility. His mental well-being has been satisfactory, with the exception of recent days when he feels fatigued and exhausted. A PET scan was conducted two Thursdays ago, with results pending from Dr. Ross on Thursday. If his PET scan results  are satisfactory, he will commence bone-building medication. He reports neck soreness, which he attributes to a lack of movement. He is scheduled to see Dr. Nash at the end of 05/2024.    The patient continues to experience hypotension, for which he takes midodrine twice daily. His afternoon dose of midodrine appears to be effective, but the morning and evening doses appear to be problematic. The accompanying adult female inquires about the appropriate dosage of midodrine if his blood pressure remains low.      Allergies   Allergen Reactions    Augmentin Vomiting and Nausea         Current medicines (including changes today)  Current Outpatient Medications   Medication Sig Dispense Refill    midodrine (PROAMATINE) 10 MG tablet Take 1 Tablet by mouth 3 times a day with meals. 90 Tablet 3    morphine ER (MS CONTIN) 15 MG Tab CR tablet Take 3 Tablets by mouth every 12 hours for 28 days. 168 Tablet 0    megestrol (MEGACE) 400 MG/10ML Suspension Take 20 mL by mouth every day. 600 mL 2    mometasone-formoterol (DULERA) 100-5 MCG/ACT Aerosol Inhale 2 Puffs 2 times a day. 13 g 2    omeprazole (PRILOSEC) 20 MG delayed-release capsule Take 1 Capsule by mouth every day. 30 Capsule 2    vitamin D3 (CHOLECALCIFEROL) 1000 UNIT Tab Take 1 Tablet by mouth every day.      acetaminophen (TYLENOL) 325 MG Tab Take 2 Tablets by mouth every 6 hours as needed for Mild Pain or Moderate Pain.      Encorafenib (BRAFTOVI) 75 MG Cap Take 300 mg by mouth every day at 6 PM.      Binimetinib (MEKTOVI) 15 MG Tab Take 30 mg by mouth 2 times a day.      CALCIUM CARBONATE ANTACID PO Take 2 Tablets by mouth every day.       No current facility-administered medications for this visit.     He  has a past medical history of Asthma, Cancer (HCC) (10/20), Cancer related pain (2/5/2024), Constipation (1/12/2024), 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) (11/16/2023), 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) (11/16/2023), Malignant  "melanoma of skin of buttock (HCC), and Nasal polyp.    He has no past medical history of Acute nasopharyngitis, Anesthesia, Anginal syndrome (HCC), Arrhythmia, Arthritis, Blood clotting disorder (HCC), Breath shortness, Bronchitis, Carcinoma in situ of respiratory system, Cataract, Congestive heart failure (HCC), Continuous ambulatory peritoneal dialysis status (HCC), Coughing blood, Dental disorder, Diabetes (HCC), Dialysis patient (HCC), Disorder of thyroid, Emphysema of lung (HCC), Glaucoma, Gynecological disorder, Heart burn, Heart murmur, Heart valve disease, Hemorrhagic disorder (HCC), Hepatitis A, Hepatitis B, Hepatitis C, Hiatus hernia syndrome, High cholesterol, Hyperlipidemia, Indigestion, Infectious disease, Jaundice, Myocardial infarct (HCC), Pacemaker, Pneumonia, Pregnant, Psychiatric problem, Renal disorder, Rheumatic fever, Seizure (HCC), Sleep apnea, Snoring, Stroke (HCC), Tuberculosis, Urinary bladder disorder, or Urinary incontinence.    ROS   ROS  -See HPI     Objective:     Physical Exam:  BP 96/70   Pulse (!) 107   Temp 36.3 °C (97.4 °F) (Temporal)   Resp 18   Ht 1.778 m (5' 10\")   Wt 63.5 kg (140 lb)   SpO2 99%  Body mass index is 20.09 kg/m².   Constitutional: Alert, no distress, well-groomed.  Skin: No rashes in visible areas.  Eye: Round. Conjunctiva clear, lids normal. No icterus.   ENMT: Lips pink without lesions, good dentition, moist mucous membranes. Phonation normal.  Neck: No masses, no thyromegaly. Moves freely without pain.  Respiratory: Unlabored respiratory effort, no cough or audible wheeze  Psych: Alert and oriented x3, normal affect and mood.     Results  Imaging  PET scan shows marked decrease in severe widespread metastatic disease, few residual very small pulmonary nodules without metabolic activity, innumerable widespread lytic bone metastases have undergone interval healing compared to CTs with increasing sclerosis, no significant hypermetabolic activity consistent " with treatment response, and decrease in several previously seen pathologic fractures.    Assessment and Plan:     Assessment & Plan  1. Hypotension.  New problem.  The patient is advised to maintain adequate hydration. A prescription for midodrine, to be taken thrice daily, will be provided. In the event of a blood pressure reading of 140/90, midodrine will be started.  Discussed previous medication and potential side effects.    2. History of metastatic melanoma with bone metastases.  The patient's condition is showing signs of improvement. The patient is advised to continue follow-up appointments with the oncology department. Morphine prescription has been refilled.    Orders:  1. Metastatic melanoma, BRAF V600E POSITIVE  (HCC)  - midodrine (PROAMATINE) 10 MG tablet; Take 1 Tablet by mouth 3 times a day with meals.  Dispense: 90 Tablet; Refill: 3        Followup: No follow-ups on file.         PLEASE NOTE: This dictation was created using voice recognition and m[employer] ambient listening software. I have made every reasonable attempt to correct obvious errors, but I expect that there are errors of grammar and possibly content that I did not discover before finalizing the note.

## 2024-07-08 ENCOUNTER — APPOINTMENT (OUTPATIENT)
Dept: RADIOLOGY | Facility: IMAGING CENTER | Age: 59
End: 2024-07-08
Attending: ORTHOPAEDIC SURGERY
Payer: COMMERCIAL

## 2024-07-08 ENCOUNTER — OFFICE VISIT (OUTPATIENT)
Dept: SURGICAL ONCOLOGY | Facility: MEDICAL CENTER | Age: 59
End: 2024-07-08
Payer: COMMERCIAL

## 2024-07-08 VITALS
HEART RATE: 91 BPM | OXYGEN SATURATION: 100 % | TEMPERATURE: 97.4 F | BODY MASS INDEX: 20.62 KG/M2 | WEIGHT: 144 LBS | HEIGHT: 70 IN | DIASTOLIC BLOOD PRESSURE: 68 MMHG | SYSTOLIC BLOOD PRESSURE: 110 MMHG

## 2024-07-08 DIAGNOSIS — C43.9 METASTATIC MELANOMA (HCC): ICD-10-CM

## 2024-07-08 DIAGNOSIS — C79.51 METASTASIS TO BONE (HCC): ICD-10-CM

## 2024-07-08 DIAGNOSIS — C79.51 MELANOMA METASTATIC TO BONE (HCC): ICD-10-CM

## 2024-07-08 DIAGNOSIS — M84.50XA PATHOLOGICAL FRACTURE DUE TO METASTATIC BONE DISEASE: ICD-10-CM

## 2024-07-08 PROCEDURE — 3074F SYST BP LT 130 MM HG: CPT | Performed by: ORTHOPAEDIC SURGERY

## 2024-07-08 PROCEDURE — 3078F DIAST BP <80 MM HG: CPT | Performed by: ORTHOPAEDIC SURGERY

## 2024-07-08 PROCEDURE — 72170 X-RAY EXAM OF PELVIS: CPT | Mod: TC | Performed by: ORTHOPAEDIC SURGERY

## 2024-07-08 PROCEDURE — 99213 OFFICE O/P EST LOW 20 MIN: CPT | Performed by: ORTHOPAEDIC SURGERY

## 2024-07-08 PROCEDURE — 73030 X-RAY EXAM OF SHOULDER: CPT | Mod: TC,RT | Performed by: ORTHOPAEDIC SURGERY

## 2024-07-08 PROCEDURE — 73030 X-RAY EXAM OF SHOULDER: CPT | Mod: TC,LT | Performed by: ORTHOPAEDIC SURGERY

## 2024-07-08 ASSESSMENT — ENCOUNTER SYMPTOMS
SENSORY CHANGE: 0
NECK PAIN: 1
WEAKNESS: 0
TINGLING: 1
BACK PAIN: 0
WEIGHT LOSS: 0

## 2024-07-08 ASSESSMENT — FIBROSIS 4 INDEX: FIB4 SCORE: 0.86

## 2024-07-09 RX ORDER — MORPHINE SULFATE 15 MG/1
45 TABLET, FILM COATED, EXTENDED RELEASE ORAL EVERY 12 HOURS
Qty: 168 TABLET | Refills: 0 | Status: SHIPPED | OUTPATIENT
Start: 2024-07-09 | End: 2024-08-06

## 2024-07-16 ENCOUNTER — HOSPITAL ENCOUNTER (OUTPATIENT)
Dept: RADIOLOGY | Facility: MEDICAL CENTER | Age: 59
End: 2024-07-16
Attending: STUDENT IN AN ORGANIZED HEALTH CARE EDUCATION/TRAINING PROGRAM
Payer: COMMERCIAL

## 2024-07-16 DIAGNOSIS — M54.2 NECK PAIN: ICD-10-CM

## 2024-07-16 PROCEDURE — 72125 CT NECK SPINE W/O DYE: CPT

## 2024-07-24 ENCOUNTER — OFFICE VISIT (OUTPATIENT)
Dept: MEDICAL GROUP | Facility: LAB | Age: 59
End: 2024-07-24
Payer: COMMERCIAL

## 2024-07-24 VITALS
SYSTOLIC BLOOD PRESSURE: 84 MMHG | TEMPERATURE: 97.3 F | HEART RATE: 72 BPM | HEIGHT: 70 IN | DIASTOLIC BLOOD PRESSURE: 52 MMHG | RESPIRATION RATE: 18 BRPM | OXYGEN SATURATION: 96 % | WEIGHT: 148 LBS | BODY MASS INDEX: 21.19 KG/M2

## 2024-07-24 DIAGNOSIS — M24.549 CONTRACTURE OF HAND: ICD-10-CM

## 2024-07-24 DIAGNOSIS — F41.9 ANXIETY: ICD-10-CM

## 2024-07-24 DIAGNOSIS — C43.9 METASTATIC MELANOMA (HCC): ICD-10-CM

## 2024-07-24 PROBLEM — M48.02 CERVICAL STENOSIS OF SPINE: Status: ACTIVE | Noted: 2024-07-18

## 2024-07-24 PROCEDURE — 99214 OFFICE O/P EST MOD 30 MIN: CPT | Performed by: FAMILY MEDICINE

## 2024-07-24 ASSESSMENT — ANXIETY QUESTIONNAIRES
5. BEING SO RESTLESS THAT IT IS HARD TO SIT STILL: NOT AT ALL
GAD7 TOTAL SCORE: 0
3. WORRYING TOO MUCH ABOUT DIFFERENT THINGS: NOT AT ALL
6. BECOMING EASILY ANNOYED OR IRRITABLE: NOT AT ALL
1. FEELING NERVOUS, ANXIOUS, OR ON EDGE: NOT AT ALL
4. TROUBLE RELAXING: NOT AT ALL
2. NOT BEING ABLE TO STOP OR CONTROL WORRYING: NOT AT ALL
7. FEELING AFRAID AS IF SOMETHING AWFUL MIGHT HAPPEN: NOT AT ALL

## 2024-07-24 ASSESSMENT — PATIENT HEALTH QUESTIONNAIRE - PHQ9
CLINICAL INTERPRETATION OF PHQ2 SCORE: 1
5. POOR APPETITE OR OVEREATING: 1 - SEVERAL DAYS
SUM OF ALL RESPONSES TO PHQ QUESTIONS 1-9: 4

## 2024-07-24 ASSESSMENT — FIBROSIS 4 INDEX: FIB4 SCORE: 0.86

## 2024-07-25 ENCOUNTER — APPOINTMENT (OUTPATIENT)
Dept: ADMISSIONS | Facility: MEDICAL CENTER | Age: 59
End: 2024-07-25
Attending: STUDENT IN AN ORGANIZED HEALTH CARE EDUCATION/TRAINING PROGRAM
Payer: COMMERCIAL

## 2024-07-29 ENCOUNTER — HOSPITAL ENCOUNTER (OUTPATIENT)
Dept: RADIOLOGY | Facility: MEDICAL CENTER | Age: 59
End: 2024-07-29

## 2024-08-01 ENCOUNTER — PRE-ADMISSION TESTING (OUTPATIENT)
Dept: ADMISSIONS | Facility: MEDICAL CENTER | Age: 59
DRG: 454 | End: 2024-08-01
Attending: STUDENT IN AN ORGANIZED HEALTH CARE EDUCATION/TRAINING PROGRAM
Payer: COMMERCIAL

## 2024-08-01 RX ORDER — ONDANSETRON 4 MG/1
4 TABLET, ORALLY DISINTEGRATING ORAL EVERY 6 HOURS PRN
Status: ON HOLD | COMMUNITY
Start: 2024-02-05 | End: 2024-08-19

## 2024-08-01 RX ORDER — SENNOSIDES 8.6 MG
3 CAPSULE ORAL
Status: ON HOLD | COMMUNITY
End: 2024-08-22

## 2024-08-14 ENCOUNTER — HOSPITAL ENCOUNTER (OUTPATIENT)
Dept: RADIOLOGY | Facility: MEDICAL CENTER | Age: 59
DRG: 454 | End: 2024-08-14
Attending: STUDENT IN AN ORGANIZED HEALTH CARE EDUCATION/TRAINING PROGRAM | Admitting: STUDENT IN AN ORGANIZED HEALTH CARE EDUCATION/TRAINING PROGRAM
Payer: COMMERCIAL

## 2024-08-14 ENCOUNTER — PRE-ADMISSION TESTING (OUTPATIENT)
Dept: ADMISSIONS | Facility: MEDICAL CENTER | Age: 59
DRG: 454 | End: 2024-08-14
Attending: STUDENT IN AN ORGANIZED HEALTH CARE EDUCATION/TRAINING PROGRAM
Payer: COMMERCIAL

## 2024-08-14 DIAGNOSIS — M84.48XK: ICD-10-CM

## 2024-08-14 DIAGNOSIS — Z01.812 PRE-OPERATIVE LABORATORY EXAMINATION: ICD-10-CM

## 2024-08-14 LAB
25(OH)D3 SERPL-MCNC: 31 NG/ML (ref 30–100)
ABO GROUP BLD: NORMAL
ANION GAP SERPL CALC-SCNC: 12 MMOL/L (ref 7–16)
APPEARANCE UR: CLEAR
APTT PPP: 33.3 SEC (ref 24.7–36)
BACTERIA #/AREA URNS HPF: NEGATIVE /HPF
BASOPHILS # BLD AUTO: 1.1 % (ref 0–1.8)
BASOPHILS # BLD: 0.06 K/UL (ref 0–0.12)
BILIRUB UR QL STRIP.AUTO: NEGATIVE
BLD GP AB SCN SERPL QL: NORMAL
BUN SERPL-MCNC: 18 MG/DL (ref 8–22)
CALCIUM SERPL-MCNC: 9.5 MG/DL (ref 8.5–10.5)
CHLORIDE SERPL-SCNC: 107 MMOL/L (ref 96–112)
CO2 SERPL-SCNC: 21 MMOL/L (ref 20–33)
COLOR UR: ABNORMAL
CREAT SERPL-MCNC: 0.62 MG/DL (ref 0.5–1.4)
EKG IMPRESSION: NORMAL
EOSINOPHIL # BLD AUTO: 0.49 K/UL (ref 0–0.51)
EOSINOPHIL NFR BLD: 8.6 % (ref 0–6.9)
EPI CELLS #/AREA URNS HPF: NEGATIVE /HPF
ERYTHROCYTE [DISTWIDTH] IN BLOOD BY AUTOMATED COUNT: 46.5 FL (ref 35.9–50)
GFR SERPLBLD CREATININE-BSD FMLA CKD-EPI: 109 ML/MIN/1.73 M 2
GLUCOSE SERPL-MCNC: 107 MG/DL (ref 65–99)
GLUCOSE UR STRIP.AUTO-MCNC: NEGATIVE MG/DL
HCT VFR BLD AUTO: 34.4 % (ref 42–52)
HGB BLD-MCNC: 11 G/DL (ref 14–18)
HYALINE CASTS #/AREA URNS LPF: ABNORMAL /LPF
IMM GRANULOCYTES # BLD AUTO: 0.11 K/UL (ref 0–0.11)
IMM GRANULOCYTES NFR BLD AUTO: 1.9 % (ref 0–0.9)
INR PPP: 1.12 (ref 0.87–1.13)
KETONES UR STRIP.AUTO-MCNC: ABNORMAL MG/DL
LEUKOCYTE ESTERASE UR QL STRIP.AUTO: ABNORMAL
LYMPHOCYTES # BLD AUTO: 0.91 K/UL (ref 1–4.8)
LYMPHOCYTES NFR BLD: 16 % (ref 22–41)
MCH RBC QN AUTO: 28.3 PG (ref 27–33)
MCHC RBC AUTO-ENTMCNC: 32 G/DL (ref 32.3–36.5)
MCV RBC AUTO: 88.4 FL (ref 81.4–97.8)
MICRO URNS: ABNORMAL
MONOCYTES # BLD AUTO: 0.4 K/UL (ref 0–0.85)
MONOCYTES NFR BLD AUTO: 7.1 % (ref 0–13.4)
NEUTROPHILS # BLD AUTO: 3.7 K/UL (ref 1.82–7.42)
NEUTROPHILS NFR BLD: 65.3 % (ref 44–72)
NITRITE UR QL STRIP.AUTO: NEGATIVE
NRBC # BLD AUTO: 0 K/UL
NRBC BLD-RTO: 0 /100 WBC (ref 0–0.2)
PH UR STRIP.AUTO: 5.5 [PH] (ref 5–8)
PLATELET # BLD AUTO: 329 K/UL (ref 164–446)
PMV BLD AUTO: 9 FL (ref 9–12.9)
POTASSIUM SERPL-SCNC: 4.3 MMOL/L (ref 3.6–5.5)
PROT UR QL STRIP: NEGATIVE MG/DL
PROTHROMBIN TIME: 14.6 SEC (ref 12–14.6)
RBC # BLD AUTO: 3.89 M/UL (ref 4.7–6.1)
RBC # URNS HPF: ABNORMAL /HPF
RBC UR QL AUTO: NEGATIVE
RH BLD: NORMAL
SCCMEC + MECA PNL NOSE NAA+PROBE: NEGATIVE
SCCMEC + MECA PNL NOSE NAA+PROBE: POSITIVE
SODIUM SERPL-SCNC: 140 MMOL/L (ref 135–145)
SP GR UR STRIP.AUTO: 1.03
UROBILINOGEN UR STRIP.AUTO-MCNC: 0.2 MG/DL
WBC # BLD AUTO: 5.7 K/UL (ref 4.8–10.8)
WBC #/AREA URNS HPF: ABNORMAL /HPF

## 2024-08-14 PROCEDURE — 81001 URINALYSIS AUTO W/SCOPE: CPT

## 2024-08-14 PROCEDURE — 86901 BLOOD TYPING SEROLOGIC RH(D): CPT

## 2024-08-14 PROCEDURE — 80048 BASIC METABOLIC PNL TOTAL CA: CPT

## 2024-08-14 PROCEDURE — 85025 COMPLETE CBC W/AUTO DIFF WBC: CPT

## 2024-08-14 PROCEDURE — 87640 STAPH A DNA AMP PROBE: CPT

## 2024-08-14 PROCEDURE — 36415 COLL VENOUS BLD VENIPUNCTURE: CPT

## 2024-08-14 PROCEDURE — 93005 ELECTROCARDIOGRAM TRACING: CPT

## 2024-08-14 PROCEDURE — 71046 X-RAY EXAM CHEST 2 VIEWS: CPT

## 2024-08-14 PROCEDURE — 86900 BLOOD TYPING SEROLOGIC ABO: CPT

## 2024-08-14 PROCEDURE — 85610 PROTHROMBIN TIME: CPT

## 2024-08-14 PROCEDURE — 93010 ELECTROCARDIOGRAM REPORT: CPT | Performed by: INTERNAL MEDICINE

## 2024-08-14 PROCEDURE — 87641 MR-STAPH DNA AMP PROBE: CPT

## 2024-08-14 PROCEDURE — 82306 VITAMIN D 25 HYDROXY: CPT

## 2024-08-14 PROCEDURE — 85730 THROMBOPLASTIN TIME PARTIAL: CPT

## 2024-08-14 PROCEDURE — 86850 RBC ANTIBODY SCREEN: CPT

## 2024-08-15 DIAGNOSIS — C43.9 METASTATIC MELANOMA (HCC): ICD-10-CM

## 2024-08-15 NOTE — OR NURSING
Pt with positive MRSA.  Case message sent to Dr. Lawrence, and copy of positive result faxed to the office.  Waiting for pending ECG to be read before sending that to office as well.

## 2024-08-16 RX ORDER — MORPHINE SULFATE 15 MG/1
45 TABLET, FILM COATED, EXTENDED RELEASE ORAL EVERY 12 HOURS
Qty: 168 TABLET | Refills: 0 | Status: ON HOLD | OUTPATIENT
Start: 2024-08-16 | End: 2024-08-22

## 2024-08-16 NOTE — TELEPHONE ENCOUNTER
Received request via: Patient    Was the patient seen in the last year in this department? Yes    Does the patient have an active prescription (recently filled or refills available) for medication(s) requested? No    [facility]     Does the patient have nursing home Plus and need 100-day supply? (This applies to ALL medications) Patient does not have SCP

## 2024-08-19 ENCOUNTER — ANESTHESIA (OUTPATIENT)
Dept: SURGERY | Facility: MEDICAL CENTER | Age: 59
DRG: 454 | End: 2024-08-19
Payer: COMMERCIAL

## 2024-08-19 ENCOUNTER — APPOINTMENT (OUTPATIENT)
Dept: RADIOLOGY | Facility: MEDICAL CENTER | Age: 59
DRG: 454 | End: 2024-08-19
Attending: STUDENT IN AN ORGANIZED HEALTH CARE EDUCATION/TRAINING PROGRAM
Payer: COMMERCIAL

## 2024-08-19 ENCOUNTER — ANESTHESIA EVENT (OUTPATIENT)
Dept: SURGERY | Facility: MEDICAL CENTER | Age: 59
DRG: 454 | End: 2024-08-19
Payer: COMMERCIAL

## 2024-08-19 ENCOUNTER — HOSPITAL ENCOUNTER (INPATIENT)
Facility: MEDICAL CENTER | Age: 59
LOS: 3 days | DRG: 454 | End: 2024-08-22
Attending: STUDENT IN AN ORGANIZED HEALTH CARE EDUCATION/TRAINING PROGRAM | Admitting: STUDENT IN AN ORGANIZED HEALTH CARE EDUCATION/TRAINING PROGRAM
Payer: COMMERCIAL

## 2024-08-19 DIAGNOSIS — Z98.1 S/P CERVICAL SPINAL FUSION: ICD-10-CM

## 2024-08-19 LAB
ABO + RH BLD: NORMAL
HCT VFR BLD AUTO: 32.5 % (ref 42–52)
HGB BLD-MCNC: 10.6 G/DL (ref 14–18)

## 2024-08-19 PROCEDURE — 160048 HCHG OR STATISTICAL LEVEL 1-5: Performed by: STUDENT IN AN ORGANIZED HEALTH CARE EDUCATION/TRAINING PROGRAM

## 2024-08-19 PROCEDURE — A9270 NON-COVERED ITEM OR SERVICE: HCPCS

## 2024-08-19 PROCEDURE — 700105 HCHG RX REV CODE 258: Performed by: ANESTHESIOLOGY

## 2024-08-19 PROCEDURE — 63045 LAM FACETEC & FORAMOT CRV: CPT | Mod: ASROC

## 2024-08-19 PROCEDURE — 160002 HCHG RECOVERY MINUTES (STAT): Performed by: STUDENT IN AN ORGANIZED HEALTH CARE EDUCATION/TRAINING PROGRAM

## 2024-08-19 PROCEDURE — 85018 HEMOGLOBIN: CPT

## 2024-08-19 PROCEDURE — 95939 C MOTOR EVOKED UPR&LWR LIMBS: CPT | Performed by: STUDENT IN AN ORGANIZED HEALTH CARE EDUCATION/TRAINING PROGRAM

## 2024-08-19 PROCEDURE — 22843 INSERT SPINE FIXATION DEVICE: CPT | Performed by: STUDENT IN AN ORGANIZED HEALTH CARE EDUCATION/TRAINING PROGRAM

## 2024-08-19 PROCEDURE — 36415 COLL VENOUS BLD VENIPUNCTURE: CPT

## 2024-08-19 PROCEDURE — A9270 NON-COVERED ITEM OR SERVICE: HCPCS | Performed by: ANESTHESIOLOGY

## 2024-08-19 PROCEDURE — 0RG80KJ FUSION OF 8 OR MORE THORACIC VERTEBRAL JOINTS WITH NONAUTOLOGOUS TISSUE SUBSTITUTE, POSTERIOR APPROACH, ANTERIOR COLUMN, OPEN APPROACH: ICD-10-PCS | Performed by: STUDENT IN AN ORGANIZED HEALTH CARE EDUCATION/TRAINING PROGRAM

## 2024-08-19 PROCEDURE — 22595 ARTHRD PST TQ ATLAS-AXIS: CPT | Performed by: STUDENT IN AN ORGANIZED HEALTH CARE EDUCATION/TRAINING PROGRAM

## 2024-08-19 PROCEDURE — 20930 SP BONE ALGRFT MORSEL ADD-ON: CPT | Performed by: STUDENT IN AN ORGANIZED HEALTH CARE EDUCATION/TRAINING PROGRAM

## 2024-08-19 PROCEDURE — 22843 INSERT SPINE FIXATION DEVICE: CPT | Mod: ASROC

## 2024-08-19 PROCEDURE — 700111 HCHG RX REV CODE 636 W/ 250 OVERRIDE (IP): Performed by: ANESTHESIOLOGY

## 2024-08-19 PROCEDURE — 700102 HCHG RX REV CODE 250 W/ 637 OVERRIDE(OP): Performed by: ANESTHESIOLOGY

## 2024-08-19 PROCEDURE — 160009 HCHG ANES TIME/MIN: Performed by: STUDENT IN AN ORGANIZED HEALTH CARE EDUCATION/TRAINING PROGRAM

## 2024-08-19 PROCEDURE — 700111 HCHG RX REV CODE 636 W/ 250 OVERRIDE (IP)

## 2024-08-19 PROCEDURE — 20930 SP BONE ALGRFT MORSEL ADD-ON: CPT | Mod: ASROC

## 2024-08-19 PROCEDURE — 95937 NEUROMUSCULAR JUNCTION TEST: CPT | Performed by: STUDENT IN AN ORGANIZED HEALTH CARE EDUCATION/TRAINING PROGRAM

## 2024-08-19 PROCEDURE — 72040 X-RAY EXAM NECK SPINE 2-3 VW: CPT

## 2024-08-19 PROCEDURE — 700111 HCHG RX REV CODE 636 W/ 250 OVERRIDE (IP): Performed by: STUDENT IN AN ORGANIZED HEALTH CARE EDUCATION/TRAINING PROGRAM

## 2024-08-19 PROCEDURE — 160042 HCHG SURGERY MINUTES - EA ADDL 1 MIN LEVEL 5: Performed by: STUDENT IN AN ORGANIZED HEALTH CARE EDUCATION/TRAINING PROGRAM

## 2024-08-19 PROCEDURE — 22595 ARTHRD PST TQ ATLAS-AXIS: CPT | Mod: ASROC

## 2024-08-19 PROCEDURE — 63048 LAM FACETEC &FORAMOT EA ADDL: CPT | Performed by: STUDENT IN AN ORGANIZED HEALTH CARE EDUCATION/TRAINING PROGRAM

## 2024-08-19 PROCEDURE — 95861 NEEDLE EMG 2 EXTREMITIES: CPT | Performed by: STUDENT IN AN ORGANIZED HEALTH CARE EDUCATION/TRAINING PROGRAM

## 2024-08-19 PROCEDURE — 770001 HCHG ROOM/CARE - MED/SURG/GYN PRIV*

## 2024-08-19 PROCEDURE — 01N80ZZ RELEASE THORACIC NERVE, OPEN APPROACH: ICD-10-PCS | Performed by: STUDENT IN AN ORGANIZED HEALTH CARE EDUCATION/TRAINING PROGRAM

## 2024-08-19 PROCEDURE — 700102 HCHG RX REV CODE 250 W/ 637 OVERRIDE(OP): Performed by: STUDENT IN AN ORGANIZED HEALTH CARE EDUCATION/TRAINING PROGRAM

## 2024-08-19 PROCEDURE — 700101 HCHG RX REV CODE 250: Performed by: ANESTHESIOLOGY

## 2024-08-19 PROCEDURE — C1713 ANCHOR/SCREW BN/BN,TIS/BN: HCPCS | Performed by: STUDENT IN AN ORGANIZED HEALTH CARE EDUCATION/TRAINING PROGRAM

## 2024-08-19 PROCEDURE — 63048 LAM FACETEC &FORAMOT EA ADDL: CPT | Mod: ASROC

## 2024-08-19 PROCEDURE — 160036 HCHG PACU - EA ADDL 30 MINS PHASE I: Performed by: STUDENT IN AN ORGANIZED HEALTH CARE EDUCATION/TRAINING PROGRAM

## 2024-08-19 PROCEDURE — 22614 ARTHRD PST TQ 1NTRSPC EA ADD: CPT | Mod: 59 | Performed by: STUDENT IN AN ORGANIZED HEALTH CARE EDUCATION/TRAINING PROGRAM

## 2024-08-19 PROCEDURE — 22614 ARTHRD PST TQ 1NTRSPC EA ADD: CPT | Mod: ASROC,59

## 2024-08-19 PROCEDURE — A9270 NON-COVERED ITEM OR SERVICE: HCPCS | Performed by: STUDENT IN AN ORGANIZED HEALTH CARE EDUCATION/TRAINING PROGRAM

## 2024-08-19 PROCEDURE — 160031 HCHG SURGERY MINUTES - 1ST 30 MINS LEVEL 5: Performed by: STUDENT IN AN ORGANIZED HEALTH CARE EDUCATION/TRAINING PROGRAM

## 2024-08-19 PROCEDURE — 95938 SOMATOSENSORY TESTING: CPT | Performed by: STUDENT IN AN ORGANIZED HEALTH CARE EDUCATION/TRAINING PROGRAM

## 2024-08-19 PROCEDURE — 0RG43J1 FUSION OF CERVICOTHORACIC VERTEBRAL JOINT WITH SYNTHETIC SUBSTITUTE, POSTERIOR APPROACH, POSTERIOR COLUMN, PERCUTANEOUS APPROACH: ICD-10-PCS | Performed by: STUDENT IN AN ORGANIZED HEALTH CARE EDUCATION/TRAINING PROGRAM

## 2024-08-19 PROCEDURE — 700105 HCHG RX REV CODE 258

## 2024-08-19 PROCEDURE — 95940 IONM IN OPERATNG ROOM 15 MIN: CPT | Performed by: STUDENT IN AN ORGANIZED HEALTH CARE EDUCATION/TRAINING PROGRAM

## 2024-08-19 PROCEDURE — 160035 HCHG PACU - 1ST 60 MINS PHASE I: Performed by: STUDENT IN AN ORGANIZED HEALTH CARE EDUCATION/TRAINING PROGRAM

## 2024-08-19 PROCEDURE — 700105 HCHG RX REV CODE 258: Performed by: STUDENT IN AN ORGANIZED HEALTH CARE EDUCATION/TRAINING PROGRAM

## 2024-08-19 PROCEDURE — 110454 HCHG SHELL REV 250: Performed by: STUDENT IN AN ORGANIZED HEALTH CARE EDUCATION/TRAINING PROGRAM

## 2024-08-19 PROCEDURE — 700101 HCHG RX REV CODE 250: Performed by: STUDENT IN AN ORGANIZED HEALTH CARE EDUCATION/TRAINING PROGRAM

## 2024-08-19 PROCEDURE — 110371 HCHG SHELL REV 272: Performed by: STUDENT IN AN ORGANIZED HEALTH CARE EDUCATION/TRAINING PROGRAM

## 2024-08-19 PROCEDURE — 0RG10K1 FUSION OF CERVICAL VERTEBRAL JOINT WITH NONAUTOLOGOUS TISSUE SUBSTITUTE, POSTERIOR APPROACH, POSTERIOR COLUMN, OPEN APPROACH: ICD-10-PCS | Performed by: STUDENT IN AN ORGANIZED HEALTH CARE EDUCATION/TRAINING PROGRAM

## 2024-08-19 PROCEDURE — 63045 LAM FACETEC & FORAMOT CRV: CPT | Performed by: STUDENT IN AN ORGANIZED HEALTH CARE EDUCATION/TRAINING PROGRAM

## 2024-08-19 PROCEDURE — 502000 HCHG MISC OR IMPLANTS RC 0278: Performed by: STUDENT IN AN ORGANIZED HEALTH CARE EDUCATION/TRAINING PROGRAM

## 2024-08-19 PROCEDURE — 61783 SCAN PROC SPINAL: CPT | Mod: ASROC

## 2024-08-19 PROCEDURE — 700102 HCHG RX REV CODE 250 W/ 637 OVERRIDE(OP)

## 2024-08-19 PROCEDURE — 61783 SCAN PROC SPINAL: CPT | Performed by: STUDENT IN AN ORGANIZED HEALTH CARE EDUCATION/TRAINING PROGRAM

## 2024-08-19 PROCEDURE — 85014 HEMATOCRIT: CPT

## 2024-08-19 DEVICE — IMPLANTABLE DEVICE: Type: IMPLANTABLE DEVICE | Site: SPINE CERVICAL | Status: FUNCTIONAL

## 2024-08-19 DEVICE — GRAFT BONE PUTTY I-FACTOR 5.0CC (1EA): Type: IMPLANTABLE DEVICE | Site: SPINE CERVICAL | Status: FUNCTIONAL

## 2024-08-19 DEVICE — SCREW BONE INVICTUS POLYAXIAL 3.5MM X 14MM (1EA): Type: IMPLANTABLE DEVICE | Site: SPINE CERVICAL | Status: FUNCTIONAL

## 2024-08-19 DEVICE — POWDER SURGICAL 5GM COLLAGEN CELLERATE RX (1EA): Type: IMPLANTABLE DEVICE | Site: SPINE CERVICAL | Status: FUNCTIONAL

## 2024-08-19 DEVICE — GRAFT BONE CHIPS CANCELLOUS 4-10MM 30CC (1EA): Type: IMPLANTABLE DEVICE | Site: SPINE CERVICAL | Status: FUNCTIONAL

## 2024-08-19 DEVICE — SCREW SET INVICTUS CT: Type: IMPLANTABLE DEVICE | Site: SPINE CERVICAL | Status: FUNCTIONAL

## 2024-08-19 RX ORDER — MEPERIDINE HYDROCHLORIDE 25 MG/ML
12.5 INJECTION INTRAMUSCULAR; INTRAVENOUS; SUBCUTANEOUS
Status: DISCONTINUED | OUTPATIENT
Start: 2024-08-19 | End: 2024-08-19 | Stop reason: HOSPADM

## 2024-08-19 RX ORDER — MIDAZOLAM HYDROCHLORIDE 1 MG/ML
INJECTION INTRAMUSCULAR; INTRAVENOUS PRN
Status: DISCONTINUED | OUTPATIENT
Start: 2024-08-19 | End: 2024-08-19 | Stop reason: SURG

## 2024-08-19 RX ORDER — HYDRALAZINE HYDROCHLORIDE 20 MG/ML
10 INJECTION INTRAMUSCULAR; INTRAVENOUS
Status: DISCONTINUED | OUTPATIENT
Start: 2024-08-19 | End: 2024-08-22 | Stop reason: HOSPADM

## 2024-08-19 RX ORDER — HYDROMORPHONE HYDROCHLORIDE 1 MG/ML
0.4 INJECTION, SOLUTION INTRAMUSCULAR; INTRAVENOUS; SUBCUTANEOUS
Status: DISCONTINUED | OUTPATIENT
Start: 2024-08-19 | End: 2024-08-19 | Stop reason: HOSPADM

## 2024-08-19 RX ORDER — HYDROMORPHONE HYDROCHLORIDE 1 MG/ML
0.5 INJECTION, SOLUTION INTRAMUSCULAR; INTRAVENOUS; SUBCUTANEOUS
Status: DISCONTINUED | OUTPATIENT
Start: 2024-08-19 | End: 2024-08-19 | Stop reason: HOSPADM

## 2024-08-19 RX ORDER — HYDROMORPHONE HYDROCHLORIDE 1 MG/ML
1 INJECTION, SOLUTION INTRAMUSCULAR; INTRAVENOUS; SUBCUTANEOUS
Status: DISCONTINUED | OUTPATIENT
Start: 2024-08-19 | End: 2024-08-19

## 2024-08-19 RX ORDER — ONDANSETRON 2 MG/ML
4 INJECTION INTRAMUSCULAR; INTRAVENOUS
Status: DISCONTINUED | OUTPATIENT
Start: 2024-08-19 | End: 2024-08-19 | Stop reason: HOSPADM

## 2024-08-19 RX ORDER — PROCHLORPERAZINE EDISYLATE 5 MG/ML
5-10 INJECTION INTRAMUSCULAR; INTRAVENOUS EVERY 4 HOURS PRN
Status: DISCONTINUED | OUTPATIENT
Start: 2024-08-19 | End: 2024-08-22 | Stop reason: HOSPADM

## 2024-08-19 RX ORDER — SODIUM CHLORIDE, SODIUM LACTATE, POTASSIUM CHLORIDE, CALCIUM CHLORIDE 600; 310; 30; 20 MG/100ML; MG/100ML; MG/100ML; MG/100ML
INJECTION, SOLUTION INTRAVENOUS CONTINUOUS
Status: ACTIVE | OUTPATIENT
Start: 2024-08-19 | End: 2024-08-19

## 2024-08-19 RX ORDER — BUPIVACAINE HYDROCHLORIDE AND EPINEPHRINE 5; 5 MG/ML; UG/ML
INJECTION, SOLUTION EPIDURAL; INTRACAUDAL; PERINEURAL
Status: DISCONTINUED | OUTPATIENT
Start: 2024-08-19 | End: 2024-08-19 | Stop reason: HOSPADM

## 2024-08-19 RX ORDER — PROMETHAZINE HYDROCHLORIDE 25 MG/1
12.5-25 SUPPOSITORY RECTAL EVERY 4 HOURS PRN
Status: DISCONTINUED | OUTPATIENT
Start: 2024-08-19 | End: 2024-08-22 | Stop reason: HOSPADM

## 2024-08-19 RX ORDER — SODIUM CHLORIDE, SODIUM GLUCONATE, SODIUM ACETATE, POTASSIUM CHLORIDE AND MAGNESIUM CHLORIDE 526; 502; 368; 37; 30 MG/100ML; MG/100ML; MG/100ML; MG/100ML; MG/100ML
INJECTION, SOLUTION INTRAVENOUS
Status: DISCONTINUED | OUTPATIENT
Start: 2024-08-19 | End: 2024-08-19 | Stop reason: SURG

## 2024-08-19 RX ORDER — ACETAMINOPHEN 325 MG/1
TABLET ORAL PRN
Status: DISCONTINUED | OUTPATIENT
Start: 2024-08-19 | End: 2024-08-19 | Stop reason: SURG

## 2024-08-19 RX ORDER — HYDROMORPHONE HYDROCHLORIDE 2 MG/ML
INJECTION, SOLUTION INTRAMUSCULAR; INTRAVENOUS; SUBCUTANEOUS PRN
Status: DISCONTINUED | OUTPATIENT
Start: 2024-08-19 | End: 2024-08-19 | Stop reason: SURG

## 2024-08-19 RX ORDER — MORPHINE SULFATE 15 MG/1
15 TABLET ORAL
Status: DISCONTINUED | OUTPATIENT
Start: 2024-08-19 | End: 2024-08-19

## 2024-08-19 RX ORDER — DOCUSATE SODIUM 100 MG/1
100 CAPSULE, LIQUID FILLED ORAL 2 TIMES DAILY
Status: DISCONTINUED | OUTPATIENT
Start: 2024-08-19 | End: 2024-08-22 | Stop reason: HOSPADM

## 2024-08-19 RX ORDER — DIPHENHYDRAMINE HYDROCHLORIDE 50 MG/ML
25 INJECTION INTRAMUSCULAR; INTRAVENOUS EVERY 6 HOURS PRN
Status: DISCONTINUED | OUTPATIENT
Start: 2024-08-19 | End: 2024-08-22 | Stop reason: HOSPADM

## 2024-08-19 RX ORDER — DIPHENHYDRAMINE HCL 25 MG
25 TABLET ORAL EVERY 6 HOURS PRN
Status: DISCONTINUED | OUTPATIENT
Start: 2024-08-19 | End: 2024-08-22 | Stop reason: HOSPADM

## 2024-08-19 RX ORDER — MORPHINE SULFATE 15 MG/1
30 TABLET ORAL
Status: DISCONTINUED | OUTPATIENT
Start: 2024-08-19 | End: 2024-08-19

## 2024-08-19 RX ORDER — CEFAZOLIN SODIUM 1 G/3ML
INJECTION, POWDER, FOR SOLUTION INTRAMUSCULAR; INTRAVENOUS
Status: DISCONTINUED | OUTPATIENT
Start: 2024-08-19 | End: 2024-08-19 | Stop reason: HOSPADM

## 2024-08-19 RX ORDER — ONDANSETRON 2 MG/ML
4 INJECTION INTRAMUSCULAR; INTRAVENOUS EVERY 4 HOURS PRN
Status: DISCONTINUED | OUTPATIENT
Start: 2024-08-19 | End: 2024-08-22 | Stop reason: HOSPADM

## 2024-08-19 RX ORDER — CALCIUM CARBONATE 500 MG/1
1000 TABLET, CHEWABLE ORAL EVERY EVENING
Status: DISCONTINUED | OUTPATIENT
Start: 2024-08-19 | End: 2024-08-22 | Stop reason: HOSPADM

## 2024-08-19 RX ORDER — ACETAMINOPHEN 500 MG
TABLET ORAL
Status: COMPLETED
Start: 2024-08-19 | End: 2024-08-19

## 2024-08-19 RX ORDER — AMOXICILLIN 250 MG
1 CAPSULE ORAL
Status: DISCONTINUED | OUTPATIENT
Start: 2024-08-19 | End: 2024-08-22 | Stop reason: HOSPADM

## 2024-08-19 RX ORDER — CLONIDINE HYDROCHLORIDE 0.1 MG/1
0.1 TABLET ORAL EVERY 4 HOURS PRN
Status: DISCONTINUED | OUTPATIENT
Start: 2024-08-19 | End: 2024-08-22 | Stop reason: HOSPADM

## 2024-08-19 RX ORDER — VANCOMYCIN HYDROCHLORIDE 1 G/20ML
INJECTION, POWDER, LYOPHILIZED, FOR SOLUTION INTRAVENOUS
Status: COMPLETED | OUTPATIENT
Start: 2024-08-19 | End: 2024-08-19

## 2024-08-19 RX ORDER — SODIUM PHOSPHATE,MONO-DIBASIC 19G-7G/118
1 ENEMA (ML) RECTAL
Status: DISCONTINUED | OUTPATIENT
Start: 2024-08-19 | End: 2024-08-22 | Stop reason: HOSPADM

## 2024-08-19 RX ORDER — PROMETHAZINE HYDROCHLORIDE 25 MG/1
12.5-25 TABLET ORAL EVERY 4 HOURS PRN
Status: DISCONTINUED | OUTPATIENT
Start: 2024-08-19 | End: 2024-08-22 | Stop reason: HOSPADM

## 2024-08-19 RX ORDER — MORPHINE SULFATE 15 MG/1
30 TABLET ORAL
Status: DISCONTINUED | OUTPATIENT
Start: 2024-08-19 | End: 2024-08-22 | Stop reason: HOSPADM

## 2024-08-19 RX ORDER — AMOXICILLIN 250 MG
1 CAPSULE ORAL NIGHTLY
Status: DISCONTINUED | OUTPATIENT
Start: 2024-08-19 | End: 2024-08-22 | Stop reason: HOSPADM

## 2024-08-19 RX ORDER — ACETAMINOPHEN 500 MG
1000 TABLET ORAL EVERY 6 HOURS PRN
Status: DISCONTINUED | OUTPATIENT
Start: 2024-08-24 | End: 2024-08-22 | Stop reason: HOSPADM

## 2024-08-19 RX ORDER — CYCLOBENZAPRINE HCL 10 MG
10 TABLET ORAL EVERY 8 HOURS PRN
Status: DISCONTINUED | OUTPATIENT
Start: 2024-08-19 | End: 2024-08-22 | Stop reason: HOSPADM

## 2024-08-19 RX ORDER — MIDODRINE HYDROCHLORIDE 5 MG/1
10 TABLET ORAL 2 TIMES DAILY WITH MEALS
Status: DISCONTINUED | OUTPATIENT
Start: 2024-08-19 | End: 2024-08-22 | Stop reason: HOSPADM

## 2024-08-19 RX ORDER — METHADONE HYDROCHLORIDE 10 MG/ML
INJECTION, SOLUTION INTRAMUSCULAR; INTRAVENOUS; SUBCUTANEOUS PRN
Status: DISCONTINUED | OUTPATIENT
Start: 2024-08-19 | End: 2024-08-19 | Stop reason: SURG

## 2024-08-19 RX ORDER — BISACODYL 10 MG
10 SUPPOSITORY, RECTAL RECTAL
Status: DISCONTINUED | OUTPATIENT
Start: 2024-08-19 | End: 2024-08-22 | Stop reason: HOSPADM

## 2024-08-19 RX ORDER — ACETAMINOPHEN 500 MG
1000 TABLET ORAL EVERY 6 HOURS
Status: DISCONTINUED | OUTPATIENT
Start: 2024-08-19 | End: 2024-08-22 | Stop reason: HOSPADM

## 2024-08-19 RX ORDER — ALPRAZOLAM 0.25 MG
0.25 TABLET ORAL 2 TIMES DAILY PRN
Status: DISCONTINUED | OUTPATIENT
Start: 2024-08-19 | End: 2024-08-22 | Stop reason: HOSPADM

## 2024-08-19 RX ORDER — OXYCODONE HCL 5 MG/5 ML
5 SOLUTION, ORAL ORAL
Status: COMPLETED | OUTPATIENT
Start: 2024-08-19 | End: 2024-08-19

## 2024-08-19 RX ORDER — HYDRALAZINE HYDROCHLORIDE 20 MG/ML
5 INJECTION INTRAMUSCULAR; INTRAVENOUS
Status: DISCONTINUED | OUTPATIENT
Start: 2024-08-19 | End: 2024-08-19 | Stop reason: HOSPADM

## 2024-08-19 RX ORDER — ALBUTEROL SULFATE 5 MG/ML
2.5 SOLUTION RESPIRATORY (INHALATION)
Status: DISCONTINUED | OUTPATIENT
Start: 2024-08-19 | End: 2024-08-19 | Stop reason: HOSPADM

## 2024-08-19 RX ORDER — LIDOCAINE HYDROCHLORIDE 20 MG/ML
INJECTION, SOLUTION EPIDURAL; INFILTRATION; INTRACAUDAL; PERINEURAL PRN
Status: DISCONTINUED | OUTPATIENT
Start: 2024-08-19 | End: 2024-08-19 | Stop reason: SURG

## 2024-08-19 RX ORDER — LABETALOL HYDROCHLORIDE 5 MG/ML
10 INJECTION, SOLUTION INTRAVENOUS
Status: DISCONTINUED | OUTPATIENT
Start: 2024-08-19 | End: 2024-08-22 | Stop reason: HOSPADM

## 2024-08-19 RX ORDER — HYDROMORPHONE HYDROCHLORIDE 2 MG/ML
2 INJECTION, SOLUTION INTRAMUSCULAR; INTRAVENOUS; SUBCUTANEOUS
Status: DISCONTINUED | OUTPATIENT
Start: 2024-08-19 | End: 2024-08-22 | Stop reason: HOSPADM

## 2024-08-19 RX ORDER — HALOPERIDOL 5 MG/ML
1 INJECTION INTRAMUSCULAR
Status: DISCONTINUED | OUTPATIENT
Start: 2024-08-19 | End: 2024-08-19 | Stop reason: HOSPADM

## 2024-08-19 RX ORDER — POLYETHYLENE GLYCOL 3350 17 G/17G
1 POWDER, FOR SOLUTION ORAL 2 TIMES DAILY PRN
Status: DISCONTINUED | OUTPATIENT
Start: 2024-08-19 | End: 2024-08-22 | Stop reason: HOSPADM

## 2024-08-19 RX ORDER — OXYCODONE HCL 5 MG/5 ML
10 SOLUTION, ORAL ORAL
Status: COMPLETED | OUTPATIENT
Start: 2024-08-19 | End: 2024-08-19

## 2024-08-19 RX ORDER — ROCURONIUM BROMIDE 10 MG/ML
INJECTION, SOLUTION INTRAVENOUS PRN
Status: DISCONTINUED | OUTPATIENT
Start: 2024-08-19 | End: 2024-08-19 | Stop reason: SURG

## 2024-08-19 RX ORDER — ONDANSETRON 4 MG/1
4 TABLET, ORALLY DISINTEGRATING ORAL EVERY 4 HOURS PRN
Status: DISCONTINUED | OUTPATIENT
Start: 2024-08-19 | End: 2024-08-22 | Stop reason: HOSPADM

## 2024-08-19 RX ORDER — MORPHINE SULFATE 15 MG/1
15 TABLET ORAL
Status: DISCONTINUED | OUTPATIENT
Start: 2024-08-19 | End: 2024-08-22 | Stop reason: HOSPADM

## 2024-08-19 RX ORDER — DIPHENHYDRAMINE HYDROCHLORIDE 50 MG/ML
12.5 INJECTION INTRAMUSCULAR; INTRAVENOUS
Status: DISCONTINUED | OUTPATIENT
Start: 2024-08-19 | End: 2024-08-19 | Stop reason: HOSPADM

## 2024-08-19 RX ORDER — MEGESTROL ACETATE 40 MG/ML
800 SUSPENSION ORAL DAILY
Status: DISCONTINUED | OUTPATIENT
Start: 2024-08-20 | End: 2024-08-22 | Stop reason: HOSPADM

## 2024-08-19 RX ORDER — SODIUM CHLORIDE, SODIUM LACTATE, POTASSIUM CHLORIDE, CALCIUM CHLORIDE 600; 310; 30; 20 MG/100ML; MG/100ML; MG/100ML; MG/100ML
INJECTION, SOLUTION INTRAVENOUS CONTINUOUS
Status: DISCONTINUED | OUTPATIENT
Start: 2024-08-19 | End: 2024-08-19 | Stop reason: HOSPADM

## 2024-08-19 RX ORDER — HYDROMORPHONE HYDROCHLORIDE 1 MG/ML
0.2 INJECTION, SOLUTION INTRAMUSCULAR; INTRAVENOUS; SUBCUTANEOUS
Status: DISCONTINUED | OUTPATIENT
Start: 2024-08-19 | End: 2024-08-19 | Stop reason: HOSPADM

## 2024-08-19 RX ORDER — CEFAZOLIN SODIUM 1 G/3ML
2 INJECTION, POWDER, FOR SOLUTION INTRAMUSCULAR; INTRAVENOUS ONCE
Status: COMPLETED | OUTPATIENT
Start: 2024-08-19 | End: 2024-08-19

## 2024-08-19 RX ORDER — TRANEXAMIC ACID 100 MG/ML
INJECTION, SOLUTION INTRAVENOUS PRN
Status: DISCONTINUED | OUTPATIENT
Start: 2024-08-19 | End: 2024-08-19 | Stop reason: SURG

## 2024-08-19 RX ORDER — DEXAMETHASONE SODIUM PHOSPHATE 4 MG/ML
INJECTION, SOLUTION INTRA-ARTICULAR; INTRALESIONAL; INTRAMUSCULAR; INTRAVENOUS; SOFT TISSUE PRN
Status: DISCONTINUED | OUTPATIENT
Start: 2024-08-19 | End: 2024-08-19 | Stop reason: SURG

## 2024-08-19 RX ORDER — ONDANSETRON 2 MG/ML
INJECTION INTRAMUSCULAR; INTRAVENOUS PRN
Status: DISCONTINUED | OUTPATIENT
Start: 2024-08-19 | End: 2024-08-19 | Stop reason: SURG

## 2024-08-19 RX ADMIN — MIDODRINE HYDROCHLORIDE 10 MG: 5 TABLET ORAL at 17:35

## 2024-08-19 RX ADMIN — CEFAZOLIN 2 G: 1 INJECTION, POWDER, FOR SOLUTION INTRAMUSCULAR; INTRAVENOUS at 07:33

## 2024-08-19 RX ADMIN — SUFENTANIL CITRATE 0.2 MCG/KG/HR: 50 INJECTION EPIDURAL; INTRAVENOUS at 07:05

## 2024-08-19 RX ADMIN — METHOCARBAMOL 1000 MG: 100 INJECTION, SOLUTION INTRAMUSCULAR; INTRAVENOUS at 23:14

## 2024-08-19 RX ADMIN — HYDROMORPHONE HYDROCHLORIDE 0.5 MG: 1 INJECTION, SOLUTION INTRAMUSCULAR; INTRAVENOUS; SUBCUTANEOUS at 14:23

## 2024-08-19 RX ADMIN — SODIUM CHLORIDE, SODIUM GLUCONATE, SODIUM ACETATE, POTASSIUM CHLORIDE AND MAGNESIUM CHLORIDE: 526; 502; 368; 37; 30 INJECTION, SOLUTION INTRAVENOUS at 07:29

## 2024-08-19 RX ADMIN — HYDROMORPHONE HYDROCHLORIDE 1 MG: 2 INJECTION INTRAMUSCULAR; INTRAVENOUS; SUBCUTANEOUS at 09:06

## 2024-08-19 RX ADMIN — LIDOCAINE HYDROCHLORIDE 80 MG: 20 INJECTION, SOLUTION EPIDURAL; INFILTRATION; INTRACAUDAL at 07:05

## 2024-08-19 RX ADMIN — TRANEXAMIC ACID 1000 MG: 100 INJECTION, SOLUTION INTRAVENOUS at 11:57

## 2024-08-19 RX ADMIN — DEXAMETHASONE SODIUM PHOSPHATE 8 MG: 4 INJECTION INTRA-ARTICULAR; INTRALESIONAL; INTRAMUSCULAR; INTRAVENOUS; SOFT TISSUE at 07:08

## 2024-08-19 RX ADMIN — METHOCARBAMOL 1000 MG: 100 INJECTION INTRAMUSCULAR; INTRAVENOUS at 13:47

## 2024-08-19 RX ADMIN — PHENYLEPHRINE HYDROCHLORIDE 50 MCG/MIN: 10 INJECTION INTRAVENOUS at 07:06

## 2024-08-19 RX ADMIN — HYDROMORPHONE HYDROCHLORIDE 0.5 MG: 1 INJECTION, SOLUTION INTRAMUSCULAR; INTRAVENOUS; SUBCUTANEOUS at 14:31

## 2024-08-19 RX ADMIN — CEFAZOLIN 2 G: 1 INJECTION, POWDER, FOR SOLUTION INTRAMUSCULAR; INTRAVENOUS at 10:56

## 2024-08-19 RX ADMIN — ACETAMINOPHEN 1000 MG: 325 TABLET ORAL at 06:51

## 2024-08-19 RX ADMIN — HYDROMORPHONE HYDROCHLORIDE 0.5 MG: 1 INJECTION, SOLUTION INTRAMUSCULAR; INTRAVENOUS; SUBCUTANEOUS at 13:47

## 2024-08-19 RX ADMIN — ACETAMINOPHEN 1000 MG: 500 TABLET ORAL at 17:35

## 2024-08-19 RX ADMIN — SUFENTANIL CITRATE 15 MCG: 50 INJECTION EPIDURAL; INTRAVENOUS at 07:17

## 2024-08-19 RX ADMIN — Medication 25 MG: at 08:21

## 2024-08-19 RX ADMIN — DOCUSATE SODIUM 100 MG: 100 CAPSULE, LIQUID FILLED ORAL at 17:35

## 2024-08-19 RX ADMIN — HYDROMORPHONE HYDROCHLORIDE 2 MG: 2 INJECTION, SOLUTION INTRAMUSCULAR; INTRAVENOUS; SUBCUTANEOUS at 20:06

## 2024-08-19 RX ADMIN — CEFAZOLIN 2 G: 2 INJECTION, POWDER, FOR SOLUTION INTRAMUSCULAR; INTRAVENOUS at 20:17

## 2024-08-19 RX ADMIN — ONDANSETRON 4 MG: 2 INJECTION INTRAMUSCULAR; INTRAVENOUS at 12:12

## 2024-08-19 RX ADMIN — MOMETASONE FUROATE AND FORMOTEROL FUMARATE DIHYDRATE 2 PUFF: 100; 5 AEROSOL RESPIRATORY (INHALATION) at 23:09

## 2024-08-19 RX ADMIN — METHADONE HYDROCHLORIDE 20 MG: 10 INJECTION, SOLUTION INTRAMUSCULAR; INTRAVENOUS; SUBCUTANEOUS at 07:05

## 2024-08-19 RX ADMIN — ROCURONIUM BROMIDE 50 MG: 50 INJECTION, SOLUTION INTRAVENOUS at 07:05

## 2024-08-19 RX ADMIN — HYDROMORPHONE HYDROCHLORIDE 1 MG: 2 INJECTION INTRAMUSCULAR; INTRAVENOUS; SUBCUTANEOUS at 08:59

## 2024-08-19 RX ADMIN — OXYCODONE HYDROCHLORIDE 10 MG: 5 SOLUTION ORAL at 14:15

## 2024-08-19 RX ADMIN — HYDROMORPHONE HYDROCHLORIDE 0.5 MG: 1 INJECTION, SOLUTION INTRAMUSCULAR; INTRAVENOUS; SUBCUTANEOUS at 13:53

## 2024-08-19 RX ADMIN — ACETAMINOPHEN 1000 MG: 500 TABLET ORAL at 23:08

## 2024-08-19 RX ADMIN — MORPHINE SULFATE 30 MG: 15 TABLET ORAL at 23:07

## 2024-08-19 RX ADMIN — MIDAZOLAM HYDROCHLORIDE 2 MG: 2 INJECTION, SOLUTION INTRAMUSCULAR; INTRAVENOUS at 07:01

## 2024-08-19 RX ADMIN — Medication 25 MG: at 09:54

## 2024-08-19 RX ADMIN — SUFENTANIL CITRATE 15 MCG: 50 INJECTION EPIDURAL; INTRAVENOUS at 08:05

## 2024-08-19 RX ADMIN — PROPOFOL 150 MG: 10 INJECTION, EMULSION INTRAVENOUS at 07:05

## 2024-08-19 RX ADMIN — MORPHINE SULFATE 30 MG: 15 TABLET ORAL at 17:35

## 2024-08-19 RX ADMIN — OXYCODONE HYDROCHLORIDE 10 MG: 5 SOLUTION ORAL at 15:15

## 2024-08-19 RX ADMIN — ANTACID TABLETS 1000 MG: 500 TABLET, CHEWABLE ORAL at 17:35

## 2024-08-19 RX ADMIN — SODIUM CHLORIDE, POTASSIUM CHLORIDE, SODIUM LACTATE AND CALCIUM CHLORIDE: 600; 310; 30; 20 INJECTION, SOLUTION INTRAVENOUS at 07:00

## 2024-08-19 RX ADMIN — SENNOSIDES AND DOCUSATE SODIUM 1 TABLET: 50; 8.6 TABLET ORAL at 20:07

## 2024-08-19 RX ADMIN — PROPOFOL 160 MCG/KG/MIN: 10 INJECTION, EMULSION INTRAVENOUS at 07:06

## 2024-08-19 RX ADMIN — SODIUM CHLORIDE, SODIUM GLUCONATE, SODIUM ACETATE, POTASSIUM CHLORIDE AND MAGNESIUM CHLORIDE: 526; 502; 368; 37; 30 INJECTION, SOLUTION INTRAVENOUS at 10:19

## 2024-08-19 RX ADMIN — SUGAMMADEX 200 MG: 100 INJECTION, SOLUTION INTRAVENOUS at 07:33

## 2024-08-19 RX ADMIN — Medication 50 MG: at 07:05

## 2024-08-19 ASSESSMENT — PATIENT HEALTH QUESTIONNAIRE - PHQ9
2. FEELING DOWN, DEPRESSED, IRRITABLE, OR HOPELESS: NOT AT ALL
1. LITTLE INTEREST OR PLEASURE IN DOING THINGS: NOT AT ALL
SUM OF ALL RESPONSES TO PHQ9 QUESTIONS 1 AND 2: 0

## 2024-08-19 ASSESSMENT — PAIN DESCRIPTION - PAIN TYPE
TYPE: ACUTE PAIN;SURGICAL PAIN
TYPE: ACUTE PAIN;SURGICAL PAIN
TYPE: SURGICAL PAIN
TYPE: ACUTE PAIN;SURGICAL PAIN
TYPE: SURGICAL PAIN
TYPE: SURGICAL PAIN
TYPE: ACUTE PAIN;SURGICAL PAIN

## 2024-08-19 ASSESSMENT — LIFESTYLE VARIABLES
HOW MANY TIMES IN THE PAST YEAR HAVE YOU HAD 5 OR MORE DRINKS IN A DAY: 0
ON A TYPICAL DAY WHEN YOU DRINK ALCOHOL HOW MANY DRINKS DO YOU HAVE: 0
EVER FELT BAD OR GUILTY ABOUT YOUR DRINKING: NO
CONSUMPTION TOTAL: NEGATIVE
HAVE PEOPLE ANNOYED YOU BY CRITICIZING YOUR DRINKING: NO
AVERAGE NUMBER OF DAYS PER WEEK YOU HAVE A DRINK CONTAINING ALCOHOL: 0
HAVE YOU EVER FELT YOU SHOULD CUT DOWN ON YOUR DRINKING: NO
TOTAL SCORE: 0
TOTAL SCORE: 0
EVER HAD A DRINK FIRST THING IN THE MORNING TO STEADY YOUR NERVES TO GET RID OF A HANGOVER: NO
ALCOHOL_USE: NO
DOES PATIENT WANT TO STOP DRINKING: NO
TOTAL SCORE: 0

## 2024-08-19 ASSESSMENT — SOCIAL DETERMINANTS OF HEALTH (SDOH)
WITHIN THE PAST 12 MONTHS, THE FOOD YOU BOUGHT JUST DIDN'T LAST AND YOU DIDN'T HAVE MONEY TO GET MORE: NEVER TRUE
WITHIN THE LAST YEAR, HAVE YOU BEEN AFRAID OF YOUR PARTNER OR EX-PARTNER?: NO
IN THE PAST 12 MONTHS, HAS THE ELECTRIC, GAS, OIL, OR WATER COMPANY THREATENED TO SHUT OFF SERVICE IN YOUR HOME?: NO
WITHIN THE LAST YEAR, HAVE TO BEEN RAPED OR FORCED TO HAVE ANY KIND OF SEXUAL ACTIVITY BY YOUR PARTNER OR EX-PARTNER?: NO
WITHIN THE LAST YEAR, HAVE YOU BEEN KICKED, HIT, SLAPPED, OR OTHERWISE PHYSICALLY HURT BY YOUR PARTNER OR EX-PARTNER?: NO
WITHIN THE PAST 12 MONTHS, YOU WORRIED THAT YOUR FOOD WOULD RUN OUT BEFORE YOU GOT THE MONEY TO BUY MORE: NEVER TRUE
WITHIN THE LAST YEAR, HAVE YOU BEEN HUMILIATED OR EMOTIONALLY ABUSED IN OTHER WAYS BY YOUR PARTNER OR EX-PARTNER?: NO

## 2024-08-19 ASSESSMENT — FIBROSIS 4 INDEX: FIB4 SCORE: 0.92

## 2024-08-19 ASSESSMENT — PAIN SCALES - GENERAL: PAIN_LEVEL: 6

## 2024-08-19 NOTE — LETTER
July 24, 2024    Patient Name: Andrew Wall  Surgeon Name: Kevin Lwarence M.D.  Surgery Facility[place] [facility] [place] [facility] [facility] [address] [facility])  Surgery Date: 8/19/2024    The time of your surgery is not final and may change up to and until the day of your surgery. You will be contacted 1-2 business days prior to your surgery date with your check-in and surgery time.    BEFORE YOUR SURGERY - WITH THE FACILITY  Pre Registration and/or Lab Work/Tests must be done within 60 days of your surgery date. These will be done at [facility], with an appointment. This appointment should be completed before your pre op appointment in our office.    On 8/12 - if you have not already heard from [facility] Pre Admission/Registration Department, please call them at [phone] option 2, then option 1, for an appointment.      BEFORE YOUR SURGERY - WITH YOUR SURGEONS OFFICE  Pre op Appointment:   Date: 08/14/24   Time: 11:00AM   Provider: Linnea Velasquez PA-C    Location: [address] [place] [address]    Bring a list of all medications you are currently taking including the dosing and frequency.    Please read the MEDICATION INSTRUCTIONS below completely.    DAY OF YOUR SURGERY  Nothing to eat or drink and refrain from smoking any substance after midnight the day of your surgery. Smoking may interfere with the anesthetic and frequently produces nausea during the recovery period.    Continue taking all lifesaving medications, including the morning of your surgery with small sip of water.    You will need a responsible adult to drive you to and from your surgery.    AFTER YOUR SURGERY  2 Week Post op Appointment:   Date: 09/03/24   Time: 2:00PM  With: Linnea Velasquez PA-C   Location: [address] [place] [address]    MEDICATION INSTRUCTIONS  Do not take these medications prior to your procedure:            Anti-inflammatories: stop 7 days prior, restart when advised. For fusions avoid for 12 weeks after surgery            Naproxen (Naprosyn or  Alleve)            Motrin            Ibuprofen            Nabumetone (Relafen)            Meloxicam (Mobic)            Celebrex            Salsalate            Diclofenac (Arthrotec, Voltaren, Flector)            Sulindac (Clinoril            Etodolac (Lodine)            Indomethacin (Indocin)            Ketoprofen            Ketorolac            Oxaprozin (Daypro)            Piroxicam (Feldene)            Blood thinners (stop after approval from the prescribing physician)            Aspirin (any dosage): Stop 14 days prior, restart 7 days after procedure            Any medications that contain aspirin in combination (ie: Excedrin migraine, Fiorinal, and Norgesic)            Warfarin (Coumadin): Stop 14 days prior, INR day of procedure, restart 2-3 weeks after procedure            Antiplatelet: Stop 14 days prior, restart 7 days after procedure            Ticlid (ticlopidine): Stop 14 days prior            Plavix (clopidogrel): Stop 7 days prior            Aggrenox or Dipyridamole: Stop 14 days prior            ReoPro (abciximab): Stop 14 days prior            Integrilin (eptifibatide): Stop 14 days prior            Aggrastat (tirofiban): Stop 14 days prior            Lovenox (Enoxaparin): Stop 24hrs before and restart 24hrs after procedure            Heparin: Stop 24hrs before             Dalteparin (Fragmin): Stop 24hrs before            Fondaparinux (Arixtra): Stop 24hrs before            Xeralto, Dabigatran (Pradaxa) Stop 5 days prior            Eliquis (apibaxan) Stop 7 days prior    Okay to take these medications as prescribed:            Muscle relaxers            Acetaminophen and pain medications that have it in addition to oxycodone and hydrocodone            Blood pressure, cholesterol and diabetes medications are ok      TIME OFF WORK  FMLA or Disability forms can be faxed directly to [phone] or you may drop them off at any [facility]. Our office charges a $35.00 fee. Forms will be  completed within 5-10 business days after payment is received. For the status of your forms you may contact our disability office directly at [phone].    DENTAL PROCDURES/CLEANINGS Avoid 3 weeks before surgery and for 3 months after surgery.    QUESTIONS ABOUT COSTS  Contact our Patient Financial Services department at [phone] for more information.    If you have any questions, please contact our office.    Janae   Surgery Scheduler  [email or web address]  ? [phone]   Fax: [phone]  EXT [phone]  [place] [address]  [phone]

## 2024-08-19 NOTE — PROGRESS NOTES
Pharmacy Medication Reconciliation      ~Medication reconciliation updated and complete per patient   ~Allergies have been verified and updated   ~No oral ABX within the last 30 days  ~Patient home pharmacy :  [facility] [phone]      ~Anticoagulants (rivaroxaban, apixaban, edoxaban, dabigatran, warfarin, enoxaparin) taken in the last 14 days? No  ~

## 2024-08-19 NOTE — OP REPORT
PREOPERATIVE DIAGNOSIS(ES): C2 C6-C7 metastatic disease with fracture and subsequent kyphosis     POSTOPERATIVE DIAGNOSIS(ES): Same     PROCEDURE: C1-to T2 posterior cervical instrumented fusion, local autograft, use of intraoperative navigation, allograft, use of I factor, foraminotomies bilateral T1-T2      ANESTHESIA: General endotracheal.     SURGEON(S): Kevin Lawrence MD     ASSISTANT: Linnea Velasquez PA-C  ESTIMATED BLOOD LOSS: 300 cc.     TRANSFUSIONS: None.     IMPLANTS: atec invivtus, demineralized bone matrix.     DRAINS: MARIN x1.     SPECIMENS: None.     COMPLICATIONS: None.     DISPOSITION: The patient was transferred to the recovery room at the end of the case in stable condition.     INDICATIONS:    59 y.o. male that presents for follow up of increasing cervical kyphosis in the setting of metastatic melanoma with metastatic disease and C6-C7 increasing collapse of the C6 vertebrae.  He did have a prior odontoid.  We initially tried to tease pathologic fractures in the cervical collar.  Unfortunately patient experienced significant increase in kyphosis with a C2 tilt of approximately 66 degrees.  At patient's last visit a C1-T2 cervical fusion was offered.       Prior to surgery, the patient was told the nature of the surgical procedure, the alternatives of surgery, the risks and benefits of the operation. The risks of surgery include, but not limited to spinal cord injury, nerve root injury, cerebrospinal fluid leak, incomplete resolution of symptoms, iatrogenic destabilization, instrumentation failure, wound infection, nonunion, and general medical and anesthetic complications. Despite the risks, the patient elected to proceed with surgery.     DESCRIPTION OF PROCEDURE: The patient was identified in the holding area by their name, medical record number, and date of birth. The surgical site was marked and preoperative antibiotics were administered. The patient was transferred to the operating room. General  anesthesia was induced. Sequential compression devices were placed and activated. The patient was placed in the [place] tongs. They were log-rolled into the prone position on the operating room table where all bony prominences were padded. [place] adaptor was used to secure the position of the head and neck. Posterior aspect of the neck was prepped and draped in standard fashion. A surgical time-out was performed. A longitudinal incision was made extending from the spinous process of C1 to the spinous process of T2. This incision was carried down through subcutaneous tissue exposing the underlying fascia. The fascia was split longitudinally in the midline exposing the lamina and lateral masses from C1-T2. Lateral radiograph confirmed the surgical levels.  We carefully exposed the inferior aspect of C1.  I bluntly dissected down to the lateral mass of C1 bilaterally.    O-arm spin was performed.  The navigation array was attached to the [place].  Navigation was used to place bilateral C1 screws.  For each C1 lateral mass screw we initially used a navigated bur to make a  hole in the lateral mass followed by navigated tap followed by navigated placement of shank screws bilaterally both were 28 mm in length.    Next I placed bilateral C2 pars/pedicle screws.  This was done via navigation guidance using a navigated Midas to cannulate the pedicle followed by nag the gated tap followed by ball-tipped probe to ensure lack of breach followed by navigated screwdriver to place appropriate length screws.    Next we turned our attention to bilateral T1 and T2 pedicle screws.  I checked the accuracy of the navigation array at the T1 pedicle navigation was less accurate so freehand technique was used.  I performed a foraminotomies bilaterally at T1 and T2.  I directly palpated the pedicle with a nerve hook.  A Midas bur was used to make a  hole.  Tap was used to advance through the pedicle to 24 mm.  Ball-tipped  probe was used to ensure lack of breach.  24 mm screws were placed bilaterally and T1 and T2.            Next I turned my attention to creation of lateral mass screw tracts from C3-C6.  On the right side I placed C3 C4-C5-C6 lateral mass screws.  On the left side I placed C5-C6 lateral mass screws.  This was to help alignment issues on the left.  First, a  hole was made using a high-speed ruiz in the cranial-caudal and medial-lateral midpoint of the lateral mass using an up and out trajectory. A tap was advanced using the same up and out trajectory to depth of 14 mm.   A 3.5 mm diameter 14 mm length screws were placed bilaterally from C3-C6 with good purchase. The neck was extended into more lordotic position.  Rods were cut and contoured were placed into the screw heads bilaterally from C1-T2 and locked in place with end caps, which were final tightened. AP and lateral radiographs demonstrated good alignment of the spine and position of the instrumentation.     The wound was irrigated with 3 L of crystalloid containing antibiotics. Bone graft harvested during the laminotomy was augmented with crushed cancellous allograft and I factor and placed on top of the decorticated posterior elements. A subfascial drain was placed. The fascia was closed using interrupted #1 Vicryl sutures. The superficial subcutaneous tissue was closed using interrupted 2-0 Vicryl sutures. The skin was closed using running locking 3-0 nylon suture. A sterile bandage was placed. The patient was returned to the supine position where the Arndt tongs were removed. He was extubated without incident and transferred to the recovery room in stable condition. At the end of the operation, all sponge, needle, and instrument counts were correct. I was present for and performed all critical aspects of the surgery. There were no complications associated with the surgery.

## 2024-08-19 NOTE — ANESTHESIA PROCEDURE NOTES
Arterial Line    Performed by: Krzysztof Cantu M.D.  Authorized by: Krzysztof Cantu M.D.    Start Time:  8/19/2024 7:10 AM  End Time:  8/19/2024 7:12 AM  Localization: surface landmarks    Patient Location:  OR  Indication: continuous blood pressure monitoring        Catheter Size:  20 G  Seldinger Technique?: Yes    Laterality:  Left  Site:  Radial artery  Line Secured:  Antimicrobial disc, tape and transparent dressing  Events: patient tolerated procedure well with no complications

## 2024-08-19 NOTE — ANESTHESIA TIME REPORT
Anesthesia Start and Stop Event Times       Date Time Event    8/19/2024 0628 Ready for Procedure     0700 Anesthesia Start     1305 Anesthesia Stop          Responsible Staff  08/19/24      Name Role Begin End    Krzysztof Cantu M.D. Anesth 0700 1305          Overtime Reason:  no overtime (within assigned shift)    Comments:

## 2024-08-19 NOTE — ANESTHESIA PROCEDURE NOTES
Airway    Date/Time: 8/19/2024 7:07 AM    Performed by: Krzysztof Cantu M.D.  Authorized by: Krzysztof Cantu M.D.    Location:  OR  Urgency:  Elective  Difficult Airway: No    Indications for Airway Management:  Anesthesia      Spontaneous Ventilation: absent    Sedation Level:  Deep  Preoxygenated: Yes    Patient Position:  Sniffing  Mask Difficulty Assessment:  1 - vent by mask  Final Airway Type:  Endotracheal airway  Final Endotracheal Airway:  ETT  Cuffed: Yes    Technique Used for Successful ETT Placement:  Video laryngoscopy    Insertion Site:  Oral  Blade Type:  Raymond  Laryngoscope Blade/Videolaryngoscope Blade Size:  3  ETT Size (mm):  7.0  Measured from:  Lips  ETT to Lips (cm):  24  Placement Verified by: auscultation and capnometry    Cormack-Lehane Classification:  Grade I - full view of glottis  Number of Attempts at Approach:  1   Straight to glidescope due to severity of c-spine disease, atraumatic intubation while minimizing c-spine manipulation

## 2024-08-19 NOTE — ANESTHESIA PREPROCEDURE EVALUATION
Case: [id number] Date/Time: 08/19/24 0645    Procedure: C1-T2 posterior instrumented fusion - Length: 5hrs    Diagnosis: Cervical stenosis of spine [M48.02]    Pre-op diagnosis: Cervical stenosis of spine [M48.02]    Location: [facility] 08 / SURGERY [facility]    Surgeons: Kevin Lawrence M.D.          Malignant melanoma with bony metastases, asthma, MS Contin for pain, echo from 1/2024 wnl.    Relevant Problems   PULMONARY   (positive) Mild intermittent asthma without complication   (positive) Moderate persistent asthma without complication       Physical Exam    Airway   Mallampati: II  TM distance: >3 FB  Neck ROM: full       Cardiovascular - normal exam  Rhythm: regular  Rate: normal  (-) murmur     Dental - normal exam           Pulmonary - normal exam  Breath sounds clear to auscultation     Abdominal    Neurological - normal exam                   Anesthesia Plan    ASA 2       Plan - general       Airway plan will be ETT          Induction: intravenous    Postoperative Plan: Postoperative administration of opioids is intended.    Pertinent diagnostic labs and testing reviewed    Informed Consent:    Anesthetic plan and risks discussed with patient.    Use of blood products discussed with: patient whom consented to blood products.

## 2024-08-19 NOTE — ANESTHESIA POSTPROCEDURE EVALUATION
Patient: Andrew Wall    Procedure Summary       Date: 08/19/24 Room / Location: [place] [address] / SURGERY [facility]    Anesthesia Start: 0700 Anesthesia Stop: 1305    Procedure: C1-T2 posterior instrumented fusion (Spine Cervical) Diagnosis:       Cervical stenosis of spine      (C6-C7 Fracture)    Surgeons: Kevin Lawrence M.D. Responsible Provider: Krzysztof Cantu M.D.    Anesthesia Type: general ASA Status: 2            Final Anesthesia Type: general  Last vitals  BP   Blood Pressure: 112/74    Temp   36.5 °C (97.7 °F)    Pulse   92   Resp   13    SpO2   95 %      Anesthesia Post Evaluation    Patient location during evaluation: PACU  Patient participation: complete - patient participated  Level of consciousness: awake and alert  Pain score: 6    Airway patency: patent  Anesthetic complications: no  Cardiovascular status: hemodynamically stable  Respiratory status: acceptable  Hydration status: euvolemic    PONV: none          There were no known notable events for this encounter.

## 2024-08-20 LAB
ANION GAP SERPL CALC-SCNC: 11 MMOL/L (ref 7–16)
BUN SERPL-MCNC: 11 MG/DL (ref 8–22)
CALCIUM SERPL-MCNC: 7.7 MG/DL (ref 8.5–10.5)
CHLORIDE SERPL-SCNC: 104 MMOL/L (ref 96–112)
CO2 SERPL-SCNC: 20 MMOL/L (ref 20–33)
CREAT SERPL-MCNC: 0.42 MG/DL (ref 0.5–1.4)
ERYTHROCYTE [DISTWIDTH] IN BLOOD BY AUTOMATED COUNT: 46.7 FL (ref 35.9–50)
GFR SERPLBLD CREATININE-BSD FMLA CKD-EPI: 123 ML/MIN/1.73 M 2
GLUCOSE SERPL-MCNC: 123 MG/DL (ref 65–99)
HCT VFR BLD AUTO: 26.9 % (ref 42–52)
HGB BLD-MCNC: 8.9 G/DL (ref 14–18)
MCH RBC QN AUTO: 28.6 PG (ref 27–33)
MCHC RBC AUTO-ENTMCNC: 33.1 G/DL (ref 32.3–36.5)
MCV RBC AUTO: 86.5 FL (ref 81.4–97.8)
PLATELET # BLD AUTO: 264 K/UL (ref 164–446)
PMV BLD AUTO: 8.9 FL (ref 9–12.9)
POTASSIUM SERPL-SCNC: 4.3 MMOL/L (ref 3.6–5.5)
RBC # BLD AUTO: 3.11 M/UL (ref 4.7–6.1)
SODIUM SERPL-SCNC: 135 MMOL/L (ref 135–145)
WBC # BLD AUTO: 5.4 K/UL (ref 4.8–10.8)

## 2024-08-20 PROCEDURE — A9270 NON-COVERED ITEM OR SERVICE: HCPCS | Performed by: STUDENT IN AN ORGANIZED HEALTH CARE EDUCATION/TRAINING PROGRAM

## 2024-08-20 PROCEDURE — 700102 HCHG RX REV CODE 250 W/ 637 OVERRIDE(OP): Performed by: STUDENT IN AN ORGANIZED HEALTH CARE EDUCATION/TRAINING PROGRAM

## 2024-08-20 PROCEDURE — 97162 PT EVAL MOD COMPLEX 30 MIN: CPT

## 2024-08-20 PROCEDURE — 700102 HCHG RX REV CODE 250 W/ 637 OVERRIDE(OP)

## 2024-08-20 PROCEDURE — 97535 SELF CARE MNGMENT TRAINING: CPT

## 2024-08-20 PROCEDURE — 700111 HCHG RX REV CODE 636 W/ 250 OVERRIDE (IP)

## 2024-08-20 PROCEDURE — 99024 POSTOP FOLLOW-UP VISIT: CPT

## 2024-08-20 PROCEDURE — 85027 COMPLETE CBC AUTOMATED: CPT

## 2024-08-20 PROCEDURE — 770001 HCHG ROOM/CARE - MED/SURG/GYN PRIV*

## 2024-08-20 PROCEDURE — 80048 BASIC METABOLIC PNL TOTAL CA: CPT

## 2024-08-20 PROCEDURE — 700101 HCHG RX REV CODE 250

## 2024-08-20 PROCEDURE — 36415 COLL VENOUS BLD VENIPUNCTURE: CPT

## 2024-08-20 PROCEDURE — 97166 OT EVAL MOD COMPLEX 45 MIN: CPT

## 2024-08-20 PROCEDURE — 700102 HCHG RX REV CODE 250 W/ 637 OVERRIDE(OP): Performed by: INTERNAL MEDICINE

## 2024-08-20 PROCEDURE — A9270 NON-COVERED ITEM OR SERVICE: HCPCS

## 2024-08-20 PROCEDURE — 700105 HCHG RX REV CODE 258

## 2024-08-20 PROCEDURE — 700111 HCHG RX REV CODE 636 W/ 250 OVERRIDE (IP): Mod: JZ | Performed by: STUDENT IN AN ORGANIZED HEALTH CARE EDUCATION/TRAINING PROGRAM

## 2024-08-20 RX ADMIN — MIDODRINE HYDROCHLORIDE 10 MG: 5 TABLET ORAL at 17:21

## 2024-08-20 RX ADMIN — SENNOSIDES AND DOCUSATE SODIUM 1 TABLET: 50; 8.6 TABLET ORAL at 20:45

## 2024-08-20 RX ADMIN — MOMETASONE FUROATE AND FORMOTEROL FUMARATE DIHYDRATE 2 PUFF: 100; 5 AEROSOL RESPIRATORY (INHALATION) at 17:20

## 2024-08-20 RX ADMIN — DOCUSATE SODIUM 100 MG: 100 CAPSULE, LIQUID FILLED ORAL at 05:40

## 2024-08-20 RX ADMIN — HYDROMORPHONE HYDROCHLORIDE 2 MG: 2 INJECTION, SOLUTION INTRAMUSCULAR; INTRAVENOUS; SUBCUTANEOUS at 03:49

## 2024-08-20 RX ADMIN — MORPHINE SULFATE 30 MG: 15 TABLET ORAL at 15:49

## 2024-08-20 RX ADMIN — DOCUSATE SODIUM 100 MG: 100 CAPSULE, LIQUID FILLED ORAL at 17:21

## 2024-08-20 RX ADMIN — METHOCARBAMOL 1000 MG: 100 INJECTION, SOLUTION INTRAMUSCULAR; INTRAVENOUS at 22:18

## 2024-08-20 RX ADMIN — ACETAMINOPHEN 1000 MG: 500 TABLET ORAL at 13:03

## 2024-08-20 RX ADMIN — MEGESTROL ACETATE 800 MG: 40 SUSPENSION ORAL at 05:44

## 2024-08-20 RX ADMIN — HYDROMORPHONE HYDROCHLORIDE 2 MG: 2 INJECTION, SOLUTION INTRAMUSCULAR; INTRAVENOUS; SUBCUTANEOUS at 17:21

## 2024-08-20 RX ADMIN — BINIMETINIB 30 MG: 15 TABLET, FILM COATED ORAL at 17:15

## 2024-08-20 RX ADMIN — MORPHINE SULFATE 30 MG: 15 TABLET ORAL at 20:44

## 2024-08-20 RX ADMIN — MORPHINE SULFATE 30 MG: 15 TABLET ORAL at 05:40

## 2024-08-20 RX ADMIN — MOMETASONE FUROATE AND FORMOTEROL FUMARATE DIHYDRATE 2 PUFF: 100; 5 AEROSOL RESPIRATORY (INHALATION) at 05:41

## 2024-08-20 RX ADMIN — CEFAZOLIN 2 G: 2 INJECTION, POWDER, FOR SOLUTION INTRAMUSCULAR; INTRAVENOUS at 03:51

## 2024-08-20 RX ADMIN — HYDROMORPHONE HYDROCHLORIDE 2 MG: 2 INJECTION, SOLUTION INTRAMUSCULAR; INTRAVENOUS; SUBCUTANEOUS at 22:15

## 2024-08-20 RX ADMIN — ACETAMINOPHEN 1000 MG: 500 TABLET ORAL at 05:40

## 2024-08-20 RX ADMIN — MIDODRINE HYDROCHLORIDE 10 MG: 5 TABLET ORAL at 10:32

## 2024-08-20 RX ADMIN — METHOCARBAMOL 1000 MG: 100 INJECTION, SOLUTION INTRAMUSCULAR; INTRAVENOUS at 08:24

## 2024-08-20 RX ADMIN — HYDROMORPHONE HYDROCHLORIDE 2 MG: 2 INJECTION, SOLUTION INTRAMUSCULAR; INTRAVENOUS; SUBCUTANEOUS at 08:40

## 2024-08-20 RX ADMIN — MORPHINE SULFATE 30 MG: 15 TABLET ORAL at 02:13

## 2024-08-20 RX ADMIN — METHOCARBAMOL 1000 MG: 100 INJECTION, SOLUTION INTRAMUSCULAR; INTRAVENOUS at 14:14

## 2024-08-20 RX ADMIN — ENCORAFENIB 300 MG: 75 CAPSULE ORAL at 17:15

## 2024-08-20 RX ADMIN — ANTACID TABLETS 1000 MG: 500 TABLET, CHEWABLE ORAL at 17:20

## 2024-08-20 RX ADMIN — HYDROMORPHONE HYDROCHLORIDE 2 MG: 2 INJECTION, SOLUTION INTRAMUSCULAR; INTRAVENOUS; SUBCUTANEOUS at 13:03

## 2024-08-20 RX ADMIN — MORPHINE SULFATE 30 MG: 15 TABLET ORAL at 10:33

## 2024-08-20 ASSESSMENT — PAIN DESCRIPTION - PAIN TYPE
TYPE: SURGICAL PAIN;ACUTE PAIN
TYPE: SURGICAL PAIN
TYPE: ACUTE PAIN;SURGICAL PAIN
TYPE: ACUTE PAIN;SURGICAL PAIN;CHRONIC PAIN
TYPE: ACUTE PAIN;SURGICAL PAIN
TYPE: SURGICAL PAIN

## 2024-08-20 ASSESSMENT — COGNITIVE AND FUNCTIONAL STATUS - GENERAL
WALKING IN HOSPITAL ROOM: A LITTLE
HELP NEEDED FOR BATHING: A LOT
DAILY ACTIVITIY SCORE: 18
CLIMB 3 TO 5 STEPS WITH RAILING: A LITTLE
SUGGESTED CMS G CODE MODIFIER DAILY ACTIVITY: CK
MOVING FROM LYING ON BACK TO SITTING ON SIDE OF FLAT BED: A LITTLE
MOBILITY SCORE: 18
DRESSING REGULAR UPPER BODY CLOTHING: A LITTLE
SUGGESTED CMS G CODE MODIFIER MOBILITY: CK
TOILETING: A LITTLE
TURNING FROM BACK TO SIDE WHILE IN FLAT BAD: A LITTLE
MOVING TO AND FROM BED TO CHAIR: A LITTLE
DRESSING REGULAR LOWER BODY CLOTHING: A LOT
STANDING UP FROM CHAIR USING ARMS: A LITTLE

## 2024-08-20 ASSESSMENT — GAIT ASSESSMENTS
DISTANCE (FEET): 100
ASSISTIVE DEVICE: FRONT WHEEL WALKER
GAIT LEVEL OF ASSIST: SUPERVISED
DEVIATION: SHUFFLED GAIT;BRADYKINETIC

## 2024-08-20 ASSESSMENT — ACTIVITIES OF DAILY LIVING (ADL): TOILETING: INDEPENDENT

## 2024-08-20 NOTE — PROGRESS NOTES
1650: Patient arrived to unit. A&Ox4. Neck brace on with dressing in place over posterior neck surgical incision CDI. MARIN drain to posterior neck with sanguinous output. Santiago catheter in place. Oriented to admission process and usage of call light.

## 2024-08-20 NOTE — DISCHARGE PLANNING
RN CAROLYN met with patient and patient's wife and daughter at bedside to complete assessment. Patient pleasantly A&Ox4 and able to verify information on face sheet.   Lives in single story home in [place] with his fully functional wife who works at home and provides support with ADLs and IADLs.   Owns multiple pieces of DME at home. Wife drives him.   PT/OT recommending home health. Patient previously on service with [facility]. Choice signed by patient's wife for: 1. [facility], pending HH order from physician.   Pending medical clearance.   Will be discharged home with private transportation from spouse.     Case Management Discharge Planning    Admission Date: 8/19/2024  GMLOS: 2.6  ALOS: 1    6-Clicks ADL Score: 18  6-Clicks Mobility Score: 18      Anticipated Discharge Dispo: Discharge Disposition: D/T to home under HHA care in anticipation of covered skilled care (06)  Discharge Address: [address] [place] [address]  Discharge Contact Phone Number: [phone]    DME Needed: No    Action(s) Taken: Updated Provider/Nurse on Discharge Plan, Patient Conference, DC Assessment Complete (See below), and Choice obtained    Escalations Completed: None    Medically Clear: No    Next Steps: Pending medical clearance and HH acceptance.     Barriers to Discharge: Medical clearance    Is the patient up for discharge tomorrow: Unknown at this time.     Care Transition Team Assessment    Information Source  Orientation Level: Oriented X4  Informant's Name: Sebastian  Who is responsible for making decisions for patient? : Patient    Readmission Evaluation  Is this a readmission?: No    Elopement Risk  Legal Hold: No  Ambulatory or Self Mobile in Wheelchair: Yes  Disoriented: No  Psychiatric Symptoms: None  History of Wandering: No  Elopement this Admit: No  Vocalizing Wanting to Leave: No  Displays Behaviors, Body Language Wanting to Leave: No-Not at Risk for Elopement  Elopement Risk: Not at Risk for Elopement    Interdisciplinary  Discharge Planning  Does Admitting Nurse Feel This Could be a Complex Discharge?: No  Primary Care Physician: Jose Luis Juarez  Lives with - Patient's Self Care Capacity: Spouse  Patient or legal guardian wants to designate a caregiver: Yes  Caregiver name: Sherly Wall  Caregiver contact info: [phone]  Support Systems: Spouse / Significant Other, Family Member(s)  Housing / Facility: [address] [place] [address]  Prior Services: Home-Independent, Intermittent Physical Support for ADL Per Family    Discharge Preparedness  What is your plan after discharge?: Home health care  What are your discharge supports?: Spouse, Child  Prior Functional Level: Needs Assist with Activities of Daily Living, Uses Wheelchair, Uses Walker, Needs Assist with Medication Management  Difficulity with ADLs: Bathing, Toileting  Difficulty with ADLs Comment: Assistance from wife  Difficulity with IADLs: Cooking, Driving, Keeping track of finances, Laundry, Managing medication, Shopping  Difficulity with IADL Comments: Assistance from wife and daughter.    Functional Assesment  Prior Functional Level: Needs Assist with Activities of Daily Living, Uses Wheelchair, Uses Walker, Needs Assist with Medication Management    Finances  Financial Barriers to Discharge: No  Prescription Coverage: Yes    Values / Beliefs / Concerns  Values / Beliefs Concerns : No    Advance Directive  Advance Directive?: POLST, Living Will    Domestic Abuse  Physical Abuse or Sexual Abuse: No  Verbal Abuse or Emotional Abuse: No  Possible Abuse/Neglect Reported to:: Not Applicable    Psychological Assessment  History of Substance Abuse: None  History of Psychiatric Problems: No  Non-compliant with Treatment: No  Newly Diagnosed Illness: No    Discharge Risks or Barriers  Discharge risks or barriers?: Complex medical needs  Patient risk factors: Cognitive / sensory / physical deficit, Complex medical needs, Vulnerable adult    Anticipated Discharge Information  Discharge  Disposition: D/T to home under HHA care in anticipation of covered skilled care (06)  Discharge Address: [address] [place] [address]  Discharge Contact Phone Number: [phone]

## 2024-08-20 NOTE — DISCHARGE PLANNING
Met with patient and spouse at bedside. Pt and wife report therapy worked with him this morning and cleared him for home, pt prefers to discharge home with wife. TCC will continue to follow for therapy evals.

## 2024-08-20 NOTE — PROGRESS NOTES
"Spine Surgery Progress Note    59 y.o. male sp C1-T2 posterior cervical fusion on 8/19/2024.    S: GALILEA. Having severe neck pain. Denies CP/SOB or abdominal discomfort. Passing flatus no issues    O:  /75   Pulse 97   Temp 36.5 °C (97.7 °F) (Temporal)   Resp 15   Ht 1.803 m (5' 11\")   Wt 68.3 kg (150 lb 9.2 oz) Comment: Simultaneous filing. User may not have seen previous data.  SpO2 96%   BMI 21.00 kg/m²     Gen: WNWD NAD  Breathing comfortably    Dressing CDI  Cervical    Deltoid  Bi  Tri  WE  Flex  Finger Flexion  Right      5  5   5   5     5   5  Left      5  5   5   5    5   5    SILT C5-T1 BUE         Lab Results   Component Value Date/Time    WBC 5.4 08/20/2024 05:11 AM    RBC 3.11 (L) 08/20/2024 05:11 AM    HEMOGLOBIN 8.9 (L) 08/20/2024 05:11 AM    HEMATOCRIT 26.9 (L) 08/20/2024 05:11 AM    MCV 86.5 08/20/2024 05:11 AM    MCH 28.6 08/20/2024 05:11 AM    MCHC 33.1 08/20/2024 05:11 AM    MPV 8.9 (L) 08/20/2024 05:11 AM    NEUTSPOLYS 65.30 08/14/2024 09:57 AM    LYMPHOCYTES 16.00 (L) 08/14/2024 09:57 AM    MONOCYTES 7.10 08/14/2024 09:57 AM    EOSINOPHILS 8.60 (H) 08/14/2024 09:57 AM    BASOPHILS 1.10 08/14/2024 09:57 AM    HYPOCHROMIA 1+ 03/22/2024 02:10 AM    ANISOCYTOSIS 2+ (A) 03/22/2024 02:10 AM      Lab Results   Component Value Date/Time    SODIUM 135 08/20/2024 05:11 AM    POTASSIUM 4.3 08/20/2024 05:11 AM    CHLORIDE 104 08/20/2024 05:11 AM    CO2 20 08/20/2024 05:11 AM    GLUCOSE 123 (H) 08/20/2024 05:11 AM    BUN 11 08/20/2024 05:11 AM    CREATININE 0.42 (L) 08/20/2024 05:11 AM          AP: 59 y.o. male sp C1-T2 posterior cervical fusion on 8/19/2024. Having significant neck pain, Dr. Lawrence discussed PCA pump with patient but patient would like to hold off for now.    Anticoagulation: mechanical  D/C Santiago on POD1  Continue PT OT  ADAT to Reg Diet  Daily dressing change by nursing starting POD2  Continue drain  Continue pain control per orders   "

## 2024-08-20 NOTE — DISCHARGE PLANNING
[facility] Transitional Care Coordination     Referral from: DUNIA Velasquez  Insurance Provider on Facesheet: Mj  Potential Rehab Diagnosis: NTSCI/ortho     Chart review indicates patient may have on going medical management and may have therapy needs to possibly meet inpatient rehab facility criteria with the goal of returning to community.     D/C support: Lives with spouse in a 2SH with ramped entry. Pt was at [facility] in April 2024 for encephalopathy and C2 cervical fx on Dr Villasenor's service. Patient was on oral chemotherapy at that time.      Physiatry consultation pended per protocol. Awaiting post op therapy evals, TCC will follow.     Thank you for the referral.

## 2024-08-20 NOTE — CARE PLAN
The patient is Stable - Low risk of patient condition declining or worsening    Shift Goals  Clinical Goals: Safety, Pain control  Patient Goals: Pain control  Family Goals: Not present    Progress made toward(s) clinical / shift goals:    Problem: Pain - Standard  Goal: Alleviation of pain or a reduction in pain to the patient’s comfort goal  Outcome: Progressing  Note: Patient educated on pain scale and to notify RN of pain. Medicated per MAR and repositione d       Problem: Knowledge Deficit - Standard  Goal: Patient and family/care givers will demonstrate understanding of plan of care, disease process/condition, diagnostic tests and medications  Outcome: Progressing  Note: Plan of care discussed, verbalized understanding. Questions answered        Problem: Fall Risk  Goal: Patient will remain free from falls  Outcome: Progressing       Patient is not progressing towards the following goals:

## 2024-08-20 NOTE — CARE PLAN
The patient is Stable - Low risk of patient condition declining or worsening    Shift Goals  Clinical Goals: safety, pain control  Patient Goals: pain control, rest  Family Goals: pain control and comfort for pt    Progress made toward(s) clinical / shift goals:  yes      Problem: Pain - Standard  Goal: Alleviation of pain or a reduction in pain to the patient’s comfort goal  Outcome: Progressing     Problem: Knowledge Deficit - Standard  Goal: Patient and family/care givers will demonstrate understanding of plan of care, disease process/condition, diagnostic tests and medications  Outcome: Progressing     Problem: Fall Risk  Goal: Patient will remain free from falls  Outcome: Progressing     Problem: Mobility  Goal: Patient's capacity to carry out activities will improve  Outcome: Progressing     Problem: Self Care  Goal: Patient will have the ability to perform ADLs independently or with assistance (bathe, groom, dress, toilet and feed)  Outcome: Progressing     Problem: Infection - Standard  Goal: Patient will remain free from infection  Outcome: Progressing     Problem: Wound/ / Incision Healing  Goal: Patient's wound/surgical incision will decrease in size and heals properly  Outcome: Progressing

## 2024-08-20 NOTE — THERAPY
Physical Therapy   Initial Evaluation     Patient Name: Andrew Wall  Age:  59 y.o., Sex:  male  Medical Record #: [id number]  Today's Date: 8/20/2024     Precautions  Precautions: Fall Risk;Cervical Collar  ;Spinal / Back Precautions   Comments: aspen collar AAT    Assessment  Patient is 59 y.o. male POD #1 C1-T2 posterior cervical fusion. PMH includes metastatic melanoma with metastatic disease, asthma, chronic pain due to bony mets. Pt seen for initial PT evaluation, wife at bedside. Per RN, pt was premedicated with IV pain medication. Pt agreeable to work with therapy. Education provided on spine precautions, log roll, compensatory strategies for dc home, walking program, and brace wearing orders. With cues, pt was able to complete bed mobility with CGA but has a recliner he can sleep in. He was able to complete transfers and gait with FWW and SPV. BP stable throughout but pt reporting nausea limiting distance ambulated. Patient will not be actively followed for physical therapy services at this time, however may be seen if requested by physician for 1 more visit within 30 days to address any discharge or equipment needs.    Plan    Physical Therapy Initial Treatment Plan   Duration: Discharge Needs Only    DC Equipment Recommendations: None  Discharge Recommendations: Recommend home health for continued physical therapy services          08/20/24 1001   Vitals   O2 (LPM) 0   O2 Delivery Device None - Room Air   Pain 0 - 10 Group   Therapist Pain Assessment During Activity;Post Activity Pain Same as Prior to Activity;8   Non Verbal Descriptors   Non Verbal Scale  Calm   Prior Living Situation   Prior Services Home-Independent;Intermittent Physical Support for ADL Per Family   Housing / Facility 1 Story House   Steps Into Home 0   Steps In Home 0   Bathroom Set up Walk In Shower;Bathtub / Shower Combination;Tub Transfer Bench   Equipment Owned 4-Wheel Walker;Wheelchair;Single Point Cane   Lives with -  Patient's Self Care Capacity Spouse   Comments daughter is visiting to assist, wife works from home   Prior Level of Functional Mobility   Bed Mobility Independent   Transfer Status Independent   Ambulation Independent   Ambulation Distance community   Assistive Devices Used Front-Wheel Walker   Comments pt has been ambulatory with FWW, occaisionally uses WC for appointments   History of Falls   History of Falls Yes   Cognition    Level of Consciousness Alert   Comments cooperative, pre medicated with dilaudid   Active ROM Upper Body   Active ROM Upper Body  X   Comments B shoulders limited by pain   Strength Upper Body   Upper Body Strength  X   Comments B UE limited by pain   Active ROM Lower Body    Active ROM Lower Body  WDL   Strength Lower Body   Lower Body Strength  WDL   Comments functional but grossly weak   Sensation Lower Body   Lower Extremity Sensation   WDL   Coordination Lower Body    Coordination Lower Body  WDL   Other Treatments   Other Treatments Provided wife present and daughter came at end of session. Provided with post op spine sx packet. reviewed restrictions and recommendations for dc home   Balance Assessment   Sitting Balance (Static) Fair +   Sitting Balance (Dynamic) Fair +   Standing Balance (Static) Fair   Standing Balance (Dynamic) Fair   Weight Shift Sitting Good   Weight Shift Standing Fair   Comments FWW   Bed Mobility    Supine to Sit Contact Guard Assist   Scooting Supervised   Rolling Supervised   Comments log roll, pt plans to sleep in a recliner   Gait Analysis   Gait Level Of Assist Supervised   Assistive Device Front Wheel Walker   Distance (Feet) 100   # of Times Distance was Traveled 1   Deviation Shuffled Gait;Bradykinetic   Weight Bearing Status no restrictions   Comments declined further due to nausea   Functional Mobility   Sit to Stand Supervised   Bed, Chair, Wheelchair Transfer Supervised   Transfer Method Stand Step   Mobility in room and hallway with FWW    Education Group   Education Provided Role of Physical Therapist;Cervical Precautions;Use of Assistive Device;Transfer Status   Cervical Precautions Patient Response Patient;Significant Other;Acceptance;Explanation;Handout;Verbal Demonstration;Action Demonstration   Role of Physical Therapist Patient Response Patient;Significant Other;Acceptance;Explanation;Handout;Action Demonstration;Verbal Demonstration   Use of Assistive Device Patient Response Patient;Acceptance;Demonstration;Explanation;Verbal Demonstration;Action Demonstration   Transfer Status Patient Response Patient;Acceptance;Explanation;Demonstration;Verbal Demonstration;Action Demonstration   Brace Wear & Care Patient Response Patient;Acceptance;Demonstration;Explanation;Verbal Demonstration;Action Demonstration   Physical Therapy Initial Treatment Plan    Duration Discharge Needs Only   Anticipated Discharge Equipment and Recommendations   DC Equipment Recommendations None   Discharge Recommendations Recommend home health for continued physical therapy services

## 2024-08-20 NOTE — THERAPY
"Occupational Therapy   Initial Evaluation     Patient Name: Andrew aWll  Age:  59 y.o., Sex:  male  Medical Record #: [id number]  Today's Date: 8/20/2024     Precautions  Precautions: Fall Risk, Cervical Collar  , Spinal / Back Precautions   Comments: aspen collar AAT    Assessment  Patient is 59 y.o. male s/p C1-T2 posterior cervical fusion. PMHx: metastatic melanoma with multiple cervical spine metastases, C2 C6-7 increasing collapse of C6 vertebra. C2 fracture from mechanical fall 3/5/2024. Pt recent inpatient rehab stay approx 2 weeks in April 2024.   Pt seen for OT eval. Pt has been wearing c-collar for approx 4 months, good carry over and knowledge of c-spine precautions and brace wear. During eval pt limited due to increasing pain, however performed functional mobility with SBA to CGA with use of FWW. Educ on spine prec within context of ADLs, pt has all necessary DME and A/E As needed for home. Pt has supportive spouse who works from home and can assist as needed. Pt would benefit from HHOT and will continue to benefit from inpt OT while in house.     Plan    Occupational Therapy Initial Treatment Plan   Treatment Interventions: (P) Self Care / Activities of Daily Living, Adaptive Equipment, Therapeutic Exercises, Therapeutic Activity, Neuro Re-Education / Balance  Treatment Frequency: (P) 4 Times per Week  Duration: (P) Until Therapy Goals Met    DC Equipment Recommendations: (P) None  Discharge Recommendations: (P) Recommend home health for continued occupational therapy services     Subjective    \"I'm used to this brace\"      Objective       08/20/24 1028   Prior Living Situation   Prior Services Home-Independent;Intermittent Physical Support for ADL Per Family   Housing / Facility 1 Story House   Steps Into Home   (ramp to enter)   Bathroom Set up Walk In Shower;Bathtub / Shower Combination;Tub Transfer Bench   Equipment Owned Tub Transfer Bench;Grab Bar(s) In Tub / Shower;Bed Side Commode;4-Wheel " Walker;Wheelchair;Other (Comments);Adjustable Bed Without Rails;Reacher   Lives with - Patient's Self Care Capacity Spouse   Comments Pt spouse works from home, can assist as needed however unable to assist for all ADLs at all times throughout the day. Daughter is visiting and will stay for 1 week.   Prior Level of ADL Function   Self Feeding Independent   Grooming / Hygiene Independent   Bathing Requires Assist   Dressing Requires Assist   Toileting Independent   Comments per pt, occasionally requires assist for dressing tasks and supervision/Christine for bathing   Prior Level of IADL Function   Medication Management Independent   Laundry Requires Assist   Kitchen Mobility Independent   Finances Requires Assist   Home Management Requires Assist   Shopping Requires Assist   Prior Level Of Mobility Supervision With Device in Home   Driving / Transportation Relatives / Others Provide Transportation   History of Falls   History of Falls Yes   Date of Last Fall   (fall earlier this year 2024)   Precautions   Precautions Fall Risk;Cervical Collar  ;Spinal / Back Precautions    Comments aspen collar AAT   Vitals   Pulse Oximetry 97 %   O2 Delivery Device None - Room Air   Pain   Intervention Medication (see MAR)   Pain 0 - 10 Group   Location Neck   Location Orientation Posterior   Pain Rating Scale (NPRS) 9   Therapist Pain Assessment During Activity;Post Activity Pain Same as Prior to Activity;Nurse Notified;9   Non Verbal Descriptors   Non Verbal Scale  Calm   Cognition    Cognition / Consciousness WDL   Level of Consciousness Alert   Comments pleasant and cooperative, distracted due to pain however good carry over of learning, able to make needs known   Passive ROM Upper Body   Passive ROM Upper Body WDL   Comments pain with PROM   Active ROM Upper Body   Active ROM Upper Body  X   Dominant Hand Right;Left   Gross Active ROM Gross Active Range of Motion Impaired, but Appears Adequate for Functional Activities.   Comments  limited due to pain, up to shoulder height bilaterally   Strength Upper Body   Upper Body Strength  X   Gross Strength Generalized Weakness, Equal Bilaterally.    Comments not formally tested at shoulders due to pain, otherwise grossly functional 3+/5   Sensation Upper Body   Upper Extremity Sensation  WDL   Upper Body Muscle Tone   Upper Body Muscle Tone  WDL   Neurological Concerns   Neurological Concerns No   Coordination Upper Body   Coordination WDL   Balance Assessment   Sitting Balance (Static) Fair   Sitting Balance (Dynamic) Fair -   Standing Balance (Static) Fair   Standing Balance (Dynamic) Fair -   Weight Shift Sitting Fair   Weight Shift Standing Fair   Comments w/FWW   Bed Mobility    Sit to Supine Standby Assist   Rolling Standby Assist   Comments HOB flat, log roll technique   ADL Assessment   Eating Supervision  (difficulty with cervical collar)   Grooming Supervision;Seated   Upper Body Dressing Moderate Assist   Lower Body Dressing Moderate Assist   Toileting   (declined need)   Functional Mobility   Sit to Stand Standby Assist   Bed, Chair, Wheelchair Transfer Contact Guard Assist   Toilet Transfers Contact Guard Assist   Transfer Method Stand Step   Mobility w/FWW   Visual Perception   Visual Perception  WDL   Edema / Skin Assessment   Edema / Skin  Not Assessed   Comments J Pdrain   Activity Tolerance   Sitting in Chair pre session, approx 10 min   Sitting Edge of Bed 2 min   Standing 3 min   Patient / Family Goals   Patient / Family Goal #1 to go home   Short Term Goals   Short Term Goal # 1 Pt will perform toilet transfers supervised   Short Term Goal # 2 Pt will perform toileting supervised   Short Term Goal # 3 Pt will perform LB dressing with A/E as needed supervised   Education Group   Education Provided Spinal Precautions;Role of Occupational Therapist;Activities of Daily Living;Brace Wear and Care   Role of Occupational Therapist Patient Response  Patient;Family;Acceptance;Demonstration;Explanation;Verbal Demonstration;Action Demonstration   Spinal Precautions Patient Response Patient;Family;Acceptance;Explanation;Demonstration;Verbal Demonstration;Action Demonstration   Brace Wear & Care Patient Response Patient;Family;Acceptance;Explanation;Demonstration;Action Demonstration;Verbal Demonstration   ADL Patient Response Patient;Family;Acceptance;Explanation;Demonstration;Action Demonstration;Verbal Demonstration   Occupational Therapy Initial Treatment Plan    Treatment Interventions Self Care / Activities of Daily Living;Adaptive Equipment;Therapeutic Exercises;Therapeutic Activity;Neuro Re-Education / Balance   Treatment Frequency 4 Times per Week   Duration Until Therapy Goals Met   Problem List   Problem List Decreased Active Daily Living Skills;Decreased Functional Mobility;Decreased Activity Tolerance;Decreased Upper Extremity Strength Right;Decreased Upper Extremity Strength Left   Anticipated Discharge Equipment and Recommendations   DC Equipment Recommendations None   Discharge Recommendations Recommend home health for continued occupational therapy services   Interdisciplinary Plan of Care Collaboration   IDT Collaboration with  Nursing;Family / Caregiver;Physical Therapist   Patient Position at End of Therapy In Bed;Call Light within Reach;Tray Table within Reach;Family / Friend in Room   Collaboration Comments RN updated   Session Information   Date / Session Number  8/20, #1 (1/4, 8/26)

## 2024-08-21 LAB
ANION GAP SERPL CALC-SCNC: 11 MMOL/L (ref 7–16)
BUN SERPL-MCNC: 8 MG/DL (ref 8–22)
CALCIUM SERPL-MCNC: 8.6 MG/DL (ref 8.5–10.5)
CHLORIDE SERPL-SCNC: 105 MMOL/L (ref 96–112)
CO2 SERPL-SCNC: 19 MMOL/L (ref 20–33)
CREAT SERPL-MCNC: 0.34 MG/DL (ref 0.5–1.4)
ERYTHROCYTE [DISTWIDTH] IN BLOOD BY AUTOMATED COUNT: 46.3 FL (ref 35.9–50)
GFR SERPLBLD CREATININE-BSD FMLA CKD-EPI: 131 ML/MIN/1.73 M 2
GLUCOSE SERPL-MCNC: 125 MG/DL (ref 65–99)
HCT VFR BLD AUTO: 27.8 % (ref 42–52)
HGB BLD-MCNC: 9.1 G/DL (ref 14–18)
MCH RBC QN AUTO: 27.8 PG (ref 27–33)
MCHC RBC AUTO-ENTMCNC: 32.7 G/DL (ref 32.3–36.5)
MCV RBC AUTO: 85 FL (ref 81.4–97.8)
PLATELET # BLD AUTO: 233 K/UL (ref 164–446)
PMV BLD AUTO: 8.8 FL (ref 9–12.9)
POTASSIUM SERPL-SCNC: 4.1 MMOL/L (ref 3.6–5.5)
RBC # BLD AUTO: 3.27 M/UL (ref 4.7–6.1)
SODIUM SERPL-SCNC: 135 MMOL/L (ref 135–145)
WBC # BLD AUTO: 6.4 K/UL (ref 4.8–10.8)

## 2024-08-21 PROCEDURE — A9270 NON-COVERED ITEM OR SERVICE: HCPCS | Performed by: STUDENT IN AN ORGANIZED HEALTH CARE EDUCATION/TRAINING PROGRAM

## 2024-08-21 PROCEDURE — 700105 HCHG RX REV CODE 258

## 2024-08-21 PROCEDURE — 700101 HCHG RX REV CODE 250

## 2024-08-21 PROCEDURE — 700102 HCHG RX REV CODE 250 W/ 637 OVERRIDE(OP): Performed by: INTERNAL MEDICINE

## 2024-08-21 PROCEDURE — A9270 NON-COVERED ITEM OR SERVICE: HCPCS

## 2024-08-21 PROCEDURE — 99024 POSTOP FOLLOW-UP VISIT: CPT

## 2024-08-21 PROCEDURE — 770001 HCHG ROOM/CARE - MED/SURG/GYN PRIV*

## 2024-08-21 PROCEDURE — 700102 HCHG RX REV CODE 250 W/ 637 OVERRIDE(OP): Performed by: STUDENT IN AN ORGANIZED HEALTH CARE EDUCATION/TRAINING PROGRAM

## 2024-08-21 PROCEDURE — 92610 EVALUATE SWALLOWING FUNCTION: CPT

## 2024-08-21 PROCEDURE — 36415 COLL VENOUS BLD VENIPUNCTURE: CPT

## 2024-08-21 PROCEDURE — 700111 HCHG RX REV CODE 636 W/ 250 OVERRIDE (IP): Mod: JZ | Performed by: STUDENT IN AN ORGANIZED HEALTH CARE EDUCATION/TRAINING PROGRAM

## 2024-08-21 PROCEDURE — 700111 HCHG RX REV CODE 636 W/ 250 OVERRIDE (IP): Mod: JZ

## 2024-08-21 PROCEDURE — 85027 COMPLETE CBC AUTOMATED: CPT

## 2024-08-21 PROCEDURE — 80048 BASIC METABOLIC PNL TOTAL CA: CPT

## 2024-08-21 PROCEDURE — 700102 HCHG RX REV CODE 250 W/ 637 OVERRIDE(OP)

## 2024-08-21 RX ORDER — DEXAMETHASONE SODIUM PHOSPHATE 4 MG/ML
6 INJECTION, SOLUTION INTRA-ARTICULAR; INTRALESIONAL; INTRAMUSCULAR; INTRAVENOUS; SOFT TISSUE EVERY 6 HOURS
Status: DISCONTINUED | OUTPATIENT
Start: 2024-08-21 | End: 2024-08-22 | Stop reason: HOSPADM

## 2024-08-21 RX ORDER — ENOXAPARIN SODIUM 100 MG/ML
40 INJECTION SUBCUTANEOUS DAILY
Status: DISCONTINUED | OUTPATIENT
Start: 2024-08-21 | End: 2024-08-22 | Stop reason: HOSPADM

## 2024-08-21 RX ADMIN — ACETAMINOPHEN 1000 MG: 500 TABLET ORAL at 04:46

## 2024-08-21 RX ADMIN — MOMETASONE FUROATE AND FORMOTEROL FUMARATE DIHYDRATE 2 PUFF: 100; 5 AEROSOL RESPIRATORY (INHALATION) at 04:48

## 2024-08-21 RX ADMIN — HYDROMORPHONE HYDROCHLORIDE 2 MG: 2 INJECTION, SOLUTION INTRAMUSCULAR; INTRAVENOUS; SUBCUTANEOUS at 07:38

## 2024-08-21 RX ADMIN — ACETAMINOPHEN 1000 MG: 500 TABLET ORAL at 16:25

## 2024-08-21 RX ADMIN — SENNOSIDES AND DOCUSATE SODIUM 1 TABLET: 50; 8.6 TABLET ORAL at 19:56

## 2024-08-21 RX ADMIN — DEXAMETHASONE SODIUM PHOSPHATE 6 MG: 4 INJECTION INTRA-ARTICULAR; INTRALESIONAL; INTRAMUSCULAR; INTRAVENOUS; SOFT TISSUE at 12:12

## 2024-08-21 RX ADMIN — ACETAMINOPHEN 1000 MG: 500 TABLET ORAL at 23:38

## 2024-08-21 RX ADMIN — METHOCARBAMOL 1000 MG: 100 INJECTION, SOLUTION INTRAMUSCULAR; INTRAVENOUS at 21:39

## 2024-08-21 RX ADMIN — METHOCARBAMOL 1000 MG: 100 INJECTION, SOLUTION INTRAMUSCULAR; INTRAVENOUS at 13:08

## 2024-08-21 RX ADMIN — DOCUSATE SODIUM 100 MG: 100 CAPSULE, LIQUID FILLED ORAL at 16:25

## 2024-08-21 RX ADMIN — MORPHINE SULFATE 30 MG: 15 TABLET ORAL at 09:40

## 2024-08-21 RX ADMIN — ACETAMINOPHEN 1000 MG: 500 TABLET ORAL at 12:12

## 2024-08-21 RX ADMIN — METHOCARBAMOL 1000 MG: 100 INJECTION, SOLUTION INTRAMUSCULAR; INTRAVENOUS at 05:48

## 2024-08-21 RX ADMIN — ANTACID TABLETS 1000 MG: 500 TABLET, CHEWABLE ORAL at 16:25

## 2024-08-21 RX ADMIN — DOCUSATE SODIUM 100 MG: 100 CAPSULE, LIQUID FILLED ORAL at 04:47

## 2024-08-21 RX ADMIN — POLYETHYLENE GLYCOL 3350 1 PACKET: 17 POWDER, FOR SOLUTION ORAL at 16:25

## 2024-08-21 RX ADMIN — MIDODRINE HYDROCHLORIDE 10 MG: 5 TABLET ORAL at 09:40

## 2024-08-21 RX ADMIN — DEXAMETHASONE SODIUM PHOSPHATE 6 MG: 4 INJECTION INTRA-ARTICULAR; INTRALESIONAL; INTRAMUSCULAR; INTRAVENOUS; SOFT TISSUE at 16:24

## 2024-08-21 RX ADMIN — HYDROMORPHONE HYDROCHLORIDE 2 MG: 2 INJECTION, SOLUTION INTRAMUSCULAR; INTRAVENOUS; SUBCUTANEOUS at 02:34

## 2024-08-21 RX ADMIN — MORPHINE SULFATE 30 MG: 15 TABLET ORAL at 19:55

## 2024-08-21 RX ADMIN — MEGESTROL ACETATE 800 MG: 40 SUSPENSION ORAL at 04:48

## 2024-08-21 RX ADMIN — MIDODRINE HYDROCHLORIDE 10 MG: 5 TABLET ORAL at 17:51

## 2024-08-21 RX ADMIN — BINIMETINIB 30 MG: 15 TABLET, FILM COATED ORAL at 04:47

## 2024-08-21 RX ADMIN — MORPHINE SULFATE 30 MG: 15 TABLET ORAL at 15:27

## 2024-08-21 RX ADMIN — ENCORAFENIB 300 MG: 75 CAPSULE ORAL at 17:50

## 2024-08-21 RX ADMIN — BINIMETINIB 30 MG: 15 TABLET, FILM COATED ORAL at 17:50

## 2024-08-21 RX ADMIN — MORPHINE SULFATE 30 MG: 15 TABLET ORAL at 05:44

## 2024-08-21 RX ADMIN — MORPHINE SULFATE 30 MG: 15 TABLET ORAL at 01:26

## 2024-08-21 RX ADMIN — ENOXAPARIN SODIUM 40 MG: 100 INJECTION SUBCUTANEOUS at 17:50

## 2024-08-21 RX ADMIN — HYDROMORPHONE HYDROCHLORIDE 2 MG: 2 INJECTION, SOLUTION INTRAMUSCULAR; INTRAVENOUS; SUBCUTANEOUS at 13:04

## 2024-08-21 RX ADMIN — MOMETASONE FUROATE AND FORMOTEROL FUMARATE DIHYDRATE 2 PUFF: 100; 5 AEROSOL RESPIRATORY (INHALATION) at 16:24

## 2024-08-21 RX ADMIN — DEXAMETHASONE SODIUM PHOSPHATE 6 MG: 4 INJECTION INTRA-ARTICULAR; INTRALESIONAL; INTRAMUSCULAR; INTRAVENOUS; SOFT TISSUE at 23:38

## 2024-08-21 ASSESSMENT — PAIN DESCRIPTION - PAIN TYPE
TYPE: ACUTE PAIN
TYPE: ACUTE PAIN;SURGICAL PAIN

## 2024-08-21 ASSESSMENT — PATIENT HEALTH QUESTIONNAIRE - PHQ9
1. LITTLE INTEREST OR PLEASURE IN DOING THINGS: NOT AT ALL
SUM OF ALL RESPONSES TO PHQ9 QUESTIONS 1 AND 2: 0
2. FEELING DOWN, DEPRESSED, IRRITABLE, OR HOPELESS: NOT AT ALL

## 2024-08-21 NOTE — PROGRESS NOTES
"Spine Surgery Progress Note    59 y.o. male sp C1-T2 posterior cervical fusion on 8/19/2024.    S: GALILEA. Having severe neck pain. Denies CP/SOB or abdominal discomfort. Passing flatus no issues    O:  /70   Pulse (!) 116   Temp 36.4 °C (97.5 °F) (Temporal)   Resp 15   Ht 1.803 m (5' 11\")   Wt 68.3 kg (150 lb 9.2 oz) Comment: Simultaneous filing. User may not have seen previous data.  SpO2 96%   BMI 21.00 kg/m²     Gen: WNWD NAD  Breathing comfortably    Dressing CDI  Cervical    Deltoid  Bi  Tri  WE  Flex  Finger Flexion  Right      5  5   5   5     5   5  Left      5  5   5   5    5   5    SILT C5-T1 BUE         Lab Results   Component Value Date/Time    WBC 6.4 08/21/2024 03:05 AM    RBC 3.27 (L) 08/21/2024 03:05 AM    HEMOGLOBIN 9.1 (L) 08/21/2024 03:05 AM    HEMATOCRIT 27.8 (L) 08/21/2024 03:05 AM    MCV 85.0 08/21/2024 03:05 AM    MCH 27.8 08/21/2024 03:05 AM    MCHC 32.7 08/21/2024 03:05 AM    MPV 8.8 (L) 08/21/2024 03:05 AM    NEUTSPOLYS 65.30 08/14/2024 09:57 AM    LYMPHOCYTES 16.00 (L) 08/14/2024 09:57 AM    MONOCYTES 7.10 08/14/2024 09:57 AM    EOSINOPHILS 8.60 (H) 08/14/2024 09:57 AM    BASOPHILS 1.10 08/14/2024 09:57 AM    HYPOCHROMIA 1+ 03/22/2024 02:10 AM    ANISOCYTOSIS 2+ (A) 03/22/2024 02:10 AM      Lab Results   Component Value Date/Time    SODIUM 135 08/21/2024 03:05 AM    POTASSIUM 4.1 08/21/2024 03:05 AM    CHLORIDE 105 08/21/2024 03:05 AM    CO2 19 (L) 08/21/2024 03:05 AM    GLUCOSE 125 (H) 08/21/2024 03:05 AM    BUN 8 08/21/2024 03:05 AM    CREATININE 0.34 (L) 08/21/2024 03:05 AM          AP: 59 y.o. male sp C1-T2 posterior cervical fusion on 8/19/2024. Having significant neck pain, added decadron  Having issues with dysphagia, swallow evaluation ordered.  Drain output elevated, continue drain.    Anticoagulation: Start Lovenox today  Continue PT OT  ADAT to Reg Diet  Daily dressing change by nursing starting POD2  Continue pain control per orders   "

## 2024-08-21 NOTE — DISCHARGE PLANNING
Patient ambulating 100ft sup with FWW, pt and spouse choice for pt to dc home. [facility] will no longer follow.

## 2024-08-21 NOTE — CARE PLAN
The patient is Stable - Low risk of patient condition declining or worsening    Shift Goals  Clinical Goals: Pain control, Mobility, Drain output  Patient Goals: Pain control, Rest  Family Goals: Updates, Pain control, D/C    Progress made toward(s) clinical / shift goals:  Yes     Patient is not progressing towards the following goals:      Problem: Pain - Standard  Goal: Alleviation of pain or a reduction in pain to the patient’s comfort goal  Outcome: Progressing  Flowsheets  Taken 8/21/2024 0323  Non Verbal Scale:   Calm   Unlabored Breathing  Taken 8/21/2024 0234  Pain Rating Scale (NPRS): 10  Note: Patient needing Dilaudid after PO Morphine 30mg (pain persists 1hr after)

## 2024-08-21 NOTE — THERAPY
"Speech Language Pathology   Clinical Swallow Evaluation     Patient Name: Andrew Wall  AGE:  59 y.o., SEX:  male  Medical Record #: [id number]  Date of Service: 8/21/2024      History of Present Illness  59 y.o. male who presented on 8/19 for C1-T2 posterior cervical fusion.    PMHx: metastatic melanoma with metastatic disease, asthma, chronic pain due to bony mets.    SLP Hx:  Initial assessment at [facility] completed on 03/29/2024 \"Patient presents with cognitive-linguistic functioning that is within ranges of typical functioning. His scores were decreased in the area of memory, however, this score was borderline and may have been affected by a number of factors related to the patients care, including be not limited to chemotherapy.\"       General Information:  Vitals  O2 Delivery Device: None - Room Air  Level of Consciousness: Alert, Awake  Orientation: Oriented x 4  Follows Directives: Yes      Prior Living Situation & Level of Function:  Prior Services: Home-Independent, Intermittent Physical Support for ADL Per Family  Housing / Facility: [address] [facility]  Lives with - Patient's Self Care Capacity: Spouse  Communication: WFL  Swallowing: Hx of \"pain wih swallowing\" s/p radiation on esophagus in Feburary 2024       Oral Mechanism Evaluation:  Dentition: Fair   Facial Symmetry: Equal  Facial Sensation: Equal     Labial Observations: WFL   Lingual Observations: Midline       Laryngeal Function:  Secretion Management: Adequate  Voice Quality: WFL   Cough: Perceptually WNL       Subjective  Patient received awake, daughter at bedside. Pt endorsed some globus sensation with solids, \"it takes a couple swallows to get down.\" Reported he had deconstructed pot pie for lunch that went down easily. Pt reported history of radiation on \"back of esophagus\" in February 2024 and had residual pain with swallowing. Pt did not receive speech therapy after radiation. Pt endorsed consuming regular oral diet prior to this " hospital admission.       Assessment  Current Method of Nutrition: Oral diet (Soft&bite sized solids/thin liquids)  Positioning: Low Soria's (15-30 degrees) - Pt unable to tolerate higher position citing pain  Bolus Administration: Patient    O2 Delivery Device: None - Room Air  Factor(s) Affecting Performance: None  Tracheostomy : No       Swallowing Trials:  Swallowing Trials  Thin Liquid (TN0): WFL  Pureed (PU4): WFL  Regular (RG7): WFL      Comments: Patient able to self-feed without difficulty. Intact oral bolus acceptance and containment. Functional mastication of solids. No notable oral bolus residue noted. No reflexive cough observed throughout oral intake. Intermittent throat clear throughout oral intake; possibly concerning for pharyngeal residue. Vocal quality remained stable throughout oral intake. Pt endorsed globus sensation following solids which cleared with liquid wash. Provided education indication for a diagnostic swallow study to rule out dysphagia; pt expressed preference to continue oral diet and monitor for changes. Discussed IDDSI levels, pt expressed preference for soft&bite sized solids and thin liquids. Provided thorough education regarding aspiration and reflux precautions.        Clinical Impressions  Patient presents with clinical indicators concerning for possible oropharyngeal dysphagia, suspect acute related to C1-T2 posterior cervical fusion. Pt may benefit from a diagnostic swallow study to further assess swallow function; however, pt politely declined at this time. Recommend modified diet of soft&bite sized solids and thin liquids, per pt preference. SLP to follow to ensure diet tolerance and monitor for changes.        Recommendations  Diet Consistency: Soft&bite sized solids/thin liquids  Instrumentation: Instrumental swallow study pending clinical progress  Medication: As tolerated  Supervision: Assist with meal tray set up  Positioning: Fully upright and midline during oral  "intake (As upright as can tolerate)  Risk Management : Small bites/sips, Alternate bites and sips, Slow rate of intake, Physical mobility, as tolerated  Oral Care: Q4h         SLP Treatment Plan  Treatment Plan: Dysphagia Treatment  SLP Frequency: 3x Per Week  Estimated Duration: Until Therapy Goals Met      Anticipated Discharge Needs  Discharge Recommendations: Recommend outpatient speech therapy services   Therapy Recommendations Upon DC: Dysphagia Training        Patient / Family Goals  Patient / Family Goal #1: \"Sometimes it gets stuck\"  Short Term Goals  Short Term Goal # 1: Pt will consume a diet of soft&bite sized solids and thin liquids without overt s/sx of aspiration or decline in respiratory status      Sunni Ford, SLP   "

## 2024-08-21 NOTE — CARE PLAN
The patient is Stable - Low risk of patient condition declining or worsening    Shift Goals  Clinical Goals: Pain control, Mobility, Drain output  Patient Goals: Pain control, Rest  Family Goals: Updates, Pain control, D/C    Progress made toward(s) clinical / shift goals:  Yes     Problem: Knowledge Deficit - Standard  Goal: Patient and family/care givers will demonstrate understanding of plan of care, disease process/condition, diagnostic tests and medications  8/21/2024 0338 by Yasmine Carias R.N.  Outcome: Progressing  8/21/2024 0336 by Yasmine Carias R.N.  Outcome: Progressing     Problem: Fall Risk  Goal: Patient will remain free from falls  8/21/2024 0338 by Yasmine Carias R.N.  Outcome: Progressing  8/21/2024 0336 by EDSON HammerN.  Outcome: Progressing     Problem: Mobility  Goal: Patient's capacity to carry out activities will improve  8/21/2024 0338 by Yasmine Carias R.N.  Outcome: Progressing  8/21/2024 0336 by Yasmine Carias R.N.  Outcome: Progressing  Flowsheets  Taken 8/21/2024 0336 by Yasmine Carias R.N.  Mobility:   Encouraged mobilization per interdisciplinary team recommendations   Monitored for signs of activity intolerance   Provided assistive devices   Provided rest periods between activities   Administered pain management to allow progressive mobilization   Collaborated with PT/OT  Taken 8/20/2024 1949 by Yasmine Carias R.N.  Level of Mobility: Level IV  Taken 8/20/2024 0815 by Juan Sellers RAlejoNAlejo  Activity Performed: Ambulate  Time Activity Tolerated: 30 min  Distance Per Occurrence (ft.): 50 feet  # of Times Distance was Traveled: 2  Assistance: Assistance of One     Problem: Self Care  Goal: Patient will have the ability to perform ADLs independently or with assistance (bathe, groom, dress, toilet and feed)  8/21/2024 0338 by Yasmine Carias R.N.  Outcome: Progressing  8/21/2024 0336 by Yasmine Carias R.N.  Outcome: Progressing     Problem: Infection - Standard  Goal:  Patient will remain free from infection  8/21/2024 0338 by Yasmine Carias, R.N.  Outcome: Progressing  8/21/2024 0336 by Yasmine Carias R.N.  Outcome: Progressing  Flowsheets (Taken 8/21/2024 0336)  Standard Infection Interventions:   Assessed for signs and symptoms of infection   Instructed patient/family on signs and symptoms of infection   Provided education on proper hand hygiene and infection prevention measures   Assessed for removal IV, central lines, intra-arterial or urinary catheters   Implemented standard precautions     Problem: Wound/ / Incision Healing  Goal: Patient's wound/surgical incision will decrease in size and heals properly  8/21/2024 0338 by Yasmine Carias, R.N.  Outcome: Progressing  8/21/2024 0336 by Yasmine Carias, R.N.  Outcome: Progressing       Patient is not progressing towards the following goals:      Problem: Pain - Standard  Goal: Alleviation of pain or a reduction in pain to the patient’s comfort goal  8/21/2024 0338 by Yasmine Carias, R.N.  Outcome: Not Progressing  8/21/2024 0336 by Yasmine Carias, R.N.  Outcome: Progressing  Flowsheets  Taken 8/21/2024 0323  Non Verbal Scale:   Calm   Unlabored Breathing  Taken 8/21/2024 0234  Pain Rating Scale (NPRS): 10  Note: Patient needing Dilaudid after PO Morphine 30mg (pain persists 1hr after)

## 2024-08-21 NOTE — DISCHARGE PLANNING
1138: Declined by [facility] (Patient's first choice) due to level of care. RN CM obtained choice from patient's wife for: 2. [facility], 3. [facility]. [facility] to send referrals, pending acceptance.   1302: Accepted with [facility].

## 2024-08-21 NOTE — CARE PLAN
The patient is Stable - Low risk of patient condition declining or worsening    Shift Goals  Clinical Goals: pain control, mobility, safety, drain output  Patient Goals: dc, pain control  Family Goals: Updates, Pain control, D/C    Progress made toward(s) clinical / shift goals:  yes      Problem: Pain - Standard  Goal: Alleviation of pain or a reduction in pain to the patient’s comfort goal  Outcome: Progressing     Problem: Knowledge Deficit - Standard  Goal: Patient and family/care givers will demonstrate understanding of plan of care, disease process/condition, diagnostic tests and medications  Outcome: Progressing     Problem: Fall Risk  Goal: Patient will remain free from falls  Outcome: Progressing     Problem: Mobility  Goal: Patient's capacity to carry out activities will improve  Outcome: Progressing     Problem: Self Care  Goal: Patient will have the ability to perform ADLs independently or with assistance (bathe, groom, dress, toilet and feed)  Outcome: Progressing     Problem: Infection - Standard  Goal: Patient will remain free from infection  Outcome: Progressing     Problem: Wound/ / Incision Healing  Goal: Patient's wound/surgical incision will decrease in size and heals properly  Outcome: Progressing

## 2024-08-22 ENCOUNTER — PHARMACY VISIT (OUTPATIENT)
Dept: PHARMACY | Facility: MEDICAL CENTER | Age: 59
End: 2024-08-22
Payer: COMMERCIAL

## 2024-08-22 VITALS
HEART RATE: 86 BPM | TEMPERATURE: 98.2 F | SYSTOLIC BLOOD PRESSURE: 123 MMHG | BODY MASS INDEX: 21.08 KG/M2 | OXYGEN SATURATION: 95 % | HEIGHT: 71 IN | WEIGHT: 150.57 LBS | DIASTOLIC BLOOD PRESSURE: 72 MMHG | RESPIRATION RATE: 16 BRPM

## 2024-08-22 PROCEDURE — A9270 NON-COVERED ITEM OR SERVICE: HCPCS

## 2024-08-22 PROCEDURE — 700105 HCHG RX REV CODE 258

## 2024-08-22 PROCEDURE — 700102 HCHG RX REV CODE 250 W/ 637 OVERRIDE(OP): Performed by: INTERNAL MEDICINE

## 2024-08-22 PROCEDURE — 700102 HCHG RX REV CODE 250 W/ 637 OVERRIDE(OP)

## 2024-08-22 PROCEDURE — A9270 NON-COVERED ITEM OR SERVICE: HCPCS | Performed by: STUDENT IN AN ORGANIZED HEALTH CARE EDUCATION/TRAINING PROGRAM

## 2024-08-22 PROCEDURE — 99024 POSTOP FOLLOW-UP VISIT: CPT

## 2024-08-22 PROCEDURE — 700101 HCHG RX REV CODE 250

## 2024-08-22 PROCEDURE — RXMED WILLOW AMBULATORY MEDICATION CHARGE

## 2024-08-22 PROCEDURE — 700102 HCHG RX REV CODE 250 W/ 637 OVERRIDE(OP): Performed by: STUDENT IN AN ORGANIZED HEALTH CARE EDUCATION/TRAINING PROGRAM

## 2024-08-22 PROCEDURE — 700111 HCHG RX REV CODE 636 W/ 250 OVERRIDE (IP)

## 2024-08-22 RX ORDER — ONDANSETRON 4 MG/1
4 TABLET, ORALLY DISINTEGRATING ORAL EVERY 4 HOURS PRN
Qty: 10 TABLET | Refills: 0 | Status: SHIPPED | OUTPATIENT
Start: 2024-08-22

## 2024-08-22 RX ORDER — CYCLOBENZAPRINE HCL 10 MG
10 TABLET ORAL EVERY 8 HOURS PRN
Qty: 30 TABLET | Refills: 0 | Status: SHIPPED | OUTPATIENT
Start: 2024-08-22

## 2024-08-22 RX ORDER — AMOXICILLIN 250 MG
1 CAPSULE ORAL NIGHTLY
Qty: 30 TABLET | Refills: 0 | Status: SHIPPED | OUTPATIENT
Start: 2024-08-22

## 2024-08-22 RX ORDER — ENOXAPARIN SODIUM 100 MG/ML
40 INJECTION SUBCUTANEOUS DAILY
Qty: 5.6 ML | Refills: 0 | Status: ACTIVE | OUTPATIENT
Start: 2024-08-22

## 2024-08-22 RX ORDER — MORPHINE SULFATE 30 MG/1
30 TABLET ORAL
Qty: 56 TABLET | Refills: 0 | Status: SHIPPED | OUTPATIENT
Start: 2024-08-22 | End: 2024-08-29 | Stop reason: SDUPTHER

## 2024-08-22 RX ADMIN — BINIMETINIB 30 MG: 15 TABLET, FILM COATED ORAL at 05:11

## 2024-08-22 RX ADMIN — DOCUSATE SODIUM 100 MG: 100 CAPSULE, LIQUID FILLED ORAL at 05:07

## 2024-08-22 RX ADMIN — METHOCARBAMOL 1000 MG: 100 INJECTION, SOLUTION INTRAMUSCULAR; INTRAVENOUS at 05:09

## 2024-08-22 RX ADMIN — ACETAMINOPHEN 1000 MG: 500 TABLET ORAL at 12:26

## 2024-08-22 RX ADMIN — MORPHINE SULFATE 15 MG: 15 TABLET ORAL at 12:24

## 2024-08-22 RX ADMIN — ACETAMINOPHEN 1000 MG: 500 TABLET ORAL at 05:07

## 2024-08-22 RX ADMIN — MEGESTROL ACETATE 800 MG: 40 SUSPENSION ORAL at 05:12

## 2024-08-22 RX ADMIN — POLYETHYLENE GLYCOL 3350 1 PACKET: 17 POWDER, FOR SOLUTION ORAL at 05:06

## 2024-08-22 RX ADMIN — MIDODRINE HYDROCHLORIDE 10 MG: 5 TABLET ORAL at 08:26

## 2024-08-22 RX ADMIN — MORPHINE SULFATE 30 MG: 15 TABLET ORAL at 03:22

## 2024-08-22 RX ADMIN — MOMETASONE FUROATE AND FORMOTEROL FUMARATE DIHYDRATE 2 PUFF: 100; 5 AEROSOL RESPIRATORY (INHALATION) at 05:13

## 2024-08-22 RX ADMIN — DEXAMETHASONE SODIUM PHOSPHATE 6 MG: 4 INJECTION INTRA-ARTICULAR; INTRALESIONAL; INTRAMUSCULAR; INTRAVENOUS; SOFT TISSUE at 05:10

## 2024-08-22 RX ADMIN — MORPHINE SULFATE 15 MG: 15 TABLET ORAL at 13:06

## 2024-08-22 ASSESSMENT — PAIN DESCRIPTION - PAIN TYPE: TYPE: ACUTE PAIN

## 2024-08-22 NOTE — PROGRESS NOTES
PIV removed. Discharge paperwork discussed. Verbalized understanding. Patient discharged home with spouse via wheelchair to car.

## 2024-08-22 NOTE — PROGRESS NOTES
Pt A&O x4. Plan of care discussed, all concerns addressed. Complaints of 5/10 pain scale from 0-10, multi-modalities offered such as distraction, repositioning, ice. No complaints of SOB or chest pain. Call light and belongings within reach. Hourly rounding in place. Pt educated on fall precautions with verbalized understanding. Bed locked and in lowest position. All needs met at this time.

## 2024-08-22 NOTE — CARE PLAN
The patient is Stable - Low risk of patient condition declining or worsening    Shift Goals  Clinical Goals: pain control, safety and comfort  Patient Goals: pain control, sleep  Family Goals: comfort    Progress made toward(s) clinical / shift goals:    Problem: Pain - Standard  Goal: Alleviation of pain or a reduction in pain to the patient’s comfort goal  Outcome: Progressing     Problem: Knowledge Deficit - Standard  Goal: Patient and family/care givers will demonstrate understanding of plan of care, disease process/condition, diagnostic tests and medications  Outcome: Progressing     Problem: Fall Risk  Goal: Patient will remain free from falls  Outcome: Progressing     Patient is alert and oriented, on RA.   Fall protocol in effect. Bed in lowest position. Brakes and bed alarm on.   Maintained a clutter free environment. Skid socks on. Call light and personal belongings within reach.   Patient c/o of pain, treated per MAR. Educated on pain scale.   Patient educated on POC. Encouraged verbalize of feelings.   All questions were answered.    Patient is not progressing towards the following goals:

## 2024-08-22 NOTE — DISCHARGE SUMMARY
Discharge Summary    CHIEF COMPLAINT ON ADMISSION  No chief complaint on file.      Reason for Admission  Pathological fracture, other site,*     Admission Date  8/19/2024    CODE STATUS  Full Code    HPI & HOSPITAL COURSE  This is a 59 y.o. male here with  metastatic melanoma with metastatic disease and C6-C7 increasing collapse of the C6 vertebrae.  He did have a prior odontoid.  We initially tried to tease pathologic fractures in the cervical collar.  Unfortunately patient experienced significant increase in kyphosis with a C2 tilt of approximately 66 degrees.  Patient underwent a C1-T2 posterior cervical fusion on 8/19/2024.  Patient was admitted for pain control and observation.  Postoperative day 1 patient reports severe neck pain.  Drain output high.  Patient continue to work with physical therapy and Occupational Therapy.  On postoperative day 3 patient reports overall improvement in neck pain.  Patient cleared to be discharged with home health.     No notes on file    Therefore, he is discharged in good and stable condition to home with close outpatient follow-up.    The patient recovered much more quickly than anticipated on admission.    Discharge Date  8/22/2024    FOLLOW UP ITEMS POST DISCHARGE  Wear rigid cervical collar for at least 6 weeks duration  Daily Lovenox injections for 2 weeks  Daily dressing changes patient's first postoperative appointment  No lifting over 10 pounds no lifting above the head      DISCHARGE DIAGNOSES  Principal Problem:    Cervical stenosis of spine (POA: Unknown)  Resolved Problems:    * No resolved hospital problems. *      FOLLOW UP  Future Appointments   Date Time Provider Department Center   8/26/2024 11:00 AM Von Wilcox M.D. RADT None   8/29/2024 11:15 AM FEDERICO Haider   9/3/2024  2:00 PM SAMARA WilderMSP [facility]   9/5/2024 11:15 AM FDEERICO Haider   9/10/2024 11:15 AM Arturo Suarez PT  [facility]   9/12/2024 11:15 AM Arturo Suarez, PT [facility]   9/17/2024 11:15 AM Arturo Suarez, PT [facility]   9/19/2024 11:15 AM Arturo Suarez, PT [facility]   9/24/2024 11:15 AM Arturo Suarez, PT [facility]   9/26/2024 11:15 AM Arturo Suarez, PT [facility]   9/30/2024 11:15 AM Arturo Suarez, PT [facility]   1/9/2025 10:00 AM SONA Gibbs None     No follow-up provider specified.    MEDICATIONS ON DISCHARGE     Medication List        START taking these medications        Instructions   cyclobenzaprine 10 mg Tabs  Commonly known as: Flexeril   Take 1 Tablet by mouth every 8 hours as needed for Muscle Spasms.  Dose: 10 mg     enoxaparin 40 MG/0.4ML Sosy inj  Commonly known as: Lovenox   Inject 1 syringe (40 mg) under the skin every day at 6 PM. Injection 40 mg under the skin everyday at 6 pm for 14 days  Dose: 40 mg     morphine 30 MG tablet  Commonly known as: MS IR  Replaces: morphine ER 15 MG Tbcr tablet   Take 1 Tablet by mouth every 3 hours as needed for Severe Pain for up to 7 days.  Dose: 30 mg     ondansetron 4 MG Tbdp  Commonly known as: Zofran ODT   Dissolve 1 Tablet by mouth every four hours as needed for Nausea/Vomiting  Dose: 4 mg     senna-docusate 8.6-50 MG Tabs  Commonly known as: Pericolace Or Senokot S   Take 1 Tablet by mouth every evening.  Dose: 1 Tablet            CONTINUE taking these medications        Instructions   Braftovi 75 MG Caps  Generic drug: Encorafenib   Take 300 mg by mouth every day at 6 PM.  Dose: 300 mg     CALCIUM CARBONATE ANTACID PO   Take 2 Tablets by mouth every day.         MEDICATION INSTRUCTIONS FOR SURGERY SCHEDULED ON 8/19/24: IT IS OK TO CONTINUE THIS MEDICATION PRIOR TO SURGERY BUT HOLD THIS MEDICATION ON THE MORNING OF SURGERY.  Dose: 2 Tablet     megestrol 400 MG/10ML Susp  Commonly known as: Megace   Take 20 mL by mouth every day.  Dose: 800 mg     Mektovi 15 MG  Tabs  Generic drug: Binimetinib   Take 30 mg by mouth 2 times a day.  Dose: 30 mg     midodrine 10 MG tablet  Commonly known as: Proamatine   Take 1 Tablet by mouth 3 times a day with meals.  Dose: 10 mg     mometasone-formoterol 100-5 MCG/ACT Aero  Commonly known as: Dulera   Inhale 2 Puffs 2 times a day.  Dose: 2 Puff            STOP taking these medications      acetaminophen 325 MG Tabs  Commonly known as: Tylenol     morphine ER 15 MG Tbcr tablet  Commonly known as: Ms Contin  Replaced by: morphine 30 MG tablet     mupirocin 2 % Oint  Commonly known as: Bactroban     Senna 8.6 MG Caps              Allergies  Allergies   Allergen Reactions    Augmentin Vomiting and Nausea       DIET  Orders Placed This Encounter   Procedures    Diet Order Diet: Level 6 - Soft and Bite Sized; Liquid level: Level 0 - Thin     Standing Status:   Standing     Number of Occurrences:   1     Order Specific Question:   Diet:     Answer:   Level 6 - Soft and Bite Sized [23]     Order Specific Question:   Liquid level     Answer:   Level 0 - Thin       ACTIVITY  As tolerated.  Weight bearing as tolerated    CONSULTATIONS  None    PROCEDURES  C1-T2 posterior cervical fusion    LABORATORY  Lab Results   Component Value Date    SODIUM 135 08/21/2024    POTASSIUM 4.1 08/21/2024    CHLORIDE 105 08/21/2024    CO2 19 (L) 08/21/2024    GLUCOSE 125 (H) 08/21/2024    BUN 8 08/21/2024    CREATININE 0.34 (L) 08/21/2024        Lab Results   Component Value Date    WBC 6.4 08/21/2024    HEMOGLOBIN 9.1 (L) 08/21/2024    HEMATOCRIT 27.8 (L) 08/21/2024    PLATELETCT 233 08/21/2024        Total time of the discharge process exceeds 30 minutes.

## 2024-08-22 NOTE — PROGRESS NOTES
Pt medicated per MAR for 5/10 pain. MARIN drain removal attempted unsuccessfully with bleeding at site, resistance, and pt reporting shooting pain down left arm. This RN notified the PA of attempt, was instructed to remove drain regardless of resistance and pain. Reassessed pt for pain, reports 10/10 pain. Charge RN assisted with drain removal, successfully removed with surgical site dressing changed. Pt medicated with additional medication per PA instruction.

## 2024-08-22 NOTE — PROGRESS NOTES
"Spine Surgery Progress Note    59 y.o. male sp C1-T2 posterior cervical fusion on 8/19/2024.    S: GALILEA. Overall improvement in neck pain. Denies CP/SOB or abdominal discomfort. Passing flatus no issues    O:  /72   Pulse 86   Temp 36.8 °C (98.2 °F) (Temporal)   Resp 16   Ht 1.803 m (5' 11\")   Wt 68.3 kg (150 lb 9.2 oz) Comment: Simultaneous filing. User may not have seen previous data.  SpO2 95%   BMI 21.00 kg/m²     Gen: WNWD NAD  Breathing comfortably    Dressing CDI  Cervical    Deltoid  Bi  Tri  WE  Flex  Finger Flexion  Right      5  5   5   5     5   5  Left      5  5   5   5    5   5    SILT C5-T1 BUE         Lab Results   Component Value Date/Time    WBC 6.4 08/21/2024 03:05 AM    RBC 3.27 (L) 08/21/2024 03:05 AM    HEMOGLOBIN 9.1 (L) 08/21/2024 03:05 AM    HEMATOCRIT 27.8 (L) 08/21/2024 03:05 AM    MCV 85.0 08/21/2024 03:05 AM    MCH 27.8 08/21/2024 03:05 AM    MCHC 32.7 08/21/2024 03:05 AM    MPV 8.8 (L) 08/21/2024 03:05 AM    NEUTSPOLYS 65.30 08/14/2024 09:57 AM    LYMPHOCYTES 16.00 (L) 08/14/2024 09:57 AM    MONOCYTES 7.10 08/14/2024 09:57 AM    EOSINOPHILS 8.60 (H) 08/14/2024 09:57 AM    BASOPHILS 1.10 08/14/2024 09:57 AM    HYPOCHROMIA 1+ 03/22/2024 02:10 AM    ANISOCYTOSIS 2+ (A) 03/22/2024 02:10 AM      Lab Results   Component Value Date/Time    SODIUM 135 08/21/2024 03:05 AM    POTASSIUM 4.1 08/21/2024 03:05 AM    CHLORIDE 105 08/21/2024 03:05 AM    CO2 19 (L) 08/21/2024 03:05 AM    GLUCOSE 125 (H) 08/21/2024 03:05 AM    BUN 8 08/21/2024 03:05 AM    CREATININE 0.34 (L) 08/21/2024 03:05 AM          AP: 59 y.o. male sp C1-T2 posterior cervical fusion on 8/19/2024. Overall improvement in neck pain.  Patient cleared for discharge with home health.    Anticoagulation: Lovenox   Continue PT OT  ADAT to Reg Diet  Daily dressing change by nursing starting POD2  Continue pain control per orders   "

## 2024-08-22 NOTE — DISCHARGE PLANNING
Patient medically cleared to discharge home with home health.   Choice obtained for and accepted with [facility].   All necessary DME already owned by patient.   He has good support from wife and daughter who work from home.   Wife will drive patient home upon discharge.   DC order and summary placed by provider.   Anticipating no further needs from case management at this time.     Case Management Discharge Planning    Admission Date: 8/19/2024  GMLOS: 2.6  ALOS: 3    6-Clicks ADL Score: 18  6-Clicks Mobility Score: 18      Anticipated Discharge Dispo: Discharge Disposition: D/T to home under A care in anticipation of covered skilled care (06)  Discharge Address: [address] [place] [id number]  Discharge Contact Phone Number: [phone]    DME Needed: No    Action(s) Taken: Updated Provider/Nurse on Discharge Plan    Escalations Completed: None    Medically Clear: Yes    Next Steps: Anticipating no further needs from case management.     Barriers to Discharge: None    Is the patient up for discharge tomorrow: Today via private transportation from wife/daughter.

## 2024-08-25 ASSESSMENT — LIFESTYLE VARIABLES
TOBACCO_USE: NO
SMOKING_STATUS: NO

## 2024-08-26 ENCOUNTER — HOSPITAL ENCOUNTER (OUTPATIENT)
Dept: RADIATION ONCOLOGY | Facility: MEDICAL CENTER | Age: 59
End: 2024-08-26
Attending: RADIOLOGY
Payer: COMMERCIAL

## 2024-08-26 PROCEDURE — 99442 PR PHYSICIAN TELEPHONE EVALUATION 11-20 MIN: CPT | Mod: 95 | Performed by: RADIOLOGY

## 2024-08-26 NOTE — PROGRESS NOTES
This visit is being conducted by telephone. This telephone visit was initiated by the patient and they verbally consented.  Patient at home in [facility].      Reason for Call:  Reestablish care    Treatment History:     Radiation Oncology          1/25/2024 1/26/2024   Ari Course Treatment Dates   Course First Treatment Date  01/22/2024 01/22/2024 01/22/2024    Course Last Treatment Date  01/25/2024 01/26/2024 01/26/2024    Aria Treatment Summary   SBRT C1-3  Plan from Course C2_C_spine SBRT   Fraction 4 of 5 5 of 5    5 of 5   Elapsed Course Days 3 @ [id number] 4 @ [id number]    4 @ [id number]   Prescribed Fraction Dose 600 cGy 600 cGy    600 cGy   Prescribed Total Dose 3,000 cGy 3,000 cGy    3,000 cGy   SBRT C6-7  Plan from Course C2_C_spine SBRT   Fraction 4 of 5 5 of 5    5 of 5   Elapsed Course Days 3 @ [id number] 4 @ [id number]    4 @ [id number]   Prescribed Fraction Dose 600 cGy 600 cGy    600 cGy   Prescribed Total Dose 3,000 cGy 3,000 cGy    3,000 cGy   C1-3  Reference Point from Course C2_C_spine SBRT   Elapsed Course Days 3 @ [id number] 4 @ [id number]    4 @ [id number]   Session Dose 600 cGy 600 cGy    --   Total Dose 2,400 cGy 3,000 cGy    3,000 cGy   C6-7  Reference Point from Course C2_C_spine SBRT   Elapsed Course Days 3 @ [id number] 4 @ [id number]    4 @ [id number]   Session Dose 600 cGy 600 cGy    --   Total Dose 2,400 cGy 3,000 cGy    3,000 cGy   SBRT R Hip  Plan from Course C3_R_hip   Fraction 4 of 5 5 of 5    5 of 5   Elapsed Course Days 3 @ [id number] 4 @ [id number]    4 @ [id number]   Prescribed Fraction Dose 600 cGy 600 cGy    600 cGy   Prescribed Total Dose 3,000 cGy 3,000 cGy    3,000 cGy   SBRT R Hip  Reference Point from Course C3_R_hip   Elapsed Course Days 3 @ [id number] 4 @ [id number]    4 @ [id number]   Session Dose 600 cGy 600 cGy    --   Total Dose 2,400 cGy 3,000 cGy    3,000 cGy      Details          More values are hidden.  Newest values shown. Go to activity for more data.                HPI:    Subjective    I am taking over his care from Dr. Fraga.  His initial consultation from January 2024 is as follows:    Patient's history began in 2019 when he was diagnosed with a T4b N1 M0 malignant melanoma of the right lower back with lymph node metastasis.  He underwent surgical resection followed by adjuvant Opdivo which he completed November 2021.   He did well till a PET CT scan was done late October 2023 showing a right upper inguinal lymph node consistent with metastatic disease.   This was biopsied on 11/16/2023 found to be consistent with metastatic melanoma.  More recently over the last 2 weeks he has been having increasing back pain.  Apparently he was found to have high calcium levels and it was recommended that he go to the emergency room at the same time he was going he fell and developed severe pain in the right leg with inability to move it due to pain.   MRI scan of the lumbar spine was done 1/8/2024 showing an extensive enhancing osseous metastasis throughout the visualized lumbar spine and sacrum most in the right sacral jaz in keeping with metastasis.  There was multiple metastasis seen in the paraspinous muscles psoas muscles iliac us muscles and gluteal muscles.  The largest is in the right posterior chest wall at the level of the right kidney.  There is no enhancement seen in the cord.  there is nerve root enhancement in the bilateral lumbar foramina diffusely of unclear etiology.  There is bilateral S1 nerve roots and right S2 nerve root in the sacral foramen surrounded by the mass and probably invading and impinged.   CT chest abdomen pelvis was done 1/13/2024 there is widespread metastatic disease throughout the chest abdomen and pelvis there is a new liver lesion and pulmonary metastasis and severe and widespread osseous metastatic disease with pathologic fractures related to the osseous metastatic disease.    There is a lytic lesion at L4 with pathologic compression fracture on the left, there is a lytic lesion at S1 sacrum and a large lytic lesion in the right sacral jaz with pathologic fractures.  There is lytic lesions bilaterally in the ilium and in the left ischium with a pathologic fracture.  There is a lytic lesion in the right superior pubic ramus and pubic symphysis with pathologic fracture.  There is a lytic lesion in the right femoral neck.     Since then, he completed SBRT as above to the cervical spine and the right hip.          Interval History:   8/2/24 Unfortunately, he then presented to the hospital in August 2024 with significant increase in kyphosis with a C2 tilt of 66 degrees.  He underwent a C1-T2 posterior fusion.  He is now slowly recovering from this.  He is on medical oncology, had a PET scan that identified a 2 small areas of punctate bony metastasis as well as a lung nodule.  He continues on therapy with Dr. Leo.  These bony lesions in the pelvis are not painful currently.  We are having a telephone follow-up now to reestablish care.  He says he is recovering as expected from his neck surgery.  He has physical therapy that is ongoing.  He is not in any pain with regards to the PET avid lesions, but does have some postoperative neck pain.  He is looking forward to ongoing therapy.    Labs / Images Reviewed:   CT-CSPINE WITHOUT PLUS RECONS    Result Date: 7/16/2024  1.  Nonunited type II odontoid fracture which appears pathologic. It appeared acute on comparison prior study 3/5/2024. 2.  Lytic changes C2, C6, C7 vertebra. There are some mild increase in sclerosis in the vertebral bodies in regions of lytic change since prior. 3.  Prevertebral fluid and edema at that C4-C6 levels. This appears increased since prior. 4.  Multiple lung nodules in the lung apices.    DX-CERVICAL SPINE-2 OR 3 VIEWS    Result Date: 8/19/2024  Digitized intraoperative radiograph is submitted for review. This  examination is not for diagnostic purpose but for guidance during a surgical procedure. Please see the patient's chart for full procedural details. INTERPRETING LOCATION: [address] , [place], [address]    DX-CHEST-2 VIEWS    Result Date: 8/14/2024  No radiographic evidence of acute cardiopulmonary disease.    DX-PELVIS-1 OR 2 VIEWS    Result Date: 7/8/2024  1.  Bilateral pubic rami metastatic disease again demonstrated. 2.  Findings consistent with recent fractures involving the right pubic bone and rami. No alignment change. 3.  Cannot exclude metastatic disease proximal body of the sacrum.    DX-SHOULDER 2+ RIGHT    Result Date: 7/8/2024  No acute fracture or bony destructive lesion identified.    DX-SHOULDER 2+ LEFT    Result Date: 7/8/2024  Sclerotic deformed glenoid unchanged. Consistent with metastatic disease.    DX-PORTABLE FLUORO > 1 HOUR    Result Date: 8/19/2024  Portable fluoroscopy utilized for 14.5 seconds. INTERPRETING LOCATION: [address] , [place], [address]    DX-O-ARM    Result Date: 8/19/2024  Portable O-arm utilized for 3.07 seconds. INTERPRETING LOCATION: [address] , [place], [address]      Assessment:   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)  Staging form: Melanoma of the Skin, AJCC 8th Edition  - Pathologic stage from 1/8/2024: Stage IV (rpT0, pNX, pM1c(1)) - Signed by Julia Fraga M.D. on 1/16/2024  Stage prefix: Recurrence      Cancer Status:   Disease Stable    Plan:   I reviewed his recent PET scan from July 29.  He has 2 small punctate areas of bony metastasis.  He also has a right upper lobe small lung nodule measuring about 7 or 8 mm.  None of these are acutely concerning.  Meanwhile, he has some acute ongoing recovery from his recent next fixation surgery.  I reviewed all of his prior radiation fields in detail as well.  For now, I think we continue to observe these lesions.  He does not need radiation therapy currently.  However, if he needs radiation in the future, I can  certainly treat him as needed.  I will follow along with medical oncology.  He will likely have another PET scan in about 3 months or so with medical oncology.  I will plan to have an in person follow-up with him in about 4 months from now, so shortly after the next set of imaging.    Time Spent on Call: 10 min      Time Spent in Preparation: 15 min    Total Time Spent: 25 min    Von Wilcox M.D.

## 2024-08-29 ENCOUNTER — PATIENT MESSAGE (OUTPATIENT)
Dept: MEDICAL GROUP | Facility: LAB | Age: 59
End: 2024-08-29
Payer: COMMERCIAL

## 2024-08-29 ENCOUNTER — APPOINTMENT (OUTPATIENT)
Dept: PHYSICAL THERAPY | Facility: MEDICAL CENTER | Age: 59
End: 2024-08-29
Attending: FAMILY MEDICINE
Payer: COMMERCIAL

## 2024-08-29 DIAGNOSIS — Z98.1 S/P CERVICAL SPINAL FUSION: ICD-10-CM

## 2024-08-29 NOTE — PATIENT COMMUNICATION
Received request via: Patient    Was the patient seen in the last year in this department? Yes    Does the patient have an active prescription (recently filled or refills available) for medication(s) requested? No    Pharmacy Name: [facility]     Does the patient have [facility] Plus and need 100-day supply? (This applies to ALL medications) Patient does not have SCP

## 2024-08-30 RX ORDER — MORPHINE SULFATE 30 MG/1
30 TABLET ORAL
Qty: 56 TABLET | Refills: 0 | Status: SHIPPED | OUTPATIENT
Start: 2024-08-30 | End: 2024-09-06

## 2024-09-03 ENCOUNTER — PHARMACY VISIT (OUTPATIENT)
Dept: PHARMACY | Facility: MEDICAL CENTER | Age: 59
End: 2024-09-03
Payer: COMMERCIAL

## 2024-09-03 PROCEDURE — RXMED WILLOW AMBULATORY MEDICATION CHARGE

## 2024-09-05 ENCOUNTER — APPOINTMENT (OUTPATIENT)
Dept: PHYSICAL THERAPY | Facility: MEDICAL CENTER | Age: 59
End: 2024-09-05
Attending: FAMILY MEDICINE
Payer: COMMERCIAL

## 2024-09-10 ENCOUNTER — APPOINTMENT (OUTPATIENT)
Dept: PHYSICAL THERAPY | Facility: MEDICAL CENTER | Age: 59
End: 2024-09-10
Attending: FAMILY MEDICINE
Payer: COMMERCIAL

## 2024-09-12 ENCOUNTER — APPOINTMENT (OUTPATIENT)
Dept: PHYSICAL THERAPY | Facility: MEDICAL CENTER | Age: 59
End: 2024-09-12
Attending: FAMILY MEDICINE
Payer: COMMERCIAL

## 2024-09-16 ENCOUNTER — PATIENT MESSAGE (OUTPATIENT)
Dept: MEDICAL GROUP | Facility: LAB | Age: 59
End: 2024-09-16
Payer: COMMERCIAL

## 2024-09-16 DIAGNOSIS — Z98.1 S/P CERVICAL SPINAL FUSION: ICD-10-CM

## 2024-09-16 DIAGNOSIS — C43.9 METASTATIC MELANOMA (HCC): ICD-10-CM

## 2024-09-16 RX ORDER — MORPHINE SULFATE 15 MG/1
45 TABLET, FILM COATED, EXTENDED RELEASE ORAL EVERY 12 HOURS
Qty: 168 TABLET | Refills: 0 | Status: SHIPPED | OUTPATIENT
Start: 2024-09-16 | End: 2024-10-14

## 2024-09-16 RX ORDER — MORPHINE SULFATE 30 MG/1
30 TABLET ORAL EVERY 4 HOURS PRN
Qty: 42 TABLET | Refills: 0 | Status: SHIPPED | OUTPATIENT
Start: 2024-09-16 | End: 2024-09-23

## 2024-09-16 NOTE — PATIENT COMMUNICATION
Received request via: Patient    Was the patient seen in the last year in this department? Yes    Does the patient have an active prescription (recently filled or refills available) for medication(s) requested? No    Ph[facility]     Does the patient have [facility] Plus and need 100-day supply? (This applies to ALL medications) Patient does not have SCP

## 2024-09-17 ENCOUNTER — APPOINTMENT (OUTPATIENT)
Dept: PHYSICAL THERAPY | Facility: MEDICAL CENTER | Age: 59
End: 2024-09-17
Attending: FAMILY MEDICINE
Payer: COMMERCIAL

## 2024-09-19 ENCOUNTER — APPOINTMENT (OUTPATIENT)
Dept: PHYSICAL THERAPY | Facility: MEDICAL CENTER | Age: 59
End: 2024-09-19
Attending: FAMILY MEDICINE
Payer: COMMERCIAL

## 2024-09-24 ENCOUNTER — APPOINTMENT (OUTPATIENT)
Dept: PHYSICAL THERAPY | Facility: MEDICAL CENTER | Age: 59
End: 2024-09-24
Attending: FAMILY MEDICINE
Payer: COMMERCIAL

## 2024-09-26 ENCOUNTER — APPOINTMENT (OUTPATIENT)
Dept: PHYSICAL THERAPY | Facility: MEDICAL CENTER | Age: 59
End: 2024-09-26
Attending: FAMILY MEDICINE
Payer: COMMERCIAL

## 2024-09-30 ENCOUNTER — APPOINTMENT (OUTPATIENT)
Dept: PHYSICAL THERAPY | Facility: MEDICAL CENTER | Age: 59
End: 2024-09-30
Attending: FAMILY MEDICINE
Payer: COMMERCIAL

## 2024-10-20 DIAGNOSIS — C43.9 METASTATIC MELANOMA (HCC): ICD-10-CM

## 2024-10-21 RX ORDER — MORPHINE SULFATE 15 MG/1
45 TABLET, FILM COATED, EXTENDED RELEASE ORAL EVERY 12 HOURS
Qty: 168 TABLET | Refills: 0 | Status: SHIPPED | OUTPATIENT
Start: 2024-10-21 | End: 2024-11-18

## 2024-11-03 NOTE — CARE PLAN
The patient is Watcher - Medium risk of patient condition declining or worsening    Shift Goals  Clinical Goals: Pain management, BM  Patient Goals: Pain management  Family Goals: Pain management, updates on POC    Progress made toward(s) clinical / shift goals:    MOM administered per MAR for BM.  Problem: Pain - Standard  Goal: Alleviation of pain or a reduction in pain to the patient’s comfort goal  Outcome: Progressing  PRN morphine and oxycodone administered per MAR for pain management.     Problem: Skin Integrity  Goal: Skin integrity is maintained or improved  Outcome: Progressing  Patient educated on the importance of q2h turning and repositioning in preventing skin breakdown. Patient continues to decline turns due to pain.         2

## 2024-11-27 DIAGNOSIS — C43.9 METASTATIC MELANOMA (HCC): ICD-10-CM

## 2024-11-27 RX ORDER — MORPHINE SULFATE 15 MG/1
45 TABLET, FILM COATED, EXTENDED RELEASE ORAL EVERY 12 HOURS
Qty: 168 TABLET | Refills: 0 | Status: SHIPPED | OUTPATIENT
Start: 2024-11-27 | End: 2024-12-25

## 2024-11-27 NOTE — TELEPHONE ENCOUNTER
Received request via:Patient     Was the patient seen in the last year in this department? Yes    Does the patient have an active prescription (recently filled or refills available) for medication(s) requested? No    [facility]     Does the patient have [facility] Plus and need 100-day supply? (This applies to ALL medications) Patient does not have SCP

## 2024-12-02 ENCOUNTER — APPOINTMENT (OUTPATIENT)
Dept: RADIOLOGY | Facility: MEDICAL CENTER | Age: 59
End: 2024-12-02
Attending: ORTHOPAEDIC SURGERY
Payer: COMMERCIAL

## 2024-12-02 ENCOUNTER — TELEPHONE (OUTPATIENT)
Dept: SURGICAL ONCOLOGY | Facility: MEDICAL CENTER | Age: 59
End: 2024-12-02
Payer: COMMERCIAL

## 2024-12-02 DIAGNOSIS — C79.51 MELANOMA METASTATIC TO BONE (HCC): ICD-10-CM

## 2024-12-02 PROCEDURE — 73030 X-RAY EXAM OF SHOULDER: CPT | Mod: LT

## 2024-12-02 PROCEDURE — 73521 X-RAY EXAM HIPS BI 2 VIEWS: CPT

## 2024-12-02 PROCEDURE — 73030 X-RAY EXAM OF SHOULDER: CPT | Mod: RT

## 2024-12-02 NOTE — TELEPHONE ENCOUNTER
[facility] to schedule follow up.     ----- Message from Physician Sofia Burleson D.O. sent at 12/2/2024  9:42 AM PST -----  Regarding: schedule follow up  Please call and schedule Fan to come in. I can see him at 3:30 tomorrow

## 2024-12-03 ENCOUNTER — TELEPHONE (OUTPATIENT)
Dept: SURGICAL ONCOLOGY | Facility: MEDICAL CENTER | Age: 59
End: 2024-12-03
Payer: COMMERCIAL

## 2024-12-03 ENCOUNTER — PATIENT MESSAGE (OUTPATIENT)
Dept: MEDICAL GROUP | Facility: LAB | Age: 59
End: 2024-12-03
Payer: COMMERCIAL

## 2024-12-03 NOTE — TELEPHONE ENCOUNTER
Lvm to schedule per .   ----- Message from Physician Sofia Burleson D.O. sent at 12/2/2024  9:42 AM PST -----  Regarding: schedule follow up  Please call and schedule Fan to come in. I can see him at 3:30 tomorrow

## 2024-12-03 NOTE — LETTER
December 4, 2024       Patient: Andrew Wall   YOB: 1965   Date of Visit: 12/3/2024         To Whom It May Concern:    In my medical opinion, I recommend that Andrew Wall be excused from upcoming jury duty due to current health conditions. Mr. Wall is severely limited in his ability to sit in public for any period of time without experiencing significant pain and fatigue. He is physically incapable of serving jury duty at this time.     If you have any questions or concerns, please don't hesitate to call [phone]          Sincerely,          Jose Luis Juarez M.D.  Electronically Signed

## 2024-12-09 ENCOUNTER — HOSPITAL ENCOUNTER (OUTPATIENT)
Dept: RADIOLOGY | Facility: MEDICAL CENTER | Age: 59
End: 2024-12-09

## 2024-12-09 ENCOUNTER — OFFICE VISIT (OUTPATIENT)
Dept: SURGICAL ONCOLOGY | Facility: MEDICAL CENTER | Age: 59
End: 2024-12-09
Payer: COMMERCIAL

## 2024-12-09 ENCOUNTER — APPOINTMENT (OUTPATIENT)
Dept: RADIOLOGY | Facility: IMAGING CENTER | Age: 59
End: 2024-12-09
Attending: ORTHOPAEDIC SURGERY
Payer: COMMERCIAL

## 2024-12-09 VITALS
HEIGHT: 71 IN | WEIGHT: 150 LBS | BODY MASS INDEX: 21 KG/M2 | TEMPERATURE: 97.7 F | OXYGEN SATURATION: 81 % | HEART RATE: 92 BPM

## 2024-12-09 DIAGNOSIS — C79.51 MELANOMA METASTATIC TO BONE (HCC): ICD-10-CM

## 2024-12-09 DIAGNOSIS — C79.51 METASTASIS TO BONE (HCC): ICD-10-CM

## 2024-12-09 PROCEDURE — 73552 X-RAY EXAM OF FEMUR 2/>: CPT | Mod: TC,LT | Performed by: ORTHOPAEDIC SURGERY

## 2024-12-09 PROCEDURE — 99213 OFFICE O/P EST LOW 20 MIN: CPT | Performed by: ORTHOPAEDIC SURGERY

## 2024-12-09 ASSESSMENT — FIBROSIS 4 INDEX: FIB4 SCORE: 1.3

## 2024-12-10 ENCOUNTER — HOSPITAL ENCOUNTER (OUTPATIENT)
Dept: RADIOLOGY | Facility: MEDICAL CENTER | Age: 59
End: 2024-12-10
Attending: NURSE PRACTITIONER
Payer: COMMERCIAL

## 2024-12-10 DIAGNOSIS — C77.4 MALIGNANT NEOPLASM METASTATIC TO INGUINAL AND LOWER EXTREMITY LYMPH NODES (HCC): ICD-10-CM

## 2024-12-10 DIAGNOSIS — D72.828 OTHER ELEVATED WHITE BLOOD CELL COUNT: ICD-10-CM

## 2024-12-10 DIAGNOSIS — G89.3 NEOPLASM RELATED PAIN: ICD-10-CM

## 2024-12-10 DIAGNOSIS — M25.512 PAIN IN SHOULDER REGION, LEFT: ICD-10-CM

## 2024-12-10 DIAGNOSIS — C78.6 SECONDARY MALIGNANT NEOPLASM OF RETROPERITONEUM AND PERITONEUM (HCC): ICD-10-CM

## 2024-12-10 DIAGNOSIS — R59.0 LOCALIZED ENLARGED LYMPH NODES: ICD-10-CM

## 2024-12-10 DIAGNOSIS — C78.02 SECONDARY MALIGNANT NEOPLASM OF LEFT LUNG (HCC): ICD-10-CM

## 2024-12-10 DIAGNOSIS — R94.5 ABNORMAL RESULTS OF LIVER FUNCTION STUDIES: ICD-10-CM

## 2024-12-10 DIAGNOSIS — R63.8 OTHER SYMPTOMS AND SIGNS CONCERNING FOOD AND FLUID INTAKE: ICD-10-CM

## 2024-12-10 DIAGNOSIS — D63.0 ANEMIA IN NEOPLASTIC DISEASE: ICD-10-CM

## 2024-12-10 DIAGNOSIS — C79.51 SECONDARY MALIGNANT NEOPLASM OF BONE (HCC): ICD-10-CM

## 2024-12-10 DIAGNOSIS — Z79.899 OTHER LONG TERM (CURRENT) DRUG THERAPY: ICD-10-CM

## 2024-12-10 DIAGNOSIS — C78.7 SECONDARY MALIGNANT NEOPLASM OF LIVER AND INTRAHEPATIC BILE DUCT (HCC): ICD-10-CM

## 2024-12-10 DIAGNOSIS — I69.891 DYSPHAGIA AS LATE EFFECT OF CEREBRAL ANEURYSM: ICD-10-CM

## 2024-12-10 DIAGNOSIS — J45.901 AB (ASTHMATIC BRONCHITIS), UNSPECIFIED ASTHMA SEVERITY, WITH ACUTE EXACERBATION: ICD-10-CM

## 2024-12-10 DIAGNOSIS — C43.59 MALIGNANT MELANOMA OF OTHER PART OF TRUNK (HCC): ICD-10-CM

## 2024-12-10 DIAGNOSIS — Z51.12 ENCOUNTER FOR ANTINEOPLASTIC IMMUNOTHERAPY: ICD-10-CM

## 2024-12-10 DIAGNOSIS — C78.01 SECONDARY MALIGNANT NEOPLASM OF RIGHT LUNG (HCC): ICD-10-CM

## 2024-12-10 DIAGNOSIS — E86.0 DEHYDRATION: ICD-10-CM

## 2024-12-10 PROCEDURE — A9579 GAD-BASE MR CONTRAST NOS,1ML: HCPCS | Mod: JZ | Performed by: NURSE PRACTITIONER

## 2024-12-10 PROCEDURE — 700117 HCHG RX CONTRAST REV CODE 255: Mod: JZ | Performed by: NURSE PRACTITIONER

## 2024-12-10 PROCEDURE — 70553 MRI BRAIN STEM W/O & W/DYE: CPT

## 2024-12-10 RX ADMIN — GADOTERIDOL 15 ML: 279.3 INJECTION, SOLUTION INTRAVENOUS at 10:18

## 2024-12-10 ASSESSMENT — ENCOUNTER SYMPTOMS
WEAKNESS: 0
SENSORY CHANGE: 0
TINGLING: 0
NECK PAIN: 0
BACK PAIN: 0
WEIGHT LOSS: 0

## 2024-12-10 NOTE — PROGRESS NOTES
Subjective:   12/9/2024  9:44 AM  Primary care physician:Jose Luis Juarez M.D.    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      History of presenting illness:  Andrew Wall  is a pleasant 59 y.o. male who presents for follow-up of multiple metastatic bone lesions secondary to melanoma.  He reports he has been having left thigh pain over the last several weeks.  The pain is worse with weightbearing and activity.  He particularly feels pain the day after he does activity such as walking for longer distances.  He denies any numbness or paresthesias down the leg.  Since his last visit he has undergone cervical fusion with Dr. Lawrence.  He also had a recent PET scan which showed progression of his metastatic bone disease in the left thigh and lumbar spine.  It also showed progression of disease in his lungs.  He has sought a second opinion with medical oncology at [facility].  They have worked with his local medical oncologist and he reports he is going to switch to a different systemic therapy in the next few weeks.  He is also scheduled to see Dr. Wilcox in radiation oncology for radiation to the lumbar spine and left femur.    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)     Nasal polyp     Pain 08/01/2024    neck, shoulder and hips     Past Surgical History:   Procedure Laterality Date    POSTERIOR CERVICAL FUSION O-ARM N/A 8/19/2024    Procedure: C1-T2 posterior instrumented fusion;  Surgeon: Kevin Lawrence M.D.;  Location: SURGERY [facility];  Service: Orthopedics    LYMPH NODE  EXCISION Right 11/16/2023    Procedure: RIGHT INGUINAL NODE SUPERFICIAL DISSECTION;  Surgeon: Davon Valera M.D.;  Location: [facility];  Service: General    NODE BIOPSY SENTINEL Bilateral 10/20/2020    Procedure: BIOPSY, LYMPH NODE, SENTINEL- GROIN;  Surgeon: Davon Valera M.D.;  Location: SURGERY SAME DAY [facility];  Service: General    WIDE EXCISION Right 10/20/2020    Procedure: WIDE EXCISION, LESION- BUTTOCKS, RADICAL MALIGNANT MELANOMA;  Surgeon: Davon Valera M.D.;  Location: SURGERY SAME DAY [facility];  Service: General    ANTROSTOMY Bilateral 11/15/2019    Procedure: MAXILLARY ANTROSTOMY- REVISION ENDOSCOPICMOMETASONE INJECTION, PROPEL STENT PLACEMENT;  Surgeon: Felicitas Tenorio M.D.;  Location: [facility];  Service: Ent    SINUSCOPY Bilateral 11/15/2019    Procedure: ENDOSCOPY, PARANASAL SINUSES- FOR FRONTAL EXPLORATION;  Surgeon: Felicitas Tenorio M.D.;  Location: [facility];  Service: Ent    TURBINOPLASTY Bilateral 11/15/2019    Procedure: TURBINOPLASTY;  Surgeon: Felicitas Tenorio M.D.;  Location: [facility];  Service: Ent    SPHENOIDECTOMY Bilateral 11/15/2019    Procedure: SPHENOIDECTOMY- FOR SPHENOIDOTOMY;  Surgeon: Felicitas Tenorio M.D.;  Location: [facility];  Service: Ent    ETHMOIDECTOMY Bilateral 11/15/2019    Procedure: ETHMOIDECTOMY- ENDOSCOPIC;  Surgeon: Felicitas Tenorio M.D.;  Location: [facility];  Service: Ent    NASAL POLYPECTOMY Bilateral 11/15/2019    Procedure: POLYPECTOMY, NASAL CAVITY;  Surgeon: Felicitas Tenorio M.D.;  Location: [facility];  Service: Ent    NASAL POLYPECTOMY  2015, 2017, 2019    x 3    OTHER  11/20    removed melanoma     Allergies   Allergen Reactions    Augmentin Vomiting and Nausea     Outpatient Encounter Medications as of 12/9/2024   Medication Sig Dispense Refill    morphine ER (MS CONTIN) 15 MG Tab CR tablet Take 3 Tablets by mouth every 12 hours  for 28 days. 168 Tablet 0    cyclobenzaprine (FLEXERIL) 10 mg Tab Take 1 Tablet by mouth every 8 hours as needed for Muscle Spasms. 30 Tablet 0    enoxaparin (LOVENOX) 40 MG/0.4ML Solution Prefilled Syringe inj Inject 1 syringe (40 mg) under the skin every day at 6 PM. Injection 40 mg under the skin everyday at 6 pm for 14 days 5.6 mL 0    ondansetron (ZOFRAN ODT) 4 MG TABLET DISPERSIBLE Dissolve 1 Tablet by mouth every four hours as needed for Nausea/Vomiting 10 Tablet 0    senna-docusate (PERICOLACE OR SENOKOT S) 8.6-50 MG Tab Take 1 Tablet by mouth every evening. 30 Tablet 0    midodrine (PROAMATINE) 10 MG tablet Take 1 Tablet by mouth 3 times a day with meals. (Patient taking differently: Take 10 mg by mouth 2 times a day.         MEDICATION INSTRUCTIONS FOR SURGERY ON 8/19/24:  IT IS OK TO CONTINUE THIS MEDICATION.  IT IS OK TO TAKE THIS MEDICATION ON THE DAY OF SURGERY WITH A SMALL SIP OF WATER.) 90 Tablet 3    megestrol (MEGACE) 400 MG/10ML Suspension Take 20 mL by mouth every day. (Patient taking differently: Take 800 mg by mouth every day.         MEDICATION INSTRUCTIONS FOR SURGERY SCHEDULED ON 8/19/24 : COORDINATE MEDICATION INSTRUCTIONS FROM PRESCRIBING PHYSICIAN.) 600 mL 2    mometasone-formoterol (DULERA) 100-5 MCG/ACT Aerosol Inhale 2 Puffs 2 times a day. (Patient taking differently: Inhale 2 Puffs 2 times a day.         MEDICATION INSTRUCTIONS FOR SURGERY ON 8/19/24:  IT IS OK TO CONTINUE THIS MEDICATION.  IT IS OK TO TAKE THIS MEDICATION ON THE DAY OF SURGERY.) 13 g 2    Encorafenib (BRAFTOVI) 75 MG Cap Take 300 mg by mouth every day at 6 PM. (Patient taking differently: Take 300 mg by mouth every day at 6 PM.         MEDICATION INSTRUCTIONS FOR SURGERY SCHEDULED ON 8/19/24 : COORDINATE MEDICATION INSTRUCTIONS FROM PRESCRIBING PHYSICIAN.)      Binimetinib (MEKTOVI) 15 MG Tab Take 30 mg by mouth 2 times a day. (Patient taking differently: Take 30 mg by mouth 2 times a day.         MEDICATION  INSTRUCTIONS FOR SURGERY SCHEDULED ON 8/19/24 : COORDINATE MEDICATION INSTRUCTIONS FROM PRESCRIBING PHYSICIAN.)      CALCIUM CARBONATE ANTACID PO Take 2 Tablets by mouth every day.         MEDICATION INSTRUCTIONS FOR SURGERY SCHEDULED ON 8/19/24: IT IS OK TO CONTINUE THIS MEDICATION PRIOR TO SURGERY BUT HOLD THIS MEDICATION ON THE MORNING OF SURGERY.       No facility-administered encounter medications on file as of 12/9/2024.     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   Other Topics Concern    Not on file   Social History Narrative    Not on file     Social Drivers of Health     Financial Resource Strain: Not on file   Food Insecurity: No Food Insecurity (8/19/2024)    Hunger Vital Sign     Worried About Running Out of Food in the Last Year: Never true     Ran Out of Food in the Last Year: Never true   Transportation Needs: No Transportation Needs (8/19/2024)    PRAPARE - Transportation     Lack of Transportation (Medical): No     Lack of Transportation (Non-Medical): No   Physical Activity: Not on file   Stress: Not on file   Social Connections: Not on file   Intimate Partner Violence: Not At Risk (8/19/2024)    Humiliation, Afraid, Rape, and Kick questionnaire     Fear of Current or Ex-Partner: No     Emotionally Abused: No     Physically Abused: No     Sexually Abused: No   Housing Stability: Low Risk  (8/19/2024)    Housing Stability Vital Sign     Unable to Pay  "for Housing in the Last Year: No     Number of Times Moved in the Last Year: 1     Homeless in the Last Year: No      Social History     Tobacco Use   Smoking Status Never    Passive exposure: Past   Smokeless Tobacco Never   Tobacco Comments    Childhood exposure     Social History     Substance and Sexual Activity   Alcohol Use Not Currently     Social History     Substance and Sexual Activity   Drug Use No        No family history on file.    Review of Systems   Constitutional:  Negative for weight loss.   Musculoskeletal:  Negative for back pain, joint pain and neck pain.        Left thigh pain   Neurological:  Negative for tingling, sensory change and weakness.        Was having right lower extremity paresthesias, that are now improving.        Objective:   Pulse 92   Temp 36.5 °C (97.7 °F) (Temporal)   Ht 1.803 m (5' 11\")   Wt 68 kg (150 lb)   BMI 20.92 kg/m²     Physical Exam  Constitutional:       General: He is not in acute distress.     Appearance: Normal appearance.   HENT:      Head: Normocephalic and atraumatic.      Right Ear: External ear normal.      Left Ear: External ear normal.      Nose: Nose normal.      Mouth/Throat:      Mouth: Mucous membranes are moist.   Eyes:      Extraocular Movements: Extraocular movements intact.      Conjunctiva/sclera: Conjunctivae normal.   Cardiovascular:      Rate and Rhythm: Normal rate.      Pulses: Normal pulses.   Pulmonary:      Effort: Pulmonary effort is normal. No respiratory distress.   Abdominal:      General: Abdomen is flat.   Musculoskeletal:      Comments: Pelvis/left lower extremity.  He is nontender to palpation to the pelvis.  He has mild tenderness to palpation of the left thigh and the proximal femur.  He is nontender distally.  No palpable masses.  Sensation intact light touch SPN, DPN, plantar and sural nerves.  Positive ankle dorsiflexion and plantarflexion.  DP pulse 2+.   Skin:     General: Skin is warm and dry.      Capillary Refill: " Capillary refill takes less than 2 seconds.   Neurological:      Mental Status: He is alert and oriented to person, place, and time.   Psychiatric:         Mood and Affect: Mood normal.         Behavior: Behavior normal.           Imaging:    Multiple views of the bilateral shoulders demonstrate stable findings.  He has sclerotic changes to the glenoid neck bilaterally consistent with old healed fracture from metastatic lesions.    Multiple views of the pelvis and bilateral hips again demonstrated multiple sites of metastatic disease in the pelvis that are stable compared to priors.  He does have some lucency and cortical destruction in the proximal femur metaphysis.  This is new since prior.    Multiple views of the left femur demonstrate lucency and cortical destruction of the proximal femur metaphysis.  There is no surrounding periosteal reaction.  No pathologic fracture.    PET/CT was reviewed and demonstrates increased avidity in size of lumbar spine metastatic deposits as well as left proximal femur lesion.  Multiple other sites of osseous disease again visualized.        Diagnosis:     1. Metastasis to bone (HCC)  [id number]-2+ LEFT      2. Melanoma metastatic to bone (HCC)                  Assessment/Plan:     I reviewed the patient's radiographs and PET/CT with him and his wife today.  We also discussed his symptoms related to the left thigh.  I am concerned he is at risk for pathologic fracture.  We discussed that the lesion has grown since prior imaging and his symptoms have also changed.  We discussed that weightbearing pain is the biggest indicator for impending fracture.  We discussed options including conservative and surgical treatments.  We discussed surgical treatment would be with an intramedullary nail.  He does not wish to proceed with surgery at this time and would like to avoid surgery if at all possible.  We also discussed the morbidity associated with a femur fracture.  He understands and  would like to proceed with systemic therapy and radiation at this time.  I did  him that if his symptoms worsen or progress/change in any way I would like to be notified.  All of his questions were answered and they were in agreement with the plan.  He is already scheduled to see me next month after he is started radiation.      Referrals: none  Restrictions: Protected weightbearing with use of a walker  New medications/refills: none  Imaging studies: AP/lateral left femur   Labs: none  Follow-up: January 2025      Sofia Burleson DO  Orthopedic Oncologist

## 2024-12-15 ASSESSMENT — LIFESTYLE VARIABLES
SMOKING_STATUS: NO
TOBACCO_USE: NO

## 2024-12-16 ENCOUNTER — HOSPITAL ENCOUNTER (OUTPATIENT)
Dept: RADIATION ONCOLOGY | Facility: MEDICAL CENTER | Age: 59
End: 2024-12-16
Attending: RADIOLOGY
Payer: COMMERCIAL

## 2024-12-16 ENCOUNTER — PATIENT MESSAGE (OUTPATIENT)
Dept: MEDICAL GROUP | Facility: LAB | Age: 59
End: 2024-12-16
Payer: COMMERCIAL

## 2024-12-16 VITALS
SYSTOLIC BLOOD PRESSURE: 85 MMHG | OXYGEN SATURATION: 97 % | HEART RATE: 106 BPM | TEMPERATURE: 97.2 F | DIASTOLIC BLOOD PRESSURE: 64 MMHG

## 2024-12-16 DIAGNOSIS — Z98.1 S/P CERVICAL SPINAL FUSION: ICD-10-CM

## 2024-12-16 DIAGNOSIS — C43.9 METASTATIC MELANOMA (HCC): ICD-10-CM

## 2024-12-16 DIAGNOSIS — C79.51 METASTASIS TO BONE (HCC): ICD-10-CM

## 2024-12-16 PROCEDURE — 99214 OFFICE O/P EST MOD 30 MIN: CPT | Performed by: RADIOLOGY

## 2024-12-16 PROCEDURE — 99215 OFFICE O/P EST HI 40 MIN: CPT | Performed by: RADIOLOGY

## 2024-12-16 RX ORDER — OXYCODONE HYDROCHLORIDE 10 MG/1
TABLET ORAL
COMMUNITY
Start: 2024-11-30 | End: 2024-12-16 | Stop reason: SDUPTHER

## 2024-12-16 ASSESSMENT — PAIN SCALES - GENERAL: PAINLEVEL_OUTOF10: 4=SLIGHT-MODERATE PAIN

## 2024-12-16 NOTE — PROGRESS NOTES
RADIATION ONCOLOGY FOLLOW-UP    Patient name:  Andrew Wall    Primary Physician:  Jose Luis Juarez M.D. MRN: [id number]  CSN: [id number]   Referring physician:  No ref. provider found  : 1965, 59 y.o.     DATE OF SERVICE: 2024    IDENTIFICATION:   A 59 y.o. male with    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)  Staging form: Melanoma of the Skin, AJCC 8th Edition  - Pathologic stage from 2024: Stage IV (rpT0, pNX, pM1c(1)) - Signed by Julia Fraga M.D. on 2024  Stage prefix: Recurrence      RADIATION SUMMARY:  Radiation Oncology          2024   Aria Course Treatment Dates   Course First Treatment Date  2024    Course Last Treatment Date  2024    Aria Treatment Summary   SBRT C1-3  Plan from Course C2_C_spine SBRT   Fraction 4 of 5 5 of 5    5 of 5   Elapsed Course Days 3 @ [id number] 4 @ [id number]    4 @ [id number]   Prescribed Fraction Dose 600 cGy 600 cGy    600 cGy   Prescribed Total Dose 3,000 cGy 3,000 cGy    3,000 cGy   SBRT C6-7  Plan from Course C2_C_spine SBRT   Fraction 4 of 5 5 of 5    5 of 5   Elapsed Course Days 3 @ [id number] 4 @ [id number]    4 @ [id number]   Prescribed Fraction Dose 600 cGy 600 cGy    600 cGy   Prescribed Total Dose 3,000 cGy 3,000 cGy    3,000 cGy   C1-3  Reference Point from Course C2_C_spine SBRT   Elapsed Course Days 3 @ [id number] 4 @ [id number]    4 @ [id number]   Session Dose 600 cGy 600 cGy    --   Total Dose 2,400 cGy 3,000 cGy    3,000 cGy   C6-7  Reference Point from Course C2_C_spine SBRT   Elapsed Course Days 3 @ [id number] 4 @ [id number]    4 @ [id number]   Session Dose 600 cGy 600 cGy    --   Total Dose 2,400 cGy 3,000 cGy    3,000 cGy   SBRT R Hip  Plan from Course C3_R_hip   Fraction 4 of 5 5 of 5    5 of 5   Elapsed Course Days 3 @ [id number] 4 @ [id number]    4 @ [id number]   Prescribed Fraction Dose 600  cGy 600 cGy    600 cGy   Prescribed Total Dose 3,000 cGy 3,000 cGy    3,000 cGy   SBRT R Hip  Reference Point from Course C3_R_hip   Elapsed Course Days 3 @ [id number] 4 @ [id number]    4 @ [id number]   Session Dose 600 cGy 600 cGy    --   Total Dose 2,400 cGy 3,000 cGy    3,000 cGy      Details          More values are hidden. Newest values shown. Go to activity for more data.                HISTORY OF PRESENT ILLNESS:  Subjective      I am taking over his care from Dr. Fraga.  His initial consultation from January 2024 is as follows:     Patient's history began in 2019 when he was diagnosed with a T4b N1 M0 malignant melanoma of the right lower back with lymph node metastasis.  He underwent surgical resection followed by adjuvant Opdivo which he completed November 2021.   He did well till a PET CT scan was done late October 2023 showing a right upper inguinal lymph node consistent with metastatic disease.   This was biopsied on 11/16/2023 found to be consistent with metastatic melanoma.  More recently over the last 2 weeks he has been having increasing back pain.  Apparently he was found to have high calcium levels and it was recommended that he go to the emergency room at the same time he was going he fell and developed severe pain in the right leg with inability to move it due to pain.   MRI scan of the lumbar spine was done 1/8/2024 showing an extensive enhancing osseous metastasis throughout the visualized lumbar spine and sacrum most in the right sacral jaz in keeping with metastasis.  There was multiple metastasis seen in the paraspinous muscles psoas muscles iliac us muscles and gluteal muscles.  The largest is in the right posterior chest wall at the level of the right kidney.  There is no enhancement seen in the cord.  there is nerve root enhancement in the bilateral lumbar foramina diffusely of unclear etiology.  There is bilateral S1 nerve roots and right S2 nerve root in the sacral foramen  surrounded by the mass and probably invading and impinged.   CT chest abdomen pelvis was done 1/13/2024 there is widespread metastatic disease throughout the chest abdomen and pelvis there is a new liver lesion and pulmonary metastasis and severe and widespread osseous metastatic disease with pathologic fractures related to the osseous metastatic disease.   There is a lytic lesion at L4 with pathologic compression fracture on the left, there is a lytic lesion at S1 sacrum and a large lytic lesion in the right sacral jaz with pathologic fractures.  There is lytic lesions bilaterally in the ilium and in the left ischium with a pathologic fracture.  There is a lytic lesion in the right superior pubic ramus and pubic symphysis with pathologic fracture.  There is a lytic lesion in the right femoral neck.      Since then, he completed SBRT as above to the cervical spine and the right hip.          8/2/24 Unfortunately, he then presented to the hospital in August 2024 with significant increase in kyphosis with a C2 tilt of 66 degrees.  He underwent a C1-T2 posterior fusion.  He is now slowly recovering from this.  He is on medical oncology, had a PET scan that identified a 2 small areas of punctate bony metastasis as well as a lung nodule.  He continues on therapy with Dr. Leo.  These bony lesions in the pelvis are not painful currently.  We are having a telephone follow-up now to reestablish care.  He says he is recovering as expected from his neck surgery.  He has physical therapy that is ongoing.  He is not in any pain with regards to the PET avid lesions, but does have some postoperative neck pain.  He is looking forward to ongoing therapy.     INTERVAL HISTORY:  12/16/2024 he comes today for routine follow-up and to discuss additional radiation.  Since I last saw him, he had an outside consultation with MD Greene, and the plan is to start dual checkpoint immunotherapy soon.  In the interim, he had a PET scan  done that showed disease involving the left femur, lumbar spine and the scattered small lung nodules.  He does have quite significant left femur pain now and saw Dr. Burleson as well and discussed surgical options versus nonsurgical treatment.  At that time he wanted to hold off on surgery and now he comes to discuss radiation therapy for this.  His pain is nearly constant, worsened by movement, was taking previously long-acting morphine 15 mg every 12 hours, but he has become tolerant to this and more recently has been taking oxycodone 10 mg as needed which is working poorly with him.  He has not seen palliative care recently.    PROBLEM LIST:  Patient Active Problem List   Diagnosis    Hypertrophy of nasal turbinates    Mild intermittent asthma without complication    Melanoma of buttock (HCC)    Moderate persistent asthma without complication    Chronic pansinusitis    Bilateral tinnitus    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)    Hypercalcemia    Metastatic melanoma, BRAF V600E POSITIVE  ([facility])    Elevated glucose    Acute anemia    Constipation    Intractable low back pain    Hypokalemia    Leukocytosis    Hyperglycemia    Metastasis to bone (HCC)    DNR (do not resuscitate)    Pathologic fracture    Elevated LFTs    Hypocalcemia    Hypophosphatemia    Cancer related pain    Nausea and vomiting    ACP (advance care planning)    Fracture dislocation of cervical spine, initial encounter ([facility])    Ground-level fall    Acute encephalopathy    C2 cervical fracture ([facility])    Dehydration    Cervical stenosis of spine       CURRENT MEDICATIONS:  Current Outpatient Medications   Medication Sig Dispense Refill    morphine ER (MS CONTIN) 15 MG Tab CR tablet Take 3 Tablets by mouth every 12 hours for 28 days. 168 Tablet 0    cyclobenzaprine (FLEXERIL) 10 mg Tab Take 1 Tablet by mouth every 8 hours as needed for Muscle Spasms. 30 Tablet 0    enoxaparin (LOVENOX) 40 MG/0.4ML Solution Prefilled Syringe inj Inject 1  syringe (40 mg) under the skin every day at 6 PM. Injection 40 mg under the skin everyday at 6 pm for 14 days 5.6 mL 0    ondansetron (ZOFRAN ODT) 4 MG TABLET DISPERSIBLE Dissolve 1 Tablet by mouth every four hours as needed for Nausea/Vomiting 10 Tablet 0    senna-docusate (PERICOLACE OR SENOKOT S) 8.6-50 MG Tab Take 1 Tablet by mouth every evening. 30 Tablet 0    midodrine (PROAMATINE) 10 MG tablet Take 1 Tablet by mouth 3 times a day with meals. (Patient taking differently: Take 10 mg by mouth 2 times a day.         MEDICATION INSTRUCTIONS FOR SURGERY ON 8/19/24:  IT IS OK TO CONTINUE THIS MEDICATION.  IT IS OK TO TAKE THIS MEDICATION ON THE DAY OF SURGERY WITH A SMALL SIP OF WATER.) 90 Tablet 3    megestrol (MEGACE) 400 MG/10ML Suspension Take 20 mL by mouth every day. (Patient taking differently: Take 800 mg by mouth every day.         MEDICATION INSTRUCTIONS FOR SURGERY SCHEDULED ON 8/19/24 : COORDINATE MEDICATION INSTRUCTIONS FROM PRESCRIBING PHYSICIAN.) 600 mL 2    mometasone-formoterol (DULERA) 100-5 MCG/ACT Aerosol Inhale 2 Puffs 2 times a day. (Patient taking differently: Inhale 2 Puffs 2 times a day.         MEDICATION INSTRUCTIONS FOR SURGERY ON 8/19/24:  IT IS OK TO CONTINUE THIS MEDICATION.  IT IS OK TO TAKE THIS MEDICATION ON THE DAY OF SURGERY.) 13 g 2    Encorafenib (BRAFTOVI) 75 MG Cap Take 300 mg by mouth every day at 6 PM. (Patient taking differently: Take 300 mg by mouth every day at 6 PM.         MEDICATION INSTRUCTIONS FOR SURGERY SCHEDULED ON 8/19/24 : COORDINATE MEDICATION INSTRUCTIONS FROM PRESCRIBING PHYSICIAN.)      Binimetinib (MEKTOVI) 15 MG Tab Take 30 mg by mouth 2 times a day. (Patient taking differently: Take 30 mg by mouth 2 times a day.         MEDICATION INSTRUCTIONS FOR SURGERY SCHEDULED ON 8/19/24 : COORDINATE MEDICATION INSTRUCTIONS FROM PRESCRIBING PHYSICIAN.)      CALCIUM CARBONATE ANTACID PO Take 2 Tablets by mouth every day.         MEDICATION INSTRUCTIONS FOR SURGERY  SCHEDULED ON 8/19/24: IT IS OK TO CONTINUE THIS MEDICATION PRIOR TO SURGERY BUT HOLD THIS MEDICATION ON THE MORNING OF SURGERY.       No current facility-administered medications for this encounter.       ALLERGIES:  Augmentin    REVIEW OF SYSTEMS:    A complete review of system taken. Pertinent items in HPI.  All others negative.    PHYSICAL EXAM:  PERFORMANCE STATUS:       No data to display                   No data to display              BP (!) 85/64 (BP Location: Right arm, Patient Position: Sitting, BP Cuff Size: Adult)   Pulse (!) 106   Temp 36.2 °C (97.2 °F) (Temporal)   SpO2 97%   Physical Exam  Constitutional:       Appearance: He is ill-appearing.   Cardiovascular:      Rate and Rhythm: Normal rate.   Pulmonary:      Effort: Pulmonary effort is normal.   Abdominal:      General: Abdomen is flat.   Musculoskeletal:         General: Tenderness present.      Left hip: Tenderness present. Decreased range of motion.   Neurological:      Mental Status: He is alert.         LABORATORY DATA:   Lab Results   Component Value Date/Time    WBC 6.4 08/21/2024 03:05 AM    RBC 3.27 (L) 08/21/2024 03:05 AM    HEMOGLOBIN 9.1 (L) 08/21/2024 03:05 AM    HEMATOCRIT 27.8 (L) 08/21/2024 03:05 AM    MCV 85.0 08/21/2024 03:05 AM    MCH 27.8 08/21/2024 03:05 AM    MCHC 32.7 08/21/2024 03:05 AM    RDW 46.3 08/21/2024 03:05 AM    PLATELETCT 233 08/21/2024 03:05 AM    MPV 8.8 (L) 08/21/2024 03:05 AM    NEUTSPOLYS 65.30 08/14/2024 09:57 AM    LYMPHOCYTES 16.00 (L) 08/14/2024 09:57 AM    MONOCYTES 7.10 08/14/2024 09:57 AM    EOSINOPHILS 8.60 (H) 08/14/2024 09:57 AM    BASOPHILS 1.10 08/14/2024 09:57 AM    HYPOCHROMIA 1+ 03/22/2024 02:10 AM    ANISOCYTOSIS 2+ (A) 03/22/2024 02:10 AM      Lab Results   Component Value Date/Time    SODIUM 135 08/21/2024 03:05 AM    POTASSIUM 4.1 08/21/2024 03:05 AM    CHLORIDE 105 08/21/2024 03:05 AM    CO2 19 (L) 08/21/2024 03:05 AM    GLUCOSE 125 (H) 08/21/2024 03:05 AM    BUN 8 08/21/2024 03:05 AM     CREATININE 0.34 (L) 08/21/2024 03:05 AM         RADIOLOGY DATA:  DX-SHOULDER 2+ RIGHT    Result Date: 12/2/2024  1.  No acute fracture. Mildly sclerotic right glenoid. 2.  There are 2 nodules that project over the right lung field. Metastatic lung disease not excluded.    DX-SHOULDER 2+ LEFT    Result Date: 12/2/2024  Sclerotic deformed glenoid similar to previous and consistent with metastatic disease.    MR-BRAIN-WITH & W/O    Result Date: 12/10/2024  There is no parenchymal or meningeal enhancing lesions to suggest metastasis. There is focal sclerosis noted involving C2 vertebral body. There has been interval reduction in the extent of the signal changes consistent with treated metastasis.    DX-HIP-BILATERAL-WITH PELVIS-2 VIEWS    Result Date: 12/2/2024  1.  No acute hip fracture or significant bony arthritic changes. 2.  Pelvic metastatic disease including the right acetabulum. 3.  Lytic metastatic lesion within the right femoral head/neck.    DX-FEMUR-2+ LEFT    Result Date: 12/9/2024  Redemonstration of lytic sclerotic lesions in the left ischium and right pubic rami. No acute fracture or dislocation.      IMPRESSION:    A 59 y.o. with   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)  Staging form: Melanoma of the Skin, AJCC 8th Edition  - Pathologic stage from 1/8/2024: Stage IV (rpT0, pNX, pM1c(1)) - Signed by Julia Fraga M.D. on 1/16/2024  Stage prefix: Recurrence      CANCER STATUS:  Disease Progression Distant    RECOMMENDATIONS:   Unfortunately, his PET scan shows continued worsening of disease in the left femur proximally, as well as lumbar spine and the scattered lung lesions.  He is having quite significant pain of the left femur as well.  His pain is poorly controlled.  They had previously seen Dr. Bedolla, and I advised him to see him back to optimize pain regimen.  In the meantime, I recommend we proceed with a course of palliative radiation to the left femoral lesion as well as the  lumbar spine metastasis to help optimize his local regional control here and to help with his left-sided femoral pain that he is having.  I do not think we need to treat the lung lesions currently.  He has previously had radiation treatment with Dr. Fraga, and is quite familiar with stereotactic radiation, and again reviewed the plan for CT simulation and 0 tactic radiation over the course of approximately 5 days.  Reviewed acute and long-term toxicity risk, including most significantly small risk of increased risk of fracture of the left femur that may need surgical fixation down the line, and he is certainly understanding of this.  We will proceed with CT simulation and treatment in the coming days.    Thank you for the opportunity to participate in his care.  If any questions or comments, please do not hesitate in calling.    Orders Placed This Encounter    Rad Onc Treatment Planning CT Simulation

## 2024-12-16 NOTE — CT SIMULATION
PATIENT NAME Andrew Wall   PRIMARY PHYSICIAN Jose Luis Juarez [id number]   REFERRING PHYSICIAN No ref. provider found 1965     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)  Staging form: Melanoma of the Skin, AJCC 8th Edition  - Pathologic stage from 1/8/2024: Stage IV (rpT0, pNX, pM1c(1)) - Signed by Julia Fraga M.D. on 1/16/2024  Stage prefix: Recurrence         Treatment Planning CT Simulation      Order Questions     Question Answer    Is this for a new course of treatment? Yes    Is this an Addendum? No    Implanted Device/Pacemaker No    Simulation Status Initial    Planned Start Date 12/23/2024    Treatment Site Bone - Femur    Laterality Left    Treatment Site Spine - Lumbar    Laterality Midline    Treatment Technique SBRT    Treatment Pattern/Frequency Daily    Number of fractions: 5    Concurrent Chemotherapy No    CT Technique 3D    Slice Thickness 2mm    Scan Extent Pelvis     Abdomen    Treatment Device(s) Vac Kelly    Patient Position Supine    Patient Orientation Head First    Arm Position On Chest    Bladder Full    Treatment Machine No preference    Treatment Image Guidance CBCT    Frequency (CBCT) Daily    Image Guidance Match Bone     PTV - Soft Tissue    Treatment Planning Image Fusion CT/PET    Other Orders Weekly Physics Check     Special Tx Procedure    Release to patient Immediate

## 2024-12-16 NOTE — PROGRESS NOTES
Patient was seen today in clinic with Dr. Wilcox for follow up.  Vitals signs and weight were obtained and pain assessment was completed.  Allergies and medications were reviewed with the patient.      Vitals/Pain:  Vitals:    12/16/24 0903   BP: (!) 85/64   BP Location: Right arm   Patient Position: Sitting   BP Cuff Size: Adult   Pulse: (!) 106   Temp: 36.2 °C (97.2 °F)   TempSrc: Temporal   SpO2: 97%   Pain Score: 4=Slight-Moderate Pain        Allergies:   Augmentin    Current Medications:  Current Outpatient Medications   Medication Sig Dispense Refill    morphine ER (MS CONTIN) 15 MG Tab CR tablet Take 3 Tablets by mouth every 12 hours for 28 days. 168 Tablet 0    cyclobenzaprine (FLEXERIL) 10 mg Tab Take 1 Tablet by mouth every 8 hours as needed for Muscle Spasms. 30 Tablet 0    enoxaparin (LOVENOX) 40 MG/0.4ML Solution Prefilled Syringe inj Inject 1 syringe (40 mg) under the skin every day at 6 PM. Injection 40 mg under the skin everyday at 6 pm for 14 days 5.6 mL 0    ondansetron (ZOFRAN ODT) 4 MG TABLET DISPERSIBLE Dissolve 1 Tablet by mouth every four hours as needed for Nausea/Vomiting 10 Tablet 0    senna-docusate (PERICOLACE OR SENOKOT S) 8.6-50 MG Tab Take 1 Tablet by mouth every evening. 30 Tablet 0    midodrine (PROAMATINE) 10 MG tablet Take 1 Tablet by mouth 3 times a day with meals. (Patient taking differently: Take 10 mg by mouth 2 times a day.         MEDICATION INSTRUCTIONS FOR SURGERY ON 8/19/24:  IT IS OK TO CONTINUE THIS MEDICATION.  IT IS OK TO TAKE THIS MEDICATION ON THE DAY OF SURGERY WITH A SMALL SIP OF WATER.) 90 Tablet 3    megestrol (MEGACE) 400 MG/10ML Suspension Take 20 mL by mouth every day. (Patient taking differently: Take 800 mg by mouth every day.         MEDICATION INSTRUCTIONS FOR SURGERY SCHEDULED ON 8/19/24 : COORDINATE MEDICATION INSTRUCTIONS FROM PRESCRIBING PHYSICIAN.) 600 mL 2    mometasone-formoterol (DULERA) 100-5 MCG/ACT Aerosol Inhale 2 Puffs 2 times a day. (Patient  taking differently: Inhale 2 Puffs 2 times a day.         MEDICATION INSTRUCTIONS FOR SURGERY ON 8/19/24:  IT IS OK TO CONTINUE THIS MEDICATION.  IT IS OK TO TAKE THIS MEDICATION ON THE DAY OF SURGERY.) 13 g 2    Encorafenib (BRAFTOVI) 75 MG Cap Take 300 mg by mouth every day at 6 PM. (Patient taking differently: Take 300 mg by mouth every day at 6 PM.         MEDICATION INSTRUCTIONS FOR SURGERY SCHEDULED ON 8/19/24 : COORDINATE MEDICATION INSTRUCTIONS FROM PRESCRIBING PHYSICIAN.)      Binimetinib (MEKTOVI) 15 MG Tab Take 30 mg by mouth 2 times a day. (Patient taking differently: Take 30 mg by mouth 2 times a day.         MEDICATION INSTRUCTIONS FOR SURGERY SCHEDULED ON 8/19/24 : COORDINATE MEDICATION INSTRUCTIONS FROM PRESCRIBING PHYSICIAN.)      CALCIUM CARBONATE ANTACID PO Take 2 Tablets by mouth every day.         MEDICATION INSTRUCTIONS FOR SURGERY SCHEDULED ON 8/19/24: IT IS OK TO CONTINUE THIS MEDICATION PRIOR TO SURGERY BUT HOLD THIS MEDICATION ON THE MORNING OF SURGERY.       No current facility-administered medications for this encounter.           [facility]

## 2024-12-16 NOTE — PATIENT COMMUNICATION
Received request via: Patient    Was the patient seen in the last year in this department? Yes    Does the patient have an active prescription (recently filled or refills available) for medication(s) requested? No    Pharmacy Name: [facility]    Does the patient have assisted Plus and need 100-day supply? (This applies to ALL medications) Patient does not have SCP.

## 2024-12-17 ENCOUNTER — TELEPHONE (OUTPATIENT)
Dept: HEMATOLOGY ONCOLOGY | Facility: MEDICAL CENTER | Age: 59
End: 2024-12-17
Payer: COMMERCIAL

## 2024-12-17 ENCOUNTER — PATIENT MESSAGE (OUTPATIENT)
Dept: HEMATOLOGY ONCOLOGY | Facility: MEDICAL CENTER | Age: 59
End: 2024-12-17
Payer: COMMERCIAL

## 2024-12-18 ENCOUNTER — HOSPITAL ENCOUNTER (OUTPATIENT)
Dept: HEMATOLOGY ONCOLOGY | Facility: MEDICAL CENTER | Age: 59
End: 2024-12-18
Attending: FAMILY MEDICINE
Payer: COMMERCIAL

## 2024-12-18 ENCOUNTER — HOSPITAL ENCOUNTER (OUTPATIENT)
Dept: RADIATION ONCOLOGY | Facility: MEDICAL CENTER | Age: 59
End: 2024-12-18

## 2024-12-18 VITALS
HEIGHT: 71 IN | SYSTOLIC BLOOD PRESSURE: 98 MMHG | TEMPERATURE: 97.2 F | DIASTOLIC BLOOD PRESSURE: 56 MMHG | BODY MASS INDEX: 21.14 KG/M2 | HEART RATE: 90 BPM | WEIGHT: 151 LBS | OXYGEN SATURATION: 93 %

## 2024-12-18 DIAGNOSIS — C43.9 METASTATIC MELANOMA (HCC): ICD-10-CM

## 2024-12-18 DIAGNOSIS — G89.3 CANCER RELATED PAIN: ICD-10-CM

## 2024-12-18 PROCEDURE — 99213 OFFICE O/P EST LOW 20 MIN: CPT | Performed by: FAMILY MEDICINE

## 2024-12-18 PROCEDURE — 77334 RADIATION TREATMENT AID(S): CPT | Performed by: RADIOLOGY

## 2024-12-18 PROCEDURE — 77470 SPECIAL RADIATION TREATMENT: CPT | Performed by: RADIOLOGY

## 2024-12-18 PROCEDURE — 77470 SPECIAL RADIATION TREATMENT: CPT | Mod: 26 | Performed by: RADIOLOGY

## 2024-12-18 PROCEDURE — 77290 THER RAD SIMULAJ FIELD CPLX: CPT | Performed by: RADIOLOGY

## 2024-12-18 PROCEDURE — 77334 RADIATION TREATMENT AID(S): CPT | Mod: 26 | Performed by: RADIOLOGY

## 2024-12-18 PROCEDURE — 77263 THER RADIOLOGY TX PLNG CPLX: CPT | Performed by: RADIOLOGY

## 2024-12-18 PROCEDURE — 77290 THER RAD SIMULAJ FIELD CPLX: CPT | Mod: 26 | Performed by: RADIOLOGY

## 2024-12-18 PROCEDURE — 99212 OFFICE O/P EST SF 10 MIN: CPT | Performed by: FAMILY MEDICINE

## 2024-12-18 RX ORDER — OXYCODONE HYDROCHLORIDE 10 MG/1
10 TABLET ORAL EVERY 4 HOURS PRN
Qty: 28 TABLET | Refills: 0 | Status: SHIPPED | OUTPATIENT
Start: 2024-12-18 | End: 2024-12-25

## 2024-12-18 RX ORDER — OXYCODONE HYDROCHLORIDE 20 MG/1
20 TABLET ORAL EVERY 4 HOURS PRN
Qty: 60 TABLET | Refills: 0 | Status: SHIPPED | OUTPATIENT
Start: 2024-12-18 | End: 2024-12-19

## 2024-12-18 ASSESSMENT — FIBROSIS 4 INDEX: FIB4 SCORE: 1.3

## 2024-12-18 NOTE — RADIATION PLANNING NOTES
PATIENT NAME Andrew Wall   PRIMARY PHYSICIAN Jose Luis Juarez [id number]   REFERRING PHYSICIAN No ref. provider found 1965     DATE OF SERVICE: 12/18/2024    DIAGNOSIS:  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)  Staging form: Melanoma of the Skin, AJCC 8th Edition  - Pathologic stage from 1/8/2024: Stage IV (rpT0, pNX, pM1c(1)) - Signed by Julia Fraga M.D. on 1/16/2024  Stage prefix: Recurrence         SPECIAL TREATMENT PROCEDURE NOTE:  Considerable additional effort required in the management of this case because of administration of Stereotactic Radiotherapy, which may result in increased normal tissue toxicity and require greater effort in contouring and treatment because of greater precision.

## 2024-12-18 NOTE — RADIATION PLANNING NOTES
Clinical Treatment Planning Note    DATE OF SERVICE: 12/18/2024    DIAGNOSIS:  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)  Staging form: Melanoma of the Skin, AJCC 8th Edition  - Pathologic stage from 1/8/2024: Stage IV (rpT0, pNX, pM1c(1)) - Signed by Julia Fraga M.D. on 1/16/2024  Stage prefix: Recurrence         IMAGING REVIEWED:  [x] CT     [] MRI     [x] PET/CT     [] BONE SCAN     [] MAMMO     [] OTHER      TREATMENT INTENT:   [] CURATIVE     [] MAINTENANCE     [x]  PALLIATIVE      []  SUPPORTIVE     []  PROPHYLACTIC     [] BENIGN     []  CONSOLIDATIVE      [x] DEFINITIVE   []  OLOGIMETASTATIC      LINE OF TREATMENT:  [] ADJUVANT   [x] DEFINITIVE   [] NEOADJUVANT   [] RE-TREATMENT      TECHNIQUE PLANNED:  [] IMRT   [] 3D   [x] SBRT   [] SRS/SRT   [] HDR   [] ELECTRON       IMRT JUSTIFICATION:  []   An immediately adjacent area has been previously irradiated and abutting portals must be established with high precision.    []  Dose escalation is planned to deliver radiation doses in excess of those commonly utilized for similar tumors with conventional treatment.    []  The target volume is concave or convex, and the critical normal tissues are within or around that convexity or concavity.    []  The target volume is in close proximity to critical structures that must be protected.    []  The volume of interest must be covered with narrow margins to adequately protect  immediately adjacent structures.      FIELDS & BLOCKING:  [x] COMPLEX BLOCKS     []  = 3 TX AREAS     []  ARCS     []  CUSTOM SHEILD        []  SIMPLE BLOCK      CHEMOTHERAPY:  []  CONCURRENT     []  INDUCTION     [] SEQUENTIAL     []  <30 DAYS FROM XRT      NOTES:  OAR CONSTRAINTS: (GUIDELINES ONLY NOT ABSOLUTE) QUANTEC OR AS STATED     One fraction Three fractions Five fractions    Serial Issue Max critical volume above threshold Threshold dose    (Gy) Max point dose (Gy)^a  Threshold dose   (Gy) Max point dose (Gy)^a Threshold  dose   (Gy) Max point dose (Gy)^a End point (greater than or equal to Grade3)   Optic pathway <0.2 cc 8 10 15.3 (5.1 Gy/fx) 17.4 (5.8 Gy/fx) 23 (4.6 Gy/fx) 25 (5 Gy/fx) Neuritis    [place]      9  17.1 (5.7 Gy/fx)  25 (5 Gy/fx) Hearing loss    Brainstem  (non medulla) <0.5 cc 10 15 18 (6 Gy/fx) 23.1 (7.7 Gy/fx) 23 (4.6 Gy/fx)   31 (6.2 Gy/fx) Cranial neuropathy   Spinal chord   and medulla <0.35 cc      <1.2 cc 10      7 14 18 (6 Gy/fx)    12.3 (4.1 Gy/fx) 21.9 (7.3 Gy/fx) 23 (4.6 Gy/fx)    14.5 (2.9 Gy/fx) 30 (6 Gy/fx) Myelitis   Spinal cord subvolume (5-6mm above and below level treated per [place]) <10% of  subvolume 10 14 18 (6 Gy/fx) 21.9 (7.3 Gy/fx) 23 (4.6 Gy/fx) 30 (6 Gy/fx) Myelitis   Cauda equina  <5 cc 14 16 21.9 (7.3 Gy/fx) 24 (8 Gy/fx) 30 (6 Gy/fx) 32 (6.4 Gy/fx) Neuritis   Sacral plexus <5 cc 14.4 16 22.5 (7.5 Gy/fx) 24 (8 (Gy/fx) 30 (6 Gy/fx) 32 (6.4 Gy/fx) Neuropathy   Esophagus^b <5 cc 11.9 15.4 17.7 (5.9 Gy/fx) 25.2 (8.4 Gy/fx) 19.5 (3.9 Gy/fx) 35 (7 Gy/fx) Stenosis/fistula   Brachial plexus <3 cc 14 17.5 20.4 (6.8 Gy/fx 24 (8 Gy/fx) 27 (5.4 Gy/fx) 30.5 (6.1 Gy/fx) Neuropathy   Heart/ pericardium <15 cc 16 22 24 (8 Gy/fx) 30 (10 Gy/fx) 32 (6.4 Gy/fx) 38 (7.6 Gy/fx) Pericarditis   Great vessels <10 cc 31 37 39 (13 Gy/fx) 45 (15 Gy/fx) 47 (9.4 Gy/fx) 53 (10.6 Gy/fx) Aneurysm   Trachea and large bronchus^b <4 cc 10.5 20.2 15 (5 Gy/fx) 30 (10 Gy/fx) 16.5 (3.3 Gy/fx) 40 (8 Gy/fx) Stenosis/ fistula   Bronchus- smaller airways <0.5 cc 12.4 13.3 18.9 (6.3 Gy/fx) 23.1 (7.7 Gy/fx) 21 (4.2 Gy/fx) 33 (6.6 Gy/fx) Stenosis with atelectasis   Rib <1 cc      <30 cc 22 30 28.8 (9.6 Gy/fx)    30.0 (10.0 Gy/fx) 36.9 (12.3 Gy/fx) 35 (7 Gy/fx) 43 (8.6 Gy/fx) Pain or fracture   Skin <10 cc 23 26 30 (10 Gy/fx) 33 (11 Gy/fx) 36.5 (7.3 Gy/fx) 39.5 (7.9 Gy/fx) Ulceration   Stomach <10 cc 11.2 12.4 16.5 (5.5 Gy/fx) 22.2 (7.4 Gy/fx) 18 (3.6 Gy/fx) 32 (6.4 Gy/fx) Ulceration/fistula   Duodenum^b <5 cc      <10 cc 11.2      9 12.4  16.5 (5.5 Gy/fx)    11.4 (3.8 Gy/fx) 22.2 (7.4 Gy/fx) 18 (3.6 Gy/fx)    12.5 (2.5 Gy/fx) 32 (6.4 Gy/fx) Ulceration   Jejunum/ Ileum^b  <5 cc 11.9 15.4 17.7 (5.9 Gy/fx) 25.2 (8.4 Gy/fx) 19.5 (3.9 Gy/fx) 35 (7 Gy/fx) Enteritis/ obstruction   Colon^b <20 cc 14.3 18.4 24 (8 Gy/fx) 28.2 (9.4 gy/fx) 25 (5 Gy/fx) 38 (7.6 Gy/fx) Colitis/ fistula   Rectum^b <20 cc 14.3 18.4 24 (8 Gy/fx) 28.2 (9.4 gy/fx) 25 (5 Gy/fx) 38 (7.6 Gy/fx) Proctitis/ fistula   Bladder wall <15 cc 11.4 18.4 16.8 (5.6 Gy/fx) 28.2 (9.4 Gy/fx) 18.3 (3.65 Gy/fx) 38 (7.6 Gy/fx) Cystitis/ fistula   Penile bulb <3 cc 14 34 21.9 (7.3 Gy/fx) 42 (14 Gy/fx) 30 (6 Gy/fx) 50 (10 Gy/fx) Impotence   Femoral heads (right and left) <10 cc 14  21.9 (7.3 Gy/fx)  30 (6 Gy/fx)  Necrosis   Renal hilium/ vascular trunk <2/3 volume 1.6 18.6 (6.2 Gy/fx)   23 (4.6 Gy/fx)  Malignant hypertension         One fraction Three fractions Five fractions    Parallel tissue Max critical volume below threshold Threshold dose (Gy) Max point dose (Gy)^a Threshold dose (Gy) Max point dose (Gy)^a Threshold dose (Gy) Max point dose (Gy)^a End point (greater than or equal to Grade 3)   Lung (right and left) 1500 cc 7 NA- Parallel tissue 11.6 (2.96 Gy/fx) NA- Parallel tissue 12.5 (2.5 Gy/fx) NA- Parallel tissue Basic lung function    Lung (right and left) 1000 cc 7.4 NA- Parallel tissue 12.4 (3.1 Gy/fx) NA- Parallel tissue 13.5 (2.7 Gy/fx) NA- Parallel tissue Pneumonitis   Liver 700 cc 9.1 NA- Parallel tissue 19.2 (4.8 Gy/fx) NA- Parallel tissue 21 (4.2 Gy/fx) NA- Parallel tissue Basic liver function   Renal Cortex (right and left) 200 cc 8.4 NA- Parallel tissue 16 (4 Gy/fx) NA- Parallel tissue 17.5 (3.5 Gy/fx) NA- Parallel tissue Basic renal function   ^a “Point” defined as 0.035 cc or less.  ^b Avoid circumferential irradiation.

## 2024-12-18 NOTE — RADIATION PLANNING NOTES
DATE OF SERVICE: 12/18/2024    DIAGNOSIS:  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)  Staging form: Melanoma of the Skin, AJCC 8th Edition  - Pathologic stage from 1/8/2024: Stage IV (rpT0, pNX, pM1c(1)) - Signed by Julia Fraga M.D. on 1/16/2024  Stage prefix: Recurrence       DATE OF SERVICE: 12/18/2024    TYPE OF SIMULATION: Pelvis    GOAL OF TREATMENT:   [] Curative  [x] Palliative  [] Oligometastatic    CONTRAST:    [] IV Contrast*  [] Small Bowel  [] Rectal  [] Urethral              POSITION:    [x]  Supine  [] Prone with belly board    COMPLEX:  [x] Complex Blocking   []Arcs  [] Custom Blocks  [] >3 Sites    PROCEDURE: Patient positioned on CT table in Vac-Kelly immobilization device with or without belly board depending on position. CT acquired thorough the entire volume of interest.  Images reviewed and exported to treatment planning system.    I have personally reviewed the relevant data, performed the target localization, and determined all relevant factors for this patient’s simulation.    *Omnipaque 80 -100cc IVP in conjunction with 500cc NS

## 2024-12-19 PROCEDURE — RXMED WILLOW AMBULATORY MEDICATION CHARGE: Performed by: FAMILY MEDICINE

## 2024-12-19 RX ORDER — OXYCODONE HYDROCHLORIDE 20 MG/1
20 TABLET ORAL EVERY 4 HOURS PRN
Qty: 60 TABLET | Refills: 0 | Status: SHIPPED | OUTPATIENT
Start: 2024-12-19 | End: 2025-01-09

## 2024-12-20 ENCOUNTER — PHARMACY VISIT (OUTPATIENT)
Dept: PHARMACY | Facility: MEDICAL CENTER | Age: 59
End: 2024-12-20
Payer: COMMERCIAL

## 2024-12-20 PROCEDURE — 77334 RADIATION TREATMENT AID(S): CPT | Mod: 26 | Performed by: RADIOLOGY

## 2024-12-20 PROCEDURE — 77334 RADIATION TREATMENT AID(S): CPT | Performed by: RADIOLOGY

## 2024-12-20 PROCEDURE — 77295 3-D RADIOTHERAPY PLAN: CPT | Performed by: RADIOLOGY

## 2024-12-20 PROCEDURE — 77295 3-D RADIOTHERAPY PLAN: CPT | Mod: 26 | Performed by: RADIOLOGY

## 2024-12-20 PROCEDURE — 77300 RADIATION THERAPY DOSE PLAN: CPT | Performed by: RADIOLOGY

## 2024-12-20 PROCEDURE — 77300 RADIATION THERAPY DOSE PLAN: CPT | Mod: 26 | Performed by: RADIOLOGY

## 2024-12-23 ENCOUNTER — HOSPITAL ENCOUNTER (OUTPATIENT)
Dept: RADIATION ONCOLOGY | Facility: MEDICAL CENTER | Age: 59
End: 2024-12-23
Payer: COMMERCIAL

## 2024-12-23 LAB
CHEMOTHERAPY INFUSION START DATE: NORMAL
CHEMOTHERAPY RECORDS: 3000 CGY
CHEMOTHERAPY RECORDS: 3500 CGY
CHEMOTHERAPY RECORDS: 6 GY
CHEMOTHERAPY RECORDS: 7 GY
CHEMOTHERAPY RECORDS: NORMAL
CHEMOTHERAPY RX CANCER: NORMAL
DATE 1ST CHEMO CANCER: NORMAL
RAD ONC ARIA COURSE LAST TREATMENT DATE: NORMAL
RAD ONC ARIA COURSE TREATMENT ELAPSED DAYS: NORMAL
RAD ONC ARIA REFERENCE POINT DOSAGE GIVEN TO DATE: 6 GY
RAD ONC ARIA REFERENCE POINT DOSAGE GIVEN TO DATE: 7 GY
RAD ONC ARIA REFERENCE POINT ID: NORMAL
RAD ONC ARIA REFERENCE POINT ID: NORMAL
RAD ONC ARIA REFERENCE POINT SESSION DOSAGE GIVEN: 6 GY
RAD ONC ARIA REFERENCE POINT SESSION DOSAGE GIVEN: 7 GY

## 2024-12-23 PROCEDURE — 77280 THER RAD SIMULAJ FIELD SMPL: CPT | Performed by: RADIOLOGY

## 2024-12-23 PROCEDURE — 77373 STRTCTC BDY RAD THER TX DLVR: CPT | Performed by: RADIOLOGY

## 2024-12-23 PROCEDURE — 77280 THER RAD SIMULAJ FIELD SMPL: CPT | Mod: 26 | Performed by: RADIOLOGY

## 2024-12-24 ENCOUNTER — HOSPITAL ENCOUNTER (OUTPATIENT)
Dept: RADIATION ONCOLOGY | Facility: MEDICAL CENTER | Age: 59
End: 2024-12-24

## 2024-12-24 LAB
CHEMOTHERAPY INFUSION START DATE: NORMAL
CHEMOTHERAPY RECORDS: 3000 CGY
CHEMOTHERAPY RECORDS: 3500 CGY
CHEMOTHERAPY RECORDS: 6 GY
CHEMOTHERAPY RECORDS: 7 GY
CHEMOTHERAPY RECORDS: NORMAL
CHEMOTHERAPY RX CANCER: NORMAL
DATE 1ST CHEMO CANCER: NORMAL
RAD ONC ARIA COURSE LAST TREATMENT DATE: NORMAL
RAD ONC ARIA COURSE TREATMENT ELAPSED DAYS: NORMAL
RAD ONC ARIA REFERENCE POINT DOSAGE GIVEN TO DATE: 12 GY
RAD ONC ARIA REFERENCE POINT DOSAGE GIVEN TO DATE: 14 GY
RAD ONC ARIA REFERENCE POINT ID: NORMAL
RAD ONC ARIA REFERENCE POINT ID: NORMAL
RAD ONC ARIA REFERENCE POINT SESSION DOSAGE GIVEN: 6 GY
RAD ONC ARIA REFERENCE POINT SESSION DOSAGE GIVEN: 7 GY

## 2024-12-24 PROCEDURE — 77373 STRTCTC BDY RAD THER TX DLVR: CPT | Performed by: RADIOLOGY

## 2024-12-24 PROCEDURE — 77280 THER RAD SIMULAJ FIELD SMPL: CPT | Performed by: RADIOLOGY

## 2024-12-24 PROCEDURE — 77280 THER RAD SIMULAJ FIELD SMPL: CPT | Mod: 26 | Performed by: RADIOLOGY

## 2024-12-24 NOTE — PROCEDURES
DATE OF SERVICE: 12/24/2024    DIAGNOSIS:  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)  Staging form: Melanoma of the Skin, AJCC 8th Edition  - Pathologic stage from 1/8/2024: Stage IV (rpT0, pNX, pM1c(1)) - Signed by Julia Fraga M.D. on 1/16/2024  Stage prefix: Recurrence       TREATMENT:  Radiation Therapy Episodes       Active Episodes       Radiation Therapy: SBRT (12/23/2024)    Radiation Therapy: SBRT (1/22/2024)    Radiation Therapy: SBRT (1/17/2024)                   Radiation Treatments         Plan Last Treated On Elapsed Days Fractions Treated Prescribed Fraction Dose (cGy) Prescribed Total Dose (cGy)    L Femur SBRT 12/24/2024 1 @ 12/24/2024 2 of 5 700 3,500    L3 SBRT 12/24/2024 1 @ 12/24/2024 2 of 5 600 3,000                  Reference Point Last Treated On Elapsed Days Most Recent Session Dose (cGy) Total Dose (cGy)    L femur SBRT 12/24/2024 1 @ 12/24/2024 700 1,400    L3 SBRT 12/24/2024 1 @ 12/24/2024 600 1,200                      Historical Treatments (Plans: 4)      Plan Last Treated On Elapsed Days Fractions Treated Prescribed Fraction Dose (cGy) Prescribed Total Dose (cGy)   SBRT Sacrum 1/19/2024 2 @ [id number] 3 of 3 900 2,700   SBRT C1-3 1/26/2024 4 @ [id number] 5 of 5 600 3,000   SBRT C6-7 1/26/2024 4 @ [id number] 5 of 5 600 3,000   SBRT R Hip 1/26/2024 4 @ [id number] 5 of 5 600 3,000                Reference Point Last Treated On Elapsed Days Most Recent Session Dose (cGy) Total Dose (cGy)   SBRT Sacrum 1/19/2024 2 @ [id number] -- 2,700   C1-3 1/26/2024 4 @ 483335983014 -- 3,000   C6-7 1/26/2024 4 @ 020819282940 -- 3,000   SBRT R Hip 1/26/2024 4 @ [id number] -- 3,000                            STEREOTACTIC PROCEDURE NOTE:    Called by [employer] machine to verify treatment parameters including:  treatment site, treatment dose, and treatment setup prior to stereotactic treatment..    Patient was placed in the treatment position with use of immobilization device and   laser guidance. CBCT images were acquired for target localization.  Images were reviewed in the axial, coronal, and saggital views and shifts were made as necessary to ensure that patient position matched simulation position.      Treatment delivered per  prescription.  The medical physicist was present throughout the set-up, verification and treatment delivery to oversee the procedure and ensure all parameters agreed with the computerized plan.    I have personally reviewed the relevant data, performed the target localization, and determined all relevant factors for this patient’s simulation.

## 2024-12-26 ENCOUNTER — HOSPITAL ENCOUNTER (OUTPATIENT)
Dept: RADIATION ONCOLOGY | Facility: MEDICAL CENTER | Age: 59
End: 2024-12-26

## 2024-12-26 LAB
CHEMOTHERAPY INFUSION START DATE: NORMAL
CHEMOTHERAPY RECORDS: 3000 CGY
CHEMOTHERAPY RECORDS: 3500 CGY
CHEMOTHERAPY RECORDS: 6 GY
CHEMOTHERAPY RECORDS: 7 GY
CHEMOTHERAPY RECORDS: NORMAL
CHEMOTHERAPY RX CANCER: NORMAL
DATE 1ST CHEMO CANCER: NORMAL
RAD ONC ARIA COURSE LAST TREATMENT DATE: NORMAL
RAD ONC ARIA COURSE TREATMENT ELAPSED DAYS: NORMAL
RAD ONC ARIA REFERENCE POINT DOSAGE GIVEN TO DATE: 18 GY
RAD ONC ARIA REFERENCE POINT DOSAGE GIVEN TO DATE: 21 GY
RAD ONC ARIA REFERENCE POINT ID: NORMAL
RAD ONC ARIA REFERENCE POINT ID: NORMAL
RAD ONC ARIA REFERENCE POINT SESSION DOSAGE GIVEN: 6 GY
RAD ONC ARIA REFERENCE POINT SESSION DOSAGE GIVEN: 7 GY

## 2024-12-26 PROCEDURE — 77435 SBRT MANAGEMENT: CPT | Performed by: RADIOLOGY

## 2024-12-26 PROCEDURE — 77336 RADIATION PHYSICS CONSULT: CPT | Mod: XU | Performed by: RADIOLOGY

## 2024-12-26 PROCEDURE — 77373 STRTCTC BDY RAD THER TX DLVR: CPT | Performed by: RADIOLOGY

## 2024-12-26 PROCEDURE — 77280 THER RAD SIMULAJ FIELD SMPL: CPT | Performed by: RADIOLOGY

## 2024-12-26 PROCEDURE — 77280 THER RAD SIMULAJ FIELD SMPL: CPT | Mod: 26 | Performed by: RADIOLOGY

## 2024-12-26 ASSESSMENT — ENCOUNTER SYMPTOMS
VOMITING: 0
SHORTNESS OF BREATH: 0
COUGH: 0
BACK PAIN: 1
PALPITATIONS: 0
CHILLS: 0
DIARRHEA: 0
CONSTIPATION: 0
FEVER: 0
NECK PAIN: 1
WEIGHT LOSS: 1
MYALGIAS: 1
NAUSEA: 0

## 2024-12-26 NOTE — PROGRESS NOTES
Subjective:     Chief Complaint   Patient presents with    Cancer     FV     Andrew Wall is a 59 y.o. male presenting to palliative care for follow-up on symptom management for oncological pain.  Patient reports since last visit with our office he has had progression of disease and worsening of pain.  Is returning today to get reevaluation of his symptom management and next steps with pain control.  He reports he is currently using oxycodone 10 mg.  He does continue with bone pain that has had significant impact on his quality of life and ambulation ability.  Through shared decision making agreed to trial working with his current opioid of oxycodone but may need to transition to something like methadone in the future.  Does not endorse any significant nausea vomiting or constipation or diarrhea.    Problem   Cancer Related Pain        Current medicines (including changes today)  Current Outpatient Medications   Medication Sig Dispense Refill    Oxycodone HCl 20 MG Tab Take 1 Tablet by mouth every four hours as needed (pain) for up to 15 days. 60 Tablet 0    megestrol (MEGACE) 400 MG/10ML Suspension Take 20 mL by mouth every day. (Patient taking differently: Take 800 mg by mouth every day.         MEDICATION INSTRUCTIONS FOR SURGERY SCHEDULED ON 8/19/24 : COORDINATE MEDICATION INSTRUCTIONS FROM PRESCRIBING PHYSICIAN.) 600 mL 2    cyclobenzaprine (FLEXERIL) 10 mg Tab Take 1 Tablet by mouth every 8 hours as needed for Muscle Spasms. 30 Tablet 0    enoxaparin (LOVENOX) 40 MG/0.4ML Solution Prefilled Syringe inj Inject 1 syringe (40 mg) under the skin every day at 6 PM. Injection 40 mg under the skin everyday at 6 pm for 14 days 5.6 mL 0    ondansetron (ZOFRAN ODT) 4 MG TABLET DISPERSIBLE Dissolve 1 Tablet by mouth every four hours as needed for Nausea/Vomiting 10 Tablet 0    senna-docusate (PERICOLACE OR SENOKOT S) 8.6-50 MG Tab Take 1 Tablet by mouth every evening. 30 Tablet 0    midodrine (PROAMATINE) 10 MG  tablet Take 1 Tablet by mouth 3 times a day with meals. (Patient taking differently: Take 10 mg by mouth 2 times a day.         MEDICATION INSTRUCTIONS FOR SURGERY ON 8/19/24:  IT IS OK TO CONTINUE THIS MEDICATION.  IT IS OK TO TAKE THIS MEDICATION ON THE DAY OF SURGERY WITH A SMALL SIP OF WATER.) 90 Tablet 3    mometasone-formoterol (DULERA) 100-5 MCG/ACT Aerosol Inhale 2 Puffs 2 times a day. (Patient taking differently: Inhale 2 Puffs 2 times a day.         MEDICATION INSTRUCTIONS FOR SURGERY ON 8/19/24:  IT IS OK TO CONTINUE THIS MEDICATION.  IT IS OK TO TAKE THIS MEDICATION ON THE DAY OF SURGERY.) 13 g 2    Encorafenib (BRAFTOVI) 75 MG Cap Take 300 mg by mouth every day at 6 PM. (Patient taking differently: Take 300 mg by mouth every day at 6 PM.         MEDICATION INSTRUCTIONS FOR SURGERY SCHEDULED ON 8/19/24 : COORDINATE MEDICATION INSTRUCTIONS FROM PRESCRIBING PHYSICIAN.)      Binimetinib (MEKTOVI) 15 MG Tab Take 30 mg by mouth 2 times a day. (Patient taking differently: Take 30 mg by mouth 2 times a day.         MEDICATION INSTRUCTIONS FOR SURGERY SCHEDULED ON 8/19/24 : COORDINATE MEDICATION INSTRUCTIONS FROM PRESCRIBING PHYSICIAN.)      CALCIUM CARBONATE ANTACID PO Take 2 Tablets by mouth every day.         MEDICATION INSTRUCTIONS FOR SURGERY SCHEDULED ON 8/19/24: IT IS OK TO CONTINUE THIS MEDICATION PRIOR TO SURGERY BUT HOLD THIS MEDICATION ON THE MORNING OF SURGERY.       No current facility-administered medications for this encounter.       Social History     Tobacco Use    Smoking status: Never     Passive exposure: Past    Smokeless tobacco: Never    Tobacco comments:     Childhood exposure   Vaping Use    Vaping status: Never Used   Substance Use Topics    Alcohol use: Not Currently    Drug use: No     Past Medical History:   Diagnosis Date    Acute nasopharyngitis 08/01/2024    sinus infection    Asthma     Bowel habit changes 08/01/2024    constipation due to Morphine    Cancer (HCC) 10/20     "melanoma    Cancer related pain 02/05/2024    Constipation 01/12/2024    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) 11/16/2023    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) 11/16/2023    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      Objective:     Vitals:    12/18/24 0901   BP: 98/56   Pulse: 90   Temp: 36.2 °C (97.2 °F)   TempSrc: Temporal   SpO2: 93%   Weight: 68.5 kg (151 lb)   Height: 1.803 m (5' 10.98\")     Body mass index is 21.07 kg/m².     Review of Systems   Constitutional:  Positive for malaise/fatigue and weight loss. Negative for chills and fever.   Respiratory:  Negative for cough and shortness of breath.    Cardiovascular:  Negative for chest pain and palpitations.   Gastrointestinal:  Negative for constipation, diarrhea, nausea and vomiting.   Musculoskeletal:  Positive for back pain, myalgias and neck pain.     Physical Exam:   Gen: No acute distress.   Skin: Pink, warm, and dry  HEENT: conjunctiva non-injected, sclera non-icteric. EOMs intact.   Nasal mucosa without edema nor erythema.   Pinna normal.    Neck: Supple, trachea midline. No adenopathy or masses in the neck or supraclavicular regions.  Lungs: Effort is normal.   CV: regular rate and rhythm  Abdomen: Flat  Ext: no edema, color normal, vascularity normal, temperature normal  Alert and oriented Eye contact is good, speech goal directed, affect calm    Assessment and Plan:   Cancer related pain  Worsening symptoms secondary to disease progression.  Has been using oxycodone 10 mg for pain control.  Through shared decision making we will increase oxycodone to 20 mg.  Did discuss possibility of transition to methadone in the future.  PDMP reviewed, no signs of diversion or abuse, risk and benefits of medication discussed, controlled subs agreement on file.      28 minutes in total including chart review and consultation with patients " oncological providers.     Followup: Return in about 2 weeks (around 1/1/2025).    Amol Ramírez M.D.

## 2024-12-26 NOTE — ASSESSMENT & PLAN NOTE
Worsening symptoms secondary to disease progression.  Has been using oxycodone 10 mg for pain control.  Through shared decision making we will increase oxycodone to 20 mg.  Did discuss possibility of transition to methadone in the future.  PDMP reviewed, no signs of diversion or abuse, risk and benefits of medication discussed, controlled subs agreement on file.

## 2024-12-26 NOTE — PROCEDURES
DATE OF SERVICE: 12/26/2024    DIAGNOSIS:  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)  Staging form: Melanoma of the Skin, AJCC 8th Edition  - Pathologic stage from 1/8/2024: Stage IV (rpT0, pNX, pM1c(1)) - Signed by Julia Fraga M.D. on 1/16/2024  Stage prefix: Recurrence       TREATMENT:  Radiation Therapy Episodes       Active Episodes       Radiation Therapy: SBRT (12/23/2024)    Radiation Therapy: SBRT (1/22/2024)    Radiation Therapy: SBRT (1/17/2024)                   Radiation Treatments         Plan Last Treated On Elapsed Days Fractions Treated Prescribed Fraction Dose (cGy) Prescribed Total Dose (cGy)    L Femur SBRT 12/26/2024 3 @ 12/26/2024 3 of 5 700 3,500    L3 SBRT 12/26/2024 3 @ 12/26/2024 3 of 5 600 3,000                  Reference Point Last Treated On Elapsed Days Most Recent Session Dose (cGy) Total Dose (cGy)    L femur SBRT 12/26/2024 3 @ 12/26/2024 700 2,100    L3 SBRT 12/26/2024 3 @ 12/26/2024 600 1,800                      Historical Treatments (Plans: 4)      Plan Last Treated On Elapsed Days Fractions Treated Prescribed Fraction Dose (cGy) Prescribed Total Dose (cGy)   SBRT Sacrum 1/19/2024 2 @ [id number] 3 of 3 900 2,700   SBRT C1-3 1/26/2024 4 @ [id number] 5 of 5 600 3,000   SBRT C6-7 1/26/2024 4 @ [id number] 5 of 5 600 3,000   SBRT R Hip 1/26/2024 4 @ [id number] 5 of 5 600 3,000                Reference Point Last Treated On Elapsed Days Most Recent Session Dose (cGy) Total Dose (cGy)   SBRT Sacrum 1/19/2024 2 @ [id number] -- 2,700   C1-3 1/26/2024 4 @ [id number] -- 3,000   C6-7 1/26/2024 4 @ [id number] -- 3,000   SBRT R Hip 1/26/2024 4 @ [id number] -- 3,000                            STEREOTACTIC PROCEDURE NOTE:    Called by [employer] machine to verify treatment parameters including:  treatment site, treatment dose, and treatment setup prior to stereotactic treatment..    Patient was placed in the treatment position with use of immobilization device and   laser guidance. CBCT images were acquired for target localization.  Images were reviewed in the axial, coronal, and saggital views and shifts were made as necessary to ensure that patient position matched simulation position.      Treatment delivered per  prescription.  The medical physicist was present throughout the set-up, verification and treatment delivery to oversee the procedure and ensure all parameters agreed with the computerized plan.    I have personally reviewed the relevant data, performed the target localization, and determined all relevant factors for this patient’s simulation.

## 2024-12-27 ENCOUNTER — HOSPITAL ENCOUNTER (OUTPATIENT)
Dept: RADIATION ONCOLOGY | Facility: MEDICAL CENTER | Age: 59
End: 2024-12-27

## 2024-12-27 LAB
CHEMOTHERAPY INFUSION START DATE: NORMAL
CHEMOTHERAPY RECORDS: 3000 CGY
CHEMOTHERAPY RECORDS: 3500 CGY
CHEMOTHERAPY RECORDS: 6 GY
CHEMOTHERAPY RECORDS: 7 GY
CHEMOTHERAPY RECORDS: NORMAL
CHEMOTHERAPY RX CANCER: NORMAL
DATE 1ST CHEMO CANCER: NORMAL
RAD ONC ARIA COURSE LAST TREATMENT DATE: NORMAL
RAD ONC ARIA COURSE TREATMENT ELAPSED DAYS: NORMAL
RAD ONC ARIA REFERENCE POINT DOSAGE GIVEN TO DATE: 24 GY
RAD ONC ARIA REFERENCE POINT DOSAGE GIVEN TO DATE: 28 GY
RAD ONC ARIA REFERENCE POINT ID: NORMAL
RAD ONC ARIA REFERENCE POINT ID: NORMAL
RAD ONC ARIA REFERENCE POINT SESSION DOSAGE GIVEN: 6 GY
RAD ONC ARIA REFERENCE POINT SESSION DOSAGE GIVEN: 7 GY

## 2024-12-27 PROCEDURE — 77373 STRTCTC BDY RAD THER TX DLVR: CPT | Performed by: RADIOLOGY

## 2024-12-27 PROCEDURE — 77280 THER RAD SIMULAJ FIELD SMPL: CPT | Performed by: RADIOLOGY

## 2024-12-27 NOTE — PROCEDURES
DATE OF SERVICE: 12/27/2024    DIAGNOSIS:  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)  Staging form: Melanoma of the Skin, AJCC 8th Edition  - Pathologic stage from 1/8/2024: Stage IV (rpT0, pNX, pM1c(1)) - Signed by Julia Fraga M.D. on 1/16/2024  Stage prefix: Recurrence       TREATMENT:  Radiation Therapy Episodes       Active Episodes       Radiation Therapy: SBRT (12/23/2024)    Radiation Therapy: SBRT (1/22/2024)    Radiation Therapy: SBRT (1/17/2024)                   Radiation Treatments         Plan Last Treated On Elapsed Days Fractions Treated Prescribed Fraction Dose (cGy) Prescribed Total Dose (cGy)    L Femur SBRT 12/27/2024 4 @ 12/27/2024 4 of 5 700 3,500    L3 SBRT 12/27/2024 4 @ 12/27/2024 4 of 5 600 3,000                  Reference Point Last Treated On Elapsed Days Most Recent Session Dose (cGy) Total Dose (cGy)    L femur SBRT 12/27/2024 4 @ 12/27/2024 700 2,800    L3 SBRT 12/27/2024 4 @ 12/27/2024 600 2,400                      Historical Treatments (Plans: 4)      Plan Last Treated On Elapsed Days Fractions Treated Prescribed Fraction Dose (cGy) Prescribed Total Dose (cGy)   SBRT Sacrum 1/19/2024 2 @ [id number] 3 of 3 900 2,700   SBRT C1-3 1/26/2024 4 @ [id number] 5 of 5 600 3,000   SBRT C6-7 1/26/2024 4 @ [id number] 5 of 5 600 3,000   SBRT R Hip 1/26/2024 4 @ [id number] 5 of 5 600 3,000                Reference Point Last Treated On Elapsed Days Most Recent Session Dose (cGy) Total Dose (cGy)   SBRT Sacrum 1/19/2024 2 @ [id number] -- 2,700   C1-3 1/26/2024 4 @ [id number] -- 3,000   C6-7 1/26/2024 4 @ [id number] -- 3,000   SBRT R Hip 1/26/2024 4 @ [id number] -- 3,000                            STEREOTACTIC PROCEDURE NOTE:    Called by [employer] machine to verify treatment parameters including:  treatment site, treatment dose, and treatment setup prior to stereotactic treatment..    Patient was placed in the treatment position with use of immobilization device and   laser guidance. CBCT images were acquired for target localization.  Images were reviewed in the axial, coronal, and saggital views and shifts were made as necessary to ensure that patient position matched simulation position.      Treatment delivered per  prescription.  The medical physicist was present throughout the set-up, verification and treatment delivery to oversee the procedure and ensure all parameters agreed with the computerized plan.    I have personally reviewed the relevant data, performed the target localization, and determined all relevant factors for this patient’s simulation.

## 2024-12-30 ENCOUNTER — HOSPITAL ENCOUNTER (OUTPATIENT)
Dept: RADIATION ONCOLOGY | Facility: MEDICAL CENTER | Age: 59
End: 2024-12-30

## 2024-12-30 LAB
CHEMOTHERAPY INFUSION START DATE: NORMAL
CHEMOTHERAPY INFUSION START DATE: NORMAL
CHEMOTHERAPY INFUSION STOP DATE: NORMAL
CHEMOTHERAPY RECORDS: 3000 CGY
CHEMOTHERAPY RECORDS: 3000 CGY
CHEMOTHERAPY RECORDS: 3500 CGY
CHEMOTHERAPY RECORDS: 3500 CGY
CHEMOTHERAPY RECORDS: 6 GY
CHEMOTHERAPY RECORDS: 6 GY
CHEMOTHERAPY RECORDS: 7 GY
CHEMOTHERAPY RECORDS: 7 GY
CHEMOTHERAPY RECORDS: NORMAL
CHEMOTHERAPY RX CANCER: NORMAL
CHEMOTHERAPY RX CANCER: NORMAL
DATE 1ST CHEMO CANCER: NORMAL
DATE 1ST CHEMO CANCER: NORMAL
RAD ONC ARIA COURSE LAST TREATMENT DATE: NORMAL
RAD ONC ARIA COURSE LAST TREATMENT DATE: NORMAL
RAD ONC ARIA COURSE TREATMENT ELAPSED DAYS: NORMAL
RAD ONC ARIA COURSE TREATMENT ELAPSED DAYS: NORMAL
RAD ONC ARIA REFERENCE POINT DOSAGE GIVEN TO DATE: 30 GY
RAD ONC ARIA REFERENCE POINT DOSAGE GIVEN TO DATE: 30 GY
RAD ONC ARIA REFERENCE POINT DOSAGE GIVEN TO DATE: 35 GY
RAD ONC ARIA REFERENCE POINT DOSAGE GIVEN TO DATE: 35 GY
RAD ONC ARIA REFERENCE POINT ID: NORMAL
RAD ONC ARIA REFERENCE POINT SESSION DOSAGE GIVEN: 6 GY
RAD ONC ARIA REFERENCE POINT SESSION DOSAGE GIVEN: 7 GY

## 2024-12-30 PROCEDURE — 77373 STRTCTC BDY RAD THER TX DLVR: CPT | Performed by: RADIOLOGY

## 2024-12-30 PROCEDURE — 77280 THER RAD SIMULAJ FIELD SMPL: CPT | Performed by: RADIOLOGY

## 2024-12-30 NOTE — PROCEDURES
DATE OF SERVICE: 12/30/2024    DIAGNOSIS:  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)  Staging form: Melanoma of the Skin, AJCC 8th Edition  - Pathologic stage from 1/8/2024: Stage IV (rpT0, pNX, pM1c(1)) - Signed by Julia Fraga M.D. on 1/16/2024  Stage prefix: Recurrence       TREATMENT:  Radiation Therapy Episodes       Active Episodes       Radiation Therapy: SBRT (12/23/2024)    Radiation Therapy: SBRT (1/22/2024)    Radiation Therapy: SBRT (1/17/2024)                   Radiation Treatments         Plan Last Treated On Elapsed Days Fractions Treated Prescribed Fraction Dose (cGy) Prescribed Total Dose (cGy)    L Femur SBRT 12/30/2024 7 @ 12/30/2024 5 of 5 700 3,500    L3 SBRT 12/30/2024 7 @ 12/30/2024 5 of 5 600 3,000                  Reference Point Last Treated On Elapsed Days Most Recent Session Dose (cGy) Total Dose (cGy)    L femur SBRT 12/30/2024 7 @ 12/30/2024 700 3,500    L3 SBRT 12/30/2024 7 @ 12/30/2024 600 3,000                      Historical Treatments (Plans: 4)      Plan Last Treated On Elapsed Days Fractions Treated Prescribed Fraction Dose (cGy) Prescribed Total Dose (cGy)   SBRT Sacrum 1/19/2024 2 @ [id number] 3 of 3 900 2,700   SBRT C1-3 1/26/2024 4 @ [id number] 5 of 5 600 3,000   SBRT C6-7 1/26/2024 4 @ [id number] 5 of 5 600 3,000   SBRT R Hip 1/26/2024 4 @ [id number] 5 of 5 600 3,000                Reference Point Last Treated On Elapsed Days Most Recent Session Dose (cGy) Total Dose (cGy)   SBRT Sacrum 1/19/2024 2 @ [id number] -- 2,700   C1-3 1/26/2024 4 @ [id number] -- 3,000   C6-7 1/26/2024 4 @ [id number] -- 3,000   SBRT R Hip 1/26/2024 4 @ [id number] -- 3,000                            STEREOTACTIC PROCEDURE NOTE:    Called by [employer] machine to verify treatment parameters including:  treatment site, treatment dose, and treatment setup prior to stereotactic treatment..    Patient was placed in the treatment position with use of immobilization device and   laser guidance. CBCT images were acquired for target localization.  Images were reviewed in the axial, coronal, and saggital views and shifts were made as necessary to ensure that patient position matched simulation position.      Treatment delivered per  prescription.  The medical physicist was present throughout the set-up, verification and treatment delivery to oversee the procedure and ensure all parameters agreed with the computerized plan.    I have personally reviewed the relevant data, performed the target localization, and determined all relevant factors for this patient’s simulation.

## 2024-12-30 NOTE — PROCEDURES
DATE OF SERVICE: 12/23/2024    DIAGNOSIS:  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)  Staging form: Melanoma of the Skin, AJCC 8th Edition  - Pathologic stage from 1/8/2024: Stage IV (rpT0, pNX, pM1c(1)) - Signed by Julia Fraga M.D. on 1/16/2024  Stage prefix: Recurrence       TREATMENT:  Radiation Therapy Episodes       Active Episodes       Radiation Therapy: SBRT (12/23/2024)    Radiation Therapy: SBRT (1/22/2024)    Radiation Therapy: SBRT (1/17/2024)                   Radiation Treatments         Plan Last Treated On Elapsed Days Fractions Treated Prescribed Fraction Dose (cGy) Prescribed Total Dose (cGy)    L Femur SBRT 12/23/2024 1 @ 12/23/2024 1 of 5 700 3,500    L3 SBRT 12/23/2024 1 @ 12/23/2024 1 of 5 600 3,000                  Reference Point Last Treated On Elapsed Days Most Recent Session Dose (cGy) Total Dose (cGy)    L femur SBRT 12/23/2024 1 @ 12/23/2024 700 700    L3 SBRT 12/23/2024 1 @ 12/23/2024 600 600                      Historical Treatments (Plans: 4)      Plan Last Treated On Elapsed Days Fractions Treated Prescribed Fraction Dose (cGy) Prescribed Total Dose (cGy)   SBRT Sacrum 1/19/2024 2 @ [id number] 3 of 3 900 2,700   SBRT C1-3 1/26/2024 4 @ [id number] 5 of 5 600 3,000   SBRT C6-7 1/26/2024 4 @ [id number] 5 of 5 600 3,000   SBRT R Hip 1/26/2024 4 @ [id number] 5 of 5 600 3,000                Reference Point Last Treated On Elapsed Days Most Recent Session Dose (cGy) Total Dose (cGy)   SBRT Sacrum 1/19/2024 2 @ [id number] -- 2,700   C1-3 1/26/2024 4 @ [id number] -- 3,000   C6-7 1/26/2024 4 @ [id number] -- 3,000   SBRT R Hip 1/26/2024 4 @ [id number] -- 3,000                            STEREOTACTIC PROCEDURE NOTE:    Called by [facility] machine to verify treatment parameters including:  treatment site, treatment dose, and treatment setup prior to stereotactic treatment..    Patient was placed in the treatment position with use of immobilization device and  laser  guidance. CBCT images were acquired for target localization.  Images were reviewed in the axial, coronal, and saggital views and shifts were made as necessary to ensure that patient position matched simulation position.      Treatment delivered per  prescription.  The medical physicist was present throughout the set-up, verification and treatment delivery to oversee the procedure and ensure all parameters agreed with the computerized plan.    I have personally reviewed the relevant data, performed the target localization, and determined all relevant factors for this patient’s simulation.

## 2024-12-31 NOTE — RADIATION COMPLETION NOTES
END OF TREATMENT SUMMARY    Patient name:  Andrew Wall    Primary Physician:  Jose Luis Juarez M.D. MRN: [id number]  CSN: [id number]   Referring physician:  No ref. provider found  : 1965, 59 y.o.       TREATMENT SUMMARY:        Course First Treatment Date 2024    Course Last Treatment Date 2024   Course Elapsed Days 7 @ 2024   Course Intent Palliative     Radiation Therapy Episodes       Active Episodes       Radiation Therapy: SBRT (2024)    Radiation Therapy: SBRT (2024)    Radiation Therapy: SBRT (2024)                   Radiation Treatments         Plan Last Treated On Elapsed Days Fractions Treated Prescribed Fraction Dose (cGy) Prescribed Total Dose (cGy)    L Femur SBRT 2024 7 @ 2024 5 of 5 700 3,500    L3 SBRT 2024 7 @ 2024 5 of 5 600 3,000                  Reference Point Last Treated On Elapsed Days Most Recent Session Dose (cGy) Total Dose (cGy)    L femur SBRT 2024 7 @ 2024 -- 3,500    L3 SBRT 2024 7 @ 2024 -- 3,000                      Historical Treatments (Plans: 4)      Plan Last Treated On Elapsed Days Fractions Treated Prescribed Fraction Dose (cGy) Prescribed Total Dose (cGy)   SBRT Sacrum 2024 2 @ [id number] 3 of 3 900 2,700   SBRT C1-3 2024 4 @ [id number] 5 of 5 600 3,000   SBRT C6-7 2024 4 @ [id number] 5 of 5 600 3,000   SBRT R Hip 2024 4 @ [id number] 5 of 5 600 3,000                Reference Point Last Treated On Elapsed Days Most Recent Session Dose (cGy) Total Dose (cGy)   SBRT Sacrum 2024 2 @ [id number] -- 2,700   C1-3 2024 4 @ [id number] -- 3,000   C6-7 2024 4 @ [id number] -- 3,000   SBRT R Hip 2024 4 @ [id number] -- 3,000                                     STAGE:   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)  Staging form: Melanoma of the Skin, AJCC 8th Edition  - Pathologic stage from 2024: Stage IV (rpT0, pNX, pM1c(1))  "- Signed by Julia Fraga M.D. on 1/16/2024  Stage prefix: Recurrence       TREATMENT INDICATION:   ***     CONCURRENT SYSTEMIC TREATMENT:   ***     RT COURSE DISCONTINUED EARLY:   No     PATIENT EXPERIENCE:       1/23/2024     8:57 AM 1/17/2024    12:51 PM   Toxicity Assessment   Toxicity Assessment Spine Male Pelvis   Fatigue (lethargy, malaise, asthenia) Severe (e.g., decrease in performance status by 2 or more ECOG levels or 40% Karnofsky) or loss of ability to perform some activities Bedridden or disabling   Radiation Dermatitis None None   Photosensitivity None    Dry Skin Normal    Anorexia  None   Colitis  None   Constipation  Requiring stool softener or dietary modification   Dehydration None None   Diarrhea w/o Colostomy  None   Flatulence  Mild   Nausea None None   Proctitis  None   Vomiting  None   Dyspepsia/heartburn None    RT Dysphagia-Esophageal None    RT - Pain due to RT  None   Tumor Pain (onset or exacerbation of tumor pain due to treatment) Moderate pain, pain or analgesics interfering with function, but not interfering with activities of daily living None   Dysuria (painful urination)  None   Urinary Frequency  Normal   Urinary Urgency  None   Bladder Spasms  Absent   Incontinence  None   Urinary Retention  Normal        FOLLOW-UP PLAN:   {avpfollowuplist:87318::\"8 Weeks\"}     COMMENT:          ANATOMIC TARGET SUMMARY    ANATOMIC TARGET MODALITY TECHNIQUE   ***   {RAD ONC MODALITY:92844} {RAD ONC Technique:11578::\"IMRT\"}            COMMENT:         DIAGRAMS:      DOSE VOLUME HISTOGRAMS:            "

## 2024-12-31 NOTE — ADDENDUM NOTE
Encounter addended by: Nelly Lopez, Med Ass't on: 12/31/2024 8:37 AM   Actions taken: Pend clinical note From: Katie Jaeger  To: Monty Treviño  Sent: 7/19/2021 9:34 AM CDT  Subject: Other    Could you please send my medicine to the [facility] on 14 due to I cannot get to the [facility] that much anymore thank you

## 2025-01-02 ENCOUNTER — HOSPITAL ENCOUNTER (OUTPATIENT)
Dept: HEMATOLOGY ONCOLOGY | Facility: MEDICAL CENTER | Age: 60
End: 2025-01-02
Attending: FAMILY MEDICINE
Payer: COMMERCIAL

## 2025-01-02 VITALS
RESPIRATION RATE: 15 BRPM | HEART RATE: 94 BPM | DIASTOLIC BLOOD PRESSURE: 60 MMHG | TEMPERATURE: 98.6 F | HEIGHT: 71 IN | OXYGEN SATURATION: 94 % | BODY MASS INDEX: 20.44 KG/M2 | SYSTOLIC BLOOD PRESSURE: 100 MMHG | WEIGHT: 146 LBS

## 2025-01-02 DIAGNOSIS — G89.3 CANCER RELATED PAIN: ICD-10-CM

## 2025-01-02 PROCEDURE — 99213 OFFICE O/P EST LOW 20 MIN: CPT | Performed by: FAMILY MEDICINE

## 2025-01-02 PROCEDURE — 99212 OFFICE O/P EST SF 10 MIN: CPT | Performed by: FAMILY MEDICINE

## 2025-01-02 RX ORDER — MORPHINE SULFATE 30 MG/1
45 TABLET ORAL
COMMUNITY
Start: 2024-09-03

## 2025-01-02 ASSESSMENT — ENCOUNTER SYMPTOMS
WEIGHT LOSS: 1
SHORTNESS OF BREATH: 0
VOMITING: 0
DIARRHEA: 0
CONSTIPATION: 0
CHILLS: 0
BACK PAIN: 1
NAUSEA: 0
PALPITATIONS: 0
COUGH: 0
FEVER: 0

## 2025-01-02 ASSESSMENT — PAIN SCALES - GENERAL: PAINLEVEL_OUTOF10: 4=SLIGHT-MODERATE PAIN

## 2025-01-02 ASSESSMENT — FIBROSIS 4 INDEX: FIB4 SCORE: 1.3

## 2025-01-02 NOTE — ASSESSMENT & PLAN NOTE
Improved.  Patient has now completed radiation which has improved tumor burden of his back which was causing pain.  Has been able to decrease his oxycodone from 20 mg to 10 mg twice daily.  Is still using 30 mg extended release morphine twice daily but would like to begin weaning that down and was advised to go to 15 mg twice daily to then once daily and then discontinuing.  PDMP reviewed.  Is not in need of refills today.  Will message if refills are needed and will follow-up in 3 months or sooner.

## 2025-01-02 NOTE — PROGRESS NOTES
Subjective:     Chief Complaint   Patient presents with    Follow-Up     Andrew Wall is a 59 y.o. male presenting to palliative care for follow-up and symptom management for oncological pain.  Patient is accompanied by his wife.  Since last visit they report he has finished his radiation and has had a significant reduction in his back pain.  He is now only taking 10 mg of his oxycodone and is interested in decreasing his long-acting morphine.  Will have follow-up this week with medical oncology and continuing his immunotherapy.  Does endorse some diarrhea secondary to his cancer treatments.  No nausea or vomiting.    Problem   Cancer Related Pain        Current medicines (including changes today)  Current Outpatient Medications   Medication Sig Dispense Refill    morphine (MS IR) 30 MG tablet 45 mg.      Oxycodone HCl 20 MG Tab Take 1 Tablet by mouth every four hours as needed (pain) for up to 15 days. 60 Tablet 0    ondansetron (ZOFRAN ODT) 4 MG TABLET DISPERSIBLE Dissolve 1 Tablet by mouth every four hours as needed for Nausea/Vomiting 10 Tablet 0    senna-docusate (PERICOLACE OR SENOKOT S) 8.6-50 MG Tab Take 1 Tablet by mouth every evening. 30 Tablet 0    midodrine (PROAMATINE) 10 MG tablet Take 1 Tablet by mouth 3 times a day with meals. (Patient taking differently: Take 10 mg by mouth 2 times a day.         MEDICATION INSTRUCTIONS FOR SURGERY ON 8/19/24:  IT IS OK TO CONTINUE THIS MEDICATION.  IT IS OK TO TAKE THIS MEDICATION ON THE DAY OF SURGERY WITH A SMALL SIP OF WATER.) 90 Tablet 3    megestrol (MEGACE) 400 MG/10ML Suspension Take 20 mL by mouth every day. (Patient taking differently: Take 800 mg by mouth every day.         MEDICATION INSTRUCTIONS FOR SURGERY SCHEDULED ON 8/19/24 : COORDINATE MEDICATION INSTRUCTIONS FROM PRESCRIBING PHYSICIAN.) 600 mL 2    mometasone-formoterol (DULERA) 100-5 MCG/ACT Aerosol Inhale 2 Puffs 2 times a day. (Patient taking differently: Inhale 2 Puffs 2 times a day.          MEDICATION INSTRUCTIONS FOR SURGERY ON 8/19/24:  IT IS OK TO CONTINUE THIS MEDICATION.  IT IS OK TO TAKE THIS MEDICATION ON THE DAY OF SURGERY.) 13 g 2    cyclobenzaprine (FLEXERIL) 10 mg Tab Take 1 Tablet by mouth every 8 hours as needed for Muscle Spasms. (Patient not taking: Reported on 1/2/2025) 30 Tablet 0    enoxaparin (LOVENOX) 40 MG/0.4ML Solution Prefilled Syringe inj Inject 1 syringe (40 mg) under the skin every day at 6 PM. Injection 40 mg under the skin everyday at 6 pm for 14 days (Patient not taking: Reported on 1/2/2025) 5.6 mL 0    Encorafenib (BRAFTOVI) 75 MG Cap Take 300 mg by mouth every day at 6 PM. (Patient taking differently: Take 300 mg by mouth every day at 6 PM.         MEDICATION INSTRUCTIONS FOR SURGERY SCHEDULED ON 8/19/24 : COORDINATE MEDICATION INSTRUCTIONS FROM PRESCRIBING PHYSICIAN.)      Binimetinib (MEKTOVI) 15 MG Tab Take 30 mg by mouth 2 times a day. (Patient taking differently: Take 30 mg by mouth 2 times a day.         MEDICATION INSTRUCTIONS FOR SURGERY SCHEDULED ON 8/19/24 : COORDINATE MEDICATION INSTRUCTIONS FROM PRESCRIBING PHYSICIAN.)      CALCIUM CARBONATE ANTACID PO Take 2 Tablets by mouth every day.         MEDICATION INSTRUCTIONS FOR SURGERY SCHEDULED ON 8/19/24: IT IS OK TO CONTINUE THIS MEDICATION PRIOR TO SURGERY BUT HOLD THIS MEDICATION ON THE MORNING OF SURGERY. (Patient not taking: Reported on 1/2/2025)       No current facility-administered medications for this encounter.       Social History     Tobacco Use    Smoking status: Never     Passive exposure: Past    Smokeless tobacco: Never    Tobacco comments:     Childhood exposure   Vaping Use    Vaping status: Never Used   Substance Use Topics    Alcohol use: Not Currently    Drug use: No     Past Medical History:   Diagnosis Date    Acute nasopharyngitis 08/01/2024    sinus infection    Asthma     Bowel habit changes 08/01/2024    constipation due to Morphine    Cancer (HCC) 10/20    melanoma    Cancer  "related pain 02/05/2024    Constipation 01/12/2024    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) 11/16/2023    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) 11/16/2023    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      Objective:     Vitals:    01/02/25 0851   BP: 100/60   BP Location: Right arm   Patient Position: Sitting   BP Cuff Size: Adult   Pulse: 94   Resp: 15   Temp: 37 °C (98.6 °F)   TempSrc: Temporal   SpO2: 94%   Weight: 66.2 kg (146 lb)   Height: 1.803 m (5' 10.98\")     Body mass index is 20.37 kg/m².     Review of Systems   Constitutional:  Positive for malaise/fatigue and weight loss. Negative for chills and fever.   Respiratory:  Negative for cough and shortness of breath.    Cardiovascular:  Negative for chest pain and palpitations.   Gastrointestinal:  Negative for constipation, diarrhea, nausea and vomiting.   Musculoskeletal:  Positive for back pain.     Physical Exam:   Gen: No acute distress.   Skin: Pink, warm, and dry  HEENT: conjunctiva non-injected, sclera non-icteric. EOMs intact.   Nasal mucosa without edema nor erythema.   Pinna normal.    Neck: Supple, trachea midline. No adenopathy or masses in the neck or supraclavicular regions.  Lungs: Effort is normal.   CV: regular rate and rhythm  Abdomen: Flat  Ext: no edema, color normal, vascularity normal, temperature normal  Alert and oriented Eye contact is good, speech goal directed, affect calm    Assessment and Plan:   Cancer related pain  Improved.  Patient has now completed radiation which has improved tumor burden of his back which was causing pain.  Has been able to decrease his oxycodone from 20 mg to 10 mg twice daily.  Is still using 30 mg extended release morphine twice daily but would like to begin weaning that down and was advised to go to 15 mg twice daily to then once daily and then discontinuing.  PDMP reviewed.  Is not " in need of refills today.  Will message if refills are needed and will follow-up in 3 months or sooner.      26 minutes in total including chart review and consultation with patients oncological providers.     Followup: Return in about 3 months (around 4/2/2025).    Amol Ramírez M.D.

## 2025-01-12 DIAGNOSIS — G89.3 CANCER RELATED PAIN: ICD-10-CM

## 2025-01-12 DIAGNOSIS — C43.9 METASTATIC MELANOMA (HCC): ICD-10-CM

## 2025-01-13 PROCEDURE — RXMED WILLOW AMBULATORY MEDICATION CHARGE: Performed by: FAMILY MEDICINE

## 2025-01-13 RX ORDER — OXYCODONE HYDROCHLORIDE 20 MG/1
20 TABLET ORAL EVERY 4 HOURS PRN
Qty: 60 TABLET | Refills: 0 | Status: SHIPPED | OUTPATIENT
Start: 2025-01-13 | End: 2025-01-30

## 2025-01-15 ENCOUNTER — PHARMACY VISIT (OUTPATIENT)
Dept: PHARMACY | Facility: MEDICAL CENTER | Age: 60
End: 2025-01-15
Payer: COMMERCIAL

## 2025-01-23 ENCOUNTER — APPOINTMENT (OUTPATIENT)
Dept: RADIOLOGY | Facility: IMAGING CENTER | Age: 60
End: 2025-01-23
Attending: ORTHOPAEDIC SURGERY
Payer: COMMERCIAL

## 2025-01-23 ENCOUNTER — APPOINTMENT (OUTPATIENT)
Dept: SURGICAL ONCOLOGY | Facility: MEDICAL CENTER | Age: 60
End: 2025-01-23
Payer: COMMERCIAL

## 2025-01-23 VITALS
HEART RATE: 100 BPM | OXYGEN SATURATION: 98 % | TEMPERATURE: 97.5 F | WEIGHT: 145 LBS | HEIGHT: 70 IN | DIASTOLIC BLOOD PRESSURE: 52 MMHG | BODY MASS INDEX: 20.76 KG/M2 | SYSTOLIC BLOOD PRESSURE: 100 MMHG

## 2025-01-23 DIAGNOSIS — C79.51 METASTASIS TO BONE (HCC): ICD-10-CM

## 2025-01-23 DIAGNOSIS — C79.51 MELANOMA METASTATIC TO BONE (HCC): ICD-10-CM

## 2025-01-23 PROCEDURE — 73552 X-RAY EXAM OF FEMUR 2/>: CPT | Mod: TC,LT | Performed by: ORTHOPAEDIC SURGERY

## 2025-01-23 PROCEDURE — 99213 OFFICE O/P EST LOW 20 MIN: CPT | Performed by: ORTHOPAEDIC SURGERY

## 2025-01-23 PROCEDURE — 3078F DIAST BP <80 MM HG: CPT | Performed by: ORTHOPAEDIC SURGERY

## 2025-01-23 PROCEDURE — 3074F SYST BP LT 130 MM HG: CPT | Performed by: ORTHOPAEDIC SURGERY

## 2025-01-23 RX ORDER — LEVOTHYROXINE SODIUM 50 UG/1
TABLET ORAL
COMMUNITY
Start: 2025-01-09

## 2025-01-23 ASSESSMENT — FIBROSIS 4 INDEX: FIB4 SCORE: 1.3

## 2025-01-24 ENCOUNTER — HOSPITAL ENCOUNTER (OUTPATIENT)
Facility: MEDICAL CENTER | Age: 60
End: 2025-01-24
Attending: NURSE PRACTITIONER
Payer: COMMERCIAL

## 2025-01-24 LAB
ALBUMIN SERPL BCP-MCNC: 3.9 G/DL (ref 3.2–4.9)
ALBUMIN/GLOB SERPL: 1.4 G/DL
ALP SERPL-CCNC: 143 U/L (ref 30–99)
ALT SERPL-CCNC: 7 U/L (ref 2–50)
ANION GAP SERPL CALC-SCNC: 13 MMOL/L (ref 7–16)
AST SERPL-CCNC: 12 U/L (ref 12–45)
BILIRUB SERPL-MCNC: 0.2 MG/DL (ref 0.1–1.5)
BUN SERPL-MCNC: 11 MG/DL (ref 8–22)
CALCIUM ALBUM COR SERPL-MCNC: 8.6 MG/DL (ref 8.5–10.5)
CALCIUM SERPL-MCNC: 8.5 MG/DL (ref 8.5–10.5)
CHLORIDE SERPL-SCNC: 103 MMOL/L (ref 96–112)
CO2 SERPL-SCNC: 21 MMOL/L (ref 20–33)
CREAT SERPL-MCNC: 0.67 MG/DL (ref 0.5–1.4)
GFR SERPLBLD CREATININE-BSD FMLA CKD-EPI: 106 ML/MIN/1.73 M 2
GLOBULIN SER CALC-MCNC: 2.8 G/DL (ref 1.9–3.5)
GLUCOSE SERPL-MCNC: 113 MG/DL (ref 65–99)
MAGNESIUM SERPL-MCNC: 2.2 MG/DL (ref 1.5–2.5)
PHOSPHATE SERPL-MCNC: 2.9 MG/DL (ref 2.5–4.5)
POTASSIUM SERPL-SCNC: 4.4 MMOL/L (ref 3.6–5.5)
PROT SERPL-MCNC: 6.7 G/DL (ref 6–8.2)
SODIUM SERPL-SCNC: 137 MMOL/L (ref 135–145)
TSH SERPL-ACNC: 3.67 UIU/ML (ref 0.35–5.5)

## 2025-01-24 PROCEDURE — 80053 COMPREHEN METABOLIC PANEL: CPT

## 2025-01-24 PROCEDURE — 84100 ASSAY OF PHOSPHORUS: CPT

## 2025-01-24 PROCEDURE — 83735 ASSAY OF MAGNESIUM: CPT

## 2025-01-24 PROCEDURE — 84443 ASSAY THYROID STIM HORMONE: CPT

## 2025-01-25 ASSESSMENT — ENCOUNTER SYMPTOMS
WEIGHT LOSS: 0
WEAKNESS: 0
TINGLING: 0
SENSORY CHANGE: 0
NECK PAIN: 0
BACK PAIN: 0

## 2025-01-26 NOTE — PROGRESS NOTES
Subjective:   1/23/2025  1:02 PM  Primary care physician:Jose Luis Juarez M.D.    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      History of presenting illness:  Andrew Wall  is a pleasant 59 y.o. male who presents for follow-up of multiple metastatic bone lesions secondary to melanoma.  Since his last visit he completed radiation to a spine met and left proximal femur met. He reports his pain has improved at both sites. He denies any pain with weightbearing or walking. He denies any new sites pain. He continues with systemic therapy as well.   Past Medical History:   Diagnosis Date    Acute nasopharyngitis 08/01/2024    sinus infection    Asthma     Bowel habit changes 08/01/2024    constipation due to Morphine    Cancer (HCC) 10/20    melanoma    Cancer related pain 02/05/2024    Constipation 01/12/2024    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) 11/16/2023    Malignant melanoma metastatic to lymph node ([facility]) 11/16/2023    Malignant melanoma of skin of buttock ([facility])     Nasal polyp     Pain 08/01/2024    neck, shoulder and hips     Past Surgical History:   Procedure Laterality Date    POSTERIOR CERVICAL FUSION O-ARM N/A 8/19/2024    Procedure: C1-T2 posterior instrumented fusion;  Surgeon: Kevin Lawrence M.D.;  Location: [facility];  Service: Orthopedics    LYMPH NODE EXCISION Right 11/16/2023    Procedure: RIGHT INGUINAL NODE SUPERFICIAL DISSECTION;  Surgeon: Davon Valera M.D.;  Location: [facility];  Service: General    NODE BIOPSY SENTINEL Bilateral 10/20/2020    Procedure: BIOPSY, LYMPH NODE, SENTINEL- GROIN;  Surgeon: Davon Valera M.D.;  Location: SURGERY SAME DAY [facility];  Service: General    WIDE EXCISION Right 10/20/2020    Procedure: WIDE EXCISION, LESION- BUTTOCKS, RADICAL MALIGNANT MELANOMA;  Surgeon: Davon Valera M.D.;  Location: SURGERY SAME DAY [facility];  Service: General    ANTROSTOMY Bilateral 11/15/2019     Procedure: MAXILLARY ANTROSTOMY- REVISION ENDOSCOPICMOMETASONE INJECTION, PROPEL STENT PLACEMENT;  Surgeon: Felicitas Tenorio M.D.;  Location: [facility];  Service: Ent    SINUSCOPY Bilateral 11/15/2019    Procedure: ENDOSCOPY, PARANASAL SINUSES- FOR FRONTAL EXPLORATION;  Surgeon: Felicitas Tenorio M.D.;  Location: [facility];  Service: Ent    TURBINOPLASTY Bilateral 11/15/2019    Procedure: TURBINOPLASTY;  Surgeon: Felicitas Tenorio M.D.;  Location: [facility];  Service: Ent    SPHENOIDECTOMY Bilateral 11/15/2019    Procedure: SPHENOIDECTOMY- FOR SPHENOIDOTOMY;  Surgeon: Felicitas Tenorio M.D.;  Location: [facility];  Service: Ent    ETHMOIDECTOMY Bilateral 11/15/2019    Procedure: ETHMOIDECTOMY- ENDOSCOPIC;  Surgeon: Felicitas Tenorio M.D.;  Location: [facility];  Service: Ent    NASAL POLYPECTOMY Bilateral 11/15/2019    Procedure: POLYPECTOMY, NASAL CAVITY;  Surgeon: Felicitas Tenorio M.D.;  Location: [facility];  Service: Ent    NASAL POLYPECTOMY  2015, 2017, 2019    x 3    OTHER  11/20    removed melanoma     Allergies   Allergen Reactions    Augmentin Vomiting and Nausea     Outpatient Encounter Medications as of 1/23/2025   Medication Sig Dispense Refill    levothyroxine (SYNTHROID) 50 MCG Tab TAKE 1 TABLET BY MOUTH EVERY DAY WITH A GLASS OF WATER. DO NOT STOP OR RUN OUT UNLESS TOLD BY MD      Oxycodone HCl 20 MG Tab Take 1 Tablet by mouth every four hours as needed (pain) for up to 15 days. 60 Tablet 0    cyclobenzaprine (FLEXERIL) 10 mg Tab Take 1 Tablet by mouth every 8 hours as needed for Muscle Spasms. 30 Tablet 0    ondansetron (ZOFRAN ODT) 4 MG TABLET DISPERSIBLE Dissolve 1 Tablet by mouth every four hours as needed for Nausea/Vomiting 10 Tablet 0    senna-docusate (PERICOLACE OR SENOKOT S) 8.6-50 MG Tab Take 1 Tablet by mouth every evening. 30 Tablet 0    megestrol (MEGACE) 400 MG/10ML Suspension Take 20 mL  by mouth every day. (Patient taking differently: Take 800 mg by mouth every day.         MEDICATION INSTRUCTIONS FOR SURGERY SCHEDULED ON 8/19/24 : COORDINATE MEDICATION INSTRUCTIONS FROM PRESCRIBING PHYSICIAN.) 600 mL 2    mometasone-formoterol (DULERA) 100-5 MCG/ACT Aerosol Inhale 2 Puffs 2 times a day. (Patient taking differently: Inhale 2 Puffs 2 times a day.         MEDICATION INSTRUCTIONS FOR SURGERY ON 8/19/24:  IT IS OK TO CONTINUE THIS MEDICATION.  IT IS OK TO TAKE THIS MEDICATION ON THE DAY OF SURGERY.) 13 g 2    CALCIUM CARBONATE ANTACID PO Take 2 Tablets by mouth every day.         MEDICATION INSTRUCTIONS FOR SURGERY SCHEDULED ON 8/19/24: IT IS OK TO CONTINUE THIS MEDICATION PRIOR TO SURGERY BUT HOLD THIS MEDICATION ON THE MORNING OF SURGERY.      morphine (MS IR) 30 MG tablet 45 mg. (Patient not taking: Reported on 1/23/2025)      enoxaparin (LOVENOX) 40 MG/0.4ML Solution Prefilled Syringe inj Inject 1 syringe (40 mg) under the skin every day at 6 PM. Injection 40 mg under the skin everyday at 6 pm for 14 days (Patient not taking: Reported on 1/23/2025) 5.6 mL 0    midodrine (PROAMATINE) 10 MG tablet Take 1 Tablet by mouth 3 times a day with meals. (Patient not taking: Reported on 1/23/2025) 90 Tablet 3    Encorafenib (BRAFTOVI) 75 MG Cap Take 300 mg by mouth every day at 6 PM. (Patient taking differently: Take 300 mg by mouth every day at 6 PM.         MEDICATION INSTRUCTIONS FOR SURGERY SCHEDULED ON 8/19/24 : COORDINATE MEDICATION INSTRUCTIONS FROM PRESCRIBING PHYSICIAN.)      Binimetinib (MEKTOVI) 15 MG Tab Take 30 mg by mouth 2 times a day. (Patient taking differently: Take 30 mg by mouth 2 times a day.         MEDICATION INSTRUCTIONS FOR SURGERY SCHEDULED ON 8/19/24 : COORDINATE MEDICATION INSTRUCTIONS FROM PRESCRIBING PHYSICIAN.)       No facility-administered encounter medications on file as of 1/23/2025.     Social History     Socioeconomic History    Marital status:      Spouse name: Not  on file    Number of children: Not on file    Years of education: Not on file    Highest education level: Not on file   Occupational History    Not on file   Tobacco Use    Smoking status: Never     Passive exposure: Past    Smokeless tobacco: Never    Tobacco comments:     Childhood exposure   Vaping Use    Vaping status: Never Used   Substance and Sexual Activity    Alcohol use: Not Currently    Drug use: No    Sexual activity: Yes     Partners: Female     Comment: used to manage semiconductor factory   Other Topics Concern    Not on file   Social History Narrative    Not on file     Social Drivers of Health     Financial Resource Strain: Not on file   Food Insecurity: No Food Insecurity (8/19/2024)    Hunger Vital Sign     Worried About Running Out of Food in the Last Year: Never true     Ran Out of Food in the Last Year: Never true   Transportation Needs: No Transportation Needs (8/19/2024)    PRAPARE - Transportation     Lack of Transportation (Medical): No     Lack of Transportation (Non-Medical): No   Physical Activity: Not on file   Stress: Not on file   Social Connections: Not on file   Intimate Partner Violence: Not At Risk (8/19/2024)    Humiliation, Afraid, Rape, and Kick questionnaire     Fear of Current or Ex-Partner: No     Emotionally Abused: No     Physically Abused: No     Sexually Abused: No   Housing Stability: Low Risk  (8/19/2024)    Housing Stability Vital Sign     Unable to Pay for Housing in the Last Year: No     Number of Times Moved in the Last Year: 1     Homeless in the Last Year: No      Social History     Tobacco Use   Smoking Status Never    Passive exposure: Past   Smokeless Tobacco Never   Tobacco Comments    Childhood exposure     Social History     Substance and Sexual Activity   Alcohol Use Not Currently     Social History     Substance and Sexual Activity   Drug Use No        No family history on file.    Review of Systems   Constitutional:  Negative for weight loss.  "  Musculoskeletal:  Negative for back pain, joint pain and neck pain.        Left thigh pain   Neurological:  Negative for tingling, sensory change and weakness.        Was having right lower extremity paresthesias, that are now improving.        Objective:   Pulse 92   Temp 36.5 °C (97.7 °F) (Temporal)   Ht 1.803 m (5' 11\")   Wt 68 kg (150 lb)   BMI 20.92 kg/m²     Physical Exam  Constitutional:       General: He is not in acute distress.     Appearance: Normal appearance. He is not ill-appearing.      Comments: Thin in appearance   HENT:      Head: Normocephalic and atraumatic.      Right Ear: External ear normal.      Left Ear: External ear normal.      Nose: Nose normal.      Mouth/Throat:      Mouth: Mucous membranes are moist.   Eyes:      Extraocular Movements: Extraocular movements intact.      Conjunctiva/sclera: Conjunctivae normal.   Cardiovascular:      Rate and Rhythm: Normal rate.      Pulses: Normal pulses.   Pulmonary:      Effort: Pulmonary effort is normal. No respiratory distress.   Abdominal:      General: Abdomen is flat.   Musculoskeletal:      Comments: Pelvis/left lower extremity.  He is nontender to palpation to the pelvis.  He has no tenderness to palpation of the left thigh and the proximal femur.  He is nontender distally.  No palpable masses.  Sensation intact light touch SPN, DPN, plantar and sural nerves.  Positive ankle dorsiflexion and plantarflexion.  DP pulse 2+.   Skin:     General: Skin is warm and dry.      Capillary Refill: Capillary refill takes less than 2 seconds.   Neurological:      Mental Status: He is alert and oriented to person, place, and time.   Psychiatric:         Mood and Affect: Mood normal.         Behavior: Behavior normal.           Imaging:        Multiple views of the left femur demonstrate lucency and cortical destruction of the proximal femur metaphysis.  There is no surrounding periosteal reaction.  No pathologic fracture. No interval progression " compared to last films. Stable appearance of pelvic lesions.        Diagnosis:     1. Metastasis to bone (HCC)        2. Melanoma metastatic to bone (HCC)                  Assessment/Plan:     I reviewed the patient's radiographs  with him and his wife today. He has had a good response to radiation in terms of pain. The left proximal femur lesion has not progressed compared to prior radiographs. We reviewed signs/symptoms concerning for impending pathologic fracture. He understands and also knows to call for follow up if other sites of pain develop. He will follow up on an as needed basis.     Referrals: none  Restrictions: Protected weightbearing with use of a walker  New medications/refills: none  Labs: none  Follow-up: as needed      Sofia Burleson DO  Orthopedic Oncologist

## 2025-01-31 ENCOUNTER — HOSPITAL ENCOUNTER (OUTPATIENT)
Dept: RADIOLOGY | Facility: MEDICAL CENTER | Age: 60
End: 2025-01-31
Attending: INTERNAL MEDICINE
Payer: COMMERCIAL

## 2025-01-31 DIAGNOSIS — C78.02 SECONDARY MALIGNANT NEOPLASM OF LEFT LUNG (HCC): ICD-10-CM

## 2025-01-31 DIAGNOSIS — C78.01 SECONDARY MALIGNANT NEOPLASM OF RIGHT LUNG (HCC): ICD-10-CM

## 2025-01-31 DIAGNOSIS — C43.59 MALIGNANT MELANOMA OF SKIN OF TRUNK, EXCEPT SCROTUM (HCC): ICD-10-CM

## 2025-01-31 DIAGNOSIS — C78.6 SECONDARY MALIGNANT NEOPLASM OF RETROPERITONEUM AND PERITONEUM (HCC): ICD-10-CM

## 2025-01-31 DIAGNOSIS — C78.7 SECONDARY MALIGNANT NEOPLASM OF LIVER (HCC): ICD-10-CM

## 2025-01-31 PROCEDURE — 71260 CT THORAX DX C+: CPT

## 2025-01-31 PROCEDURE — 700117 HCHG RX CONTRAST REV CODE 255: Performed by: INTERNAL MEDICINE

## 2025-01-31 RX ADMIN — IOHEXOL 75 ML: 350 INJECTION, SOLUTION INTRAVENOUS at 17:52

## 2025-02-06 ENCOUNTER — HOSPITAL ENCOUNTER (OUTPATIENT)
Dept: RADIATION ONCOLOGY | Facility: MEDICAL CENTER | Age: 60
End: 2025-02-06
Attending: RADIOLOGY
Payer: COMMERCIAL

## 2025-02-06 NOTE — PROGRESS NOTES
This visit is being conducted by telephone. This telephone visit was initiated by the patient and they verbally consented.  Patient at home in [facility].      Reason for Call: 2/6/2025 treatment follow-up    Treatment History:     Radiation Oncology          12/27/2024 12/30/2024   Aria Course Treatment Dates   Course First Treatment Date 12/23/2024 12/23/2024    Course Last Treatment Date 12/27/2024 12/30/2024    Aria Treatment Summary   L Femur SBRT  Plan from Course C4 L femur, L3   Fraction 4 of 5 5 of 5    5 of 5   Elapsed Course Days 4 @ 12/27/2024 7 @ 12/30/2024    7 @ 12/30/2024   Prescribed Fraction Dose 700 cGy 700 cGy    700 cGy   Prescribed Total Dose 3,500 cGy 3,500 cGy    3,500 cGy   L3 SBRT  Plan from Course C4 L femur, L3   Fraction 4 of 5 5 of 5    5 of 5   Elapsed Course Days 4 @ 12/27/2024 7 @ 12/30/2024    7 @ 12/30/2024   Prescribed Fraction Dose 600 cGy 600 cGy    600 cGy   Prescribed Total Dose 3,000 cGy 3,000 cGy    3,000 cGy   L femur SBRT  Reference Point from Course C4 L femur, L3   Elapsed Course Days 4 @ 12/27/2024 7 @ 12/30/2024    7 @ 12/30/2024   Session Dose 700 cGy 700 cGy    --   Total Dose 2,800 cGy 3,500 cGy    3,500 cGy   L3 SBRT  Reference Point from Course C4 L femur, L3   Elapsed Course Days 4 @ 12/27/2024 7 @ 12/30/2024    7 @ 12/30/2024   Session Dose 600 cGy 600 cGy    --   Total Dose 2,400 cGy 3,000 cGy    3,000 cGy      Details          More values are hidden. Newest values shown. Go to activity for more data.                HPI:    Subjective    I am taking over his care from Dr. Fraga.  His initial consultation from January 2024 is as follows:     Patient's history began in 2019 when he was diagnosed with a T4b N1 M0 malignant melanoma of the right lower back with lymph node metastasis.  He underwent surgical resection followed by adjuvant Opdivo which he completed November 2021.   He did well till a PET CT scan was done late October 2023 showing a right  upper inguinal lymph node consistent with metastatic disease.   This was biopsied on 11/16/2023 found to be consistent with metastatic melanoma.  More recently over the last 2 weeks he has been having increasing back pain.  Apparently he was found to have high calcium levels and it was recommended that he go to the emergency room at the same time he was going he fell and developed severe pain in the right leg with inability to move it due to pain.   MRI scan of the lumbar spine was done 1/8/2024 showing an extensive enhancing osseous metastasis throughout the visualized lumbar spine and sacrum most in the right sacral jaz in keeping with metastasis.  There was multiple metastasis seen in the paraspinous muscles psoas muscles iliac us muscles and gluteal muscles.  The largest is in the right posterior chest wall at the level of the right kidney.  There is no enhancement seen in the cord.  there is nerve root enhancement in the bilateral lumbar foramina diffusely of unclear etiology.  There is bilateral S1 nerve roots and right S2 nerve root in the sacral foramen surrounded by the mass and probably invading and impinged.   CT chest abdomen pelvis was done 1/13/2024 there is widespread metastatic disease throughout the chest abdomen and pelvis there is a new liver lesion and pulmonary metastasis and severe and widespread osseous metastatic disease with pathologic fractures related to the osseous metastatic disease.   There is a lytic lesion at L4 with pathologic compression fracture on the left, there is a lytic lesion at S1 sacrum and a large lytic lesion in the right sacral jaz with pathologic fractures.  There is lytic lesions bilaterally in the ilium and in the left ischium with a pathologic fracture.  There is a lytic lesion in the right superior pubic ramus and pubic symphysis with pathologic fracture.  There is a lytic lesion in the right femoral neck.      Since then, he completed SBRT as above to the  cervical spine and the right hip.            8/2/24 Unfortunately, he then presented to the hospital in August 2024 with significant increase in kyphosis with a C2 tilt of 66 degrees.  He underwent a C1-T2 posterior fusion.  He is now slowly recovering from this.  He is on medical oncology, had a PET scan that identified a 2 small areas of punctate bony metastasis as well as a lung nodule.  He continues on therapy with Dr. Leo.  These bony lesions in the pelvis are not painful currently.  We are having a telephone follow-up now to reestablish care.  He says he is recovering as expected from his neck surgery.  He has physical therapy that is ongoing.  He is not in any pain with regards to the PET avid lesions, but does have some postoperative neck pain.  He is looking forward to ongoing therapy.      12/16/2024 he comes today for routine follow-up and to discuss additional radiation.  Since I last saw him, he had an outside consultation with MD Greene, and the plan is to start dual checkpoint immunotherapy soon.  In the interim, he had a PET scan done that showed disease involving the left femur, lumbar spine and the scattered small lung nodules.  He does have quite significant left femur pain now and saw Dr. Burleson as well and discussed surgical options versus nonsurgical treatment.  At that time he wanted to hold off on surgery and now he comes to discuss radiation therapy for this.  His pain is nearly constant, worsened by movement, was taking previously long-acting morphine 15 mg every 12 hours, but he has become tolerant to this and more recently has been taking oxycodone 10 mg as needed which is working poorly with him.  He has not seen palliative care recently.        Interval History:   2/6/2025 Sebastian is doing very well.  His pain is markedly improved, nearly immediately after the completion of radiation.  He saw palliative care recently, and his narcotic regimen is decreased tremendously, now taking  about 10 mg oxycodone only as needed, no standing regimen otherwise.  He did have his consultation with MD Greene, and the plan is to proceed with immunotherapy which she is getting locally.  Overall, he is in good spirits.    Labs / Images Reviewed:   CT-CHEST (THORAX) WITH    Result Date: 2/1/2025  1. Many new and larger pulmonary nodules up to 2.5 cm individually, about 15 in each lung, compared with 3/21/2024. At least 2 nodules are smaller. 2. Multiple scattered bone lesions show more sclerosis. No definite new lesions. Healing sternal fracture. New cervicothoracic fusion hardware partially seen. 3. No change upper normal-sized mediastinal lymph nodes.     MR-BRAIN-WITH & W/O    Result Date: 12/10/2024  There is no parenchymal or meningeal enhancing lesions to suggest metastasis. There is focal sclerosis noted involving C2 vertebral body. There has been interval reduction in the extent of the signal changes consistent with treated metastasis.    DX-FEMUR-2+ LEFT    Result Date: 1/23/2025  1.  Mixed sclerotic-lytic lesions in the left inferior ischiopubic ramus and symphysis pubis. These may represent osseous metastatic disease as might be seen in prostate cancer or bladder cancer. Alternatively, Padget disease of bone might be considered.    DX-FEMUR-2+ LEFT    Result Date: 12/9/2024  Redemonstration of lytic sclerotic lesions in the left ischium and right pubic rami. No acute fracture or dislocation.      Assessment:   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)  Staging form: Melanoma of the Skin, AJCC 8th Edition  - Pathologic stage from 1/8/2024: Stage IV (rpT0, pNX, pM1c(1)) - Signed by Julia Fraga M.D. on 1/16/2024  Stage prefix: Recurrence      Cancer Status:   Disease Stable    Plan:   Sebastian is doing relatively well.  He is on immunotherapy.  He will continue routine surveillance and follow-up with medical oncology as he is under their care.  We can certainly see him back at any point in  the future if he has additional needs for radiation therapy.    Time Spent on Call: 5 min      Time Spent in Preparation: 5 min    Total Time Spent: 10 min    Von Wilcox M.D.

## 2025-02-11 ENCOUNTER — TELEPHONE (OUTPATIENT)
Dept: HEMATOLOGY ONCOLOGY | Facility: MEDICAL CENTER | Age: 60
End: 2025-02-11
Payer: COMMERCIAL

## 2025-02-11 ENCOUNTER — PHARMACY VISIT (OUTPATIENT)
Dept: PHARMACY | Facility: MEDICAL CENTER | Age: 60
End: 2025-02-11
Payer: COMMERCIAL

## 2025-02-11 DIAGNOSIS — G89.3 CANCER RELATED PAIN: ICD-10-CM

## 2025-02-11 DIAGNOSIS — C43.9 METASTATIC MELANOMA (HCC): ICD-10-CM

## 2025-02-11 PROCEDURE — RXMED WILLOW AMBULATORY MEDICATION CHARGE: Performed by: FAMILY MEDICINE

## 2025-02-11 RX ORDER — OXYCODONE HYDROCHLORIDE 20 MG/1
20 TABLET ORAL EVERY 6 HOURS PRN
Qty: 60 TABLET | Refills: 0 | Status: SHIPPED | OUTPATIENT
Start: 2025-02-11 | End: 2025-02-26

## 2025-02-21 ENCOUNTER — HOSPITAL ENCOUNTER (OUTPATIENT)
Facility: MEDICAL CENTER | Age: 60
End: 2025-02-21
Attending: NURSE PRACTITIONER
Payer: COMMERCIAL

## 2025-02-21 LAB
ALBUMIN SERPL BCP-MCNC: 3.8 G/DL (ref 3.2–4.9)
ALBUMIN/GLOB SERPL: 1.2 G/DL
ALP SERPL-CCNC: 190 U/L (ref 30–99)
ALT SERPL-CCNC: 24 U/L (ref 2–50)
ANION GAP SERPL CALC-SCNC: 11 MMOL/L (ref 7–16)
AST SERPL-CCNC: 21 U/L (ref 12–45)
BILIRUB SERPL-MCNC: 0.3 MG/DL (ref 0.1–1.5)
BUN SERPL-MCNC: 10 MG/DL (ref 8–22)
CALCIUM ALBUM COR SERPL-MCNC: 9.5 MG/DL (ref 8.5–10.5)
CALCIUM SERPL-MCNC: 9.3 MG/DL (ref 8.5–10.5)
CHLORIDE SERPL-SCNC: 106 MMOL/L (ref 96–112)
CO2 SERPL-SCNC: 20 MMOL/L (ref 20–33)
CREAT SERPL-MCNC: 0.68 MG/DL (ref 0.5–1.4)
GFR SERPLBLD CREATININE-BSD FMLA CKD-EPI: 106 ML/MIN/1.73 M 2
GLOBULIN SER CALC-MCNC: 3.2 G/DL (ref 1.9–3.5)
GLUCOSE SERPL-MCNC: 102 MG/DL (ref 65–99)
MAGNESIUM SERPL-MCNC: 2.2 MG/DL (ref 1.5–2.5)
PHOSPHATE SERPL-MCNC: 2 MG/DL (ref 2.5–4.5)
POTASSIUM SERPL-SCNC: 4.9 MMOL/L (ref 3.6–5.5)
PROT SERPL-MCNC: 7 G/DL (ref 6–8.2)
SODIUM SERPL-SCNC: 137 MMOL/L (ref 135–145)
TSH SERPL-ACNC: 4.96 UIU/ML (ref 0.35–5.5)

## 2025-02-21 PROCEDURE — 83735 ASSAY OF MAGNESIUM: CPT

## 2025-02-21 PROCEDURE — 84443 ASSAY THYROID STIM HORMONE: CPT

## 2025-02-21 PROCEDURE — 84100 ASSAY OF PHOSPHORUS: CPT

## 2025-02-21 PROCEDURE — 80053 COMPREHEN METABOLIC PANEL: CPT

## 2025-03-03 DIAGNOSIS — G89.3 CANCER RELATED PAIN: ICD-10-CM

## 2025-03-03 DIAGNOSIS — C43.9 METASTATIC MELANOMA (HCC): ICD-10-CM

## 2025-03-03 RX ORDER — OXYCODONE HYDROCHLORIDE 20 MG/1
20 TABLET ORAL EVERY 6 HOURS PRN
Qty: 60 TABLET | Refills: 0 | Status: SHIPPED | OUTPATIENT
Start: 2025-03-03 | End: 2025-03-20

## 2025-03-05 ENCOUNTER — PHARMACY VISIT (OUTPATIENT)
Dept: PHARMACY | Facility: MEDICAL CENTER | Age: 60
End: 2025-03-05
Payer: COMMERCIAL

## 2025-03-05 PROCEDURE — RXMED WILLOW AMBULATORY MEDICATION CHARGE: Performed by: FAMILY MEDICINE

## 2025-03-09 ENCOUNTER — APPOINTMENT (OUTPATIENT)
Dept: RADIOLOGY | Facility: MEDICAL CENTER | Age: 60
DRG: 948 | End: 2025-03-09
Attending: EMERGENCY MEDICINE
Payer: COMMERCIAL

## 2025-03-09 ENCOUNTER — HOSPITAL ENCOUNTER (INPATIENT)
Facility: MEDICAL CENTER | Age: 60
LOS: 1 days | DRG: 948 | End: 2025-03-11
Attending: EMERGENCY MEDICINE | Admitting: INTERNAL MEDICINE
Payer: COMMERCIAL

## 2025-03-09 DIAGNOSIS — C79.9 METASTATIC MALIGNANT NEOPLASM, UNSPECIFIED SITE (HCC): ICD-10-CM

## 2025-03-09 DIAGNOSIS — C43.9 METASTATIC MELANOMA (HCC): ICD-10-CM

## 2025-03-09 DIAGNOSIS — Z98.1 S/P CERVICAL SPINAL FUSION: ICD-10-CM

## 2025-03-09 DIAGNOSIS — R07.89 CHEST WALL PAIN: ICD-10-CM

## 2025-03-09 LAB
ALBUMIN SERPL BCP-MCNC: 3.3 G/DL (ref 3.2–4.9)
ALBUMIN/GLOB SERPL: 0.9 G/DL
ALP SERPL-CCNC: 131 U/L (ref 30–99)
ALT SERPL-CCNC: 7 U/L (ref 2–50)
ANION GAP SERPL CALC-SCNC: 11 MMOL/L (ref 7–16)
ANISOCYTOSIS BLD QL SMEAR: ABNORMAL
APTT PPP: 31.2 SEC (ref 24.7–36)
AST SERPL-CCNC: 18 U/L (ref 12–45)
BASOPHILS # BLD AUTO: 0.7 % (ref 0–1.8)
BASOPHILS # BLD: 0.03 K/UL (ref 0–0.12)
BILIRUB SERPL-MCNC: 0.3 MG/DL (ref 0.1–1.5)
BUN SERPL-MCNC: 10 MG/DL (ref 8–22)
BURR CELLS BLD QL SMEAR: NORMAL
CALCIUM ALBUM COR SERPL-MCNC: 8.6 MG/DL (ref 8.5–10.5)
CALCIUM SERPL-MCNC: 8 MG/DL (ref 8.5–10.5)
CHLORIDE SERPL-SCNC: 106 MMOL/L (ref 96–112)
CO2 SERPL-SCNC: 19 MMOL/L (ref 20–33)
COMMENT 1642: NORMAL
CREAT SERPL-MCNC: 0.54 MG/DL (ref 0.5–1.4)
EKG IMPRESSION: NORMAL
EOSINOPHIL # BLD AUTO: 0.12 K/UL (ref 0–0.51)
EOSINOPHIL NFR BLD: 2.6 % (ref 0–6.9)
ERYTHROCYTE [DISTWIDTH] IN BLOOD BY AUTOMATED COUNT: 50.5 FL (ref 35.9–50)
GFR SERPLBLD CREATININE-BSD FMLA CKD-EPI: 113 ML/MIN/1.73 M 2
GLOBULIN SER CALC-MCNC: 3.5 G/DL (ref 1.9–3.5)
GLUCOSE SERPL-MCNC: 107 MG/DL (ref 65–99)
HCT VFR BLD AUTO: 32.1 % (ref 42–52)
HGB BLD-MCNC: 9.5 G/DL (ref 14–18)
IMM GRANULOCYTES # BLD AUTO: 0.07 K/UL (ref 0–0.11)
IMM GRANULOCYTES NFR BLD AUTO: 1.5 % (ref 0–0.9)
INR PPP: 1.2 (ref 0.87–1.13)
LYMPHOCYTES # BLD AUTO: 0.5 K/UL (ref 1–4.8)
LYMPHOCYTES NFR BLD: 10.9 % (ref 22–41)
MCH RBC QN AUTO: 24 PG (ref 27–33)
MCHC RBC AUTO-ENTMCNC: 29.6 G/DL (ref 32.3–36.5)
MCV RBC AUTO: 81.1 FL (ref 81.4–97.8)
MICROCYTES BLD QL SMEAR: ABNORMAL
MONOCYTES # BLD AUTO: 0.28 K/UL (ref 0–0.85)
MONOCYTES NFR BLD AUTO: 6.1 % (ref 0–13.4)
MORPHOLOGY BLD-IMP: NORMAL
NEUTROPHILS # BLD AUTO: 3.59 K/UL (ref 1.82–7.42)
NEUTROPHILS NFR BLD: 78.2 % (ref 44–72)
NRBC # BLD AUTO: 0 K/UL
NRBC BLD-RTO: 0 /100 WBC (ref 0–0.2)
OVALOCYTES BLD QL SMEAR: NORMAL
PLATELET # BLD AUTO: 269 K/UL (ref 164–446)
PMV BLD AUTO: 8.9 FL (ref 9–12.9)
POIKILOCYTOSIS BLD QL SMEAR: NORMAL
POTASSIUM SERPL-SCNC: 4.2 MMOL/L (ref 3.6–5.5)
PROT SERPL-MCNC: 6.8 G/DL (ref 6–8.2)
PROTHROMBIN TIME: 15.2 SEC (ref 12–14.6)
RBC # BLD AUTO: 3.96 M/UL (ref 4.7–6.1)
RBC BLD AUTO: PRESENT
SODIUM SERPL-SCNC: 136 MMOL/L (ref 135–145)
WBC # BLD AUTO: 4.6 K/UL (ref 4.8–10.8)

## 2025-03-09 PROCEDURE — 96375 TX/PRO/DX INJ NEW DRUG ADDON: CPT

## 2025-03-09 PROCEDURE — 36415 COLL VENOUS BLD VENIPUNCTURE: CPT

## 2025-03-09 PROCEDURE — 85730 THROMBOPLASTIN TIME PARTIAL: CPT

## 2025-03-09 PROCEDURE — 700102 HCHG RX REV CODE 250 W/ 637 OVERRIDE(OP): Performed by: INTERNAL MEDICINE

## 2025-03-09 PROCEDURE — G0378 HOSPITAL OBSERVATION PER HR: HCPCS

## 2025-03-09 PROCEDURE — 96374 THER/PROPH/DIAG INJ IV PUSH: CPT

## 2025-03-09 PROCEDURE — 96372 THER/PROPH/DIAG INJ SC/IM: CPT

## 2025-03-09 PROCEDURE — 700111 HCHG RX REV CODE 636 W/ 250 OVERRIDE (IP): Mod: JZ | Performed by: INTERNAL MEDICINE

## 2025-03-09 PROCEDURE — 80053 COMPREHEN METABOLIC PANEL: CPT

## 2025-03-09 PROCEDURE — 71275 CT ANGIOGRAPHY CHEST: CPT

## 2025-03-09 PROCEDURE — 93005 ELECTROCARDIOGRAM TRACING: CPT | Mod: TC | Performed by: EMERGENCY MEDICINE

## 2025-03-09 PROCEDURE — 85025 COMPLETE CBC W/AUTO DIFF WBC: CPT

## 2025-03-09 PROCEDURE — 700111 HCHG RX REV CODE 636 W/ 250 OVERRIDE (IP): Performed by: EMERGENCY MEDICINE

## 2025-03-09 PROCEDURE — 700101 HCHG RX REV CODE 250: Performed by: EMERGENCY MEDICINE

## 2025-03-09 PROCEDURE — 700105 HCHG RX REV CODE 258: Performed by: EMERGENCY MEDICINE

## 2025-03-09 PROCEDURE — 700117 HCHG RX CONTRAST REV CODE 255: Performed by: EMERGENCY MEDICINE

## 2025-03-09 PROCEDURE — 96376 TX/PRO/DX INJ SAME DRUG ADON: CPT

## 2025-03-09 PROCEDURE — 85610 PROTHROMBIN TIME: CPT

## 2025-03-09 PROCEDURE — 71045 X-RAY EXAM CHEST 1 VIEW: CPT

## 2025-03-09 PROCEDURE — A9270 NON-COVERED ITEM OR SERVICE: HCPCS | Performed by: INTERNAL MEDICINE

## 2025-03-09 PROCEDURE — 99223 1ST HOSP IP/OBS HIGH 75: CPT | Performed by: INTERNAL MEDICINE

## 2025-03-09 PROCEDURE — 99285 EMERGENCY DEPT VISIT HI MDM: CPT

## 2025-03-09 RX ORDER — ONDANSETRON 2 MG/ML
4 INJECTION INTRAMUSCULAR; INTRAVENOUS EVERY 4 HOURS PRN
Status: DISCONTINUED | OUTPATIENT
Start: 2025-03-09 | End: 2025-03-11 | Stop reason: HOSPADM

## 2025-03-09 RX ORDER — PROMETHAZINE HYDROCHLORIDE 25 MG/1
12.5-25 SUPPOSITORY RECTAL EVERY 4 HOURS PRN
Status: DISCONTINUED | OUTPATIENT
Start: 2025-03-09 | End: 2025-03-11 | Stop reason: HOSPADM

## 2025-03-09 RX ORDER — FLUTICASONE PROPIONATE AND SALMETEROL 250; 50 UG/1; UG/1
1 POWDER RESPIRATORY (INHALATION) EVERY 12 HOURS
Status: ON HOLD | COMMUNITY
End: 2025-03-11

## 2025-03-09 RX ORDER — MORPHINE SULFATE 4 MG/ML
4 INJECTION INTRAVENOUS ONCE
Status: COMPLETED | OUTPATIENT
Start: 2025-03-09 | End: 2025-03-09

## 2025-03-09 RX ORDER — OXYCODONE HYDROCHLORIDE 5 MG/1
5 TABLET ORAL
Refills: 0 | Status: DISCONTINUED | OUTPATIENT
Start: 2025-03-09 | End: 2025-03-10

## 2025-03-09 RX ORDER — HYDROMORPHONE HYDROCHLORIDE 1 MG/ML
0.5 INJECTION, SOLUTION INTRAMUSCULAR; INTRAVENOUS; SUBCUTANEOUS
Status: DISCONTINUED | OUTPATIENT
Start: 2025-03-09 | End: 2025-03-10

## 2025-03-09 RX ORDER — PROCHLORPERAZINE EDISYLATE 5 MG/ML
5-10 INJECTION INTRAMUSCULAR; INTRAVENOUS EVERY 4 HOURS PRN
Status: DISCONTINUED | OUTPATIENT
Start: 2025-03-09 | End: 2025-03-11 | Stop reason: HOSPADM

## 2025-03-09 RX ORDER — ENOXAPARIN SODIUM 100 MG/ML
40 INJECTION SUBCUTANEOUS DAILY
Status: DISCONTINUED | OUTPATIENT
Start: 2025-03-09 | End: 2025-03-11

## 2025-03-09 RX ORDER — OXYCODONE HCL 20 MG/1
20 TABLET, FILM COATED, EXTENDED RELEASE ORAL EVERY 12 HOURS
Refills: 0 | Status: DISCONTINUED | OUTPATIENT
Start: 2025-03-09 | End: 2025-03-10

## 2025-03-09 RX ORDER — DENOSUMAB 120 MG/1.7ML
120 INJECTION SUBCUTANEOUS ONCE
COMMUNITY

## 2025-03-09 RX ORDER — CYCLOBENZAPRINE HCL 10 MG
10 TABLET ORAL EVERY 8 HOURS PRN
Status: DISCONTINUED | OUTPATIENT
Start: 2025-03-09 | End: 2025-03-11 | Stop reason: HOSPADM

## 2025-03-09 RX ORDER — LEVOTHYROXINE SODIUM 50 UG/1
50 TABLET ORAL
Status: DISCONTINUED | OUTPATIENT
Start: 2025-03-10 | End: 2025-03-11 | Stop reason: HOSPADM

## 2025-03-09 RX ORDER — HYDROMORPHONE HYDROCHLORIDE 1 MG/ML
0.5 INJECTION, SOLUTION INTRAMUSCULAR; INTRAVENOUS; SUBCUTANEOUS
Status: COMPLETED | OUTPATIENT
Start: 2025-03-09 | End: 2025-03-09

## 2025-03-09 RX ORDER — METHOCARBAMOL 500 MG/1
500 TABLET, FILM COATED ORAL 4 TIMES DAILY
Status: DISCONTINUED | OUTPATIENT
Start: 2025-03-10 | End: 2025-03-11 | Stop reason: HOSPADM

## 2025-03-09 RX ORDER — ACETAMINOPHEN 500 MG
1000 TABLET ORAL EVERY 8 HOURS
Status: DISCONTINUED | OUTPATIENT
Start: 2025-03-09 | End: 2025-03-11 | Stop reason: HOSPADM

## 2025-03-09 RX ORDER — HYDRALAZINE HYDROCHLORIDE 20 MG/ML
10 INJECTION INTRAMUSCULAR; INTRAVENOUS EVERY 4 HOURS PRN
Status: DISCONTINUED | OUTPATIENT
Start: 2025-03-09 | End: 2025-03-11 | Stop reason: HOSPADM

## 2025-03-09 RX ORDER — KETOROLAC TROMETHAMINE 15 MG/ML
15 INJECTION, SOLUTION INTRAMUSCULAR; INTRAVENOUS ONCE
Status: COMPLETED | OUTPATIENT
Start: 2025-03-09 | End: 2025-03-09

## 2025-03-09 RX ORDER — POLYETHYLENE GLYCOL 3350 17 G/17G
1 POWDER, FOR SOLUTION ORAL
Status: DISCONTINUED | OUTPATIENT
Start: 2025-03-09 | End: 2025-03-11 | Stop reason: HOSPADM

## 2025-03-09 RX ORDER — AMOXICILLIN 250 MG
2 CAPSULE ORAL EVERY EVENING
Status: DISCONTINUED | OUTPATIENT
Start: 2025-03-09 | End: 2025-03-11 | Stop reason: HOSPADM

## 2025-03-09 RX ORDER — PROMETHAZINE HYDROCHLORIDE 25 MG/1
12.5-25 TABLET ORAL EVERY 4 HOURS PRN
Status: DISCONTINUED | OUTPATIENT
Start: 2025-03-09 | End: 2025-03-11 | Stop reason: HOSPADM

## 2025-03-09 RX ORDER — LIDOCAINE 4 G/G
2 PATCH TOPICAL EVERY 24 HOURS
Status: DISCONTINUED | OUTPATIENT
Start: 2025-03-09 | End: 2025-03-11 | Stop reason: HOSPADM

## 2025-03-09 RX ORDER — HYDROMORPHONE HYDROCHLORIDE 1 MG/ML
0.5 INJECTION, SOLUTION INTRAMUSCULAR; INTRAVENOUS; SUBCUTANEOUS
Status: DISCONTINUED | OUTPATIENT
Start: 2025-03-09 | End: 2025-03-09

## 2025-03-09 RX ORDER — DEXAMETHASONE SODIUM PHOSPHATE 4 MG/ML
4 INJECTION, SOLUTION INTRA-ARTICULAR; INTRALESIONAL; INTRAMUSCULAR; INTRAVENOUS; SOFT TISSUE EVERY 6 HOURS
Status: COMPLETED | OUTPATIENT
Start: 2025-03-10 | End: 2025-03-10

## 2025-03-09 RX ORDER — MORPHINE SULFATE 4 MG/ML
4 INJECTION INTRAVENOUS
Status: DISCONTINUED | OUTPATIENT
Start: 2025-03-09 | End: 2025-03-09

## 2025-03-09 RX ORDER — ONDANSETRON 4 MG/1
4 TABLET, ORALLY DISINTEGRATING ORAL EVERY 4 HOURS PRN
Status: DISCONTINUED | OUTPATIENT
Start: 2025-03-09 | End: 2025-03-11 | Stop reason: HOSPADM

## 2025-03-09 RX ORDER — BUDESONIDE 0.5 MG/2ML
500 INHALANT ORAL 2 TIMES DAILY
COMMUNITY

## 2025-03-09 RX ORDER — ALBUTEROL SULFATE 90 UG/1
1-2 INHALANT RESPIRATORY (INHALATION) EVERY 4 HOURS PRN
COMMUNITY

## 2025-03-09 RX ORDER — OXYCODONE HYDROCHLORIDE 10 MG/1
10 TABLET ORAL
Refills: 0 | Status: DISCONTINUED | OUTPATIENT
Start: 2025-03-09 | End: 2025-03-10

## 2025-03-09 RX ADMIN — MORPHINE SULFATE 4 MG: 4 INJECTION, SOLUTION INTRAMUSCULAR; INTRAVENOUS at 19:00

## 2025-03-09 RX ADMIN — KETAMINE HYDROCHLORIDE 25 MG: 10 INJECTION, SOLUTION INTRAMUSCULAR; INTRAVENOUS at 19:35

## 2025-03-09 RX ADMIN — HYDROMORPHONE HYDROCHLORIDE 0.5 MG: 1 INJECTION, SOLUTION INTRAMUSCULAR; INTRAVENOUS; SUBCUTANEOUS at 20:32

## 2025-03-09 RX ADMIN — OXYCODONE HYDROCHLORIDE 20 MG: 10 TABLET, FILM COATED, EXTENDED RELEASE ORAL at 22:41

## 2025-03-09 RX ADMIN — SENNOSIDES AND DOCUSATE SODIUM 2 TABLET: 50; 8.6 TABLET ORAL at 22:41

## 2025-03-09 RX ADMIN — ENOXAPARIN SODIUM 40 MG: 100 INJECTION SUBCUTANEOUS at 22:42

## 2025-03-09 RX ADMIN — IOHEXOL 80 ML: 350 INJECTION, SOLUTION INTRAVENOUS at 20:58

## 2025-03-09 RX ADMIN — HYDROMORPHONE HYDROCHLORIDE 0.5 MG: 1 INJECTION, SOLUTION INTRAMUSCULAR; INTRAVENOUS; SUBCUTANEOUS at 18:46

## 2025-03-09 RX ADMIN — ACETAMINOPHEN 1000 MG: 500 TABLET ORAL at 22:41

## 2025-03-09 RX ADMIN — KETAMINE HYDROCHLORIDE 25 MG: 10 INJECTION, SOLUTION INTRAMUSCULAR; INTRAVENOUS at 17:10

## 2025-03-09 RX ADMIN — KETOROLAC TROMETHAMINE 15 MG: 15 INJECTION, SOLUTION INTRAMUSCULAR; INTRAVENOUS at 21:40

## 2025-03-09 RX ADMIN — HYDROMORPHONE HYDROCHLORIDE 0.5 MG: 1 INJECTION, SOLUTION INTRAMUSCULAR; INTRAVENOUS; SUBCUTANEOUS at 21:06

## 2025-03-09 RX ADMIN — LIDOCAINE PAIN RELIEF 2 PATCH: 560 PATCH TOPICAL at 21:40

## 2025-03-09 RX ADMIN — HYDROMORPHONE HYDROCHLORIDE 0.5 MG: 1 INJECTION, SOLUTION INTRAMUSCULAR; INTRAVENOUS; SUBCUTANEOUS at 17:48

## 2025-03-09 ASSESSMENT — LIFESTYLE VARIABLES
ON A TYPICAL DAY WHEN YOU DRINK ALCOHOL HOW MANY DRINKS DO YOU HAVE: 0
HOW MANY TIMES IN THE PAST YEAR HAVE YOU HAD 5 OR MORE DRINKS IN A DAY: 0
AVERAGE NUMBER OF DAYS PER WEEK YOU HAVE A DRINK CONTAINING ALCOHOL: 0
HAVE PEOPLE ANNOYED YOU BY CRITICIZING YOUR DRINKING: NO
CONSUMPTION TOTAL: NEGATIVE
DOES PATIENT WANT TO STOP DRINKING: NO
TOTAL SCORE: 0
EVER HAD A DRINK FIRST THING IN THE MORNING TO STEADY YOUR NERVES TO GET RID OF A HANGOVER: NO
TOTAL SCORE: 0
ALCOHOL_USE: NO
TOTAL SCORE: 0
SUBSTANCE_ABUSE: 0
EVER FELT BAD OR GUILTY ABOUT YOUR DRINKING: NO
HAVE YOU EVER FELT YOU SHOULD CUT DOWN ON YOUR DRINKING: NO

## 2025-03-09 ASSESSMENT — FIBROSIS 4 INDEX
FIB4 SCORE: 1.1
FIB4 SCORE: 1.52

## 2025-03-09 ASSESSMENT — COGNITIVE AND FUNCTIONAL STATUS - GENERAL
MOVING TO AND FROM BED TO CHAIR: A LOT
DRESSING REGULAR LOWER BODY CLOTHING: A LITTLE
WALKING IN HOSPITAL ROOM: TOTAL
STANDING UP FROM CHAIR USING ARMS: A LOT
DAILY ACTIVITIY SCORE: 19
PERSONAL GROOMING: A LITTLE
MOVING FROM LYING ON BACK TO SITTING ON SIDE OF FLAT BED: A LITTLE
SUGGESTED CMS G CODE MODIFIER MOBILITY: CL
CLIMB 3 TO 5 STEPS WITH RAILING: TOTAL
DRESSING REGULAR UPPER BODY CLOTHING: A LITTLE
SUGGESTED CMS G CODE MODIFIER DAILY ACTIVITY: CK
HELP NEEDED FOR BATHING: A LITTLE
TOILETING: A LITTLE
MOBILITY SCORE: 13

## 2025-03-09 ASSESSMENT — ENCOUNTER SYMPTOMS
HEARTBURN: 0
PALPITATIONS: 0
NERVOUS/ANXIOUS: 0
BACK PAIN: 0
HEMOPTYSIS: 0
HALLUCINATIONS: 0
BLURRED VISION: 0
BRUISES/BLEEDS EASILY: 0
DOUBLE VISION: 0
SPUTUM PRODUCTION: 0
SPEECH CHANGE: 0
WEIGHT LOSS: 0
PHOTOPHOBIA: 0
ORTHOPNEA: 0
COUGH: 0
CHILLS: 0
MYALGIAS: 1
FOCAL WEAKNESS: 0
TREMORS: 0
FEVER: 0
FLANK PAIN: 0
HEADACHES: 0
NAUSEA: 0
NECK PAIN: 0
POLYDIPSIA: 0
VOMITING: 0

## 2025-03-09 ASSESSMENT — SOCIAL DETERMINANTS OF HEALTH (SDOH)
WITHIN THE PAST 12 MONTHS, YOU WORRIED THAT YOUR FOOD WOULD RUN OUT BEFORE YOU GOT THE MONEY TO BUY MORE: NEVER TRUE
IN THE PAST 12 MONTHS, HAS THE ELECTRIC, GAS, OIL, OR WATER COMPANY THREATENED TO SHUT OFF SERVICE IN YOUR HOME?: NO
WITHIN THE LAST YEAR, HAVE YOU BEEN AFRAID OF YOUR PARTNER OR EX-PARTNER?: NO
WITHIN THE PAST 12 MONTHS, THE FOOD YOU BOUGHT JUST DIDN'T LAST AND YOU DIDN'T HAVE MONEY TO GET MORE: NEVER TRUE
WITHIN THE LAST YEAR, HAVE TO BEEN RAPED OR FORCED TO HAVE ANY KIND OF SEXUAL ACTIVITY BY YOUR PARTNER OR EX-PARTNER?: NO
WITHIN THE LAST YEAR, HAVE YOU BEEN KICKED, HIT, SLAPPED, OR OTHERWISE PHYSICALLY HURT BY YOUR PARTNER OR EX-PARTNER?: NO
WITHIN THE LAST YEAR, HAVE YOU BEEN HUMILIATED OR EMOTIONALLY ABUSED IN OTHER WAYS BY YOUR PARTNER OR EX-PARTNER?: NO

## 2025-03-09 ASSESSMENT — PATIENT HEALTH QUESTIONNAIRE - PHQ9
2. FEELING DOWN, DEPRESSED, IRRITABLE, OR HOPELESS: NOT AT ALL
SUM OF ALL RESPONSES TO PHQ9 QUESTIONS 1 AND 2: 0
1. LITTLE INTEREST OR PLEASURE IN DOING THINGS: NOT AT ALL

## 2025-03-09 ASSESSMENT — PAIN DESCRIPTION - PAIN TYPE
TYPE: ACUTE PAIN
TYPE: ACUTE PAIN;CHRONIC PAIN
TYPE: ACUTE PAIN
TYPE: ACUTE PAIN
TYPE: ACUTE PAIN;CHRONIC PAIN

## 2025-03-09 NOTE — ED TRIAGE NOTES
"Chief Complaint   Patient presents with    Rib Pain     left     Patient BIB [facility] for above. Patient has a history of stage 4 chest and pelvic cancer and takes Q4 Oxycodone 20mg, At 1530 patient says he was resting when his rib pain suddenly worsened.     Patient received Fentanyl 100mcg IV and Ketamine 90 mg from EMS. [facility].     Patient denied recent falls or trauma. No chest pain or SOB. States he is currently getting immunotherapy treatment.    /84   Pulse (!) 118   Temp 36.9 °C (98.4 °F) (Temporal)   Resp 18   Ht 1.778 m (5' 10\")   Wt 63.5 kg (140 lb)   SpO2 95%   BMI 20.09 kg/m²     "

## 2025-03-10 PROCEDURE — 99497 ADVNCD CARE PLAN 30 MIN: CPT

## 2025-03-10 PROCEDURE — 99254 IP/OBS CNSLTJ NEW/EST MOD 60: CPT

## 2025-03-10 PROCEDURE — 700102 HCHG RX REV CODE 250 W/ 637 OVERRIDE(OP): Performed by: INTERNAL MEDICINE

## 2025-03-10 PROCEDURE — A9270 NON-COVERED ITEM OR SERVICE: HCPCS | Performed by: INTERNAL MEDICINE

## 2025-03-10 PROCEDURE — 700111 HCHG RX REV CODE 636 W/ 250 OVERRIDE (IP): Mod: JZ | Performed by: INTERNAL MEDICINE

## 2025-03-10 PROCEDURE — 700111 HCHG RX REV CODE 636 W/ 250 OVERRIDE (IP): Performed by: INTERNAL MEDICINE

## 2025-03-10 PROCEDURE — 96375 TX/PRO/DX INJ NEW DRUG ADDON: CPT

## 2025-03-10 PROCEDURE — 700102 HCHG RX REV CODE 250 W/ 637 OVERRIDE(OP)

## 2025-03-10 PROCEDURE — 99233 SBSQ HOSP IP/OBS HIGH 50: CPT | Mod: GC | Performed by: INTERNAL MEDICINE

## 2025-03-10 PROCEDURE — A9270 NON-COVERED ITEM OR SERVICE: HCPCS

## 2025-03-10 PROCEDURE — 770004 HCHG ROOM/CARE - ONCOLOGY PRIVATE *

## 2025-03-10 PROCEDURE — 96372 THER/PROPH/DIAG INJ SC/IM: CPT

## 2025-03-10 PROCEDURE — 96376 TX/PRO/DX INJ SAME DRUG ADON: CPT

## 2025-03-10 RX ORDER — OXYCODONE HYDROCHLORIDE 10 MG/1
20 TABLET ORAL
Refills: 0 | Status: DISCONTINUED | OUTPATIENT
Start: 2025-03-10 | End: 2025-03-11

## 2025-03-10 RX ORDER — OXYCODONE HYDROCHLORIDE 10 MG/1
10 TABLET ORAL
Refills: 0 | Status: DISCONTINUED | OUTPATIENT
Start: 2025-03-10 | End: 2025-03-11

## 2025-03-10 RX ORDER — HYDROMORPHONE HYDROCHLORIDE 1 MG/ML
1 INJECTION, SOLUTION INTRAMUSCULAR; INTRAVENOUS; SUBCUTANEOUS
Status: DISCONTINUED | OUTPATIENT
Start: 2025-03-10 | End: 2025-03-11 | Stop reason: HOSPADM

## 2025-03-10 RX ORDER — METHADONE HYDROCHLORIDE 5 MG/1
5 TABLET ORAL EVERY 12 HOURS
Refills: 0 | Status: DISCONTINUED | OUTPATIENT
Start: 2025-03-10 | End: 2025-03-11 | Stop reason: HOSPADM

## 2025-03-10 RX ORDER — LORAZEPAM 0.5 MG/1
0.5 TABLET ORAL EVERY 4 HOURS PRN
Status: DISCONTINUED | OUTPATIENT
Start: 2025-03-10 | End: 2025-03-11 | Stop reason: HOSPADM

## 2025-03-10 RX ORDER — DULOXETIN HYDROCHLORIDE 30 MG/1
30 CAPSULE, DELAYED RELEASE ORAL DAILY
Status: DISCONTINUED | OUTPATIENT
Start: 2025-03-10 | End: 2025-03-11 | Stop reason: HOSPADM

## 2025-03-10 RX ADMIN — DEXAMETHASONE SODIUM PHOSPHATE 4 MG: 4 INJECTION INTRA-ARTICULAR; INTRALESIONAL; INTRAMUSCULAR; INTRAVENOUS; SOFT TISSUE at 06:14

## 2025-03-10 RX ADMIN — DULOXETINE 30 MG: 30 CAPSULE, DELAYED RELEASE ORAL at 13:11

## 2025-03-10 RX ADMIN — LEVOTHYROXINE SODIUM 50 MCG: 0.05 TABLET ORAL at 06:14

## 2025-03-10 RX ADMIN — DEXAMETHASONE SODIUM PHOSPHATE 4 MG: 4 INJECTION INTRA-ARTICULAR; INTRALESIONAL; INTRAMUSCULAR; INTRAVENOUS; SOFT TISSUE at 12:16

## 2025-03-10 RX ADMIN — ACETAMINOPHEN 1000 MG: 500 TABLET ORAL at 13:11

## 2025-03-10 RX ADMIN — METHOCARBAMOL 500 MG: 500 TABLET ORAL at 20:19

## 2025-03-10 RX ADMIN — HYDROMORPHONE HYDROCHLORIDE 1 MG: 1 INJECTION, SOLUTION INTRAMUSCULAR; INTRAVENOUS; SUBCUTANEOUS at 16:15

## 2025-03-10 RX ADMIN — METHADONE HYDROCHLORIDE 5 MG: 5 TABLET ORAL at 17:28

## 2025-03-10 RX ADMIN — SENNOSIDES AND DOCUSATE SODIUM 2 TABLET: 50; 8.6 TABLET ORAL at 17:28

## 2025-03-10 RX ADMIN — METHADONE HYDROCHLORIDE 5 MG: 5 TABLET ORAL at 12:15

## 2025-03-10 RX ADMIN — ENOXAPARIN SODIUM 40 MG: 100 INJECTION SUBCUTANEOUS at 17:28

## 2025-03-10 RX ADMIN — MOMETASONE FUROATE AND FORMOTEROL FUMARATE DIHYDRATE 2 PUFF: 200; 5 AEROSOL RESPIRATORY (INHALATION) at 10:51

## 2025-03-10 RX ADMIN — DEXAMETHASONE SODIUM PHOSPHATE 4 MG: 4 INJECTION INTRA-ARTICULAR; INTRALESIONAL; INTRAMUSCULAR; INTRAVENOUS; SOFT TISSUE at 01:14

## 2025-03-10 RX ADMIN — METHOCARBAMOL 500 MG: 500 TABLET ORAL at 16:14

## 2025-03-10 RX ADMIN — METHOCARBAMOL 500 MG: 500 TABLET ORAL at 12:16

## 2025-03-10 RX ADMIN — DEXAMETHASONE SODIUM PHOSPHATE 4 MG: 4 INJECTION INTRA-ARTICULAR; INTRALESIONAL; INTRAMUSCULAR; INTRAVENOUS; SOFT TISSUE at 17:26

## 2025-03-10 RX ADMIN — METHOCARBAMOL 500 MG: 500 TABLET ORAL at 08:48

## 2025-03-10 RX ADMIN — OXYCODONE HYDROCHLORIDE 10 MG: 10 TABLET ORAL at 12:21

## 2025-03-10 RX ADMIN — HYDROMORPHONE HYDROCHLORIDE 1 MG: 1 INJECTION, SOLUTION INTRAMUSCULAR; INTRAVENOUS; SUBCUTANEOUS at 23:30

## 2025-03-10 RX ADMIN — ACETAMINOPHEN 1000 MG: 500 TABLET ORAL at 20:19

## 2025-03-10 RX ADMIN — ACETAMINOPHEN 1000 MG: 500 TABLET ORAL at 06:14

## 2025-03-10 RX ADMIN — MOMETASONE FUROATE AND FORMOTEROL FUMARATE DIHYDRATE 2 PUFF: 200; 5 AEROSOL RESPIRATORY (INHALATION) at 17:28

## 2025-03-10 RX ADMIN — HYDROMORPHONE HYDROCHLORIDE 1 MG: 1 INJECTION, SOLUTION INTRAMUSCULAR; INTRAVENOUS; SUBCUTANEOUS at 08:48

## 2025-03-10 RX ADMIN — OXYCODONE HYDROCHLORIDE 20 MG: 10 TABLET, FILM COATED, EXTENDED RELEASE ORAL at 06:14

## 2025-03-10 ASSESSMENT — ENCOUNTER SYMPTOMS
NECK PAIN: 1
WEAKNESS: 1
BACK PAIN: 1
NAUSEA: 0
BLURRED VISION: 0
PALPITATIONS: 0
HEADACHES: 0
SHORTNESS OF BREATH: 0
MYALGIAS: 1
WEIGHT LOSS: 1
VOMITING: 0
COUGH: 0
DIZZINESS: 0
FEVER: 0
CONSTIPATION: 1
ABDOMINAL PAIN: 0
CHILLS: 0
WEAKNESS: 0
NERVOUS/ANXIOUS: 1
DIAPHORESIS: 1
DOUBLE VISION: 0
FLANK PAIN: 0
SORE THROAT: 0
DEPRESSION: 1

## 2025-03-10 ASSESSMENT — COGNITIVE AND FUNCTIONAL STATUS - GENERAL
TURNING FROM BACK TO SIDE WHILE IN FLAT BAD: A LITTLE
SUGGESTED CMS G CODE MODIFIER DAILY ACTIVITY: CK
HELP NEEDED FOR BATHING: A LITTLE
WALKING IN HOSPITAL ROOM: A LOT
TOILETING: A LITTLE
DRESSING REGULAR UPPER BODY CLOTHING: A LITTLE
DAILY ACTIVITIY SCORE: 19
MOVING FROM LYING ON BACK TO SITTING ON SIDE OF FLAT BED: A LITTLE
MOVING TO AND FROM BED TO CHAIR: A LOT
PERSONAL GROOMING: A LITTLE
MOBILITY SCORE: 13
SUGGESTED CMS G CODE MODIFIER MOBILITY: CL
DRESSING REGULAR LOWER BODY CLOTHING: A LITTLE
CLIMB 3 TO 5 STEPS WITH RAILING: TOTAL
STANDING UP FROM CHAIR USING ARMS: A LOT

## 2025-03-10 ASSESSMENT — PAIN DESCRIPTION - PAIN TYPE
TYPE: ACUTE PAIN;CHRONIC PAIN
TYPE: ACUTE PAIN
TYPE: CHRONIC PAIN;ACUTE PAIN
TYPE: ACUTE PAIN
TYPE: ACUTE PAIN

## 2025-03-10 NOTE — ED PROVIDER NOTES
CHIEF COMPLAINT  Chief Complaint   Patient presents with    Rib Pain     left       LIMITATION TO HISTORY   Select: none    HPI    Andrew Wall is a 59 y.o. male with a history of metastatic melanoma who presents to the Emergency Department with severe onset of left rib pain which began today. His family reports that the pain did not appear to be caused from any type of movement, as he was stationary at the time of onset. The patient is on oxycodone for pain management of metastatic cancer. His POLST was noted to be comfort care only, but after discussion it appears the patient and his wife are want more selective care.     OUTSIDE HISTORIAN(S):  Select: Wife present at bedside and provided information as detailed above.     EXTERNAL RECORDS REVIEWED  Select: POLST comfort care only.  Which was at bedside however after discussion with the patient as well as the patient's wife they would actually like to have more selective care and treatment if there is any to be had.    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    SURGICAL HISTORY  Past Surgical History:   Procedure Laterality Date    POSTERIOR CERVICAL FUSION O-ARM N/A 8/19/2024    Procedure: C1-T2 posterior instrumented fusion;  Surgeon: Kevin Lawrence M.D.;  Location: SURGERY [facility];  Service: Orthopedics    LYMPH NODE EXCISION Right 11/16/2023    Procedure: RIGHT INGUINAL NODE SUPERFICIAL DISSECTION;  Surgeon: Davon Valera M.D.;  Location: [facility];  Service:  General    NODE BIOPSY SENTINEL Bilateral 10/20/2020    Procedure: BIOPSY, LYMPH NODE, SENTINEL- GROIN;  Surgeon: Davon Valera M.D.;  Location: SURGERY SAME DAY [facility];  Service: General    WIDE EXCISION Right 10/20/2020    Procedure: WIDE EXCISION, LESION- BUTTOCKS, RADICAL MALIGNANT MELANOMA;  Surgeon: Davon Valera M.D.;  Location: SURGERY SAME DAY [facility];  Service: General    ANTROSTOMY Bilateral 11/15/2019    Procedure: MAXILLARY ANTROSTOMY- REVISION ENDOSCOPICMOMETASONE INJECTION, PROPEL STENT PLACEMENT;  Surgeon: Felicitas Tenorio M.D.;  Location: [facility];  Service: Ent    SINUSCOPY Bilateral 11/15/2019    Procedure: ENDOSCOPY, PARANASAL SINUSES- FOR FRONTAL EXPLORATION;  Surgeon: Felicitas Tenorio M.D.;  Location: [facility];  Service: Ent    TURBINOPLASTY Bilateral 11/15/2019    Procedure: TURBINOPLASTY;  Surgeon: Felicitas Tenorio M.D.;  Location: [facility];  Service: Ent    SPHENOIDECTOMY Bilateral 11/15/2019    Procedure: SPHENOIDECTOMY- FOR SPHENOIDOTOMY;  Surgeon: Felicitas Tenorio M.D.;  Location: [facility];  Service: Ent    ETHMOIDECTOMY Bilateral 11/15/2019    Procedure: ETHMOIDECTOMY- ENDOSCOPIC;  Surgeon: Felicitas Tenorio M.D.;  Location: [facility];  Service: Ent    NASAL POLYPECTOMY Bilateral 11/15/2019    Procedure: POLYPECTOMY, NASAL CAVITY;  Surgeon: Felicitas Tenorio M.D.;  Location: [facility];  Service: Ent    NASAL POLYPECTOMY  2015, 2017, 2019    x 3    OTHER  11/20    removed melanoma         FAMILY HISTORY  No family history on file.       SOCIAL HISTORY  Social History     Tobacco Use    Smoking status: Never     Passive exposure: Past    Smokeless tobacco: Never    Tobacco comments:     Childhood exposure   Vaping Use    Vaping status: Never Used   Substance Use Topics    Alcohol use: Not Currently    Drug use: No         CURRENT MEDICATIONS  No current facility-administered  medications on file prior to encounter.     Current Outpatient Medications on File Prior to Encounter   Medication Sig Dispense Refill    Oxycodone HCl 20 MG Tab Take 1 Tablet by mouth every 6 hours as needed (Pain) for up to 15 days. 60 Tablet 0    levothyroxine (SYNTHROID) 50 MCG Tab TAKE 1 TABLET BY MOUTH EVERY DAY WITH A GLASS OF WATER. DO NOT STOP OR RUN OUT UNLESS TOLD BY MD      morphine (MS IR) 30 MG tablet 45 mg. (Patient not taking: Reported on 1/23/2025)      cyclobenzaprine (FLEXERIL) 10 mg Tab Take 1 Tablet by mouth every 8 hours as needed for Muscle Spasms. 30 Tablet 0    enoxaparin (LOVENOX) 40 MG/0.4ML Solution Prefilled Syringe inj Inject 1 syringe (40 mg) under the skin every day at 6 PM. Injection 40 mg under the skin everyday at 6 pm for 14 days (Patient not taking: Reported on 1/23/2025) 5.6 mL 0    ondansetron (ZOFRAN ODT) 4 MG TABLET DISPERSIBLE Dissolve 1 Tablet by mouth every four hours as needed for Nausea/Vomiting 10 Tablet 0    senna-docusate (PERICOLACE OR SENOKOT S) 8.6-50 MG Tab Take 1 Tablet by mouth every evening. 30 Tablet 0    midodrine (PROAMATINE) 10 MG tablet Take 1 Tablet by mouth 3 times a day with meals. (Patient not taking: Reported on 1/23/2025) 90 Tablet 3    megestrol (MEGACE) 400 MG/10ML Suspension Take 20 mL by mouth every day. (Patient taking differently: Take 800 mg by mouth every day.         MEDICATION INSTRUCTIONS FOR SURGERY SCHEDULED ON 8/19/24 : COORDINATE MEDICATION INSTRUCTIONS FROM PRESCRIBING PHYSICIAN.) 600 mL 2    mometasone-formoterol (DULERA) 100-5 MCG/ACT Aerosol Inhale 2 Puffs 2 times a day. (Patient taking differently: Inhale 2 Puffs 2 times a day.         MEDICATION INSTRUCTIONS FOR SURGERY ON 8/19/24:  IT IS OK TO CONTINUE THIS MEDICATION.  IT IS OK TO TAKE THIS MEDICATION ON THE DAY OF SURGERY.) 13 g 2    Encorafenib (BRAFTOVI) 75 MG Cap Take 300 mg by mouth every day at 6 PM. (Patient taking differently: Take 300 mg by mouth every day at 6 PM.  "        MEDICATION INSTRUCTIONS FOR SURGERY SCHEDULED ON 8/19/24 : COORDINATE MEDICATION INSTRUCTIONS FROM PRESCRIBING PHYSICIAN.)      Binimetinib (MEKTOVI) 15 MG Tab Take 30 mg by mouth 2 times a day. (Patient taking differently: Take 30 mg by mouth 2 times a day.         MEDICATION INSTRUCTIONS FOR SURGERY SCHEDULED ON 8/19/24 : COORDINATE MEDICATION INSTRUCTIONS FROM PRESCRIBING PHYSICIAN.)      CALCIUM CARBONATE ANTACID PO Take 2 Tablets by mouth every day.         MEDICATION INSTRUCTIONS FOR SURGERY SCHEDULED ON 8/19/24: IT IS OK TO CONTINUE THIS MEDICATION PRIOR TO SURGERY BUT HOLD THIS MEDICATION ON THE MORNING OF SURGERY.           ALLERGIES  Allergies   Allergen Reactions    Augmentin Vomiting and Nausea       PHYSICAL EXAM  VITAL SIGNS:/84   Pulse (!) 118   Temp 36.9 °C (98.4 °F) (Temporal)   Resp 18   Ht 1.778 m (5' 10\")   Wt 63.5 kg (140 lb)   SpO2 95%   BMI 20.09 kg/m²     Constitutional: Painful discomfort but no acute distress  HENT: Normocephalic, Atraumatic, Bilateral external ears normal.  Eyes:  conjunctiva are normal.   Neck: Supple.  Nontender midline  Cardiovascular: Regular rate and rhythm without murmurs gallops or rubs.   Thorax & Lungs: Breath sounds diminished bilaterally. Tenderness to chest wall.   Abdomen: Soft, non distended, non tender   Skin: Warm, Dry, No erythema,   Back: No tenderness, No CVA tenderness.  Musculoskeletal: No clubbing cyanosis or edema good range of motion   Neurologic: Alert & oriented x 3, normal sensation moving all extremities appears normal   Psychiatric: Affect normal, Judgment normal, Mood normal.     DIAGNOSTIC STUDIES / PROCEDURES    RADIOLOGY  I have independently interpreted the diagnostic imaging associated with this visit and am waiting the final reading from the radiologist.   My preliminary interpretation is as follows: X-ray has multiple pulmonary nodules no signs of pneumothorax or displaced rib fractures.      Radiologist " interpretation:   CT-CTA CHEST PULMONARY ARTERY W/ RECONS   Final Result         1.  No large central pulmonary embolus is appreciated, evaluation of the subsegmental branches is essentially nondiagnostic due to motion artifacts. Additional imaging would be required for definitive exclusion of small distal pulmonary emboli.   2.  Bilateral pulmonary nodular masses, appearance compatible with metastatic disease.   3.  Trace bilateral pleural effusions.   4.  Linear densities the bilateral lung bases favor atelectasis.   5.  Atherosclerosis.      DX-CHEST-PORTABLE (1 VIEW)   Final Result      Multiple bilateral pulmonary nodules consistent with the patient's known metastatic disease.           COURSE & MEDICAL DECISION MAKING    ED COURSE:    ED Observation Status? No, The patient does not qualify for observation status     INTERVENTIONS BY ME:  Medications   HYDROmorphone (Dilaudid) injection 0.5 mg (has no administration in time range)   lidocaine (Asperflex) 4 % patch 2 Patch (2 Patches Transdermal Patch Applied 3/9/25 2140)   HYDROmorphone (Dilaudid) injection 0.5 mg (0.5 mg Intravenous Given 3/9/25 2106)   ketamine (Ketalar) 25 mg in NS 50 mL (low dose pain infusion) (0 mg Intravenous Stopped 3/9/25 1725)   morphine 4 MG/ML injection 4 mg (4 mg Intravenous Given 3/9/25 1900)   ketamine (Ketalar) 25 mg in NS 50 mL (low dose pain infusion) (0 mg Intravenous Stopped 3/9/25 2000)   iohexol (OMNIPAQUE) 350 mg/mL (IV) (80 mL Intravenous Given 3/9/25 2058)   ketorolac (Toradol) 15 MG/ML injection 15 mg (15 mg Intravenous Given 3/9/25 2140)       5:00 PM - Patient seen and examined at bedside. Discussed plan of care, including medication for symptom and pain management as well as imaging of chest. Patient agrees to the plan of care. The patient will be medicated with dilaudid injection 0.5 mg & ketalar. Ordered for CXR to evaluate his symptoms.     6:27 PM- Patient was reevaluated at bedside. Discussed radiology results  with the patient and informed him that there is no evidence of pneumothorax at this time on CXR. Discussed plan of care moving forward, as well as pain management at home. After discussion with family and patient, it appears they are more selection care vs comfort care. Plans for CT scan and lab work today for further diagnosis.         INITIAL ASSESSMENT, COURSE AND PLAN  Care Narrative: Patient presents emerged part for evaluation.  The patient initially was given fentanyl prior to arrival as well as a ketamine dose.  While he was here he has had a total of 4 doses of 0.5 mg Dilaudid 1 dose of 4 mg of morphine and 2 separate infusions of ketamine for pain control while he was here.  Initial chest x-ray was done no overt signs of pneumothorax or rib fractures.  CT scan of the chest was also done.  There are multiple metastatic lesions.  Pleural effusion but no pulmonary embolism seen..  At this point I feel the patient should be admitted for further pain control since orals are not able to be maintained.  I will contact the hospitalist for this admission.          ADDITIONAL PROBLEM LIST  Metastatic melanoma    DISPOSITION AND DISCUSSIONS  I have discussed management of the patient with the following physicians/ AZAEL's/ ancillary staff: Hospitalist for admission    Patient will be admitted for further IV pain control and possible nerve blockades.    FINAL DIAGNOSIS  1. Chest wall pain    2. Metastatic malignant neoplasm, unspecified site (HCC)         Chetna TOLEDO (Bridget), am scribing for, and in the presence of, Fan Moreno M.D..    Electronically signed by: Chetna Turpin (Bridget), 3/9/2025    IFan M.D. personally performed the services described in this documentation, as scribed by Chetna Turpin in my presence, and it is both accurate and complete.     Electronically signed by: Fan Moreno M.D.,9:41 PM 03/09/25

## 2025-03-10 NOTE — DISCHARGE PLANNING
Care Transition Team Assessment    Patient is a 59 year-old male admitted for cancer related pain. Please see patient's H&P for prior medical history. LMSW met with patient at bedside to complete assessment. Patient is A&Ox4 and able to verify the information on the face sheet. Patient lives with his wife in a single story house with a ramp to enter, Sherly Wall (p) [phone]; DPOA and emergency contact. Advance Directive on file. Prior to admission patient is independent with ADL's and IADL’s. Patient has a 4WW and wheelchair he uses at baseline. Patient reported that his family is good support for him. Patient receives monthly SSI deposits. The patient's PCP is Dr. Jose Luis Juarez. Patient's preferred pharmacy is [facility] on [address]. Patient denies a history of SNF/IPR use in the past, pt was on service with  last summer. Patient denies any SA or MH concerns. Patient's confirmed medical coverage via [employer]. Patient has means of transportation when medically cleared and spouse will provide transport once stable for discharge. Patient's primary oncologist is Dr. Ross with [facility].    Patient and family have elected for hospice, obtained hospice choice and faxed to [facility]. Pt had previously arranged hospice with [facility], family would like referral sent to [facility]. Tamara will meet with family at bedside tomorrow morning.     Information Source  Orientation Level: Oriented X4  Information Given By: Patient    Readmission Evaluation  Is this a readmission?: No    Elopement Risk  Legal Hold: No  Ambulatory or Self Mobile in Wheelchair: No-Not an Elopement Risk  Elopement Risk: Not at Risk for Elopement    Interdisciplinary Discharge Planning  Lives with - Patient's Self Care Capacity: Spouse  Patient or legal guardian wants to designate a caregiver: No  Support Systems: Family Member(s), Friends / Neighbors  Housing / Facility: 2 Story House    Discharge Preparedness  What is your plan after discharge?: Home with  help  What are your discharge supports?: Spouse  Prior Functional Level: Ambulatory  Difficulity with ADLs: None  Difficulity with IADLs: None    Functional Assesment  Prior Functional Level: Ambulatory    Finances  Financial Barriers to Discharge: No  Prescription Coverage: Yes    Vision / Hearing Impairment  Vision Impairment : No  Hearing Impairment : No         Advance Directive  Advance Directive?: DPOA for Health Care  Durable Power of  Name and Contact : Sherly Wall    Domestic Abuse  Have you ever been the victim of abuse or violence?: No  Possible Abuse/Neglect Reported to:: Not Applicable    Psychological Assessment  History of Substance Abuse: None  History of Psychiatric Problems: No  Non-compliant with Treatment: No  Newly Diagnosed Illness: No    Discharge Risks or Barriers  Discharge risks or barriers?: Complex medical needs  Patient risk factors: Complex medical needs    Anticipated Discharge Information  Discharge Disposition: D/T to hospice home (50)

## 2025-03-10 NOTE — ED NOTES
Called lab to ask to process samples already sent. Lab states they will run the CBC, CMP, PT/INR, and APTT.

## 2025-03-10 NOTE — PROGRESS NOTES
2 RN Skin Assessment Completed by Saritha RN and Zoey RN.     Head: WDL  Ears: WDL  Nose: WDL  Mouth: WDL  Neck: WDL  Breasts/Chest: WDL  Shoulder Blades: WDL  Spine: WDL  (R) Arm/Elbow/hand: WDL  (L) Arm/Elbow/hand: WDL  Abdomen: WDL  Groin: WDL  Sacrum/Coccyx/Buttocks: blanching  (R) Leg: WDL  (L) Leg: WDL  (R) Heel/Foot/Toe: blanching  (L) Heel/Foot/Toe: blanching           Devices in place: BP Cuff and Pulse Ox     Interventions in place: Pillows     Possible skin injury found: No     Pictures uploaded into Epic: N/A  Wound Consult Placed: N/A

## 2025-03-10 NOTE — PROGRESS NOTES
Scans reviewed. Significant progression of disease. No focal lesions that would alleviate patient's pain syndrome and thus no direct XRT targets. Suggest continued follow up with medical oncology and palliative care.    Von Wilcox MD, PhD  Radiation Oncology

## 2025-03-10 NOTE — CONSULTS
"Hematology/Oncology Consult    Primary Hematologist/Oncologist: Cody    Oncology History:  Andrew is a 59-year-old male with a past medical history notable for metastatic melanoma.  His oncologic history is as follows.    2020: Adjuvant Opdivo x 1 year from a primary right lower back melanoma.    12/2023: Progressive disease with BRAF mutation positive.  PET scan demonstrated hypermetabolic right upper inguinal lymph nodes and extensive enhancing osseous metastatic disease to the lumbar spine and the sacrum.    1/9/2024: Dual agent immunotherapy with Opdivo and Yervoy.  PD-L1 0%, RANCHO, TMB 0.  Complicated by severe ICI toxicity.  Only 1 cycle given.    1/25/2024: BRAF inhibitors with Braftovi and Mektovi initiated.    10/3/2024: Drug switch to Tafinlar and Mekinist due to disease progression.    12/18/2024: Disease progression again.  Recommendations made by [facility] to retry dual agent immunotherapy.  Opdivo/Yervoy reinitiated in December 2024.    2/25/2025: Last outpatient visit with [facility] for pretreatment evaluation prior to cycle 4 of combination Opdivo and Yervoy. Clinically stable.    3/4/2025: Whole-body PET scan with progressive disease with severe increase in pulmonary metastatic disease with more than 50 new and enlarging hypermetabolic pulmonary nodules.  Severe increase in osseous metastatic disease with more than 35 new and enlarging widespread hypermetabolic bone mets.  New mediastinal lymph nodes.  New retroperitoneal and pelvic lymph nodes suspicious for metastatic disease and several new soft tissue metastases.      Review of Systems:  Review of Systems   Constitutional:  Positive for diaphoresis, malaise/fatigue and weight loss.   Musculoskeletal:  Positive for back pain, joint pain, myalgias and neck pain.   Neurological:  Positive for weakness.        Vitals:     /78   Pulse 92   Temp 36.7 °C (98 °F) (Temporal)   Resp 20   Ht 1.778 m (5' 10\")   Wt 64 kg (141 lb 1.5 oz)   SpO2 91%   BMI " 20.24 kg/m²     Physical Exam:  Physical Exam  Vitals and nursing note reviewed.   Constitutional:       General: He is not in acute distress.     Appearance: He is ill-appearing and diaphoretic. He is not toxic-appearing.   HENT:      Head: Normocephalic and atraumatic.   Eyes:      General: No scleral icterus.     Pupils: Pupils are equal, round, and reactive to light.   Cardiovascular:      Rate and Rhythm: Normal rate and regular rhythm.      Pulses: Normal pulses.      Heart sounds: No murmur heard.     No friction rub. No gallop.   Pulmonary:      Effort: Pulmonary effort is normal.      Breath sounds: No stridor. No wheezing, rhonchi or rales.   Abdominal:      General: Abdomen is flat. Bowel sounds are normal.      Palpations: Abdomen is soft.   Musculoskeletal:         General: No swelling.      Cervical back: No rigidity.   Skin:     General: Skin is warm.      Capillary Refill: Capillary refill takes 2 to 3 seconds.      Coloration: Skin is pale. Skin is not jaundiced.   Neurological:      General: No focal deficit present.      Mental Status: He is alert and oriented to person, place, and time. Mental status is at baseline.   Psychiatric:         Mood and Affect: Mood normal.          Assessment and Plan:    # Metastatic Melanoma    Andrew is a 59-year-old male that has progressed on 4 lines of therapy for metastatic melanoma including single agent Opdivo, Braftovi and Mektovi, Tafinlar and Mekinist, and most recently dual agent immunotherapy with ipilimumab and nivolumab.    We discussed his restaging PET scan obtained [facility] on 3/4/2025 in detail.  Unfortunately, this demonstrates extremely grave findings with over 50 new and enlarging pulmonary metastases and over 35 new enlarging bony metastases.  He has disease involving multiple levels of spine, the left clavicle, right acromion, left upper scapula, sternum, multiple ribs, both iliac bones, both sacral ala, both  acetabulum, the left ischium, and the inferior pubic ramus and both femurs. As result, his performance status is also declined ECOG 4 and he is currently bedbound.    I discussed with him that he has rapidly progressive disease despite highly effective dual agent immunotherapy.  Given the fact that he has progressed on both immunotherapy and BRAF inhibition and the fact that molecular testing demonstrated no other targetable mutations on NexGen sequencing, I was jesse and honest with him that we have exhausted all active anticancer treatments.    I recommend that he transition to hospice and I expressed to him and his wife in plain language that he is approaching his death.  At their request, we discussed prognosis.  In my best estimate, he likely has weeks but his time may be as long as 1-2 months.    They expressed her understanding and their desire to optimize his pain control prior to heading home on hospice.      Thank you for allowing me to participate in his care. Please do not hesitate to reach out with any questions.    Please note that this dictation was created using voice recognition software. I have made every reasonable attempt to correct obvious errors, but I expect that there are errors of grammar and possibly content that I did not discover before finalizing the note.      Arvind Cortez MD FACP  Hematologist/Oncologist  [facility]   [facility] [employer] [facility] [employer]  Voalte preferred for contact

## 2025-03-10 NOTE — H&P
Hospital Medicine History & Physical Note    Date of Service  3/9/2025    Primary Care Physician  Jose Luis Juarez M.D.    Consultants      Code Status  DNAR/DNI    Chief Complaint  Chief Complaint   Patient presents with    Rib Pain     left       History of Presenting Illness  Andrew Wall is a 59 y.o. male with malignant melanoma with metastasis to the bones of the pelvis, chest, on immunotherapy, cancer associated pain treated with oxycodone at home, asthma, hypothyroidism who presented 3/9/2025 with sudden worsening of left chest pain since yesterday, exacerbated by movements and deep inspiration, 10 out of 10 with decreased effectiveness of oxycodone 10 to 20 mg every 6 hours  Patient was evaluated with repeat CTA of the chest that showed metastatic lesions to the lungs bilaterally, trace bilateral pleural effusions, no pulmonary embolism.   He received 4 mg of morphine, 2 mg of Dilaudid IV, IV Toradol 15 mg, ketamine 50 mg  He will be admitted for pain control.   POLST form reviewed: Status DNR/DNI and comfort measures, however patient would like selective treatment at this time.      I discussed the plan of care with patient, bedside RN, and ERP .    Review of Systems  Review of Systems   Constitutional:  Negative for chills, fever and weight loss.   HENT:  Negative for ear pain, hearing loss and tinnitus.    Eyes:  Negative for blurred vision, double vision and photophobia.   Respiratory:  Negative for cough, hemoptysis and sputum production.    Cardiovascular:  Positive for chest pain. Negative for palpitations and orthopnea.   Gastrointestinal:  Negative for heartburn, nausea and vomiting.   Genitourinary:  Negative for dysuria, flank pain, frequency and hematuria.   Musculoskeletal:  Positive for joint pain and myalgias. Negative for back pain and neck pain.   Skin:  Negative for itching and rash.   Neurological:  Negative for tremors, speech change, focal weakness and headaches.    Endo/Heme/Allergies:  Negative for environmental allergies and polydipsia. Does not bruise/bleed easily.   Psychiatric/Behavioral:  Negative for hallucinations and substance abuse. The patient is not nervous/anxious.        Past Medical History   has a past medical history of Acute nasopharyngitis (08/01/2024), Asthma, Bowel habit changes (08/01/2024), Cancer (HCC) (10/20), Cancer related pain (02/05/2024), Constipation (01/12/2024), 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) (11/16/2023), 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) (11/16/2023), 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), Nasal polyp, and Pain (08/01/2024).    Surgical History   has a past surgical history that includes nasal polypectomy (2015, 2017, 2019); antrostomy (Bilateral, 11/15/2019); sinuscopy (Bilateral, 11/15/2019); turbinoplasty (Bilateral, 11/15/2019); sphenoidectomy (Bilateral, 11/15/2019); ethmoidectomy (Bilateral, 11/15/2019); nasal polypectomy (Bilateral, 11/15/2019); node biopsy sentinel (Bilateral, 10/20/2020); wide excision (Right, 10/20/2020); other (11/20); lymph node excision (Right, 11/16/2023); and posterior cervical fusion o-arm (N/A, 8/19/2024).     Family History  family history is not on file.   Family history reviewed with patient. There is no family history that is pertinent to the chief complaint.     Social History   reports that he has never smoked. He has been exposed to tobacco smoke. He has never used smokeless tobacco. He reports that he does not currently use alcohol. He reports that he does not use drugs.    Allergies  Allergies   Allergen Reactions    Augmentin Vomiting and Nausea       Medications  Prior to Admission Medications   Prescriptions Last Dose Informant Patient Reported? Taking?   Binimetinib (MEKTOVI) 15 MG Tab  Patient No No   Sig: Take 30 mg by mouth 2 times a day.   Patient taking differently: Take 30 mg by mouth 2 times a day.         MEDICATION INSTRUCTIONS FOR SURGERY  SCHEDULED ON 8/19/24 : COORDINATE MEDICATION INSTRUCTIONS FROM PRESCRIBING PHYSICIAN.   CALCIUM CARBONATE ANTACID PO  Patient Yes No   Sig: Take 2 Tablets by mouth every day.         MEDICATION INSTRUCTIONS FOR SURGERY SCHEDULED ON 8/19/24: IT IS OK TO CONTINUE THIS MEDICATION PRIOR TO SURGERY BUT HOLD THIS MEDICATION ON THE MORNING OF SURGERY.   Encorafenib (BRAFTOVI) 75 MG Cap  Patient No No   Sig: Take 300 mg by mouth every day at 6 PM.   Patient taking differently: Take 300 mg by mouth every day at 6 PM.         MEDICATION INSTRUCTIONS FOR SURGERY SCHEDULED ON 8/19/24 : COORDINATE MEDICATION INSTRUCTIONS FROM PRESCRIBING PHYSICIAN.   Oxycodone HCl 20 MG Tab   No No   Sig: Take 1 Tablet by mouth every 6 hours as needed (Pain) for up to 15 days.   cyclobenzaprine (FLEXERIL) 10 mg Tab   No No   Sig: Take 1 Tablet by mouth every 8 hours as needed for Muscle Spasms.   enoxaparin (LOVENOX) 40 MG/0.4ML Solution Prefilled Syringe inj   No No   Sig: Inject 1 syringe (40 mg) under the skin every day at 6 PM. Injection 40 mg under the skin everyday at 6 pm for 14 days   Patient not taking: Reported on 1/23/2025   levothyroxine (SYNTHROID) 50 MCG Tab   Yes No   Sig: TAKE 1 TABLET BY MOUTH EVERY DAY WITH A GLASS OF WATER. DO NOT STOP OR RUN OUT UNLESS TOLD BY MD   megestrol (MEGACE) 400 MG/10ML Suspension  Patient No No   Sig: Take 20 mL by mouth every day.   Patient taking differently: Take 800 mg by mouth every day.         MEDICATION INSTRUCTIONS FOR SURGERY SCHEDULED ON 8/19/24 : COORDINATE MEDICATION INSTRUCTIONS FROM PRESCRIBING PHYSICIAN.   midodrine (PROAMATINE) 10 MG tablet  Patient No No   Sig: Take 1 Tablet by mouth 3 times a day with meals.   Patient not taking: Reported on 1/23/2025   mometasone-formoterol (DULERA) 100-5 MCG/ACT Aerosol  Patient No No   Sig: Inhale 2 Puffs 2 times a day.   Patient taking differently: Inhale 2 Puffs 2 times a day.         MEDICATION INSTRUCTIONS FOR SURGERY ON 8/19/24:  IT IS OK  TO CONTINUE THIS MEDICATION.  IT IS OK TO TAKE THIS MEDICATION ON THE DAY OF SURGERY.   morphine (MS IR) 30 MG tablet   Yes No   Si mg.   Patient not taking: Reported on 2025   ondansetron (ZOFRAN ODT) 4 MG TABLET DISPERSIBLE   No No   Sig: Dissolve 1 Tablet by mouth every four hours as needed for Nausea/Vomiting   senna-docusate (PERICOLACE OR SENOKOT S) 8.6-50 MG Tab   No No   Sig: Take 1 Tablet by mouth every evening.      Facility-Administered Medications: None       Physical Exam  Temp:  [36.9 °C (98.4 °F)] 36.9 °C (98.4 °F)  Pulse:  [108-122] 108  Resp:  [18-25] 20  BP: (110-127)/(75-84) 117/79  SpO2:  [94 %-98 %] 95 %  Blood Pressure: 117/79   Temperature: 36.9 °C (98.4 °F)   Pulse: (!) 108   Respiration: 20   Pulse Oximetry: 95 %       Physical Exam  Vitals and nursing note reviewed.   Constitutional:       General: He is not in acute distress.     Appearance: Normal appearance. He is ill-appearing.   HENT:      Head: Normocephalic and atraumatic.      Nose: Nose normal.      Mouth/Throat:      Mouth: Mucous membranes are moist.   Eyes:      Extraocular Movements: Extraocular movements intact.      Pupils: Pupils are equal, round, and reactive to light.   Cardiovascular:      Rate and Rhythm: Normal rate and regular rhythm.   Pulmonary:      Effort: Pulmonary effort is normal.      Breath sounds: Examination of the right-lower field reveals decreased breath sounds. Examination of the left-lower field reveals decreased breath sounds. Decreased breath sounds present.   Chest:      Chest wall: Tenderness present.   Breasts:     Left: Tenderness present.      Comments: Exquisite tenderness over the left rib cage, especially on the dorsal aspect  Abdominal:      General: Abdomen is flat. There is no distension.      Tenderness: There is no abdominal tenderness.   Musculoskeletal:         General: No swelling or deformity. Normal range of motion.      Cervical back: Normal range of motion and neck  "supple.   Skin:     General: Skin is warm and dry.   Neurological:      General: No focal deficit present.      Mental Status: He is alert and oriented to person, place, and time.   Psychiatric:         Mood and Affect: Mood normal.         Behavior: Behavior normal.         Laboratory:  Recent Labs     03/09/25  1648   WBC 4.6*   RBC 3.96*   HEMOGLOBIN 9.5*   HEMATOCRIT 32.1*   MCV 81.1*   MCH 24.0*   MCHC 29.6*   RDW 50.5*   PLATELETCT 269   MPV 8.9*     Recent Labs     03/09/25  1648   SODIUM 136   POTASSIUM 4.2   CHLORIDE 106   CO2 19*   GLUCOSE 107*   BUN 10   CREATININE 0.54   CALCIUM 8.0*     Recent Labs     03/09/25  1648   ALTSGPT 7   ASTSGOT 18   ALKPHOSPHAT 131*   TBILIRUBIN 0.3   GLUCOSE 107*     Recent Labs     03/09/25  1648   APTT 31.2   INR 1.20*     No results for input(s): \"NTPROBNP\" in the last 72 hours.      No results for input(s): \"TROPONINT\" in the last 72 hours.    Imaging:  CT-CTA CHEST PULMONARY ARTERY W/ RECONS   Final Result         1.  No large central pulmonary embolus is appreciated, evaluation of the subsegmental branches is essentially nondiagnostic due to motion artifacts. Additional imaging would be required for definitive exclusion of small distal pulmonary emboli.   2.  Bilateral pulmonary nodular masses, appearance compatible with metastatic disease.   3.  Trace bilateral pleural effusions.   4.  Linear densities the bilateral lung bases favor atelectasis.   5.  Atherosclerosis.      DX-CHEST-PORTABLE (1 VIEW)   Final Result      Multiple bilateral pulmonary nodules consistent with the patient's known metastatic disease.          X-Ray:  I have personally reviewed the images and compared with prior images.    Assessment/Plan:  Justification for Admission Status  I anticipate this patient is appropriate for observation status at this time because cancer related pain    Patient will need a Med/Surg bed on MEDICAL service .  The need is secondary to cancer related pain.    * Cancer " related pain- (present on admission)  Assessment & Plan  Patient presented with severe pleuritic and musculoskeletal chest pain on the left side secondary to metastatic disease  CTA of the chest: Negative for PE.  Multiple metastatic pulmonary lesions  Plan: Multimodal pain regimen with scheduled Tylenol, scheduled sustained-release oxycodone 20 mg twice daily  Lidocaine patches, methocarbamol scheduled  P.o. oxycodone, IV Dilaudid as needed  Need to for respiratory depression secondary to multiple opiates with pulse oximetry.  Assess GCS every 4-6 hours  Consider palliative care consult    Chronic anemia- (present on admission)  Assessment & Plan  Hemoglobin 9.5, without significant recent changes  No evidence of bleeding    Metastatic melanoma, BRAF [id number] POSITIVE  (HCC)- (present on admission)  Assessment & Plan  Patient has been getting infusions of immunotherapy, following with Dr. Ross  Consider inpatient oncology consult to discuss prognosis and disposition    Mild intermittent asthma without complication  Assessment & Plan  Not in exacerbation  Continue Pulmicort, Advair, albuterol as needed        VTE prophylaxis: enoxaparin ppx

## 2025-03-10 NOTE — HOSPITAL COURSE
Andrew Wall is a 59 y.o. male with malignant melanoma with metastasis to the bones of the pelvis, chest, on immunotherapy, cancer associated pain treated with oxycodone at home, asthma, hypothyroidism who presented 3/9/2025 with sudden worsening of left chest pain.    The patient explains that it started yesterday. It is exacerbated by movements and deep inspiration, 10 out of 10 with decreased effectiveness of oxycodone 10 to 20 mg every 6 hours.    In the emergency room vital signs significant for heart rate 120 that tachycardia.  Labs significant for hemoglobin 9.5, CO2 19, calcium 8, alkaline phosphatase 131. CTA of the chest that showed metastatic lesions to the lungs bilaterally, trace bilateral pleural effusions, no pulmonary embolism.       Patient seen and examined this a.m.  He states that he is still having pain despite long-acting breakthrough pain medication discussed with him that palliative care has been consulted and they will further assist in recommendations for pain management.

## 2025-03-10 NOTE — ED NOTES
Patient stating his pain is returning and worsening, despite additional Dilaudid dose. Dr. Moreno notified. Patient medicated per MAR.

## 2025-03-10 NOTE — PROGRESS NOTES
[facility] Medicine Daily Progress Note    Date of Service  3/10/2025    Chief Complaint  Andrew Wall is a 59 y.o. male admitted 3/9/2025 with cancer related pain.    Hospital Course  Andrew Wall is a 59 y.o. male with malignant melanoma with metastasis to the bones of the pelvis, chest, on immunotherapy, cancer associated pain treated with oxycodone at home, asthma, hypothyroidism who presented 3/9/2025 with sudden worsening of left chest pain.    The patient explains that it started yesterday. It is exacerbated by movements and deep inspiration, 10 out of 10 with decreased effectiveness of oxycodone 10 to 20 mg every 6 hours.    In the emergency room vital signs significant for heart rate 120 that tachycardia.  Labs significant for hemoglobin 9.5, CO2 19, calcium 8, alkaline phosphatase 131. CTA of the chest that showed metastatic lesions to the lungs bilaterally, trace bilateral pleural effusions, no pulmonary embolism.       Patient seen and examined this a.m.  He states that he is still having pain despite long-acting breakthrough pain medication discussed with him that palliative care has been consulted and they will further assist in recommendations for pain management.    Interval Problem Update  Patient is still having significant pain.  -Plan of care: Palliative care consulted.  Methadone initiated and OxyContin discontinued.  Continue pain management.  -Disposition: Patient is being transferred as inpatient status for further management.    I have discussed this patient's plan of care and discharge plan at IDT rounds today with Case Management, Nursing, Nursing leadership, and other members of the IDT team.    Consultants/Specialty  oncology and palliative care    Code Status  DNAR/DNI    Disposition  The patient is not medically cleared for discharge to home or a post-acute facility.  Anticipate discharge to: home with close outpatient follow-up    I have placed the appropriate orders for  post-discharge needs.    Review of Systems  Review of Systems   Constitutional:  Negative for chills, fever and malaise/fatigue.   HENT:  Negative for congestion and sore throat.    Eyes:  Negative for blurred vision and double vision.   Respiratory:  Negative for cough and shortness of breath.    Cardiovascular:  Negative for chest pain, palpitations and leg swelling.   Gastrointestinal:  Negative for abdominal pain, nausea and vomiting.   Genitourinary:  Negative for dysuria and flank pain.   Musculoskeletal:  Positive for myalgias.        Left Sided Rib Pain   Skin:  Negative for itching and rash.   Neurological:  Negative for dizziness, weakness and headaches.   Psychiatric/Behavioral:  Positive for depression. Negative for suicidal ideas.         Physical Exam  Temp:  [35.8 °C (96.5 °F)-36.9 °C (98.4 °F)] 36.7 °C (98 °F)  Pulse:  [] 92  Resp:  [16-25] 20  BP: (103-127)/(68-84) 109/78  SpO2:  [91 %-98 %] 91 %    Physical Exam  Constitutional:       Appearance: He is ill-appearing.   HENT:      Head: Normocephalic and atraumatic.      Nose: Nose normal.      Mouth/Throat:      Mouth: Mucous membranes are moist.   Eyes:      Pupils: Pupils are equal, round, and reactive to light.   Cardiovascular:      Rate and Rhythm: Normal rate and regular rhythm.      Pulses: Normal pulses.      Heart sounds: Normal heart sounds.   Pulmonary:      Effort: Pulmonary effort is normal.      Breath sounds: Normal breath sounds.   Abdominal:      General: Bowel sounds are normal.      Palpations: Abdomen is soft.   Musculoskeletal:         General: Normal range of motion.      Cervical back: Normal range of motion.   Skin:     General: Skin is warm and dry.   Neurological:      General: No focal deficit present.      Mental Status: He is alert. Mental status is at baseline.   Psychiatric:         Mood and Affect: Mood normal.         Behavior: Behavior normal.         Fluids    Intake/Output Summary (Last 24 hours) at  3/10/2025 1211  Last data filed at 3/10/2025 0125  Gross per 24 hour   Intake --   Output 525 ml   Net -525 ml        Laboratory  Recent Labs     03/09/25  1648   WBC 4.6*   RBC 3.96*   HEMOGLOBIN 9.5*   HEMATOCRIT 32.1*   MCV 81.1*   MCH 24.0*   MCHC 29.6*   RDW 50.5*   PLATELETCT 269   MPV 8.9*     Recent Labs     03/09/25  1648   SODIUM 136   POTASSIUM 4.2   CHLORIDE 106   CO2 19*   GLUCOSE 107*   BUN 10   CREATININE 0.54   CALCIUM 8.0*     Recent Labs     03/09/25  1648   APTT 31.2   INR 1.20*               Imaging  CT-CTA CHEST PULMONARY ARTERY W/ RECONS   Final Result         1.  No large central pulmonary embolus is appreciated, evaluation of the subsegmental branches is essentially nondiagnostic due to motion artifacts. Additional imaging would be required for definitive exclusion of small distal pulmonary emboli.   2.  Bilateral pulmonary nodular masses, appearance compatible with metastatic disease.   3.  Trace bilateral pleural effusions.   4.  Linear densities the bilateral lung bases favor atelectasis.   5.  Atherosclerosis.      DX-CHEST-PORTABLE (1 VIEW)   Final Result      Multiple bilateral pulmonary nodules consistent with the patient's known metastatic disease.           Assessment/Plan  * Cancer related pain- (present on admission)  Assessment & Plan  Patient presented with severe pleuritic and musculoskeletal chest pain on the left side secondary to metastatic disease  CTA of the chest: Negative for PE.  Multiple metastatic pulmonary lesions  Plan: Multimodal pain regimen  Lidocaine patches, methocarbamol scheduled  Palliative care consulted  Methadone initiated and Oxycontin stopped    Chronic anemia- (present on admission)  Assessment & Plan  Hemoglobin 9.5, without significant recent changes  No evidence of bleeding    Metastatic melanoma, BRAF V600E POSITIVE  (HCC)- (present on admission)  Assessment & Plan  Patient has been getting infusions of immunotherapy, following with   [facility]  Medical oncology aware of patients inpatient status  Dr Wilcox with radiation oncology consulted with no recommendations at this time  Palliative care consulted    Mild intermittent asthma without complication  Assessment & Plan  Not in exacerbation  Continue Pulmicort, Advair, albuterol as needed         VTE prophylaxis:    enoxaparin ppx      I have performed a physical exam and reviewed and updated ROS and Plan today (3/10/2025). In review of yesterday's note (3/9/2025), there are no changes except as documented above.      IShanda A.P.R.N. performed a substantiated portion of the service face-to-face with same patient on the same date of service INDEPENDENTLY FROM THE MD ON ASSESSMENT, EXAMINATION, AND DISCUSSION IN PLAN OF CARE FOR 22 MINUTES.  I was personally involved in reviewing and conducting the medical decision making, including the information as described below:

## 2025-03-10 NOTE — PROGRESS NOTES
4 Eyes Skin Assessment Completed by JODY neumann and JODY mcnally.    Head WDL  Ears WDL  Nose WDL  Mouth WDL  Neck WDL  Breast/Chest Discoloration  Shoulder Blades WDL  Spine WDL  (R) Arm/Elbow/Hand WDL  (L) Arm/Elbow/Hand WDL  Abdomen WDL  Groin WDL  Scrotum/Coccyx/Buttocks Redness, Blanching, and Discoloration  (R) Leg WDL  (L) Leg WDL  (R) Heel/Foot/Toe WDL  (L) Heel/Foot/Toe WDL          Devices In Places Pulse Ox      Interventions In Place Sacral Mepilex and Q2 Turns    Possible Skin Injury No    Pictures Uploaded Into Epic N/A  Wound Consult Placed N/A  RN Wound Prevention Protocol Ordered No

## 2025-03-10 NOTE — PROGRESS NOTES
Pt arrived to unit via gurney. Slid pt from gurney to bed using slide sheet and board - pt reported pain with movement. Pt oriented to room, unit, and plan of care. All questions answered at this time. Call light within reach; fall precautions in place.

## 2025-03-10 NOTE — CONSULTS
MRN: [id number]  Date of palliative consult: 3/10/2025  Reason for consult: symptom management  Referring provider: Dr. Mcgovern  Location of consult: [facility]  Additional consulting services: none    HPI:   Andrew Wall is a 59 y.o. male with medical history significant for malignant melanoma with metastases to bilateral lungs and bones of pelvis and chest with pathological fractures admitted 3/9/2025 with new, acute pain to left ribs. PET scan from 3/4 shows progression of disease. Patient seeing outpatient palliative care Dr. Quinones and radiation oncologist Dr. Ross. Palliative care consulted for symptom management during this hospital stay.    Additional Pertinent Medical History: asthma, hypothyoid    Pain History:  Onset: Sudden onset 3/9 around lunchtime  Location: left mid costal region  Duration: constant pain with waves of worsening pain  Characteristics: stabbing, cramping, sharp  Aggravating factors: taking deep breaths, movement  Alleviating factors: positioning, laying on right side  Radiation: denies  Treatments: oxycodone 20mg IR, previously on morphine ER (stopped in December). Pain managed well prior to hospitalization.  Severity: 3/10 now, at its worst 8-10/10. Tearful throughout conversation.    Additional symptoms: +constipation (last bowel movement was on 3/7, generally has a BM every 3-4 days, takes senna and Miralax at home, and he did have to use a suppository recently, drinks approximately 1L of fluid per day), fatigue, poor sleep (2-3 hours per night to awaken for pain medication), depression, anxiety, poor appetite (able to eat breakfast this morning), denies dyspnea, nausea, vomiting    Medication Allergy/Sensitivities:  Allergies   Allergen Reactions    Amoxicillin-Pot Clavulanate Vomiting and Nausea       ROS:    Review of Systems   Constitutional:  Positive for malaise/fatigue and weight loss.   Gastrointestinal:  Positive for constipation. Negative for nausea and vomiting.    Musculoskeletal:  Positive for myalgias.        Left mid-costal pain   Psychiatric/Behavioral:  Positive for depression. The patient is nervous/anxious.        PE:   Recent vital signs  BMI: Body mass index is 20.24 kg/m².    Temp (24hrs), Av.4 °C (97.6 °F), Min:35.8 °C (96.5 °F), Max:36.9 °C (98.4 °F)  Temperature: 36.3 °C (97.4 °F)  Pulse  Av.7  Min: 78  Max: 122   Blood Pressure: 118/68       Physical Exam  Constitutional:       General: He is awake.   Cardiovascular:      Heart sounds: Normal heart sounds.   Pulmonary:      Effort: Pulmonary effort is normal.      Breath sounds: Normal breath sounds.   Musculoskeletal:      Comments: Left-sided mid costal pain   Neurological:      Mental Status: He is alert.   Psychiatric:         Mood and Affect: Mood is depressed. Affect is tearful.       Recent Labs     25  1648   SODIUM 136   POTASSIUM 4.2   CHLORIDE 106   CO2 19*   GLUCOSE 107*   BUN 10   CREATININE 0.54   CALCIUM 8.0*     Recent Labs     25  1648   WBC 4.6*   RBC 3.96*   HEMOGLOBIN 9.5*   HEMATOCRIT 32.1*   MCV 81.1*   MCH 24.0*   MCHC 29.6*   RDW 50.5*   PLATELETCT 269   MPV 8.9*       ASSESSMENT/PLAN WITH SHARED DECISION MAKING:   Medications reviewed. Labs Reviewed.     Pertinent imaging reviewed.    PHYSICAL ASPECTS OF CARE  Palliative Performance Scale: 50%    # Malignant melanoma with metastases  -reached out to Dr. Quinones with palliative and Dr. Ross with medical oncology to discuss history and current prognosis  -per Dr. Ross, recommend primary team consult radiation oncology  # Cancer-related pain; Left-sided mid costal pain  -He is aware that Dr. Quinones previously did discuss possibly transitioning him to methadone.  He shares that he discontinued his extended release morphine in December, of which he was on 30 mg twice daily.he says that this was no longer working so he was switched to the oxycodone.  He explained that his radiation therapy with Dr. Wilcox has been  working on alleviating his pain prior to this hospitalization.  Overnight his pain has been managed; he shares that he felt the most relief after the 2 doses of ketamine he received in the ER yesterday.  He shares that in the ambulance ride he did receive fentanyl and felt no relief from that. He expresses he has lost weight and muscle recently, to which we explained that methadone is likely a better choice for him.  We educated the patient on side effects and use of methadone, and explained that if methadone does not work in the next few days there is a possibility we could start him on a ketamine infusion, however we will address that in the next few days.  -PDMP score is 440  -MEDD last 24hrs is 117mg  -stop OxyContin; very high cost through [place]  -start methadone 5mg Q12hr  -starting on lower dose of methadone due to also starting Cymbalta simultaneously  -adding Cymbalta for dual affect of anxiety and neuropathic pain modulation  - QTc 3/9: 461  -continue oxycodone IR 10-20 mg and dilaudid for breakthrough pain  -continue dexamethasone per primary team  # Protein-calorie malnutrition  -consider restarting Megase, potentially after stopping dexamethasone  # Depression  -Start Cymbalta 30mg daily  # Constipation  -continue bowel protocol, monitor  # Hypothyroid  # Asthma  # Frailty    SOCIAL ASPECTS OF CARE  Sebastian lives at home with his wife. Wife (Liz) and daughter (Janay) are at bedside during visit.    SPIRITUAL ASPECTS OF CARE   Sebastian shares that he is [employer] and that he prays daily. I offered him a visit from the  to which he was agreeable.    GOALS OF CARE/SERIOUS ILLNESS CONVERSATION  Introduced myself, my teammate Lucretia ALAS, and medical student Juve to Sebastian, his wife Liz, and his daughter Janay. Discussed role of palliative care and reason for consult. Sebastian, Liz, and Janay agreeable to discuss goals of care. Upon initiating the conversation with Sebastian and his family, I let them know  that I have been in contact with Dr. Quinones and Dr. Ross to obtain some background information and understanding of the patient's care prior to this hospitalization. After addressing symptom management, patient agreeable to discuss goals of care.    Sebastian and his wife share that they would like to amend his POLST which currently reads DNR/DNI with comfort focused treatment.  They expressed a wish to change this to a more selective focused treatment plan.  We then discussed Sebastian's current CODE STATUS, which we explained is DNR/DNI.  Upon reeducating him on what this means and questioning if this status is okay, Sebastian said he would like to remain a DNR/DNI, however would like time to think through changing his POLST.  We will readdress this in the next few days. He has been feeling anxious while waiting for an oncologist to discuss his recent PET scan with him.    Provided palliative care contact information and encouraged Sebastian, Liz, and Janay to reach out with any questions/needs.     Code Status: DNR/DNI    ACP Documents: POLST, ACP documents    17 minutes spent discussing advance care planning, this time excludes any other billed services.    Interval diagnostic studies and medical documentation entries pertinent to this case were reviewed independently by me. This patient has at least one acute or chronic illness or injury that poses a threat to life or bodily function. This patient suffers from a high risk of morbidity from additional invasive diagnostic testing or intensive treatment. Discussion of recommendations and coordination of care undertaken with primary provider/treatment team.    Monique Calloway DNP, [facility]  Inpatient Palliative Care Provider  [phone]

## 2025-03-10 NOTE — ED NOTES
Bedside report received from prior RN. Pt alert&Ox4 with family at bedside. Resp normal/unlabored on Ra. Bed side rails up/in low position. Pt updated to POC and all questions answered.     Pt pain level increased, erp updated

## 2025-03-10 NOTE — ED NOTES
Med rec updated and complete. Allergies reviewed.    Interviewed family ( wife) at bedside.    No outpatient antibiotic use in last 30 days.    Denies anticoagulant and antiplatelet medications.      Last Chemo infusion at [facility] on 02/26/25.    Preferred Pharmacy  [facility] [phone]

## 2025-03-10 NOTE — CARE PLAN
The patient is Stable - Low risk of patient condition declining or worsening    Shift Goals  Clinical Goals: pain control, pt safety & comfort, palliative consult?  Patient Goals: rest, pain control  Family Goals: pt safety & comfort    Progress made toward(s) clinical / shift goals:    Problem: Pain - Standard  Goal: Alleviation of pain or a reduction in pain to the patient’s comfort goal  Description: Target End Date:  Prior to discharge or change in level of care    Document on Vitals flowsheet    1.  Document pain using the appropriate pain scale per order or unit policy  2.  Educate and implement non-pharmacologic comfort measures (i.e. relaxation, distraction, massage, cold/heat therapy, etc.)  3.  Pain management medications as ordered  4.  Reassess pain after pain med administration per policy  5.  If opiods administered assess patient's response to pain medication is appropriate per POSS sedation scale  6.  Follow pain management plan developed in collaboration with patient and interdisciplinary team (including palliative care or pain specialists if applicable)  Outcome: Progressing     Problem: Knowledge Deficit - Standard  Goal: Patient and family/care givers will demonstrate understanding of plan of care, disease process/condition, diagnostic tests and medications  Description: Target End Date:  1-3 days or as soon as patient condition allows    Document in Patient Education    1.  Patient and family/caregiver oriented to unit, equipment, visitation policy and means for communicating concern  2.  Complete/review Learning Assessment  3.  Assess knowledge level of disease process/condition, treatment plan, diagnostic tests and medications  4.  Explain disease process/condition, treatment plan, diagnostic tests and medications  Outcome: Progressing     Problem: Skin Integrity  Goal: Skin integrity is maintained or improved  Description: Target End Date:  Prior to discharge or change in level of  care    Document interventions on Skin Risk/[place] flowsheet groups and corresponding LDA    1.  Assess and monitor skin integrity, appearance and/or temperature  2.  Assess risk factors for impaired skin integrity and/or pressures ulcers  3.  Implement precautions to protect skin integrity in collaboration with interdisciplinary team  4.  Implement pressure ulcer prevention protocol if at risk for skin breakdown  5.  Confirm wound care consult if at risk for skin breakdown  6.  Ensure patient use of pressure relieving devices  (Low air loss bed, waffle overlay, heel protectors, ROHO cushion, etc)  Outcome: Progressing     Problem: Fall Risk  Goal: Patient will remain free from falls  Description: Target End Date:  Prior to discharge or change in level of care    Document interventions on the Jodi Leigh Fall Risk Assessment    1.  Assess for fall risk factors  2.  Implement fall precautions  Outcome: Progressing       Patient is not progressing towards the following goals:

## 2025-03-10 NOTE — ASSESSMENT & PLAN NOTE
Patient presented with severe pleuritic and musculoskeletal chest pain on the left side secondary to metastatic disease  CTA of the chest: Negative for PE.  Multiple metastatic pulmonary lesions  Plan: Multimodal pain regimen  Lidocaine patches, methocarbamol scheduled  Palliative care consulted  Methadone initiated and Oxycontin stopped

## 2025-03-10 NOTE — ASSESSMENT & PLAN NOTE
Patient has been getting infusions of immunotherapy, following with Dr. Ross  Medical oncology aware of patients inpatient status  Dr Wilcox with radiation oncology consulted with no recommendations at this time  Palliative care consulted

## 2025-03-11 VITALS
HEIGHT: 70 IN | SYSTOLIC BLOOD PRESSURE: 115 MMHG | RESPIRATION RATE: 18 BRPM | HEART RATE: 79 BPM | DIASTOLIC BLOOD PRESSURE: 74 MMHG | WEIGHT: 141.09 LBS | BODY MASS INDEX: 20.2 KG/M2 | TEMPERATURE: 97.6 F | OXYGEN SATURATION: 100 %

## 2025-03-11 PROCEDURE — 99497 ADVNCD CARE PLAN 30 MIN: CPT

## 2025-03-11 PROCEDURE — 700102 HCHG RX REV CODE 250 W/ 637 OVERRIDE(OP): Performed by: INTERNAL MEDICINE

## 2025-03-11 PROCEDURE — A9270 NON-COVERED ITEM OR SERVICE: HCPCS

## 2025-03-11 PROCEDURE — 700102 HCHG RX REV CODE 250 W/ 637 OVERRIDE(OP)

## 2025-03-11 PROCEDURE — A9270 NON-COVERED ITEM OR SERVICE: HCPCS | Performed by: INTERNAL MEDICINE

## 2025-03-11 PROCEDURE — 99239 HOSP IP/OBS DSCHRG MGMT >30: CPT | Performed by: STUDENT IN AN ORGANIZED HEALTH CARE EDUCATION/TRAINING PROGRAM

## 2025-03-11 PROCEDURE — 99233 SBSQ HOSP IP/OBS HIGH 50: CPT

## 2025-03-11 PROCEDURE — 700111 HCHG RX REV CODE 636 W/ 250 OVERRIDE (IP): Performed by: INTERNAL MEDICINE

## 2025-03-11 PROCEDURE — 700111 HCHG RX REV CODE 636 W/ 250 OVERRIDE (IP)

## 2025-03-11 RX ORDER — ATROPINE SULFATE 10 MG/ML
2 SOLUTION/ DROPS OPHTHALMIC EVERY 4 HOURS PRN
Status: DISCONTINUED | OUTPATIENT
Start: 2025-03-11 | End: 2025-03-11 | Stop reason: HOSPADM

## 2025-03-11 RX ORDER — DEXAMETHASONE SODIUM PHOSPHATE 4 MG/ML
4 INJECTION, SOLUTION INTRA-ARTICULAR; INTRALESIONAL; INTRAMUSCULAR; INTRAVENOUS; SOFT TISSUE 2 TIMES DAILY
Status: DISCONTINUED | OUTPATIENT
Start: 2025-03-11 | End: 2025-03-11 | Stop reason: HOSPADM

## 2025-03-11 RX ORDER — OXYCODONE HYDROCHLORIDE 30 MG/1
30 TABLET ORAL
Refills: 0 | Status: DISCONTINUED | OUTPATIENT
Start: 2025-03-11 | End: 2025-03-11 | Stop reason: HOSPADM

## 2025-03-11 RX ORDER — CYCLOBENZAPRINE HCL 10 MG
10 TABLET ORAL EVERY 8 HOURS PRN
Qty: 30 TABLET | Refills: 0 | Status: SHIPPED | OUTPATIENT
Start: 2025-03-11

## 2025-03-11 RX ORDER — OXYCODONE HYDROCHLORIDE 10 MG/1
20 TABLET ORAL
Refills: 0 | Status: DISCONTINUED | OUTPATIENT
Start: 2025-03-11 | End: 2025-03-11 | Stop reason: HOSPADM

## 2025-03-11 RX ORDER — DULOXETIN HYDROCHLORIDE 30 MG/1
30 CAPSULE, DELAYED RELEASE ORAL DAILY
Qty: 30 CAPSULE | Refills: 0 | Status: SHIPPED | OUTPATIENT
Start: 2025-03-12

## 2025-03-11 RX ORDER — ACETAMINOPHEN 500 MG
1000 TABLET ORAL EVERY 8 HOURS
Qty: 30 TABLET | Refills: 0 | Status: SHIPPED | OUTPATIENT
Start: 2025-03-11

## 2025-03-11 RX ADMIN — DULOXETINE 30 MG: 30 CAPSULE, DELAYED RELEASE ORAL at 05:04

## 2025-03-11 RX ADMIN — MOMETASONE FUROATE AND FORMOTEROL FUMARATE DIHYDRATE 2 PUFF: 200; 5 AEROSOL RESPIRATORY (INHALATION) at 05:05

## 2025-03-11 RX ADMIN — METHOCARBAMOL 500 MG: 500 TABLET ORAL at 08:44

## 2025-03-11 RX ADMIN — OXYCODONE HYDROCHLORIDE 30 MG: 30 TABLET ORAL at 11:02

## 2025-03-11 RX ADMIN — HYDROMORPHONE HYDROCHLORIDE 1 MG: 1 INJECTION, SOLUTION INTRAMUSCULAR; INTRAVENOUS; SUBCUTANEOUS at 13:23

## 2025-03-11 RX ADMIN — DEXAMETHASONE SODIUM PHOSPHATE 4 MG: 4 INJECTION INTRA-ARTICULAR; INTRALESIONAL; INTRAMUSCULAR; INTRAVENOUS; SOFT TISSUE at 11:02

## 2025-03-11 RX ADMIN — ACETAMINOPHEN 1000 MG: 500 TABLET ORAL at 05:05

## 2025-03-11 RX ADMIN — METHADONE HYDROCHLORIDE 5 MG: 5 TABLET ORAL at 05:05

## 2025-03-11 RX ADMIN — LEVOTHYROXINE SODIUM 50 MCG: 0.05 TABLET ORAL at 05:04

## 2025-03-11 ASSESSMENT — ENCOUNTER SYMPTOMS
DEPRESSION: 1
WEIGHT LOSS: 1
NERVOUS/ANXIOUS: 1
NAUSEA: 0
CONSTIPATION: 1
MYALGIAS: 1
VOMITING: 0

## 2025-03-11 ASSESSMENT — PAIN DESCRIPTION - PAIN TYPE
TYPE: ACUTE PAIN

## 2025-03-11 NOTE — PROGRESS NOTES
4 Eyes Skin Assessment Completed by Shamar RN and JODY Leon.    Head WDL  Ears WDL  Nose WDL  Mouth WDL  Neck WDL  Breast/Chest Discoloration  Shoulder Blades WDL  Spine WDL  (R) Arm/Elbow/Hand WDL  (L) Arm/Elbow/Hand WDL  Abdomen WDL  Groin WDL  Scrotum/Coccyx/Buttocks Redness, Blanching, and Discoloration  (R) Leg WDL  (L) Leg WDL  (R) Heel/Foot/Toe Redness and Blanching  (L) Heel/Foot/Toe Redness and Blanching          Devices In Places Blood Pressure Cuff and Pulse Ox      Interventions In Place Pillows and Pressure Redistribution Mattress    Possible Skin Injury No    Pictures Uploaded Into Epic N/A  Wound Consult Placed N/A  RN Wound Prevention Protocol Ordered No

## 2025-03-11 NOTE — PROGRESS NOTES
"MRN: [id number]  Date of palliative consult: 3/10/2025  Reason for consult: symptom management  Referring provider: Dr. Mcgovern  Location of consult: [facility]  Additional consulting services: none    HPI:   Andrew Wall is a 59 y.o. male with medical history significant for malignant melanoma with metastases to bilateral lungs and bones of pelvis and chest with pathological fractures admitted 3/9/2025 with new, acute pain to left ribs. PET scan from 3/4 shows progression of disease. Patient seeing outpatient palliative care Dr. Quinones and radiation oncologist Dr. Ross. Palliative care consulted for symptom management during this hospital stay.    Additional Pertinent Medical History: asthma, hypothyoid    Pain History:  Onset: Sudden onset 3/9 around lunchtime  Location: left mid costal region  Duration: constant pain with waves of worsening pain  Characteristics: stabbing, cramping, sharp  Aggravating factors: taking deep breaths, movement  Alleviating factors: positioning, laying on right side  Radiation: denies  Treatments: oxycodone 20mg IR, previously on morphine ER (stopped in December). Pain managed well prior to hospitalization.  Severity: 3/10 now, at its worst 8-10/10. Tearful throughout conversation.    Additional symptoms: +constipation (last bowel movement was on 3/7, generally has a BM every 3-4 days, takes senna and Miralax at home, and he did have to use a suppository recently, drinks approximately 1L of fluid per day), fatigue, poor sleep (2-3 hours per night to awaken for pain medication), depression, anxiety, poor appetite (able to eat breakfast this morning), denies dyspnea, nausea, vomiting    Interval History:  3/11: Pain improved overnight, patient was able to get some sleep. Current pain 0/10. Pain is \"tolerable\" with a deep breath. Patient seen by Dr. Cortez with oncology yesterday, elected to proceed with hospice at that point in time. Reports no side effects with induction of " methadone. Reports anxiety overnight into this morning. Requesting to speak with Dr. Wilcox regarding possible radiation to sternum and right back hip for pain control.    ROS:    Review of Systems   Constitutional:  Positive for malaise/fatigue and weight loss.   Gastrointestinal:  Positive for constipation. Negative for nausea and vomiting.   Musculoskeletal:  Positive for myalgias.        Left mid-costal pain   Psychiatric/Behavioral:  Positive for depression. The patient is nervous/anxious.        PE:   Recent vital signs  BMI: Body mass index is 20.24 kg/m².    Temp (24hrs), Av.5 °C (97.7 °F), Min:36.3 °C (97.3 °F), Max:36.8 °C (98.2 °F)  Temperature: 36.4 °C (97.6 °F)  Pulse  Av.8  Min: 78  Max: 122   Blood Pressure: 115/74       Physical Exam  Constitutional:       General: He is awake.   Cardiovascular:      Heart sounds: Normal heart sounds.   Pulmonary:      Effort: Pulmonary effort is normal.      Breath sounds: Normal breath sounds.   Musculoskeletal:      Comments: Left-sided mid costal pain   Neurological:      Mental Status: He is alert.   Psychiatric:         Mood and Affect: Mood is depressed. Affect is flat.       Recent Labs     25  1648   SODIUM 136   POTASSIUM 4.2   CHLORIDE 106   CO2 19*   GLUCOSE 107*   BUN 10   CREATININE 0.54   CALCIUM 8.0*     Recent Labs     25  1648   WBC 4.6*   RBC 3.96*   HEMOGLOBIN 9.5*   HEMATOCRIT 32.1*   MCV 81.1*   MCH 24.0*   MCHC 29.6*   RDW 50.5*   PLATELETCT 269   MPV 8.9*       ASSESSMENT/PLAN WITH SHARED DECISION MAKING:   Medications reviewed. Labs Reviewed.     Pertinent imaging reviewed.    PHYSICAL ASPECTS OF CARE  Palliative Performance Scale: 50%    # Malignant melanoma with metastases  3/10  -reached out to Dr. Quinones with palliative and Dr. Ross with medical oncology to discuss history and current prognosis  -per Dr. Ross, recommend primary team consult radiation oncology  3/11  -elected to proceed with hospice,  comfort-focused treatment after conversation with Dr. Cortez yesterday  -requesting to speak with Dr. Wilcox regarding possible radiation treatment for pain prior to discharge. Reached out to Dr. Wilcox who says he will contact the patient and his wife today.  # Cancer-related pain; Left-sided mid costal pain  3/10  -He is aware that Dr. Quinones previously did discuss possibly transitioning him to methadone.  He shares that he discontinued his extended release morphine in December, of which he was on 30 mg twice daily.he says that this was no longer working so he was switched to the oxycodone.  He explained that his radiation therapy with Dr. Wilcox has been working on alleviating his pain prior to this hospitalization.  Overnight his pain has been managed; he shares that he felt the most relief after the 2 doses of ketamine he received in the ER yesterday.  He shares that in the ambulance ride he did receive fentanyl and felt no relief from that. He expresses he has lost weight and muscle recently, to which we explained that methadone is likely a better choice for him.  We educated the patient on side effects and use of methadone, and explained that if methadone does not work in the next few days there is a possibility we could start him on a ketamine infusion, however we will address that in the next few days.  -PDMP score is 440  -MEDD last 24hrs is 117mg  -stop OxyContin; very high cost through [place]  -start methadone 5mg Q12hr  -starting on lower dose of methadone due to also starting Cymbalta simultaneously  -adding Cymbalta for dual affect of anxiety and neuropathic pain modulation  - QTc 3/9: 461  -continue oxycodone IR 10-20 mg and dilaudid for breakthrough pain  -continue dexamethasone per primary team  3/11  - MEDD overnight ~ 60 with 10mg methadone daily  - as above, increase oxycodone to 20-30 mg Q3h  - restart dexamethasone for pain control, fatigue, and appetite improvement; 4 mg BID- in the morning  with breakfast and in the afternoon with lunch  -hospice liaison at bedside during conversation with patient, aware of current plan of care. Per liaison, patient can discharge by 1300 today, depending on the conversation with Dr. Wilcox.  # Protein-calorie malnutrition  3/10  -consider restarting Megase, potentially after stopping dexamethasone  # Depression  3/10  -Start Cymbalta 30mg daily  # Constipation  3/10  -continue bowel protocol, monitor  # Hypothyroid  # Asthma  # Frailty  # Anxiety  3/11  Bedside nurse reached out 3/10 regarding patient's anxiety. Order placed for ativan 0.5mg Q4h prn. Continue as needed.    SOCIAL ASPECTS OF CARE  Sebastian lives at home with his wife. Wife (Liz) and daughter (Janay) are at bedside during visit.    SPIRITUAL ASPECTS OF CARE   Sebastian shares that he is [employer] and that he prays daily. I offered him a visit from the  to which he was agreeable.    GOALS OF CARE/SERIOUS ILLNESS CONVERSATION  3/10:  Introduced myself, my teammate Lucretia ALAS, and medical student Juve to Sebastian, his wife Liz, and his daughter Janay. Discussed role of palliative care and reason for consult. Sebastian, Liz, and Janay agreeable to discuss goals of care. Upon initiating the conversation with Sebastian and his family, I let them know that I have been in contact with Dr. Quinones and Dr. Ross to obtain some background information and understanding of the patient's care prior to this hospitalization. After addressing symptom management, patient agreeable to discuss goals of care.    Sebastian and his wife share that they would like to amend his POLST which currently reads DNR/DNI with comfort focused treatment.  They expressed a wish to change this to a more selective focused treatment plan.  We then discussed Sebastian's current CODE STATUS, which we explained is DNR/DNI.  Upon reeducating him on what this means and questioning if this status is okay, Sebastian said he would like to remain a DNR/DNI, however would  like time to think through changing his POLST.  We will readdress this in the next few days. He has been feeling anxious while waiting for an oncologist to discuss his recent PET scan with him.    Provided palliative care contact information and encouraged Sebastian, Liz, and Janay to reach out with any questions/needs.     3/11:  Touched on patient's current POLST which reads DNR/DNI and comfort-focused treatment. Patient and patient's wife amenable with leaving POLST as is at this time, since hospice has been elected.    Code Status: DNR/DNI    ACP Documents: POLST, ACP documents    17 minutes spent discussing advance care planning, this time excludes any other billed services.    Interval diagnostic studies and medical documentation entries pertinent to this case were reviewed independently by me. This patient has at least one acute or chronic illness or injury that poses a threat to life or bodily function. This patient suffers from a high risk of morbidity from additional invasive diagnostic testing or intensive treatment. Discussion of recommendations and coordination of care undertaken with primary provider/treatment team.    Monique Calloway DNP, [facility]  Inpatient Palliative Care Provider  [phone]

## 2025-03-11 NOTE — DISCHARGE SUMMARY
Discharge Summary    CHIEF COMPLAINT ON ADMISSION  Chief Complaint   Patient presents with    Rib Pain     left       Reason for Admission  ems     Admission Date  3/9/2025    CODE STATUS  Comfort Care/DNR    HPI & HOSPITAL COURSE  59 y.o. male with malignant melanoma with metastasis to the bones of the pelvis, chest, on immunotherapy, cancer associated pain treated with oxycodone at home, asthma, hypothyroidism who presented 3/9/2025 with sudden worsening of left chest pain.    The patient explains that it started yesterday. It is exacerbated by movements and deep inspiration, 10 out of 10 with decreased effectiveness of oxycodone 10 to 20 mg every 6 hours.    In the emergency room vital signs significant for heart rate 120 that tachycardia.  Labs significant for hemoglobin 9.5, CO2 19, calcium 8, alkaline phosphatase 131. CTA of the chest that showed metastatic lesions to the lungs bilaterally, trace bilateral pleural effusions, no pulmonary embolism.       He was admitted to the oncology floor for cancer related pain management.  Medical oncology and radiation oncology following who evaluated patient's recent 3/4/2025 PET scan which showed worsening lung and bone metastatic disease.  After goals of care discussion with palliative care, patient opted to transition to comfort care on 3/10/2025.  Per Radiation oncology, no plan for palliative radiation.  Stable patient within current condition is to be discharged to outpatient hospice for continuity of care.    Therefore, he is discharged in guarded and stable condition to hospice.    The patient met 2-midnight criteria for an inpatient stay at the time of discharge.    Discharge Date  3/11/2025    FOLLOW UP ITEMS POST DISCHARGE  Outpatient hospice to follow posthospital discharge care    DISCHARGE DIAGNOSES  Principal Problem:    Cancer related pain (POA: Yes)  Active Problems:    Metastatic melanoma, BRAF V600E POSITIVE  (HCC) (POA: Yes)    Chronic anemia (POA:  Yes)  Resolved Problems:    * No resolved hospital problems. *      FOLLOW UP  Future Appointments   Date Time Provider Department Center   4/7/2025 12:30 PM Amol Ramírez M.D. ONCRMO None   4/17/2025  3:30 PM Linnea Velasquez P.A.-C. [facility] [address]   7/24/2025  2:00 PM Sofia Burleson D.O. SRGONC None     [facility]  [address]  [place] [address]  [phone]          MEDICATIONS ON DISCHARGE     Medication List        START taking these medications        Instructions   acetaminophen 500 MG Tabs  Commonly known as: Tylenol   Take 2 Tablets by mouth every 8 hours.  Dose: 1,000 mg     DULoxetine 30 MG Cpep  Start taking on: March 12, 2025  Commonly known as: Cymbalta   Take 1 Capsule by mouth every day.  Dose: 30 mg            CONTINUE taking these medications        Instructions   albuterol 108 (90 Base) MCG/ACT Aers inhalation aerosol   Inhale 1-2 Puffs every four hours as needed for Shortness of Breath.  Dose: 1-2 Puff     budesonide 0.5 MG/2ML Susp  Commonly known as: Pulmicort   Take 500 mcg by nebulization 2 times a day.  Dose: 500 mcg     cyclobenzaprine 10 mg Tabs  Commonly known as: Flexeril   Take 1 Tablet by mouth every 8 hours as needed for Muscle Spasms.  Dose: 10 mg     levothyroxine 50 MCG Tabs  Commonly known as: Synthroid   50 mcg every morning on an empty stomach.  Dose: 50 mcg     Oxycodone HCl 20 MG Tabs   Take 1 Tablet by mouth every 6 hours as needed (Pain) for up to 15 days.  Dose: 20 mg     Xgeva 120 MG/1.7ML injection  Generic drug: denosumab   Inject 120 mg under the skin one time. * due Tuesday 03/11/25*  Dose: 120 mg            STOP taking these medications      Wixela Inhub 250-50 MCG/ACT Aepb  Generic drug: fluticasone-salmeterol              Allergies  Allergies   Allergen Reactions    Amoxicillin-Pot Clavulanate Vomiting and Nausea       DIET  Orders Placed This Encounter   Procedures    Diet Order Diet: Regular     Standing Status:   Standing      Number of Occurrences:   1     Diet::   Regular [1]       ACTIVITY  As tolerated.  Weight bearing as tolerated    CONSULTATIONS  Medical oncology  Radiation oncology    PROCEDURES  None    LABORATORY  Lab Results   Component Value Date    SODIUM 136 03/09/2025    POTASSIUM 4.2 03/09/2025    CHLORIDE 106 03/09/2025    CO2 19 (L) 03/09/2025    GLUCOSE 107 (H) 03/09/2025    BUN 10 03/09/2025    CREATININE 0.54 03/09/2025        Lab Results   Component Value Date    WBC 4.6 (L) 03/09/2025    HEMOGLOBIN 9.5 (L) 03/09/2025    HEMATOCRIT 32.1 (L) 03/09/2025    PLATELETCT 269 03/09/2025        Total time of the discharge process exceeds 34 minutes.

## 2025-03-11 NOTE — CARE PLAN
The patient is Stable - Low risk of patient condition declining or worsening    Shift Goals  Clinical Goals: Pain control  Patient Goals: Discharge home  Family Goals: improve patient's appetite    Progress made toward(s) clinical / shift goals:      Problem: Knowledge Deficit - Standard  Goal: Patient and family/care givers will demonstrate understanding of plan of care, disease process/condition, diagnostic tests and medications  Description: Target End Date:  1-3 days or as soon as patient condition allows    Document in Patient Education    1.  Patient and family/caregiver oriented to unit, equipment, visitation policy and means for communicating concern  2.  Complete/review Learning Assessment  3.  Assess knowledge level of disease process/condition, treatment plan, diagnostic tests and medications  4.  Explain disease process/condition, treatment plan, diagnostic tests and medications  Outcome: Progressing  Note: Patient and family understand comfort care measures.

## 2025-03-11 NOTE — CARE PLAN
The patient is Watcher - Medium risk of patient condition declining or worsening    Shift Goals  Clinical Goals: Pain control  Patient Goals: Pain control  Family Goals: improve patient's appetite    Progress made toward(s) clinical / shift goals:      Problem: Pain - Standard  Goal: Alleviation of pain or a reduction in pain to the patient’s comfort goal  Outcome: Progressing     Problem: Skin Integrity  Goal: Skin integrity is maintained or improved  Outcome: Progressing     Problem: Fall Risk  Goal: Patient will remain free from falls  Outcome: Progressing       Patient is not progressing towards the following goals:

## 2025-03-11 NOTE — DISCHARGE PLANNING
Case Management Discharge Planning    Admission Date: 3/9/2025  GMLOS: 2.6  ALOS: 1    6-Clicks ADL Score: 19  6-Clicks Mobility Score: 13  PT and/or OT Eval ordered: Yes  Post-acute Referrals Ordered: Yes  Post-acute Choice Obtained: Yes  Has referral(s) been sent to post-acute provider:  Yes      Anticipated Discharge Dispo: Discharge Disposition: D/T to hospice home (50)    DME Needed: No    Action(s) Taken: Patient is medically cleared for discharge. Patient is accepted with [facility] hosp[facility]. Patient to DC with [facility] at 1300 today.     Escalations Completed: None    Medically Clear: Yes

## 2025-03-18 ENCOUNTER — HOSPITAL ENCOUNTER (OUTPATIENT)
Dept: RADIOLOGY | Facility: MEDICAL CENTER | Age: 60
End: 2025-03-18
Payer: COMMERCIAL

## 2025-06-20 NOTE — ED NOTES
H&P reviewed. The patient was examined and there are no changes to the H&P.   Report to JODY Melara on CDU, Transport at bedside. Pt A&Ox4 on RA, all belongings accounted for

## (undated) DEVICE — BLADE SURGICAL #15 - (50/BX 3BX/CA)

## (undated) DEVICE — COVER MAYO STAND X-LG - (22EA/CA)

## (undated) DEVICE — NEEDLE NON SAFETY 25 GA X 1 1/2 IN HYPO (100EA/BX)

## (undated) DEVICE — TOWEL STOP TIMEOUT SAFETY FLAG (40EA/CA)

## (undated) DEVICE — TUBING C&T SET FLYING LEADS DRAIN TUBING (10EA/BX)

## (undated) DEVICE — CANISTER SUCTION 3000ML MECHANICAL FILTER AUTO SHUTOFF MEDI-VAC NONSTERILE LF DISP (40EA/CA)

## (undated) DEVICE — GOWN SURGEONS X-LARGE - DISP. (30/CA)

## (undated) DEVICE — KIT SURGIFLO W/OUT THROMBIN - (6EA/CA)

## (undated) DEVICE — SUTURE 4-0 MONOCRYL PLUS PS-2 - 27 INCH (36/BX)

## (undated) DEVICE — CHLORAPREP 26 ML APPLICATOR - ORANGE TINT(25/CA)

## (undated) DEVICE — MASK ANESTHESIA ADULT  - (100/CA)

## (undated) DEVICE — GOWN WARMING STANDARD FLEX - (30/CA)

## (undated) DEVICE — INTRAOP NEURO IN OR 1:1 PER 15 MIN

## (undated) DEVICE — SUCTION INSTRUMENT YANKAUER BULBOUS TIP W/O VENT (50EA/CA)

## (undated) DEVICE — SODIUM CHL IRRIGATION 0.9% 1000ML (12EA/CA)

## (undated) DEVICE — SYSTEM PEEL & PLACE 13CM INCISIONS

## (undated) DEVICE — DRESSING TRANSPARENT FILM TEGADERM 4 X 4.75" (50EA/BX)"

## (undated) DEVICE — SUTURE 4-0 CHROMIC GUT - 18 INCH G-3 D/A (12/BX)

## (undated) DEVICE — DISSECTOR FINE CURVED JAW VESSEL SEALER 21CM 40DEG (6EA/CA)

## (undated) DEVICE — SUTURE 3-0 VICRYL PLUS SH - 8X 18 INCH (12/BX)

## (undated) DEVICE — SPLINT NASAL DOYLE AIRWAY (2EA/ST)

## (undated) DEVICE — DRAPE LARGE 3 QUARTER - (20/CA)

## (undated) DEVICE — HEAD HOLDER JUNIOR/ADULT

## (undated) DEVICE — CANISTER SUCTION RIGID RED 1500CC (40EA/CA)

## (undated) DEVICE — CLOSURE SKIN STRIP 1/2 X 4 IN - (STERI STRIP) (50/BX 4BX/CA)

## (undated) DEVICE — SET LEADWIRE 5 LEAD BEDSIDE DISPOSABLE ECG (1SET OF 5/EA)

## (undated) DEVICE — SPONGE GAUZESTER 4 X 4 4PLY - (128PK/CA)

## (undated) DEVICE — DRAIN J-VAC 10MM FLAT - (10/CA)

## (undated) DEVICE — GLOVE BIOGEL ECLIPSE PF LATEX SIZE 8.0  (50PR/BX)

## (undated) DEVICE — LASER FIBER SINULIGHT

## (undated) DEVICE — TRAY CATHETER FOLEY URINE METER W/STATLOCK 350ML (10EA/CA)

## (undated) DEVICE — SPONGE GAUZESTER. 2X2 4-PL - (2/PK 50PK/BX 30BX/CS)

## (undated) DEVICE — GLOVE BIOGEL SZ 7 SURGICAL PF LTX - (50PR/BX 4BX/CA)

## (undated) DEVICE — SET EXTENSION WITH 2 PORTS (48EA/CA) ***PART #2C8610 IS A SUBSTITUTE*****

## (undated) DEVICE — COVER LIGHT HANDLE ALC PLUS DISP (18EA/BX)

## (undated) DEVICE — PROTECTOR ULNA NERVE - (36PR/CA)

## (undated) DEVICE — SLEEVE, VASO, THIGH, MED

## (undated) DEVICE — DISH PETRI STERILE (50EA/CA)

## (undated) DEVICE — ELECTRODE 850 FOAM ADHESIVE - HYDROGEL RADIOTRNSPRNT (50/PK)

## (undated) DEVICE — SLEEVE VASO CALF MED - (10PR/CA)

## (undated) DEVICE — TUBING CLEARLINK DUO-VENT - C-FLO (48EA/CA)

## (undated) DEVICE — DRAPE SURG STERI-DRAPE 7X11OD - (40EA/CA)

## (undated) DEVICE — PACK NEURO - (2EA/CA)

## (undated) DEVICE — SENSOR SPO2 NEO LNCS ADHESIVE (20/BX) SEE USER NOTES

## (undated) DEVICE — PACK MINOR BASIN - (2EA/CA)

## (undated) DEVICE — SYRINGE 10 ML CONTROL LL (25EA/BX 4BX/CA)

## (undated) DEVICE — SENSOR OXIMETER ADULT SPO2 RD SET (20EA/BX)

## (undated) DEVICE — SHEET TRANSVERSE LAP - (12EA/CA)

## (undated) DEVICE — LACTATED RINGERS INJ. 500 ML - (24EA/CA)

## (undated) DEVICE — SUTURE GENERAL

## (undated) DEVICE — TUBE CONNECTING SUCTION - CLEAR PLASTIC STERILE 72 IN (50EA/CA)

## (undated) DEVICE — KIT  I.V. START (100EA/CA)

## (undated) DEVICE — SUTURE 1 STRATAFIX SYMMETRIC PDS PLUS CT-1 45CM (12EA/BX)

## (undated) DEVICE — DECANTER FLD BLS - (50/CA)

## (undated) DEVICE — BOVIE BLADE COATED - (50/PK)

## (undated) DEVICE — SOD. CHL. INJ. 0.9% 1000 ML - (14EA/CA 60CA/PF)

## (undated) DEVICE — GLOVE BIOGEL PI INDICATOR SZ 8.0 SURGICAL PF LF -(50/BX 4BX/CA)

## (undated) DEVICE — DRESSING XEROFORM 1X8 - (50/BX 4BX/CA)

## (undated) DEVICE — NEPTUNE 4 PORT MANIFOLD - (20/PK)

## (undated) DEVICE — GLOVE SZ 7.5 BIOGEL PI MICRO - PF LF (50PR/BX)

## (undated) DEVICE — MASK OXYGEN VNYL ADLT MED CONC WITH 7 FOOT TUBING  - (50EA/CA)

## (undated) DEVICE — GLOVE BIOGEL SZ 8 SURGICAL PF LTX - (50PR/BX 4BX/CA)

## (undated) DEVICE — DRAPE SURGICAL U 77X120 - (10/CA)

## (undated) DEVICE — CANISTER SUCTION 3000ML MECHANICAL FILTER AUTO SHUTOFF MEDI-VAC NONSTERILE LF DISP  (40EA/CA)

## (undated) DEVICE — DRAPE IOBAN II INCISE 23X17 - (10EA/BX 4BX/CA)

## (undated) DEVICE — LACTATED RINGERS INJ 1000 ML - (14EA/CA 60CA/PF)

## (undated) DEVICE — ELECTRODE DUAL RETURN W/ CORD - (50/PK)

## (undated) DEVICE — MIDAS LUBRICATOR DIFFUSER PACK (4EA/CA)

## (undated) DEVICE — LIGASURE SM JAW SEALER CRVD - (6EA/CA)

## (undated) DEVICE — GLOVE, LITE (PAIR)

## (undated) DEVICE — SUTURE ETHILON 2-0 FSLX 30 (36PK/BX)"

## (undated) DEVICE — WATER IRRIGATION STERILE 1000ML (12EA/CA)

## (undated) DEVICE — SUTURE 0 VICRYL PLUS CT-1 - 8 X 18 INCH (12/BX)

## (undated) DEVICE — SUTURE 2-0 VICRYL CP-2 (12EA/BX)

## (undated) DEVICE — RESERVOIR SUCTION 100 CC - SILICONE (20EA/CA)

## (undated) DEVICE — DRESSING TRANSPARENT FILM TEGADERM 2.375 X 2.75"  (100EA/BX)"

## (undated) DEVICE — GLOVE BIOGEL PI INDICATOR SZ 6.5 SURGICAL PF LF - (50/BX 4BX/CA)

## (undated) DEVICE — CANNULA O2 COMFORT SOFT EAR ADULT 7 FT TUBING (50/CA)

## (undated) DEVICE — TOWELS CLOTH SURGICAL - (4/PK 20PK/CA)

## (undated) DEVICE — GLOVE SZ 7 BIOGEL PI MICRO - PF LF (50PR/BX 4BX/CA)

## (undated) DEVICE — DRESSING POST OP BORDER 4IN X 12 IN (25EA/CA)

## (undated) DEVICE — BLADE STRAIGHT SINUS (5EA/PK)

## (undated) DEVICE — CATHETER IV 20 GA X 1-1/4 ---SURG.& SDS ONLY--- (50EA/BX)

## (undated) DEVICE — SPONGE KENNEDY SINUS 400426 - (10EA/PK)

## (undated) DEVICE — CONTAINER SPECIMEN BAG OR - STERILE 4 OZ W/LID (100EA/CA)

## (undated) DEVICE — PATTIES SURG X-RAYCOTTONOID - 1/2 X 3 IN (200/CA)

## (undated) DEVICE — SUTURE 3-0 VICRYL PLUS SH - 27 INCH (36/BX)

## (undated) DEVICE — ANTI-FOG SOLUTION - 60BTL/CA

## (undated) DEVICE — BLADE SURGICAL CLIPPER - (50EA/CA)

## (undated) DEVICE — SPONGE NASAL MRCL - 400410 CANN (10EA/PK)

## (undated) DEVICE — SPONGE XRAY 8X4 STERL. 12PL - (10EA/TY 80TY/CA)

## (undated) DEVICE — CELLSAVER PACK

## (undated) DEVICE — PENCIL ELECTSURG 10FT BTN SWH - (50/CA)

## (undated) DEVICE — CORETEMP DRAPE FORM-FITTED EASY DROPANDGO DRAPE FOR USE ON THE CORETEMP FLUID MANAGEMENT 56IN X 56IN

## (undated) DEVICE — CELLSAVER STAT

## (undated) DEVICE — SUTURE 3.5-0 ETHIBOND GREEN CT-2 TAPER (36PK/BX)

## (undated) DEVICE — SUTURE 2-0 ETHILON FS - (36/BX) 18 INCH

## (undated) DEVICE — DRAPE 36X28IN RAD CARM BND BG - (25/CA) O

## (undated) DEVICE — BONE MILL BM210

## (undated) DEVICE — SODIUM CHL. INJ. 0.9% 500ML (24EA/CA 50CA/PF)

## (undated) DEVICE — SPHERE NAVIGATION STEALTH (5EA/TY 12TY/PK)

## (undated) DEVICE — BOVIE BLADE COATED &INSULATED - 25/PK

## (undated) DEVICE — STERI STRIP COMPOUND BENZOIN - TINCTURE 0.6ML WITH APPLICATOR (40EA/BX)

## (undated) DEVICE — KIT ANESTHESIA W/CIRCUIT & 3/LT BAG W/FILTER (20EA/CA)

## (undated) DEVICE — GLOVE BIOGEL INDICATOR SZ 7.5 SURGICAL PF LTX - (50PR/BX 4BX/CA)

## (undated) DEVICE — KIT ROOM DECONTAMINATION

## (undated) DEVICE — GLOVE BIOGEL PI INDICATOR SZ 7.5 SURGICAL PF LF -(50/BX 4BX/CA)

## (undated) DEVICE — BALLOON DILATION MUTI-SINUS LOPROFILE 6MM X 20MM

## (undated) DEVICE — TUBE CONNECT SUCTION CLEAR 120 X 1/4" (50EA/CA)"

## (undated) DEVICE — NEEDLE NON SAFETY 27GA X 1-1/4 IN HYPO (100EA/BX)

## (undated) DEVICE — DRAPE LAPAROTOMY T SHEET - (12EA/CA)

## (undated) DEVICE — PIN HEAD MAYFIELD DISP. (3EA/PK 12PK/BX)